# Patient Record
Sex: FEMALE | Race: WHITE | NOT HISPANIC OR LATINO | Employment: OTHER | URBAN - METROPOLITAN AREA
[De-identification: names, ages, dates, MRNs, and addresses within clinical notes are randomized per-mention and may not be internally consistent; named-entity substitution may affect disease eponyms.]

---

## 2017-01-05 DIAGNOSIS — E03.4 HYPOTHYROIDISM DUE TO ACQUIRED ATROPHY OF THYROID: ICD-10-CM

## 2017-01-05 DIAGNOSIS — I10 ESSENTIAL HYPERTENSION: ICD-10-CM

## 2017-01-05 DIAGNOSIS — N39.41 URGE INCONTINENCE: ICD-10-CM

## 2017-01-05 NOTE — TELEPHONE ENCOUNTER
Pt stating she told nurse to send 90 day supply to [facility] but it was sent to [facility] instead. Pt has to pay full price at local pharmacy. meds pended and pharm updated. Please advise.

## 2017-01-06 RX ORDER — ESOMEPRAZOLE MAGNESIUM 40 MG/1
40 CAPSULE, DELAYED RELEASE ORAL
Qty: 90 CAPSULE | Refills: 1 | Status: SHIPPED | OUTPATIENT
Start: 2017-01-06 | End: 2017-08-22 | Stop reason: SDUPTHER

## 2017-01-06 RX ORDER — LEVOTHYROXINE SODIUM 100 UG/1
100 TABLET ORAL DAILY
Qty: 90 TABLET | Refills: 1 | Status: SHIPPED | OUTPATIENT
Start: 2017-01-06 | End: 2017-06-22 | Stop reason: SDUPTHER

## 2017-01-06 RX ORDER — BENAZEPRIL HYDROCHLORIDE 20 MG/1
20 TABLET ORAL 2 TIMES DAILY
Qty: 180 TABLET | Refills: 1 | Status: SHIPPED | OUTPATIENT
Start: 2017-01-06 | End: 2017-08-22 | Stop reason: SDUPTHER

## 2017-01-06 RX ORDER — OXYBUTYNIN CHLORIDE 5 MG/1
5 TABLET, EXTENDED RELEASE ORAL DAILY
Qty: 90 TABLET | Refills: 1 | Status: SHIPPED | OUTPATIENT
Start: 2017-01-06 | End: 2017-08-22 | Stop reason: SDUPTHER

## 2017-01-10 ENCOUNTER — OFFICE VISIT (OUTPATIENT)
Dept: SPINE | Facility: CLINIC | Age: 67
End: 2017-01-10
Attending: ANESTHESIOLOGY
Payer: COMMERCIAL

## 2017-01-10 VITALS — HEIGHT: 61 IN | WEIGHT: 135 LBS | BODY MASS INDEX: 25.49 KG/M2

## 2017-01-10 DIAGNOSIS — M47.816 LUMBAR FACET ARTHROPATHY: ICD-10-CM

## 2017-01-10 DIAGNOSIS — M47.816 SPONDYLOSIS OF LUMBAR REGION WITHOUT MYELOPATHY OR RADICULOPATHY: Primary | ICD-10-CM

## 2017-01-10 PROCEDURE — 1159F MED LIST DOCD IN RCRD: CPT | Mod: S$GLB,,, | Performed by: ANESTHESIOLOGY

## 2017-01-10 PROCEDURE — 1126F AMNT PAIN NOTED NONE PRSNT: CPT | Mod: S$GLB,,, | Performed by: ANESTHESIOLOGY

## 2017-01-10 PROCEDURE — 99999 PR PBB SHADOW E&M-EST. PATIENT-LVL II: CPT | Mod: PBBFAC,,, | Performed by: ANESTHESIOLOGY

## 2017-01-10 PROCEDURE — 99213 OFFICE O/P EST LOW 20 MIN: CPT | Mod: S$GLB,,, | Performed by: ANESTHESIOLOGY

## 2017-01-10 PROCEDURE — 1157F ADVNC CARE PLAN IN RCRD: CPT | Mod: S$GLB,,, | Performed by: ANESTHESIOLOGY

## 2017-01-10 PROCEDURE — 1160F RVW MEDS BY RX/DR IN RCRD: CPT | Mod: S$GLB,,, | Performed by: ANESTHESIOLOGY

## 2017-01-10 NOTE — PROGRESS NOTES
Subjective:      Patient ID: Osman Johansen is a 66 y.o. female.    Chief Complaint: Follow-up    Referred by: Referral, Self       Returns for follow-up status post facet joint injections.  She has 100% relief.  She does not want anything done.  She has no untoward side effects.  Pain Scales  Best: 0/10  Worst: 8/10  Usually: 5/10  Today: 0/10      Past Medical History   Diagnosis Date    Allergy     Anxiety     Arthritis     Blood transfusion 8 yrs    Degenerative disc disease     Depression     GERD (gastroesophageal reflux disease)     Hypertension     Neuromuscular disorder     Osteoporosis     PUD (peptic ulcer disease)     Thoracic or lumbosacral neuritis or radiculitis 10/19/2012    Thyroid disease        Past Surgical History   Procedure Laterality Date    Hysterectomy  8 yrs     Appendectomy  1965    Tonsillectomy  1954       Review of patient's allergies indicates:   Allergen Reactions    Pcn [penicillins] Swelling    Toradol [ketorolac] Swelling    Vibramycin [doxycycline calcium] Swelling    Cymbalta [duloxetine]      dizzyness    Elavil [amitriptyline]     Lyrica [pregabalin] Swelling    Seroquel [quetiapine]      restless leg symdrome       Current Outpatient Prescriptions   Medication Sig Dispense Refill    benazepril (LOTENSIN) 20 MG tablet Take 1 tablet (20 mg total) by mouth 2 (two) times daily. 1 po qam and 1/2 tab po qpm 180 tablet 1    clonazePAM (KLONOPIN) 0.5 MG tablet Take 1 tablet (0.5 mg total) by mouth nightly as needed for Anxiety. 30 tablet 0    esomeprazole (NEXIUM) 40 MG capsule Take 1 capsule (40 mg total) by mouth before breakfast. 90 capsule 1    estradiol (VIVELLE-DOT) 0.075 mg/24 hr APPLY 1 PATCH TWICE A WEEK 3 patch 2    fluticasone (FLONASE) 50 mcg/actuation nasal spray USE 1 SPRAY IN EACH NOSTRIL DAILY 16 g 11    hyoscyamine (LEVSIN/SL) 0.125 mg Subl Place 1 tablet (0.125 mg total) under the tongue every 4 (four) hours as needed. 45 tablet 3     levothyroxine (SYNTHROID) 100 MCG tablet Take 1 tablet (100 mcg total) by mouth once daily. BRAND NAME ONLY 90 tablet 1    metformin (GLUCOPHAGE) 500 MG tablet Take 1 tablet by mouth once daily.      methylPREDNISolone (MEDROL DOSEPACK) 4 mg tablet use as directed 21 tablet 0    naproxen (NAPROSYN) 500 MG tablet Take 1 tablet (500 mg total) by mouth 2 (two) times daily as needed. 45 tablet 1    oxybutynin (DITROPAN-XL) 5 MG TR24 Take 1 tablet (5 mg total) by mouth once daily. 90 tablet 1    promethazine (PHENERGAN) 25 MG tablet Take 1 tablet (25 mg total) by mouth every 6 (six) hours as needed for Nausea. 45 tablet 2    ropinirole (REQUIP) 2 MG tablet       SUBOXONE 8-2 mg Film 1 Film by Subdermal route once daily.  0    sucralfate (CARAFATE) 1 gram tablet       valacyclovir (VALTREX) 1000 MG tablet Take 1 tablet (1,000 mg total) by mouth 3 (three) times daily. 21 tablet 1     No current facility-administered medications for this visit.        Family History   Problem Relation Age of Onset    Arthritis Mother     Cancer Mother     Heart disease Mother     Hypertension Mother        Social History     Social History    Marital status:      Spouse name: N/A    Number of children: N/A    Years of education: N/A     Occupational History    Not on file.     Social History Main Topics    Smoking status: Former Smoker     Quit date: 1/1/2008    Smokeless tobacco: Never Used    Alcohol use No    Drug use: No    Sexual activity: No     Other Topics Concern    Not on file     Social History Narrative       Review of Systems   HENT: Positive for headaches.    Eyes:        Vision change   Cardiovascular:        Hypertension   Respiratory: Positive for cough.    Endocrine:        Hypothyroidism   Hematologic/Lymphatic: Bruises/bleeds easily.   Genitourinary: Positive for frequency.   Neurological: Positive for aphonia, dizziness and loss of balance.   Psychiatric/Behavioral: Positive for  "depression and memory loss. The patient is nervous/anxious.    All other systems reviewed and are negative.          Objective:     Visit Vitals    Ht 5' 1" (1.549 m)    Wt 61.2 kg (135 lb)    BMI 25.51 kg/m2     Normocephalic.  Atraumatic.  Affect appropriate.  Breathing unlabored.  Extra ocular muscles intact.      Ortho/SPM Exam      Assessment:       Encounter Diagnoses   Name Primary?    Spondylosis of lumbar region without myelopathy or radiculopathy Yes    Lumbar facet arthropathy          Plan:       Osman was seen today for follow-up.    Diagnoses and all orders for this visit:    Spondylosis of lumbar region without myelopathy or radiculopathy    Lumbar facet arthropathy     We discussed with the patient the assessment and recommendations. The following is the plan we agreed on:]  1.  Return as needed.  Scales with her blood pressure and she needs to follow-up with her primary care physician.  She has an appointment on 30 January to support blood pressure.      "

## 2017-01-16 ENCOUNTER — PATIENT OUTREACH (OUTPATIENT)
Dept: ADMINISTRATIVE | Facility: HOSPITAL | Age: 67
End: 2017-01-16

## 2017-01-18 ENCOUNTER — TELEPHONE (OUTPATIENT)
Dept: FAMILY MEDICINE | Facility: CLINIC | Age: 67
End: 2017-01-18

## 2017-01-18 ENCOUNTER — NURSE TRIAGE (OUTPATIENT)
Dept: ADMINISTRATIVE | Facility: CLINIC | Age: 67
End: 2017-01-18

## 2017-01-18 PROBLEM — I10 ESSENTIAL (PRIMARY) HYPERTENSION: Status: ACTIVE | Noted: 2017-01-18

## 2017-01-18 PROBLEM — R07.9 CHEST PAIN AT REST: Status: ACTIVE | Noted: 2017-01-18

## 2017-01-18 PROBLEM — M79.7 FIBROMYALGIA: Status: ACTIVE | Noted: 2017-01-18

## 2017-01-18 NOTE — TELEPHONE ENCOUNTER
FYI: Pt c/o chest pain radiating to left arm, feeling cold and clammy and reporting BP 90/50. She stated off on for the past few days. I advised to report to ED. Pt stated she only wanted to go to a [facility] hospital. I advised best to go to the nearest ED, but pt declined.

## 2017-01-18 NOTE — TELEPHONE ENCOUNTER
Reason for Disposition   Pain also present in shoulder(s) or arm(s) or jaw    Protocols used: ST CHEST PAIN-A-OH    Reynamaitegretchen is calling to report left chest pain/pressure at times radiating down her arm.  At times she is feeling cold/clammy.  Advised ER.  States really did not want to go to ER.  Wants to see her MD.  Please contact Osman to advise.

## 2017-01-19 PROBLEM — I10 ACCELERATED HYPERTENSION: Status: ACTIVE | Noted: 2017-01-19

## 2017-01-20 ENCOUNTER — PATIENT OUTREACH (OUTPATIENT)
Dept: ADMINISTRATIVE | Facility: CLINIC | Age: 67
End: 2017-01-20
Payer: MEDICARE

## 2017-01-20 NOTE — PATIENT INSTRUCTIONS
C3 nurse attempted to contact patient. No answer. The following message was left for the patient to return the call:  Good afternoon I am a nurse calling on behalf of [facility] from the Care Coordination Center.  This is a Transitional Care Call for Osman Johansen . When you have a moment please contact us at [phone] or [phone] Monday through Friday, between the hours of 8 am to 4 pm. We look forward to speaking with you. On behalf of [facility] have a nice day.    The patient does not have a scheduled HOSFU appointment within 7-14 days post hospital discharge date 1/19/17. Message sent to Physician staff to assist with HOSFU appointment scheduling.

## 2017-01-24 ENCOUNTER — PATIENT OUTREACH (OUTPATIENT)
Dept: ADMINISTRATIVE | Facility: HOSPITAL | Age: 67
End: 2017-01-24

## 2017-01-24 NOTE — PROGRESS NOTES
Spoke to patient. She declined to schedule hospital follow up stating she is scheduled with her PCP, Dr. Stiles 1/20/17. Pt states she is also not having any post discharge issues

## 2017-01-24 NOTE — LETTER
January 24, 2017    Osman Johansen  [id number]  [place] [address]             [facility]  [address]  [place] [address]  Phone: [phone] Dear Mrs. Johansen:    We have tried to reach you by mychart unsuccessfully.    [facility] is committed to your overall health.  To help you get the most out of each of your visits, we will review your information to make sure you are up to date on all of your recommended tests and/or procedures.       Dr. Stiles        has found that you may be due for:     Annual Dilated Eye Exam   Tetanus immunization   Osteoporosis screening (DEXA SCAN)   Influenza vaccine   Mammogram     If you have had any of the above done at another facility, please bring the records or information with you so that your record at [facility] will be complete.      If you are currently taking medication , please bring it with you to your appointment for review.     We appreciate the opportunity to provide you with excellent medical care.      Sincerely,  Carmita Vazquez  Clinical Care Coordinator  [facility] [address] [facility] [address]  [place] [address]  Phone: [phone]   Fax: [phone]

## 2017-01-25 ENCOUNTER — OFFICE VISIT (OUTPATIENT)
Dept: OBSTETRICS AND GYNECOLOGY | Facility: CLINIC | Age: 67
End: 2017-01-25
Attending: OBSTETRICS & GYNECOLOGY
Payer: COMMERCIAL

## 2017-01-25 VITALS
BODY MASS INDEX: 26.51 KG/M2 | WEIGHT: 140.44 LBS | SYSTOLIC BLOOD PRESSURE: 112 MMHG | DIASTOLIC BLOOD PRESSURE: 80 MMHG | HEIGHT: 61 IN

## 2017-01-25 DIAGNOSIS — Z12.31 VISIT FOR SCREENING MAMMOGRAM: ICD-10-CM

## 2017-01-25 DIAGNOSIS — Z78.0 MENOPAUSE: Primary | ICD-10-CM

## 2017-01-25 PROCEDURE — 99202 OFFICE O/P NEW SF 15 MIN: CPT | Mod: S$GLB,,, | Performed by: OBSTETRICS & GYNECOLOGY

## 2017-01-25 PROCEDURE — 3074F SYST BP LT 130 MM HG: CPT | Mod: S$GLB,,, | Performed by: OBSTETRICS & GYNECOLOGY

## 2017-01-25 PROCEDURE — 1160F RVW MEDS BY RX/DR IN RCRD: CPT | Mod: S$GLB,,, | Performed by: OBSTETRICS & GYNECOLOGY

## 2017-01-25 PROCEDURE — 1157F ADVNC CARE PLAN IN RCRD: CPT | Mod: S$GLB,,, | Performed by: OBSTETRICS & GYNECOLOGY

## 2017-01-25 PROCEDURE — 99999 PR PBB SHADOW E&M-EST. PATIENT-LVL II: CPT | Mod: PBBFAC,,, | Performed by: OBSTETRICS & GYNECOLOGY

## 2017-01-25 PROCEDURE — 1159F MED LIST DOCD IN RCRD: CPT | Mod: S$GLB,,, | Performed by: OBSTETRICS & GYNECOLOGY

## 2017-01-25 PROCEDURE — 1126F AMNT PAIN NOTED NONE PRSNT: CPT | Mod: S$GLB,,, | Performed by: OBSTETRICS & GYNECOLOGY

## 2017-01-25 PROCEDURE — 3079F DIAST BP 80-89 MM HG: CPT | Mod: S$GLB,,, | Performed by: OBSTETRICS & GYNECOLOGY

## 2017-01-25 RX ORDER — ESTRADIOL 0.07 MG/D
PATCH TRANSDERMAL
Qty: 12 PATCH | Refills: 3 | Status: SHIPPED | OUTPATIENT
Start: 2017-01-25 | End: 2017-12-09 | Stop reason: SDUPTHER

## 2017-01-25 NOTE — PROGRESS NOTES
Subjective:       Patient ID: Osman Johansen is a 66 y.o. female.    Chief Complaint:  Menopause and Depression      History of Present Illness  HPI  This 66 yr old P2 female is here for discussion of ERT.  She was recently seen in ER for angina and had nuclear stress test and was normal.  Is now on nitroglycerine.  She took this for angina in her 30's but then went away.   She is having memory fog and has several days depression at a time then will have energy for week or two then fatigue and takes all she has to breathe.  She went to detox when she stopped her pain meds (fentanyl) for her back injuries.  Is taking Ceboxin and when she weans off of this she doesn't sleep for 4 days at a time.  She has appointment with neurology  When she forgets her patches she notices hot flashes etc. We disscussed  Her estradiol was 78 in Nov.  This is a good level for her so her other symptoms should not be related to this.  We are going to change her patch to weekly instead of twice weekly so she can remember better.  GYN & OB History  No LMP recorded. Patient has had a hysterectomy.   Date of Last Pap: 2013    OB History    Para Term  AB SAB TAB Ectopic Multiple Living   2 2              # Outcome Date GA Lbr Lucian/2nd Weight Sex Delivery Anes PTL Lv   2 Para            1 Para                   Review of Systems  Review of Systems   Constitutional: Negative for chills and fever.   Respiratory: Negative for shortness of breath.    Cardiovascular: Negative for chest pain.   Gastrointestinal: Negative for abdominal pain, nausea and vomiting.   Genitourinary: Negative for difficulty urinating, dyspareunia, genital sores, menstrual problem, pelvic pain, vaginal bleeding, vaginal discharge and vaginal pain.   Skin: Negative for wound.   Hematological: Negative for adenopathy.           Objective:    Physical Exam       Assessment:        1. Menopause               Plan:      Change to climara for easier use and  compliance

## 2017-01-25 NOTE — Clinical Note
Dr Pearson, this is a patient that is coming to see you and I would love a summary of your thoughts.  She is with me now and says ok to send to me.

## 2017-01-27 ENCOUNTER — TELEPHONE (OUTPATIENT)
Dept: FAMILY MEDICINE | Facility: CLINIC | Age: 67
End: 2017-01-27

## 2017-01-30 ENCOUNTER — HOSPITAL ENCOUNTER (OUTPATIENT)
Dept: RADIOLOGY | Facility: HOSPITAL | Age: 67
Discharge: HOME | End: 2017-01-30
Attending: INTERNAL MEDICINE
Payer: COMMERCIAL

## 2017-01-30 ENCOUNTER — OFFICE VISIT (OUTPATIENT)
Dept: FAMILY MEDICINE | Facility: CLINIC | Age: 67
End: 2017-01-30
Payer: COMMERCIAL

## 2017-01-30 VITALS
SYSTOLIC BLOOD PRESSURE: 124 MMHG | HEART RATE: 72 BPM | BODY MASS INDEX: 26.93 KG/M2 | DIASTOLIC BLOOD PRESSURE: 70 MMHG | HEIGHT: 61 IN | WEIGHT: 142.63 LBS

## 2017-01-30 DIAGNOSIS — Z12.31 ENCOUNTER FOR SCREENING MAMMOGRAM FOR BREAST CANCER: ICD-10-CM

## 2017-01-30 DIAGNOSIS — I10 ESSENTIAL HYPERTENSION: ICD-10-CM

## 2017-01-30 DIAGNOSIS — E03.4 HYPOTHYROIDISM DUE TO ACQUIRED ATROPHY OF THYROID: ICD-10-CM

## 2017-01-30 DIAGNOSIS — Z78.0 POST-MENOPAUSAL: ICD-10-CM

## 2017-01-30 DIAGNOSIS — E11.9 CONTROLLED TYPE 2 DIABETES MELLITUS WITHOUT COMPLICATION, WITHOUT LONG-TERM CURRENT USE OF INSULIN: ICD-10-CM

## 2017-01-30 DIAGNOSIS — R51.9 FRONTAL HEADACHE: ICD-10-CM

## 2017-01-30 DIAGNOSIS — Z00.00 ROUTINE PHYSICAL EXAMINATION: Primary | ICD-10-CM

## 2017-01-30 PROCEDURE — 3074F SYST BP LT 130 MM HG: CPT | Mod: S$GLB,,, | Performed by: INTERNAL MEDICINE

## 2017-01-30 PROCEDURE — 99397 PER PM REEVAL EST PAT 65+ YR: CPT | Mod: S$GLB,,, | Performed by: INTERNAL MEDICINE

## 2017-01-30 PROCEDURE — 99999 PR PBB SHADOW E&M-EST. PATIENT-LVL III: CPT | Mod: PBBFAC,,, | Performed by: INTERNAL MEDICINE

## 2017-01-30 PROCEDURE — 3078F DIAST BP <80 MM HG: CPT | Mod: S$GLB,,, | Performed by: INTERNAL MEDICINE

## 2017-01-30 PROCEDURE — 70210 X-RAY EXAM OF SINUSES: CPT | Mod: TC,PO

## 2017-01-30 PROCEDURE — 70210 X-RAY EXAM OF SINUSES: CPT | Mod: 26,,, | Performed by: RADIOLOGY

## 2017-01-30 NOTE — PROGRESS NOTES
Subjective:       Patient ID: Osman Johansen is a 67 y.o. female.    Chief Complaint: Follow-up and Herpes Zoster    HPI Comments: Here for routine health maintenance.   Here with multiple complaints:  HTN - controlled  Hypothyroid - controlled  DM - controlled    Went to ER for CP and low BP.  NM stress test wnl.  Dx non cardiac chest pain    Complains of b/l frontal headaches including top of her head.  Had viral URI with a lot of mucous a few weeks ago.  Gets headaches frequently.      Back pain resolved s/p pain Dr injection    Recall - pain Dr wanted her to see neurology.  Gets cramps on Suboxone.  Trying to wean off and down to a piece of her daily pill but can't because develops RLS that prevents her from sleeping.  She had similar events with leg cramping when on fentanyl and hydrocodone that resolved when placed on Suboxone.  She is down to a piece of Suboxone daily = 1 pill lasts about 4-6 days.  She also has uncontrolled RLS that has been exacerbated as she has tried to wean off Suboxone.  This has been happening off and on for about 6 months now.     Review of Systems   Constitutional: Negative for appetite change and fever.   HENT: Negative for nosebleeds and trouble swallowing.    Eyes: Negative for discharge and visual disturbance.   Respiratory: Negative for choking and shortness of breath.    Cardiovascular: Negative for chest pain and palpitations.   Gastrointestinal: Negative for abdominal pain, nausea and vomiting.   Musculoskeletal: Negative for arthralgias and joint swelling.   Skin: Negative for rash and wound.   Neurological: Positive for headaches. Negative for dizziness and syncope.   Psychiatric/Behavioral: Negative for confusion and dysphoric mood.       Objective:      Vitals:    01/30/17 1505   BP: 124/70   Pulse: 72     Physical Exam   Constitutional: She appears well-nourished.   Eyes: Conjunctivae and EOM are normal.   Neck: Trachea normal and normal range of motion. No  thyromegaly present.   Cardiovascular: Normal heart sounds.    Edema negative   Pulmonary/Chest: Effort normal and breath sounds normal.   Abdominal: Soft. There is no hepatomegaly.   Neurological: No cranial nerve deficit.   DTR decreased bilateral   Skin: Skin is warm, dry and intact.   Psychiatric: She has a normal mood and affect.   Alert and Oriented    Vitals reviewed.        Assessment:       1. Routine physical examination    2. Post-menopausal    3. Encounter for screening mammogram for breast cancer    4. Hypothyroidism due to acquired atrophy of thyroid    5. Frontal headache    6. Essential hypertension    7. Controlled type 2 diabetes mellitus without complication, without long-term current use of insulin        Plan:       Routine physical examination    Post-menopausal  -     DXA Bone Density Spine And Hip_Axial Skeleton; Future; Expected date: 1/30/17    Encounter for screening mammogram for breast cancer  -     Mammo Digital Screening Bilat with CAD; Future; Expected date: 1/30/17    Hypothyroidism due to acquired atrophy of thyroid  -     TSH; Future; Expected date: 7/29/17    Frontal headache  -     X-Ray Sinuses Ltd Less Than 3 Views; Future; Expected date: 1/30/17    Essential hypertension  -     Lipid panel; Future; Expected date: 7/29/17  -     Comprehensive metabolic panel; Future; Expected date: 7/29/17    Controlled type 2 diabetes mellitus without complication, without long-term current use of insulin  -     Hemoglobin A1c; Future; Expected date: 7/29/17    wellness reviewed  Continue current plan of care    Defer to neurology for HA, RLS, muscle cramping        Counseled on regular exercise, maintenance of a healthy weight, balanced diet rich in fruits/vegetables and lean protein, and avoidance of unhealthy habits like smoking and excessive alcohol intake.   Also, counseled on importance of being compliant with medication, health appointments, diet and exercise.     Return in about 6  "months (around 7/30/2017).    "This note will not be shared with the patient."  "

## 2017-01-30 NOTE — MR AVS SNAPSHOT
[facility]  [address]  [facility] [address]  Phone: [phone]  Fax: [phone]                  Osman Johansen   2017 3:10 PM   Office Visit    Description:  Female : 1950   Provider:  Galindo Stiles MD   Department:  [facility]           Reason for Visit     Follow-up     Herpes Zoster           Diagnoses this Visit        Comments    Routine physical examination    -  Primary     Post-menopausal         Encounter for screening mammogram for breast cancer         Hypothyroidism due to acquired atrophy of thyroid         Frontal headache         Essential hypertension         Controlled type 2 diabetes mellitus without complication, without long-term current use of insulin                To Do List           Future Appointments        Provider Department Dept Phone    2017 4:00 PM NS XR3 [facility] [phone]    2017 2:15 PM [facility] MAMMO1 [facility] [phone]    2017 2:40 PM NS DEXA1 [facility] [phone]    2017 1:40 PM Chandana Pearson Jr., MD [facility] [phone]    2017 10:15 AM LAB, [place] [facility] [phone]      Goals (5 Years of Data)     None      Follow-Up and Disposition     Return in about 6 months (around 2017).    Follow-up and Disposition History      [facility]     [facility] -  Assistance  Registered nurses in the [facility] provide clinical advisement, health education, appointment booking, and other advisory services.  Call for this free service at [phone].             Medications           Message regarding Medications     Verify the changes and/or additions to your medication regime listed below are the same as discussed with your clinician today.  If any of these changes or additions are incorrect, please notify your healthcare provider.         STOP taking these medications     clonazePAM (KLONOPIN) 0.5 MG tablet Take 1 tablet (0.5 mg total) by mouth nightly as needed for Anxiety.    estradiol (VIVELLE-DOT) 0.075 mg/24 hr APPLY 1 PATCH TWICE A WEEK    methylPREDNISolone (MEDROL DOSEPACK) 4 mg tablet use as directed    naproxen (NAPROSYN) 500 MG tablet Take 1 tablet (500 mg total) by mouth 2 (two) times daily as needed.    valacyclovir (VALTREX) 1000 MG tablet Take 1 tablet (1,000 mg total) by mouth 3 (three) times daily.           Verify that the below list of medications is an accurate representation of the medications you are currently taking.  If none reported, the list may be blank. If incorrect, please contact your healthcare provider. Carry this list with you in case of emergency.           Current Medications     benazepril (LOTENSIN) 20 MG tablet Take 1 tablet (20 mg total) by mouth 2 (two) times daily. 1 po qam and 1/2 tab po qpm    esomeprazole (NEXIUM) 40 MG capsule Take 1 capsule (40 mg total) by mouth before breakfast.    estradiol (CLIMARA) 0.075 mg/24 hr Apply to clean dry skin once per week    fluticasone (FLONASE) 50 mcg/actuation nasal spray USE 1 SPRAY IN EACH NOSTRIL DAILY    hyoscyamine (LEVSIN/SL) 0.125 mg Subl Place 1 tablet (0.125 mg total) under the tongue every 4 (four) hours as needed.    levothyroxine (SYNTHROID) 100 MCG tablet Take 1 tablet (100 mcg total) by mouth once daily. BRAND NAME ONLY    metformin (GLUCOPHAGE) 500 MG tablet Take 1 tablet by mouth once daily.    oxybutynin (DITROPAN-XL) 5 MG TR24 Take 1 tablet (5 mg total) by mouth once daily.    promethazine (PHENERGAN) 25 MG tablet Take 1 tablet (25 mg total) by mouth every 6 (six) hours as needed for Nausea.    ropinirole (REQUIP) 2 MG tablet 2 mg nightly.     SUBOXONE 8-2 mg Film 1 Film by Subdermal route once daily.    sucralfate (CARAFATE) 1 gram tablet     nitroGLYCERIN (NITROSTAT) 0.4 MG SL tablet Place 1 tablet (0.4 mg total) under the tongue every 5 (five)  "minutes as needed for Chest pain.           Clinical Reference Information           Vital Signs - Last Recorded  Most recent update: 1/30/2017  3:13 PM by Lizet Tee LPN    BP Pulse Ht Wt BMI    124/70 (BP Location: Left arm, Patient Position: Sitting, BP Method: Manual) 72 5' 1" (1.549 m) 64.7 kg (142 lb 10.2 oz) 26.95 kg/m2      Blood Pressure          Most Recent Value    BP  124/70      Allergies as of 1/30/2017     Pcn [Penicillins]    Toradol [Ketorolac]    Vibramycin [Doxycycline Calcium]    Cymbalta [Duloxetine]    Elavil [Amitriptyline]    Lyrica [Pregabalin]    Seroquel [Quetiapine]      Immunizations Administered on Date of Encounter - 1/30/2017     None      Orders Placed During Today's Visit     Future Labs/Procedures Expected by Expires    DXA Bone Density Spine And Hip_Axial Skeleton  1/30/2017 1/30/2018    Mammo Digital Screening Bilat with CAD  1/30/2017 4/1/2018    X-Ray Sinuses Ltd Less Than 3 Views  1/30/2017 1/31/2018    Comprehensive metabolic panel  7/29/2017 3/31/2018    Hemoglobin A1c  7/29/2017 1/30/2018    Lipid panel  7/29/2017 1/30/2018    TSH  7/29/2017 1/30/2018      "

## 2017-02-09 ENCOUNTER — HOSPITAL ENCOUNTER (OUTPATIENT)
Dept: RADIOLOGY | Facility: HOSPITAL | Age: 67
Discharge: HOME | End: 2017-02-09
Attending: INTERNAL MEDICINE
Payer: COMMERCIAL

## 2017-02-09 DIAGNOSIS — Z78.0 POST-MENOPAUSAL: ICD-10-CM

## 2017-02-09 DIAGNOSIS — Z12.31 ENCOUNTER FOR SCREENING MAMMOGRAM FOR BREAST CANCER: ICD-10-CM

## 2017-02-09 PROCEDURE — 77080 DXA BONE DENSITY AXIAL: CPT | Mod: TC,PO

## 2017-02-09 PROCEDURE — 77080 DXA BONE DENSITY AXIAL: CPT | Mod: 26,,, | Performed by: RADIOLOGY

## 2017-02-09 PROCEDURE — 77067 SCR MAMMO BI INCL CAD: CPT | Mod: TC

## 2017-02-09 PROCEDURE — 77067 SCR MAMMO BI INCL CAD: CPT | Mod: 26,,, | Performed by: RADIOLOGY

## 2017-02-09 PROCEDURE — 77063 BREAST TOMOSYNTHESIS BI: CPT | Mod: 26,,, | Performed by: RADIOLOGY

## 2017-02-16 ENCOUNTER — OFFICE VISIT (OUTPATIENT)
Dept: NEUROLOGY | Facility: CLINIC | Age: 67
End: 2017-02-16
Payer: COMMERCIAL

## 2017-02-16 ENCOUNTER — TELEPHONE (OUTPATIENT)
Dept: NEUROLOGY | Facility: CLINIC | Age: 67
End: 2017-02-16

## 2017-02-16 VITALS
HEIGHT: 61 IN | DIASTOLIC BLOOD PRESSURE: 86 MMHG | HEART RATE: 76 BPM | BODY MASS INDEX: 26.93 KG/M2 | WEIGHT: 142.63 LBS | SYSTOLIC BLOOD PRESSURE: 185 MMHG | RESPIRATION RATE: 18 BRPM

## 2017-02-16 DIAGNOSIS — G25.81 RLS (RESTLESS LEGS SYNDROME): ICD-10-CM

## 2017-02-16 DIAGNOSIS — R41.3 MEMORY LOSS: Primary | ICD-10-CM

## 2017-02-16 PROCEDURE — 1126F AMNT PAIN NOTED NONE PRSNT: CPT | Mod: S$GLB,,, | Performed by: PSYCHIATRY & NEUROLOGY

## 2017-02-16 PROCEDURE — 1157F ADVNC CARE PLAN IN RCRD: CPT | Mod: S$GLB,,, | Performed by: PSYCHIATRY & NEUROLOGY

## 2017-02-16 PROCEDURE — 3077F SYST BP >= 140 MM HG: CPT | Mod: S$GLB,,, | Performed by: PSYCHIATRY & NEUROLOGY

## 2017-02-16 PROCEDURE — 1160F RVW MEDS BY RX/DR IN RCRD: CPT | Mod: S$GLB,,, | Performed by: PSYCHIATRY & NEUROLOGY

## 2017-02-16 PROCEDURE — 99205 OFFICE O/P NEW HI 60 MIN: CPT | Mod: S$GLB,,, | Performed by: PSYCHIATRY & NEUROLOGY

## 2017-02-16 PROCEDURE — 3079F DIAST BP 80-89 MM HG: CPT | Mod: S$GLB,,, | Performed by: PSYCHIATRY & NEUROLOGY

## 2017-02-16 PROCEDURE — 1159F MED LIST DOCD IN RCRD: CPT | Mod: S$GLB,,, | Performed by: PSYCHIATRY & NEUROLOGY

## 2017-02-16 PROCEDURE — 99999 PR PBB SHADOW E&M-EST. PATIENT-LVL III: CPT | Mod: PBBFAC,,, | Performed by: PSYCHIATRY & NEUROLOGY

## 2017-02-16 RX ORDER — ROPINIROLE 2 MG/1
3 TABLET, FILM COATED ORAL NIGHTLY
Qty: 90 TABLET | Refills: 3 | Status: SHIPPED | OUTPATIENT
Start: 2017-02-16 | End: 2017-02-22 | Stop reason: SDUPTHER

## 2017-02-16 NOTE — MR AVS SNAPSHOT
[facility]  [address] [place] [address]  Phone: [phone]  Fax: [phone]                  Osman Johansen   2017 1:40 PM   Office Visit    Description:  Female : 1950   Provider:  Chanadna Pearson Jr., MD   Department:  [facility] - Neurology           Reason for Visit     Muscle Pain     Memory Loss           Diagnoses this Visit        Comments    Memory loss    -  Primary     RLS (restless legs syndrome)                To Do List           Future Appointments        Provider Department Dept Phone    3/3/2017 12:50 PM LAB, [place] [facility] [phone]    3/3/2017 1:00 PM [facility] MRI1 [facility] [phone]    2017 3:00 PM Chandana Pearson Jr., MD [facility] [phone]    2017 10:15 AM LAB, [place] [facility] [phone]    2017 1:50 PM Galindo Stiles MD [facility] [phone]      Goals (5 Years of Data)     None       These Medications        Disp Refills Start End    ropinirole (REQUIP) 2 MG tablet 90 tablet 3 2017     Take 1.5 tablets (3 mg total) by mouth nightly. - Oral    Pharmacy: [facility] - [address] #: [phone]         [facility]     [facility] Care [facility] -  Assistance  Registered nurses in the [facility] Center provide clinical advisement, health education, appointment booking, and other advisory services.  Call for this free service at [phone].             Medications           Message regarding Medications     Verify the changes and/or additions to your medication regime listed below are the same as discussed with your clinician today.  If any of these changes or additions are incorrect, please notify your healthcare provider.        CHANGE how you are taking these medications     Start Taking Instead of    ropinirole (REQUIP) 2 MG tablet ropinirole  "(REQUIP) 2 MG tablet    Dosage:  Take 1.5 tablets (3 mg total) by mouth nightly. Dosage:  2 mg nightly.     Reason for Change:  Reorder            Verify that the below list of medications is an accurate representation of the medications you are currently taking.  If none reported, the list may be blank. If incorrect, please contact your healthcare provider. Carry this list with you in case of emergency.           Current Medications     benazepril (LOTENSIN) 20 MG tablet Take 1 tablet (20 mg total) by mouth 2 (two) times daily. 1 po qam and 1/2 tab po qpm    esomeprazole (NEXIUM) 40 MG capsule Take 1 capsule (40 mg total) by mouth before breakfast.    estradiol (CLIMARA) 0.075 mg/24 hr Apply to clean dry skin once per week    fluticasone (FLONASE) 50 mcg/actuation nasal spray USE 1 SPRAY IN EACH NOSTRIL DAILY    hyoscyamine (LEVSIN/SL) 0.125 mg Subl Place 1 tablet (0.125 mg total) under the tongue every 4 (four) hours as needed.    levothyroxine (SYNTHROID) 100 MCG tablet Take 1 tablet (100 mcg total) by mouth once daily. BRAND NAME ONLY    metformin (GLUCOPHAGE) 500 MG tablet Take 1 tablet by mouth once daily.    nitroGLYCERIN (NITROSTAT) 0.4 MG SL tablet Place 1 tablet (0.4 mg total) under the tongue every 5 (five) minutes as needed for Chest pain.    oxybutynin (DITROPAN-XL) 5 MG TR24 Take 1 tablet (5 mg total) by mouth once daily.    promethazine (PHENERGAN) 25 MG tablet Take 1 tablet (25 mg total) by mouth every 6 (six) hours as needed for Nausea.    ropinirole (REQUIP) 2 MG tablet Take 1.5 tablets (3 mg total) by mouth nightly.    SUBOXONE 8-2 mg Film 1 Film by Subdermal route once daily.    sucralfate (CARAFATE) 1 gram tablet            Clinical Reference Information           Your Vitals Were     BP Pulse Resp Height Weight BMI    185/86 (BP Location: Right arm, Patient Position: Sitting, BP Method: Automatic) 76 18 5' 1" (1.549 m) 64.7 kg (142 lb 10.2 oz) 26.95 kg/m2      Blood Pressure          Most " Recent Value    BP  (!)  185/86      Allergies as of 2/16/2017     Pcn [Penicillins]    Toradol [Ketorolac]    Vibramycin [Doxycycline Calcium]    Cymbalta [Duloxetine]    Elavil [Amitriptyline]    Lyrica [Pregabalin]    Seroquel [Quetiapine]      Immunizations Administered on Date of Encounter - 2/16/2017     None      Orders Placed During Today's Visit     Future Labs/Procedures Expected by Expires    Ammonia  2/16/2017 2/16/2018    CBC auto differential  2/16/2017 4/17/2018    Comprehensive metabolic panel  2/16/2017 2/16/2018    Ferritin  2/16/2017 4/17/2018    Folate  2/16/2017 2/16/2018    Iron and TIBC  2/16/2017 4/17/2018    MRI Brain Without Contrast  2/16/2017 2/16/2018    RPR  2/16/2017 2/16/2018    TSH  2/16/2017 2/16/2018    Vitamin B12  2/16/2017 2/16/2018    Vitamin B1  2/16/2017 4/17/2018    Neuropsychological testing  As directed 2/16/2018      Language Assistance Services     ATTENTION: Language assistance services are available, free of charge. Please call [phone].      ATENCIÓN: Si habla español, tiene a lorenzo disposición servicios gratuitos de asistencia lingüística. Llame al [phone].     CHÚ Ý: N?u b?n nói Ti?ng Vi?t, có các d?ch v? h? tr? ngôn ng? mi?n phí dành cho b?n. G?i s? [phone].         [facility] complies with applicable Federal civil rights laws and does not discriminate on the basis of race, color, national origin, age, disability, or sex.

## 2017-02-16 NOTE — PROGRESS NOTES
Date of service:  2/16/2017    Chief complaint:  RLS, Memory Loss    History of present illness:  The patient is a 67 y.o. female referred for evaluation of RLS.  This began about 12 years ago.  She describes it as discomfort in the bilateral LE.  It is worse at night.  It also worsens with Elavil and steroids.  She notices it more when she attempts to wean down on narcotics.  She is presently on Suboxone.  She indicates that this is preventing her from stopping the Suboxone altogether.  She is on Requip, which she feels has not been helpful.  These symptoms are present whenever she does not take Suboxone.    The patient is most concerned with memory loss. This issue began about 20 years ago.  It has been gradually progressive, though she does have good days and bad days.  She has issues with word finding.  It involves short-term memory more than long-term memory.  She reports some difficulties with executive function.  There are issues with multiple step processes. She has no clear problems with ADLs. She does get lost in familiar areas. There are no hallucinations. There are some personality changes.  She does endorse depression, anxiety, and racing of thoughts.      Past Medical History   Diagnosis Date    Allergy     Anxiety     Arthritis     Blood transfusion 8 yrs    Degenerative disc disease     Depression     GERD (gastroesophageal reflux disease)     Hypertension     Neuromuscular disorder     Osteoporosis     PUD (peptic ulcer disease)     Thoracic or lumbosacral neuritis or radiculitis 10/19/2012    Thyroid disease        Past Surgical History   Procedure Laterality Date    Hysterectomy  8 yrs     Appendectomy  1965    Tonsillectomy  1954       Family History   Problem Relation Age of Onset    Arthritis Mother     Cancer Mother     Heart disease Mother     Hypertension Mother        Social History     Social History    Marital status:      Spouse name: N/A    Number of  "children: N/A    Years of education: N/A     Occupational History    Not on file.     Social History Main Topics    Smoking status: Former Smoker     Quit date: 1/1/2008    Smokeless tobacco: Never Used    Alcohol use No    Drug use: No    Sexual activity: No     Other Topics Concern    Not on file     Social History Narrative        Review of Systems  General/Constitutional:  No unintentional weight loss, No change in appetite  Eyes/Vision:  + change in vision, No double vision  ENT:  No frequent nose bleeds, + ringing in the ears  Respiratory:  No cough, No wheezing  Cardiovascular:  + chest pain, + palpitations  Gastrointestinal:  No jaundice, + nausea/vomiting  Genitourinary:  + incontinence, No burning with urination  Hematologic/Lymphatic:  + easy bruising/bleeding, No night sweats  Neurological:  + numbness, + weakness  Endocrine:  + fatigue, + heat/cold intolerance  Allergy/Immunologic:  No fevers, + chills  Musculoskeletal:  + muscle pain, + joint pain   Psychiatric:  No thoughts of harming self/others, + depression  Integumentary:  + rashes, No sores that do not heal     Physical exam:  Visit Vitals    BP (!) 185/86 (BP Location: Right arm, Patient Position: Sitting, BP Method: Automatic)    Pulse 76    Resp 18    Ht 5' 1" (1.549 m)    Wt 64.7 kg (142 lb 10.2 oz)    BMI 26.95 kg/m2     General: Well developed, well nourished.  No acute distress.  HEENT: Atraumatic, normocephalic.  Neck: Supple, trachea midline.  Cardiovascular: Regular rate and rhythm.  Pulmonary: No increased work of breathing.  Abdomen/GI: No guarding.  Musculoskeletal: No obvious joint deformities, moves all extremities well.    Neurological exam:  Mental status: Awake and alert.  Oriented x4.  Speech fluent and appropriate.  Recent and remote memory appear to be largely intact.  Fund of knowledge seems normal.  MMSE = 29/30.  Cranial nerves: Pupils equal round and reactive to light, extraocular movements intact, facial " strength and sensation intact bilaterally, palate and tongue midline, hearing grossly intact bilaterally.  Motor: 5 out of 5 strength throughout the upper and lower extremities bilaterally. Normal bulk and tone.  Sensation: Intact to light touch and temperature bilaterally.  DTR: 2+ at the knees and biceps bilaterally.  Coordination: Finger-nose-finger testing intact bilaterally.  Gait: Nonfocal gait.    Data base:  Notes of the referring physician were reviewed.  Briefly summarized, these included a recent visit for a variety of issues including HTN, DM, frontal headache, and thyroid disease.    Assessment and plan:  The patient is a 67 y.o. female referred for evaluation of RLS.  I do not see recent iron studies, so we will check some serologies.  Since she has not seen much benefit from Requip at this point but is also not having side effects, we will increase it to 3 mg QHS.  I would not feel comfortable maintaining her on narcotics for her RLS.  Given the severity of her symptoms, we will have her work with one of our movement disorder specialists.      She also complains of memory loss. I feel that the differential diagnosis includes MCI and pseudodementia.  I suspect the latter.  Options for evaluation were discussed, and the patient wishes to pursue an aggressive evaluation.  We will order MRI of the brain, serologies, and neuropsychological testing. We will plan on seeing the patient back in a few months.    The patient was noted to be hypertensive in clinic today.  Repeat BP check confirmed this.  I have asked the patient to follow up with their PCP about this and my staff to message the patient's PCP.

## 2017-02-16 NOTE — LETTER
February 27, 2017      Galindo Stiles MD  [address] [place] [address]           [facility]  [address] [place] [address]  Phone: [phone]  Fax: [phone]          Patient: Osman Johansen   MR Number: [id number]   YOB: 1950   Date of Visit: 2/16/2017       Dear Dr. Galindo Stiles:    Thank you for referring Osman Johansen to me for evaluation. Attached you will find relevant portions of my assessment and plan of care.    If you have questions, please do not hesitate to call me. I look forward to following Osman Johansen along with you.    Sincerely,    Chandana Pearson Jr., MD    Enclosure  CC:  No Recipients    If you would like to receive this communication electronically, please contact [email or web address] or [phone] to request more information on [employer] Link access.    For providers and/or their staff who would like to refer a patient to [facility], please contact us through our one-stop-shop provider referral line, [facility], at [phone].    If you feel you have received this communication in error or would no longer like to receive these types of communications, please e-mail [email or web address]

## 2017-02-16 NOTE — TELEPHONE ENCOUNTER
Patient referred to [facility]. Please contact patient to schedule appt.  Patent willing  to come to [place].

## 2017-02-22 DIAGNOSIS — G25.81 RLS (RESTLESS LEGS SYNDROME): ICD-10-CM

## 2017-02-22 DIAGNOSIS — R41.3 MEMORY LOSS: ICD-10-CM

## 2017-02-22 RX ORDER — ROPINIROLE 2 MG/1
3 TABLET, FILM COATED ORAL NIGHTLY
Qty: 135 TABLET | Refills: 3 | Status: SHIPPED | OUTPATIENT
Start: 2017-02-22 | End: 2020-09-08

## 2017-02-27 DIAGNOSIS — K29.70 GASTRITIS: ICD-10-CM

## 2017-03-01 RX ORDER — SUCRALFATE 1 G/1
TABLET ORAL
Qty: 90 TABLET | Refills: 1 | Status: SHIPPED | OUTPATIENT
Start: 2017-03-01 | End: 2017-08-22 | Stop reason: SDUPTHER

## 2017-03-02 ENCOUNTER — TELEPHONE (OUTPATIENT)
Dept: PAIN MEDICINE | Facility: CLINIC | Age: 67
End: 2017-03-02

## 2017-03-02 NOTE — TELEPHONE ENCOUNTER
Please review. Patient is asking for a repeat B/L Facet Injs @ L3/4. Please advise. This injection was performed 12/15/2016

## 2017-03-02 NOTE — TELEPHONE ENCOUNTER
----- Message from Candace Rushing sent at 3/1/2017 10:27 AM CST -----  _  1st Request  _  2nd Request  _  3rd Request        Who: Osman Costello    Why: pt is calling to schedule another injection, just like the one she had on 12/15/16    What Number to Call Back:[phone]    When to Expect a call back: (Before the end of the day)   -- if the call is after 12:00, the call back will be tomorrow.

## 2017-03-03 ENCOUNTER — HOSPITAL ENCOUNTER (OUTPATIENT)
Dept: RADIOLOGY | Facility: HOSPITAL | Age: 67
Discharge: HOME | End: 2017-03-03
Attending: PSYCHIATRY & NEUROLOGY
Payer: COMMERCIAL

## 2017-03-03 DIAGNOSIS — R41.3 MEMORY LOSS: ICD-10-CM

## 2017-03-03 PROCEDURE — 70551 MRI BRAIN STEM W/O DYE: CPT | Mod: TC,PO

## 2017-03-03 PROCEDURE — 70551 MRI BRAIN STEM W/O DYE: CPT | Mod: 26,,, | Performed by: RADIOLOGY

## 2017-03-03 NOTE — TELEPHONE ENCOUNTER
Left voice message requesting a return call to schedule for a Bilateral Facet injections at L3/4 with Dr. Belcher.

## 2017-03-13 ENCOUNTER — SURGERY (OUTPATIENT)
Age: 67
End: 2017-03-13

## 2017-03-13 ENCOUNTER — HOSPITAL ENCOUNTER (OUTPATIENT)
Facility: OTHER | Age: 67
Discharge: HOME | End: 2017-03-13
Attending: ANESTHESIOLOGY | Admitting: ANESTHESIOLOGY
Payer: COMMERCIAL

## 2017-03-13 VITALS
TEMPERATURE: 98 F | HEART RATE: 55 BPM | SYSTOLIC BLOOD PRESSURE: 176 MMHG | HEIGHT: 61 IN | BODY MASS INDEX: 26.06 KG/M2 | OXYGEN SATURATION: 100 % | RESPIRATION RATE: 18 BRPM | DIASTOLIC BLOOD PRESSURE: 77 MMHG | WEIGHT: 138 LBS

## 2017-03-13 DIAGNOSIS — M50.30 DDD (DEGENERATIVE DISC DISEASE), CERVICAL: Primary | ICD-10-CM

## 2017-03-13 DIAGNOSIS — R52 PAIN: ICD-10-CM

## 2017-03-13 LAB — POCT GLUCOSE: 84 MG/DL (ref 70–110)

## 2017-03-13 PROCEDURE — 25500020 PHARM REV CODE 255: Performed by: ANESTHESIOLOGY

## 2017-03-13 PROCEDURE — 64493 INJ PARAVERT F JNT L/S 1 LEV: CPT | Mod: 50 | Performed by: ANESTHESIOLOGY

## 2017-03-13 PROCEDURE — 64493 INJ PARAVERT F JNT L/S 1 LEV: CPT | Mod: 50,,, | Performed by: ANESTHESIOLOGY

## 2017-03-13 PROCEDURE — 25000003 PHARM REV CODE 250: Performed by: ANESTHESIOLOGY

## 2017-03-13 PROCEDURE — 63600175 PHARM REV CODE 636 W HCPCS: Performed by: ANESTHESIOLOGY

## 2017-03-13 RX ORDER — BUPIVACAINE HYDROCHLORIDE 2.5 MG/ML
INJECTION, SOLUTION INFILTRATION; PERINEURAL
Status: DISCONTINUED | OUTPATIENT
Start: 2017-03-13 | End: 2017-03-13 | Stop reason: HOSPADM

## 2017-03-13 RX ORDER — LIDOCAINE HYDROCHLORIDE 10 MG/ML
INJECTION INFILTRATION; PERINEURAL
Status: DISCONTINUED | OUTPATIENT
Start: 2017-03-13 | End: 2017-03-13 | Stop reason: HOSPADM

## 2017-03-13 RX ORDER — TRIAMCINOLONE ACETONIDE 40 MG/ML
INJECTION, SUSPENSION INTRA-ARTICULAR; INTRAMUSCULAR
Status: DISCONTINUED | OUTPATIENT
Start: 2017-03-13 | End: 2017-03-13 | Stop reason: HOSPADM

## 2017-03-13 RX ADMIN — TRIAMCINOLONE ACETONIDE 40 MG: 40 INJECTION, SUSPENSION INTRA-ARTICULAR; INTRAMUSCULAR at 01:03

## 2017-03-13 RX ADMIN — LIDOCAINE HYDROCHLORIDE 10 ML: 10 INJECTION, SOLUTION INFILTRATION; PERINEURAL at 01:03

## 2017-03-13 RX ADMIN — BUPIVACAINE HYDROCHLORIDE 10 ML: 2.5 INJECTION, SOLUTION INFILTRATION; PERINEURAL at 01:03

## 2017-03-13 RX ADMIN — IOHEXOL 10 ML: 300 INJECTION, SOLUTION INTRAVENOUS at 01:03

## 2017-03-13 RX ADMIN — SODIUM BICARBONATE 10 ML: 0.2 INJECTION, SOLUTION INTRAVENOUS at 01:03

## 2017-03-13 NOTE — IP AVS SNAPSHOT
[facility] Location ([facility])  [address] [place], [place] [address]  Phone: [phone]           Patient Discharge Instructions     Our goal is to set you up for success. This packet includes information on your condition, medications, and your home care. It will help you to care for yourself so you don't get sicker and need to go back to the hospital.     Please ask your nurse if you have any questions.        There are many details to remember when preparing to leave the hospital. Here is what you will need to do:    1. Take your medicine. If you are prescribed medications, review your Medication List in the following pages. You may have new medications to  at the pharmacy and others that you'll need to stop taking. Review the instructions for how and when to take your medications. Talk with your doctor or nurses if you are unsure of what to do.     2. Go to your follow-up appointments. Specific follow-up information is listed in the following pages. Your may be contacted by a transition nurse or clinical provider about future appointments. Be sure we have all of the phone numbers to reach you, if needed. Please contact your provider's office if you are unable to make an appointment.     3. Watch for warning signs. Your doctor or nurse will give you detailed warning signs to watch for and when to call for assistance. These instructions may also include educational information about your condition. If you experience any of warning signs to your health, call your doctor.               [facility]  Unless otherwise directed by your provider, please contact [facility], our nurse care line that is available for 24/7 assistance.     [phone] (toll-free)    Registered nurses in the [facility] provide clinical advisement, health education, appointment booking, and other advisory services.                    ** Verify the list of medication(s) below is accurate and up to  date. Carry this with you in case of emergency. If your medications have changed, please notify your healthcare provider.             Medication List      ASK your doctor about these medications        Additional Info                      benazepril 20 MG tablet   Commonly known as:  LOTENSIN   Quantity:  180 tablet   Refills:  1   Dose:  20 mg    Instructions:  Take 1 tablet (20 mg total) by mouth 2 (two) times daily. 1 po qam and 1/2 tab po qpm     Begin Date    AM    Noon    PM    Bedtime       esomeprazole 40 MG capsule   Commonly known as:  NEXIUM   Quantity:  90 capsule   Refills:  1   Dose:  40 mg    Instructions:  Take 1 capsule (40 mg total) by mouth before breakfast.     Begin Date    AM    Noon    PM    Bedtime       estradiol 0.075 mg/24 hr   Commonly known as:  CLIMARA   Quantity:  12 patch   Refills:  3    Instructions:  Apply to clean dry skin once per week     Begin Date    AM    Noon    PM    Bedtime       fluticasone 50 mcg/actuation nasal spray   Commonly known as:  FLONASE   Quantity:  16 g   Refills:  11    Instructions:  USE 1 SPRAY IN EACH NOSTRIL DAILY     Begin Date    AM    Noon    PM    Bedtime       hyoscyamine 0.125 mg Subl   Commonly known as:  LEVSIN/SL   Quantity:  45 tablet   Refills:  3   Dose:  0.125 mg    Instructions:  Place 1 tablet (0.125 mg total) under the tongue every 4 (four) hours as needed.     Begin Date    AM    Noon    PM    Bedtime       levothyroxine 100 MCG tablet   Commonly known as:  SYNTHROID   Quantity:  90 tablet   Refills:  1   Dose:  100 mcg    Instructions:  Take 1 tablet (100 mcg total) by mouth once daily. BRAND NAME ONLY     Begin Date    AM    Noon    PM    Bedtime       metformin 500 MG tablet   Commonly known as:  GLUCOPHAGE   Refills:  0   Dose:  1 tablet    Instructions:  Take 1 tablet by mouth once daily.     Begin Date    AM    Noon    PM    Bedtime       nitroGLYCERIN 0.4 MG SL tablet   Commonly known as:  NITROSTAT   Quantity:  30 tablet    Refills:  0   Dose:  0.4 mg    Instructions:  Place 1 tablet (0.4 mg total) under the tongue every 5 (five) minutes as needed for Chest pain.     Begin Date    AM    Noon    PM    Bedtime       oxybutynin 5 MG Tr24   Commonly known as:  DITROPAN-XL   Quantity:  90 tablet   Refills:  1   Dose:  5 mg    Instructions:  Take 1 tablet (5 mg total) by mouth once daily.     Begin Date    AM    Noon    PM    Bedtime       promethazine 25 MG tablet   Commonly known as:  PHENERGAN   Quantity:  45 tablet   Refills:  2   Dose:  25 mg    Instructions:  Take 1 tablet (25 mg total) by mouth every 6 (six) hours as needed for Nausea.     Begin Date    AM    Noon    PM    Bedtime       ropinirole 2 MG tablet   Commonly known as:  REQUIP   Quantity:  135 tablet   Refills:  3   Dose:  3 mg    Instructions:  Take 1.5 tablets (3 mg total) by mouth nightly.     Begin Date    AM    Noon    PM    Bedtime       SUBOXONE 8-2 mg Film   Refills:  0   Dose:  1 Film   Generic drug:  buprenorphine-naloxone    Instructions:  1 Film by Subdermal route once daily.     Begin Date    AM    Noon    PM    Bedtime       * sucralfate 1 gram tablet   Commonly known as:  CARAFATE   Refills:  0      Begin Date    AM    Noon    PM    Bedtime       * sucralfate 1 gram tablet   Commonly known as:  CARAFATE   Quantity:  90 tablet   Refills:  1    Instructions:  TAKE 1 TABLET FOUR TIMES A DAY AS NEEDED FOR GASTRITIS     Begin Date    AM    Noon    PM    Bedtime       * Notice:  This list has 2 medication(s) that are the same as other medications prescribed for you. Read the directions carefully, and ask your doctor or other care provider to review them with you.               Please bring to all follow up appointments:    1. A copy of your discharge instructions.  2. All medicines you are currently taking in their original bottles.  3. Identification and insurance card.    Please arrive 15 minutes ahead of scheduled appointment time.    Please call 24 hours in  advance if you must reschedule your appointment and/or time.        Your Scheduled Appointments     Mar 28, 2017  1:20 PM CDT   Established Patient Visit with BISHOP Rodriguez   [facility]    [address]  [facility] [address]   [phone]            May 03, 2017  3:20 PM CDT   New Patient with Connie Hanson MD   [facility]    [address] [facility] [address]  [facility] [address]   [phone]            May 24, 2017  3:00 PM CDT   Established Patient Visit with Chandana Pearson Jr., MD   [facility]    [address] [facility] [address]  [facility] [address]   [phone]            Jul 28, 2017 10:15 AM CDT   Fasting Lab with [facility] [place] [facility] ([facility])    [address] [facility] [address]  [facility] [address]   [phone]            Aug 04, 2017  1:50 PM CDT   Established Patient Visit with Galindo Stiles MD   [facility] ([facility])    [address] [facility] [address]  [facility] [address]   [phone]                  Discharge Instructions       Home Care Instructions Pain Management:    1. DIET:   You may resume your normal diet today.   2. BATHING:   You may shower with luke warm water. No soaking in tub.  3. DRESSING:   You may remove your bandage today.   4. ACTIVITY LEVEL:   You may resume your normal activities 24 hrs after your procedure.  5. MEDICATIONS:   You may resume your normal medications today.   6. SPECIAL INSTRUCTIONS:   No heat to the injection site for 24 hrs including, bath or shower, heating pad, moist heat, or hot tubs.    Use ice pack to injection site for any pain or discomfort.  Apply ice packs for 20 minute intervals as needed.   If you have received any sedatives by mouth today you may not drive for 12 hours.    If you have received any sedation through your IV, you may not drive for 24 hrs.     PLEASE CALL YOUR DOCTOR IF:  1. Redness or swelling around the injection site.  2. Fever of  "101 degrees  3. Drainage (pus) from the injection site.  4. For any continuous bleeding (some dried blood over the incision is normal.)  5. For severe headache that is relieved when lying flat.    FOR EMERGENCIES:   If any unusual problems or difficulties occur during clinic hours, call [phone] or 557.         Admission Information     Date & Time Provider Department CSN    3/13/2017 12:39 PM Talia Belcher MD [facility] [id number]      Care Providers     Provider Role Specialty Primary office phone    Talia Belcher MD Attending Provider Pain Medicine [phone]    Talia Belcher MD Surgeon  Pain Medicine [phone]      Your Vitals Were     BP Pulse Temp Resp Height Weight    176/77 (BP Location: Right arm, Patient Position: Lying, BP Method: Automatic) 55 98 °F (36.7 °C) (Oral) 18 5' 1" (1.549 m) 62.6 kg (138 lb)    SpO2 BMI             100% 26.07 kg/m2         Recent Lab Values        9/17/2015 1/21/2016 8/25/2016 11/25/2016                  3:40 PM  1:40 PM 12:44 PM  1:34 PM        A1C 5.9 5.9 6.1 6.0        Comment for A1C at 12:44 PM on 8/25/2016:  According to ADA guidelines, hemoglobin A1C <7.0% represents  optimal control in non-pregnant diabetic patients.  Different  metrics may apply to specific populations.   Standards of Medical Care in Diabetes - 2016.  For the purpose of screening for the presence of diabetes:  <5.7%     Consistent with the absence of diabetes  5.7-6.4%  Consistent with increasing risk for diabetes   (prediabetes)  >or=6.5%  Consistent with diabetes  Currently no consensus exists for use of hemoglobin A1C  for diagnosis of diabetes for children.      Comment for A1C at  1:34 PM on 11/25/2016:  According to ADA guidelines, hemoglobin A1C <7.0% represents  optimal control in non-pregnant diabetic patients.  Different  metrics may apply to specific populations.   Standards of Medical Care in Diabetes - 2016.  For the purpose of screening for the presence of " diabetes:  <5.7%     Consistent with the absence of diabetes  5.7-6.4%  Consistent with increasing risk for diabetes   (prediabetes)  >or=6.5%  Consistent with diabetes  Currently no consensus exists for use of hemoglobin A1C  for diagnosis of diabetes for children.        Allergies as of 3/13/2017        Reactions    Pcn [Penicillins] Swelling    Toradol [Ketorolac] Swelling    Vibramycin [Doxycycline Calcium] Swelling    Cymbalta [Duloxetine]     dizzyness    Elavil [Amitriptyline]     Lyrica [Pregabalin] Swelling    Seroquel [Quetiapine]     restless leg symdrome      Advance Directives     An advance directive is a document which, in the event you are no longer able to make decisions for yourself, tells your healthcare team what kind of treatment you do or do not want to receive, or who you would like to make those decisions for you.  If you do not currently have an advance directive, [facility] encourages you to create one.  For more information call:  [phone]), [phone] ([phone]),  or log on to [email or web address].        Language Assistance Services     ATTENTION: Language assistance services are available, free of charge. Please call [phone].      ATENCIÓN: Si habla español, tiene a lorenzo disposición servicios gratuitos de asistencia lingüística. Llame al [phone].     [facility] Ý: N?u b?n nói Ti?ng Vi?t, có các d?ch v? h? tr? ngôn ng? mi?n phí dành cho b?n. G?i s? [phone].         [facility] complies with applicable Federal civil rights laws and does not discriminate on the basis of race, color, national origin, age, disability, or sex.

## 2017-03-13 NOTE — DISCHARGE INSTRUCTIONS

## 2017-03-13 NOTE — OP NOTE
Thoracic/Lumbar Facet Joint Injection Under Fluoroscopy  Time-out taken to identify patient and procedure side prior to starting the procedure.             Date of Service: 03/13/2017    PCP: Galindo Stiles MD    PROCEDURE:  bilateral facet joint injection at L3-4 under fluoroscopy.    REASON FOR PROCEDURE: lumbar facet degenerative disease    PHYSICIAN: Talia Belcher MD  ASSISTANTS: none    MEDICATIONS INJECTED:  0.5ml Kenalog 40mg and Bupivacaine 0.25% 10ml. Injected 1.5ml  per level.  LOCAL ANESTHETIC USED:   Xylocaine 1% 9ml with Sodium Bicarbonate 1ml. 3ml per site.  SEDATION MEDICATIONS: None    ESTIMATED BLOOD LOSS:  None.    COMPLICATIONS:  None.    TECHNIQUE:   Lying in a prone position, the patient was prepped and draped in the usual sterile fashion using ChloraPrep and fenestrated drape.  The level was determined under fluoroscopic guidance.  Local anesthetic was given by going down to the hub of the 27-gauge 1.25in needle and raising a wheel.  The 3.5in 22-gauge needle was introduced into the >>facet joint.  Negative pressure applied to make sure that there was no intravascular placement.  Omnipaque was injected to confirm placement.  It was also done to confirm that there was no vascular runoff.  Medication was then injected slowly.  The patient tolerated the procedure well.     PAIN BEFORE THE PROCEDURE:  4/10    PAIN AFTER THE PROCEDURE: 0/10    The patient was monitored after the procedure.  Patient was given post procedure and discharge instructions to follow at home.  We will see the patient back in two weeks or the patient may call to inform of status. The patient was discharged in a stable condition

## 2017-03-28 ENCOUNTER — OFFICE VISIT (OUTPATIENT)
Dept: PAIN MEDICINE | Facility: CLINIC | Age: 67
End: 2017-03-28
Payer: COMMERCIAL

## 2017-03-28 VITALS
BODY MASS INDEX: 26.22 KG/M2 | DIASTOLIC BLOOD PRESSURE: 58 MMHG | HEIGHT: 61 IN | TEMPERATURE: 99 F | WEIGHT: 138.88 LBS | HEART RATE: 69 BPM | RESPIRATION RATE: 18 BRPM | SYSTOLIC BLOOD PRESSURE: 114 MMHG

## 2017-03-28 DIAGNOSIS — M53.3 SACROILIAC DYSFUNCTION: ICD-10-CM

## 2017-03-28 DIAGNOSIS — M43.06 SPONDYLOLYSIS OF LUMBAR REGION: ICD-10-CM

## 2017-03-28 DIAGNOSIS — M47.816 FACET DEGENERATION OF LUMBAR REGION: Primary | ICD-10-CM

## 2017-03-28 DIAGNOSIS — M51.36 DDD (DEGENERATIVE DISC DISEASE), LUMBAR: ICD-10-CM

## 2017-03-28 PROCEDURE — 1160F RVW MEDS BY RX/DR IN RCRD: CPT | Mod: S$GLB,,, | Performed by: NURSE PRACTITIONER

## 2017-03-28 PROCEDURE — 99213 OFFICE O/P EST LOW 20 MIN: CPT | Mod: S$GLB,,, | Performed by: NURSE PRACTITIONER

## 2017-03-28 PROCEDURE — 3078F DIAST BP <80 MM HG: CPT | Mod: S$GLB,,, | Performed by: NURSE PRACTITIONER

## 2017-03-28 PROCEDURE — 3074F SYST BP LT 130 MM HG: CPT | Mod: S$GLB,,, | Performed by: NURSE PRACTITIONER

## 2017-03-28 PROCEDURE — 1125F AMNT PAIN NOTED PAIN PRSNT: CPT | Mod: S$GLB,,, | Performed by: NURSE PRACTITIONER

## 2017-03-28 PROCEDURE — 99999 PR PBB SHADOW E&M-EST. PATIENT-LVL III: CPT | Mod: PBBFAC,,, | Performed by: NURSE PRACTITIONER

## 2017-03-28 PROCEDURE — 1159F MED LIST DOCD IN RCRD: CPT | Mod: S$GLB,,, | Performed by: NURSE PRACTITIONER

## 2017-03-28 PROCEDURE — 1157F ADVNC CARE PLAN IN RCRD: CPT | Mod: S$GLB,,, | Performed by: NURSE PRACTITIONER

## 2017-03-28 NOTE — PROGRESS NOTES
Chronic patient Established Note (Follow up visit)      SUBJECTIVE:    Osman Johansen presents to the clinic for a follow-up appointment for lower back pain.  She is s/p bilateral L3-4 facet joint injections on 3/13/17 with about 30% relief.  She previously had 100% relief for about 6 weeks.  She continue to report lower back pain without radiation into her legs.  Her pain is worse with standing and sitting for prolonged periods of time.  She was evaluated by neurology in February for gait disturbance and short term memory loss.  She had a brain MRI and is scheduled to follow up in May.  Since the last visit, Osman Johansen states the pain has been worsening.  Current pain intensity is 4/10.    Pain Disability Index Review:  Last 3 PDI Scores 3/28/2017 1/10/2017 12/13/2016   Pain Disability Index (PDI) 38 37 41       Pain Medications:  None    Opioid Contract: no     report:  Reviewed and consistent with medication use as prescribed.    Pain Procedures:   7/18/13 Bilateral L5 TF JOCE  10/28/13 Bilateral SI joint injection  4/20/15 Bilateral SI joint injection  12/15/16 Bilateral L3-4 facet joint injections- 100% relief for 6 weeks  3/13/17 Bilateral L3-4 facet joint injections- 30% relief      Physical Therapy/Home Exercise: per self    Imaging:     Lumbar MRI 12/10/16    Narrative   MRI LUMBAR SPINE WITHOUT CONTRAST    COMPARISON: None    TECHNIQUE:  Sagittal T1, T2, and STIR;  axial T1 and T2 sequences through the lumbar spine were acquired without contrast.    FINDINGS: Vertebral body height and alignment are within normal limits.  The conus terminates at T12-L1.  Moderate loss of disc height is present at L4-5.  Marrow signal is mildly heterogeneous.  No focal osseous lesion.    L1-L2:  No disc herniation. No spinal canal or neuroforaminal narrowing.    L2-L3:  Mild diffuse disc bulging is present without spinal canal or neuroforaminal narrowing.    L3-L4:  Mild diffuse disc bulging and moderate  bilateral facet arthropathy result in mild spinal canal narrowing.    L4-L5:  Mild diffuse disc bulging is present without spinal canal or neuroforaminal narrowing.    L5-S1:  No disc herniation. No spinal canal or neuroforaminal narrowing.    Several small gallstones noted.   Impression     Degenerative lumbar spondylosis with mild L3-4 spinal canal narrowing and no significant neuroforaminal narrowing.  Cholelithiasis         Allergies:   Review of patient's allergies indicates:   Allergen Reactions    Pcn [penicillins] Swelling    Toradol [ketorolac] Swelling    Vibramycin [doxycycline calcium] Swelling    Cymbalta [duloxetine]      dizzyness    Elavil [amitriptyline]     Lyrica [pregabalin] Swelling    Seroquel [quetiapine]      restless leg symdrome       Current Medications:   Current Outpatient Prescriptions   Medication Sig Dispense Refill    benazepril (LOTENSIN) 20 MG tablet Take 1 tablet (20 mg total) by mouth 2 (two) times daily. 1 po qam and 1/2 tab po qpm 180 tablet 1    esomeprazole (NEXIUM) 40 MG capsule Take 1 capsule (40 mg total) by mouth before breakfast. 90 capsule 1    estradiol (CLIMARA) 0.075 mg/24 hr Apply to clean dry skin once per week 12 patch 3    fluticasone (FLONASE) 50 mcg/actuation nasal spray USE 1 SPRAY IN EACH NOSTRIL DAILY 16 g 11    hyoscyamine (LEVSIN/SL) 0.125 mg Subl Place 1 tablet (0.125 mg total) under the tongue every 4 (four) hours as needed. 45 tablet 3    levothyroxine (SYNTHROID) 100 MCG tablet Take 1 tablet (100 mcg total) by mouth once daily. BRAND NAME ONLY 90 tablet 1    metformin (GLUCOPHAGE) 500 MG tablet Take 1 tablet by mouth once daily.      nitroGLYCERIN (NITROSTAT) 0.4 MG SL tablet Place 1 tablet (0.4 mg total) under the tongue every 5 (five) minutes as needed for Chest pain. 30 tablet 0    oxybutynin (DITROPAN-XL) 5 MG TR24 Take 1 tablet (5 mg total) by mouth once daily. 90 tablet 1    promethazine (PHENERGAN) 25 MG tablet Take 1 tablet (25  mg total) by mouth every 6 (six) hours as needed for Nausea. 45 tablet 2    ropinirole (REQUIP) 2 MG tablet Take 1.5 tablets (3 mg total) by mouth nightly. 135 tablet 3    SUBOXONE 8-2 mg Film 1 Film by Subdermal route once daily.  0    sucralfate (CARAFATE) 1 gram tablet       sucralfate (CARAFATE) 1 gram tablet TAKE 1 TABLET FOUR TIMES A DAY AS NEEDED FOR GASTRITIS 90 tablet 1     No current facility-administered medications for this visit.        REVIEW OF SYSTEMS:    GENERAL:  No weight loss, malaise or fevers.  HEENT:  Negative for frequent or significant headaches.  NECK:  Negative for lumps, goiter, pain and significant neck swelling.  RESPIRATORY:  Negative for cough, wheezing or shortness of breath.  CARDIOVASCULAR:  Negative for chest pain, leg swelling or palpitations.  GI:  Negative for abdominal discomfort, blood in stools or black stools or change in bowel habits. GERD.  MUSCULOSKELETAL:  See HPI.  SKIN:  Negative for lesions, rash, and itching.  PSYCH: H/O anxiety and opioid dependence.  HEMATOLOGY/LYMPHOLOGY:  Negative for prolonged bleeding, bruising easily or swollen nodes.  NEURO:   No history of headaches, syncope, paralysis, seizures or tremors.  All other reviewed and negative other than HPI.    Past Medical History:  Past Medical History:   Diagnosis Date    Allergy     Anxiety     Arthritis     Blood transfusion 8 yrs    Degenerative disc disease     Depression     GERD (gastroesophageal reflux disease)     Hypertension     Neuromuscular disorder     Osteoporosis     PUD (peptic ulcer disease)     Thoracic or lumbosacral neuritis or radiculitis 10/19/2012    Thyroid disease        Past Surgical History:  Past Surgical History:   Procedure Laterality Date    APPENDECTOMY  1965    HYSTERECTOMY  7 yrs     TONSILLECTOMY  1954       Family History:  Family History   Problem Relation Age of Onset    Arthritis Mother     Cancer Mother     Heart disease Mother     Hypertension  "Mother        Social History:  Social History     Social History    Marital status:      Spouse name: N/A    Number of children: N/A    Years of education: N/A     Social History Main Topics    Smoking status: Former Smoker     Quit date: 1/1/2008    Smokeless tobacco: Never Used    Alcohol use No    Drug use: No    Sexual activity: No     Other Topics Concern    Not on file     Social History Narrative       OBJECTIVE:    BP (!) 114/58 (BP Location: Left arm, Patient Position: Sitting)  Pulse 69  Temp 98.6 °F (37 °C) (Oral)   Resp 18  Ht 5' 1" (1.549 m)  Wt 63 kg (138 lb 14.2 oz)  BMI 26.24 kg/m2    PHYSICAL EXAMINATION:    General appearance: Well appearing, in no acute distress, alert and oriented x3.  Psych:  Mood and affect appropriate.  Skin: No rashes or edema.  Head/face:  Atraumatic, normocephalic. No palpable lymph nodes  Cor: RRR  Pulm: CTA  GI: Abdomen soft and non-distended.  There is pain with palpation to left inguinal area.  Back: There is pain with palpation to lumbar facet joints at approximately the levels of L2-5.  There is pain with lateral rotation of thoracic and lumbar spine.  There is pain with flexion and extension.  Positive facet loading bilaterally.  Extremities: Peripheral joint ROM is full and pain free without obvious instability or laxity in all four extremities. No deformities, edema, or skin discoloration. Good capillary refill.  Musculoskeletal: There is pain with internal and external rotation of right hip.  Bilateral upper and lower extremity strength is normal and symmetric.  No atrophy or tone abnormalities are noted.  Neuro: Bilateral upper and lower extremity coordination and muscle stretch reflexes are physiologic and symmetric.  Plantar response are downgoing.   Gait: Normal.    ASSESSMENT: 67 y.o. year old female with back and abdominal pain, consistent with the following diagnoses:     1. Facet degeneration of lumbar region     2. Spondylolysis of " lumbar region     3. Sacroiliac dysfunction     4. DDD (degenerative disc disease), lumbar           PLAN:     - Previous imaging was reviewed and discussed with the patient today.    - She is s/p bilateral L3-4 facet joint injections with 30% relief.  She has pain below this area as well.  I will schedule her for bilateral L2,3,4,5 MBB.  The patient will call with relief and if diagnostic, we can schedule radiofrequency ablation of affected nerves, one side followed by the other 2 weeks apart.    - She will continue f/u with neurology.    - RTC after procedures.    - The patient will continue a home exercise routine to help with pain and strengthening.      - Counseled patient regarding the importance of constant sleeping habits and physical therapy.    - Dr. Belcher was consulted on the patient and agrees with this plan.      The above plan and management options were discussed at length with patient. Patient is in agreement with the above and verbalized understanding.    Meche Clinton  03/28/2017

## 2017-03-28 NOTE — MR AVS SNAPSHOT
[facility]  [address]  [place] [address]  Phone: [phone]  Fax: [phone]                  Osman Johansen   3/28/2017 1:20 PM   Office Visit    Description:  Female : 1950   Provider:  BISHOP Rodriguez   Department:  [facility]           Reason for Visit     Back Pain           Diagnoses this Visit        Comments    Facet degeneration of lumbar region    -  Primary     Spondylolysis of lumbar region         Sacroiliac dysfunction         DDD (degenerative disc disease), lumbar                To Do List           Future Appointments        Provider Department Dept Phone    5/3/2017 3:20 PM Connie Hanson MD [facility] [phone]    2017 3:00 PM Chandana Pearson Jr., MD [facility]rology [phone]    2017 10:15 AM LAB, [place] [facility] [phone]    2017 1:50 PM Galindo Stiles MD [facility] [phone]      Goals (5 Years of Data)     None      [facility]     [facility] Nurse Care Line -  Assistance  Registered nurses in the [facility] provide clinical advisement, health education, appointment booking, and other advisory services.  Call for this free service at [phone].             Medications           Message regarding Medications     Verify the changes and/or additions to your medication regime listed below are the same as discussed with your clinician today.  If any of these changes or additions are incorrect, please notify your healthcare provider.             Verify that the below list of medications is an accurate representation of the medications you are currently taking.  If none reported, the list may be blank. If incorrect, please contact your healthcare provider. Carry this list with you in case of emergency.           Current Medications     benazepril (LOTENSIN) 20 MG tablet Take 1 tablet (20 mg total) by mouth 2  "(two) times daily. 1 po qam and 1/2 tab po qpm    esomeprazole (NEXIUM) 40 MG capsule Take 1 capsule (40 mg total) by mouth before breakfast.    estradiol (CLIMARA) 0.075 mg/24 hr Apply to clean dry skin once per week    fluticasone (FLONASE) 50 mcg/actuation nasal spray USE 1 SPRAY IN EACH NOSTRIL DAILY    hyoscyamine (LEVSIN/SL) 0.125 mg Subl Place 1 tablet (0.125 mg total) under the tongue every 4 (four) hours as needed.    levothyroxine (SYNTHROID) 100 MCG tablet Take 1 tablet (100 mcg total) by mouth once daily. BRAND NAME ONLY    metformin (GLUCOPHAGE) 500 MG tablet Take 1 tablet by mouth once daily.    nitroGLYCERIN (NITROSTAT) 0.4 MG SL tablet Place 1 tablet (0.4 mg total) under the tongue every 5 (five) minutes as needed for Chest pain.    oxybutynin (DITROPAN-XL) 5 MG TR24 Take 1 tablet (5 mg total) by mouth once daily.    promethazine (PHENERGAN) 25 MG tablet Take 1 tablet (25 mg total) by mouth every 6 (six) hours as needed for Nausea.    ropinirole (REQUIP) 2 MG tablet Take 1.5 tablets (3 mg total) by mouth nightly.    SUBOXONE 8-2 mg Film 1 Film by Subdermal route once daily.    sucralfate (CARAFATE) 1 gram tablet     sucralfate (CARAFATE) 1 gram tablet TAKE 1 TABLET FOUR TIMES A DAY AS NEEDED FOR GASTRITIS           Clinical Reference Information           Your Vitals Were     BP Pulse Temp Resp Height Weight    114/58 (BP Location: Left arm, Patient Position: Sitting) 69 98.6 °F (37 °C) (Oral) 18 5' 1" (1.549 m) 63 kg (138 lb 14.2 oz)    BMI                26.24 kg/m2          Blood Pressure          Most Recent Value    BP  (!)  114/58      Allergies as of 3/28/2017     Pcn [Penicillins]    Toradol [Ketorolac]    Vibramycin [Doxycycline Calcium]    Cymbalta [Duloxetine]    Elavil [Amitriptyline]    Lyrica [Pregabalin]    Seroquel [Quetiapine]      Immunizations Administered on Date of Encounter - 3/28/2017     None      Language Assistance Services     ATTENTION: Language assistance services are " available, free of charge. Please call [phone].      ATENCIÓN: Si habla wolfgang, tiene a lorenzo disposición servicios gratuitos de asistencia lingüística. Llame al [phone].     CHÚ Ý: N?u b?n nói Ti?ng Vi?t, có các d?ch v? h? tr? ngôn ng? mi?n phí dành cho b?n. G?i s? [phone].         [facility] complies with applicable Federal civil rights laws and does not discriminate on the basis of race, color, national origin, age, disability, or sex.

## 2017-04-17 ENCOUNTER — SURGERY (OUTPATIENT)
Age: 67
End: 2017-04-17

## 2017-04-17 ENCOUNTER — HOSPITAL ENCOUNTER (OUTPATIENT)
Facility: OTHER | Age: 67
Discharge: HOME | End: 2017-04-17
Attending: ANESTHESIOLOGY | Admitting: ANESTHESIOLOGY
Payer: COMMERCIAL

## 2017-04-17 VITALS
RESPIRATION RATE: 17 BRPM | HEIGHT: 61 IN | SYSTOLIC BLOOD PRESSURE: 157 MMHG | BODY MASS INDEX: 25.11 KG/M2 | TEMPERATURE: 98 F | DIASTOLIC BLOOD PRESSURE: 69 MMHG | OXYGEN SATURATION: 99 % | WEIGHT: 133 LBS | HEART RATE: 59 BPM

## 2017-04-17 DIAGNOSIS — R52 PAIN: Primary | ICD-10-CM

## 2017-04-17 LAB — POCT GLUCOSE: 99 MG/DL (ref 70–110)

## 2017-04-17 PROCEDURE — 64495 INJ PARAVERT F JNT L/S 3 LEV: CPT | Mod: 50 | Performed by: ANESTHESIOLOGY

## 2017-04-17 PROCEDURE — 64495 INJ PARAVERT F JNT L/S 3 LEV: CPT | Mod: 50,,, | Performed by: ANESTHESIOLOGY

## 2017-04-17 PROCEDURE — 64494 INJ PARAVERT F JNT L/S 2 LEV: CPT | Mod: 50 | Performed by: ANESTHESIOLOGY

## 2017-04-17 PROCEDURE — 64494 INJ PARAVERT F JNT L/S 2 LEV: CPT | Mod: 50,,, | Performed by: ANESTHESIOLOGY

## 2017-04-17 PROCEDURE — 64493 INJ PARAVERT F JNT L/S 1 LEV: CPT | Mod: 50,,, | Performed by: ANESTHESIOLOGY

## 2017-04-17 PROCEDURE — 63600175 PHARM REV CODE 636 W HCPCS: Performed by: ANESTHESIOLOGY

## 2017-04-17 PROCEDURE — 25000003 PHARM REV CODE 250: Performed by: ANESTHESIOLOGY

## 2017-04-17 PROCEDURE — 64493 INJ PARAVERT F JNT L/S 1 LEV: CPT | Mod: 50 | Performed by: ANESTHESIOLOGY

## 2017-04-17 RX ORDER — BUPIVACAINE HYDROCHLORIDE 2.5 MG/ML
INJECTION, SOLUTION INFILTRATION; PERINEURAL
Status: DISCONTINUED | OUTPATIENT
Start: 2017-04-17 | End: 2017-04-17 | Stop reason: HOSPADM

## 2017-04-17 RX ORDER — LIDOCAINE HYDROCHLORIDE 10 MG/ML
INJECTION INFILTRATION; PERINEURAL
Status: DISCONTINUED | OUTPATIENT
Start: 2017-04-17 | End: 2017-04-17 | Stop reason: HOSPADM

## 2017-04-17 RX ORDER — TRIAMCINOLONE ACETONIDE 40 MG/ML
INJECTION, SUSPENSION INTRA-ARTICULAR; INTRAMUSCULAR
Status: DISCONTINUED | OUTPATIENT
Start: 2017-04-17 | End: 2017-04-17 | Stop reason: HOSPADM

## 2017-04-17 RX ADMIN — LIDOCAINE HYDROCHLORIDE 10 ML: 10 INJECTION, SOLUTION INFILTRATION; PERINEURAL at 01:04

## 2017-04-17 RX ADMIN — BUPIVACAINE HYDROCHLORIDE 10 ML: 2.5 INJECTION, SOLUTION INFILTRATION; PERINEURAL at 01:04

## 2017-04-17 RX ADMIN — TRIAMCINOLONE ACETONIDE 40 MG: 40 INJECTION, SUSPENSION INTRA-ARTICULAR; INTRAMUSCULAR at 01:04

## 2017-04-17 NOTE — DISCHARGE INSTRUCTIONS

## 2017-04-17 NOTE — OP NOTE
lUMBAR Medial Branch Block Under Fluoroscopy  Time-out taken to identify patient and procedure side prior to starting the procedure.        Date of Service: 04/17/2017    PCP: Galindo Stiles MD    Referring Physician:                                                           PROCEDURE:  Bilateral  L2, L3, L4, and L5 medial branch block    REASON FOR PROCEDURE: Lumbar degenerative joint disease    PHYSICIAN: Talia Belcher MD  ASSISTANTS: Tyler Kessler DO    MEDICATIONS INJECTED:Kenalog 20mg Bupivicaine 25% 1.5ml at each level      LOCAL ANESTHETIC USED: Xylocaine 1% 9ml with Sodium Bicarbonate 1ml.  3ml each site.    SEDATION MEDICATIONS: None    ESTIMATED BLOOD LOSS:  None.    COMPLICATIONS:  None.    TECHNIQUE: Laying in a prone position, the patient was prepped and draped in the usual sterile fashion using ChloraPrep and fenestrated drape.  The level was determined under fluoroscopic guidance.  Local anesthetic was given by going down to the hub of the 27-gauge 1.25in needle and raising a wheel.  A 22-gauge 3.5inch needle was introduced to the anatomic local of the medial branch at the lateral mass utilizing live fluoroscopy. Medication was then injected slowly. The patient tolerated the procedure well.     PAIN BEFORE THE PROCEDURE:  3/10.    PAIN AFTER THE PROCEDURE:  0/10.    The patient was monitored after the procedure.  Patient was given post procedure and discharge instructions to follow at home.  We will see the patient back in two weeks or the patient may call to inform of status. The patient was discharged in a stable condition

## 2017-04-17 NOTE — PLAN OF CARE
Pt tolerated procedure well. Pt reports 0/10 pain after procedure. Assisted pt up for first time. Steady on feet. All discharge instructions given. Dressing clean, dry, intact. Patient provided with pain diary.

## 2017-04-17 NOTE — IP AVS SNAPSHOT
[facility] Location ([place])  [address] [place], [place] [address]  Phone: [phone]           Patient Discharge Instructions   Our goal is to set you up for success. This packet includes information on your condition, medications, and your home care.  It will help you care for yourself to prevent having to return to the hospital.     Please ask your nurse if you have any questions.      There are many details to remember when preparing to leave the hospital. Here is what you will need to do:    1. Take your medicine. If you are prescribed medications, review your Medication List on the following pages. You may have new medications to  at the pharmacy and others that you'll need to stop taking. Review the instructions for how and when to take your medications. Talk with your doctor or nurses if you are unsure of what to do.     2. Go to your follow-up appointments. Specific follow-up information is listed in the following pages. Your may be contacted by a nurse or clinical provider about future appointments. Be sure we have all of the phone numbers to reach you. Please contact your provider's office if you are unable to make an appointment.     3. Watch for warning signs. Your doctor or nurse will give you detailed warning signs to watch for and when to call for assistance. These instructions may also include educational information about your condition. If you experience any of warning signs to your health, call your doctor.           [facility]  Unless otherwise directed by your provider, please   contact [facility], our nurse care line   that is available for 24/7 assistance.     [phone] (toll-free)     Registered nurses in the [facility]   provide: appointment scheduling, clinical advisement, health education, and other advisory services.                  ** Verify the list of medication(s) below is accurate and up to date. Carry this with you in case of  emergency. If your medications have changed, please notify your healthcare provider.             Medication List      ASK your doctor about these medications        Additional Info                      benazepril 20 MG tablet   Commonly known as:  LOTENSIN   Quantity:  180 tablet   Refills:  1   Dose:  20 mg    Instructions:  Take 1 tablet (20 mg total) by mouth 2 (two) times daily. 1 po qam and 1/2 tab po qpm     Begin Date    AM    Noon    PM    Bedtime       esomeprazole 40 MG capsule   Commonly known as:  NEXIUM   Quantity:  90 capsule   Refills:  1   Dose:  40 mg    Instructions:  Take 1 capsule (40 mg total) by mouth before breakfast.     Begin Date    AM    Noon    PM    Bedtime       estradiol 0.075 mg/24 hr   Commonly known as:  CLIMARA   Quantity:  12 patch   Refills:  3    Instructions:  Apply to clean dry skin once per week     Begin Date    AM    Noon    PM    Bedtime       fluticasone 50 mcg/actuation nasal spray   Commonly known as:  FLONASE   Quantity:  16 g   Refills:  11    Instructions:  USE 1 SPRAY IN EACH NOSTRIL DAILY     Begin Date    AM    Noon    PM    Bedtime       hyoscyamine 0.125 mg Subl   Commonly known as:  LEVSIN/SL   Quantity:  45 tablet   Refills:  3   Dose:  0.125 mg    Instructions:  Place 1 tablet (0.125 mg total) under the tongue every 4 (four) hours as needed.     Begin Date    AM    Noon    PM    Bedtime       levothyroxine 100 MCG tablet   Commonly known as:  SYNTHROID   Quantity:  90 tablet   Refills:  1   Dose:  100 mcg    Instructions:  Take 1 tablet (100 mcg total) by mouth once daily. BRAND NAME ONLY     Begin Date    AM    Noon    PM    Bedtime       metformin 500 MG tablet   Commonly known as:  GLUCOPHAGE   Refills:  0   Dose:  1 tablet    Instructions:  Take 1 tablet by mouth once daily.     Begin Date    AM    Noon    PM    Bedtime       nitroGLYCERIN 0.4 MG SL tablet   Commonly known as:  NITROSTAT   Quantity:  30 tablet   Refills:  0   Dose:  0.4 mg     Instructions:  Place 1 tablet (0.4 mg total) under the tongue every 5 (five) minutes as needed for Chest pain.     Begin Date    AM    Noon    PM    Bedtime       oxybutynin 5 MG Tr24   Commonly known as:  DITROPAN-XL   Quantity:  90 tablet   Refills:  1   Dose:  5 mg    Instructions:  Take 1 tablet (5 mg total) by mouth once daily.     Begin Date    AM    Noon    PM    Bedtime       promethazine 25 MG tablet   Commonly known as:  PHENERGAN   Quantity:  45 tablet   Refills:  2   Dose:  25 mg    Instructions:  Take 1 tablet (25 mg total) by mouth every 6 (six) hours as needed for Nausea.     Begin Date    AM    Noon    PM    Bedtime       ropinirole 2 MG tablet   Commonly known as:  REQUIP   Quantity:  135 tablet   Refills:  3   Dose:  3 mg    Instructions:  Take 1.5 tablets (3 mg total) by mouth nightly.     Begin Date    AM    Noon    PM    Bedtime       SUBOXONE 8-2 mg Film   Refills:  0   Dose:  1 Film   Generic drug:  buprenorphine-naloxone    Instructions:  1 Film by Subdermal route once daily.     Begin Date    AM    Noon    PM    Bedtime       * sucralfate 1 gram tablet   Commonly known as:  CARAFATE   Refills:  0      Begin Date    AM    Noon    PM    Bedtime       * sucralfate 1 gram tablet   Commonly known as:  CARAFATE   Quantity:  90 tablet   Refills:  1    Instructions:  TAKE 1 TABLET FOUR TIMES A DAY AS NEEDED FOR GASTRITIS     Begin Date    AM    Noon    PM    Bedtime       * Notice:  This list has 2 medication(s) that are the same as other medications prescribed for you. Read the directions carefully, and ask your doctor or other care provider to review them with you.               Please bring to all follow up appointments:    1. A copy of your discharge instructions.  2. All medicines you are currently taking in their original bottles.  3. Identification and insurance card.    Please arrive 15 minutes ahead of scheduled appointment time.    Please call 24 hours in advance if you must reschedule  your appointment and/or time.        Your Scheduled Appointments     May 03, 2017  3:20 PM CDT   New Patient with Connie Hanson MD   [place] [facility] ([facility]    [address] [facility] [facility] [place] [address]   [phone]            May 24, 2017  3:00 PM CDT   Established Patient Visit with Chandana Pearson Jr., MD   [place] [facility] ([facility] [place])    [address] [facility] [address] [place] [address]   [phone]            Jul 28, 2017 10:15 AM CDT   Fasting Lab with LAB, [place] [facility] ([facility] [place])    [address]  [place] [address]   [phone]            Aug 04, 2017  1:50 PM CDT   Established Patient Visit with Galindo Stiles MD   [facility] ([facility] [place])    [address] [facility] [place] [address]   [phone]                  Discharge Instructions       Home Care Instructions Pain Management:    1. DIET:   You may resume your normal diet today.   2. BATHING:   You may shower with luke warm water. No soaking in tub.  3. DRESSING:   You may remove your bandage today.   4. ACTIVITY LEVEL:   You may resume your normal activities 24 hrs after your procedure.  5. MEDICATIONS:   You may resume your normal medications today.   6. SPECIAL INSTRUCTIONS:   No heat to the injection site for 24 hrs including, bath or shower, heating pad, moist heat, or hot tubs.    Use ice pack to injection site for any pain or discomfort.  Apply ice packs for 20 minute intervals as needed.   If you have received any sedatives by mouth today you may not drive for 12 hours.    If you have received any sedation through your IV, you may not drive for 24 hrs.     PLEASE CALL YOUR DOCTOR IF:  1. Redness or swelling around the injection site.  2. Fever of 101 degrees  3. Drainage (pus) from the injection site.  4. For any continuous bleeding (some dried blood over the incision is normal.)  5. For severe headache that is relieved when lying  "flat.    FOR EMERGENCIES:   If any unusual problems or difficulties occur during clinic hours, call [phone] or [phone].         Admission Information     Date & Time Provider Department CSN    4/17/2017 12:06 PM Talia Belcher MD [facility] [id number]      Care Providers     Provider Role Specialty Primary office phone    Talia Belcher MD Attending Provider Pain Medicine [phone]    Talia Belcher MD Surgeon  Pain Medicine [phone]      Your Vitals Were     BP Pulse Temp Resp Height Weight    157/69 (BP Location: Right arm, Patient Position: Sitting, BP Method: Automatic) 59 98.3 °F (36.8 °C) (Oral) 17 5' 1" (1.549 m) 60.3 kg (133 lb)    SpO2 BMI             99% 25.13 kg/m2         Recent Lab Values        9/17/2015 1/21/2016 8/25/2016 11/25/2016                  3:40 PM  1:40 PM 12:44 PM  1:34 PM        A1C 5.9 5.9 6.1 6.0        Comment for A1C at 12:44 PM on 8/25/2016:  According to ADA guidelines, hemoglobin A1C <7.0% represents  optimal control in non-pregnant diabetic patients.  Different  metrics may apply to specific populations.   Standards of Medical Care in Diabetes - 2016.  For the purpose of screening for the presence of diabetes:  <5.7%     Consistent with the absence of diabetes  5.7-6.4%  Consistent with increasing risk for diabetes   (prediabetes)  >or=6.5%  Consistent with diabetes  Currently no consensus exists for use of hemoglobin A1C  for diagnosis of diabetes for children.      Comment for A1C at  1:34 PM on 11/25/2016:  According to ADA guidelines, hemoglobin A1C <7.0% represents  optimal control in non-pregnant diabetic patients.  Different  metrics may apply to specific populations.   Standards of Medical Care in Diabetes - 2016.  For the purpose of screening for the presence of diabetes:  <5.7%     Consistent with the absence of diabetes  5.7-6.4%  Consistent with increasing risk for diabetes   (prediabetes)  >or=6.5%  Consistent with diabetes  Currently no consensus " exists for use of hemoglobin A1C  for diagnosis of diabetes for children.        Allergies as of 4/17/2017        Reactions    Pcn [Penicillins] Swelling    Toradol [Ketorolac] Swelling    Vibramycin [Doxycycline Calcium] Swelling    Cymbalta [Duloxetine]     dizzyness    Elavil [Amitriptyline]     Lyrica [Pregabalin] Swelling    Seroquel [Quetiapine]     restless leg symdrome      Advance Directives     An advance directive is a document which, in the event you are no longer able to make decisions for yourself, tells your healthcare team what kind of treatment you do or do not want to receive, or who you would like to make those decisions for you.  If you do not currently have an advance directive, [facility] encourages you to create one.  For more information call:  [phone]), [phone] ([phone]),  or log on to [email or web address].        Smoking Cessation     If you would like to quit smoking:   You may be eligible for free services if you are a [place] resident and started smoking cigarettes before September 1, 1988.  Call the Smoking Cessation Trust (SCT) toll free at (879) 778-9714 or (694) 418-2122.   Call 5-617-QUIT-NOW if you do not meet the above criteria.   Contact us via email: [email or web address]   View our website for more information: [email or web address]        Language Assistance Services     ATTENTION: Language assistance services are available, free of charge. Please call [phone].      ATENCIÓN: Si habla español, tiene a lorenzo disposición servicios gratuitos de asistencia lingüística. Llame al [phone].     GENNY Ý: N?u b?n nói Ti?ng Vi?t, có các d?ch v? h? tr? ngôn ng? mi?n phí dành cho b?n. G?i s? [phone].         [facility] complies with applicable Federal civil rights laws and does not discriminate on the basis of race, color, national origin, age, disability, or sex.

## 2017-04-21 ENCOUNTER — TELEPHONE (OUTPATIENT)
Dept: PAIN MEDICINE | Facility: CLINIC | Age: 67
End: 2017-04-21

## 2017-04-21 NOTE — TELEPHONE ENCOUNTER
----- Message from Lolita Campa sent at 4/21/2017 11:23 AM CDT -----  Contact: pt   X_  1st Request  _  2nd Request  _  3rd Request        Who: DEN MEJIAS [[id number]]    Why: pt is requesting a medial branch form pt states she would like another form sent to her   What Number to Call Back: [phone].    When to Expect a call back: (Before the end of the day)   -- if the call is after 12:00, the call back will be tomorrow.

## 2017-04-21 NOTE — TELEPHONE ENCOUNTER
Contacted and spoke to patient regarding her request. Informed her those forms are not kept in the office, she was given the contact information to the procedure area.

## 2017-04-25 ENCOUNTER — TELEPHONE (OUTPATIENT)
Dept: PAIN MEDICINE | Facility: CLINIC | Age: 67
End: 2017-04-25

## 2017-05-03 ENCOUNTER — OFFICE VISIT (OUTPATIENT)
Dept: NEUROLOGY | Facility: CLINIC | Age: 67
End: 2017-05-03
Payer: COMMERCIAL

## 2017-05-03 VITALS
DIASTOLIC BLOOD PRESSURE: 70 MMHG | HEIGHT: 61 IN | HEART RATE: 62 BPM | SYSTOLIC BLOOD PRESSURE: 120 MMHG | BODY MASS INDEX: 26.46 KG/M2 | RESPIRATION RATE: 20 BRPM | WEIGHT: 140.13 LBS

## 2017-05-03 DIAGNOSIS — R79.0 DECREASED FERRITIN: ICD-10-CM

## 2017-05-03 DIAGNOSIS — G25.81 RLS (RESTLESS LEGS SYNDROME): ICD-10-CM

## 2017-05-03 DIAGNOSIS — R29.898 RIGIDITY (MUSCLES): ICD-10-CM

## 2017-05-03 DIAGNOSIS — R41.89 SUBJECTIVE MEMORY COMPLAINTS: ICD-10-CM

## 2017-05-03 PROCEDURE — 1159F MED LIST DOCD IN RCRD: CPT | Mod: S$GLB,,, | Performed by: PSYCHIATRY & NEUROLOGY

## 2017-05-03 PROCEDURE — 1157F ADVNC CARE PLAN IN RCRD: CPT | Mod: 8P,S$GLB,, | Performed by: PSYCHIATRY & NEUROLOGY

## 2017-05-03 PROCEDURE — 3074F SYST BP LT 130 MM HG: CPT | Mod: S$GLB,,, | Performed by: PSYCHIATRY & NEUROLOGY

## 2017-05-03 PROCEDURE — 99999 PR PBB SHADOW E&M-EST. PATIENT-LVL III: CPT | Mod: PBBFAC,,, | Performed by: PSYCHIATRY & NEUROLOGY

## 2017-05-03 PROCEDURE — 1126F AMNT PAIN NOTED NONE PRSNT: CPT | Mod: S$GLB,,, | Performed by: PSYCHIATRY & NEUROLOGY

## 2017-05-03 PROCEDURE — 3078F DIAST BP <80 MM HG: CPT | Mod: S$GLB,,, | Performed by: PSYCHIATRY & NEUROLOGY

## 2017-05-03 PROCEDURE — 1160F RVW MEDS BY RX/DR IN RCRD: CPT | Mod: S$GLB,,, | Performed by: PSYCHIATRY & NEUROLOGY

## 2017-05-03 PROCEDURE — 99214 OFFICE O/P EST MOD 30 MIN: CPT | Mod: S$GLB,,, | Performed by: PSYCHIATRY & NEUROLOGY

## 2017-05-03 RX ORDER — HEPARIN 100 UNIT/ML
500 SYRINGE INTRAVENOUS
Status: CANCELLED | OUTPATIENT
Start: 2017-05-08

## 2017-05-03 RX ORDER — SODIUM CHLORIDE 0.9 % (FLUSH) 0.9 %
10 SYRINGE (ML) INJECTION
Status: CANCELLED | OUTPATIENT
Start: 2017-05-08

## 2017-05-03 NOTE — ASSESSMENT & PLAN NOTE
Intermittent RLS, currently controlled with either requip or suboxone, but she'd like to get off the latter completely.  Ferritin <40 can worsen RLS, so will send her for infusions.   -> ferritin infusion   -> continue requip for now, but consider levodopa, mirapex, gabapentin, others.

## 2017-05-03 NOTE — ASSESSMENT & PLAN NOTE
Intermittent posturing and tensing up that was at it's worse when she was weaning off of fentanyl, so I suspect this is an extrapyramidal side effect and will likely be static.   -> see RLS/ferritin plans

## 2017-05-03 NOTE — LETTER
May 3, 2017      Chandana Pearson Jr., MD  [address] [facility] [address] [place] [address]           [facility]  [address] [facility] [place] [address]  Phone: [phone]  Fax: [phone]          Patient: Osman Johansen   MR Number: [id number]   YOB: 1950   Date of Visit: 5/3/2017       Dear Dr. Chandana Pearson Jr.:    Thank you for referring Osman Johansen to me for evaluation. Attached you will find relevant portions of my assessment and plan of care.    If you have questions, please do not hesitate to call me. I look forward to following Osman Johansen along with you.    Sincerely,    Connie Hanson MD    Enclosure  CC:  No Recipients    If you would like to receive this communication electronically, please contact [email or web address] or [phone] to request more information on [employer] access.    For providers and/or their staff who would like to refer a patient to [facility], please contact us through our one-stop-shop provider referral line, [facility], at [phone].    If you feel you have received this communication in error or would no longer like to receive these types of communications, please e-mail [email or web address]

## 2017-05-03 NOTE — PROGRESS NOTES
Osman REYNAGA Chief Complaints during this visit:  New Patient visit for  RLS, memory loss    Chandana Pearson Jr., MD  [address] [place] [address]    Primary Care Physician  Galindo Stiles MD  [address] [place] [address]          History of present illness:   67 y.o.  female seen in consultation at the request of  Dr. Pearson for evaluation of RLS and memory loss.      Says she gets RLS every time she gets epidural.  requip seems to help.    Was on fentanyl patch for about 8 years.  She was able to wean herself off of the oral pain medications, but had to be hospitalized to get off of the fentanyl.  Now on suboxone, tiny piece, but would like to get off of that because she gets muscle spasms in jaw, body that she feels is still due to the opiate.    Describes augmentation on requip (worse 3 days into taking it).    Prior to pain meds, she says she only had two episodes of RLS, both as s/e to medications (elavil).    Gets tightness in muscles, tense when watching tv or during night for past 3 years.    12 years ago, she was diagnosed with iron deficiency anemia.      From iLli's note 2/16/17:  The patient is a 67 y.o. female referred for evaluation of RLS. This began about 12 years ago. She describes it as discomfort in the bilateral LE. It is worse at night. It also worsens with Elavil and steroids. She notices it more when she attempts to wean down on narcotics. She is presently on Suboxone. She indicates that this is preventing her from stopping the Suboxone altogether. She is on Requip, which she feels has not been helpful. These symptoms are present whenever she does not take Suboxone.     The patient is most concerned with memory loss. This issue began about 20 years ago. It has been gradually progressive, though she does have good days and bad days. She has issues with word finding. It involves short-term memory more than long-term memory. She reports some difficulties with executive  function. There are issues with multiple step processes. She has no clear problems with ADLs. She does get lost in familiar areas. There are no hallucinations. There are some personality changes. She does endorse depression, anxiety, and racing of thoughts.    II.  Review of systems:  As in HPI,  otherwise, balance 10 systems reviewed and are negative.    III.  Past Medical History:   Diagnosis Date    Allergy     Anxiety     Arthritis     Blood transfusion 8 yrs    Degenerative disc disease     Depression     GERD (gastroesophageal reflux disease)     Hypertension     Neuromuscular disorder     Osteoporosis     PUD (peptic ulcer disease)     Thoracic or lumbosacral neuritis or radiculitis 10/19/2012    Thyroid disease      Family History   Problem Relation Age of Onset    Arthritis Mother     Cancer Mother     Heart disease Mother     Hypertension Mother      Social History     Social History    Marital status:      Spouse name: N/A    Number of children: N/A    Years of education: N/A     Social History Main Topics    Smoking status: Former Smoker     Quit date: 1/1/2008    Smokeless tobacco: Never Used    Alcohol use No    Drug use: No    Sexual activity: No     Other Topics Concern    None     Social History Narrative         Current Outpatient Prescriptions on File Prior to Visit   Medication Sig Dispense Refill    benazepril (LOTENSIN) 20 MG tablet Take 1 tablet (20 mg total) by mouth 2 (two) times daily. 1 po qam and 1/2 tab po qpm 180 tablet 1    esomeprazole (NEXIUM) 40 MG capsule Take 1 capsule (40 mg total) by mouth before breakfast. 90 capsule 1    estradiol (CLIMARA) 0.075 mg/24 hr Apply to clean dry skin once per week 12 patch 3    fluticasone (FLONASE) 50 mcg/actuation nasal spray USE 1 SPRAY IN EACH NOSTRIL DAILY 16 g 11    hyoscyamine (LEVSIN/SL) 0.125 mg Subl Place 1 tablet (0.125 mg total) under the tongue every 4 (four) hours as needed. 45 tablet 3     "levothyroxine (SYNTHROID) 100 MCG tablet Take 1 tablet (100 mcg total) by mouth once daily. BRAND NAME ONLY 90 tablet 1    metformin (GLUCOPHAGE) 500 MG tablet Take 1 tablet by mouth once daily.      nitroGLYCERIN (NITROSTAT) 0.4 MG SL tablet Place 1 tablet (0.4 mg total) under the tongue every 5 (five) minutes as needed for Chest pain. 30 tablet 0    oxybutynin (DITROPAN-XL) 5 MG TR24 Take 1 tablet (5 mg total) by mouth once daily. 90 tablet 1    promethazine (PHENERGAN) 25 MG tablet Take 1 tablet (25 mg total) by mouth every 6 (six) hours as needed for Nausea. 45 tablet 2    ropinirole (REQUIP) 2 MG tablet Take 1.5 tablets (3 mg total) by mouth nightly. 135 tablet 3    SUBOXONE 8-2 mg Film 1 Film by Subdermal route once daily. 1 strip every 3-5 days  0    sucralfate (CARAFATE) 1 gram tablet TAKE 1 TABLET FOUR TIMES A DAY AS NEEDED FOR GASTRITIS 90 tablet 1    [DISCONTINUED] sucralfate (CARAFATE) 1 gram tablet        No current facility-administered medications on file prior to visit.        PRIOR problem-specific medications tried:  lyrica    Review of patient's allergies indicates:   Allergen Reactions    Pcn [penicillins] Swelling    Toradol [ketorolac] Swelling    Vibramycin [doxycycline calcium] Swelling    Cymbalta [duloxetine]      dizzyness    Elavil [amitriptyline]     Lyrica [pregabalin] Swelling    Seroquel [quetiapine]      restless leg symdrome       IV. Physical Exam    Vitals:    05/03/17 1504   BP: 120/70   Pulse: 62   Resp: 20   Weight: 63.6 kg (140 lb 1.6 oz)   Height: 5' 1" (1.549 m)     General appearance: Well nourished, well developed, no acute distress.         Cardiovascular:  pedal pulses 2, no edema or cyanosis, heart regular rate and rhythym, no carotid bruits.         -------------------------------------------------------------  Facial Expression: normal       Affect: full       Orientation to time & place:  Oriented to time, place, person and situation       Attention & " concentration:  Normal attention span and concentration       Memory:  Recent and remote memory intact  Language: Spontaneous, fluent; able to repeat and name objects        Fund of knowledge:  Aware of current events        Speech:  normal (not dysarthric)  -------------------------------------------------------  Cranial nerves: normal visual acuity, visual fields full, optic discs not visualized, pupils equal round and reactive, extraocular movements intact,       facial sensation intact, face symmetrical, hearing intact to whisper, palate raises midline, shoulder shrug strength normal, tongue protrudes midline.        -------------------------------------------------------  Musculoskeletal  Muscle tone: all 4 extremities normal        Muscle Bulk: all 4 extremities normal        Muscle strength:  5/5 in all 4 extremities        No pronator drift    --------------------------------------------------------------  Cerebellar and Coordination  Gait:  normal, able to tandem without difficulty        Finger-nose: no dysmetria       Rapid Alternating Movements (pronation/supination):  R normal; L normal  --------------------------------------------------------------  MOVEMENT DISORDERS FOCUSED EXAM  Abnormality of movement (bradykinesia, hyperkinesia) present? No    Tremor present?   No   Posture:  normal  Postural stability:  no Rhomberg    V.  Laboratory/ Radiological Data: (Personally reviewed images)         Lab Results   Component Value Date    FERRITIN 22 03/03/2017     Lab Results   Component Value Date    TSH 6.339 (H) 03/03/2017      Lab Results   Component Value Date    [id number] 794 03/03/2017          3/3/17 MRI brain:    1. Multiple foci of white matter signal abnormality nonspecific on this examination but statistically favored to relate  to microvascular ischemic change        VI. Assessment and Plan          Constellation of symptoms including RLS, intermittent tension of muscles and fibromyalgia all  suggestive of a peripheral dysfunction of dopamine.  Ferritin <40 can exacerbate, so we'll address this first.      Problem List Items Addressed This Visit        1 - High    RLS (restless legs syndrome)    Current Assessment & Plan     Intermittent RLS, currently controlled with either requip or suboxone, but she'd like to get off the latter completely.  Ferritin <40 can worsen RLS, so will send her for infusions.   -> ferritin infusion   -> continue requip for now, but consider levodopa, mirapex, gabapentin, others.         Rigidity (muscles)    Overview     EPS:  Intermittent, involuntary tensing up when at rest.         Current Assessment & Plan     Intermittent posturing and tensing up that was at it's worse when she was weaning off of fentanyl, so I suspect this is an extrapyramidal side effect and will likely be static.   -> see RLS/ferritin plans         Decreased ferritin    Overview     Lab Results   Component Value Date    FERRITIN 22 03/03/2017                 2     Subjective memory complaints    Current Assessment & Plan     Pending neuropsych testing next week.                     Return in about 3 months (around 8/3/2017) for RLS.         ___________________________________________________________    I appreciate the opportunity to participate in the care of this patient and will communicate my assessment and plan back to the referring physician via copy of this note.

## 2017-05-03 NOTE — MR AVS SNAPSHOT
[facility]  [address] [place] [address]  Phone: [phone]  Fax: [phone]                  Osman Johansen   5/3/2017 3:20 PM   Office Visit    Description:  Female : 1950   Provider:  Connie Hanson MD   Department:  [place] - Neurology           Reason for Visit     Gait Problem     Memory Loss           Diagnoses this Visit        Comments    RLS (restless legs syndrome)         Subjective memory complaints         Decreased ferritin         Rigidity (muscles)                To Do List           Future Appointments        Provider Department Dept Phone    2017 2:30 PM CHAIR 34, [facility] [phone]    2017 2:30 PM CHAIR 11, [facility] [phone]    2017 3:00 PM Chandana Pearson Jr., MD [facility] [phone]    2017 2:30 PM CHAIR 15, [facility] [phone]    2017 10:15 AM LAB, [place] [facility] [phone]      Goals (5 Years of Data)     None      Follow-Up and Disposition     Return in about 3 months (around 8/3/2017) for RLS.    Follow-up and Disposition History      [facility]     [facility] -  Assistance  Unless otherwise directed by your provider, please contact [facility], our nurse care line that is available for  assistance.     Registered nurses in the [facility] provide: appointment scheduling, clinical advisement, health education, and other advisory services.  Call: [phone] (toll free)               Medications           Message regarding Medications     Verify the changes and/or additions to your medication regime listed below are the same as discussed with your clinician today.  If any of these changes or additions are incorrect, please notify your healthcare provider.             Verify that the below list of medications is  "an accurate representation of the medications you are currently taking.  If none reported, the list may be blank. If incorrect, please contact your healthcare provider. Carry this list with you in case of emergency.           Current Medications     benazepril (LOTENSIN) 20 MG tablet Take 1 tablet (20 mg total) by mouth 2 (two) times daily. 1 po qam and 1/2 tab po qpm    esomeprazole (NEXIUM) 40 MG capsule Take 1 capsule (40 mg total) by mouth before breakfast.    estradiol (CLIMARA) 0.075 mg/24 hr Apply to clean dry skin once per week    fluticasone (FLONASE) 50 mcg/actuation nasal spray USE 1 SPRAY IN EACH NOSTRIL DAILY    hyoscyamine (LEVSIN/SL) 0.125 mg Subl Place 1 tablet (0.125 mg total) under the tongue every 4 (four) hours as needed.    levothyroxine (SYNTHROID) 100 MCG tablet Take 1 tablet (100 mcg total) by mouth once daily. BRAND NAME ONLY    metformin (GLUCOPHAGE) 500 MG tablet Take 1 tablet by mouth once daily.    nitroGLYCERIN (NITROSTAT) 0.4 MG SL tablet Place 1 tablet (0.4 mg total) under the tongue every 5 (five) minutes as needed for Chest pain.    oxybutynin (DITROPAN-XL) 5 MG TR24 Take 1 tablet (5 mg total) by mouth once daily.    promethazine (PHENERGAN) 25 MG tablet Take 1 tablet (25 mg total) by mouth every 6 (six) hours as needed for Nausea.    ropinirole (REQUIP) 2 MG tablet Take 1.5 tablets (3 mg total) by mouth nightly.    SUBOXONE 8-2 mg Film 1 Film by Subdermal route once daily. 1 strip every 3-5 days    sucralfate (CARAFATE) 1 gram tablet TAKE 1 TABLET FOUR TIMES A DAY AS NEEDED FOR GASTRITIS           Clinical Reference Information           Your Vitals Were     BP Pulse Resp Height Weight BMI    120/70 62 20 5' 1" (1.549 m) 63.6 kg (140 lb 1.6 oz) 26.47 kg/m2      Blood Pressure          Most Recent Value    BP  120/70      Allergies as of 5/3/2017     Pcn [Penicillins]    Toradol [Ketorolac]    Vibramycin [Doxycycline Calcium]    Cymbalta [Duloxetine]    Elavil [Amitriptyline]    " Lyrica [Pregabalin]    Seroquel [Quetiapine]      Immunizations Administered on Date of Encounter - 5/3/2017     None      Language Assistance Services     ATTENTION: Language assistance services are available, free of charge. Please call [phone].      ATENCIÓN: Si habla wolfgang, tiene a lorenzo disposición servicios gratuitos de asistencia lingüística. Llame al [phone].     CHÚ Ý: N?u b?n nói Ti?ng Vi?t, có các d?ch v? h? tr? ngôn ng? mi?n phí dành cho b?n. G?i s? [phone].         [facility] complies with applicable Federal civil rights laws and does not discriminate on the basis of race, color, national origin, age, disability, or sex.

## 2017-05-15 ENCOUNTER — INITIAL CONSULT (OUTPATIENT)
Dept: NEUROLOGY | Facility: CLINIC | Age: 67
End: 2017-05-15
Payer: COMMERCIAL

## 2017-05-15 DIAGNOSIS — R41.89 SUBJECTIVE MEMORY COMPLAINTS: ICD-10-CM

## 2017-05-15 DIAGNOSIS — F45.1 SOMATIC SYMPTOM DISORDER: ICD-10-CM

## 2017-05-15 PROCEDURE — 90791 PSYCH DIAGNOSTIC EVALUATION: CPT | Mod: 59,S$GLB,, | Performed by: CLINICAL NEUROPSYCHOLOGIST

## 2017-05-15 PROCEDURE — 99499 UNLISTED E&M SERVICE: CPT | Mod: S$GLB,,, | Performed by: CLINICAL NEUROPSYCHOLOGIST

## 2017-05-15 PROCEDURE — 96118 PR NEUROPSYCH TESTING BY PSYCH/PHYS: CPT | Mod: S$GLB,,, | Performed by: CLINICAL NEUROPSYCHOLOGIST

## 2017-05-15 NOTE — LETTER
May 17, 2017        Chandana Pearson Jr., MD  [address]  [facility] [address]             [facility]  [address]  [place] [address]  Phone: [phone]   Patient: Osman Johansen   MR Number: [id number]   YOB: 1950   Date of Visit: 5/15/2017       Dear Dr. Pearson:    Thank you for referring Osman Johansen to me for evaluation. Below are the relevant portions of my assessment and plan of care.            If you have questions, please do not hesitate to call me. I look forward to following Osman along with you.    Sincerely,      DAR Lopez II, PhD           CC  Galindo Stiles MD
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
## 2017-05-17 ENCOUNTER — INFUSION (OUTPATIENT)
Dept: INFUSION THERAPY | Facility: HOSPITAL | Age: 67
End: 2017-05-17
Attending: PSYCHIATRY & NEUROLOGY
Payer: COMMERCIAL

## 2017-05-17 VITALS
TEMPERATURE: 99 F | RESPIRATION RATE: 16 BRPM | DIASTOLIC BLOOD PRESSURE: 64 MMHG | HEART RATE: 68 BPM | HEIGHT: 61 IN | WEIGHT: 138.38 LBS | BODY MASS INDEX: 26.13 KG/M2 | SYSTOLIC BLOOD PRESSURE: 133 MMHG

## 2017-05-17 DIAGNOSIS — R79.0 DECREASED FERRITIN: Primary | ICD-10-CM

## 2017-05-17 PROBLEM — F45.1 SOMATIC SYMPTOM DISORDER: Status: ACTIVE | Noted: 2017-05-17

## 2017-05-17 PROCEDURE — 96365 THER/PROPH/DIAG IV INF INIT: CPT | Mod: PN

## 2017-05-17 PROCEDURE — 25000003 PHARM REV CODE 250: Mod: PN | Performed by: PSYCHIATRY & NEUROLOGY

## 2017-05-17 PROCEDURE — 63600175 PHARM REV CODE 636 W HCPCS: Mod: PN | Performed by: PSYCHIATRY & NEUROLOGY

## 2017-05-17 RX ORDER — HEPARIN 100 UNIT/ML
500 SYRINGE INTRAVENOUS
Status: CANCELLED | OUTPATIENT
Start: 2017-05-17

## 2017-05-17 RX ORDER — SODIUM CHLORIDE 0.9 % (FLUSH) 0.9 %
10 SYRINGE (ML) INJECTION
Status: CANCELLED | OUTPATIENT
Start: 2017-05-17

## 2017-05-17 RX ORDER — SODIUM CHLORIDE 0.9 % (FLUSH) 0.9 %
10 SYRINGE (ML) INJECTION
Status: DISCONTINUED | OUTPATIENT
Start: 2017-05-17 | End: 2017-05-17 | Stop reason: HOSPADM

## 2017-05-17 RX ADMIN — Medication 10 ML: at 02:05

## 2017-05-17 RX ADMIN — SODIUM CHLORIDE 125 MG: 9 INJECTION, SOLUTION INTRAVENOUS at 01:05

## 2017-05-17 NOTE — PLAN OF CARE
Problem: Activity Intolerance (Adult)  Goal: Identify Related Risk Factors and Signs and Symptoms  Related risk factors and signs and symptoms are identified upon initiation of Human [employer] Clinical Practice Guideline (CPG)   Outcome: Ongoing (interventions implemented as appropriate)  TOLERATED TREATMENT WITHOUT DIFFICULTY.

## 2017-05-17 NOTE — PLAN OF CARE
Problem: Activity Intolerance (Adult)  Goal: Activity Tolerance  Patient will demonstrate the desired outcomes by discharge/transition of care.   Outcome: Ongoing (interventions implemented as appropriate)  TOLERATED TREATMENT WITHOUT DIFFICULTY.

## 2017-05-17 NOTE — MR AVS SNAPSHOT
Patient Information     Patient Name Sex Osman Del Castillo Female 1950      Visit Information        Provider Department Dept Phone Center    2017 1:30 PM CHAIR 18, [facility] [phone] [facility]      Patient Instructions     None      Your Current Medications Are     benazepril (LOTENSIN) 20 MG tablet    esomeprazole (NEXIUM) 40 MG capsule    estradiol (CLIMARA) 0.075 mg/24 hr    fluticasone (FLONASE) 50 mcg/actuation nasal spray    hyoscyamine (LEVSIN/SL) 0.125 mg Subl    levothyroxine (SYNTHROID) 100 MCG tablet    metformin (GLUCOPHAGE) 500 MG tablet    nitroGLYCERIN (NITROSTAT) 0.4 MG SL tablet    oxybutynin (DITROPAN-XL) 5 MG TR24    promethazine (PHENERGAN) 25 MG tablet    ropinirole (REQUIP) 2 MG tablet    SUBOXONE 8-2 mg Film    sucralfate (CARAFATE) 1 gram tablet      Facility-Administered Medications     ferric gluconate (FERRLECIT) 125 mg in sodium chloride 0.9% 100 mL IVPB    sodium chloride 0.9% flush 10 mL      Appointments for Next Year     2017  2:30 PM INFUSION 120 MIN (120 min.) [facility] CHAIR 11, [facility] CHEMO    Arrive at check-in approximately 15 minutes before your scheduled appointment time. Bring all outside medical records and imaging, along with a list of your current medications and insurance card.    1st Floor    2017  3:00 PM ESTABLISHED PATIENT (20 min.) [facility] Chandana Pearson Jr., MD    Arrive at check-in approximately 15 minutes before your scheduled appointment time. Bring all outside medical records and imaging, along with a list of your current medications and insurance card.    First Floor    2017  2:30 PM INFUSION 120 MIN (120 min.) [facility] CHAIR 15, [facility] CHEMO    Arrive at check-in approximately 15 minutes before your scheduled appointment time. Bring all outside medical records and imaging, along with a list of your current medications  "and insurance card.    1st Floor    7/28/2017 10:15 AM FASTING LAB (15 min.) [facility]    1. Do not eat or drink anything for TEN HOURS (10) PRIOR TO TEST. Do not chew gum or eat candy mints, even those claiming to be sugar free. Water is allowed but do not drink any other fluids 2. Take your regular daily medicines as your doctor has ordered. If you are diabetic, do not take your insulin or other diabetic medication until your blood is drawn and you are ready to eat. Your physician may have special instructions for diabetics. Check with your doctor if you have any questions.3. Alcoholic beverages are not allowed starting at 6:00pm the evening before your appointment.    [facility] Floor    8/4/2017  1:50 PM ESTABLISHED PATIENT SHORT (20 min.) [facility] Galindo Stiles MD    Arrive at check-in approximately 15 minutes before your scheduled appointment time. Bring all outside medical records and imaging, along with a list of your current medications and insurance card.    1st Floor         Default Flowsheet Data (last 24 hours)      Amb Complex Vitals Alrs        05/17/17 1531 05/17/17 1349             Measurements    Weight  62.8 kg (138 lb 6.4 oz)       Height  5' 1" (1.549 m)       BSA (Calculated - sq m)  1.64 sq meters       BMI (Calculated)  26.2       /64 138/81       Temp  99.2 °F (37.3 °C)       Pulse 68 78       Resp  16       Pain Assessment    Pain Score  Four       Pain Loc  HEAD   headache               Allergies     Pcn [Penicillins] Swelling    Toradol [Ketorolac] Swelling    Vibramycin [Doxycycline Calcium] Swelling    Cymbalta [Duloxetine]     dizzyness    Elavil [Amitriptyline]     Lyrica [Pregabalin] Swelling    Seroquel [Quetiapine]     restless leg symdrome      Current Discharge Medication List     Cannot display discharge medications since this is not an admission.      "

## 2017-05-22 ENCOUNTER — DOCUMENTATION ONLY (OUTPATIENT)
Dept: NEUROLOGY | Facility: CLINIC | Age: 67
End: 2017-05-22

## 2017-05-22 NOTE — PROGRESS NOTES
Neuropsychology Feedback Note    Date of Session: 05/22/2017  Session Content: Results and recommendations were discussed at length with the patient first and then together with her . I explained that test results were largely normal. I also discussed best treatment for depression, anxiety, and likely somatization issues. She and  were in complete agreement and very appreciative of consultation. No further neuropsychology feedback needed.

## 2017-05-22 NOTE — LETTER
May 22, 2017        Connie Hanson MD  [address] [facility] [address] [facility] [address]             [facility]  [address]  [place] [address]  Phone: [phone]  Fax: [phone]   Patient: Osman Johansen   MR Number: [id number]   YOB: 1950   Date of Visit: 5/22/2017       Dear Dr. Hanson:    Thank you for referring Osman Johansen to me for evaluation. Below are the relevant portions of my assessment and plan of care.            If you have questions, please do not hesitate to call me. I look forward to following Osman along with you.    Sincerely,      DAR Lopez II, PhD           CC  Chandana Pearson Jr., MD

## 2017-05-23 ENCOUNTER — INFUSION (OUTPATIENT)
Dept: INFUSION THERAPY | Facility: HOSPITAL | Age: 67
End: 2017-05-23
Attending: PSYCHIATRY & NEUROLOGY
Payer: COMMERCIAL

## 2017-05-23 VITALS
DIASTOLIC BLOOD PRESSURE: 69 MMHG | TEMPERATURE: 99 F | BODY MASS INDEX: 26.22 KG/M2 | SYSTOLIC BLOOD PRESSURE: 145 MMHG | HEIGHT: 61 IN | RESPIRATION RATE: 14 BRPM | HEART RATE: 51 BPM | WEIGHT: 138.88 LBS

## 2017-05-23 DIAGNOSIS — R79.0 DECREASED FERRITIN: Primary | ICD-10-CM

## 2017-05-23 PROCEDURE — 25000003 PHARM REV CODE 250: Mod: PN | Performed by: PSYCHIATRY & NEUROLOGY

## 2017-05-23 PROCEDURE — 63600175 PHARM REV CODE 636 W HCPCS: Mod: PN | Performed by: PSYCHIATRY & NEUROLOGY

## 2017-05-23 PROCEDURE — 96365 THER/PROPH/DIAG IV INF INIT: CPT | Mod: PN

## 2017-05-23 RX ORDER — SODIUM CHLORIDE 0.9 % (FLUSH) 0.9 %
10 SYRINGE (ML) INJECTION
Status: CANCELLED | OUTPATIENT
Start: 2017-05-23

## 2017-05-23 RX ORDER — SODIUM CHLORIDE 0.9 % (FLUSH) 0.9 %
10 SYRINGE (ML) INJECTION
Status: DISCONTINUED | OUTPATIENT
Start: 2017-05-23 | End: 2017-05-23 | Stop reason: HOSPADM

## 2017-05-23 RX ORDER — HEPARIN 100 UNIT/ML
500 SYRINGE INTRAVENOUS
Status: CANCELLED | OUTPATIENT
Start: 2017-05-23

## 2017-05-23 RX ADMIN — SODIUM CHLORIDE 125 MG: 9 INJECTION, SOLUTION INTRAVENOUS at 02:05

## 2017-05-24 ENCOUNTER — OFFICE VISIT (OUTPATIENT)
Dept: NEUROLOGY | Facility: CLINIC | Age: 67
End: 2017-05-24
Payer: COMMERCIAL

## 2017-05-24 ENCOUNTER — TELEPHONE (OUTPATIENT)
Dept: NEUROLOGY | Facility: CLINIC | Age: 67
End: 2017-05-24

## 2017-05-24 VITALS
WEIGHT: 139.88 LBS | SYSTOLIC BLOOD PRESSURE: 117 MMHG | HEART RATE: 100 BPM | HEIGHT: 61 IN | BODY MASS INDEX: 26.41 KG/M2 | DIASTOLIC BLOOD PRESSURE: 71 MMHG | RESPIRATION RATE: 20 BRPM

## 2017-05-24 DIAGNOSIS — R29.818 EXTRAPYRAMIDAL SYMPTOM: ICD-10-CM

## 2017-05-24 DIAGNOSIS — R41.3 MEMORY LOSS: Primary | ICD-10-CM

## 2017-05-24 DIAGNOSIS — G25.81 RLS (RESTLESS LEGS SYNDROME): ICD-10-CM

## 2017-05-24 PROCEDURE — 1159F MED LIST DOCD IN RCRD: CPT | Mod: S$GLB,,, | Performed by: PSYCHIATRY & NEUROLOGY

## 2017-05-24 PROCEDURE — 99214 OFFICE O/P EST MOD 30 MIN: CPT | Mod: S$GLB,,, | Performed by: PSYCHIATRY & NEUROLOGY

## 2017-05-24 PROCEDURE — 1126F AMNT PAIN NOTED NONE PRSNT: CPT | Mod: S$GLB,,, | Performed by: PSYCHIATRY & NEUROLOGY

## 2017-05-24 PROCEDURE — 99999 PR PBB SHADOW E&M-EST. PATIENT-LVL III: CPT | Mod: PBBFAC,,, | Performed by: PSYCHIATRY & NEUROLOGY

## 2017-05-24 NOTE — PROGRESS NOTES
Date of service:  5/24/2017    Chief complaint:  RLS, Memory Loss    Interval history:  The patient is a 67 y.o. female seen previously for RLS and memory loss.  Regarding the former, she is working with Dr. Hanson.  She was sent for ferritin infusions, and her dopamine agonist was continued.  Dr. Hanson also felt that the patient had some EPS when weaning off narcotics that were felt likely to be static.  She reports that she has not needed her RLS medications lately since starting the infusions.  Regarding her memory, she reports seems better.    History of present illness:  The patient is a 67 y.o. female referred for evaluation of RLS.  This began about 12 years ago.  She describes it as discomfort in the bilateral LE.  It is worse at night.  It also worsens with Elavil and steroids.  She notices it more when she attempts to wean down on narcotics.  She is presently on Suboxone.  She indicates that this is preventing her from stopping the Suboxone altogether.  She is on Requip, which she feels has not been helpful.  These symptoms are present whenever she does not take Suboxone.    The patient is most concerned with memory loss. This issue began about 20 years ago.  It has been gradually progressive, though she does have good days and bad days.  She has issues with word finding.  It involves short-term memory more than long-term memory.  She reports some difficulties with executive function.  There are issues with multiple step processes. She has no clear problems with ADLs. She does get lost in familiar areas. There are no hallucinations. There are some personality changes.  She does endorse depression, anxiety, and racing of thoughts.      Past Medical History:   Diagnosis Date    Allergy     Anxiety     Arthritis     Blood transfusion 8 yrs    Degenerative disc disease     Depression     GERD (gastroesophageal reflux disease)     Hypertension     Neuromuscular disorder     Osteoporosis     PUD (peptic  "ulcer disease)     Thoracic or lumbosacral neuritis or radiculitis 10/19/2012    Thyroid disease        Past Surgical History:   Procedure Laterality Date    APPENDECTOMY  1965    HYSTERECTOMY  8 yrs     TONSILLECTOMY  1954       Family History   Problem Relation Age of Onset    Arthritis Mother     Cancer Mother     Heart disease Mother     Hypertension Mother        Social History     Social History    Marital status:      Spouse name: N/A    Number of children: N/A    Years of education: N/A     Occupational History    Not on file.     Social History Main Topics    Smoking status: Former Smoker     Quit date: 1/1/2008    Smokeless tobacco: Never Used    Alcohol use No    Drug use: No    Sexual activity: No     Other Topics Concern    Not on file     Social History Narrative    No narrative on file        Review of Systems  General/Constitutional:  No unintentional weight loss, No change in appetite  Eyes/Vision:  + change in vision, No double vision  ENT:  No frequent nose bleeds, + ringing in the ears  Respiratory:  No cough, No wheezing  Cardiovascular:  + chest pain, + palpitations  Gastrointestinal:  No jaundice, + nausea/vomiting  Genitourinary:  + incontinence, No burning with urination  Hematologic/Lymphatic:  + easy bruising/bleeding, No night sweats  Neurological:  + numbness, + weakness  Endocrine:  + fatigue, + heat/cold intolerance  Allergy/Immunologic:  No fevers, + chills  Musculoskeletal:  + muscle pain, + joint pain   Psychiatric:  No thoughts of harming self/others, + depression  Integumentary:  + rashes, No sores that do not heal     Physical exam:  /71   Pulse 100   Resp 20   Ht 5' 1" (1.549 m)   Wt 63.5 kg (139 lb 14.1 oz)   BMI 26.43 kg/m²   General: Well developed, well nourished.  No acute distress.  HEENT: Atraumatic, normocephalic.  Neck: Supple, trachea midline.  Cardiovascular: Regular rate and rhythm.  Pulmonary: No increased work of " "breathing.  Abdomen/GI: No guarding.  Musculoskeletal: No obvious joint deformities, moves all extremities well.    Neurological exam:  Mental status: Awake and alert.  Oriented x4.  Speech fluent and appropriate.  Recent and remote memory appear to be largely intact.  Fund of knowledge seems normal.  MMSE = 29/30.  Cranial nerves: Pupils equal round and reactive to light, extraocular movements intact, facial strength and sensation intact bilaterally, palate and tongue midline, hearing grossly intact bilaterally.  Motor: 5 out of 5 strength throughout the upper and lower extremities bilaterally. Normal bulk and tone.  Sensation: Intact to light touch and temperature bilaterally.  DTR: 2+ at the knees and biceps bilaterally.  Coordination: Finger-nose-finger testing intact bilaterally.  Gait: Nonfocal gait.    Data base:  Notes of the referring physician were reviewed.  Briefly summarized, these included a recent visit for a variety of issues including HTN, DM, frontal headache, and thyroid disease.    Labs checked at our initial visit were largely unrevealing.  She did have an elevated TSH but a normal FT4.  Also of note, her ferritin was 22.    MRI brain (3/17):  "Multiple foci of white matter signal abnormality nonspecific on this examination but statistically favored to relate  to microvascular ischemic change."  I independently visualized and interpreted this study.     Neuropsychological testing (5/17):  "Patient has significant history of psychiatric risk factors, various pain concerns, RLS, HTN, and fibromyalgia. She has been reporting memory difficulties for many years, but has never had a neuropsychological exam. She was referred for a neuropsychological evaluation by Neurology to characterize her cognitive status, for differential diagnosis, and for treatment recommendations. She has no decline in I/ADLs. She is seeing a therapist and psychiatrist to good effect regarding her mood, but still has ongoing " anxiety/depression. She had an MRI in March that noted some microvascular ischemic changes, but was otherwise normal. The patient's baseline or pre-morbid intellectual functioning was estimated to be in the low average to average range based on educational/occupational history and performance on a word reading measure. Results should be interpreted in that context.     Neurocognitive testing was largely intact. Attention/concentration was largely normal. Mental speed was also normal range or better for her age. Motor speed and dexterity was above average. Basic and expressive language was normal on observation. Verbal fluency and naming were normal range. Visuoconstruction skills were normal. Verbal learning and memory were above average for her age/background. Visual learning and memory were lower than expected. However, it is not uncommon for patients to have 1-3 isolated low scores when administered a lengthy neuropsychological battery. Executive functions were normal for set-shifting, generative fluency, planning/organization when copying a complex figure, and for verbal reasoning.     Psychiatric concerns continue to be prominent despite improved mood over the past several months. In particular, her history noted above and self-report on a lengthy psychological measure (Personality Assessment Inventory) note a tendency to generate physical symptoms in response to stress or when she is depressed/anxious. Thus, it is likely that she could experience significant improvement in pain control with behaviorally-based/psychologically-based interventions. Consistent with her profile on the NADINE, it is likely that her cognitive symptoms are also 2/2 psychiatric factors rather than any neurologic or neurodegenerative process. She also continues to report considerable depression and anxiety, but this appears improved when compared to the past several months.      Overall, the patient's neurocognitive profile was largely  "normal. She is not evidencing any significant memory difficulties on testing. Instead, it is likely that continued psychiatric factors (somatization in particular) are contributing to her experience of cognitive symptoms in everyday life. Continued psychotherapy and psychiatric follow-up is critical for this patient to ensure optimal management of her mood and somatization concerns. Aside from psychiatric factors, it is also important to minimize any medications that she is on that can impact cognition. Additionally, her recent thyroid level was abnormal."    Assessment and plan:  The patient is a 67 y.o. female seen previously for complaints of memory loss.  Evaluation is as noted, and it appears that this is more of a pseudodementia picture.  This was discussed with the patient in detail, and reassurance was provided.  She will continue working with her mental health providers.    The patient has established care in our movement disorder clinic for her RLS.  Dr. Hanson felt that the patient has both RLS and EPS from her narcotics.  The patient had a number of questions related to these issues, and this was discussed with her in depth.  I have encouraged her to continue working with Dr. Hanson on these issues.  The patient does report improvement in her RLS since starting the ferritin infusions, which is certainly encouraging.    As her memory loss does not represent MCI/dementia, we will see the patient back on a prn basis.  She will continue working with Dr. Hanson on her RLS and EPS.  "

## 2017-05-24 NOTE — TELEPHONE ENCOUNTER
We do not have a clinic schedule for August and if this patient can travel to [place] for appointment that would be great. If patient can not travel it may be a wait for appointments in [place]. We are asking patients to give us a call back in the middle of June for appointments.

## 2017-05-25 ENCOUNTER — TELEPHONE (OUTPATIENT)
Dept: NEUROLOGY | Facility: CLINIC | Age: 67
End: 2017-05-25

## 2017-05-25 NOTE — TELEPHONE ENCOUNTER
----- Message from [place] Gonzáles sent at 5/25/2017 11:34 AM CDT -----  Contact: [phone]  Patient is returning nurse's phone call.  Please call patient back at [phone].

## 2017-05-30 ENCOUNTER — INFUSION (OUTPATIENT)
Dept: INFUSION THERAPY | Facility: HOSPITAL | Age: 67
End: 2017-05-30
Attending: PSYCHIATRY & NEUROLOGY
Payer: COMMERCIAL

## 2017-05-30 ENCOUNTER — NURSE TRIAGE (OUTPATIENT)
Dept: ADMINISTRATIVE | Facility: CLINIC | Age: 67
End: 2017-05-30

## 2017-05-30 VITALS
HEART RATE: 58 BPM | DIASTOLIC BLOOD PRESSURE: 74 MMHG | RESPIRATION RATE: 16 BRPM | SYSTOLIC BLOOD PRESSURE: 169 MMHG | TEMPERATURE: 98 F

## 2017-05-30 DIAGNOSIS — R79.0 DECREASED FERRITIN: Primary | ICD-10-CM

## 2017-05-30 PROCEDURE — 25000003 PHARM REV CODE 250: Mod: PN | Performed by: PSYCHIATRY & NEUROLOGY

## 2017-05-30 PROCEDURE — 96365 THER/PROPH/DIAG IV INF INIT: CPT | Mod: PN

## 2017-05-30 PROCEDURE — 63600175 PHARM REV CODE 636 W HCPCS: Mod: PN | Performed by: PSYCHIATRY & NEUROLOGY

## 2017-05-30 RX ORDER — SODIUM CHLORIDE 0.9 % (FLUSH) 0.9 %
10 SYRINGE (ML) INJECTION
Status: CANCELLED | OUTPATIENT
Start: 2017-05-30

## 2017-05-30 RX ORDER — HEPARIN 100 UNIT/ML
500 SYRINGE INTRAVENOUS
Status: CANCELLED | OUTPATIENT
Start: 2017-05-30

## 2017-05-30 RX ORDER — SODIUM CHLORIDE 0.9 % (FLUSH) 0.9 %
10 SYRINGE (ML) INJECTION
Status: DISCONTINUED | OUTPATIENT
Start: 2017-05-30 | End: 2017-05-30 | Stop reason: HOSPADM

## 2017-05-30 RX ADMIN — SODIUM CHLORIDE 125 MG: 9 INJECTION, SOLUTION INTRAVENOUS at 02:05

## 2017-05-30 RX ADMIN — Medication 10 ML: at 02:05

## 2017-05-30 RX ADMIN — SODIUM CHLORIDE: 0.9 INJECTION, SOLUTION INTRAVENOUS at 02:05

## 2017-05-30 NOTE — PLAN OF CARE
Problem: Patient Care Overview (Adult)  Goal: Discharge Needs Assessment  Adequate for discharge.   Pt tolerated infusion without noted distress.  Reviewed upcoming appointments.  All questions answered.  Ambulated from infusion by self

## 2017-05-30 NOTE — TELEPHONE ENCOUNTER
"  Reason for Disposition   Pain, redness, or swelling at intravenous (IV) site or along course of vein   Arm is swollen, new onset (or leg swelling if IV in lower extremity)    Answer Assessment - Initial Assessment Questions  1. ONSET: "When did the pain start?"      Following Fe infusion  2. LOCATION: "Where is the pain located?"      Hand and up arm to elbow    4. WORK OR EXERCISE: "Has there been any recent work or exercise that involved this part of the body?"      n/a  5. CAUSE: "What do you think is causing the arm pain?"      Had Fe infusion and shortly afterward started w/ symptoms  6. OTHER SYMPTOMS: "Do you have any other symptoms?" (e.g., neck pain, swelling, rash, fever, numbness, weakness)      + swelling from fingers to elbow, + tingling in fingers and arm    Protocols used: ST ARM PAIN-A-AH, ST IV SITE (SKIN) SYMPTOMS-A-AH  pt states she got Fe infusion today/ her 3rd one in 3 weeks/ about 1 hour after infusion she started with tingling in fingers and swelling from fingers to elbow/ states her arm is continuing to swell    To ER for evaluation and tx    Soumya Reddy RN      "

## 2017-06-21 ENCOUNTER — TELEPHONE (OUTPATIENT)
Dept: FAMILY MEDICINE | Facility: CLINIC | Age: 67
End: 2017-06-21

## 2017-06-21 DIAGNOSIS — E03.4 HYPOTHYROIDISM DUE TO ACQUIRED ATROPHY OF THYROID: ICD-10-CM

## 2017-06-21 NOTE — TELEPHONE ENCOUNTER
Patient stated that she thinks her thyroid is off because she has palpitations, rapid heart beat and dizziness, and took synthroid 112mcg , you have her on the 100mcg .  She stated after she took the 112mcg she felt better, she does need them refilled ,but wants you to know what is going on with her.   She said to let you know family issues and she had some medical issues so she had to cancel her appointment.   Please advise TY

## 2017-06-21 NOTE — TELEPHONE ENCOUNTER
----- Message from Sarah Newman sent at 6/21/2017 11:18 AM CDT -----  Patient is requesting a return call she states the last few week she has been getting dizzy, she thinks it may be caused by her thyroid,  contact patient at [phone] to advise.       Thank you

## 2017-06-22 RX ORDER — LEVOTHYROXINE SODIUM 112 UG/1
112 TABLET ORAL DAILY
Qty: 60 TABLET | Refills: 0 | Status: SHIPPED | OUTPATIENT
Start: 2017-06-22 | End: 2017-08-22 | Stop reason: SDUPTHER

## 2017-06-22 NOTE — TELEPHONE ENCOUNTER
Will send in 112mcg for 2 mo supply; needs apt in 2 mo w lab previously ordered 1 week prior.  Ok for green hold spot.  Can you link previous labs to new apt?  Tsh, a1c, flp, cmp

## 2017-07-21 NOTE — TELEPHONE ENCOUNTER
----- Message from Celio Pugh sent at 1/5/2017 10:26 AM CST -----  Contact: pt  Pt is requesting a callback, needs to talk to nurse about medications  Call Back#[phone]  Thanks   Admission

## 2017-08-08 ENCOUNTER — PATIENT OUTREACH (OUTPATIENT)
Dept: ADMINISTRATIVE | Facility: HOSPITAL | Age: 67
End: 2017-08-08

## 2017-08-16 ENCOUNTER — PATIENT OUTREACH (OUTPATIENT)
Dept: ADMINISTRATIVE | Facility: HOSPITAL | Age: 67
End: 2017-08-16

## 2017-08-16 ENCOUNTER — TELEPHONE (OUTPATIENT)
Dept: NEUROLOGY | Facility: CLINIC | Age: 67
End: 2017-08-16

## 2017-08-16 NOTE — PROGRESS NOTES
Portal outreach un-read by patient.  Outreach mailed today    [facility] is committed to your overall health.  To help you get the most out of each of your visits, we will review your information to make sure you are up to date on all of your recommended tests and/or procedures.       Dr. Stiles        has found that you may be due for:     Annual Dilated Eye Exam   Tetanus immunization   Pneumonia immunization     REMINDER:  lab appointment 8/16/17       If you have had any of the above done at another facility, please bring the records or information with you so that your record at [facility] will be complete.      If you are currently taking medication , please bring it with you to your appointment for review.     We appreciate the opportunity to provide you with excellent medical care.     Please call the number listed below if you have any questions.

## 2017-08-16 NOTE — LETTER
August 16, 2017    Osman Johansen  [address]  [place] [address]             [facility]  [address]  [place] [address]  Phone: [phone] Dear Ms. Johansen:    We have tried to reach you by mychart unsuccessfully.   [facility] is committed to your overall health.  To help you get the most out of each of your visits, we will review your information to make sure you are up to date on all of your recommended tests and/or procedures.       Dr. Stiles        has found that you may be due for:     Annual Dilated Eye Exam   Tetanus immunization   Pneumonia immunization     REMINDER:  lab appointment 8/16/17       If you have had any of the above done at another facility, please bring the records or information with you so that your record at [facility] will be complete.      If you are currently taking medication , please bring it with you to your appointment for review.     We appreciate the opportunity to provide you with excellent medical care.     Sincerely,    Carmita Vazquez  Clinical Care Coordinator  [facility] [address] [facility] [address]  [place] [address]  Phone: [phone]   Fax: [phone]

## 2017-08-16 NOTE — TELEPHONE ENCOUNTER
Left message on both number informing Dr. Hanson out of the office due to jury duty. Explained we need to reschedule appointment and call office back.

## 2017-08-17 ENCOUNTER — TELEPHONE (OUTPATIENT)
Dept: FAMILY MEDICINE | Facility: CLINIC | Age: 67
End: 2017-08-17

## 2017-08-17 ENCOUNTER — LAB VISIT (OUTPATIENT)
Dept: LAB | Facility: HOSPITAL | Age: 67
End: 2017-08-17
Attending: INTERNAL MEDICINE
Payer: COMMERCIAL

## 2017-08-17 DIAGNOSIS — E03.4 HYPOTHYROIDISM DUE TO ACQUIRED ATROPHY OF THYROID: ICD-10-CM

## 2017-08-17 DIAGNOSIS — E61.1 LOW IRON: Primary | ICD-10-CM

## 2017-08-17 DIAGNOSIS — I10 ESSENTIAL HYPERTENSION: ICD-10-CM

## 2017-08-17 DIAGNOSIS — E11.9 CONTROLLED TYPE 2 DIABETES MELLITUS WITHOUT COMPLICATION, WITHOUT LONG-TERM CURRENT USE OF INSULIN: ICD-10-CM

## 2017-08-17 LAB
ALBUMIN SERPL BCP-MCNC: 3.9 G/DL
ALP SERPL-CCNC: 63 U/L
ALT SERPL W/O P-5'-P-CCNC: 15 U/L
ANION GAP SERPL CALC-SCNC: 5 MMOL/L
AST SERPL-CCNC: 20 U/L
BILIRUB SERPL-MCNC: 0.4 MG/DL
BUN SERPL-MCNC: 21 MG/DL
CALCIUM SERPL-MCNC: 9.3 MG/DL
CHLORIDE SERPL-SCNC: 102 MMOL/L
CHOLEST/HDLC SERPL: 2.7 {RATIO}
CO2 SERPL-SCNC: 28 MMOL/L
CREAT SERPL-MCNC: 0.9 MG/DL
EST. GFR  (AFRICAN AMERICAN): >60 ML/MIN/1.73 M^2
EST. GFR  (NON AFRICAN AMERICAN): >60 ML/MIN/1.73 M^2
GLUCOSE SERPL-MCNC: 94 MG/DL
HDL/CHOLESTEROL RATIO: 36.9 %
HDLC SERPL-MCNC: 249 MG/DL
HDLC SERPL-MCNC: 92 MG/DL
LDLC SERPL CALC-MCNC: 139.2 MG/DL
NONHDLC SERPL-MCNC: 157 MG/DL
POTASSIUM SERPL-SCNC: 4.9 MMOL/L
PROT SERPL-MCNC: 7.5 G/DL
SODIUM SERPL-SCNC: 135 MMOL/L
TRIGL SERPL-MCNC: 89 MG/DL
TSH SERPL DL<=0.005 MIU/L-ACNC: 0.44 UIU/ML

## 2017-08-17 PROCEDURE — 80053 COMPREHEN METABOLIC PANEL: CPT

## 2017-08-17 PROCEDURE — 84443 ASSAY THYROID STIM HORMONE: CPT

## 2017-08-17 PROCEDURE — 80061 LIPID PANEL: CPT

## 2017-08-17 PROCEDURE — 36415 COLL VENOUS BLD VENIPUNCTURE: CPT | Mod: PO

## 2017-08-17 PROCEDURE — 83036 HEMOGLOBIN GLYCOSYLATED A1C: CPT

## 2017-08-17 NOTE — TELEPHONE ENCOUNTER
----- Message from Marty Gutierrez sent at 8/17/2017  3:41 PM CDT -----  Contact: Patient  Patient states that she has done her labs but there aren't any orders for the Feratin nor for Iron.  The lab is requesting for the order to be put in the system so the labs can test for her iron also.  Please call [phone].  Thank you

## 2017-08-17 NOTE — TELEPHONE ENCOUNTER
Attempted to contact pt on number listed below. No answer. Left message to call clinic.     It appears that neurology ordered the last iron studies and ordered infusions for it.    Would you order below tests, or do we defer to neurology.

## 2017-08-18 LAB
ESTIMATED AVG GLUCOSE: 105 MG/DL
HBA1C MFR BLD HPLC: 5.3 %

## 2017-08-18 NOTE — TELEPHONE ENCOUNTER
Spoke with pt, notified that orders have been placed. States that is all she needs. She will call back to make another lab appt to have drawn.

## 2017-08-22 ENCOUNTER — OFFICE VISIT (OUTPATIENT)
Dept: FAMILY MEDICINE | Facility: CLINIC | Age: 67
End: 2017-08-22
Payer: COMMERCIAL

## 2017-08-22 ENCOUNTER — LAB VISIT (OUTPATIENT)
Dept: LAB | Facility: HOSPITAL | Age: 67
End: 2017-08-22
Attending: INTERNAL MEDICINE
Payer: COMMERCIAL

## 2017-08-22 VITALS
HEIGHT: 61 IN | BODY MASS INDEX: 26.06 KG/M2 | RESPIRATION RATE: 18 BRPM | OXYGEN SATURATION: 98 % | SYSTOLIC BLOOD PRESSURE: 112 MMHG | WEIGHT: 138 LBS | HEART RATE: 78 BPM | DIASTOLIC BLOOD PRESSURE: 64 MMHG

## 2017-08-22 DIAGNOSIS — H26.9 CATARACT, UNSPECIFIED CATARACT TYPE, UNSPECIFIED LATERALITY: ICD-10-CM

## 2017-08-22 DIAGNOSIS — E11.9 CONTROLLED TYPE 2 DIABETES MELLITUS WITHOUT COMPLICATION, WITHOUT LONG-TERM CURRENT USE OF INSULIN: ICD-10-CM

## 2017-08-22 DIAGNOSIS — N39.41 URGE INCONTINENCE: ICD-10-CM

## 2017-08-22 DIAGNOSIS — E03.4 HYPOTHYROIDISM DUE TO ACQUIRED ATROPHY OF THYROID: Primary | ICD-10-CM

## 2017-08-22 DIAGNOSIS — E61.1 LOW IRON: ICD-10-CM

## 2017-08-22 DIAGNOSIS — K29.70 GASTRITIS, PRESENCE OF BLEEDING UNSPECIFIED, UNSPECIFIED CHRONICITY, UNSPECIFIED GASTRITIS TYPE: ICD-10-CM

## 2017-08-22 DIAGNOSIS — I10 ESSENTIAL HYPERTENSION: ICD-10-CM

## 2017-08-22 LAB
FERRITIN SERPL-MCNC: 82 NG/ML
IRON SERPL-MCNC: 90 UG/DL
SATURATED IRON: 31 %
TOTAL IRON BINDING CAPACITY: 293 UG/DL
TRANSFERRIN SERPL-MCNC: 198 MG/DL

## 2017-08-22 PROCEDURE — 3078F DIAST BP <80 MM HG: CPT | Mod: S$GLB,,, | Performed by: INTERNAL MEDICINE

## 2017-08-22 PROCEDURE — 4010F ACE/ARB THERAPY RXD/TAKEN: CPT | Mod: S$GLB,,, | Performed by: INTERNAL MEDICINE

## 2017-08-22 PROCEDURE — 36415 COLL VENOUS BLD VENIPUNCTURE: CPT | Mod: PO

## 2017-08-22 PROCEDURE — 99999 PR PBB SHADOW E&M-EST. PATIENT-LVL III: CPT | Mod: PBBFAC,,, | Performed by: INTERNAL MEDICINE

## 2017-08-22 PROCEDURE — 1126F AMNT PAIN NOTED NONE PRSNT: CPT | Mod: S$GLB,,, | Performed by: INTERNAL MEDICINE

## 2017-08-22 PROCEDURE — 83540 ASSAY OF IRON: CPT

## 2017-08-22 PROCEDURE — 82728 ASSAY OF FERRITIN: CPT

## 2017-08-22 PROCEDURE — 3044F HG A1C LEVEL LT 7.0%: CPT | Mod: S$GLB,,, | Performed by: INTERNAL MEDICINE

## 2017-08-22 PROCEDURE — 3074F SYST BP LT 130 MM HG: CPT | Mod: S$GLB,,, | Performed by: INTERNAL MEDICINE

## 2017-08-22 PROCEDURE — 3008F BODY MASS INDEX DOCD: CPT | Mod: S$GLB,,, | Performed by: INTERNAL MEDICINE

## 2017-08-22 PROCEDURE — 1159F MED LIST DOCD IN RCRD: CPT | Mod: S$GLB,,, | Performed by: INTERNAL MEDICINE

## 2017-08-22 PROCEDURE — 99214 OFFICE O/P EST MOD 30 MIN: CPT | Mod: S$GLB,,, | Performed by: INTERNAL MEDICINE

## 2017-08-22 RX ORDER — BENAZEPRIL HYDROCHLORIDE 20 MG/1
20 TABLET ORAL DAILY
Qty: 90 TABLET | Refills: 1 | Status: SHIPPED | OUTPATIENT
Start: 2017-08-22 | End: 2018-03-06 | Stop reason: SDUPTHER

## 2017-08-22 RX ORDER — SUCRALFATE 1 G/1
TABLET ORAL
Qty: 90 TABLET | Refills: 3 | Status: SHIPPED | OUTPATIENT
Start: 2017-08-22 | End: 2019-06-14 | Stop reason: ALTCHOICE

## 2017-08-22 RX ORDER — LEVOTHYROXINE SODIUM 112 UG/1
125 TABLET ORAL DAILY
Qty: 90 TABLET | Refills: 1 | Status: SHIPPED | OUTPATIENT
Start: 2017-08-22 | End: 2017-09-08

## 2017-08-22 RX ORDER — OXYBUTYNIN CHLORIDE 10 MG/1
10 TABLET, EXTENDED RELEASE ORAL DAILY
Qty: 90 TABLET | Refills: 1 | Status: SHIPPED | OUTPATIENT
Start: 2017-08-22 | End: 2018-03-06 | Stop reason: SDUPTHER

## 2017-08-22 RX ORDER — ESOMEPRAZOLE MAGNESIUM 40 MG/1
40 CAPSULE, DELAYED RELEASE ORAL
Qty: 90 CAPSULE | Refills: 1 | Status: SHIPPED | OUTPATIENT
Start: 2017-08-22 | End: 2018-06-04 | Stop reason: SDUPTHER

## 2017-08-22 RX ORDER — METFORMIN HYDROCHLORIDE 500 MG/1
500 TABLET ORAL DAILY
Qty: 90 TABLET | Refills: 1 | Status: SHIPPED | OUTPATIENT
Start: 2017-08-22 | End: 2018-02-18 | Stop reason: SDUPTHER

## 2017-08-22 NOTE — PROGRESS NOTES
Subjective:       Patient ID: Osman Johansen is a 67 y.o. female.    Chief Complaint: Diabetes; Hashimoto's Thyroiditis; and Medication Refill    HTN - controlled  Hypothyroid - controlled, but almost supratheraputic.  112 mcg from local pharmacy made her have palpitations, but 125 mcg does not from mail order.  Dye? Only use mail order 112  DM - controlled; outside eye exam done 7/26/17    Went to ER for CP and low BP.  NM stress test wnl.  Dx non cardiac chest pain    Back pain resolved s/p pain Dr injection - still pain free    Recall - pain Dr wanted her to see neurology.  Gets cramps on Suboxone.  Trying to wean off and down to a piece of her daily pill but can't because develops RLS that prevents her from sleeping.  She had similar events with leg cramping when on fentanyl and hydrocodone that resolved when placed on Suboxone.  She is down to a piece of Suboxone daily = 1 pill lasts about 4-6 days.  She also has uncontrolled RLS that has been exacerbated as she has tried to wean off Suboxone.  This has been happening off and on for about 6 months now.     Wants wait on vac      Review of Systems   Constitutional: Negative for appetite change and fever.   HENT: Negative for nosebleeds and trouble swallowing.    Eyes: Negative for discharge and visual disturbance.   Respiratory: Negative for choking and shortness of breath.    Cardiovascular: Negative for chest pain and palpitations.   Gastrointestinal: Negative for abdominal pain, nausea and vomiting.   Musculoskeletal: Negative for arthralgias and joint swelling.   Skin: Negative for rash and wound.   Neurological: Negative for dizziness and syncope.   Psychiatric/Behavioral: Negative for confusion and dysphoric mood.       Objective:      Vitals:    08/22/17 1514   BP: 112/64   Pulse: 78   Resp: 18     Physical Exam   Constitutional: She appears well-nourished.   Eyes: Conjunctivae and EOM are normal.   Neck: Normal range of motion.   Cardiovascular:  Normal rate and regular rhythm.    Pulmonary/Chest: Effort normal and breath sounds normal.   Musculoskeletal:   Normal ROM bilateral    Neurological: No cranial nerve deficit (grossly intact).   Skin: Skin is warm and dry.   Psychiatric: She has a normal mood and affect.   Alert and orientated   Vitals reviewed.        Assessment:       1. Hypothyroidism due to acquired atrophy of thyroid    2. Gastritis, presence of bleeding unspecified, unspecified chronicity, unspecified gastritis type    3. Essential hypertension    4. Urge incontinence    5. Controlled type 2 diabetes mellitus without complication, without long-term current use of insulin    6. Cataract, unspecified cataract type, unspecified laterality        Plan:       Hypothyroidism due to acquired atrophy of thyroid  -     levothyroxine (SYNTHROID) 112 MCG tablet; Take 1 tablet (112 mcg total) by mouth once daily. BRAND NAME ONLY  Dispense: 90 tablet; Refill: 1  -     TSH; Future; Expected date: 02/18/2018    Gastritis, presence of bleeding unspecified, unspecified chronicity, unspecified gastritis type  -     sucralfate (CARAFATE) 1 gram tablet; TAKE 1 TABLET FOUR TIMES A DAY AS NEEDED FOR GASTRITIS  Dispense: 90 tablet; Refill: 3  -     esomeprazole (NEXIUM) 40 MG capsule; Take 1 capsule (40 mg total) by mouth before breakfast.  Dispense: 90 capsule; Refill: 1  -     CBC auto differential; Future; Expected date: 02/18/2018    Essential hypertension  -     benazepril (LOTENSIN) 20 MG tablet; Take 1 tablet (20 mg total) by mouth once daily.  Dispense: 90 tablet; Refill: 1  -     Comprehensive metabolic panel; Future; Expected date: 02/18/2018  -     Lipid panel; Future; Expected date: 02/18/2018    Urge incontinence  -     oxybutynin (DITROPAN-XL) 10 MG 24 hr tablet; Take 1 tablet (10 mg total) by mouth once daily.  Dispense: 90 tablet; Refill: 1    Controlled type 2 diabetes mellitus without complication, without long-term current use of insulin  -      metformin (GLUCOPHAGE) 500 MG tablet; Take 1 tablet (500 mg total) by mouth once daily.  Dispense: 90 tablet; Refill: 1  -     Hemoglobin A1c; Future; Expected date: 02/18/2018  -     Microalbumin/creatinine urine ratio; Future; Expected date: 02/18/2018    Cataract, unspecified cataract type, unspecified laterality  -     Ambulatory referral to Ophthalmology      Medication List with Changes/Refills   Current Medications    DIPHENHYDRAMINE (BENADRYL) 25 MG CAPSULE    Take 1 each (25 mg total) by mouth every 6 (six) hours as needed for Itching or Allergies.    ESTRADIOL (CLIMARA) 0.075 MG/24 HR    Apply to clean dry skin once per week    FLUTICASONE (FLONASE) 50 MCG/ACTUATION NASAL SPRAY    USE 1 SPRAY IN EACH NOSTRIL DAILY    HYOSCYAMINE (LEVSIN/SL) 0.125 MG SUBL    Place 1 tablet (0.125 mg total) under the tongue every 4 (four) hours as needed.    NITROGLYCERIN (NITROSTAT) 0.4 MG SL TABLET    Place 1 tablet (0.4 mg total) under the tongue every 5 (five) minutes as needed for Chest pain.    PROMETHAZINE (PHENERGAN) 25 MG TABLET    Take 1 tablet (25 mg total) by mouth every 6 (six) hours as needed for Nausea.    ROPINIROLE (REQUIP) 2 MG TABLET    Take 1.5 tablets (3 mg total) by mouth nightly.    SUBOXONE 8-2 MG FILM    1 Film by Subdermal route once daily. 1 strip last 7days   Changed and/or Refilled Medications    Modified Medication Previous Medication    BENAZEPRIL (LOTENSIN) 20 MG TABLET benazepril (LOTENSIN) 20 MG tablet       Take 1 tablet (20 mg total) by mouth once daily.    Take 1 tablet (20 mg total) by mouth 2 (two) times daily. 1 po qam and 1/2 tab po qpm    ESOMEPRAZOLE (NEXIUM) 40 MG CAPSULE esomeprazole (NEXIUM) 40 MG capsule       Take 1 capsule (40 mg total) by mouth before breakfast.    Take 1 capsule (40 mg total) by mouth before breakfast.    LEVOTHYROXINE (SYNTHROID) 112 MCG TABLET levothyroxine (SYNTHROID) 112 MCG tablet       Take 1 tablet (112 mcg total) by mouth once daily. BRAND NAME ONLY   "  Take 1 tablet (112 mcg total) by mouth once daily. BRAND NAME ONLY    METFORMIN (GLUCOPHAGE) 500 MG TABLET metformin (GLUCOPHAGE) 500 MG tablet       Take 1 tablet (500 mg total) by mouth once daily.    Take 1 tablet by mouth once daily.    OXYBUTYNIN (DITROPAN-XL) 10 MG 24 HR TABLET oxybutynin (DITROPAN-XL) 5 MG TR24       Take 1 tablet (10 mg total) by mouth once daily.    Take 1 tablet (5 mg total) by mouth once daily.    SUCRALFATE (CARAFATE) 1 GRAM TABLET sucralfate (CARAFATE) 1 gram tablet       TAKE 1 TABLET FOUR TIMES A DAY AS NEEDED FOR GASTRITIS    TAKE 1 TABLET FOUR TIMES A DAY AS NEEDED FOR GASTRITIS             Counseled on regular exercise, maintenance of a healthy weight, balanced diet rich in fruits/vegetables and lean protein, and avoidance of unhealthy habits like smoking and excessive alcohol intake.   Also, counseled on importance of being compliant with medication, health appointments, diet and exercise.     Return in about 6 months (around 2/22/2018).    "This note will not be shared with the patient."  "

## 2017-09-08 DIAGNOSIS — K29.70 GASTRITIS, PRESENCE OF BLEEDING UNSPECIFIED, UNSPECIFIED CHRONICITY, UNSPECIFIED GASTRITIS TYPE: ICD-10-CM

## 2017-09-08 DIAGNOSIS — E03.4 HYPOTHYROIDISM DUE TO ACQUIRED ATROPHY OF THYROID: ICD-10-CM

## 2017-09-08 NOTE — TELEPHONE ENCOUNTER
----- Message from Preethi Dey sent at 9/8/2017  4:04 PM CDT -----  Refill on Rx Finagin 25 mg and Rx Synthroid 125 mg(90 day supply).  Please send into [facility], call patient at [phone]

## 2017-09-11 RX ORDER — LEVOTHYROXINE SODIUM 125 UG/1
125 TABLET ORAL DAILY
Qty: 30 TABLET | Refills: 6 | Status: SHIPPED | OUTPATIENT
Start: 2017-09-11 | End: 2018-02-21 | Stop reason: SDUPTHER

## 2017-09-11 RX ORDER — PROMETHAZINE HYDROCHLORIDE 25 MG/1
25 TABLET ORAL EVERY 6 HOURS PRN
Qty: 45 TABLET | Refills: 6 | Status: SHIPPED | OUTPATIENT
Start: 2017-09-11 | End: 2018-11-20 | Stop reason: SDUPTHER

## 2017-10-17 ENCOUNTER — OFFICE VISIT (OUTPATIENT)
Dept: OPHTHALMOLOGY | Facility: CLINIC | Age: 67
End: 2017-10-17
Payer: COMMERCIAL

## 2017-10-17 DIAGNOSIS — H25.13 NUCLEAR SCLEROSIS, BILATERAL: Primary | ICD-10-CM

## 2017-10-17 DIAGNOSIS — H35.362 RETINAL DRUSEN OF LEFT EYE: ICD-10-CM

## 2017-10-17 DIAGNOSIS — H40.053 OCULAR HYPERTENSION, BILATERAL: ICD-10-CM

## 2017-10-17 DIAGNOSIS — H52.7 REFRACTIVE ERROR: ICD-10-CM

## 2017-10-17 PROCEDURE — 99999 PR PBB SHADOW E&M-EST. PATIENT-LVL II: CPT | Mod: PBBFAC,,, | Performed by: OPHTHALMOLOGY

## 2017-10-17 PROCEDURE — 92004 COMPRE OPH EXAM NEW PT 1/>: CPT | Mod: S$GLB,,, | Performed by: OPHTHALMOLOGY

## 2017-10-17 RX ORDER — CARBOXYMETHYLCELLULOSE SODIUM 5 MG/ML
1 SOLUTION/ DROPS OPHTHALMIC DAILY PRN
COMMUNITY

## 2017-10-17 NOTE — LETTER
October 17, 2017      Galindo Stiles MD  [address] [facility] [address] [place] [address]           [facility]  [address] [facility] [address] [place] [address]  Phone: [phone]  Fax: [phone]          Patient: Osman Johansen   MR Number: [id number]   YOB: 1950   Date of Visit: 10/17/2017       Dear Dr. Galindo Stiles:    Thank you for referring Osman Johansen to me for evaluation. Attached you will find relevant portions of my assessment and plan of care.    If you have questions, please do not hesitate to call me. I look forward to following Osman Johansen along with you.    Sincerely,    Aicha Merritt MD    Enclosure  CC:  No Recipients    If you would like to receive this communication electronically, please contact [email or web address] or [phone] to request more information on [employer] Link access.    For providers and/or their staff who would like to refer a patient to [facility], please contact us through our one-stop-shop provider referral line, [facility], at [phone].    If you feel you have received this communication in error or would no longer like to receive these types of communications, please e-mail [email or web address]

## 2017-10-17 NOTE — PROGRESS NOTES
HPI     Cataract    Additional comments: Cataract eval per Dr Terrell           Comments   DLE: 7/26/17    Pt states having more difficulty seeing at dist. Uses a contact for near   OS, tried using a dist OD but could not see well. + dry eyes. + has   trouble driving at night due to poor night vision and glare from   headlights.     Gtts: Refresh Tears 2-3 times daily.     LBSL: does not need to check  Hemoglobin A1C       Date                     Value               Ref Range             Status                08/17/2017               5.3                 4.0 - 5.6 %           Final                 11/25/2016               6.0                 4.5 - 6.2 %           Final                 08/25/2016               6.1                 4.5 - 6.2 %           Final              Agree with above. Monovision CL's, wears lens in OS for near. Does not   recall what the power was for the CL she tried OD, that was 2 years ago.        Last edited by Aicha Merritt MD on 10/17/2017  3:00 PM.   (History)        ROS     Positive for: Neurological (restless legs; short term memory issues;   denies history of seizure; denies neuropathy), Endocrine (DM diagnosed 2   years ago, often forgets glucophage - only takes sometimes, A1c stable   with this regimen she says; hypothyroid), Cardiovascular (HTN - up and   down lately), Eyes (CL wear OS for near - monovision)    Negative for: Genitourinary (denies flomax; remote hx nephrolithiasis;   denies nephropathy), Respiratory (sleep apnea - no machine; denies   asthma/orthopnea), Heme/Lymph (denies ASA)    Last edited by Aicha Merritt MD on 10/17/2017  3:19 PM.   (History)        Assessment /Plan     For exam results, see Encounter Report.    Nuclear sclerosis, bilateral    Ocular hypertension, bilateral    Retinal drusen of left eye    Refractive error          Nuclear sclerosis, bilateral - Not visually significant.  Observe.    Ocular hypertension, bilateral - per   [facility] in [place], [place]. NCT 24 OD, 20 OS. 20 OU for me today, very healthy optic nerves C:D 0.25. Observe.    Retinal drusen of left eye - former smoker. Advised healthy diet. Father , no known family history macular degeneration.    Refractive error - mild hyperopia - distance vision may improve with CL for distance OD - recommend re-trying this. Otherwise doing well with monovision OS near.

## 2017-10-27 ENCOUNTER — OFFICE VISIT (OUTPATIENT)
Dept: NEUROLOGY | Facility: CLINIC | Age: 67
End: 2017-10-27
Payer: COMMERCIAL

## 2017-10-27 VITALS
SYSTOLIC BLOOD PRESSURE: 190 MMHG | WEIGHT: 136.69 LBS | HEART RATE: 52 BPM | HEIGHT: 61 IN | BODY MASS INDEX: 25.81 KG/M2 | DIASTOLIC BLOOD PRESSURE: 80 MMHG

## 2017-10-27 DIAGNOSIS — R79.0 DECREASED FERRITIN: ICD-10-CM

## 2017-10-27 DIAGNOSIS — G25.81 RLS (RESTLESS LEGS SYNDROME): ICD-10-CM

## 2017-10-27 DIAGNOSIS — R29.898 RIGIDITY (MUSCLES): ICD-10-CM

## 2017-10-27 PROCEDURE — 99213 OFFICE O/P EST LOW 20 MIN: CPT | Mod: S$GLB,,, | Performed by: PSYCHIATRY & NEUROLOGY

## 2017-10-27 PROCEDURE — 99999 PR PBB SHADOW E&M-EST. PATIENT-LVL III: CPT | Mod: PBBFAC,,, | Performed by: PSYCHIATRY & NEUROLOGY

## 2017-10-27 NOTE — ASSESSMENT & PLAN NOTE
RLS now resolved since ferritin infusions earlier this year.   -> requip prn   -> follow ferritin yearly

## 2017-10-27 NOTE — PROGRESS NOTES
Osman REYNAGA Chief Complaints during this visit:  f/u Patient visit for  RLS, memory loss    No referring provider defined for this encounter.    Primary Care Physician  Galindo Stiles MD  [address] [place] [address]          History of present illness:   67 y.o.  W seen in f/u for RLS and memory loss.  Since ferritin infusions, RLS has been much better controlled.  No events at all except after extreme walking (like a whole day of walking).  Not even taking requip.    Memory loss is static.    Interval history 5/3/17:  consultation at the request of  Dr. Pearson for evaluation of RLS and memory loss.    Says she gets RLS every time she gets epidural.  requip seems to help.  Was on fentanyl patch for about 8 years.  She was able to wean herself off of the oral pain medications, but had to be hospitalized to get off of the fentanyl.  Now on suboxone, tiny piece, but would like to get off of that because she gets muscle spasms in jaw, body that she feels is still due to the opiate.  Describes augmentation on requip (worse 3 days into taking it).  Prior to pain meds, she says she only had two episodes of RLS, both as s/e to medications (elavil).  Gets tightness in muscles, tense when watching tv or during night for past 3 years.  12 years ago, she was diagnosed with iron deficiency anemia.      From Lili's note 2/16/17:  The patient is a 67 y.o. female referred for evaluation of RLS. This began about 12 years ago. She describes it as discomfort in the bilateral LE. It is worse at night. It also worsens with Elavil and steroids. She notices it more when she attempts to wean down on narcotics. She is presently on Suboxone. She indicates that this is preventing her from stopping the Suboxone altogether. She is on Requip, which she feels has not been helpful. These symptoms are present whenever she does not take Suboxone.     The patient is most concerned with memory loss. This issue began about 20 years  ago. It has been gradually progressive, though she does have good days and bad days. She has issues with word finding. It involves short-term memory more than long-term memory. She reports some difficulties with executive function. There are issues with multiple step processes. She has no clear problems with ADLs. She does get lost in familiar areas. There are no hallucinations. There are some personality changes. She does endorse depression, anxiety, and racing of thoughts.    II.  Review of systems:  As in HPI,  otherwise, balance 10 systems reviewed and are negative.    III.  Past Medical History:   Diagnosis Date    Allergy     Anxiety     Arthritis     Blood transfusion 8 yrs    Cataract     Degenerative disc disease     Depression     Dry eye syndrome     GERD (gastroesophageal reflux disease)     Hypertension     Macular drusen     Neuromuscular disorder     Ocular hypertension, bilateral     Osteoporosis     PUD (peptic ulcer disease)     Thoracic or lumbosacral neuritis or radiculitis 10/19/2012    Thyroid disease      Family History   Problem Relation Age of Onset    Arthritis Mother     Cancer Mother     Heart disease Mother     Hypertension Mother     Cataracts Mother     Ulcers Father     Thyroid disease Brother     Heart disease Brother     Amblyopia Neg Hx     Blindness Neg Hx     Diabetes Neg Hx     Glaucoma Neg Hx     Macular degeneration Neg Hx     Retinal detachment Neg Hx     Strabismus Neg Hx     Stroke Neg Hx      Social History     Social History    Marital status:      Spouse name: N/A    Number of children: N/A    Years of education: N/A     Social History Main Topics    Smoking status: Former Smoker     Quit date: 1/1/2008    Smokeless tobacco: Never Used    Alcohol use No    Drug use: No    Sexual activity: No     Other Topics Concern    None     Social History Narrative    None         Current Outpatient Prescriptions on File Prior to  Visit   Medication Sig Dispense Refill    benazepril (LOTENSIN) 20 MG tablet Take 1 tablet (20 mg total) by mouth once daily. 90 tablet 1    CALCIUM CARBONATE/VITAMIN D3 (CALCIUM 500 WITH D ORAL) Take 1 capsule by mouth once daily.      carboxymethylcellulose (REFRESH PLUS) 0.5 % Dpet 1 drop daily as needed.      esomeprazole (NEXIUM) 40 MG capsule Take 1 capsule (40 mg total) by mouth before breakfast. 90 capsule 1    estradiol (CLIMARA) 0.075 mg/24 hr Apply to clean dry skin once per week (Patient taking differently: Place 1 patch onto the skin twice a week. Apply to clean dry skin once per week) 12 patch 3    fluticasone (FLONASE) 50 mcg/actuation nasal spray USE 1 SPRAY IN EACH NOSTRIL DAILY 16 g 11    hyoscyamine (LEVSIN/SL) 0.125 mg Subl Place 1 tablet (0.125 mg total) under the tongue every 4 (four) hours as needed. 45 tablet 3    levothyroxine (SYNTHROID) 125 MCG tablet Take 1 tablet (125 mcg total) by mouth once daily. 30 tablet 6    metformin (GLUCOPHAGE) 500 MG tablet Take 1 tablet (500 mg total) by mouth once daily. 90 tablet 1    MULTIVITAMIN (MULTIPLE VITAMIN ORAL) Take 1 capsule by mouth once daily.      nitroGLYCERIN (NITROSTAT) 0.4 MG SL tablet Place 1 tablet (0.4 mg total) under the tongue every 5 (five) minutes as needed for Chest pain. 30 tablet 0    oxybutynin (DITROPAN-XL) 10 MG 24 hr tablet Take 1 tablet (10 mg total) by mouth once daily. 90 tablet 1    promethazine (PHENERGAN) 25 MG tablet Take 1 tablet (25 mg total) by mouth every 6 (six) hours as needed for Nausea. 45 tablet 6    ropinirole (REQUIP) 2 MG tablet Take 1.5 tablets (3 mg total) by mouth nightly. (Patient taking differently: Take 2 mg by mouth nightly. ) 135 tablet 3    SUBOXONE 8-2 mg Film 1 Film by Subdermal route once daily. 1 strip last 7days  0    sucralfate (CARAFATE) 1 gram tablet TAKE 1 TABLET FOUR TIMES A DAY AS NEEDED FOR GASTRITIS 90 tablet 3    [DISCONTINUED] diphenhydrAMINE (BENADRYL) 25 mg capsule  "Take 1 each (25 mg total) by mouth every 6 (six) hours as needed for Itching or Allergies. 20 capsule 0     No current facility-administered medications on file prior to visit.        PRIOR problem-specific medications tried:  lyrica    Review of patient's allergies indicates:   Allergen Reactions    Pcn [penicillins] Swelling    Toradol [ketorolac] Swelling    Vibramycin [doxycycline calcium] Swelling    Cymbalta [duloxetine]      dizzyness    Elavil [amitriptyline]     Lyrica [pregabalin] Swelling    Seroquel [quetiapine]      restless leg symdrome       IV. Physical Exam    Vitals:    10/27/17 1532   BP: (!) 190/80   BP Location: Left arm   Patient Position: Sitting   BP Method: Medium (Automatic)   Pulse: (!) 52   Weight: 62 kg (136 lb 11 oz)   Height: 5' 1" (1.549 m)     General appearance: Well nourished, well developed, no acute distress.          -------------------------------------------------------------  Facial Expression: normal       Affect: full       Orientation to time & place:  Oriented to time, place, person and situation       Speech:  normal (not dysarthric)  Gait:  normal   --------------------------------------------------------------  MOVEMENT DISORDERS FOCUSED EXAM  Abnormality of movement (bradykinesia, hyperkinesia) present? No    Tremor present?   No   Posture:  normal  Postural stability:  no Rhomberg    V.  Laboratory/ Radiological Data:          Lab Results   Component Value Date    FERRITIN 82 08/22/2017     Lab Results   Component Value Date    TSH 0.439 08/17/2017      Lab Results   Component Value Date    [id number] 03/03/2017          3/3/17 MRI brain:    1. Multiple foci of white matter signal abnormality nonspecific on this examination but statistically favored to relate  to microvascular ischemic change        VI. Assessment and Plan          Constellation of symptoms including RLS, intermittent tension of muscles and fibromyalgia all suggestive of a peripheral " dysfunction of dopamine.  Ferritin <40 can exacerbate, so we'll address this first.      Problem List Items Addressed This Visit        1 - High    Rigidity (muscles)    Overview     EPS:  Intermittent, involuntary tensing up when at rest.         Current Assessment & Plan     Continues to occur 1-2 times during the night (upper body, throat).   -> no changes in meds             2     RLS (restless legs syndrome)    Current Assessment & Plan     RLS now resolved since ferritin infusions earlier this year.   -> requip prn   -> follow ferritin yearly            3     Decreased ferritin    Overview     Ferritin 22 in March 2017; up to 82 after infusions 8/2017.                     Return in about 1 year (around 10/27/2018).

## 2017-11-30 ENCOUNTER — OFFICE VISIT (OUTPATIENT)
Dept: SPINE | Facility: CLINIC | Age: 67
End: 2017-11-30
Payer: COMMERCIAL

## 2017-11-30 VITALS
WEIGHT: 135.81 LBS | BODY MASS INDEX: 25.64 KG/M2 | HEART RATE: 58 BPM | HEIGHT: 61 IN | SYSTOLIC BLOOD PRESSURE: 136 MMHG | DIASTOLIC BLOOD PRESSURE: 70 MMHG

## 2017-11-30 DIAGNOSIS — M53.3 SACROILIAC JOINT PAIN: ICD-10-CM

## 2017-11-30 DIAGNOSIS — M47.816 LUMBAR SPONDYLOSIS: ICD-10-CM

## 2017-11-30 DIAGNOSIS — M47.816 LUMBAR FACET ARTHROPATHY: ICD-10-CM

## 2017-11-30 DIAGNOSIS — M54.14 THORACIC RADICULOPATHY: Primary | ICD-10-CM

## 2017-11-30 PROCEDURE — 99999 PR PBB SHADOW E&M-EST. PATIENT-LVL III: CPT | Mod: PBBFAC,,, | Performed by: NURSE PRACTITIONER

## 2017-11-30 PROCEDURE — 99214 OFFICE O/P EST MOD 30 MIN: CPT | Mod: S$GLB,,, | Performed by: NURSE PRACTITIONER

## 2017-11-30 NOTE — PROGRESS NOTES
Chronic patient Established Note (Follow up visit)      SUBJECTIVE:    Osman Johansen presents to the clinic for a follow-up appointment for lower back pain.  We have not seen the patient since April.  At that time, she underwent bilateral L2,3,4,5 MBB with steroids which provided 90% pain relief for 7 months.  Her pain returned about 2 weeks ago in similar character and distribution.  She is also reporting middle back pain which wraps around her sides to the front of her body.  She describes this pain as tingling and burning.  This pain has been present for about 4 months and has worsened since onset.  She has tried home stretching and exercise without benefit.  She has tried heat and ice.  She has tried OTC medications, all without benefit.  Since the last visit, Osman Johansen states the pain has been worsening.  Current pain intensity is 6/10.    Pain Disability Index Review:  Last 3 PDI Scores 11/30/2017 3/28/2017 1/10/2017   Pain Disability Index (PDI) 56 38 37       Pain Medications:  None    Opioid Contract: no     report:  Reviewed and consistent with medication use as prescribed.    Pain Procedures:   7/18/13 Bilateral L5 TF JOCE  10/28/13 Bilateral SI joint injection  4/20/15 Bilateral SI joint injection  12/15/16 Bilateral L3-4 facet joint injections- 100% relief for 6 weeks  3/13/17 Bilateral L3-4 facet joint injections- 30% relief  4/17/17 Bilateral L2,3,4,5 MBB with steroids- significant benefit for 7 months    Physical Therapy/Home Exercise: per self    Imaging:     Lumbar MRI 12/10/16    Narrative   MRI LUMBAR SPINE WITHOUT CONTRAST    COMPARISON: None    TECHNIQUE:  Sagittal T1, T2, and STIR;  axial T1 and T2 sequences through the lumbar spine were acquired without contrast.    FINDINGS: Vertebral body height and alignment are within normal limits.  The conus terminates at T12-L1.  Moderate loss of disc height is present at L4-5.  Marrow signal is mildly heterogeneous.  No focal osseous  lesion.    L1-L2:  No disc herniation. No spinal canal or neuroforaminal narrowing.    L2-L3:  Mild diffuse disc bulging is present without spinal canal or neuroforaminal narrowing.    L3-L4:  Mild diffuse disc bulging and moderate bilateral facet arthropathy result in mild spinal canal narrowing.    L4-L5:  Mild diffuse disc bulging is present without spinal canal or neuroforaminal narrowing.    L5-S1:  No disc herniation. No spinal canal or neuroforaminal narrowing.    Several small gallstones noted.   Impression     Degenerative lumbar spondylosis with mild L3-4 spinal canal narrowing and no significant neuroforaminal narrowing.  Cholelithiasis     CMP  Sodium   Date Value Ref Range Status   08/17/2017 135 (L) 136 - 145 mmol/L Final     Potassium   Date Value Ref Range Status   08/17/2017 4.9 3.5 - 5.1 mmol/L Final     Chloride   Date Value Ref Range Status   08/17/2017 102 95 - 110 mmol/L Final     CO2   Date Value Ref Range Status   08/17/2017 28 23 - 29 mmol/L Final     Glucose   Date Value Ref Range Status   08/17/2017 94 70 - 110 mg/dL Final     BUN, Bld   Date Value Ref Range Status   08/17/2017 21 8 - 23 mg/dL Final     Creatinine   Date Value Ref Range Status   08/17/2017 0.9 0.5 - 1.4 mg/dL Final     Calcium   Date Value Ref Range Status   08/17/2017 9.3 8.7 - 10.5 mg/dL Final     Total Protein   Date Value Ref Range Status   08/17/2017 7.5 6.0 - 8.4 g/dL Final     Albumin   Date Value Ref Range Status   08/17/2017 3.9 3.5 - 5.2 g/dL Final     Total Bilirubin   Date Value Ref Range Status   08/17/2017 0.4 0.1 - 1.0 mg/dL Final     Comment:     For infants and newborns, interpretation of results should be based  on gestational age, weight and in agreement with clinical  observations.  Premature Infant recommended reference ranges:  Up to 24 hours.............<8.0 mg/dL  Up to 48 hours............<12.0 mg/dL  3-5 days..................<15.0 mg/dL  6-29 days.................<15.0 mg/dL       Alkaline  Phosphatase   Date Value Ref Range Status   08/17/2017 63 55 - 135 U/L Final     AST   Date Value Ref Range Status   08/17/2017 20 10 - 40 U/L Final     ALT   Date Value Ref Range Status   08/17/2017 15 10 - 44 U/L Final     Anion Gap   Date Value Ref Range Status   08/17/2017 5 (L) 8 - 16 mmol/L Final     eGFR if    Date Value Ref Range Status   08/17/2017 >60.0 >60 mL/min/1.73 m^2 Final     eGFR if non    Date Value Ref Range Status   08/17/2017 >60.0 >60 mL/min/1.73 m^2 Final     Comment:     Calculation used to obtain the estimated glomerular filtration  rate (eGFR) is the CKD-EPI equation. Since race is unknown   in our information system, the eGFR values for   -American and Non--American patients are given   for each creatinine result.       Lab Results   Component Value Date    WBC 6.66 03/03/2017    HGB 11.5 (L) 03/03/2017    HCT 36.1 (L) 03/03/2017    MCV 89 03/03/2017     03/03/2017         Allergies:   Review of patient's allergies indicates:   Allergen Reactions    Pcn [penicillins] Swelling    Toradol [ketorolac] Swelling    Vibramycin [doxycycline calcium] Swelling    Cymbalta [duloxetine]      dizzyness    Elavil [amitriptyline]     Lyrica [pregabalin] Swelling    Seroquel [quetiapine]      restless leg symdrome       Current Medications:   Current Outpatient Prescriptions   Medication Sig Dispense Refill    benazepril (LOTENSIN) 20 MG tablet Take 1 tablet (20 mg total) by mouth once daily. 90 tablet 1    CALCIUM CARBONATE/VITAMIN D3 (CALCIUM 500 WITH D ORAL) Take 1 capsule by mouth once daily.      carboxymethylcellulose (REFRESH PLUS) 0.5 % Dpet 1 drop daily as needed.      esomeprazole (NEXIUM) 40 MG capsule Take 1 capsule (40 mg total) by mouth before breakfast. 90 capsule 1    estradiol (CLIMARA) 0.075 mg/24 hr Apply to clean dry skin once per week (Patient taking differently: Place 1 patch onto the skin twice a week. Apply to clean  dry skin once per week) 12 patch 3    fluticasone (FLONASE) 50 mcg/actuation nasal spray USE 1 SPRAY IN EACH NOSTRIL DAILY 16 g 11    hyoscyamine (LEVSIN/SL) 0.125 mg Subl Place 1 tablet (0.125 mg total) under the tongue every 4 (four) hours as needed. 45 tablet 3    levothyroxine (SYNTHROID) 125 MCG tablet Take 1 tablet (125 mcg total) by mouth once daily. 30 tablet 6    metformin (GLUCOPHAGE) 500 MG tablet Take 1 tablet (500 mg total) by mouth once daily. 90 tablet 1    MULTIVITAMIN (MULTIPLE VITAMIN ORAL) Take 1 capsule by mouth once daily.      nitroGLYCERIN (NITROSTAT) 0.4 MG SL tablet Place 1 tablet (0.4 mg total) under the tongue every 5 (five) minutes as needed for Chest pain. 30 tablet 0    oxybutynin (DITROPAN-XL) 10 MG 24 hr tablet Take 1 tablet (10 mg total) by mouth once daily. 90 tablet 1    promethazine (PHENERGAN) 25 MG tablet Take 1 tablet (25 mg total) by mouth every 6 (six) hours as needed for Nausea. 45 tablet 6    ropinirole (REQUIP) 2 MG tablet Take 1.5 tablets (3 mg total) by mouth nightly. (Patient taking differently: Take 2 mg by mouth nightly. ) 135 tablet 3    SUBOXONE 8-2 mg Film 1 Film by Subdermal route once daily. 1 strip last 7days  0    sucralfate (CARAFATE) 1 gram tablet TAKE 1 TABLET FOUR TIMES A DAY AS NEEDED FOR GASTRITIS 90 tablet 3     No current facility-administered medications for this visit.        REVIEW OF SYSTEMS:    GENERAL:  No weight loss, malaise or fevers.  HEENT:  Negative for frequent or significant headaches.  NECK:  Negative for lumps, goiter, pain and significant neck swelling.  RESPIRATORY:  Negative for cough, wheezing or shortness of breath.  CARDIOVASCULAR:  Negative for chest pain, leg swelling or palpitations.  GI:  Negative for abdominal discomfort, blood in stools or black stools or change in bowel habits. GERD.  MUSCULOSKELETAL:  See HPI.  SKIN:  Negative for lesions, rash, and itching.  PSYCH: H/O anxiety and opioid  "dependence.  HEMATOLOGY/LYMPHOLOGY:  Negative for prolonged bleeding, bruising easily or swollen nodes.  NEURO:   No history of headaches, syncope, paralysis, seizures or tremors.  All other reviewed and negative other than HPI.    Past Medical History:  Past Medical History:   Diagnosis Date    Allergy     Anxiety     Arthritis     Blood transfusion 8 yrs    Cataract     Degenerative disc disease     Depression     Dry eye syndrome     GERD (gastroesophageal reflux disease)     Hypertension     Macular drusen     Neuromuscular disorder     Ocular hypertension, bilateral     Osteoporosis     PUD (peptic ulcer disease)     Thoracic or lumbosacral neuritis or radiculitis 10/19/2012    Thyroid disease        Past Surgical History:  Past Surgical History:   Procedure Laterality Date    APPENDECTOMY  1965    HYSTERECTOMY  8 yrs     TONSILLECTOMY  1954       Family History:  Family History   Problem Relation Age of Onset    Arthritis Mother     Cancer Mother     Heart disease Mother     Hypertension Mother     Cataracts Mother     Ulcers Father     Thyroid disease Brother     Heart disease Brother     Amblyopia Neg Hx     Blindness Neg Hx     Diabetes Neg Hx     Glaucoma Neg Hx     Macular degeneration Neg Hx     Retinal detachment Neg Hx     Strabismus Neg Hx     Stroke Neg Hx        Social History:  Social History     Social History    Marital status:      Spouse name: N/A    Number of children: N/A    Years of education: N/A     Social History Main Topics    Smoking status: Former Smoker     Quit date: 1/1/2008    Smokeless tobacco: Never Used    Alcohol use No    Drug use: No    Sexual activity: No     Other Topics Concern    None     Social History Narrative    None       OBJECTIVE:    /70   Pulse (!) 58   Ht 5' 1" (1.549 m)   Wt 61.6 kg (135 lb 12.8 oz)   BMI 25.66 kg/m²     PHYSICAL EXAMINATION:    General appearance: Well appearing, in no acute " distress, alert and oriented x3.  Psych:  Mood and affect appropriate.  Skin: No rashes or edema.  Head/face:  Atraumatic, normocephalic. No palpable lymph nodes  Cor: RRR  Pulm: CTA  GI: Abdomen soft and non-distended.    Back: There is pain with palpation to lumbar facet joints at approximately the levels of L2-5.  There is midline pain to thoracic spine.  There is pain with lateral rotation of thoracic and lumbar spine.  There is pain with extension.  Positive facet loading bilaterally.  Extremities: Peripheral joint ROM is full and pain free without obvious instability or laxity in all four extremities. No deformities, edema, or skin discoloration. Good capillary refill.  Musculoskeletal: Bilateral upper and lower extremity strength is normal and symmetric.  No atrophy or tone abnormalities are noted.  Neuro: Bilateral upper and lower extremity coordination and muscle stretch reflexes are physiologic and symmetric.  Plantar response are downgoing. Decreased sensation to left thoracic area around the levels of T7-9.  Gait: Normal.    ASSESSMENT: 67 y.o. year old female with back pain, consistent with the following diagnoses:     1. Thoracic radiculopathy  MRI Thoracic Spine Without Contrast   2. Lumbar spondylosis     3. Sacroiliac joint pain     4. Lumbar facet arthropathy           PLAN:     - Previous imaging was reviewed and discussed with the patient today.    - She had bilateral L2,3,4,5 MBB in April (with steroid) with significant benefit for 7 months.  I will schedule a repeat.    The procedure, risks, benefits and options were discussed with patient. There are no contraindications to the procedure. The patient expressed understanding and agreed to proceed.  Consent obtained today.    - She will continue f/u with neurology.    - Regarding thoracic pain, will order MRI as she has had XRAYs and has radicular symptoms.    - RTC 2 weeks after procedure.    - The patient will continue a home exercise routine  to help with pain and strengthening.      - Counseled patient regarding the importance of constant sleeping habits and physical therapy.    - Dr. Belcher was consulted on the patient and agrees with this plan.      The above plan and management options were discussed at length with patient. Patient is in agreement with the above and verbalized understanding.    Meche Clinton  11/30/2017

## 2017-12-03 ENCOUNTER — HOSPITAL ENCOUNTER (OUTPATIENT)
Dept: RADIOLOGY | Facility: HOSPITAL | Age: 67
Discharge: HOME | End: 2017-12-03
Attending: NURSE PRACTITIONER
Payer: COMMERCIAL

## 2017-12-03 DIAGNOSIS — M54.14 THORACIC RADICULOPATHY: ICD-10-CM

## 2017-12-03 PROCEDURE — 72146 MRI CHEST SPINE W/O DYE: CPT | Mod: 26,,, | Performed by: RADIOLOGY

## 2017-12-03 PROCEDURE — 72146 MRI CHEST SPINE W/O DYE: CPT | Mod: TC

## 2017-12-05 ENCOUNTER — TELEPHONE (OUTPATIENT)
Dept: PAIN MEDICINE | Facility: CLINIC | Age: 67
End: 2017-12-05

## 2017-12-05 NOTE — TELEPHONE ENCOUNTER
----- Message from Shena Silva sent at 12/5/2017 10:09 AM CST -----  Pt. States she already had Thoracic Mri, she's ready  to schedule Thoacic Procedure  with  Dr. Belcher,  Pt. Will like Thoracic procedure schedule with her MBB 12/14/17 w/ Dr. Belcher.

## 2017-12-09 DIAGNOSIS — Z78.0 MENOPAUSE: ICD-10-CM

## 2017-12-12 RX ORDER — ESTRADIOL 0.07 MG/D
PATCH TRANSDERMAL
Qty: 12 PATCH | Refills: 3 | Status: SHIPPED | OUTPATIENT
Start: 2017-12-12 | End: 2018-11-13 | Stop reason: SDUPTHER

## 2017-12-14 ENCOUNTER — HOSPITAL ENCOUNTER (OUTPATIENT)
Facility: OTHER | Age: 67
Discharge: HOME | End: 2017-12-14
Attending: ANESTHESIOLOGY | Admitting: ANESTHESIOLOGY
Payer: COMMERCIAL

## 2017-12-14 ENCOUNTER — SURGERY (OUTPATIENT)
Age: 67
End: 2017-12-14

## 2017-12-14 VITALS
RESPIRATION RATE: 16 BRPM | SYSTOLIC BLOOD PRESSURE: 169 MMHG | OXYGEN SATURATION: 100 % | HEART RATE: 60 BPM | DIASTOLIC BLOOD PRESSURE: 72 MMHG

## 2017-12-14 DIAGNOSIS — M47.816 OSTEOARTHRITIS OF LUMBAR SPINE, UNSPECIFIED SPINAL OSTEOARTHRITIS COMPLICATION STATUS: Primary | ICD-10-CM

## 2017-12-14 DIAGNOSIS — R52 PAIN: ICD-10-CM

## 2017-12-14 LAB — POCT GLUCOSE: 98 MG/DL (ref 70–110)

## 2017-12-14 PROCEDURE — 64493 INJ PARAVERT F JNT L/S 1 LEV: CPT | Mod: 50 | Performed by: ANESTHESIOLOGY

## 2017-12-14 PROCEDURE — 64495 INJ PARAVERT F JNT L/S 3 LEV: CPT | Mod: 50 | Performed by: ANESTHESIOLOGY

## 2017-12-14 PROCEDURE — 64494 INJ PARAVERT F JNT L/S 2 LEV: CPT | Mod: 50 | Performed by: ANESTHESIOLOGY

## 2017-12-14 PROCEDURE — 64493 INJ PARAVERT F JNT L/S 1 LEV: CPT | Mod: 50,,, | Performed by: ANESTHESIOLOGY

## 2017-12-14 PROCEDURE — 25000003 PHARM REV CODE 250: Performed by: ANESTHESIOLOGY

## 2017-12-14 PROCEDURE — 82947 ASSAY GLUCOSE BLOOD QUANT: CPT | Performed by: ANESTHESIOLOGY

## 2017-12-14 PROCEDURE — 64495 INJ PARAVERT F JNT L/S 3 LEV: CPT | Mod: 50,,, | Performed by: ANESTHESIOLOGY

## 2017-12-14 PROCEDURE — 64494 INJ PARAVERT F JNT L/S 2 LEV: CPT | Mod: 50,,, | Performed by: ANESTHESIOLOGY

## 2017-12-14 PROCEDURE — 20552 NJX 1/MLT TRIGGER POINT 1/2: CPT

## 2017-12-14 PROCEDURE — 63600175 PHARM REV CODE 636 W HCPCS: Performed by: ANESTHESIOLOGY

## 2017-12-14 RX ORDER — SODIUM CHLORIDE 9 MG/ML
500 INJECTION, SOLUTION INTRAVENOUS CONTINUOUS
Status: DISCONTINUED | OUTPATIENT
Start: 2017-12-14 | End: 2017-12-14 | Stop reason: HOSPADM

## 2017-12-14 RX ORDER — LIDOCAINE HYDROCHLORIDE 10 MG/ML
INJECTION INFILTRATION; PERINEURAL
Status: DISCONTINUED | OUTPATIENT
Start: 2017-12-14 | End: 2017-12-14 | Stop reason: HOSPADM

## 2017-12-14 RX ORDER — TRIAMCINOLONE ACETONIDE 40 MG/ML
INJECTION, SUSPENSION INTRA-ARTICULAR; INTRAMUSCULAR
Status: DISCONTINUED | OUTPATIENT
Start: 2017-12-14 | End: 2017-12-14 | Stop reason: HOSPADM

## 2017-12-14 RX ORDER — BUPIVACAINE HYDROCHLORIDE 2.5 MG/ML
INJECTION, SOLUTION EPIDURAL; INFILTRATION; INTRACAUDAL
Status: DISCONTINUED | OUTPATIENT
Start: 2017-12-14 | End: 2017-12-14 | Stop reason: HOSPADM

## 2017-12-14 RX ADMIN — LIDOCAINE HYDROCHLORIDE 10 ML: 10 INJECTION, SOLUTION INFILTRATION; PERINEURAL at 09:12

## 2017-12-14 RX ADMIN — TRIAMCINOLONE ACETONIDE 40 MG: 40 INJECTION, SUSPENSION INTRA-ARTICULAR; INTRAMUSCULAR at 09:12

## 2017-12-14 RX ADMIN — BUPIVACAINE HYDROCHLORIDE 10 ML: 2.5 INJECTION, SOLUTION EPIDURAL; INFILTRATION; INTRACAUDAL; PERINEURAL at 09:12

## 2017-12-14 NOTE — DISCHARGE INSTRUCTIONS

## 2017-12-14 NOTE — OP NOTE
lUMBAR Medial Branch Block Under Fluoroscopy  Time-out taken to identify patient and procedure side prior to starting the procedure.   I attest that I have reviewed the patient's home medications prior to the procedure and no contraindication have been identified. I  re-evaluated the patient after the patient was positioned for the procedure in the procedure room immediately before the procedural time-out. The vital signs are current and represent the current state of the patient which has not significantly changed since the preprocedure assessment.            Date of Service: 12/14/2017    PCP: Galindo Stiles MD    Referring Physician:                                                           PROCEDURE:  Bilateral L2, L3, L4, and L5 medial branch block with steroid    REASON FOR PROCEDURE: Lumbar spondylosis [M47.816], lumbar DJD    PHYSICIAN: Talia Belcher MD  ASSISTANTS: Tyler Kessler DO.     MEDICATIONS INJECTED: Kenalog 20mg Bupivicaine 25% 1.5ml at each level      LOCAL ANESTHETIC USED: Xylocaine 1% 9ml with Sodium Bicarbonate 1ml.  3ml each site.    SEDATION MEDICATIONS: None    ESTIMATED BLOOD LOSS:  None.    COMPLICATIONS:  None.    TECHNIQUE: Laying in a prone position, the patient was prepped and draped in the usual sterile fashion using ChloraPrep and fenestrated drape.  The level was determined under fluoroscopic guidance.  Local anesthetic was given by going down to the hub of the 27-gauge 1.25in needle and raising a wheel.  A 22-gauge 3.5inch needle was introduced to the anatomic local of the medial branch at the lateral mass utilizing live fluoroscopy. Medication was then injected slowly. The patient tolerated the procedure well.    Trigger point injection:     REASON FOR PROCEDURE: Thoracic Myofascial pain, myalgia    PHYSICIAN: Talia Belcher MD  ASSISTANTS: Tyler Kessler DO.    MEDICATIONS INJECTED: Kenalog 20mg, Bupivicaine 25%    SEDATION MEDICATIONS: None    ESTIMATED BLOOD LOSS:   None.    COMPLICATIONS:  None.    TECHNIQUE:  Under clean technique & after discussing with the patient trigger point injection using 4cc solution mixture of 3.5cc Bupivicaine 0.25% and 0.5cc Kenalog 20mg/ml. This was injected in the following muscles: interspinous ligament at T7-T8    PAIN BEFORE THE PROCEDURE:  2/10.    PAIN AFTER THE PROCEDURE:  0/10.    The patient was monitored after the procedure.  Patient was given post procedure and discharge instructions to follow at home.  We will see the patient back in two weeks or the patient may call to inform of status. The patient was discharged in a stable condition

## 2018-02-07 ENCOUNTER — PATIENT OUTREACH (OUTPATIENT)
Dept: ADMINISTRATIVE | Facility: HOSPITAL | Age: 68
End: 2018-02-07

## 2018-02-07 NOTE — PROGRESS NOTES
Health Maintenance Due   Topic Date Due    TETANUS VACCINE  01/30/1968    Pneumococcal (65+) (2 of 2 - PPSV23) 01/28/2017    Influenza Vaccine  08/01/2017    Foot Exam  12/13/2017    Mammogram  02/13/2018

## 2018-02-15 ENCOUNTER — PATIENT OUTREACH (OUTPATIENT)
Dept: ADMINISTRATIVE | Facility: HOSPITAL | Age: 68
End: 2018-02-15

## 2018-02-15 NOTE — PROGRESS NOTES
Health Maintenance Due   Topic Date Due    TETANUS VACCINE  01/30/1968    Pneumococcal (65+) (2 of 2 - PPSV23) 01/28/2017    Influenza Vaccine  08/01/2017    Foot Exam  12/13/2017    Mammogram  02/13/2018    Hemoglobin A1c  02/17/2018     Portal outreach un-read by patient.  Outreach mailed today

## 2018-02-15 NOTE — LETTER
February 15, 2018    Osman Johansen  [address]  [place] [address]             [facility]  [address]  [place] [address]  Phone: [phone] Dear Ms. Johansen:    We have tried to reach you by [facility] email unsuccessfully.      [facility] is committed to your overall health.  To help you get the most out of each of your visits, we will review your information to make sure you are up to date on all of your recommended tests and/or procedures.       Dr. Stiles       has found that you may be due for:     Tetanus immunization   Pneumonia immunization   Influenza vaccine   Diabetic Foot Exam   Mammogram     REMINDER: lab appointment 2/20/18     If you have had any of the above done at another facility, please bring the records or information with you so that your record at [facility] will be complete.      If you are currently taking medication , please bring it with you to your appointment for review.     Sincerely,    Carmita Vazquez  Clinical Care Coordinator  [facility] [address] [place] [address]  Phone: [phone]   Fax: [phone]

## 2018-02-18 DIAGNOSIS — E11.9 CONTROLLED TYPE 2 DIABETES MELLITUS WITHOUT COMPLICATION, WITHOUT LONG-TERM CURRENT USE OF INSULIN: ICD-10-CM

## 2018-02-19 RX ORDER — METFORMIN HYDROCHLORIDE 500 MG/1
TABLET ORAL
Qty: 90 TABLET | Refills: 1 | Status: SHIPPED | OUTPATIENT
Start: 2018-02-19 | End: 2018-08-17 | Stop reason: SDUPTHER

## 2018-02-20 ENCOUNTER — LAB VISIT (OUTPATIENT)
Dept: LAB | Facility: HOSPITAL | Age: 68
End: 2018-02-20
Attending: INTERNAL MEDICINE
Payer: COMMERCIAL

## 2018-02-20 DIAGNOSIS — E11.9 CONTROLLED TYPE 2 DIABETES MELLITUS WITHOUT COMPLICATION, WITHOUT LONG-TERM CURRENT USE OF INSULIN: ICD-10-CM

## 2018-02-20 LAB
CREAT UR-MCNC: 253 MG/DL
MICROALBUMIN UR DL<=1MG/L-MCNC: 16 UG/ML
MICROALBUMIN/CREATININE RATIO: 6.3 UG/MG

## 2018-02-20 PROCEDURE — 82043 UR ALBUMIN QUANTITATIVE: CPT

## 2018-02-21 ENCOUNTER — OFFICE VISIT (OUTPATIENT)
Dept: FAMILY MEDICINE | Facility: CLINIC | Age: 68
End: 2018-02-21
Payer: COMMERCIAL

## 2018-02-21 VITALS
WEIGHT: 133.63 LBS | OXYGEN SATURATION: 97 % | RESPIRATION RATE: 15 BRPM | HEIGHT: 61 IN | SYSTOLIC BLOOD PRESSURE: 122 MMHG | HEART RATE: 80 BPM | DIASTOLIC BLOOD PRESSURE: 70 MMHG | BODY MASS INDEX: 25.23 KG/M2

## 2018-02-21 DIAGNOSIS — Z00.00 ROUTINE PHYSICAL EXAMINATION: Primary | ICD-10-CM

## 2018-02-21 DIAGNOSIS — E03.4 HYPOTHYROIDISM DUE TO ACQUIRED ATROPHY OF THYROID: ICD-10-CM

## 2018-02-21 DIAGNOSIS — R10.9 ABDOMINAL PAIN, UNSPECIFIED ABDOMINAL LOCATION: ICD-10-CM

## 2018-02-21 DIAGNOSIS — E78.5 DYSLIPIDEMIA: ICD-10-CM

## 2018-02-21 DIAGNOSIS — I10 ESSENTIAL HYPERTENSION: ICD-10-CM

## 2018-02-21 DIAGNOSIS — E11.9 CONTROLLED TYPE 2 DIABETES MELLITUS WITHOUT COMPLICATION, WITHOUT LONG-TERM CURRENT USE OF INSULIN: ICD-10-CM

## 2018-02-21 PROCEDURE — 99999 PR PBB SHADOW E&M-EST. PATIENT-LVL III: CPT | Mod: PBBFAC,,, | Performed by: INTERNAL MEDICINE

## 2018-02-21 PROCEDURE — 99397 PER PM REEVAL EST PAT 65+ YR: CPT | Mod: S$GLB,,, | Performed by: INTERNAL MEDICINE

## 2018-02-21 RX ORDER — LEVOTHYROXINE SODIUM 112 UG/1
112 TABLET ORAL DAILY
Qty: 90 TABLET | Refills: 3 | Status: SHIPPED | OUTPATIENT
Start: 2018-02-21 | End: 2018-05-03 | Stop reason: SDUPTHER

## 2018-02-21 RX ORDER — DICYCLOMINE HYDROCHLORIDE 10 MG/1
10 CAPSULE ORAL EVERY 6 HOURS PRN
Qty: 45 CAPSULE | Refills: 5 | Status: SHIPPED | OUTPATIENT
Start: 2018-02-21 | End: 2018-03-23

## 2018-02-21 NOTE — PROGRESS NOTES
Subjective:       Patient ID: Osman Johansen is a 68 y.o. female.    Chief Complaint: Annual Exam    Here for routine health maintenance.  No new complaints.    HTN - controlled  Hypothyroid - uncontrolled, supratheraputic.  112 mcg from local pharmacy and Brand name made her have palpitations, but 125 mcg does not from mail order.  Dye? Only use mail order -   DM - controlled; outside eye exam done 7/26/17  HLD - mild uncontrolled ldl goal < 70; refuses statins out of fear from mother's se.    Complains of 2 episodes of severe abdominal pain associated with n/v and diarrhea - points to LLQ.  Decrease with Bentyl.  Once in Nov and again 3 weeks ago.  Lasts 1-2 weeks.  Hx of IBS and Levsin did not help.  Large diverticuli found on cscp.      Back pain resolved s/p pain Dr injection - still pain free    Recall - pain Dr wanted her to see neurology.  Gets cramps on Suboxone.  Trying to wean off and down to a piece of her daily pill but can't because develops RLS that prevents her from sleeping.  She had similar events with leg cramping when on fentanyl and hydrocodone that resolved when placed on Suboxone.  She is down to a piece of Suboxone daily = 1 pill lasts about 4-6 days.  She also has uncontrolled RLS that has been exacerbated as she has tried to wean off Suboxone.  This has been happening off and on for about 6 months now.      NM stress test wnl.  Dx non cardiac chest pain      Review of Systems   Constitutional: Negative for appetite change and fever.   HENT: Negative for nosebleeds and trouble swallowing.    Eyes: Negative for discharge and visual disturbance.   Respiratory: Negative for choking and shortness of breath.    Cardiovascular: Negative for chest pain and palpitations.   Gastrointestinal: Negative for abdominal pain, nausea and vomiting.   Musculoskeletal: Negative for arthralgias and joint swelling.   Skin: Negative for rash and wound.   Neurological: Negative for dizziness and syncope.    Psychiatric/Behavioral: Negative for confusion and dysphoric mood.       Objective:      Vitals:    02/21/18 1523   BP: 122/70   Pulse: 80   Resp: 15     Physical Exam   Constitutional: She appears well-nourished.   Eyes: Conjunctivae and EOM are normal.   Neck: Trachea normal and normal range of motion. No thyromegaly present.   Cardiovascular: Normal heart sounds.    Pulses:       Dorsalis pedis pulses are 2+ on the right side, and 2+ on the left side.   Edema negative   Pulmonary/Chest: Effort normal and breath sounds normal.   Abdominal: Soft. There is no hepatomegaly.   Musculoskeletal:   ROM normal bilateral  Strength normal bilateral   Feet:   Right Foot:   Protective Sensation: 4 sites tested. 4 sites sensed.   Left Foot:   Protective Sensation: 4 sites tested. 4 sites sensed.   Neurological: She has normal reflexes. No cranial nerve deficit.   Skin: Skin is warm, dry and intact.   Psychiatric: She has a normal mood and affect.   Alert and Oriented    Vitals reviewed.        Assessment:       1. Routine physical examination    2. Controlled type 2 diabetes mellitus without complication, without long-term current use of insulin    3. Essential hypertension    4. Dyslipidemia    5. Hypothyroidism due to acquired atrophy of thyroid    6. Abdominal pain, unspecified abdominal location        Plan:       Routine physical examination    Controlled type 2 diabetes mellitus without complication, without long-term current use of insulin  -     Hemoglobin A1c; Future; Expected date: 08/20/2018  -     Microalbumin/creatinine urine ratio; Future; Expected date: 08/20/2018    Essential hypertension  -     Comprehensive metabolic panel; Future; Expected date: 08/20/2018    Dyslipidemia  -     Lipid panel; Future; Expected date: 08/20/2018    Hypothyroidism due to acquired atrophy of thyroid  -     TSH; Future; Expected date: 08/20/2018  -     levothyroxine (SYNTHROID) 112 MCG tablet; Take 1 tablet (112 mcg total) by  mouth once daily.  Dispense: 90 tablet; Refill: 3    Abdominal pain, unspecified abdominal location  -     dicyclomine (BENTYL) 10 MG capsule; Take 1 capsule (10 mg total) by mouth every 6 (six) hours as needed (stomach pain).  Dispense: 45 capsule; Refill: 5      Medication List with Changes/Refills   New Medications    DICYCLOMINE (BENTYL) 10 MG CAPSULE    Take 1 capsule (10 mg total) by mouth every 6 (six) hours as needed (stomach pain).   Current Medications    BENAZEPRIL (LOTENSIN) 20 MG TABLET    Take 1 tablet (20 mg total) by mouth once daily.    CALCIUM CARBONATE/VITAMIN D3 (CALCIUM 500 WITH D ORAL)    Take 1 capsule by mouth once daily.    CARBOXYMETHYLCELLULOSE (REFRESH PLUS) 0.5 % DPET    1 drop daily as needed.    ESOMEPRAZOLE (NEXIUM) 40 MG CAPSULE    Take 1 capsule (40 mg total) by mouth before breakfast.    ESTRADIOL (CLIMARA) 0.075 MG/24 HR    APPLY TO CLEAN DRY SKIN ONCE PER WEEK    FLUTICASONE (FLONASE) 50 MCG/ACTUATION NASAL SPRAY    USE 1 SPRAY IN EACH NOSTRIL DAILY    HYOSCYAMINE (LEVSIN/SL) 0.125 MG SUBL    Place 1 tablet (0.125 mg total) under the tongue every 4 (four) hours as needed.    METFORMIN (GLUCOPHAGE) 500 MG TABLET    TAKE 1 TABLET DAILY    MULTIVITAMIN (MULTIPLE VITAMIN ORAL)    Take 1 capsule by mouth once daily.    NITROGLYCERIN (NITROSTAT) 0.4 MG SL TABLET    Place 1 tablet (0.4 mg total) under the tongue every 5 (five) minutes as needed for Chest pain.    OXYBUTYNIN (DITROPAN-XL) 10 MG 24 HR TABLET    Take 1 tablet (10 mg total) by mouth once daily.    PROMETHAZINE (PHENERGAN) 25 MG TABLET    Take 1 tablet (25 mg total) by mouth every 6 (six) hours as needed for Nausea.    ROPINIROLE (REQUIP) 2 MG TABLET    Take 1.5 tablets (3 mg total) by mouth nightly.    SUBOXONE 8-2 MG FILM    1 Film by Subdermal route once daily. 1 strip last 7days    SUCRALFATE (CARAFATE) 1 GRAM TABLET    TAKE 1 TABLET FOUR TIMES A DAY AS NEEDED FOR GASTRITIS   Changed and/or Refilled Medications     "Modified Medication Previous Medication    LEVOTHYROXINE (SYNTHROID) 112 MCG TABLET levothyroxine (SYNTHROID) 125 MCG tablet       Take 1 tablet (112 mcg total) by mouth once daily.    Take 1 tablet (125 mcg total) by mouth once daily.        If sob and palpitations with 112 mcg levothyroxine, send to endo  ls changes; consider statin  If abd pn again, r/u diverticulitis w CT        Counseled on regular exercise, maintenance of a healthy weight, balanced diet rich in fruits/vegetables and lean protein, and avoidance of unhealthy habits like smoking and excessive alcohol intake.   Also, counseled on importance of being compliant with medication, health appointments, diet and exercise.     Follow-up in about 6 months (around 8/21/2018).    "This note will not be shared with the patient."  "

## 2018-02-22 ENCOUNTER — TELEPHONE (OUTPATIENT)
Dept: FAMILY MEDICINE | Facility: CLINIC | Age: 68
End: 2018-02-22

## 2018-02-22 DIAGNOSIS — Z92.89 HX OF CT SCAN OF ABDOMEN: ICD-10-CM

## 2018-02-22 DIAGNOSIS — Z29.89 ENCOUNTER FOR HYDRATION PRIOR TO CT SCAN: ICD-10-CM

## 2018-02-22 DIAGNOSIS — R10.32 LLQ ABDOMINAL PAIN: Primary | ICD-10-CM

## 2018-02-22 DIAGNOSIS — R10.84 GENERALIZED ABDOMINAL PAIN: ICD-10-CM

## 2018-02-22 NOTE — TELEPHONE ENCOUNTER
Pt states she is having abdominal cramping and diarrhea again starting last night.  She states you mentioned abx if needed.

## 2018-02-22 NOTE — TELEPHONE ENCOUNTER
----- Message from Samira Sarmiento sent at 2/22/2018  4:05 PM CST -----  Contact: pt  Pt states was in yesterday for regular check up had been having a stomach like virus but gone away but last night started back up and been going every since. Would like to have something called in,please...[phone] (home)             .  [facility] - [place] - [address] [address]  [address] [address] [address]  [place] [address]  Phone: [phone] Fax: [phone]

## 2018-02-23 NOTE — TELEPHONE ENCOUNTER
Spoke with pt. Verbalized understanding. CT scheduled.  Needs stat creatinine same morning.  Needs to stop metformin x 2 days after and cmp after to resume medication.  Labs pended.    Staff, please schedule both lab appts.

## 2018-03-06 DIAGNOSIS — I10 ESSENTIAL HYPERTENSION: ICD-10-CM

## 2018-03-06 DIAGNOSIS — N39.41 URGE INCONTINENCE: ICD-10-CM

## 2018-03-06 RX ORDER — OXYBUTYNIN CHLORIDE 5 MG/1
TABLET, EXTENDED RELEASE ORAL
Qty: 90 TABLET | Refills: 1 | OUTPATIENT
Start: 2018-03-06

## 2018-03-06 RX ORDER — BENAZEPRIL HYDROCHLORIDE 20 MG/1
TABLET ORAL
Qty: 90 TABLET | Refills: 1 | Status: SHIPPED | OUTPATIENT
Start: 2018-03-06 | End: 2018-08-22 | Stop reason: SDUPTHER

## 2018-03-06 RX ORDER — OXYBUTYNIN CHLORIDE 10 MG/1
TABLET, EXTENDED RELEASE ORAL
Qty: 90 TABLET | Refills: 1 | Status: SHIPPED | OUTPATIENT
Start: 2018-03-06 | End: 2018-08-30 | Stop reason: SDUPTHER

## 2018-03-08 NOTE — TELEPHONE ENCOUNTER
----- Message from Alex Neves sent at 3/8/2018 11:15 AM CST -----  Contact: Self  Refill was not approved. Please advise on if she was supposed to discontinue  Rx oxybutynin (DITROPAN-XL) 10 MG 24 hr tablet  She states she only has 3 left.   Please call her to let her know [phone] (home)       [facility] - [address] [address]  Phone: [phone] Fax: [phone]

## 2018-03-08 NOTE — TELEPHONE ENCOUNTER
Spoke with pt. Instructed that 2 requests for oxybutynin were submitted. 1 approved and sent to [facility]. The other request denied due to duplicity.    Verbalized understanding.

## 2018-05-01 ENCOUNTER — NURSE TRIAGE (OUTPATIENT)
Dept: ADMINISTRATIVE | Facility: CLINIC | Age: 68
End: 2018-05-01

## 2018-05-01 ENCOUNTER — TELEPHONE (OUTPATIENT)
Dept: FAMILY MEDICINE | Facility: CLINIC | Age: 68
End: 2018-05-01

## 2018-05-01 NOTE — TELEPHONE ENCOUNTER
Patient called stating having blurry vision, shortness of breath, fatigue, and weakness.  She states the fatigue is worse than anything.  She states she has had episodes over the last 10 days.  She is not having symptoms right now, but would like to have this worked up.  Appt scheduled

## 2018-05-01 NOTE — TELEPHONE ENCOUNTER
----- Message from Anat Wiley sent at 5/1/2018 12:58 PM CDT -----  Contact: Osman  Patient called stating having blurry vision, shortness of breath, fatigue, and weak. Sent to [facility]. Thanks!

## 2018-05-02 ENCOUNTER — LAB VISIT (OUTPATIENT)
Dept: LAB | Facility: HOSPITAL | Age: 68
End: 2018-05-02
Attending: NURSE PRACTITIONER
Payer: COMMERCIAL

## 2018-05-02 ENCOUNTER — OFFICE VISIT (OUTPATIENT)
Dept: FAMILY MEDICINE | Facility: CLINIC | Age: 68
End: 2018-05-02
Payer: COMMERCIAL

## 2018-05-02 VITALS
BODY MASS INDEX: 25.18 KG/M2 | TEMPERATURE: 99 F | RESPIRATION RATE: 14 BRPM | HEIGHT: 61 IN | SYSTOLIC BLOOD PRESSURE: 124 MMHG | OXYGEN SATURATION: 99 % | HEART RATE: 88 BPM | WEIGHT: 133.38 LBS | DIASTOLIC BLOOD PRESSURE: 68 MMHG

## 2018-05-02 DIAGNOSIS — E03.9 HYPOTHYROIDISM, UNSPECIFIED TYPE: ICD-10-CM

## 2018-05-02 DIAGNOSIS — I10 ESSENTIAL (PRIMARY) HYPERTENSION: ICD-10-CM

## 2018-05-02 DIAGNOSIS — Z86.69 HISTORY OF BLURRY VISION: ICD-10-CM

## 2018-05-02 DIAGNOSIS — D64.9 ANEMIA, UNSPECIFIED TYPE: ICD-10-CM

## 2018-05-02 DIAGNOSIS — R53.83 FATIGUE, UNSPECIFIED TYPE: Primary | ICD-10-CM

## 2018-05-02 DIAGNOSIS — Z87.898 HISTORY OF SHORTNESS OF BREATH: ICD-10-CM

## 2018-05-02 LAB
ALBUMIN SERPL BCP-MCNC: 3.9 G/DL
ALP SERPL-CCNC: 74 U/L
ALT SERPL W/O P-5'-P-CCNC: 11 U/L
ANION GAP SERPL CALC-SCNC: 10 MMOL/L
AST SERPL-CCNC: 18 U/L
BASOPHILS # BLD AUTO: 0.07 K/UL
BASOPHILS NFR BLD: 0.7 %
BILIRUB SERPL-MCNC: 0.3 MG/DL
BUN SERPL-MCNC: 16 MG/DL
CALCIUM SERPL-MCNC: 10.2 MG/DL
CHLORIDE SERPL-SCNC: 100 MMOL/L
CO2 SERPL-SCNC: 27 MMOL/L
CREAT SERPL-MCNC: 1 MG/DL
DIFFERENTIAL METHOD: ABNORMAL
EOSINOPHIL # BLD AUTO: 0 K/UL
EOSINOPHIL NFR BLD: 0 %
ERYTHROCYTE [DISTWIDTH] IN BLOOD BY AUTOMATED COUNT: 12.8 %
EST. GFR  (AFRICAN AMERICAN): >60 ML/MIN/1.73 M^2
EST. GFR  (NON AFRICAN AMERICAN): 57.6 ML/MIN/1.73 M^2
FERRITIN SERPL-MCNC: 49 NG/ML
GLUCOSE SERPL-MCNC: 96 MG/DL
HCT VFR BLD AUTO: 36.8 %
HGB BLD-MCNC: 11.5 G/DL
IMM GRANULOCYTES # BLD AUTO: 0.03 K/UL
IMM GRANULOCYTES NFR BLD AUTO: 0.3 %
IRON SERPL-MCNC: 49 UG/DL
LYMPHOCYTES # BLD AUTO: 3.1 K/UL
LYMPHOCYTES NFR BLD: 32.2 %
MCH RBC QN AUTO: 29.1 PG
MCHC RBC AUTO-ENTMCNC: 31.3 G/DL
MCV RBC AUTO: 93 FL
MONOCYTES # BLD AUTO: 0.8 K/UL
MONOCYTES NFR BLD: 8 %
NEUTROPHILS # BLD AUTO: 5.7 K/UL
NEUTROPHILS NFR BLD: 58.8 %
NRBC BLD-RTO: 0 /100 WBC
PLATELET # BLD AUTO: 208 K/UL
PMV BLD AUTO: 12.2 FL
POTASSIUM SERPL-SCNC: 4.9 MMOL/L
PROT SERPL-MCNC: 7.7 G/DL
RBC # BLD AUTO: 3.95 M/UL
SATURATED IRON: 16 %
SODIUM SERPL-SCNC: 137 MMOL/L
TOTAL IRON BINDING CAPACITY: 308 UG/DL
TRANSFERRIN SERPL-MCNC: 208 MG/DL
WBC # BLD AUTO: 9.62 K/UL

## 2018-05-02 PROCEDURE — 84443 ASSAY THYROID STIM HORMONE: CPT

## 2018-05-02 PROCEDURE — 80053 COMPREHEN METABOLIC PANEL: CPT

## 2018-05-02 PROCEDURE — 82728 ASSAY OF FERRITIN: CPT

## 2018-05-02 PROCEDURE — 36415 COLL VENOUS BLD VENIPUNCTURE: CPT | Mod: PO

## 2018-05-02 PROCEDURE — 84439 ASSAY OF FREE THYROXINE: CPT

## 2018-05-02 PROCEDURE — 3074F SYST BP LT 130 MM HG: CPT | Mod: CPTII,S$GLB,, | Performed by: NURSE PRACTITIONER

## 2018-05-02 PROCEDURE — 3078F DIAST BP <80 MM HG: CPT | Mod: CPTII,S$GLB,, | Performed by: NURSE PRACTITIONER

## 2018-05-02 PROCEDURE — 99999 PR PBB SHADOW E&M-EST. PATIENT-LVL V: CPT | Mod: PBBFAC,,, | Performed by: NURSE PRACTITIONER

## 2018-05-02 PROCEDURE — 85025 COMPLETE CBC W/AUTO DIFF WBC: CPT

## 2018-05-02 PROCEDURE — 99214 OFFICE O/P EST MOD 30 MIN: CPT | Mod: S$GLB,,, | Performed by: NURSE PRACTITIONER

## 2018-05-02 PROCEDURE — 83540 ASSAY OF IRON: CPT

## 2018-05-02 NOTE — PROGRESS NOTES
Subjective:       Patient ID: Osman Johansen is a 68 y.o. female.    Chief Complaint: Shortness of Breath; Extremity Weakness; Blurred Vision; and Fatigue    HPI   Ms. Johansen is a new patient to me. She presents today for SOB, blurry vision, fatigue, and weakness. She does not have any of these symptoms currently--intermittent over the past 2 weeks--has been worked up for very similar symptoms a few months back, thyroid found to be suppressed, levothyroxine adjusted and all symptoms resolved. Last stress test 01/2017 WNL.     History of iron deficiency anemia, last CBC showed normocytic normochromic h/h 11.3/34.8    HTN: home average 120s/60s  Vitals:    05/02/18 1339   BP: 124/68   Pulse: 88   Resp: 14   Temp: 98.9 °F (37.2 °C)     Review of Systems   Constitutional: Negative for diaphoresis and fever.   HENT: Negative for facial swelling and trouble swallowing.    Eyes: Negative for discharge and redness.   Respiratory: Negative for cough and shortness of breath.    Cardiovascular: Negative for chest pain and palpitations.   Gastrointestinal: Negative for abdominal pain and diarrhea.   Genitourinary: Negative for difficulty urinating.   Musculoskeletal: Negative for gait problem.   Skin: Negative for rash and wound.   Neurological: Negative for facial asymmetry and speech difficulty.   Psychiatric/Behavioral: Negative for confusion. The patient is not nervous/anxious.        Past Medical History:   Diagnosis Date    Allergy     Anxiety     Arthritis     Blood transfusion 8 yrs    Cataract     Degenerative disc disease     Depression     Dry eye syndrome     GERD (gastroesophageal reflux disease)     Hypertension     Macular drusen     Neuromuscular disorder     Ocular hypertension, bilateral     Osteoporosis     PUD (peptic ulcer disease)     Thoracic or lumbosacral neuritis or radiculitis 10/19/2012    Thyroid disease      Objective:      Physical Exam   Constitutional: She is oriented to  person, place, and time. She does not have a sickly appearance. No distress.   HENT:   Head: Normocephalic.   Right Ear: Hearing normal.   Left Ear: Hearing normal.   Nose: Nose normal.   Eyes: Conjunctivae and lids are normal.   Neck: No JVD present. No tracheal deviation present.   Cardiovascular: Normal rate and normal heart sounds.    Pulmonary/Chest: Effort normal and breath sounds normal.   Abdominal: Normal appearance. She exhibits no distension.   Musculoskeletal: She exhibits no deformity.   Neurological: She is alert and oriented to person, place, and time.   Skin: She is not diaphoretic. No pallor.   Psychiatric: She has a normal mood and affect. Her speech is normal and behavior is normal. Judgment and thought content normal. Cognition and memory are normal.   Nursing note and vitals reviewed.      Assessment:       1. Fatigue, unspecified type    2. History of shortness of breath    3. History of blurry vision    4. Essential (primary) hypertension    5. Hypothyroidism, unspecified type    6. Anemia, unspecified type        Plan:       Fatigue, unspecified type  -     TSH; Future; Expected date: 05/02/2018  -     CBC auto differential; Future; Expected date: 05/02/2018    History of shortness of breath  -     CBC auto differential; Future; Expected date: 05/02/2018  -     Iron and TIBC; Future; Expected date: 05/02/2018  -     Ferritin; Future; Expected date: 05/02/2018    History of blurry vision  -     TSH; Future; Expected date: 05/02/2018    Essential (primary) hypertension  -     Comprehensive metabolic panel; Future; Expected date: 05/02/2018    Hypothyroidism, unspecified type  -     TSH; Future; Expected date: 05/02/2018    Anemia, unspecified type  -     CBC auto differential; Future; Expected date: 05/02/2018  -     Iron and TIBC; Future; Expected date: 05/02/2018  -     Ferritin; Future; Expected date: 05/02/2018        Follow-up for labs now, if symptoms return, with PCP as scheduled.

## 2018-05-03 DIAGNOSIS — D64.9 ANEMIA, UNSPECIFIED TYPE: Primary | ICD-10-CM

## 2018-05-03 DIAGNOSIS — E03.4 HYPOTHYROIDISM DUE TO ACQUIRED ATROPHY OF THYROID: ICD-10-CM

## 2018-05-03 LAB
T4 FREE SERPL-MCNC: 1.38 NG/DL
TSH SERPL DL<=0.005 MIU/L-ACNC: 0.11 UIU/ML

## 2018-05-03 RX ORDER — LEVOTHYROXINE SODIUM 100 UG/1
100 TABLET ORAL
Qty: 30 TABLET | Refills: 5 | Status: SHIPPED | OUTPATIENT
Start: 2018-05-03 | End: 2018-11-14 | Stop reason: SDUPTHER

## 2018-06-04 DIAGNOSIS — K29.70 GASTRITIS, PRESENCE OF BLEEDING UNSPECIFIED, UNSPECIFIED CHRONICITY, UNSPECIFIED GASTRITIS TYPE: ICD-10-CM

## 2018-06-04 DIAGNOSIS — E03.4 HYPOTHYROIDISM DUE TO ACQUIRED ATROPHY OF THYROID: ICD-10-CM

## 2018-06-05 RX ORDER — LEVOTHYROXINE SODIUM 100 UG/1
TABLET ORAL
Qty: 90 TABLET | Refills: 1 | Status: SHIPPED | OUTPATIENT
Start: 2018-06-05 | End: 2018-08-30 | Stop reason: SDUPTHER

## 2018-06-05 RX ORDER — ESOMEPRAZOLE MAGNESIUM 40 MG/1
CAPSULE, DELAYED RELEASE ORAL
Qty: 90 CAPSULE | Refills: 1 | Status: SHIPPED | OUTPATIENT
Start: 2018-06-05 | End: 2019-08-27 | Stop reason: SDUPTHER

## 2018-06-14 ENCOUNTER — TELEPHONE (OUTPATIENT)
Dept: FAMILY MEDICINE | Facility: CLINIC | Age: 68
End: 2018-06-14

## 2018-06-14 NOTE — TELEPHONE ENCOUNTER
Spoke to [facility]ist at [facility] to confirm levothyroxine dose change to 100mcg per Yadira Serna office note. Verbalized understanding.

## 2018-06-14 NOTE — TELEPHONE ENCOUNTER
----- Message from Brien GARCIA Mohitjannette sent at 6/14/2018 10:27 AM CDT -----  Contact: Ezra Stout/Jeremy, Pharmacist  Jeremy called in regarding the attached patient and her Rx for SYNTHROID 100 mcg tablet.  Jeremy stated they have some questions regarding the MCG's.  Previous Rx for 125 mcg and this one is only 100 mcg    Jeremy's call back number is [phone], ref number: [id number]

## 2018-08-09 ENCOUNTER — OFFICE VISIT (OUTPATIENT)
Dept: SPINE | Facility: CLINIC | Age: 68
End: 2018-08-09
Payer: COMMERCIAL

## 2018-08-09 ENCOUNTER — PATIENT OUTREACH (OUTPATIENT)
Dept: ADMINISTRATIVE | Facility: HOSPITAL | Age: 68
End: 2018-08-09

## 2018-08-09 VITALS
WEIGHT: 138 LBS | BODY MASS INDEX: 26.06 KG/M2 | SYSTOLIC BLOOD PRESSURE: 181 MMHG | DIASTOLIC BLOOD PRESSURE: 70 MMHG | HEIGHT: 61 IN | HEART RATE: 70 BPM

## 2018-08-09 DIAGNOSIS — M79.10 MYALGIA: ICD-10-CM

## 2018-08-09 DIAGNOSIS — M53.3 SACROILIAC JOINT PAIN: ICD-10-CM

## 2018-08-09 DIAGNOSIS — M54.14 THORACIC RADICULOPATHY: ICD-10-CM

## 2018-08-09 DIAGNOSIS — M47.816 LUMBAR FACET ARTHROPATHY: ICD-10-CM

## 2018-08-09 DIAGNOSIS — M47.816 LUMBAR SPONDYLOSIS: Primary | ICD-10-CM

## 2018-08-09 DIAGNOSIS — M47.816 SPONDYLOSIS OF LUMBAR REGION WITHOUT MYELOPATHY OR RADICULOPATHY: ICD-10-CM

## 2018-08-09 PROCEDURE — 99999 PR PBB SHADOW E&M-EST. PATIENT-LVL III: CPT | Mod: PBBFAC,,, | Performed by: NURSE PRACTITIONER

## 2018-08-09 PROCEDURE — 99213 OFFICE O/P EST LOW 20 MIN: CPT | Mod: S$GLB,,, | Performed by: NURSE PRACTITIONER

## 2018-08-09 PROCEDURE — 3077F SYST BP >= 140 MM HG: CPT | Mod: CPTII,S$GLB,, | Performed by: NURSE PRACTITIONER

## 2018-08-09 PROCEDURE — 3078F DIAST BP <80 MM HG: CPT | Mod: CPTII,S$GLB,, | Performed by: NURSE PRACTITIONER

## 2018-08-09 RX ORDER — DICYCLOMINE HYDROCHLORIDE 10 MG/1
10 CAPSULE ORAL 4 TIMES DAILY PRN
COMMUNITY
Start: 2018-07-26 | End: 2021-05-11 | Stop reason: SDUPTHER

## 2018-08-09 NOTE — PROGRESS NOTES
Health Maintenance Due   Topic Date Due    TETANUS VACCINE  01/30/1968    Pneumococcal (65+) (2 of 2 - PPSV23) 01/28/2017    Mammogram  02/13/2018    Influenza Vaccine  08/01/2018

## 2018-08-09 NOTE — PROGRESS NOTES
Chronic patient Established Note (Follow up visit)      SUBJECTIVE:    Osman Johansen presents to the clinic for a follow-up appointment for lower back pain.  We have not seen the patient since last year.  Since her last OV, she underwent bilateral L2,3,4,5 MBB with steroids and T7-8 interspinous ligament injection with 90% pain relief until a couple of weeks ago.  Her previous thoracic MRI did not show any major disc bulges or NF narrowing.  She wishes to schedule a repeat procedure.  Since the last visit, Osman Johansen states the pain has been worsening.  Current pain intensity is 4/10.    Pain Disability Index Review:  Last 3 PDI Scores 8/9/2018 11/30/2017 3/28/2017   Pain Disability Index (PDI) 39 [phone] 38       Pain Medications:  None    Opioid Contract: no     report:  Reviewed and consistent with medication use as prescribed.    Pain Procedures:   7/18/13 Bilateral L5 TF JOCE  10/28/13 Bilateral SI joint injection  4/20/15 Bilateral SI joint injection  12/15/16 Bilateral L3-4 facet joint injections- 100% relief for 6 weeks  3/13/17 Bilateral L3-4 facet joint injections- 30% relief  4/17/17 Bilateral L2,3,4,5 MBB with steroids- significant benefit for 7 months  12/14/17 Bilateral L2,3,4,5 MBB with steroids and Interspinal ligament injection- 90% relief for 7 months    Physical Therapy/Home Exercise: per self    Imaging:     Lumbar MRI 12/10/16    Narrative   MRI LUMBAR SPINE WITHOUT CONTRAST    COMPARISON: None    TECHNIQUE:  Sagittal T1, T2, and STIR;  axial T1 and T2 sequences through the lumbar spine were acquired without contrast.    FINDINGS: Vertebral body height and alignment are within normal limits.  The conus terminates at T12-L1.  Moderate loss of disc height is present at L4-5.  Marrow signal is mildly heterogeneous.  No focal osseous lesion.    L1-L2:  No disc herniation. No spinal canal or neuroforaminal narrowing.    L2-L3:  Mild diffuse disc bulging is present without spinal canal or  neuroforaminal narrowing.    L3-L4:  Mild diffuse disc bulging and moderate bilateral facet arthropathy result in mild spinal canal narrowing.    L4-L5:  Mild diffuse disc bulging is present without spinal canal or neuroforaminal narrowing.    L5-S1:  No disc herniation. No spinal canal or neuroforaminal narrowing.    Several small gallstones noted.   Impression     Degenerative lumbar spondylosis with mild L3-4 spinal canal narrowing and no significant neuroforaminal narrowing.  Cholelithiasis       Narrative     MRI of the thoracic spine without contrast    Technique: Multiplanar multisequence MR imaging of the thoracic spine was performed without contrast.    Findings: There is mild levoscoliosis of the lumbar spine.  This may be positional.  Vertebral bodies otherwise demonstrate normal height alignment.  The marrow signal of the vertebral bodies is within normal limits.  There is mild disc desiccation noted throughout the thoracic spine.  No significant disc space narrowing.  No significant disc protrusions are identified.  The central canal is widely patent.  No significant spondylosis.  The cord is normal in signal and caliber.      Impression         1.  Mild levoscoliosis of the thoracic spine otherwise essentially unremarkable MRI of the thoracic spine.         CMP  Sodium   Date Value Ref Range Status   05/02/2018 137 136 - 145 mmol/L Final     Potassium   Date Value Ref Range Status   05/02/2018 4.9 3.5 - 5.1 mmol/L Final     Chloride   Date Value Ref Range Status   05/02/2018 100 95 - 110 mmol/L Final     CO2   Date Value Ref Range Status   05/02/2018 27 23 - 29 mmol/L Final     Glucose   Date Value Ref Range Status   05/02/2018 96 70 - 110 mg/dL Final     BUN, Bld   Date Value Ref Range Status   05/02/2018 16 8 - 23 mg/dL Final     Creatinine   Date Value Ref Range Status   05/02/2018 1.0 0.5 - 1.4 mg/dL Final     Calcium   Date Value Ref Range Status   05/02/2018 10.2 8.7 - 10.5 mg/dL Final     Total  Protein   Date Value Ref Range Status   05/02/2018 7.7 6.0 - 8.4 g/dL Final     Albumin   Date Value Ref Range Status   05/02/2018 3.9 3.5 - 5.2 g/dL Final     Total Bilirubin   Date Value Ref Range Status   05/02/2018 0.3 0.1 - 1.0 mg/dL Final     Comment:     For infants and newborns, interpretation of results should be based  on gestational age, weight and in agreement with clinical  observations.  Premature Infant recommended reference ranges:  Up to 24 hours.............<8.0 mg/dL  Up to 48 hours............<12.0 mg/dL  3-5 days..................<15.0 mg/dL  6-29 days.................<15.0 mg/dL       Alkaline Phosphatase   Date Value Ref Range Status   05/02/2018 74 55 - 135 U/L Final     AST   Date Value Ref Range Status   05/02/2018 18 10 - 40 U/L Final     ALT   Date Value Ref Range Status   05/02/2018 11 10 - 44 U/L Final     Anion Gap   Date Value Ref Range Status   05/02/2018 10 8 - 16 mmol/L Final     eGFR if    Date Value Ref Range Status   05/02/2018 >60.0 >60 mL/min/1.73 m^2 Final     eGFR if non    Date Value Ref Range Status   05/02/2018 57.6 (A) >60 mL/min/1.73 m^2 Final     Comment:     Calculation used to obtain the estimated glomerular filtration  rate (eGFR) is the CKD-EPI equation.        Lab Results   Component Value Date    WBC 9.62 05/02/2018    HGB 11.5 (L) 05/02/2018    HCT 36.8 (L) 05/02/2018    MCV 93 05/02/2018     05/02/2018         Allergies:   Review of patient's allergies indicates:   Allergen Reactions    Pcn [penicillins] Swelling    Toradol [ketorolac] Swelling    Vibramycin [doxycycline calcium] Swelling    Cymbalta [duloxetine]      dizzyness    Elavil [amitriptyline]     Lyrica [pregabalin] Swelling    Seroquel [quetiapine]      restless leg symdrome       Current Medications:   Current Outpatient Prescriptions   Medication Sig Dispense Refill    benazepril (LOTENSIN) 20 MG tablet TAKE 1 TABLET DAILY 90 tablet 1    CALCIUM  CARBONATE/VITAMIN D3 (CALCIUM 500 WITH D ORAL) Take 1 capsule by mouth once daily.      carboxymethylcellulose (REFRESH PLUS) 0.5 % Dpet 1 drop daily as needed.      esomeprazole (NEXIUM) 40 MG capsule TAKE 1 CAPSULE BEFORE BREAKFAST 90 capsule 1    estradiol (CLIMARA) 0.075 mg/24 hr APPLY TO CLEAN DRY SKIN ONCE PER WEEK (Patient taking differently: APPLY TO CLEAN DRY SKIN TWICE PER WEEK) 12 patch 3    fluticasone (FLONASE) 50 mcg/actuation nasal spray USE 1 SPRAY IN EACH NOSTRIL DAILY (Patient taking differently: USE 1 SPRAY IN EACH NOSTRIL DAILY PRN) 16 g 11    hyoscyamine (LEVSIN/SL) 0.125 mg Subl Place 1 tablet (0.125 mg total) under the tongue every 4 (four) hours as needed. 45 tablet 3    levothyroxine (SYNTHROID) 100 MCG tablet Take 1 tablet (100 mcg total) by mouth before breakfast. 30 tablet 5    metFORMIN (GLUCOPHAGE) 500 MG tablet TAKE 1 TABLET DAILY 90 tablet 1    MULTIVITAMIN (MULTIPLE VITAMIN ORAL) Take 1 capsule by mouth once daily.      oxybutynin (DITROPAN-XL) 10 MG 24 hr tablet TAKE 1 TABLET DAILY 90 tablet 1    ropinirole (REQUIP) 2 MG tablet Take 1.5 tablets (3 mg total) by mouth nightly. (Patient taking differently: Take 2 mg by mouth nightly. ) 135 tablet 3    SUBOXONE 8-2 mg Film 1 Film by Subdermal route once daily. 1 strip last 7days  0    sucralfate (CARAFATE) 1 gram tablet TAKE 1 TABLET FOUR TIMES A DAY AS NEEDED FOR GASTRITIS 90 tablet 3    SYNTHROID 100 mcg tablet TAKE 1 TABLET DAILY 90 tablet 1    nitroGLYCERIN (NITROSTAT) 0.4 MG SL tablet Place 1 tablet (0.4 mg total) under the tongue every 5 (five) minutes as needed for Chest pain. 30 tablet 0    promethazine (PHENERGAN) 25 MG tablet Take 1 tablet (25 mg total) by mouth every 6 (six) hours as needed for Nausea. 45 tablet 6     No current facility-administered medications for this visit.        REVIEW OF SYSTEMS:    GENERAL:  No weight loss, malaise or fevers.  HEENT:  Negative for frequent or significant  headaches.  NECK:  Negative for lumps, goiter, pain and significant neck swelling.  RESPIRATORY:  Negative for cough, wheezing or shortness of breath.  CARDIOVASCULAR:  Negative for chest pain, leg swelling or palpitations.  GI:  Negative for abdominal discomfort, blood in stools or black stools or change in bowel habits. GERD.  MUSCULOSKELETAL:  See HPI.  SKIN:  Negative for lesions, rash, and itching.  PSYCH: H/O anxiety and opioid dependence.  HEMATOLOGY/LYMPHOLOGY:  Negative for prolonged bleeding, bruising easily or swollen nodes.  NEURO:   No history of headaches, syncope, paralysis, seizures or tremors.  All other reviewed and negative other than HPI.    Past Medical History:  Past Medical History:   Diagnosis Date    Allergy     Anxiety     Arthritis     Blood transfusion 8 yrs    Cataract     Degenerative disc disease     Depression     Dry eye syndrome     GERD (gastroesophageal reflux disease)     Hypertension     Macular drusen     Neuromuscular disorder     Ocular hypertension, bilateral     Osteoporosis     PUD (peptic ulcer disease)     Thoracic or lumbosacral neuritis or radiculitis 10/19/2012    Thyroid disease        Past Surgical History:  Past Surgical History:   Procedure Laterality Date    APPENDECTOMY  1965    HYSTERECTOMY  7 yrs     TONSILLECTOMY  1954       Family History:  Family History   Problem Relation Age of Onset    Arthritis Mother     Cancer Mother     Heart disease Mother     Hypertension Mother     Cataracts Mother     Ulcers Father     Thyroid disease Brother     Heart disease Brother     Amblyopia Neg Hx     Blindness Neg Hx     Diabetes Neg Hx     Glaucoma Neg Hx     Macular degeneration Neg Hx     Retinal detachment Neg Hx     Strabismus Neg Hx     Stroke Neg Hx        Social History:  Social History     Social History    Marital status:      Spouse name: N/A    Number of children: N/A    Years of education: N/A     Social History  "Main Topics    Smoking status: Former Smoker     Quit date: 1/1/2008    Smokeless tobacco: Never Used    Alcohol use No    Drug use: No    Sexual activity: No     Other Topics Concern    None     Social History Narrative    None       OBJECTIVE:    BP (!) 181/70   Pulse 70   Ht 5' 1" (1.549 m)   Wt 62.6 kg (138 lb)   BMI 26.07 kg/m²     PHYSICAL EXAMINATION:    General appearance: Well appearing, in no acute distress, alert and oriented x3.  Psych:  Mood and affect appropriate.  Skin: No rashes or edema.  Head/face:  Atraumatic, normocephalic. No palpable lymph nodes  Cor: RRR  Pulm: CTA  GI: Abdomen soft and non-distended.    Back: There is pain with palpation to lumbar facet joints at approximately the levels of L2-5.  There is midline pain to thoracic spine around T7.  There is pain with lateral rotation of thoracic and lumbar spine.  There is pain with extension.  Positive facet loading bilaterally.  Extremities: Peripheral joint ROM is full and pain free without obvious instability or laxity in all four extremities. No deformities, edema, or skin discoloration. Good capillary refill.  Musculoskeletal: Bilateral upper and lower extremity strength is normal and symmetric.  No atrophy or tone abnormalities are noted.  Neuro: Bilateral upper and lower extremity coordination and muscle stretch reflexes are physiologic and symmetric.  Plantar response are downgoing.   Gait: Normal.    ASSESSMENT: 68 y.o. year old female with back pain, consistent with the following diagnoses:     1. Lumbar spondylosis     2. Thoracic radiculopathy     3. Sacroiliac joint pain     4. Myalgia     5. Spondylosis of lumbar region without myelopathy or radiculopathy     6. Lumbar facet arthropathy           PLAN:     - Previous imaging was reviewed and discussed with the patient today.    - Schedule for repeat bilateral L2,3,4,5 MBB with steroid and T7-8 interspinous ligament injection as previous provided significant benefit " for 7 months.   The procedure, risks, benefits and options were discussed with patient. There are no contraindications to the procedure. The patient expressed understanding and agreed to proceed.      - RTC 2 weeks after procedure.    - The patient will continue a home exercise routine to help with pain and strengthening.      - Counseled patient regarding the importance of constant sleeping habits and physical therapy.        The above plan and management options were discussed at length with patient. Patient is in agreement with the above and verbalized understanding.    Meche Clinton  08/09/2018

## 2018-08-17 ENCOUNTER — PATIENT OUTREACH (OUTPATIENT)
Dept: ADMINISTRATIVE | Facility: HOSPITAL | Age: 68
End: 2018-08-17

## 2018-08-17 DIAGNOSIS — E11.9 CONTROLLED TYPE 2 DIABETES MELLITUS WITHOUT COMPLICATION, WITHOUT LONG-TERM CURRENT USE OF INSULIN: ICD-10-CM

## 2018-08-17 NOTE — LETTER
August 17, 2018    Osman Johansen  [address]  [place] [address]             [facility]  [address]  [place] [address]  Phone: [phone] Dear Ms. Johansen:    We have tried to reach you by [facility] email unsuccessfully.      [facility] is committed to your overall health.  To help you get the most out of each of your visits, we will review your information to make sure you are up to date on all of your recommended tests and/or procedures.       Dr. Stiles    has found that your chart shows you may be due for the following:     Tetanus Immunization   Pneumonia immunization   Mammogram     REMINDER: lab appointment 8/17/18     If you have had any of the above done at another facility, please bring the records or information with you so that your record at [facility] will be complete.  If you would like to schedule any of these, please contact me.      If you are currently taking medication , please bring it with you to your appointment for review.       If you have any questions or concerns, please don't hesitate to call.    Sincerely,    Carmita Vazquez  Clinical Care Coordinator  [facility]  [address] [facility] [address]  [place] [address]  Phone: [phone]   Fax: [phone]

## 2018-08-20 ENCOUNTER — TELEPHONE (OUTPATIENT)
Dept: FAMILY MEDICINE | Facility: CLINIC | Age: 68
End: 2018-08-20

## 2018-08-20 ENCOUNTER — LAB VISIT (OUTPATIENT)
Dept: LAB | Facility: HOSPITAL | Age: 68
End: 2018-08-20
Attending: INTERNAL MEDICINE
Payer: COMMERCIAL

## 2018-08-20 DIAGNOSIS — E78.5 DYSLIPIDEMIA: ICD-10-CM

## 2018-08-20 DIAGNOSIS — E03.4 HYPOTHYROIDISM DUE TO ACQUIRED ATROPHY OF THYROID: ICD-10-CM

## 2018-08-20 DIAGNOSIS — E03.9 HYPOTHYROIDISM, UNSPECIFIED TYPE: Primary | ICD-10-CM

## 2018-08-20 DIAGNOSIS — E11.9 CONTROLLED TYPE 2 DIABETES MELLITUS WITHOUT COMPLICATION, WITHOUT LONG-TERM CURRENT USE OF INSULIN: ICD-10-CM

## 2018-08-20 DIAGNOSIS — E03.9 HYPOTHYROIDISM, UNSPECIFIED TYPE: ICD-10-CM

## 2018-08-20 DIAGNOSIS — I10 ESSENTIAL HYPERTENSION: ICD-10-CM

## 2018-08-20 LAB
ALBUMIN SERPL BCP-MCNC: 3.9 G/DL
ALP SERPL-CCNC: 70 U/L
ALT SERPL W/O P-5'-P-CCNC: 11 U/L
ANION GAP SERPL CALC-SCNC: 9 MMOL/L
AST SERPL-CCNC: 20 U/L
BILIRUB SERPL-MCNC: 0.2 MG/DL
BUN SERPL-MCNC: 17 MG/DL
CALCIUM SERPL-MCNC: 9.5 MG/DL
CHLORIDE SERPL-SCNC: 103 MMOL/L
CHOLEST SERPL-MCNC: 245 MG/DL
CHOLEST/HDLC SERPL: 2.4 {RATIO}
CO2 SERPL-SCNC: 25 MMOL/L
CREAT SERPL-MCNC: 1 MG/DL
EST. GFR  (AFRICAN AMERICAN): >60 ML/MIN/1.73 M^2
EST. GFR  (NON AFRICAN AMERICAN): 57.6 ML/MIN/1.73 M^2
ESTIMATED AVG GLUCOSE: 108 MG/DL
GLUCOSE SERPL-MCNC: 98 MG/DL
HBA1C MFR BLD HPLC: 5.4 %
HDLC SERPL-MCNC: 104 MG/DL
HDLC SERPL: 42.4 %
LDLC SERPL CALC-MCNC: 128.8 MG/DL
NONHDLC SERPL-MCNC: 141 MG/DL
POTASSIUM SERPL-SCNC: 4.9 MMOL/L
PROT SERPL-MCNC: 7.4 G/DL
SODIUM SERPL-SCNC: 137 MMOL/L
T3 SERPL-MCNC: 53 NG/DL
T4 SERPL-MCNC: 7.8 UG/DL
TRIGL SERPL-MCNC: 61 MG/DL
TSH SERPL DL<=0.005 MIU/L-ACNC: 0.88 UIU/ML

## 2018-08-20 PROCEDURE — 80053 COMPREHEN METABOLIC PANEL: CPT

## 2018-08-20 PROCEDURE — 80061 LIPID PANEL: CPT

## 2018-08-20 PROCEDURE — 83036 HEMOGLOBIN GLYCOSYLATED A1C: CPT

## 2018-08-20 PROCEDURE — 84480 ASSAY TRIIODOTHYRONINE (T3): CPT

## 2018-08-20 PROCEDURE — 84436 ASSAY OF TOTAL THYROXINE: CPT

## 2018-08-20 PROCEDURE — 84443 ASSAY THYROID STIM HORMONE: CPT

## 2018-08-20 RX ORDER — METFORMIN HYDROCHLORIDE 500 MG/1
TABLET ORAL
Qty: 90 TABLET | Refills: 1 | Status: SHIPPED | OUTPATIENT
Start: 2018-08-20 | End: 2019-02-15 | Stop reason: SDUPTHER

## 2018-08-21 ENCOUNTER — HOSPITAL ENCOUNTER (OUTPATIENT)
Facility: OTHER | Age: 68
Discharge: HOME | End: 2018-08-21
Attending: ANESTHESIOLOGY | Admitting: ANESTHESIOLOGY
Payer: COMMERCIAL

## 2018-08-21 VITALS
HEART RATE: 62 BPM | TEMPERATURE: 99 F | WEIGHT: 130 LBS | DIASTOLIC BLOOD PRESSURE: 82 MMHG | OXYGEN SATURATION: 98 % | RESPIRATION RATE: 18 BRPM | SYSTOLIC BLOOD PRESSURE: 198 MMHG | HEIGHT: 61 IN | BODY MASS INDEX: 24.55 KG/M2

## 2018-08-21 DIAGNOSIS — M47.816 OSTEOARTHRITIS OF LUMBAR SPINE, UNSPECIFIED SPINAL OSTEOARTHRITIS COMPLICATION STATUS: ICD-10-CM

## 2018-08-21 DIAGNOSIS — M43.06 SPONDYLOLYSIS OF LUMBAR REGION: Primary | ICD-10-CM

## 2018-08-21 DIAGNOSIS — M47.816 LUMBAR SPONDYLOSIS: ICD-10-CM

## 2018-08-21 PROCEDURE — 63600175 PHARM REV CODE 636 W HCPCS: Performed by: ANESTHESIOLOGY

## 2018-08-21 PROCEDURE — 64494 INJ PARAVERT F JNT L/S 2 LEV: CPT | Mod: 50 | Performed by: ANESTHESIOLOGY

## 2018-08-21 PROCEDURE — 64494 INJ PARAVERT F JNT L/S 2 LEV: CPT | Mod: 50,,, | Performed by: ANESTHESIOLOGY

## 2018-08-21 PROCEDURE — 64495 INJ PARAVERT F JNT L/S 3 LEV: CPT | Mod: 50,,, | Performed by: ANESTHESIOLOGY

## 2018-08-21 PROCEDURE — 20552 NJX 1/MLT TRIGGER POINT 1/2: CPT | Performed by: ANESTHESIOLOGY

## 2018-08-21 PROCEDURE — 25000003 PHARM REV CODE 250: Performed by: ANESTHESIOLOGY

## 2018-08-21 PROCEDURE — 20552 NJX 1/MLT TRIGGER POINT 1/2: CPT | Mod: 59,,, | Performed by: ANESTHESIOLOGY

## 2018-08-21 PROCEDURE — 64493 INJ PARAVERT F JNT L/S 1 LEV: CPT | Mod: 50,,, | Performed by: ANESTHESIOLOGY

## 2018-08-21 PROCEDURE — 64493 INJ PARAVERT F JNT L/S 1 LEV: CPT | Mod: 50 | Performed by: ANESTHESIOLOGY

## 2018-08-21 PROCEDURE — 64495 INJ PARAVERT F JNT L/S 3 LEV: CPT | Mod: 50 | Performed by: ANESTHESIOLOGY

## 2018-08-21 RX ORDER — BUPIVACAINE HYDROCHLORIDE 2.5 MG/ML
INJECTION, SOLUTION EPIDURAL; INFILTRATION; INTRACAUDAL
Status: DISCONTINUED | OUTPATIENT
Start: 2018-08-21 | End: 2018-08-21 | Stop reason: HOSPADM

## 2018-08-21 RX ORDER — TRIAMCINOLONE ACETONIDE 40 MG/ML
INJECTION, SUSPENSION INTRA-ARTICULAR; INTRAMUSCULAR
Status: DISCONTINUED | OUTPATIENT
Start: 2018-08-21 | End: 2018-08-21 | Stop reason: HOSPADM

## 2018-08-21 RX ORDER — SODIUM CHLORIDE 9 MG/ML
500 INJECTION, SOLUTION INTRAVENOUS CONTINUOUS
Status: DISCONTINUED | OUTPATIENT
Start: 2018-08-21 | End: 2018-08-21 | Stop reason: HOSPADM

## 2018-08-21 RX ORDER — LIDOCAINE HYDROCHLORIDE 10 MG/ML
INJECTION INFILTRATION; PERINEURAL
Status: DISCONTINUED | OUTPATIENT
Start: 2018-08-21 | End: 2018-08-21 | Stop reason: HOSPADM

## 2018-08-21 NOTE — OP NOTE
lUMBAR Medial Branch Block Under Fluoroscopy  Time-out taken to identify patient and procedure side prior to starting the procedure.   I attest that I have reviewed the patient's home medications prior to the procedure and no contraindication have been identified. I  re-evaluated the patient after the patient was positioned for the procedure in the procedure room immediately before the procedural time-out. The vital signs are current and represent the current state of the patient which has not significantly changed since the preprocedure assessment.            Date of Service: 08/21/2018    PCP: Galindo Stiles MD    Referring Physician:                                                           PROCEDURE:  Bilateral  L2, L3, L4, L5 medial branch block, and T7-8 interspinous ligament injection.    REASON FOR PROCEDURE: Facet arthritis of lumbar region [M46.96]  Myalgia [M79.1]  1. Spondylolysis of lumbar region    2. Osteoarthritis of lumbar spine, unspecified spinal osteoarthritis complication status    3. Lumbar spondylosis      POSTPROCEDURE DIAGNOSIS:   Facet arthritis of lumbar region [M46.96]  Myalgia [M79.1]    1. Spondylolysis of lumbar region    2. Osteoarthritis of lumbar spine, unspecified spinal osteoarthritis complication status    3. Lumbar spondylosis           PHYSICIAN: Talia Belcher MD  ASSISTANTS: Stephan Mireles MD    MEDICATIONS INJECTED: Bupivicaine 25% 1.5ml at each level, as well as total of 10 mg kenalog.      LOCAL ANESTHETIC USED: Xylocaine 1% 9ml with Sodium Bicarbonate 1ml.  3ml each site.    SEDATION MEDICATIONS: None    ESTIMATED BLOOD LOSS:  None.    COMPLICATIONS:  None.    TECHNIQUE: Laying in a prone position, the patient was prepped and draped in the usual sterile fashion using ChloraPrep and fenestrated drape.  The level was determined under fluoroscopic guidance.  Local anesthetic was given by going down to the hub of the 27-gauge 1.25in needle and raising a wheel.  A 22-gauge  3.5inch needle was introduced to the anatomic local of the medial branch at the lateral mass utilizing live fluoroscopy. Medication was then injected slowly. The patient tolerated the procedure well.    In addition, under clean technique & after discussing with the patient T7-8 interspinous ligament injection using Sarapin  mls, Bupivicaine 0.25%     mls & Betamethasone  Mls.      PAIN BEFORE THE PROCEDURE:  0/10.    PAIN AFTER THE PROCEDURE:  0/10.    The patient was monitored after the procedure.  Patient was given post procedure and discharge instructions to follow at home.  We will see the patient back in two weeks or the patient may call to inform of status. The patient was discharged in a stable condition

## 2018-08-21 NOTE — DISCHARGE INSTRUCTIONS
Thank you for allowing us to care for you today. You may receive a survey about the care we provided. Your feedback is valuable and helps us provide excellent care throughout the community.     Home Care Instructions for Pain Management:    1. DIET:   You may resume your normal diet today.   2. BATHING:   You may shower with luke warm water. No tub baths or anything that will soak injection sites under water for the next 24 hours.  3. DRESSING:   You may remove your bandage today.   4. ACTIVITY LEVEL:   You may resume your normal activities IMMEDIATELY after your procedure. Nothing strenuous today.  5. MEDICATIONS:   You may resume your normal medications today. To restart blood thinners, ask your doctor.  6. DRIVING    If you have received any sedatives by mouth today, you may not drive for 12 hours.    If you have received any sedation through your IV, you may not drive for 24 hrs.   7. SPECIAL INSTRUCTIONS:   No heat to the injection site for 24 hrs including, hot bath or shower, heating pad, moist heat, or hot tubs.    Use ice pack to injection site for any pain or discomfort.  Apply ice packs for 20 minute intervals as needed.    IF you have diabetes, be sure to monitor your blood sugar more closely. IF your injection contained steroids your blood sugar levels may become higher than normal.    If you are still having pain upon discharge:  Your pain may improve over the next 48 hours. The anesthetic (numbing medication) works immediately to 48 hours. IF your injection contained a steroid (anti-inflammatory medication), it takes approximately 3 days to start feeling relief and 7-10 days to see your greatest results from the medication. It is possible you may need subsequent injections. This would be discussed at your follow up appointment with pain management or your referring doctor.      PLEASE CALL YOUR DOCTOR IF:  1. Redness or swelling around the injection site.  2. Fever of 101 degrees or more  3.  Drainage (pus) from the injection site.  4. For any continuous bleeding (some dried blood over the incision is normal.)    FOR EMERGENCIES:   If any unusual problems or difficulties occur during clinic hours, call [phone] or [phone].

## 2018-08-22 ENCOUNTER — OFFICE VISIT (OUTPATIENT)
Dept: FAMILY MEDICINE | Facility: CLINIC | Age: 68
End: 2018-08-22
Payer: COMMERCIAL

## 2018-08-22 VITALS
HEART RATE: 61 BPM | DIASTOLIC BLOOD PRESSURE: 78 MMHG | SYSTOLIC BLOOD PRESSURE: 118 MMHG | OXYGEN SATURATION: 97 % | HEIGHT: 61 IN | BODY MASS INDEX: 25.89 KG/M2 | WEIGHT: 137.13 LBS

## 2018-08-22 DIAGNOSIS — E11.9 CONTROLLED TYPE 2 DIABETES MELLITUS WITHOUT COMPLICATION, WITHOUT LONG-TERM CURRENT USE OF INSULIN: ICD-10-CM

## 2018-08-22 DIAGNOSIS — E03.4 HYPOTHYROIDISM DUE TO ACQUIRED ATROPHY OF THYROID: ICD-10-CM

## 2018-08-22 DIAGNOSIS — Z23 INFLUENZA VACCINE NEEDED: ICD-10-CM

## 2018-08-22 DIAGNOSIS — Z12.31 ENCOUNTER FOR SCREENING MAMMOGRAM FOR BREAST CANCER: ICD-10-CM

## 2018-08-22 DIAGNOSIS — Z23 NEED FOR PNEUMOCOCCAL VACCINE: ICD-10-CM

## 2018-08-22 DIAGNOSIS — E78.5 DYSLIPIDEMIA: ICD-10-CM

## 2018-08-22 DIAGNOSIS — I10 ESSENTIAL HYPERTENSION: Primary | ICD-10-CM

## 2018-08-22 PROCEDURE — 90732 PPSV23 VACC 2 YRS+ SUBQ/IM: CPT | Mod: S$GLB,,, | Performed by: INTERNAL MEDICINE

## 2018-08-22 PROCEDURE — 3044F HG A1C LEVEL LT 7.0%: CPT | Mod: CPTII,S$GLB,, | Performed by: INTERNAL MEDICINE

## 2018-08-22 PROCEDURE — 99214 OFFICE O/P EST MOD 30 MIN: CPT | Mod: 25,S$GLB,, | Performed by: INTERNAL MEDICINE

## 2018-08-22 PROCEDURE — 3078F DIAST BP <80 MM HG: CPT | Mod: CPTII,S$GLB,, | Performed by: INTERNAL MEDICINE

## 2018-08-22 PROCEDURE — 90471 IMMUNIZATION ADMIN: CPT | Mod: S$GLB,,, | Performed by: INTERNAL MEDICINE

## 2018-08-22 PROCEDURE — 3074F SYST BP LT 130 MM HG: CPT | Mod: CPTII,S$GLB,, | Performed by: INTERNAL MEDICINE

## 2018-08-22 PROCEDURE — 90472 IMMUNIZATION ADMIN EACH ADD: CPT | Mod: S$GLB,,, | Performed by: INTERNAL MEDICINE

## 2018-08-22 PROCEDURE — 90662 IIV NO PRSV INCREASED AG IM: CPT | Mod: S$GLB,,, | Performed by: INTERNAL MEDICINE

## 2018-08-22 PROCEDURE — 99999 PR PBB SHADOW E&M-EST. PATIENT-LVL III: CPT | Mod: PBBFAC,,, | Performed by: INTERNAL MEDICINE

## 2018-08-22 RX ORDER — BENAZEPRIL HYDROCHLORIDE 20 MG/1
20 TABLET ORAL DAILY
Qty: 135 TABLET | Refills: 3 | Status: SHIPPED | OUTPATIENT
Start: 2018-08-22 | End: 2018-08-30 | Stop reason: SDUPTHER

## 2018-08-22 NOTE — PROGRESS NOTES
Subjective:       Patient ID: Osman Johansen is a 68 y.o. female.    Chief Complaint: Hypothyroidism    HTN - controlled with 2-3 day episode where runs high each month  Hypothyroid - controlled, supratheraputic.  112 mcg from local pharmacy and Brand name made her have palpitations, but 125 mcg does not from mail order.  Dye? Only use mail order -   DM - controlled; outside eye exam done 7/26/17  HLD - uncontrolled ldl goal < 70; refuses statins out of fear from mother's se.  Will consider next visit     Complains of 2 episodes of severe abdominal pain associated with n/v and diarrhea - points to LLQ.  Decrease with Bentyl.  Once in Nov and again 3 weeks ago.  Lasts 1-2 weeks.  Hx of IBS and Levsin did not help.  Large diverticuli found on cscp.          Recall - pain Dr wanted her to see neurology.  Gets cramps on Suboxone.  Trying to wean off and down to a piece of her daily pill but can't because develops RLS that prevents her from sleeping.  She had similar events with leg cramping when on fentanyl and hydrocodone that resolved when placed on Suboxone.  She is down to a piece of Suboxone daily = 1 pill lasts about 4-6 days.  She also has uncontrolled RLS that has been exacerbated as she has tried to wean off Suboxone.  This has been happening off and on for about 6 months now.      NM stress test wnl 1/2017.  Dx non cardiac chest pain      Review of Systems   Constitutional: Negative for appetite change and fever.   HENT: Negative for nosebleeds and trouble swallowing.    Eyes: Negative for discharge and visual disturbance.   Respiratory: Negative for choking and shortness of breath.    Cardiovascular: Negative for chest pain and palpitations.   Gastrointestinal: Negative for abdominal pain, nausea and vomiting.   Musculoskeletal: Negative for arthralgias and joint swelling.   Skin: Negative for rash and wound.   Neurological: Negative for dizziness and syncope.   Psychiatric/Behavioral: Negative for  confusion and dysphoric mood.       Objective:      Vitals:    08/22/18 1531   BP: (!) 158/72   Pulse: 61     Physical Exam   Constitutional: She appears well-nourished.   Eyes: Conjunctivae and EOM are normal.   Neck: Normal range of motion.   Cardiovascular: Normal rate and regular rhythm.   Pulmonary/Chest: Effort normal and breath sounds normal.   Musculoskeletal:   Normal ROM bilateral    Neurological: No cranial nerve deficit (grossly intact).   Skin: Skin is warm and dry.   Psychiatric: She has a normal mood and affect.   Alert and orientated   Vitals reviewed.        Assessment:       1. Essential hypertension    2. Dyslipidemia    3. Hypothyroidism due to acquired atrophy of thyroid    4. Controlled type 2 diabetes mellitus without complication, without long-term current use of insulin    5. Encounter for screening mammogram for breast cancer    6. Need for pneumococcal vaccine    7. Influenza vaccine needed        Plan:       Essential hypertension  -     benazepril (LOTENSIN) 20 MG tablet; Take 1 tablet (20 mg total) by mouth once daily. May take additional 20 mg tab prn per instructions given  Dispense: 135 tablet; Refill: 3  -     Comprehensive metabolic panel; Future; Expected date: 02/18/2019    Dyslipidemia  -     Lipid panel; Future; Expected date: 02/18/2019    Hypothyroidism due to acquired atrophy of thyroid  -     TSH; Future; Expected date: 02/18/2019    Controlled type 2 diabetes mellitus without complication, without long-term current use of insulin  -     Hemoglobin A1c; Future; Expected date: 02/18/2019    Encounter for screening mammogram for breast cancer  -     Mammo Digital Screening Bilat with CAD; Future; Expected date: 08/22/2018    Need for pneumococcal vaccine  -     Pneumococcal Polysaccharide Vaccine (23 Valent) (SQ/IM)    Influenza vaccine needed  -     Influenza - High Dose (65+) (PF) (IM)            Medication List with Changes/Refills   Current Medications    CALCIUM  CARBONATE/VITAMIN D3 (CALCIUM 500 WITH D ORAL)    Take 1 capsule by mouth once daily.    CARBOXYMETHYLCELLULOSE (REFRESH PLUS) 0.5 % DPET    1 drop daily as needed.    DICYCLOMINE (BENTYL) 10 MG CAPSULE    10 mg.     ESOMEPRAZOLE (NEXIUM) 40 MG CAPSULE    TAKE 1 CAPSULE BEFORE BREAKFAST    ESTRADIOL (CLIMARA) 0.075 MG/24 HR    APPLY TO CLEAN DRY SKIN ONCE PER WEEK    FLUTICASONE (FLONASE) 50 MCG/ACTUATION NASAL SPRAY    USE 1 SPRAY IN EACH NOSTRIL DAILY    HYOSCYAMINE (LEVSIN/SL) 0.125 MG SUBL    Place 1 tablet (0.125 mg total) under the tongue every 4 (four) hours as needed.    LEVOTHYROXINE (SYNTHROID) 100 MCG TABLET    Take 1 tablet (100 mcg total) by mouth before breakfast.    METFORMIN (GLUCOPHAGE) 500 MG TABLET    TAKE 1 TABLET DAILY    MULTIVITAMIN (MULTIPLE VITAMIN ORAL)    Take 1 capsule by mouth once daily.    NITROGLYCERIN (NITROSTAT) 0.4 MG SL TABLET    Place 1 tablet (0.4 mg total) under the tongue every 5 (five) minutes as needed for Chest pain.    OXYBUTYNIN (DITROPAN-XL) 10 MG 24 HR TABLET    TAKE 1 TABLET DAILY    PROMETHAZINE (PHENERGAN) 25 MG TABLET    Take 1 tablet (25 mg total) by mouth every 6 (six) hours as needed for Nausea.    ROPINIROLE (REQUIP) 2 MG TABLET    Take 1.5 tablets (3 mg total) by mouth nightly.    SUBOXONE 8-2 MG FILM    1 Film by Subdermal route once daily. 1 strip last 7days    SUCRALFATE (CARAFATE) 1 GRAM TABLET    TAKE 1 TABLET FOUR TIMES A DAY AS NEEDED FOR GASTRITIS    SYNTHROID 100 MCG TABLET    TAKE 1 TABLET DAILY   Changed and/or Refilled Medications    Modified Medication Previous Medication    BENAZEPRIL (LOTENSIN) 20 MG TABLET benazepril (LOTENSIN) 20 MG tablet       Take 1 tablet (20 mg total) by mouth once daily. May take additional 20 mg tab prn per instructions given    TAKE 1 TABLET DAILY       Continue current management    Counseled on regular exercise, maintenance of a healthy weight, balanced diet rich in fruits/vegetables and lean protein, and avoidance of  "unhealthy habits like smoking and excessive alcohol intake.   Also, counseled on importance of being compliant with medication, health appointments, diet and exercise.     Follow-up in about 6 months (around 2/26/2019).    "This note will not be shared with the patient."  "

## 2018-08-23 ENCOUNTER — TELEPHONE (OUTPATIENT)
Dept: FAMILY MEDICINE | Facility: CLINIC | Age: 68
End: 2018-08-23

## 2018-08-23 NOTE — TELEPHONE ENCOUNTER
Pt having pain 9/10 and cannot lift left arm without pain  where flu shot was received per documentation.  Pt states she is certain that is where the pneumonia shot was given.  She has tried ice and heat without relief.  She is taking Tylenol and ASA.  She is asking if there is a cream or something she can put on this to help with the discomfort.

## 2018-08-23 NOTE — TELEPHONE ENCOUNTER
----- Message from Talon Hewitt sent at 8/23/2018  2:56 PM CDT -----  Contact: Patient  Type:  Patient Returning Call    Who Called:  Osman  Who Left Message for Patient:  Bria  Does the patient know what this is regarding?:  Flu shot  Best Call Back Number:  [phone]  Additional Information:  n/a

## 2018-08-23 NOTE — TELEPHONE ENCOUNTER
Placed call to pt, no answer at this time. Left voicemail to call clinic. Call back number provided.    (Flu vaccine was given in Left arm.)

## 2018-08-23 NOTE — TELEPHONE ENCOUNTER
----- Message from Az Ruelas sent at 8/23/2018  2:40 PM CDT -----  Contact: patient  Type: Needs Medical Advice    Who Called: Patient  Symptoms (please be specific):    How long has patient had these symptoms:    Pharmacy name and phone #:    Best Call Back Number: [phone]  Additional Information: patient called to advise that she took the pneumonia and the flu shot yesterday but the pneumonia shot gave her severe pain in left arm need to know what can she do about the pain in her arm? Call back

## 2018-08-30 ENCOUNTER — TELEPHONE (OUTPATIENT)
Dept: FAMILY MEDICINE | Facility: CLINIC | Age: 68
End: 2018-08-30

## 2018-08-30 ENCOUNTER — OFFICE VISIT (OUTPATIENT)
Dept: FAMILY MEDICINE | Facility: CLINIC | Age: 68
End: 2018-08-30
Payer: COMMERCIAL

## 2018-08-30 VITALS
BODY MASS INDEX: 25.81 KG/M2 | OXYGEN SATURATION: 97 % | HEIGHT: 61 IN | SYSTOLIC BLOOD PRESSURE: 168 MMHG | DIASTOLIC BLOOD PRESSURE: 94 MMHG | HEART RATE: 92 BPM | WEIGHT: 136.69 LBS | RESPIRATION RATE: 18 BRPM

## 2018-08-30 DIAGNOSIS — E03.4 HYPOTHYROIDISM DUE TO ACQUIRED ATROPHY OF THYROID: ICD-10-CM

## 2018-08-30 DIAGNOSIS — I10 ESSENTIAL HYPERTENSION: ICD-10-CM

## 2018-08-30 DIAGNOSIS — M25.512 ACUTE PAIN OF LEFT SHOULDER: Primary | ICD-10-CM

## 2018-08-30 DIAGNOSIS — M25.512 ACUTE PAIN OF LEFT SHOULDER: ICD-10-CM

## 2018-08-30 DIAGNOSIS — N39.41 URGE INCONTINENCE: ICD-10-CM

## 2018-08-30 PROCEDURE — 99214 OFFICE O/P EST MOD 30 MIN: CPT | Mod: S$GLB,,, | Performed by: INTERNAL MEDICINE

## 2018-08-30 PROCEDURE — 99999 PR PBB SHADOW E&M-EST. PATIENT-LVL III: CPT | Mod: PBBFAC,,, | Performed by: INTERNAL MEDICINE

## 2018-08-30 PROCEDURE — 3080F DIAST BP >= 90 MM HG: CPT | Mod: CPTII,S$GLB,, | Performed by: INTERNAL MEDICINE

## 2018-08-30 PROCEDURE — 3077F SYST BP >= 140 MM HG: CPT | Mod: CPTII,S$GLB,, | Performed by: INTERNAL MEDICINE

## 2018-08-30 RX ORDER — LIDOCAINE 50 MG/G
1 PATCH TOPICAL DAILY PRN
Qty: 5 PATCH | Refills: 5 | Status: SHIPPED | OUTPATIENT
Start: 2018-08-30 | End: 2018-08-30 | Stop reason: SDUPTHER

## 2018-08-30 RX ORDER — BENAZEPRIL HYDROCHLORIDE 20 MG/1
20 TABLET ORAL DAILY
Qty: 135 TABLET | Refills: 3 | Status: SHIPPED | OUTPATIENT
Start: 2018-08-30 | End: 2018-11-06

## 2018-08-30 RX ORDER — OXYBUTYNIN CHLORIDE 10 MG/1
10 TABLET, EXTENDED RELEASE ORAL DAILY
Qty: 90 TABLET | Refills: 1 | Status: SHIPPED | OUTPATIENT
Start: 2018-08-30 | End: 2019-02-26 | Stop reason: SDUPTHER

## 2018-08-30 RX ORDER — LEVOTHYROXINE SODIUM 100 UG/1
100 TABLET ORAL DAILY
Qty: 90 TABLET | Refills: 1 | Status: SHIPPED | OUTPATIENT
Start: 2018-08-30 | End: 2019-02-26 | Stop reason: SDUPTHER

## 2018-08-30 NOTE — TELEPHONE ENCOUNTER
----- Message from Preethi Dey sent at 8/30/2018 11:52 AM CDT -----  Prior authorization on Rx pain patches.  Please send into [facility].  Please call patient when called in at [phone].

## 2018-08-30 NOTE — PROGRESS NOTES
Subjective:       Patient ID: Osman Johansen is a 68 y.o. female.    Chief Complaint: Arm Pain    Complains of severe shoulder pain after receiving pneumonia vaccine in left shoulder 1 week ago.  Radiates and is painful in shoulder/back, neck and upper chest muscle.  Worse with movement.  Lidocaine patch decreases pain      Review of Systems   Constitutional: Negative for appetite change and fever.   HENT: Negative for nosebleeds and trouble swallowing.    Eyes: Negative for discharge and visual disturbance.   Respiratory: Negative for choking and shortness of breath.    Cardiovascular: Negative for chest pain and palpitations.   Gastrointestinal: Negative for abdominal pain, nausea and vomiting.   Musculoskeletal: Positive for arthralgias. Negative for joint swelling.   Skin: Negative for rash and wound.   Neurological: Negative for dizziness and syncope.   Psychiatric/Behavioral: Negative for confusion and dysphoric mood.       Objective:      Vitals:    08/30/18 1054   BP: (!) 168/94   Pulse: 92   Resp: 18     Physical Exam   Constitutional: She appears well-nourished.   Eyes: Conjunctivae and EOM are normal.   Neck: Normal range of motion.   Pulmonary/Chest: Effort normal.   Musculoskeletal:   Left shoulder: no swelling, warmth or erythema.  + TTP at distal medial deltoid head, proximal anterior deltoid head, left lateral upper chest and trapezious - some of which are previous fibromyalgia painful spots.  Location of shot non TTP   Neurological: No cranial nerve deficit (grossly intact).   Skin: Skin is warm and dry.   Psychiatric: She has a normal mood and affect.   Alert and orientated   Vitals reviewed.        Assessment:       1. Acute pain of left shoulder    2. Urge incontinence    3. Essential hypertension    4. Hypothyroidism due to acquired atrophy of thyroid        Plan:       Acute pain of left shoulder  -     lidocaine (LIDODERM) 5 %; Place 1 patch onto the skin daily as needed. Remove & Discard  patch within 12 hours or as directed by MD  Dispense: 5 patch; Refill: 5    Urge incontinence  -     oxybutynin (DITROPAN-XL) 10 MG 24 hr tablet; Take 1 tablet (10 mg total) by mouth once daily.  Dispense: 90 tablet; Refill: 1    Essential hypertension  -     benazepril (LOTENSIN) 20 MG tablet; Take 1 tablet (20 mg total) by mouth once daily. May take additional 20 mg tab prn per instructions given  Dispense: 135 tablet; Refill: 3    Hypothyroidism due to acquired atrophy of thyroid  -     SYNTHROID 100 mcg tablet; Take 1 tablet (100 mcg total) by mouth once daily.  Dispense: 90 tablet; Refill: 1            Medication List with Changes/Refills   New Medications    LIDOCAINE (LIDODERM) 5 %    Place 1 patch onto the skin daily as needed. Remove & Discard patch within 12 hours or as directed by MD   Current Medications    CALCIUM CARBONATE/VITAMIN D3 (CALCIUM 500 WITH D ORAL)    Take 1 capsule by mouth once daily.    CARBOXYMETHYLCELLULOSE (REFRESH PLUS) 0.5 % DPET    1 drop daily as needed.    DICYCLOMINE (BENTYL) 10 MG CAPSULE    10 mg.     ESOMEPRAZOLE (NEXIUM) 40 MG CAPSULE    TAKE 1 CAPSULE BEFORE BREAKFAST    ESTRADIOL (CLIMARA) 0.075 MG/24 HR    APPLY TO CLEAN DRY SKIN ONCE PER WEEK    FLUTICASONE (FLONASE) 50 MCG/ACTUATION NASAL SPRAY    USE 1 SPRAY IN EACH NOSTRIL DAILY    HYOSCYAMINE (LEVSIN/SL) 0.125 MG SUBL    Place 1 tablet (0.125 mg total) under the tongue every 4 (four) hours as needed.    LEVOTHYROXINE (SYNTHROID) 100 MCG TABLET    Take 1 tablet (100 mcg total) by mouth before breakfast.    METFORMIN (GLUCOPHAGE) 500 MG TABLET    TAKE 1 TABLET DAILY    MULTIVITAMIN (MULTIPLE VITAMIN ORAL)    Take 1 capsule by mouth once daily.    NITROGLYCERIN (NITROSTAT) 0.4 MG SL TABLET    Place 1 tablet (0.4 mg total) under the tongue every 5 (five) minutes as needed for Chest pain.    PROMETHAZINE (PHENERGAN) 25 MG TABLET    Take 1 tablet (25 mg total) by mouth every 6 (six) hours as needed for Nausea.    ROPINIROLE  "(REQUIP) 2 MG TABLET    Take 1.5 tablets (3 mg total) by mouth nightly.    SUBOXONE 8-2 MG FILM    1 Film by Subdermal route once daily. 1 strip last 7days    SUCRALFATE (CARAFATE) 1 GRAM TABLET    TAKE 1 TABLET FOUR TIMES A DAY AS NEEDED FOR GASTRITIS   Changed and/or Refilled Medications    Modified Medication Previous Medication    BENAZEPRIL (LOTENSIN) 20 MG TABLET benazepril (LOTENSIN) 20 MG tablet       Take 1 tablet (20 mg total) by mouth once daily. May take additional 20 mg tab prn per instructions given    Take 1 tablet (20 mg total) by mouth once daily. May take additional 20 mg tab prn per instructions given    OXYBUTYNIN (DITROPAN-XL) 10 MG 24 HR TABLET oxybutynin (DITROPAN-XL) 10 MG 24 hr tablet       Take 1 tablet (10 mg total) by mouth once daily.    TAKE 1 TABLET DAILY    SYNTHROID 100 MCG TABLET SYNTHROID 100 mcg tablet       Take 1 tablet (100 mcg total) by mouth once daily.    TAKE 1 TABLET DAILY     firbomyalgia flare? - has 800mg ibuprofen at home will start to take.  Nw, xray, PT, pmr  Continue current management    Counseled on regular exercise, maintenance of a healthy weight, balanced diet rich in fruits/vegetables and lean protein, and avoidance of unhealthy habits like smoking and excessive alcohol intake.   Also, counseled on importance of being compliant with medication, health appointments, diet and exercise.     No Follow-up on file.    "This note will not be shared with the patient."  "

## 2018-08-31 ENCOUNTER — TELEPHONE (OUTPATIENT)
Dept: FAMILY MEDICINE | Facility: CLINIC | Age: 68
End: 2018-08-31

## 2018-08-31 ENCOUNTER — DOCUMENTATION ONLY (OUTPATIENT)
Dept: FAMILY MEDICINE | Facility: CLINIC | Age: 68
End: 2018-08-31

## 2018-08-31 RX ORDER — LIDOCAINE 50 MG/G
1 PATCH TOPICAL DAILY PRN
Qty: 5 PATCH | Refills: 5 | Status: SHIPPED | OUTPATIENT
Start: 2018-08-31 | End: 2018-11-14

## 2018-08-31 NOTE — PROGRESS NOTES
PA initiated for Lidocaine 5% Patch    Additional form faxed to [employer] 8/31/18  _____________________________________  Per [employer] automated system:  APPROVED   Valid: 8/4/18-9/2/21

## 2018-08-31 NOTE — TELEPHONE ENCOUNTER
----- Message from Giovanni Butler sent at 8/31/2018  1:17 PM CDT -----  Type: Needs Medical Advice    Who Called:  Patient  Best Call Back Number: [phone]  Additional Information: Patient wants to confirm doctor Go received and sent back authorization for Lidocaine patches

## 2018-11-06 ENCOUNTER — OFFICE VISIT (OUTPATIENT)
Dept: FAMILY MEDICINE | Facility: CLINIC | Age: 68
End: 2018-11-06
Payer: COMMERCIAL

## 2018-11-06 VITALS
BODY MASS INDEX: 25.81 KG/M2 | SYSTOLIC BLOOD PRESSURE: 154 MMHG | HEART RATE: 64 BPM | OXYGEN SATURATION: 98 % | HEIGHT: 61 IN | WEIGHT: 136.69 LBS | TEMPERATURE: 98 F | DIASTOLIC BLOOD PRESSURE: 96 MMHG

## 2018-11-06 DIAGNOSIS — I10 ESSENTIAL HYPERTENSION: Primary | ICD-10-CM

## 2018-11-06 PROCEDURE — 3077F SYST BP >= 140 MM HG: CPT | Mod: CPTII,S$GLB,, | Performed by: NURSE PRACTITIONER

## 2018-11-06 PROCEDURE — 99214 OFFICE O/P EST MOD 30 MIN: CPT | Mod: S$GLB,,, | Performed by: NURSE PRACTITIONER

## 2018-11-06 PROCEDURE — 3080F DIAST BP >= 90 MM HG: CPT | Mod: CPTII,S$GLB,, | Performed by: NURSE PRACTITIONER

## 2018-11-06 PROCEDURE — 99999 PR PBB SHADOW E&M-EST. PATIENT-LVL III: CPT | Mod: PBBFAC,,, | Performed by: NURSE PRACTITIONER

## 2018-11-06 PROCEDURE — 1101F PT FALLS ASSESS-DOCD LE1/YR: CPT | Mod: CPTII,S$GLB,, | Performed by: NURSE PRACTITIONER

## 2018-11-06 RX ORDER — HYDROCHLOROTHIAZIDE 12.5 MG/1
12.5 CAPSULE ORAL DAILY
Qty: 30 CAPSULE | Refills: 0 | Status: SHIPPED | OUTPATIENT
Start: 2018-11-06 | End: 2018-11-14 | Stop reason: ALTCHOICE

## 2018-11-06 RX ORDER — NITROGLYCERIN 0.4 MG/1
0.4 TABLET SUBLINGUAL EVERY 5 MIN PRN
Qty: 30 TABLET | Refills: 0 | Status: SHIPPED | OUTPATIENT
Start: 2018-11-06 | End: 2020-03-04 | Stop reason: SDUPTHER

## 2018-11-06 RX ORDER — BENAZEPRIL HYDROCHLORIDE 20 MG/1
20 TABLET ORAL 2 TIMES DAILY
Qty: 135 TABLET | Refills: 3
Start: 2018-11-06 | End: 2019-02-26 | Stop reason: SDUPTHER

## 2018-11-06 NOTE — PROGRESS NOTES
Subjective:       Patient ID: Osman Johansen is a 68 y.o. female.    Chief Complaint: Hypertension (blurred vision, headaches, pain posterior left arm )    Ms. Johansen is a known patient to me. She presents today for HTN    HPI   Uncontrolled at home (ranging 140s-190s/80s-90s). No weight gain. Does add salt. Compliant with benazepril (taking 20mg BID). Denies stress. Has cut down on caffeine   Vitals:    11/06/18 1229   BP: (!) 154/96   Pulse: 64   Temp: 98.4 °F (36.9 °C)     Review of Systems   Constitutional: Negative for diaphoresis and fever.   HENT: Negative for facial swelling and trouble swallowing.    Eyes: Negative for discharge and redness.   Respiratory: Negative for cough and shortness of breath.    Cardiovascular: Negative for chest pain and palpitations.   Gastrointestinal: Negative for vomiting.   Genitourinary: Negative for difficulty urinating.   Musculoskeletal: Negative for gait problem.   Skin: Negative for rash and wound.   Neurological: Negative for facial asymmetry and speech difficulty.   Psychiatric/Behavioral: Negative for confusion. The patient is not nervous/anxious.        Past Medical History:   Diagnosis Date    Allergy     Anxiety     Arthritis     Blood transfusion 8 yrs    Cataract     Degenerative disc disease     Depression     Dry eye syndrome     GERD (gastroesophageal reflux disease)     Hypertension     Macular drusen     Neuromuscular disorder     Ocular hypertension, bilateral     Osteoporosis     PUD (peptic ulcer disease)     Thoracic or lumbosacral neuritis or radiculitis 10/19/2012    Thyroid disease      Objective:      Physical Exam   Constitutional: She is oriented to person, place, and time. She does not have a sickly appearance. No distress.   HENT:   Head: Normocephalic.   Right Ear: Hearing normal.   Left Ear: Hearing normal.   Nose: Nose normal.   Eyes: Conjunctivae and lids are normal.   Neck: No JVD present. No tracheal deviation present.  "  Cardiovascular: Normal rate and normal heart sounds.   Pulmonary/Chest: Effort normal and breath sounds normal.   Musculoskeletal: She exhibits no deformity.   Neurological: She is alert and oriented to person, place, and time.   Skin: She is not diaphoretic. No pallor.   Psychiatric: She has a normal mood and affect. Her speech is normal and behavior is normal. Judgment and thought content normal. Cognition and memory are normal.   Nursing note and vitals reviewed.      Assessment:       1. Essential hypertension        Plan:       Essential hypertension  -    START hydroCHLOROthiazide (MICROZIDE) 12.5 mg capsule; Take 1 capsule (12.5 mg total) by mouth once daily.  Dispense: 30 capsule; Refill: 0  -     CONTINUE benazepril (LOTENSIN) 20 MG tablet; Take 1 tablet (20 mg total) by mouth 2 (two) times daily. May take additional 20 mg tab prn per instructions given  Dispense: 135 tablet; Refill: 3  Additional Advice:  1.  Avoid excessive salt, hidden salt. Increase fruits and vegetables.   2. Increase physical activity--at least 30 minutes of moderate-intensity dynamic aerobic exercises at least 5 days per week as tolerated   3. Limit alcohol consumption (<2 drinks per day for men, <1 drink per day in women). Total weekly alcohol consumption should not exceed 140 g for men and 80 g for women.   4. Smoking cessation  5.  Avoid decongestants (any allergy or cold medication with "D")  6.  Purchase home blood pressure cuff and check blood pressure twice daily--keep a record; If you already have a blood pressure cuff bring the cuff to your next visit so we can make sure it is accurate.     Other orders  -     nitroGLYCERIN (NITROSTAT) 0.4 MG SL tablet; Place 1 tablet (0.4 mg total) under the tongue every 5 (five) minutes as needed for Chest pain.  Dispense: 30 tablet; Refill: 0        Follow-up in about 1 week (around 11/13/2018) for HTN FU, sooner as needed .       Medication List           Accurate as of 11/6/18 12:48 " PM. If you have any questions, ask your nurse or doctor.               START taking these medications    hydroCHLOROthiazide 12.5 mg capsule  Commonly known as:  MICROZIDE  Take 1 capsule (12.5 mg total) by mouth once daily.  Started by:  Yadira Serna NP        CHANGE how you take these medications    benazepril 20 MG tablet  Commonly known as:  LOTENSIN  Take 1 tablet (20 mg total) by mouth 2 (two) times daily. May take additional 20 mg tab prn per instructions given  What changed:  when to take this  Changed by:  Yadira Serna NP     estradiol 0.075 mg/24 hr  Commonly known as:  CLIMARA  APPLY TO CLEAN DRY SKIN ONCE PER WEEK  What changed:  See the new instructions.     fluticasone 50 mcg/actuation nasal spray  Commonly known as:  FLONASE  USE 1 SPRAY IN EACH NOSTRIL DAILY  What changed:    · how much to take  · how to take this  · when to take this     rOPINIRole 2 MG tablet  Commonly known as:  REQUIP  Take 1.5 tablets (3 mg total) by mouth nightly.  What changed:  how much to take        CONTINUE taking these medications    CALCIUM 500 WITH D ORAL     carboxymethylcellulose 0.5 % Dpet  Commonly known as:  REFRESH PLUS     dicyclomine 10 MG capsule  Commonly known as:  BENTYL     esomeprazole 40 MG capsule  Commonly known as:  NEXIUM  TAKE 1 CAPSULE BEFORE BREAKFAST     hyoscyamine 0.125 mg Subl  Commonly known as:  LEVSIN/SL  Place 1 tablet (0.125 mg total) under the tongue every 4 (four) hours as needed.     * levothyroxine 100 MCG tablet  Commonly known as:  SYNTHROID  Take 1 tablet (100 mcg total) by mouth before breakfast.     * SYNTHROID 100 MCG tablet  Generic drug:  levothyroxine  Take 1 tablet (100 mcg total) by mouth once daily.     lidocaine 5 %  Commonly known as:  LIDODERM  Place 1 patch onto the skin daily as needed. Remove & Discard patch within 12 hours or as directed by MD     metFORMIN 500 MG tablet  Commonly known as:  GLUCOPHAGE  TAKE 1 TABLET DAILY     MULTIPLE VITAMIN  ORAL     nitroGLYCERIN 0.4 MG SL tablet  Commonly known as:  NITROSTAT  Place 1 tablet (0.4 mg total) under the tongue every 5 (five) minutes as needed for Chest pain.     oxybutynin 10 MG 24 hr tablet  Commonly known as:  DITROPAN-XL  Take 1 tablet (10 mg total) by mouth once daily.     promethazine 25 MG tablet  Commonly known as:  PHENERGAN  Take 1 tablet (25 mg total) by mouth every 6 (six) hours as needed for Nausea.     SUBOXONE 8-2 mg Film  Generic drug:  buprenorphine-naloxone     sucralfate 1 gram tablet  Commonly known as:  CARAFATE  TAKE 1 TABLET FOUR TIMES A DAY AS NEEDED FOR GASTRITIS         * This list has 2 medication(s) that are the same as other medications prescribed for you. Read the directions carefully, and ask your doctor or other care provider to review them with you.               Where to Get Your Medications      These medications were sent to [facility] [place] - [address] [address] [address]  [address] [address] [place] [address]    Phone:  [phone]   · hydroCHLOROthiazide 12.5 mg capsule  · nitroGLYCERIN 0.4 MG SL tablet     Information about where to get these medications is not yet available    Ask your nurse or doctor about these medications  · benazepril 20 MG tablet

## 2018-11-06 NOTE — PATIENT INSTRUCTIONS
"  Additional Advice:  1.  Avoid excessive salt, hidden salt. Increase fruits and vegetables.   2. Increase physical activity--at least 30 minutes of moderate-intensity dynamic aerobic exercises at least 5 days per week as tolerated   3. Limit alcohol consumption (<2 drinks per day for men, <1 drink per day in women). Total weekly alcohol consumption should not exceed 140 g for men and 80 g for women.   4. Smoking cessation  5.  Avoid decongestants (any allergy or cold medication with "D")  6.  Purchase home blood pressure cuff and check blood pressure twice daily--keep a record; If you already have a blood pressure cuff bring the cuff to your next visit so we can make sure it is accurate.       Discharge Instructions: Eating a Low-Salt Diet  Your health care provider has prescribed a low-salt diet for you. Most people with heart problems need to eat less salt, which is full of sodium. Too much sodium is linked to high blood pressure, which is linked to a greater risk of heart disease, stroke, blindness, and kidney problems.  Home care    Learn ways to cut back on salt (sodium):  · Eat less frozen, canned, dried, packaged, and fast foods. These often contain high amounts of sodium.  · Season foods with herbs instead of salt when you cook.  · Season with flavorings such as pepper, lemon, garlic, and onion.  · Dont add salt to your food at the table.  · Sprinkle salt-free herbal blends on meats and vegetables.  Learn to read food labels carefully:  · Look for the total amount of sodium per serving.  · Look for foods labeled low sodium, reduced sodium, or no added salt.  · Beware. Salt goes by many names. Cut down on foods with these words (all forms of salt) listed as ingredients:  ¨ Salt  ¨ Sodium  ¨ Soy sauce  ¨ Baking soda  ¨ Baking powder  ¨ MSG  ¨ Monosodium  ¨ Na (the chemical symbol for sodium)  Other ideas:  · Use more fresh food. Buy more fruits and vegetables.  · Select lean meats, fish, and poultry.  · Find " a cookbook with low-salt recipes. Youll find ideas for tasty meals that are healthy for your heart.  ·   When eating out, ask questions about the menu. Tell the  you're on a low-salt diet.  ¨ If you order fish, chicken, beef, or pork, ask the  to have it broiled, baked, poached, or grilled without salt, butter, or breading.  ¨ Choose plain steamed rice, boiled noodles, and baked or boiled potatoes. Top potatoes with chives and a little sour cream instead of butter.  · Avoid antacids that are high in salt. Check the label before you buy.  Follow-up  Make a follow-up appointment with a nutritionist as directed by our staff.  Date Last Reviewed: 6/20/2015 © 2000-2017 [employer]. [address] [place] [address]. All rights reserved. This information is not intended as a substitute for professional medical care. Always follow your healthcare professional's instructions.        Established High Blood Pressure    High blood pressure (hypertension) is a chronic disease. Often, healthcare providers dont know what causes it. But it can be caused by certain health conditions and medicines.  If you have high blood pressure, you may not have any symptoms. If you do have symptoms, they may include headache, dizziness, changes in your vision, chest pain, and shortness of breath. But even without symptoms, high blood pressure thats not treated raises your risk for heart attack and stroke. High blood pressure is a serious health risk and shouldnt be ignored.  A blood pressure reading is made up of two numbers: a higher number over a lower number. The top number is the systolic pressure. The bottom number is the diastolic pressure. A normal blood pressure is a systolic pressure of  less than 120 over a diastolic pressure of less than 80. You will see your blood pressure readings written together. For example, a person with a systolic pressure of 188 and a diastolic pressure of 78 will have  118/78 written in the medical record.  High blood pressure is when either the top number is 140 or higher, or the bottom number is 90 or higher. This must be the result when taking your blood pressure a number of times. The blood pressures between normal and high are called prehypertension.  Home care  If you have high blood pressure, you should do what is listed below to lower your blood pressure. If you are taking medicines for high blood pressure, these methods may reduce or end your need for medicines in the future.  · Begin a weight-loss program if you are overweight.  · Cut back on how much salt you get in your diet. Heres how to do this:  ¨ Dont eat foods that have a lot of salt. These include olives, pickles, smoked meats, and salted potato chips.  ¨ Dont add salt to your food at the table.  ¨ Use only small amounts of salt when cooking.  · Start an exercise program. Talk with your healthcare provider about the type of exercise program that would be best for you. It doesn't have to be hard. Even brisk walking for 20 minutes 3 times a week is a good form of exercise.  · Dont take medicines that stimulate the heart. This includes many over-the-counter cold and sinus decongestant pills and sprays, as well as diet pills. Check the warnings about hypertension on the label. Before buying any over-the-counter medicines or supplements, always ask the pharmacist about the product's potential interaction with your high blood pressure and your high blood pressure medicines.  · Stimulants such as amphetamine or cocaine could be deadly for someone with high blood pressure. Never take these.  · Limit how much caffeine you get in your diet. Switch to caffeine-free products.  · Stop smoking. If you are a long-time smoker, this can be hard. Talk to your healthcare provider about medicines and nicotine replacement options to help you. Also, enroll in a stop-smoking program to make it more likely that you will quit for  good.  · Learn how to handle stress. This is an important part of any program to lower blood pressure. Learn about relaxation methods like meditation, yoga, or biofeedback.  · If your provider prescribed medicines, take them exactly as directed. Missing doses may cause your blood pressure get out of control.  · If you miss a dose or doses, check with your healthcare provider or pharmacist about what to do.  · Consider buying an automatic blood pressure machine. Ask your provider for a recommendation. You can get one of these at most pharmacies.     The American Heart Association recommends the following guidelines for home blood pressure monitoring:  · Don't smoke or drink coffee for 30 minutes before taking your blood pressure.  · Go to the bathroom before the test.  · Relax for 5 minutes before taking the measurement.  · Sit with your back supported (don't sit on a couch or soft chair); keep your feet on the floor uncrossed. Place your arm on a solid flat surface (like a table) with the upper part of the arm at heart level. Place the middle of the cuff directly above the eye of the elbow. Check the monitor's instruction manual for an illustration.  · Take multiple readings. When you measure, take 2 to 3 readings one minute apart and record all of the results.  · Take your blood pressure at the same time every day, or as your healthcare provider recommends.  · Record the date, time, and blood pressure reading.  · Take the record with you to your next medical appointment. If your blood pressure monitor has a built-in memory, simply take the monitor with you to your next appointment.  · Call your provider if you have several high readings. Don't be frightened by a single high blood pressure reading, but if you get several high readings, check in with your healthcare provider.  · Note: When blood pressure reaches a systolic (top number) of 180 or higher OR diastolic (bottom number) of 110 or higher, seek emergency  medical treatment.  Follow-up care  You will need to see your healthcare provider regularly. This is to check your blood pressure and to make changes to your medicines. Make a follow-up appointment as directed. Bring the record of your home blood pressure readings to the appointment.  When to seek medical advice  Call your healthcare provider right away if any of these occur:  · Blood pressure reaches a systolic (upper number) of 180 or higher OR a diastolic (bottom number) of 110 or higher  · Chest pain or shortness of breath  · Severe headache  · Throbbing or rushing sound in the ears  · Nosebleed  · Sudden severe pain in your belly (abdomen)  · Extreme drowsiness, confusion, or fainting  · Dizziness or spinning sensation (vertigo)  · Weakness of an arm or leg or one side of the face  · You have problems speaking or seeing   Date Last Reviewed: 12/1/2016  © 7154-0297 [employer]. [address] [place] [address], [place] [address]. All rights reserved. This information is not intended as a substitute for professional medical care. Always follow your healthcare professional's instructions.

## 2018-11-09 DIAGNOSIS — E11.9 TYPE 2 DIABETES MELLITUS WITHOUT COMPLICATION, UNSPECIFIED WHETHER LONG TERM INSULIN USE: ICD-10-CM

## 2018-11-13 DIAGNOSIS — Z78.0 MENOPAUSE: ICD-10-CM

## 2018-11-13 RX ORDER — ESTRADIOL 0.07 MG/D
PATCH TRANSDERMAL
Qty: 24 PATCH | Refills: 3 | Status: SHIPPED | OUTPATIENT
Start: 2018-11-13 | End: 2022-04-28

## 2018-11-14 ENCOUNTER — OFFICE VISIT (OUTPATIENT)
Dept: FAMILY MEDICINE | Facility: CLINIC | Age: 68
End: 2018-11-14
Payer: COMMERCIAL

## 2018-11-14 VITALS
DIASTOLIC BLOOD PRESSURE: 60 MMHG | SYSTOLIC BLOOD PRESSURE: 118 MMHG | TEMPERATURE: 98 F | HEART RATE: 63 BPM | WEIGHT: 136.69 LBS | OXYGEN SATURATION: 99 % | BODY MASS INDEX: 25.81 KG/M2 | RESPIRATION RATE: 16 BRPM | HEIGHT: 61 IN

## 2018-11-14 DIAGNOSIS — I10 ESSENTIAL (PRIMARY) HYPERTENSION: Primary | ICD-10-CM

## 2018-11-14 PROCEDURE — 3074F SYST BP LT 130 MM HG: CPT | Mod: CPTII,S$GLB,, | Performed by: NURSE PRACTITIONER

## 2018-11-14 PROCEDURE — 99213 OFFICE O/P EST LOW 20 MIN: CPT | Mod: S$GLB,,, | Performed by: NURSE PRACTITIONER

## 2018-11-14 PROCEDURE — 3078F DIAST BP <80 MM HG: CPT | Mod: CPTII,S$GLB,, | Performed by: NURSE PRACTITIONER

## 2018-11-14 PROCEDURE — 1101F PT FALLS ASSESS-DOCD LE1/YR: CPT | Mod: CPTII,S$GLB,, | Performed by: NURSE PRACTITIONER

## 2018-11-14 PROCEDURE — 99999 PR PBB SHADOW E&M-EST. PATIENT-LVL III: CPT | Mod: PBBFAC,,, | Performed by: NURSE PRACTITIONER

## 2018-11-14 NOTE — PROGRESS NOTES
Subjective:       Patient ID: Osman Johansen is a 68 y.o. female.    Chief Complaint: Hypertension    Ms. Johansen is a known patient to me. She presents today for HTN follow up    HPI   Controlled on benazepril 20mg bid--took HCTZ for a few days and BP low 90s/50s with lightheadedness/dizziness so she stopped taking.   Vitals:    11/14/18 1332   BP: 118/60   Pulse: 63   Resp: 16   Temp: 98.2 °F (36.8 °C)     Review of Systems   Constitutional: Negative for diaphoresis and fever.   HENT: Negative for facial swelling and trouble swallowing.    Eyes: Negative for discharge and redness.   Respiratory: Negative for cough and shortness of breath.    Cardiovascular: Negative for chest pain and palpitations.   Gastrointestinal: Negative for diarrhea.   Genitourinary: Negative for difficulty urinating.   Musculoskeletal: Negative for gait problem.   Skin: Negative for rash and wound.   Neurological: Negative for facial asymmetry and speech difficulty.   Psychiatric/Behavioral: Negative for confusion. The patient is not nervous/anxious.        Past Medical History:   Diagnosis Date    Allergy     Anxiety     Arthritis     Blood transfusion 8 yrs    Cataract     Degenerative disc disease     Depression     Dry eye syndrome     GERD (gastroesophageal reflux disease)     Hypertension     Macular drusen     Neuromuscular disorder     Ocular hypertension, bilateral     Osteoporosis     PUD (peptic ulcer disease)     Thoracic or lumbosacral neuritis or radiculitis 10/19/2012    Thyroid disease      Objective:      Physical Exam   Constitutional: She is oriented to person, place, and time. She does not have a sickly appearance. No distress.   HENT:   Head: Normocephalic.   Right Ear: Hearing normal.   Left Ear: Hearing normal.   Nose: Nose normal.   Eyes: Conjunctivae and lids are normal.   Neck: No JVD present. No tracheal deviation present.   Cardiovascular: Normal rate and normal heart sounds.    Pulmonary/Chest: Effort normal and breath sounds normal.   Musculoskeletal: She exhibits no deformity.   Neurological: She is alert and oriented to person, place, and time.   Skin: She is not diaphoretic. No pallor.   Psychiatric: She has a normal mood and affect. Her speech is normal and behavior is normal. Judgment and thought content normal. Cognition and memory are normal.   Nursing note and vitals reviewed.      Assessment:       1. Essential (primary) hypertension        Plan:       Essential (primary) hypertension   Stop HCTZ   Continue benazepril as now   Continue low salt diet   Check BP at home: notify me if >140/90      schedule mammogram     Follow-up in about 3 months (around 2/26/2019) for routine FU with PCP as scheduled with fasting labs prio.       Medication List           Accurate as of 11/14/18  1:58 PM. If you have any questions, ask your nurse or doctor.               CHANGE how you take these medications    fluticasone 50 mcg/actuation nasal spray  Commonly known as:  FLONASE  USE 1 SPRAY IN EACH NOSTRIL DAILY  What changed:    · how much to take  · how to take this  · when to take this     rOPINIRole 2 MG tablet  Commonly known as:  REQUIP  Take 1.5 tablets (3 mg total) by mouth nightly.  What changed:    · how much to take  · when to take this  · reasons to take this        CONTINUE taking these medications    benazepril 20 MG tablet  Commonly known as:  LOTENSIN  Take 1 tablet (20 mg total) by mouth 2 (two) times daily. May take additional 20 mg tab prn per instructions given     CALCIUM 500 WITH D ORAL     carboxymethylcellulose 0.5 % Dpet  Commonly known as:  REFRESH PLUS     dicyclomine 10 MG capsule  Commonly known as:  BENTYL     esomeprazole 40 MG capsule  Commonly known as:  NEXIUM  TAKE 1 CAPSULE BEFORE BREAKFAST     estradiol 0.075 mg/24 hr  Commonly known as:  CLIMARA  APPLY TO CLEAN DRY SKIN ONCE PER WEEK     hyoscyamine 0.125 mg Subl  Commonly known as:  LEVSIN/SL  Place 1  tablet (0.125 mg total) under the tongue every 4 (four) hours as needed.     metFORMIN 500 MG tablet  Commonly known as:  GLUCOPHAGE  TAKE 1 TABLET DAILY     MULTIPLE VITAMIN ORAL     nitroGLYCERIN 0.4 MG SL tablet  Commonly known as:  NITROSTAT  Place 1 tablet (0.4 mg total) under the tongue every 5 (five) minutes as needed for Chest pain.     oxybutynin 10 MG 24 hr tablet  Commonly known as:  DITROPAN-XL  Take 1 tablet (10 mg total) by mouth once daily.     promethazine 25 MG tablet  Commonly known as:  PHENERGAN  Take 1 tablet (25 mg total) by mouth every 6 (six) hours as needed for Nausea.     SUBOXONE 8-2 mg Film  Generic drug:  buprenorphine-naloxone     sucralfate 1 gram tablet  Commonly known as:  CARAFATE  TAKE 1 TABLET FOUR TIMES A DAY AS NEEDED FOR GASTRITIS     SYNTHROID 100 MCG tablet  Generic drug:  levothyroxine  Take 1 tablet (100 mcg total) by mouth once daily.        STOP taking these medications    hydroCHLOROthiazide 12.5 mg capsule  Commonly known as:  MICROZIDE  Stopped by:  Yadira Serna NP     lidocaine 5 %  Commonly known as:  LIDODERM  Stopped by:  Yadira Serna NP

## 2018-11-20 DIAGNOSIS — K29.70 GASTRITIS, PRESENCE OF BLEEDING UNSPECIFIED, UNSPECIFIED CHRONICITY, UNSPECIFIED GASTRITIS TYPE: ICD-10-CM

## 2018-11-21 RX ORDER — PROMETHAZINE HYDROCHLORIDE 25 MG/1
TABLET ORAL
Qty: 45 TABLET | Refills: 2 | Status: SHIPPED | OUTPATIENT
Start: 2018-11-21 | End: 2020-09-08

## 2018-11-21 NOTE — TELEPHONE ENCOUNTER
She reports she has gastritis and has flair ups, she reports she is not out of this medication just yet, but states she is close to running out. Allergies reviewed. Please advise. thanks-RICHARD

## 2018-11-29 ENCOUNTER — TELEPHONE (OUTPATIENT)
Dept: PAIN MEDICINE | Facility: CLINIC | Age: 68
End: 2018-11-29

## 2018-11-29 ENCOUNTER — HOSPITAL ENCOUNTER (OUTPATIENT)
Dept: RADIOLOGY | Facility: HOSPITAL | Age: 68
Discharge: HOME | End: 2018-11-29
Attending: INTERNAL MEDICINE
Payer: COMMERCIAL

## 2018-11-29 DIAGNOSIS — Z12.31 ENCOUNTER FOR SCREENING MAMMOGRAM FOR BREAST CANCER: ICD-10-CM

## 2018-11-29 PROCEDURE — 77067 SCR MAMMO BI INCL CAD: CPT | Mod: 26,,, | Performed by: RADIOLOGY

## 2018-11-29 PROCEDURE — 77063 BREAST TOMOSYNTHESIS BI: CPT | Mod: 26,,, | Performed by: RADIOLOGY

## 2018-11-29 PROCEDURE — 77063 BREAST TOMOSYNTHESIS BI: CPT | Mod: TC,PO

## 2018-11-29 PROCEDURE — 77067 SCR MAMMO BI INCL CAD: CPT | Mod: TC,PO

## 2018-11-29 NOTE — TELEPHONE ENCOUNTER
----- Message from Miladis Saravia sent at 11/29/2018 12:26 PM CST -----  Contact: Self  Trigger point injections in clinic, please contact patient.  ----- Message -----  From: Tonja Payne  Sent: 11/28/2018   4:04 PM  To: [facility] Schedulers              Name of Who is Calling: DEN MEJIAS [[id number]]      What is the request in detail: Pt would like to set up trigger point injections. Please contact to further discuss and advise.        Can the clinic reply by [facility]: N      What Number to Call Back if not in [facility]: [phone]

## 2018-12-07 ENCOUNTER — OFFICE VISIT (OUTPATIENT)
Dept: OPHTHALMOLOGY | Facility: CLINIC | Age: 68
End: 2018-12-07
Payer: COMMERCIAL

## 2018-12-07 DIAGNOSIS — H35.362 RETINAL DRUSEN OF LEFT EYE: ICD-10-CM

## 2018-12-07 DIAGNOSIS — H25.13 NUCLEAR SCLEROSIS, BILATERAL: Primary | ICD-10-CM

## 2018-12-07 DIAGNOSIS — H52.7 REFRACTIVE ERROR: ICD-10-CM

## 2018-12-07 PROCEDURE — 92014 COMPRE OPH EXAM EST PT 1/>: CPT | Mod: S$GLB,,, | Performed by: OPHTHALMOLOGY

## 2018-12-07 PROCEDURE — 99999 PR PBB SHADOW E&M-EST. PATIENT-LVL III: CPT | Mod: PBBFAC,,, | Performed by: OPHTHALMOLOGY

## 2018-12-07 NOTE — PROGRESS NOTES
HPI     Seen by Optometrist 2 weeks ago    Here for Cataract evaluation states her vision gets blurry and seems to   fade in and out gradually worsening over the past year. Denies eye pain   does dry eyes using Refresh TID. Wears contact lens left eye for reading   does sleep in contact.     Lab Results       Component                Value               Date                       HGBA1C                   5.4                 2018                 HGBA1C                   5.4                 2018                 HGBA1C                   5.3                 2017            No results found for: LABA1C    Last edited by Tash Crespo on 2018  1:34 PM. (History)        ROS     Positive for: Neurological (restless legs; short term memory issues;   denies history of seizure; denies neuropathy), Endocrine (DM diagnosed 2   years ago, often forgets glucophage - only takes sometimes, A1c stable   with this regimen she says; hypothyroid), Cardiovascular (HTN - up and   down lately), Eyes (CL wear OS for near - monovision)    Negative for: Genitourinary (denies flomax; remote hx nephrolithiasis;   denies nephropathy), Respiratory (sleep apnea - no machine; denies   asthma/orthopnea), Heme/Lymph (denies ASA)    Last edited by Aicha Merritt MD on 2018  2:14 PM.   (History)        Assessment /Plan     For exam results, see Encounter Report.    Nuclear sclerosis, bilateral    Retinal drusen of left eye    Refractive error            Nuclear sclerosis, bilateral - Not yet visually significant.  Observe.    Ocular hypertension, bilateral - per Dr. Terrell in [place], [place]. NCT 24 OD, 20 OS. 19 OU for me today, very healthy optic nerves C:D 0.25. Observe.    Retinal drusen of left eye - former smoker. Advised healthy diet. Father , no known family history macular degeneration.    Refractive error - mild hyperopia - distance vision may improve with CL for distance OD - recommend  re-trying this. Otherwise doing well with monovision OS near.

## 2018-12-12 ENCOUNTER — OFFICE VISIT (OUTPATIENT)
Dept: PAIN MEDICINE | Facility: CLINIC | Age: 68
End: 2018-12-12
Attending: ANESTHESIOLOGY
Payer: COMMERCIAL

## 2018-12-12 VITALS
SYSTOLIC BLOOD PRESSURE: 178 MMHG | DIASTOLIC BLOOD PRESSURE: 80 MMHG | WEIGHT: 134.5 LBS | HEIGHT: 61 IN | RESPIRATION RATE: 18 BRPM | BODY MASS INDEX: 25.39 KG/M2 | TEMPERATURE: 98 F | HEART RATE: 63 BPM

## 2018-12-12 DIAGNOSIS — M54.14 THORACIC RADICULOPATHY: Primary | ICD-10-CM

## 2018-12-12 DIAGNOSIS — M79.18 MYOFASCIAL PAIN: ICD-10-CM

## 2018-12-12 DIAGNOSIS — M71.9 CERVICOTHORACIC INTERSPINOUS BURSITIS: ICD-10-CM

## 2018-12-12 DIAGNOSIS — R07.9 CHEST PAIN AT REST: ICD-10-CM

## 2018-12-12 DIAGNOSIS — B02.29 POST HERPETIC NEURALGIA: ICD-10-CM

## 2018-12-12 PROCEDURE — 3077F SYST BP >= 140 MM HG: CPT | Mod: CPTII,S$GLB,, | Performed by: ANESTHESIOLOGY

## 2018-12-12 PROCEDURE — 99999 PR PBB SHADOW E&M-EST. PATIENT-LVL III: CPT | Mod: PBBFAC,,, | Performed by: ANESTHESIOLOGY

## 2018-12-12 PROCEDURE — 99213 OFFICE O/P EST LOW 20 MIN: CPT | Mod: 25,S$GLB,, | Performed by: ANESTHESIOLOGY

## 2018-12-12 PROCEDURE — 3079F DIAST BP 80-89 MM HG: CPT | Mod: CPTII,S$GLB,, | Performed by: ANESTHESIOLOGY

## 2018-12-12 PROCEDURE — 1101F PT FALLS ASSESS-DOCD LE1/YR: CPT | Mod: CPTII,S$GLB,, | Performed by: ANESTHESIOLOGY

## 2018-12-12 PROCEDURE — 20553 NJX 1/MLT TRIGGER POINTS 3/>: CPT | Mod: 59,S$GLB,, | Performed by: ANESTHESIOLOGY

## 2018-12-12 PROCEDURE — 20610 DRAIN/INJ JOINT/BURSA W/O US: CPT | Mod: 59,S$GLB,, | Performed by: ANESTHESIOLOGY

## 2018-12-12 RX ORDER — LIDOCAINE 50 MG/G
1 PATCH TOPICAL DAILY
Qty: 30 PATCH | Refills: 2 | Status: SHIPPED | OUTPATIENT
Start: 2018-12-12 | End: 2021-05-21

## 2018-12-12 RX ORDER — BETAMETHASONE SODIUM PHOSPHATE AND BETAMETHASONE ACETATE 3; 3 MG/ML; MG/ML
6 INJECTION, SUSPENSION INTRA-ARTICULAR; INTRALESIONAL; INTRAMUSCULAR; SOFT TISSUE
Status: COMPLETED | OUTPATIENT
Start: 2018-12-12 | End: 2018-12-12

## 2018-12-12 RX ADMIN — BETAMETHASONE SODIUM PHOSPHATE AND BETAMETHASONE ACETATE 6 MG: 3; 3 INJECTION, SUSPENSION INTRA-ARTICULAR; INTRALESIONAL; INTRAMUSCULAR; SOFT TISSUE at 03:12

## 2019-02-14 ENCOUNTER — LAB VISIT (OUTPATIENT)
Dept: LAB | Facility: HOSPITAL | Age: 69
End: 2019-02-14
Attending: INTERNAL MEDICINE
Payer: COMMERCIAL

## 2019-02-14 DIAGNOSIS — I10 ESSENTIAL HYPERTENSION: ICD-10-CM

## 2019-02-14 DIAGNOSIS — E78.5 DYSLIPIDEMIA: ICD-10-CM

## 2019-02-14 DIAGNOSIS — E11.9 CONTROLLED TYPE 2 DIABETES MELLITUS WITHOUT COMPLICATION, WITHOUT LONG-TERM CURRENT USE OF INSULIN: ICD-10-CM

## 2019-02-14 DIAGNOSIS — E03.4 HYPOTHYROIDISM DUE TO ACQUIRED ATROPHY OF THYROID: ICD-10-CM

## 2019-02-14 LAB
ALBUMIN SERPL BCP-MCNC: 3.7 G/DL
ALP SERPL-CCNC: 61 U/L
ALT SERPL W/O P-5'-P-CCNC: 11 U/L
ANION GAP SERPL CALC-SCNC: 7 MMOL/L
AST SERPL-CCNC: 18 U/L
BILIRUB SERPL-MCNC: 0.3 MG/DL
BUN SERPL-MCNC: 15 MG/DL
CALCIUM SERPL-MCNC: 9.4 MG/DL
CHLORIDE SERPL-SCNC: 104 MMOL/L
CHOLEST SERPL-MCNC: 243 MG/DL
CHOLEST/HDLC SERPL: 2.7 {RATIO}
CO2 SERPL-SCNC: 27 MMOL/L
CREAT SERPL-MCNC: 1 MG/DL
EST. GFR  (AFRICAN AMERICAN): >60 ML/MIN/1.73 M^2
EST. GFR  (NON AFRICAN AMERICAN): 57.2 ML/MIN/1.73 M^2
ESTIMATED AVG GLUCOSE: 111 MG/DL
GLUCOSE SERPL-MCNC: 93 MG/DL
HBA1C MFR BLD HPLC: 5.5 %
HDLC SERPL-MCNC: 91 MG/DL
HDLC SERPL: 37.4 %
LDLC SERPL CALC-MCNC: 130 MG/DL
NONHDLC SERPL-MCNC: 152 MG/DL
POTASSIUM SERPL-SCNC: 4.4 MMOL/L
PROT SERPL-MCNC: 7.1 G/DL
SODIUM SERPL-SCNC: 138 MMOL/L
TRIGL SERPL-MCNC: 110 MG/DL
TSH SERPL DL<=0.005 MIU/L-ACNC: 1.38 UIU/ML

## 2019-02-14 PROCEDURE — 83036 HEMOGLOBIN GLYCOSYLATED A1C: CPT

## 2019-02-14 PROCEDURE — 80053 COMPREHEN METABOLIC PANEL: CPT

## 2019-02-14 PROCEDURE — 36415 COLL VENOUS BLD VENIPUNCTURE: CPT | Mod: PO

## 2019-02-14 PROCEDURE — 80061 LIPID PANEL: CPT

## 2019-02-14 PROCEDURE — 84443 ASSAY THYROID STIM HORMONE: CPT

## 2019-02-15 DIAGNOSIS — E11.9 CONTROLLED TYPE 2 DIABETES MELLITUS WITHOUT COMPLICATION, WITHOUT LONG-TERM CURRENT USE OF INSULIN: ICD-10-CM

## 2019-02-18 RX ORDER — METFORMIN HYDROCHLORIDE 500 MG/1
TABLET ORAL
Qty: 90 TABLET | Refills: 1 | Status: ON HOLD | OUTPATIENT
Start: 2019-02-18 | End: 2019-06-05 | Stop reason: HOSPADM

## 2019-02-26 ENCOUNTER — LAB VISIT (OUTPATIENT)
Dept: LAB | Facility: HOSPITAL | Age: 69
End: 2019-02-26
Attending: INTERNAL MEDICINE
Payer: COMMERCIAL

## 2019-02-26 ENCOUNTER — OFFICE VISIT (OUTPATIENT)
Dept: FAMILY MEDICINE | Facility: CLINIC | Age: 69
End: 2019-02-26
Payer: COMMERCIAL

## 2019-02-26 VITALS
HEART RATE: 61 BPM | OXYGEN SATURATION: 98 % | DIASTOLIC BLOOD PRESSURE: 70 MMHG | RESPIRATION RATE: 20 BRPM | SYSTOLIC BLOOD PRESSURE: 130 MMHG | TEMPERATURE: 98 F | WEIGHT: 137.38 LBS | BODY MASS INDEX: 25.94 KG/M2 | HEIGHT: 61 IN

## 2019-02-26 DIAGNOSIS — D50.9 IRON DEFICIENCY ANEMIA, UNSPECIFIED IRON DEFICIENCY ANEMIA TYPE: ICD-10-CM

## 2019-02-26 DIAGNOSIS — E78.5 DYSLIPIDEMIA: ICD-10-CM

## 2019-02-26 DIAGNOSIS — E11.9 CONTROLLED TYPE 2 DIABETES MELLITUS WITHOUT COMPLICATION, WITHOUT LONG-TERM CURRENT USE OF INSULIN: ICD-10-CM

## 2019-02-26 DIAGNOSIS — Z00.00 ROUTINE PHYSICAL EXAMINATION: Primary | ICD-10-CM

## 2019-02-26 DIAGNOSIS — Z23 NEED FOR DIPHTHERIA-TETANUS-PERTUSSIS (TDAP) VACCINE: ICD-10-CM

## 2019-02-26 DIAGNOSIS — N39.41 URGE INCONTINENCE: ICD-10-CM

## 2019-02-26 DIAGNOSIS — J32.9 SINUSITIS, UNSPECIFIED CHRONICITY, UNSPECIFIED LOCATION: ICD-10-CM

## 2019-02-26 DIAGNOSIS — I10 ESSENTIAL HYPERTENSION: ICD-10-CM

## 2019-02-26 DIAGNOSIS — E03.4 HYPOTHYROIDISM DUE TO ACQUIRED ATROPHY OF THYROID: ICD-10-CM

## 2019-02-26 LAB
BASOPHILS # BLD AUTO: 0.05 K/UL
BASOPHILS NFR BLD: 0.6 %
DIFFERENTIAL METHOD: ABNORMAL
EOSINOPHIL # BLD AUTO: 0 K/UL
EOSINOPHIL NFR BLD: 0.1 %
ERYTHROCYTE [DISTWIDTH] IN BLOOD BY AUTOMATED COUNT: 13.6 %
FERRITIN SERPL-MCNC: 31 NG/ML
HCT VFR BLD AUTO: 35 %
HGB BLD-MCNC: 11 G/DL
IMM GRANULOCYTES # BLD AUTO: 0.02 K/UL
IMM GRANULOCYTES NFR BLD AUTO: 0.2 %
IRON SERPL-MCNC: 29 UG/DL
LYMPHOCYTES # BLD AUTO: 2.4 K/UL
LYMPHOCYTES NFR BLD: 29.9 %
MCH RBC QN AUTO: 28.9 PG
MCHC RBC AUTO-ENTMCNC: 31.4 G/DL
MCV RBC AUTO: 92 FL
MONOCYTES # BLD AUTO: 0.6 K/UL
MONOCYTES NFR BLD: 7.1 %
NEUTROPHILS # BLD AUTO: 5 K/UL
NEUTROPHILS NFR BLD: 62.1 %
NRBC BLD-RTO: 0 /100 WBC
PLATELET # BLD AUTO: 248 K/UL
PMV BLD AUTO: 11.6 FL
RBC # BLD AUTO: 3.8 M/UL
SATURATED IRON: 10 %
TOTAL IRON BINDING CAPACITY: 303 UG/DL
TRANSFERRIN SERPL-MCNC: 205 MG/DL
WBC # BLD AUTO: 8.03 K/UL

## 2019-02-26 PROCEDURE — 99999 PR PBB SHADOW E&M-EST. PATIENT-LVL III: ICD-10-PCS | Mod: PBBFAC,,, | Performed by: INTERNAL MEDICINE

## 2019-02-26 PROCEDURE — 3078F PR MOST RECENT DIASTOLIC BLOOD PRESSURE < 80 MM HG: ICD-10-PCS | Mod: CPTII,S$GLB,, | Performed by: INTERNAL MEDICINE

## 2019-02-26 PROCEDURE — 3075F SYST BP GE 130 - 139MM HG: CPT | Mod: CPTII,S$GLB,, | Performed by: INTERNAL MEDICINE

## 2019-02-26 PROCEDURE — 90715 TDAP VACCINE GREATER THAN OR EQUAL TO 7YO IM: ICD-10-PCS | Mod: S$GLB,,, | Performed by: INTERNAL MEDICINE

## 2019-02-26 PROCEDURE — 83540 ASSAY OF IRON: CPT

## 2019-02-26 PROCEDURE — 90471 TDAP VACCINE GREATER THAN OR EQUAL TO 7YO IM: ICD-10-PCS | Mod: S$GLB,,, | Performed by: INTERNAL MEDICINE

## 2019-02-26 PROCEDURE — 36415 COLL VENOUS BLD VENIPUNCTURE: CPT | Mod: PO

## 2019-02-26 PROCEDURE — 99397 PR PREVENTIVE VISIT,EST,65 & OVER: ICD-10-PCS | Mod: 25,S$GLB,, | Performed by: INTERNAL MEDICINE

## 2019-02-26 PROCEDURE — 82728 ASSAY OF FERRITIN: CPT

## 2019-02-26 PROCEDURE — 99397 PER PM REEVAL EST PAT 65+ YR: CPT | Mod: 25,S$GLB,, | Performed by: INTERNAL MEDICINE

## 2019-02-26 PROCEDURE — 90715 TDAP VACCINE 7 YRS/> IM: CPT | Mod: S$GLB,,, | Performed by: INTERNAL MEDICINE

## 2019-02-26 PROCEDURE — 3078F DIAST BP <80 MM HG: CPT | Mod: CPTII,S$GLB,, | Performed by: INTERNAL MEDICINE

## 2019-02-26 PROCEDURE — 90471 IMMUNIZATION ADMIN: CPT | Mod: S$GLB,,, | Performed by: INTERNAL MEDICINE

## 2019-02-26 PROCEDURE — 3044F PR MOST RECENT HEMOGLOBIN A1C LEVEL <7.0%: ICD-10-PCS | Mod: CPTII,S$GLB,, | Performed by: INTERNAL MEDICINE

## 2019-02-26 PROCEDURE — 85025 COMPLETE CBC W/AUTO DIFF WBC: CPT

## 2019-02-26 PROCEDURE — 3044F HG A1C LEVEL LT 7.0%: CPT | Mod: CPTII,S$GLB,, | Performed by: INTERNAL MEDICINE

## 2019-02-26 PROCEDURE — 99999 PR PBB SHADOW E&M-EST. PATIENT-LVL III: CPT | Mod: PBBFAC,,, | Performed by: INTERNAL MEDICINE

## 2019-02-26 PROCEDURE — 3075F PR MOST RECENT SYSTOLIC BLOOD PRESS GE 130-139MM HG: ICD-10-PCS | Mod: CPTII,S$GLB,, | Performed by: INTERNAL MEDICINE

## 2019-02-26 RX ORDER — OXYBUTYNIN CHLORIDE 10 MG/1
10 TABLET, EXTENDED RELEASE ORAL DAILY
Qty: 90 TABLET | Refills: 3 | Status: SHIPPED | OUTPATIENT
Start: 2019-02-26 | End: 2019-08-27 | Stop reason: SDUPTHER

## 2019-02-26 RX ORDER — LEVOTHYROXINE SODIUM 100 UG/1
100 TABLET ORAL DAILY
Qty: 90 TABLET | Refills: 2 | Status: SHIPPED | OUTPATIENT
Start: 2019-02-26 | End: 2019-08-27 | Stop reason: SDUPTHER

## 2019-02-26 RX ORDER — CEFUROXIME AXETIL 500 MG/1
500 TABLET ORAL 2 TIMES DAILY
Qty: 28 TABLET | Refills: 0 | Status: SHIPPED | OUTPATIENT
Start: 2019-02-26 | End: 2019-03-12

## 2019-02-26 RX ORDER — BENAZEPRIL HYDROCHLORIDE 20 MG/1
20 TABLET ORAL 2 TIMES DAILY
Qty: 135 TABLET | Refills: 3
Start: 2019-02-26 | End: 2019-06-06

## 2019-02-26 NOTE — PROGRESS NOTES
Subjective:       Patient ID: Osman Johansen is a 69 y.o. female.    Chief Complaint: Annual Exam    Here for routine health maintenance.    Complains of sinus pain and pressure with yellow drainage for 2 months.     HTN - controlled with 2-3 day episode where runs high each month  Hypothyroid - controlled.112 mcg from local pharmacy and Brand name made her have palpitations, but 125 mcg does not from mail order.  Dye? Only use mail order -   DM - controlled; outside eye exam done 7/26/17  HLD - uncontrolled ldl goal < 70; will go on statin that her mother was on as her and her mother are allergic to the same medicines in the past.     Recall - pain Dr wanted her to see neurology.  Gets cramps on Suboxone.  Trying to wean off and down to a piece of her daily pill but can't because develops RLS that prevents her from sleeping.  She had similar events with leg cramping when on fentanyl and hydrocodone that resolved when placed on Suboxone.  She is down to a piece of Suboxone daily = 1 pill lasts about 4-6 days.  She also has uncontrolled RLS that has been exacerbated as she has tried to wean off Suboxone.  This has been happening off and on for about 6 months now.      NM stress test wnl 1/2017.  Dx non cardiac chest pain      Review of Systems   Constitutional: Negative for appetite change and fever.   HENT: Positive for sinus pressure and sinus pain. Negative for nosebleeds and trouble swallowing.    Eyes: Negative for discharge and visual disturbance.   Respiratory: Negative for choking and shortness of breath.    Cardiovascular: Negative for chest pain and palpitations.   Gastrointestinal: Negative for abdominal pain, nausea and vomiting.   Musculoskeletal: Negative for arthralgias and joint swelling.   Skin: Negative for rash and wound.   Neurological: Negative for dizziness and syncope.   Psychiatric/Behavioral: Negative for confusion and dysphoric mood.       Objective:      Vitals:    02/26/19 1550   BP:  130/70   Pulse: 61   Resp: 20   Temp: 98.4 °F (36.9 °C)     Physical Exam   Constitutional: She appears well-nourished.   Eyes: Conjunctivae and EOM are normal.   Neck: Trachea normal and normal range of motion. No thyromegaly present.   Cardiovascular: Normal heart sounds.   Pulses:       Dorsalis pedis pulses are 2+ on the right side, and 2+ on the left side.   Edema negative   Pulmonary/Chest: Effort normal and breath sounds normal.   Abdominal: Soft. There is no hepatomegaly.   Feet:   Right Foot:   Protective Sensation: 4 sites tested. 4 sites sensed.   Left Foot:   Protective Sensation: 4 sites tested. 4 sites sensed.   Neurological: No cranial nerve deficit.   DTR decreased bilateral   Skin: Skin is warm, dry and intact.   Psychiatric: She has a normal mood and affect.   Alert and Oriented    Vitals reviewed.        Assessment:       1. Routine physical examination    2. Essential hypertension    3. Hypothyroidism due to acquired atrophy of thyroid    4. Urge incontinence    5. Controlled type 2 diabetes mellitus without complication, without long-term current use of insulin    6. Dyslipidemia    7. Sinusitis, unspecified chronicity, unspecified location    8. Need for diphtheria-tetanus-pertussis (Tdap) vaccine    9. Iron deficiency anemia, unspecified iron deficiency anemia type        Plan:       Routine physical examination  -     Tdap Vaccine    Essential hypertension  -     benazepril (LOTENSIN) 20 MG tablet; Take 1 tablet (20 mg total) by mouth 2 (two) times daily. May take additional 20 mg tab prn per instructions given  Dispense: 135 tablet; Refill: 3  -     Comprehensive metabolic panel; Future; Expected date: 08/25/2019    Hypothyroidism due to acquired atrophy of thyroid  -     SYNTHROID 100 mcg tablet; Take 1 tablet (100 mcg total) by mouth once daily.  Dispense: 90 tablet; Refill: 2  -     TSH; Future; Expected date: 08/25/2019    Urge incontinence  -     oxybutynin (DITROPAN-XL) 10 MG 24 hr  tablet; Take 1 tablet (10 mg total) by mouth once daily.  Dispense: 90 tablet; Refill: 3    Controlled type 2 diabetes mellitus without complication, without long-term current use of insulin  -     Hemoglobin A1c; Future; Expected date: 08/25/2019  -     Microalbumin/creatinine urine ratio; Future; Expected date: 08/25/2019    Dyslipidemia  -     Lipid panel; Future; Expected date: 08/25/2019    Sinusitis, unspecified chronicity, unspecified location  -     cefUROXime (CEFTIN) 500 MG tablet; Take 1 tablet (500 mg total) by mouth 2 (two) times daily. for 14 days  Dispense: 28 tablet; Refill: 0    Need for diphtheria-tetanus-pertussis (Tdap) vaccine  -     Tdap Vaccine    Iron deficiency anemia, unspecified iron deficiency anemia type  -     CBC auto differential; Future; Expected date: 08/25/2019  -     CBC auto differential; Future; Expected date: 02/26/2019  -     Ferritin; Future; Expected date: 02/26/2019  -     Iron and TIBC; Future; Expected date: 02/26/2019            Medication List with Changes/Refills   New Medications    CEFUROXIME (CEFTIN) 500 MG TABLET    Take 1 tablet (500 mg total) by mouth 2 (two) times daily. for 14 days   Current Medications    CALCIUM CARBONATE/VITAMIN D3 (CALCIUM 500 WITH D ORAL)    Take 1 capsule by mouth once daily.    CARBOXYMETHYLCELLULOSE (REFRESH PLUS) 0.5 % DPET    1 drop daily as needed.    DICYCLOMINE (BENTYL) 10 MG CAPSULE    Take 10 mg by mouth 4 (four) times daily as needed.     ESOMEPRAZOLE (NEXIUM) 40 MG CAPSULE    TAKE 1 CAPSULE BEFORE BREAKFAST    ESTRADIOL (CLIMARA) 0.075 MG/24 HR    APPLY TO CLEAN DRY SKIN ONCE PER WEEK    FLUTICASONE (FLONASE) 50 MCG/ACTUATION NASAL SPRAY    USE 1 SPRAY IN EACH NOSTRIL DAILY    HYOSCYAMINE (LEVSIN/SL) 0.125 MG SUBL    Place 1 tablet (0.125 mg total) under the tongue every 4 (four) hours as needed.    LIDOCAINE (LIDODERM) 5 %    Place 1 patch onto the skin once daily. Remove & Discard patch within 12 hours or as directed by MD     METFORMIN (GLUCOPHAGE) 500 MG TABLET    TAKE 1 TABLET DAILY    MULTIVITAMIN (MULTIPLE VITAMIN ORAL)    Take 1 capsule by mouth once daily.    NITROGLYCERIN (NITROSTAT) 0.4 MG SL TABLET    Place 1 tablet (0.4 mg total) under the tongue every 5 (five) minutes as needed for Chest pain.    PROMETHAZINE (PHENERGAN) 25 MG TABLET    TAKE 1 TABLET EVERY 6 HOURS AS NEEDED FOR NAUSEA    ROPINIROLE (REQUIP) 2 MG TABLET    Take 1.5 tablets (3 mg total) by mouth nightly.    SUBOXONE 8-2 MG FILM    1 Film by Subdermal route once daily. 1 strip last 7days    SUCRALFATE (CARAFATE) 1 GRAM TABLET    TAKE 1 TABLET FOUR TIMES A DAY AS NEEDED FOR GASTRITIS   Changed and/or Refilled Medications    Modified Medication Previous Medication    BENAZEPRIL (LOTENSIN) 20 MG TABLET benazepril (LOTENSIN) 20 MG tablet       Take 1 tablet (20 mg total) by mouth 2 (two) times daily. May take additional 20 mg tab prn per instructions given    Take 1 tablet (20 mg total) by mouth 2 (two) times daily. May take additional 20 mg tab prn per instructions given    OXYBUTYNIN (DITROPAN-XL) 10 MG 24 HR TABLET oxybutynin (DITROPAN-XL) 10 MG 24 hr tablet       Take 1 tablet (10 mg total) by mouth once daily.    Take 1 tablet (10 mg total) by mouth once daily.    SYNTHROID 100 MCG TABLET SYNTHROID 100 mcg tablet       Take 1 tablet (100 mcg total) by mouth once daily.    Take 1 tablet (100 mcg total) by mouth once daily.     Wellness reviewed  Will call me with statin mom non allergic to and I will Rx mail order  Continue current management and monitor.    Counseled on regular exercise, maintenance of a healthy weight, balanced diet rich in fruits/vegetables and lean protein, and avoidance of unhealthy habits like smoking and excessive alcohol intake.   Also, counseled on importance of being compliant with medication, health appointments, diet and exercise.     Follow-up in about 6 months (around 8/26/2019).

## 2019-02-27 ENCOUNTER — TELEPHONE (OUTPATIENT)
Dept: FAMILY MEDICINE | Facility: CLINIC | Age: 69
End: 2019-02-27
Payer: COMMERCIAL

## 2019-02-27 DIAGNOSIS — D50.9 IRON DEFICIENCY ANEMIA, UNSPECIFIED IRON DEFICIENCY ANEMIA TYPE: Primary | ICD-10-CM

## 2019-02-27 RX ORDER — EZETIMIBE 10 MG/1
10 TABLET ORAL DAILY
Qty: 30 TABLET | Refills: 11 | Status: SHIPPED | OUTPATIENT
Start: 2019-02-27 | End: 2019-03-18 | Stop reason: SDUPTHER

## 2019-02-27 RX ORDER — FERROUS SULFATE 325(65) MG
325 TABLET ORAL
Qty: 90 TABLET | Refills: 3 | COMMUNITY
Start: 2019-02-27 | End: 2021-05-21

## 2019-02-27 NOTE — TELEPHONE ENCOUNTER
Discussed labs; patient also states she was supposed to get back to you regarding cholesterol medication that she can take; meds pended for your review

## 2019-02-28 ENCOUNTER — CLINICAL SUPPORT (OUTPATIENT)
Dept: FAMILY MEDICINE | Facility: CLINIC | Age: 69
End: 2019-02-28
Payer: COMMERCIAL

## 2019-02-28 DIAGNOSIS — D50.9 IRON DEFICIENCY ANEMIA, UNSPECIFIED IRON DEFICIENCY ANEMIA TYPE: Primary | ICD-10-CM

## 2019-02-28 PROCEDURE — 99499 UNLISTED E&M SERVICE: CPT | Mod: S$GLB,,, | Performed by: INTERNAL MEDICINE

## 2019-02-28 PROCEDURE — 99499 NO LOS: ICD-10-PCS | Mod: S$GLB,,, | Performed by: INTERNAL MEDICINE

## 2019-03-06 ENCOUNTER — LAB VISIT (OUTPATIENT)
Dept: LAB | Facility: HOSPITAL | Age: 69
End: 2019-03-06
Attending: INTERNAL MEDICINE
Payer: COMMERCIAL

## 2019-03-06 DIAGNOSIS — D50.9 IRON DEFICIENCY ANEMIA, UNSPECIFIED IRON DEFICIENCY ANEMIA TYPE: ICD-10-CM

## 2019-03-06 PROCEDURE — 82272 OCCULT BLD FECES 1-3 TESTS: CPT

## 2019-03-07 LAB — OB PNL STL: NEGATIVE

## 2019-03-18 NOTE — TELEPHONE ENCOUNTER
----- Message from Harley Trujillo sent at 3/18/2019  1:23 PM CDT -----  Contact: Patient  Type:  RX Refill Request    Who Called:  Patient  Refill or New Rx:  Refill  RX Name and Strength:  ezetimibe (ZETIA) 10 mg tablet  How is the patient currently taking it? (ex. 1XDay):  As ordered  Is this a 30 day or 90 day RX:  As recommended  Preferred Pharmacy with phone number:    [facility] [place] - [address]  [address] [address] [facility] [address]  [place] [address]  Phone: [phone] Fax: [phone]  Local or Mail Order:  Local  Ordering Provider:  Dr. Go Christopher Call Back Number:  [phone]  Additional Information:  FRANK

## 2019-03-18 NOTE — TELEPHONE ENCOUNTER
Pt stated that she never got her refill of Zetia from [facility] and needs a 30 day supply to go to [facility]. I informed pt that I would send her refill request to Dr. Stiles for approval. Pt voiced understanding.

## 2019-03-19 RX ORDER — EZETIMIBE 10 MG/1
10 TABLET ORAL DAILY
Qty: 30 TABLET | Refills: 11 | Status: SHIPPED | OUTPATIENT
Start: 2019-03-19 | End: 2019-06-14

## 2019-06-03 ENCOUNTER — TELEPHONE (OUTPATIENT)
Dept: FAMILY MEDICINE | Facility: CLINIC | Age: 69
End: 2019-06-03

## 2019-06-03 ENCOUNTER — NURSE TRIAGE (OUTPATIENT)
Dept: ADMINISTRATIVE | Facility: CLINIC | Age: 69
End: 2019-06-03

## 2019-06-03 NOTE — TELEPHONE ENCOUNTER
Tried calling land line and cell no answer.     Tried calling patient to see if she wants to keep Dr. Stiles apt tomorrow for 9:50 or keep clari apt for 1 waiting on a call back.

## 2019-06-03 NOTE — TELEPHONE ENCOUNTER
If patient has chest pain associated with a BP of 206/109, she needs to go to the ER/ call 911.  If no chest pain and BP 140s/90s, can put into apt tomorrow.  If symptoms worsen, please go to Emergency Room.

## 2019-06-03 NOTE — TELEPHONE ENCOUNTER
Tried calling patient no anser on either phone number. To see if she will see harry tomorrow or see clari. Waiting on call back .

## 2019-06-03 NOTE — TELEPHONE ENCOUNTER
Encouraged pt to go to the ER for cardiac symptoms. Pt states she does not want to go to the ER but she will go if symptoms began/worsens. Pt states that symptoms only happen at night and have been going on for the past 6 months. Try to persuade the pt to go to the ER but she said she will wait it out until tomorrow. Informed pt that she has a 0950 appt scheduled with Dr Stiles. Advised pt to contact Dr Stiles' staff in the morning to see what he advises her too. But informed patient that Shen, NP wants her to go to the ER. Pt verbalized understanding.

## 2019-06-03 NOTE — TELEPHONE ENCOUNTER
"206/109  141/67 now.  Relieved when taking NItro glycerin    Reason for Disposition   Requesting regular office appointment    Answer Assessment - Initial Assessment Questions  1. REASON FOR CALL or QUESTION: "What is your reason for calling today?" or "How can I best help you?" or "What question do you have that I can help answer?"      Night chest pain.  Relieved when taking NItro glycerin    Protocols used: ST INFORMATION ONLY CALL-A-AH      "

## 2019-06-04 ENCOUNTER — OFFICE VISIT (OUTPATIENT)
Dept: FAMILY MEDICINE | Facility: CLINIC | Age: 69
End: 2019-06-04
Payer: COMMERCIAL

## 2019-06-04 ENCOUNTER — TELEPHONE (OUTPATIENT)
Dept: FAMILY MEDICINE | Facility: CLINIC | Age: 69
End: 2019-06-04

## 2019-06-04 VITALS
BODY MASS INDEX: 26.2 KG/M2 | HEIGHT: 61 IN | WEIGHT: 138.75 LBS | OXYGEN SATURATION: 96 % | DIASTOLIC BLOOD PRESSURE: 88 MMHG | SYSTOLIC BLOOD PRESSURE: 186 MMHG | HEART RATE: 73 BPM

## 2019-06-04 DIAGNOSIS — E03.4 HYPOTHYROIDISM DUE TO ACQUIRED ATROPHY OF THYROID: ICD-10-CM

## 2019-06-04 DIAGNOSIS — I10 ESSENTIAL HYPERTENSION: ICD-10-CM

## 2019-06-04 DIAGNOSIS — I20.0 UNSTABLE ANGINA: Primary | ICD-10-CM

## 2019-06-04 DIAGNOSIS — E11.9 CONTROLLED TYPE 2 DIABETES MELLITUS WITHOUT COMPLICATION, WITHOUT LONG-TERM CURRENT USE OF INSULIN: ICD-10-CM

## 2019-06-04 PROBLEM — R07.9 CHEST PAIN: Status: ACTIVE | Noted: 2019-06-04

## 2019-06-04 PROCEDURE — 99999 PR PBB SHADOW E&M-EST. PATIENT-LVL III: CPT | Mod: PBBFAC,,, | Performed by: INTERNAL MEDICINE

## 2019-06-04 PROCEDURE — 3079F DIAST BP 80-89 MM HG: CPT | Mod: CPTII,S$GLB,, | Performed by: INTERNAL MEDICINE

## 2019-06-04 PROCEDURE — 99215 OFFICE O/P EST HI 40 MIN: CPT | Mod: S$GLB,,, | Performed by: INTERNAL MEDICINE

## 2019-06-04 PROCEDURE — 3044F PR MOST RECENT HEMOGLOBIN A1C LEVEL <7.0%: ICD-10-PCS | Mod: CPTII,S$GLB,, | Performed by: INTERNAL MEDICINE

## 2019-06-04 PROCEDURE — 99999 PR PBB SHADOW E&M-EST. PATIENT-LVL III: ICD-10-PCS | Mod: PBBFAC,,, | Performed by: INTERNAL MEDICINE

## 2019-06-04 PROCEDURE — 3077F PR MOST RECENT SYSTOLIC BLOOD PRESSURE >= 140 MM HG: ICD-10-PCS | Mod: CPTII,S$GLB,, | Performed by: INTERNAL MEDICINE

## 2019-06-04 PROCEDURE — 1101F PT FALLS ASSESS-DOCD LE1/YR: CPT | Mod: CPTII,S$GLB,, | Performed by: INTERNAL MEDICINE

## 2019-06-04 PROCEDURE — 3079F PR MOST RECENT DIASTOLIC BLOOD PRESSURE 80-89 MM HG: ICD-10-PCS | Mod: CPTII,S$GLB,, | Performed by: INTERNAL MEDICINE

## 2019-06-04 PROCEDURE — 1101F PR PT FALLS ASSESS DOC 0-1 FALLS W/OUT INJ PAST YR: ICD-10-PCS | Mod: CPTII,S$GLB,, | Performed by: INTERNAL MEDICINE

## 2019-06-04 PROCEDURE — 3077F SYST BP >= 140 MM HG: CPT | Mod: CPTII,S$GLB,, | Performed by: INTERNAL MEDICINE

## 2019-06-04 PROCEDURE — 3044F HG A1C LEVEL LT 7.0%: CPT | Mod: CPTII,S$GLB,, | Performed by: INTERNAL MEDICINE

## 2019-06-04 PROCEDURE — 99215 PR OFFICE/OUTPT VISIT, EST, LEVL V, 40-54 MIN: ICD-10-PCS | Mod: S$GLB,,, | Performed by: INTERNAL MEDICINE

## 2019-06-04 NOTE — PROGRESS NOTES
"Subjective:       Patient ID: Osman Johansen is a 69 y.o. female.    Chief Complaint: Chest Pain    Patient comes in today with chest pain.  Complains of intermittent daily chest pain for the past 2 weeks.  Last night she had 2 episodes of chest pain that woke her up.   She describes it as a mild - moderate "pressure" pain in her left chest that radiates down her left arm.  It is associated with elevated pressure.  It was relieved by 1x NTG each to the two different times it occurred.    No nausea, vomiting.  No SOB.   In January of 2017 she had a normal stress test and was diagnosed with non cardiac chest pain.  She was given NTG at this visit.  The pain then was a constant, sharp pain for a week.    This pain over the past 2 weeks is different in that it is intermittent and is "pressure - like."   Patient was told yesterday to go to ER if recurred by our nurse, but did not.    HTN - uncontrolled    DM - controlled      Review of Systems   Constitutional: Negative for appetite change and fever.   HENT: Negative for nosebleeds and trouble swallowing.    Eyes: Negative for discharge and visual disturbance.   Respiratory: Negative for choking and shortness of breath.    Cardiovascular: Positive for chest pain. Negative for palpitations.   Gastrointestinal: Negative for abdominal pain, nausea and vomiting.   Musculoskeletal: Negative for arthralgias and joint swelling.   Skin: Negative for rash and wound.   Neurological: Negative for dizziness and syncope.   Psychiatric/Behavioral: Negative for confusion and dysphoric mood.       Objective:      Vitals:    06/04/19 0949   BP: (!) 186/88   Pulse: 73     Physical Exam   Constitutional: She appears well-nourished.   Eyes: Conjunctivae and EOM are normal.   Neck: Normal range of motion.   Cardiovascular: Normal rate and regular rhythm.   Pulmonary/Chest: Effort normal and breath sounds normal.   Musculoskeletal:   Normal ROM bilateral    Neurological: No cranial nerve " deficit (grossly intact).   Skin: Skin is warm and dry.   Psychiatric: She has a normal mood and affect.   Alert and orientated   Vitals reviewed.        Assessment:       1. Unstable angina    2. Essential hypertension    3. Controlled type 2 diabetes mellitus without complication, without long-term current use of insulin    4. Hypothyroidism due to acquired atrophy of thyroid        Plan:       Unstable angina    Essential hypertension    Controlled type 2 diabetes mellitus without complication, without long-term current use of insulin    Hypothyroidism due to acquired atrophy of thyroid            Medication List with Changes/Refills   Current Medications    BENAZEPRIL (LOTENSIN) 20 MG TABLET    Take 1 tablet (20 mg total) by mouth 2 (two) times daily. May take additional 20 mg tab prn per instructions given    CALCIUM CARBONATE/VITAMIN D3 (CALCIUM 500 WITH D ORAL)    Take 1 capsule by mouth once daily.    CARBOXYMETHYLCELLULOSE (REFRESH PLUS) 0.5 % DPET    1 drop daily as needed.    DICYCLOMINE (BENTYL) 10 MG CAPSULE    Take 10 mg by mouth 4 (four) times daily as needed.     ESOMEPRAZOLE (NEXIUM) 40 MG CAPSULE    TAKE 1 CAPSULE BEFORE BREAKFAST    ESTRADIOL (CLIMARA) 0.075 MG/24 HR    APPLY TO CLEAN DRY SKIN ONCE PER WEEK    EZETIMIBE (ZETIA) 10 MG TABLET    Take 1 tablet (10 mg total) by mouth once daily.    FERROUS SULFATE (FEOSOL) 325 MG (65 MG IRON) TAB TABLET    Take 1 tablet (325 mg total) by mouth daily with breakfast.    FLUTICASONE (FLONASE) 50 MCG/ACTUATION NASAL SPRAY    USE 1 SPRAY IN EACH NOSTRIL DAILY    HYOSCYAMINE (LEVSIN/SL) 0.125 MG SUBL    Place 1 tablet (0.125 mg total) under the tongue every 4 (four) hours as needed.    LIDOCAINE (LIDODERM) 5 %    Place 1 patch onto the skin once daily. Remove & Discard patch within 12 hours or as directed by MD    METFORMIN (GLUCOPHAGE) 500 MG TABLET    TAKE 1 TABLET DAILY    MULTIVITAMIN (MULTIPLE VITAMIN ORAL)    Take 1 capsule by mouth once daily.     NITROGLYCERIN (NITROSTAT) 0.4 MG SL TABLET    Place 1 tablet (0.4 mg total) under the tongue every 5 (five) minutes as needed for Chest pain.    OXYBUTYNIN (DITROPAN-XL) 10 MG 24 HR TABLET    Take 1 tablet (10 mg total) by mouth once daily.    PROMETHAZINE (PHENERGAN) 25 MG TABLET    TAKE 1 TABLET EVERY 6 HOURS AS NEEDED FOR NAUSEA    ROPINIROLE (REQUIP) 2 MG TABLET    Take 1.5 tablets (3 mg total) by mouth nightly.    SUBOXONE 8-2 MG FILM    1 Film by Subdermal route once daily. 1 strip last 7days    SUCRALFATE (CARAFATE) 1 GRAM TABLET    TAKE 1 TABLET FOUR TIMES A DAY AS NEEDED FOR GASTRITIS    SYNTHROID 100 MCG TABLET    Take 1 tablet (100 mcg total) by mouth once daily.     Concern is for this unstable angina to turn into a full myocardial infarct.  Symptoms are classic cardiac pain.  Will likely need a heart cath.  Cardiac stress test is only good for one year and it has been > 2 years.  In addition, this pain is different and more consistent with cardiac type pain.    Advised for me to call ambulance for safety.  Patient refused but stated she will go straight to ER.   Continue current management and monitor.    After hospital visit

## 2019-06-14 ENCOUNTER — HOSPITAL ENCOUNTER (OUTPATIENT)
Dept: RADIOLOGY | Facility: HOSPITAL | Age: 69
Discharge: HOME | End: 2019-06-14
Attending: INTERNAL MEDICINE
Payer: COMMERCIAL

## 2019-06-14 ENCOUNTER — OFFICE VISIT (OUTPATIENT)
Dept: CARDIOLOGY | Facility: CLINIC | Age: 69
End: 2019-06-14
Payer: COMMERCIAL

## 2019-06-14 VITALS
SYSTOLIC BLOOD PRESSURE: 138 MMHG | WEIGHT: 137.13 LBS | DIASTOLIC BLOOD PRESSURE: 69 MMHG | BODY MASS INDEX: 25.89 KG/M2 | HEART RATE: 67 BPM | HEIGHT: 61 IN

## 2019-06-14 DIAGNOSIS — I51.89 DIASTOLIC DYSFUNCTION: ICD-10-CM

## 2019-06-14 DIAGNOSIS — I34.0 NON-RHEUMATIC MITRAL REGURGITATION: ICD-10-CM

## 2019-06-14 DIAGNOSIS — R07.2 PRECORDIAL PAIN: ICD-10-CM

## 2019-06-14 DIAGNOSIS — Z91.89 AT RISK FOR CORONARY ARTERY DISEASE: ICD-10-CM

## 2019-06-14 DIAGNOSIS — I10 ESSENTIAL (PRIMARY) HYPERTENSION: Primary | ICD-10-CM

## 2019-06-14 PROBLEM — I20.89 ANGINA OF EFFORT: Status: ACTIVE | Noted: 2019-06-14

## 2019-06-14 PROCEDURE — 1101F PT FALLS ASSESS-DOCD LE1/YR: CPT | Mod: CPTII,S$GLB,, | Performed by: INTERNAL MEDICINE

## 2019-06-14 PROCEDURE — 99999 PR PBB SHADOW E&M-EST. PATIENT-LVL IV: ICD-10-PCS | Mod: PBBFAC,,, | Performed by: INTERNAL MEDICINE

## 2019-06-14 PROCEDURE — 99214 OFFICE O/P EST MOD 30 MIN: CPT | Mod: S$GLB,,, | Performed by: INTERNAL MEDICINE

## 2019-06-14 PROCEDURE — 75571 CT HRT W/O DYE W/CA TEST: CPT | Mod: 26,,, | Performed by: RADIOLOGY

## 2019-06-14 PROCEDURE — 3078F DIAST BP <80 MM HG: CPT | Mod: CPTII,S$GLB,, | Performed by: INTERNAL MEDICINE

## 2019-06-14 PROCEDURE — 1101F PR PT FALLS ASSESS DOC 0-1 FALLS W/OUT INJ PAST YR: ICD-10-PCS | Mod: CPTII,S$GLB,, | Performed by: INTERNAL MEDICINE

## 2019-06-14 PROCEDURE — 3075F PR MOST RECENT SYSTOLIC BLOOD PRESS GE 130-139MM HG: ICD-10-PCS | Mod: CPTII,S$GLB,, | Performed by: INTERNAL MEDICINE

## 2019-06-14 PROCEDURE — 75571 CT HRT W/O DYE W/CA TEST: CPT | Mod: TC,PO

## 2019-06-14 PROCEDURE — 3078F PR MOST RECENT DIASTOLIC BLOOD PRESSURE < 80 MM HG: ICD-10-PCS | Mod: CPTII,S$GLB,, | Performed by: INTERNAL MEDICINE

## 2019-06-14 PROCEDURE — 75571 CT CALCIUM SCORING CARDIAC: ICD-10-PCS | Mod: 26,,, | Performed by: RADIOLOGY

## 2019-06-14 PROCEDURE — 99214 PR OFFICE/OUTPT VISIT, EST, LEVL IV, 30-39 MIN: ICD-10-PCS | Mod: S$GLB,,, | Performed by: INTERNAL MEDICINE

## 2019-06-14 PROCEDURE — 99999 PR PBB SHADOW E&M-EST. PATIENT-LVL IV: CPT | Mod: PBBFAC,,, | Performed by: INTERNAL MEDICINE

## 2019-06-14 PROCEDURE — 3075F SYST BP GE 130 - 139MM HG: CPT | Mod: CPTII,S$GLB,, | Performed by: INTERNAL MEDICINE

## 2019-06-14 RX ORDER — HYDROCHLOROTHIAZIDE 12.5 MG/1
12.5 TABLET ORAL EVERY OTHER DAY
Qty: 15 TABLET | Refills: 5 | Status: SHIPPED | OUTPATIENT
Start: 2019-06-14 | End: 2019-12-09 | Stop reason: SDUPTHER

## 2019-06-14 NOTE — PROGRESS NOTES
Subjective:    Patient ID:  Osman Johansen is a 69 y.o. female who presents for Chest Pain (F/U STPH) and Hypertension        HPI  S/P STPH WITH CP, RECORDS REVIEWED NEGATIVE W/U, STRESS, HCTZ ADDED, DROPPED BP TOO MUCH, ECHO MILD MR, DIASTOLIC DYSFUNCTION,LDL 86,  SEE ROS    Past Medical History:   Diagnosis Date    Allergy     Anxiety     Arthritis     Blood transfusion 8 yrs    CAD (coronary artery disease)     Cataract     Degenerative disc disease     Depression     Dry eye syndrome     GERD (gastroesophageal reflux disease)     Hypertension     Macular drusen     Neuromuscular disorder     Ocular hypertension, bilateral     Osteoporosis     PUD (peptic ulcer disease)     Thoracic or lumbosacral neuritis or radiculitis 10/19/2012    Thyroid disease      Past Surgical History:   Procedure Laterality Date    APPENDECTOMY  1965    BLOCK, NERVE Bilateral 8/21/2018    [facility]    BLOCK-NERVE-MEDIAL BRANCH-LUMBAR Bilateral 12/14/2017    [facility]    BLOCK-NERVE-MEDIAL BRANCH-LUMBAR Bilateral 4/17/2017    [facility]    CARDIAC CATHETERIZATION      COLONOSCOPY N/A 11/29/2012    [facility]    HYSTERECTOMY  8 yrs     INJECTION N/A 8/21/2018    [facility]    INJECTION, JOINT, SHOULDER, HIP, OR KNEE Bilateral 10/28/2013    [facility]    INJECTION, STEROID, SPINE, LUMBAR, EPIDURAL Bilateral 7/18/2013    [facility]    INJECTION-FACET Bilateral 3/13/2017    [facility]    INJECTION-FACET Bilateral 12/15/2016    [facility]    INJECTION-JOINT Bilateral 4/20/2015    [facility]    TONSILLECTOMY  1954     Family History   Problem Relation Age of  Onset    Arthritis Mother     Cancer Mother     Heart disease Mother     Hypertension Mother     Cataracts Mother     Ovarian cancer Mother     Ulcers Father     Thyroid disease Brother     Heart disease Brother     Amblyopia Neg Hx     Blindness Neg Hx     Diabetes Neg Hx     Glaucoma Neg Hx     Macular degeneration Neg Hx     Retinal detachment Neg Hx     Strabismus Neg Hx     Stroke Neg Hx      Social History     Socioeconomic History    Marital status:      Spouse name: Not on file    Number of children: Not on file    Years of education: Not on file    Highest education level: Not on file   Occupational History    Not on file   Social Needs    Financial resource strain: Not on file    Food insecurity:     Worry: Not on file     Inability: Not on file    Transportation needs:     Medical: Not on file     Non-medical: Not on file   Tobacco Use    Smoking status: Former Smoker     Last attempt to quit: 2008     Years since quittin.4    Smokeless tobacco: Never Used   Substance and Sexual Activity    Alcohol use: No     Alcohol/week: 0.0 oz    Drug use: No    Sexual activity: Never   Lifestyle    Physical activity:     Days per week: Not on file     Minutes per session: Not on file    Stress: Not on file   Relationships    Social connections:     Talks on phone: Not on file     Gets together: Not on file     Attends Spiritism service: Not on file     Active member of club or organization: Not on file     Attends meetings of clubs or organizations: Not on file     Relationship status: Not on file   Other Topics Concern    Not on file   Social History Narrative    Not on file       Review of patient's allergies indicates:   Allergen Reactions    Pcn [penicillins] Swelling    Toradol [ketorolac] Swelling    Vibramycin [doxycycline calcium] Swelling    Zetia [ezetimibe] Other (See Comments)    Cymbalta [duloxetine]      dizzyness    Elavil [amitriptyline]      Lyrica [pregabalin] Swelling    Seroquel [quetiapine]      restless leg symdrome       Current Outpatient Medications:     amLODIPine (NORVASC) 5 MG tablet, Take 1 tablet (5 mg total) by mouth every evening., Disp: 90 tablet, Rfl: 6    CALCIUM CARBONATE/VITAMIN D3 (CALCIUM 500 WITH D ORAL), Take 1 capsule by mouth once daily., Disp: , Rfl:     carboxymethylcellulose (REFRESH PLUS) 0.5 % Dpet, 1 drop daily as needed., Disp: , Rfl:     esomeprazole (NEXIUM) 40 MG capsule, TAKE 1 CAPSULE BEFORE BREAKFAST, Disp: 90 capsule, Rfl: 1    estradiol (CLIMARA) 0.075 mg/24 hr, APPLY TO CLEAN DRY SKIN ONCE PER WEEK, Disp: 24 patch, Rfl: 3    fluticasone (FLONASE) 50 mcg/actuation nasal spray, USE 1 SPRAY IN EACH NOSTRIL DAILY (Patient taking differently: USE 1 SPRAY IN EACH NOSTRIL DAILY PRN), Disp: 16 g, Rfl: 11    hyoscyamine (LEVSIN/SL) 0.125 mg Subl, Place 1 tablet (0.125 mg total) under the tongue every 4 (four) hours as needed., Disp: 45 tablet, Rfl: 3    lidocaine (LIDODERM) 5 %, Place 1 patch onto the skin once daily. Remove & Discard patch within 12 hours or as directed by MD, Disp: 30 patch, Rfl: 2    lisinopril (PRINIVIL,ZESTRIL) 20 MG tablet, Take 2 tablets (40 mg total) by mouth every morning., Disp: 90 tablet, Rfl: 6    MULTIVITAMIN (MULTIPLE VITAMIN ORAL), Take 1 capsule by mouth once daily., Disp: , Rfl:     nitroGLYCERIN (NITROSTAT) 0.4 MG SL tablet, Place 1 tablet (0.4 mg total) under the tongue every 5 (five) minutes as needed for Chest pain., Disp: 30 tablet, Rfl: 0    oxybutynin (DITROPAN-XL) 10 MG 24 hr tablet, Take 1 tablet (10 mg total) by mouth once daily., Disp: 90 tablet, Rfl: 3    promethazine (PHENERGAN) 25 MG tablet, TAKE 1 TABLET EVERY 6 HOURS AS NEEDED FOR NAUSEA, Disp: 45 tablet, Rfl: 2    ropinirole (REQUIP) 2 MG tablet, Take 1.5 tablets (3 mg total) by mouth nightly. (Patient taking differently: Take 2 mg by mouth nightly as needed. ), Disp: 135 tablet, Rfl: 3    SUBOXONE 8-2 mg  Film, 1 Film by Subdermal route once daily. 1 strip every 3 to 4 days, Disp: , Rfl: 0    SYNTHROID 100 mcg tablet, Take 1 tablet (100 mcg total) by mouth once daily., Disp: 90 tablet, Rfl: 2    dicyclomine (BENTYL) 10 MG capsule, Take 10 mg by mouth 4 (four) times daily as needed. , Disp: , Rfl:     ferrous sulfate (FEOSOL) 325 mg (65 mg iron) Tab tablet, Take 1 tablet (325 mg total) by mouth daily with breakfast., Disp: 90 tablet, Rfl: 3    hydroCHLOROthiazide (HYDRODIURIL) 12.5 MG Tab, Take 1 tablet (12.5 mg total) by mouth every other day., Disp: 15 tablet, Rfl: 5    Review of Systems   Constitution: Negative for chills, diaphoresis, malaise/fatigue and night sweats.   HENT: Negative for congestion, hearing loss and nosebleeds.    Eyes: Negative for blurred vision and visual disturbance.   Cardiovascular: Negative for chest pain, claudication, cyanosis, dyspnea on exertion (MILD), irregular heartbeat, leg swelling, near-syncope, orthopnea, palpitations, paroxysmal nocturnal dyspnea and syncope.   Respiratory: Negative for cough, hemoptysis, shortness of breath and wheezing.    Endocrine: Negative for cold intolerance, heat intolerance and polyuria.   Hematologic/Lymphatic: Negative for adenopathy. Does not bruise/bleed easily.   Skin: Negative for color change, itching and nail changes.   Musculoskeletal: Negative for back pain, falls and joint pain.   Gastrointestinal: Negative for abdominal pain, change in bowel habit, constipation, flatus, heartburn, hematemesis, jaundice, melena and vomiting.   Genitourinary: Negative for dysuria, flank pain and frequency.   Neurological: Negative for brief paralysis, dizziness (WITH BP DROP), focal weakness, light-headedness, loss of balance, numbness, paresthesias, seizures, sensory change, tremors and weakness.   Psychiatric/Behavioral: Negative for altered mental status, depression and memory loss. The patient is not nervous/anxious.    Allergic/Immunologic: Negative  "for hives and persistent infections.        Objective:      Vitals:    06/14/19 1140   BP: 138/69   Pulse: 67   Weight: 62.2 kg (137 lb 2 oz)   Height: 5' 1" (1.549 m)   PainSc: 0-No pain     Body mass index is 25.91 kg/m².    Physical Exam   Constitutional: She is oriented to person, place, and time. She appears well-developed and well-nourished. She is active.   HENT:   Head: Normocephalic and atraumatic.   Mouth/Throat: Oropharynx is clear and moist and mucous membranes are normal.   Eyes: Pupils are equal, round, and reactive to light. Conjunctivae and EOM are normal.   Neck: Trachea normal and normal range of motion. Neck supple. Normal carotid pulses, no hepatojugular reflux and no JVD present. Carotid bruit is not present. No tracheal deviation, no edema and no erythema present. No thyromegaly present.   Cardiovascular: Normal rate, regular rhythm and normal heart sounds.  No extrasystoles are present. PMI is not displaced. Exam reveals no gallop and no friction rub.   No murmur heard.  Pulses:       Carotid pulses are 2+ on the right side, and 2+ on the left side.       Radial pulses are 2+ on the right side, and 2+ on the left side.        Femoral pulses are 2+ on the right side, and 2+ on the left side.       Popliteal pulses are 2+ on the right side, and 2+ on the left side.        Dorsalis pedis pulses are 2+ on the right side, and 2+ on the left side.        Posterior tibial pulses are 2+ on the right side, and 2+ on the left side.   Pulmonary/Chest: Effort normal and breath sounds normal. No tachypnea and no bradypnea. She has no wheezes. She has no rales. She exhibits no tenderness.   Abdominal: Soft. Bowel sounds are normal. She exhibits no distension and no mass. There is no hepatosplenomegaly. There is no tenderness. There is no guarding and no CVA tenderness.   Musculoskeletal: Normal range of motion. She exhibits no edema or tenderness.   Lymphadenopathy:     She has no cervical adenopathy. "   Neurological: She is alert and oriented to person, place, and time. She has normal strength. She displays no tremor. No cranial nerve deficit. She exhibits normal muscle tone. Coordination normal.   Skin: Skin is warm and dry. No rash noted. No cyanosis or erythema. No pallor.   Psychiatric: She has a normal mood and affect. Her speech is normal and behavior is normal. Judgment and thought content normal.               ..    Chemistry        Component Value Date/Time     06/04/2019 1117    K 4.0 06/04/2019 1117     06/04/2019 1117    CO2 24 06/04/2019 1117    BUN 20 (H) 06/04/2019 1117    CREATININE 0.96 06/04/2019 1117    GLU 86 06/04/2019 1117        Component Value Date/Time    CALCIUM 9.2 06/04/2019 1117    ALKPHOS 47 06/04/2019 1117    AST 27 06/04/2019 1117    ALT 26 06/04/2019 1117    BILITOT 0.2 06/04/2019 1117    ESTGFRAFRICA >60 06/04/2019 1117    EGFRNONAA >60 06/04/2019 1117            ..  Lab Results   Component Value Date    CHOL 211 (H) 06/05/2019    CHOL 243 (H) 02/14/2019    CHOL 245 (H) 08/20/2018     Lab Results   Component Value Date    HDL 93 (H) 06/05/2019    HDL 91 (H) 02/14/2019     (H) 08/20/2018     Lab Results   Component Value Date    LDLCALC 86.4 06/05/2019    LDLCALC 130.0 02/14/2019    LDLCALC 128.8 08/20/2018     Lab Results   Component Value Date    TRIG 158 (H) 06/05/2019    TRIG 110 02/14/2019    TRIG 61 08/20/2018     Lab Results   Component Value Date    CHOLHDL 44.1 06/05/2019    CHOLHDL 37.4 02/14/2019    CHOLHDL 42.4 08/20/2018     ..  Lab Results   Component Value Date    WBC 6.86 06/05/2019    HGB 10.3 (L) 06/05/2019    HCT 31.6 (L) 06/05/2019    MCV 88 06/05/2019     06/05/2019       Test(s) Reviewed  I have reviewed the following in detail:  [x] Stress test   [] Angiography   [x] Echocardiogram   [x] Labs   [x] Other:       Assessment:         ICD-10-CM ICD-9-CM   1. Essential (primary) hypertension I10 401.9   2. Non-rheumatic mitral  regurgitation I34.0 424.0   3. Diastolic dysfunction I51.9 429.9   4. Precordial pain R07.2 786.51   5. At risk for coronary artery disease Z91.89 V49.89     Problem List Items Addressed This Visit        Cardiac/Vascular    Essential (primary) hypertension - Primary    Relevant Orders    Basic metabolic panel    Non-rheumatic mitral regurgitation    Diastolic dysfunction    Angina of effort    At risk for coronary artery disease    Relevant Orders    CT Cardiac Scoring (Completed)           Plan:     DECREASE HCTZ TO 12.5 QOD, WATCH BP, HAD SIGNIFICANT DROP WITH THE DAILY HCTZ AND SHE WAS TAKING INITIALLY 25 MG CHECK CA SCORE, ALL CV CLINICALLY STABLE, NO ANGINA, NO HF, NO TIA, NO CLINICAL ARRHYTHMIA,CONTINUE CURRENT MEDS, EDUCATION, DIET, EXERCISERTC IN 6 MO WITH LABS      Essential (primary) hypertension  -     Basic metabolic panel; Future; Expected date: 06/14/2019    Non-rheumatic mitral regurgitation    Diastolic dysfunction    Precordial pain    At risk for coronary artery disease  -     CT Cardiac Scoring; Future; Expected date: 06/14/2019    Other orders  -     hydroCHLOROthiazide (HYDRODIURIL) 12.5 MG Tab; Take 1 tablet (12.5 mg total) by mouth every other day.  Dispense: 15 tablet; Refill: 5    RTC Low level/low impact aerobic exercise 5x's/wk. Heart healthy diet and risk factor modification.    See labs and med orders.    Aerobic exercise 5x's/wk. Heart healthy diet and risk factor modification.    See labs and med orders.

## 2019-06-24 ENCOUNTER — TELEPHONE (OUTPATIENT)
Dept: CARDIOLOGY | Facility: CLINIC | Age: 69
End: 2019-06-24

## 2019-06-25 RX ORDER — PRAVASTATIN SODIUM 20 MG/1
20 TABLET ORAL DAILY
Qty: 90 TABLET | Refills: 0 | Status: SHIPPED | OUTPATIENT
Start: 2019-06-25 | End: 2019-09-19 | Stop reason: SDUPTHER

## 2019-06-25 NOTE — TELEPHONE ENCOUNTER
----- Message from Sourav Nugent MD sent at 6/24/2019  6:25 PM CDT -----  I talked to patient about high calcium score will need to start pravastatin 20 mg take half a pill initially

## 2019-06-27 ENCOUNTER — OFFICE VISIT (OUTPATIENT)
Dept: NEUROLOGY | Facility: CLINIC | Age: 69
End: 2019-06-27
Payer: COMMERCIAL

## 2019-06-27 VITALS — BODY MASS INDEX: 25.89 KG/M2 | WEIGHT: 137.13 LBS | HEIGHT: 61 IN

## 2019-06-27 DIAGNOSIS — G47.31 COMPLEX SLEEP APNEA SYNDROME: ICD-10-CM

## 2019-06-27 DIAGNOSIS — G25.81 RLS (RESTLESS LEGS SYNDROME): Primary | ICD-10-CM

## 2019-06-27 PROBLEM — R07.9 CHEST PAIN AT REST: Status: RESOLVED | Noted: 2017-01-18 | Resolved: 2019-06-27

## 2019-06-27 PROBLEM — I10 UNCONTROLLED HYPERTENSION: Status: RESOLVED | Noted: 2019-06-04 | Resolved: 2019-06-27

## 2019-06-27 PROBLEM — G47.39 COMPLEX SLEEP APNEA SYNDROME: Status: ACTIVE | Noted: 2019-06-27

## 2019-06-27 PROCEDURE — 99999 PR PBB SHADOW E&M-EST. PATIENT-LVL III: ICD-10-PCS | Mod: PBBFAC,,, | Performed by: PSYCHIATRY & NEUROLOGY

## 2019-06-27 PROCEDURE — 99999 PR PBB SHADOW E&M-EST. PATIENT-LVL III: CPT | Mod: PBBFAC,,, | Performed by: PSYCHIATRY & NEUROLOGY

## 2019-06-27 PROCEDURE — 99214 PR OFFICE/OUTPT VISIT, EST, LEVL IV, 30-39 MIN: ICD-10-PCS | Mod: S$GLB,,, | Performed by: PSYCHIATRY & NEUROLOGY

## 2019-06-27 PROCEDURE — 1101F PR PT FALLS ASSESS DOC 0-1 FALLS W/OUT INJ PAST YR: ICD-10-PCS | Mod: CPTII,S$GLB,, | Performed by: PSYCHIATRY & NEUROLOGY

## 2019-06-27 PROCEDURE — 1101F PT FALLS ASSESS-DOCD LE1/YR: CPT | Mod: CPTII,S$GLB,, | Performed by: PSYCHIATRY & NEUROLOGY

## 2019-06-27 PROCEDURE — 99214 OFFICE O/P EST MOD 30 MIN: CPT | Mod: S$GLB,,, | Performed by: PSYCHIATRY & NEUROLOGY

## 2019-06-27 NOTE — ASSESSMENT & PLAN NOTE
Well controlled, if she uses suboxone.  Unable to tolerate much else.  Opiates are third line in treating RLS, so I encouraged her to continue this low dose medication.   -> no changes   -> consider retesting ferritin and IV if <40

## 2019-06-27 NOTE — PROGRESS NOTES
"Osman REYNAGA Chief Complaints during this visit:  f/u Patient visit for  RLS, memory loss    No referring provider defined for this encounter.    Primary Care Physician  Galindo Stiles MD  [address] [place] [address]          History of present illness:   69 y.o.  W seen in f/u for RLS and memory loss.  Trying to wean off suboxone, but cannot tolerate off (RLS gets worse immediately).    Chest pain during night with radiation down left arm.  Recent testing at [facility] (nuclear test, no angiogram) all normal.    Was told in past (Dr. Rosado) that she had complex NAOMI and would need a special "cpap" machine.        10/2017:  Since ferritin infusions, RLS has been much better controlled.  No events at all except after extreme walking (like a whole day of walking).  Not even taking requip.  Memory loss is static.    Interval history 5/3/17:  consultation at the request of  Dr. Pearson for evaluation of RLS and memory loss.    Says she gets RLS every time she gets epidural.  requip seems to help.  Was on fentanyl patch for about 8 years.  She was able to wean herself off of the oral pain medications, but had to be hospitalized to get off of the fentanyl.  Now on suboxone, tiny piece, but would like to get off of that because she gets muscle spasms in jaw, body that she feels is still due to the opiate.  Describes augmentation on requip (worse 3 days into taking it).  Prior to pain meds, she says she only had two episodes of RLS, both as s/e to medications (elavil).  Gets tightness in muscles, tense when watching tv or during night for past 3 years.  12 years ago, she was diagnosed with iron deficiency anemia.      From Lili's note 2/16/17:  The patient is a 67 y.o. female referred for evaluation of RLS. This began about 12 years ago. She describes it as discomfort in the bilateral LE. It is worse at night. It also worsens with Elavil and steroids. She notices it more when she attempts to wean down on " narcotics. She is presently on Suboxone. She indicates that this is preventing her from stopping the Suboxone altogether. She is on Requip, which she feels has not been helpful. These symptoms are present whenever she does not take Suboxone.     The patient is most concerned with memory loss. This issue began about 20 years ago. It has been gradually progressive, though she does have good days and bad days. She has issues with word finding. It involves short-term memory more than long-term memory. She reports some difficulties with executive function. There are issues with multiple step processes. She has no clear problems with ADLs. She does get lost in familiar areas. There are no hallucinations. There are some personality changes. She does endorse depression, anxiety, and racing of thoughts.    II.  Review of systems:  As in HPI,  otherwise, balance 10 systems reviewed and are negative.    III.  Past Medical History:   Diagnosis Date    Allergy     Anxiety     Arthritis     Blood transfusion 8 yrs    CAD (coronary artery disease)     Cataract     Degenerative disc disease     Depression     Dry eye syndrome     GERD (gastroesophageal reflux disease)     Hypertension     Macular drusen     Neuromuscular disorder     Ocular hypertension, bilateral     Osteoporosis     PUD (peptic ulcer disease)     Thoracic or lumbosacral neuritis or radiculitis 10/19/2012    Thyroid disease      Family History   Problem Relation Age of Onset    Arthritis Mother     Cancer Mother     Heart disease Mother     Hypertension Mother     Cataracts Mother     Ovarian cancer Mother     Ulcers Father     Thyroid disease Brother     Heart disease Brother     Amblyopia Neg Hx     Blindness Neg Hx     Diabetes Neg Hx     Glaucoma Neg Hx     Macular degeneration Neg Hx     Retinal detachment Neg Hx     Strabismus Neg Hx     Stroke Neg Hx      Sochx:        Current Outpatient Medications on File Prior to  Visit   Medication Sig Dispense Refill    amLODIPine (NORVASC) 5 MG tablet Take 1 tablet (5 mg total) by mouth every evening. 90 tablet 6    CALCIUM CARBONATE/VITAMIN D3 (CALCIUM 500 WITH D ORAL) Take 1 capsule by mouth once daily.      carboxymethylcellulose (REFRESH PLUS) 0.5 % Dpet 1 drop daily as needed.      dicyclomine (BENTYL) 10 MG capsule Take 10 mg by mouth 4 (four) times daily as needed.       esomeprazole (NEXIUM) 40 MG capsule TAKE 1 CAPSULE BEFORE BREAKFAST 90 capsule 1    estradiol (CLIMARA) 0.075 mg/24 hr APPLY TO CLEAN DRY SKIN ONCE PER WEEK 24 patch 3    ferrous sulfate (FEOSOL) 325 mg (65 mg iron) Tab tablet Take 1 tablet (325 mg total) by mouth daily with breakfast. 90 tablet 3    fluticasone (FLONASE) 50 mcg/actuation nasal spray USE 1 SPRAY IN EACH NOSTRIL DAILY (Patient taking differently: USE 1 SPRAY IN EACH NOSTRIL DAILY PRN) 16 g 11    hydroCHLOROthiazide (HYDRODIURIL) 12.5 MG Tab Take 1 tablet (12.5 mg total) by mouth every other day. 15 tablet 5    hyoscyamine (LEVSIN/SL) 0.125 mg Subl Place 1 tablet (0.125 mg total) under the tongue every 4 (four) hours as needed. 45 tablet 3    lidocaine (LIDODERM) 5 % Place 1 patch onto the skin once daily. Remove & Discard patch within 12 hours or as directed by MD 30 patch 2    MULTIVITAMIN (MULTIPLE VITAMIN ORAL) Take 1 capsule by mouth once daily.      nitroGLYCERIN (NITROSTAT) 0.4 MG SL tablet Place 1 tablet (0.4 mg total) under the tongue every 5 (five) minutes as needed for Chest pain. 30 tablet 0    oxybutynin (DITROPAN-XL) 10 MG 24 hr tablet Take 1 tablet (10 mg total) by mouth once daily. 90 tablet 3    pravastatin (PRAVACHOL) 20 MG tablet Take 1 tablet (20 mg total) by mouth once daily. 90 tablet 0    promethazine (PHENERGAN) 25 MG tablet TAKE 1 TABLET EVERY 6 HOURS AS NEEDED FOR NAUSEA 45 tablet 2    ropinirole (REQUIP) 2 MG tablet Take 1.5 tablets (3 mg total) by mouth nightly. (Patient taking differently: Take 2 mg by  "mouth nightly as needed. ) 135 tablet 3    SUBOXONE 8-2 mg Film 1 Film by Subdermal route once daily. 1 strip every 3 to 4 days  0    SYNTHROID 100 mcg tablet Take 1 tablet (100 mcg total) by mouth once daily. 90 tablet 2    lisinopril (PRINIVIL,ZESTRIL) 20 MG tablet Take 2 tablets (40 mg total) by mouth every morning. 90 tablet 6     No current facility-administered medications on file prior to visit.        PRIOR problem-specific medications tried:  lyrica    Review of patient's allergies indicates:   Allergen Reactions    Pcn [penicillins] Swelling    Toradol [ketorolac] Swelling    Vibramycin [doxycycline calcium] Swelling    Cymbalta [duloxetine]      dizzyness    Elavil [amitriptyline]     Lyrica [pregabalin] Swelling    Seroquel [quetiapine]      restless leg symdrome       IV. Physical Exam    Vitals:    06/27/19 1407   Weight: 62.2 kg (137 lb 2 oz)   Height: 5' 1" (1.549 m)     General appearance: Well nourished, well developed, no acute distress.          -------------------------------------------------------------  Facial Expression: normal       Affect: full       Orientation to time & place:  Oriented to time, place, person and situation       Speech:  normal (not dysarthric)  Gait:  normal   --------------------------------------------------------------  MOVEMENT DISORDERS FOCUSED EXAM  Abnormality of movement (bradykinesia, hyperkinesia) present? No    Tremor present?   No   Posture:  normal  Postural stability:  no Rhomberg    V.  Laboratory/ Radiological Data:          Lab Results   Component Value Date    FERRITIN 31 02/26/2019     Lab Results   Component Value Date    TSH <0.015 (L) 06/04/2019      Lab Results   Component Value Date    [id number] 03/03/2017      PSG 2011:     IMPRESSION: Complex Sleep Apnea (327.21) CompSA. The patient qualified for titration based on AHI criteria, but central apneas emerged with application of CPAP / bilevel PAP. An effective pressure was not " determined.   RECOMMENDATION: Titration with Auto Servo Ventilation (ASV) ( device)       3/3/17 MRI brain:    1. Multiple foci of white matter signal abnormality nonspecific on this examination but statistically favored to relate  to microvascular ischemic change        VI. Assessment and Plan            Problem List Items Addressed This Visit        2     RLS (restless legs syndrome) - Primary    Current Assessment & Plan     Well controlled, if she uses suboxone.  Unable to tolerate much else.  Opiates are third line in treating RLS, so I encouraged her to continue this low dose medication.   -> no changes   -> consider retesting ferritin and IV if <40         Relevant Orders    Ambulatory referral to Sleep Disorders       3     Complex sleep apnea syndrome    Overview     PSG 2011 ([facility]):  Complex sleep apnea, recommend ASV         Current Assessment & Plan     She did not follow-up 8 years ago for the ASV titration and now worries that her chest pain at night could be due to untreated NAOMI.   -> refer to Sleep Clinic at [facility]         Relevant Orders    Ambulatory referral to Sleep Disorders                Follow up in about 6 months (around 12/27/2019).

## 2019-06-27 NOTE — ASSESSMENT & PLAN NOTE
She did not follow-up 8 years ago for the ASV titration and now worries that her chest pain at night could be due to untreated NAOIM.   -> refer to Sleep Clinic at [facility]

## 2019-08-13 ENCOUNTER — PATIENT OUTREACH (OUTPATIENT)
Dept: ADMINISTRATIVE | Facility: HOSPITAL | Age: 69
End: 2019-08-13

## 2019-08-20 ENCOUNTER — LAB VISIT (OUTPATIENT)
Dept: LAB | Facility: HOSPITAL | Age: 69
End: 2019-08-20
Attending: INTERNAL MEDICINE
Payer: COMMERCIAL

## 2019-08-20 DIAGNOSIS — D50.9 IRON DEFICIENCY ANEMIA, UNSPECIFIED IRON DEFICIENCY ANEMIA TYPE: ICD-10-CM

## 2019-08-20 DIAGNOSIS — E11.9 CONTROLLED TYPE 2 DIABETES MELLITUS WITHOUT COMPLICATION, WITHOUT LONG-TERM CURRENT USE OF INSULIN: ICD-10-CM

## 2019-08-20 DIAGNOSIS — E78.5 DYSLIPIDEMIA: ICD-10-CM

## 2019-08-20 DIAGNOSIS — I10 ESSENTIAL HYPERTENSION: ICD-10-CM

## 2019-08-20 DIAGNOSIS — E03.4 HYPOTHYROIDISM DUE TO ACQUIRED ATROPHY OF THYROID: ICD-10-CM

## 2019-08-20 LAB
ALBUMIN SERPL BCP-MCNC: 3.5 G/DL (ref 3.5–5.2)
ALBUMIN/CREAT UR: 8 UG/MG (ref 0–30)
ALP SERPL-CCNC: 53 U/L (ref 55–135)
ALT SERPL W/O P-5'-P-CCNC: 9 U/L (ref 10–44)
ANION GAP SERPL CALC-SCNC: 10 MMOL/L (ref 8–16)
AST SERPL-CCNC: 18 U/L (ref 10–40)
BASOPHILS # BLD AUTO: 0.07 K/UL (ref 0–0.2)
BASOPHILS NFR BLD: 1.1 % (ref 0–1.9)
BILIRUB SERPL-MCNC: 0.2 MG/DL (ref 0.1–1)
BUN SERPL-MCNC: 20 MG/DL (ref 8–23)
CALCIUM SERPL-MCNC: 9.3 MG/DL (ref 8.7–10.5)
CHLORIDE SERPL-SCNC: 106 MMOL/L (ref 95–110)
CHOLEST SERPL-MCNC: 192 MG/DL (ref 120–199)
CHOLEST/HDLC SERPL: 1.8 {RATIO} (ref 2–5)
CO2 SERPL-SCNC: 24 MMOL/L (ref 23–29)
CREAT SERPL-MCNC: 1 MG/DL (ref 0.5–1.4)
CREAT UR-MCNC: 138 MG/DL (ref 15–325)
DIFFERENTIAL METHOD: ABNORMAL
EOSINOPHIL # BLD AUTO: 0 K/UL (ref 0–0.5)
EOSINOPHIL NFR BLD: 0.5 % (ref 0–8)
ERYTHROCYTE [DISTWIDTH] IN BLOOD BY AUTOMATED COUNT: 13.5 % (ref 11.5–14.5)
EST. GFR  (AFRICAN AMERICAN): >60 ML/MIN/1.73 M^2
EST. GFR  (NON AFRICAN AMERICAN): 57.2 ML/MIN/1.73 M^2
ESTIMATED AVG GLUCOSE: 111 MG/DL (ref 68–131)
GLUCOSE SERPL-MCNC: 90 MG/DL (ref 70–110)
HBA1C MFR BLD HPLC: 5.5 % (ref 4–5.6)
HCT VFR BLD AUTO: 33.7 % (ref 37–48.5)
HDLC SERPL-MCNC: 104 MG/DL (ref 40–75)
HDLC SERPL: 54.2 % (ref 20–50)
HGB BLD-MCNC: 10.5 G/DL (ref 12–16)
IMM GRANULOCYTES # BLD AUTO: 0.03 K/UL (ref 0–0.04)
IMM GRANULOCYTES NFR BLD AUTO: 0.5 % (ref 0–0.5)
LDLC SERPL CALC-MCNC: 77.4 MG/DL (ref 63–159)
LYMPHOCYTES # BLD AUTO: 1.7 K/UL (ref 1–4.8)
LYMPHOCYTES NFR BLD: 26.8 % (ref 18–48)
MCH RBC QN AUTO: 28.5 PG (ref 27–31)
MCHC RBC AUTO-ENTMCNC: 31.2 G/DL (ref 32–36)
MCV RBC AUTO: 92 FL (ref 82–98)
MICROALBUMIN UR DL<=1MG/L-MCNC: 11 UG/ML
MONOCYTES # BLD AUTO: 0.4 K/UL (ref 0.3–1)
MONOCYTES NFR BLD: 5.8 % (ref 4–15)
NEUTROPHILS # BLD AUTO: 4.1 K/UL (ref 1.8–7.7)
NEUTROPHILS NFR BLD: 65.3 % (ref 38–73)
NONHDLC SERPL-MCNC: 88 MG/DL
NRBC BLD-RTO: 0 /100 WBC
PLATELET # BLD AUTO: 257 K/UL (ref 150–350)
PMV BLD AUTO: 11.5 FL (ref 9.2–12.9)
POTASSIUM SERPL-SCNC: 5.1 MMOL/L (ref 3.5–5.1)
PROT SERPL-MCNC: 6.8 G/DL (ref 6–8.4)
RBC # BLD AUTO: 3.68 M/UL (ref 4–5.4)
SODIUM SERPL-SCNC: 140 MMOL/L (ref 136–145)
TRIGL SERPL-MCNC: 53 MG/DL (ref 30–150)
TSH SERPL DL<=0.005 MIU/L-ACNC: 0.95 UIU/ML (ref 0.4–4)
WBC # BLD AUTO: 6.22 K/UL (ref 3.9–12.7)

## 2019-08-20 PROCEDURE — 80061 LIPID PANEL: CPT

## 2019-08-20 PROCEDURE — 85025 COMPLETE CBC W/AUTO DIFF WBC: CPT

## 2019-08-20 PROCEDURE — 84443 ASSAY THYROID STIM HORMONE: CPT

## 2019-08-20 PROCEDURE — 80053 COMPREHEN METABOLIC PANEL: CPT

## 2019-08-20 PROCEDURE — 82043 UR ALBUMIN QUANTITATIVE: CPT

## 2019-08-20 PROCEDURE — 83036 HEMOGLOBIN GLYCOSYLATED A1C: CPT

## 2019-08-20 PROCEDURE — 36415 COLL VENOUS BLD VENIPUNCTURE: CPT | Mod: PO

## 2019-08-23 ENCOUNTER — TELEPHONE (OUTPATIENT)
Dept: PAIN MEDICINE | Facility: CLINIC | Age: 69
End: 2019-08-23

## 2019-08-23 NOTE — TELEPHONE ENCOUNTER
----- Message from Rebekah Fowler sent at 8/23/2019  1:23 PM CDT -----  Contact: DEN MEJIAS [[id number]]  Name of Who is Calling: DEN MEJIAS [[id number]]       What is the request in detail: Patient is requesting a call from staff in regards to getting her injections schedule.....Please contact to further discuss and advise.     Can the clinic reply by [facility]: no     What [facility] to [facility] Back [facility]  [phone]

## 2019-08-23 NOTE — TELEPHONE ENCOUNTER
Staff contacted and spoke with patient in regards to her message.    Staff informed patient that she would need to be schedule for in office visit to be evaluated for possible injections.    Staff went ahead with patient permission to be schedule for 09.12.19 at 1:20p with WILMA    Pt verbalized understanding and has accepted the appt.

## 2019-08-27 ENCOUNTER — OFFICE VISIT (OUTPATIENT)
Dept: FAMILY MEDICINE | Facility: CLINIC | Age: 69
End: 2019-08-27
Payer: COMMERCIAL

## 2019-08-27 VITALS
HEIGHT: 61 IN | SYSTOLIC BLOOD PRESSURE: 130 MMHG | WEIGHT: 144.81 LBS | HEART RATE: 77 BPM | DIASTOLIC BLOOD PRESSURE: 74 MMHG | OXYGEN SATURATION: 97 % | BODY MASS INDEX: 27.34 KG/M2

## 2019-08-27 DIAGNOSIS — N39.41 URGE INCONTINENCE: ICD-10-CM

## 2019-08-27 DIAGNOSIS — E03.4 HYPOTHYROIDISM DUE TO ACQUIRED ATROPHY OF THYROID: ICD-10-CM

## 2019-08-27 DIAGNOSIS — D50.9 IRON DEFICIENCY ANEMIA, UNSPECIFIED IRON DEFICIENCY ANEMIA TYPE: ICD-10-CM

## 2019-08-27 DIAGNOSIS — K29.70 GASTRITIS, PRESENCE OF BLEEDING UNSPECIFIED, UNSPECIFIED CHRONICITY, UNSPECIFIED GASTRITIS TYPE: ICD-10-CM

## 2019-08-27 DIAGNOSIS — E78.5 DYSLIPIDEMIA: ICD-10-CM

## 2019-08-27 DIAGNOSIS — I25.10 CORONARY ARTERY DISEASE, ANGINA PRESENCE UNSPECIFIED, UNSPECIFIED VESSEL OR LESION TYPE, UNSPECIFIED WHETHER NATIVE OR TRANSPLANTED HEART: Primary | ICD-10-CM

## 2019-08-27 DIAGNOSIS — E11.9 CONTROLLED TYPE 2 DIABETES MELLITUS WITHOUT COMPLICATION, WITHOUT LONG-TERM CURRENT USE OF INSULIN: ICD-10-CM

## 2019-08-27 DIAGNOSIS — I10 ESSENTIAL HYPERTENSION: ICD-10-CM

## 2019-08-27 PROCEDURE — 3078F DIAST BP <80 MM HG: CPT | Mod: CPTII,S$GLB,, | Performed by: INTERNAL MEDICINE

## 2019-08-27 PROCEDURE — 99999 PR PBB SHADOW E&M-EST. PATIENT-LVL III: CPT | Mod: PBBFAC,,, | Performed by: INTERNAL MEDICINE

## 2019-08-27 PROCEDURE — 1101F PT FALLS ASSESS-DOCD LE1/YR: CPT | Mod: CPTII,S$GLB,, | Performed by: INTERNAL MEDICINE

## 2019-08-27 PROCEDURE — 99214 OFFICE O/P EST MOD 30 MIN: CPT | Mod: S$GLB,,, | Performed by: INTERNAL MEDICINE

## 2019-08-27 PROCEDURE — 3044F PR MOST RECENT HEMOGLOBIN A1C LEVEL <7.0%: ICD-10-PCS | Mod: CPTII,S$GLB,, | Performed by: INTERNAL MEDICINE

## 2019-08-27 PROCEDURE — 3078F PR MOST RECENT DIASTOLIC BLOOD PRESSURE < 80 MM HG: ICD-10-PCS | Mod: CPTII,S$GLB,, | Performed by: INTERNAL MEDICINE

## 2019-08-27 PROCEDURE — 3075F SYST BP GE 130 - 139MM HG: CPT | Mod: CPTII,S$GLB,, | Performed by: INTERNAL MEDICINE

## 2019-08-27 PROCEDURE — 3075F PR MOST RECENT SYSTOLIC BLOOD PRESS GE 130-139MM HG: ICD-10-PCS | Mod: CPTII,S$GLB,, | Performed by: INTERNAL MEDICINE

## 2019-08-27 PROCEDURE — 3044F HG A1C LEVEL LT 7.0%: CPT | Mod: CPTII,S$GLB,, | Performed by: INTERNAL MEDICINE

## 2019-08-27 PROCEDURE — 99214 PR OFFICE/OUTPT VISIT, EST, LEVL IV, 30-39 MIN: ICD-10-PCS | Mod: S$GLB,,, | Performed by: INTERNAL MEDICINE

## 2019-08-27 PROCEDURE — 99999 PR PBB SHADOW E&M-EST. PATIENT-LVL III: ICD-10-PCS | Mod: PBBFAC,,, | Performed by: INTERNAL MEDICINE

## 2019-08-27 PROCEDURE — 1101F PR PT FALLS ASSESS DOC 0-1 FALLS W/OUT INJ PAST YR: ICD-10-PCS | Mod: CPTII,S$GLB,, | Performed by: INTERNAL MEDICINE

## 2019-08-27 RX ORDER — ESOMEPRAZOLE MAGNESIUM 40 MG/1
40 CAPSULE, DELAYED RELEASE ORAL
Qty: 90 CAPSULE | Refills: 3 | Status: SHIPPED | OUTPATIENT
Start: 2019-08-27 | End: 2020-03-04 | Stop reason: SDUPTHER

## 2019-08-27 RX ORDER — TRIAMCINOLONE ACETONIDE 1 MG/G
PASTE DENTAL
COMMUNITY
Start: 2019-08-26 | End: 2019-11-07

## 2019-08-27 RX ORDER — CEPHALEXIN 500 MG/1
CAPSULE ORAL
COMMUNITY
Start: 2019-08-26 | End: 2019-11-07

## 2019-08-27 RX ORDER — OXYBUTYNIN CHLORIDE 10 MG/1
10 TABLET, EXTENDED RELEASE ORAL DAILY
Qty: 90 TABLET | Refills: 3 | Status: SHIPPED | OUTPATIENT
Start: 2019-08-27 | End: 2019-09-20

## 2019-08-27 RX ORDER — LEVOTHYROXINE SODIUM 100 UG/1
100 TABLET ORAL DAILY
Qty: 90 TABLET | Refills: 3 | Status: SHIPPED | OUTPATIENT
Start: 2019-08-27 | End: 2020-03-04 | Stop reason: SDUPTHER

## 2019-08-27 NOTE — PROGRESS NOTES
Subjective:       Patient ID: Osman Johansen is a 69 y.o. female.    Chief Complaint: Hypertension    Follow up s/p angina admit.  Had classic angina symptoms in office.  Sent to ER.  W/u / stress test normal.  F/u w cardiology.  Calcium score done = > 1,100 = cx CAD with likely significant blockage.  If pt has cp again, will likely get angiogram.  No chest pain since.  Does have AVILA waking in store.    CT doc emphysema - AVILA as above.  No longer smokes.    Iron deficiency anemia - no improvement with oral iron yet.  Advised getting cscp and EGD (hx bleeding ulcer in past).  Last cscp 2012 was ok.  + fatigue     HTN - controlled   Hypothyroid - controlled.112 mcg from local pharmacy and Brand name made her have palpitations, but 125 mcg does not from mail order.  Dye? Only use mail order -   DM - controlled; outside eye exam done 7/26/17  HLD - uncontrolled ldl goal < 70; cardiology finally convinced her to start a statin.      Recall - pain Dr wanted her to see neurology.  Gets cramps on Suboxone.  Trying to wean off and down to a piece of her daily pill but can't because develops RLS that prevents her from sleeping.  She had similar events with leg cramping when on fentanyl and hydrocodone that resolved when placed on Suboxone.  She is down to a piece of Suboxone daily = 1 pill lasts about 4-6 days.  She also has uncontrolled RLS that has been exacerbated as she has tried to wean off Suboxone.  This has been happening off and on for about 6 months now.       Review of Systems   Constitutional: Positive for fatigue. Negative for appetite change and fever.   HENT: Negative for nosebleeds and trouble swallowing.    Eyes: Negative for discharge and visual disturbance.   Respiratory: Negative for choking and shortness of breath.    Cardiovascular: Negative for chest pain and palpitations.   Gastrointestinal: Negative for abdominal pain, nausea and vomiting.   Musculoskeletal: Negative for arthralgias and joint  swelling.   Skin: Negative for rash and wound.   Neurological: Negative for dizziness and syncope.   Psychiatric/Behavioral: Negative for confusion and dysphoric mood.       Objective:      Vitals:    08/27/19 1606   BP: 130/74   Pulse: 77     Physical Exam   Constitutional: She appears well-nourished.   Eyes: Conjunctivae and EOM are normal.   Neck: Normal range of motion.   Cardiovascular: Normal rate and regular rhythm.   Pulmonary/Chest: Effort normal and breath sounds normal.   Musculoskeletal:   Normal ROM bilateral    Neurological: No cranial nerve deficit (grossly intact).   Skin: Skin is warm and dry.   Psychiatric: She has a normal mood and affect.   Alert and orientated   Vitals reviewed.        Assessment:       1. Coronary artery disease, angina presence unspecified, unspecified vessel or lesion type, unspecified whether native or transplanted heart    2. Essential hypertension    3. Dyslipidemia    4. Controlled type 2 diabetes mellitus without complication, without long-term current use of insulin    5. Iron deficiency anemia, unspecified iron deficiency anemia type    6. Hypothyroidism due to acquired atrophy of thyroid    7. Gastritis, presence of bleeding unspecified, unspecified chronicity, unspecified gastritis type    8. Urge incontinence        Plan:       Coronary artery disease, angina presence unspecified, unspecified vessel or lesion type, unspecified whether native or transplanted heart  -     Comprehensive metabolic panel; Future; Expected date: 12/25/2019    Essential hypertension  -     Comprehensive metabolic panel; Future; Expected date: 12/25/2019    Dyslipidemia  -     Comprehensive metabolic panel; Future; Expected date: 12/25/2019    Controlled type 2 diabetes mellitus without complication, without long-term current use of insulin  -     Hemoglobin A1c; Future; Expected date: 12/25/2019    Iron deficiency anemia, unspecified iron deficiency anemia type  -     Occult blood x 1,  stool; Future  -     Occult blood x 1, stool; Future  -     Iron and TIBC; Future; Expected date: 12/25/2019  -     Ferritin; Future; Expected date: 12/25/2019  -     Reticulocytes; Future; Expected date: 12/25/2019  -     CBC auto differential; Future; Expected date: 12/25/2019    Hypothyroidism due to acquired atrophy of thyroid  -     TSH; Future; Expected date: 12/25/2019  -     SYNTHROID 100 mcg tablet; Take 1 tablet (100 mcg total) by mouth once daily.  Dispense: 90 tablet; Refill: 3    Gastritis, presence of bleeding unspecified, unspecified chronicity, unspecified gastritis type  -     esomeprazole (NEXIUM) 40 MG capsule; Take 1 capsule (40 mg total) by mouth before breakfast.  Dispense: 90 capsule; Refill: 3    Urge incontinence  -     oxybutynin (DITROPAN-XL) 10 MG 24 hr tablet; Take 1 tablet (10 mg total) by mouth once daily.  Dispense: 90 tablet; Refill: 3            Medication List with Changes/Refills   Current Medications    AMLODIPINE (NORVASC) 5 MG TABLET    Take 1 tablet (5 mg total) by mouth every evening.    CALCIUM CARBONATE/VITAMIN D3 (CALCIUM 500 WITH D ORAL)    Take 1 capsule by mouth once daily.    CARBOXYMETHYLCELLULOSE (REFRESH PLUS) 0.5 % DPET    1 drop daily as needed.    CEPHALEXIN (KEFLEX) 500 MG CAPSULE        DICYCLOMINE (BENTYL) 10 MG CAPSULE    Take 10 mg by mouth 4 (four) times daily as needed.     ESTRADIOL (CLIMARA) 0.075 MG/24 HR    APPLY TO CLEAN DRY SKIN ONCE PER WEEK    FERROUS SULFATE (FEOSOL) 325 MG (65 MG IRON) TAB TABLET    Take 1 tablet (325 mg total) by mouth daily with breakfast.    FLUTICASONE (FLONASE) 50 MCG/ACTUATION NASAL SPRAY    USE 1 SPRAY IN EACH NOSTRIL DAILY    HYDROCHLOROTHIAZIDE (HYDRODIURIL) 12.5 MG TAB    Take 1 tablet (12.5 mg total) by mouth every other day.    HYOSCYAMINE (LEVSIN/SL) 0.125 MG SUBL    Place 1 tablet (0.125 mg total) under the tongue every 4 (four) hours as needed.    LIDOCAINE (LIDODERM) 5 %    Place 1 patch onto the skin once daily.  Remove & Discard patch within 12 hours or as directed by MD    LISINOPRIL (PRINIVIL,ZESTRIL) 20 MG TABLET    Take 2 tablets (40 mg total) by mouth every morning.    MULTIVITAMIN (MULTIPLE VITAMIN ORAL)    Take 1 capsule by mouth once daily.    NITROGLYCERIN (NITROSTAT) 0.4 MG SL TABLET    Place 1 tablet (0.4 mg total) under the tongue every 5 (five) minutes as needed for Chest pain.    PRAVASTATIN (PRAVACHOL) 20 MG TABLET    Take 1 tablet (20 mg total) by mouth once daily.    PROMETHAZINE (PHENERGAN) 25 MG TABLET    TAKE 1 TABLET EVERY 6 HOURS AS NEEDED FOR NAUSEA    ROPINIROLE (REQUIP) 2 MG TABLET    Take 1.5 tablets (3 mg total) by mouth nightly.    SUBOXONE 8-2 MG FILM    1 Film by Subdermal route once daily. 1 strip every 3 to 4 days    TRIAMCINOLONE ACETONIDE 0.1% (KENALOG) 0.1 % PASTE       Changed and/or Refilled Medications    Modified Medication Previous Medication    ESOMEPRAZOLE (NEXIUM) 40 MG CAPSULE esomeprazole (NEXIUM) 40 MG capsule       Take 1 capsule (40 mg total) by mouth before breakfast.    TAKE 1 CAPSULE BEFORE BREAKFAST    OXYBUTYNIN (DITROPAN-XL) 10 MG 24 HR TABLET oxybutynin (DITROPAN-XL) 10 MG 24 hr tablet       Take 1 tablet (10 mg total) by mouth once daily.    Take 1 tablet (10 mg total) by mouth once daily.    SYNTHROID 100 MCG TABLET SYNTHROID 100 mcg tablet       Take 1 tablet (100 mcg total) by mouth once daily.    Take 1 tablet (100 mcg total) by mouth once daily.     Patient refused cscp and egd now.  Risks and benefits discussed.  If fobt +, will consider.  Advised to hold iron and no red meat for 3 days before starting stool cards   rtc in 4mo; if iron not improve, send for iv iron  Continue current management and monitor.    Counseled on regular exercise, maintenance of a healthy weight, balanced diet rich in fruits/vegetables and lean protein, and avoidance of unhealthy habits like smoking and excessive alcohol intake.   Also, counseled on importance of being compliant with  medication, health appointments, diet and exercise.     Follow up in about 6 months (around 2/27/2020).

## 2019-09-19 DIAGNOSIS — Z91.89 AT RISK FOR CORONARY ARTERY DISEASE: Primary | ICD-10-CM

## 2019-09-19 RX ORDER — PRAVASTATIN SODIUM 20 MG/1
20 TABLET ORAL DAILY
Qty: 90 TABLET | Refills: 1 | Status: SHIPPED | OUTPATIENT
Start: 2019-09-19 | End: 2020-09-08 | Stop reason: SDUPTHER

## 2019-09-20 ENCOUNTER — OFFICE VISIT (OUTPATIENT)
Dept: FAMILY MEDICINE | Facility: CLINIC | Age: 69
End: 2019-09-20
Payer: COMMERCIAL

## 2019-09-20 ENCOUNTER — TELEPHONE (OUTPATIENT)
Dept: FAMILY MEDICINE | Facility: CLINIC | Age: 69
End: 2019-09-20

## 2019-09-20 VITALS
SYSTOLIC BLOOD PRESSURE: 110 MMHG | WEIGHT: 145.31 LBS | HEART RATE: 70 BPM | TEMPERATURE: 99 F | DIASTOLIC BLOOD PRESSURE: 76 MMHG | BODY MASS INDEX: 25.75 KG/M2 | HEIGHT: 63 IN | OXYGEN SATURATION: 98 %

## 2019-09-20 DIAGNOSIS — R10.11 RUQ PAIN: Primary | ICD-10-CM

## 2019-09-20 DIAGNOSIS — N39.41 URGE INCONTINENCE: ICD-10-CM

## 2019-09-20 DIAGNOSIS — R35.0 URINARY FREQUENCY: ICD-10-CM

## 2019-09-20 LAB
BACTERIA #/AREA URNS HPF: NORMAL /HPF
BILIRUB UR QL STRIP: NEGATIVE
CLARITY UR: CLEAR
COLOR UR: YELLOW
GLUCOSE UR QL STRIP: NEGATIVE
HGB UR QL STRIP: ABNORMAL
KETONES UR QL STRIP: NEGATIVE
LEUKOCYTE ESTERASE UR QL STRIP: NEGATIVE
MICROSCOPIC COMMENT: NORMAL
NITRITE UR QL STRIP: NEGATIVE
PH UR STRIP: 6 [PH] (ref 5–8)
PROT UR QL STRIP: NEGATIVE
RBC #/AREA URNS HPF: 1 /HPF (ref 0–4)
SP GR UR STRIP: <=1.005 (ref 1–1.03)
SQUAMOUS #/AREA URNS HPF: 1 /HPF
URN SPEC COLLECT METH UR: ABNORMAL
WBC #/AREA URNS HPF: 1 /HPF (ref 0–5)

## 2019-09-20 PROCEDURE — 3078F DIAST BP <80 MM HG: CPT | Mod: CPTII,S$GLB,, | Performed by: NURSE PRACTITIONER

## 2019-09-20 PROCEDURE — 1101F PT FALLS ASSESS-DOCD LE1/YR: CPT | Mod: CPTII,S$GLB,, | Performed by: NURSE PRACTITIONER

## 2019-09-20 PROCEDURE — 3074F SYST BP LT 130 MM HG: CPT | Mod: CPTII,S$GLB,, | Performed by: NURSE PRACTITIONER

## 2019-09-20 PROCEDURE — 99999 PR PBB SHADOW E&M-EST. PATIENT-LVL III: ICD-10-PCS | Mod: PBBFAC,,, | Performed by: NURSE PRACTITIONER

## 2019-09-20 PROCEDURE — 81000 URINALYSIS NONAUTO W/SCOPE: CPT | Mod: PO

## 2019-09-20 PROCEDURE — 99214 OFFICE O/P EST MOD 30 MIN: CPT | Mod: S$GLB,,, | Performed by: NURSE PRACTITIONER

## 2019-09-20 PROCEDURE — 1101F PR PT FALLS ASSESS DOC 0-1 FALLS W/OUT INJ PAST YR: ICD-10-PCS | Mod: CPTII,S$GLB,, | Performed by: NURSE PRACTITIONER

## 2019-09-20 PROCEDURE — 3078F PR MOST RECENT DIASTOLIC BLOOD PRESSURE < 80 MM HG: ICD-10-PCS | Mod: CPTII,S$GLB,, | Performed by: NURSE PRACTITIONER

## 2019-09-20 PROCEDURE — 99999 PR PBB SHADOW E&M-EST. PATIENT-LVL III: CPT | Mod: PBBFAC,,, | Performed by: NURSE PRACTITIONER

## 2019-09-20 PROCEDURE — 99214 PR OFFICE/OUTPT VISIT, EST, LEVL IV, 30-39 MIN: ICD-10-PCS | Mod: S$GLB,,, | Performed by: NURSE PRACTITIONER

## 2019-09-20 PROCEDURE — 3074F PR MOST RECENT SYSTOLIC BLOOD PRESSURE < 130 MM HG: ICD-10-PCS | Mod: CPTII,S$GLB,, | Performed by: NURSE PRACTITIONER

## 2019-09-20 RX ORDER — FAMCICLOVIR 500 MG/1
TABLET ORAL
Refills: 1 | COMMUNITY
Start: 2019-09-04 | End: 2019-11-07

## 2019-09-20 RX ORDER — HYDROCODONE BITARTRATE AND ACETAMINOPHEN 5; 325 MG/1; MG/1
1 TABLET ORAL EVERY 6 HOURS PRN
Refills: 0 | COMMUNITY
Start: 2019-08-26 | End: 2019-11-07

## 2019-09-20 RX ORDER — OXYBUTYNIN CHLORIDE 15 MG/1
15 TABLET, EXTENDED RELEASE ORAL DAILY
Qty: 90 TABLET | Refills: 1 | Status: SHIPPED | OUTPATIENT
Start: 2019-09-20 | End: 2020-03-02

## 2019-09-20 NOTE — PROGRESS NOTES
Subjective:       Patient ID: Osman Johansen is a 69 y.o. female.    Chief Complaint: Hernia (pulling and hurting the past week, first noticed a couple of weeks ago)    Abdominal Pain   This is a new problem. The current episode started more than 1 month ago. The onset quality is gradual. The problem occurs intermittently. The problem has been waxing and waning. The pain is located in the RUQ. The quality of the pain is aching. Associated symptoms include frequency. Pertinent negatives include no constipation, diarrhea, dysuria, fever, nausea or vomiting. The pain is aggravated by certain positions, palpation and movement.     Labs last month showed normal liver function. Pain has been intermittent over the past few months.    She reports urinary frequency gradually worsening over the last few months--believes she has gotten use to dose of ditropan. Check urine for reassurance     Vitals:    09/20/19 1300   BP: 110/76   Pulse: 70   Temp: 99 °F (37.2 °C)     Review of Systems   Constitutional: Negative for diaphoresis and fever.   HENT: Negative for facial swelling and trouble swallowing.    Eyes: Negative for discharge and redness.   Respiratory: Negative for cough and shortness of breath.    Cardiovascular: Negative for chest pain and palpitations.   Gastrointestinal: Positive for abdominal pain. Negative for constipation, diarrhea, nausea and vomiting.   Genitourinary: Positive for frequency. Negative for difficulty urinating and dysuria.   Musculoskeletal: Negative for gait problem.   Skin: Negative for pallor and rash.   Neurological: Negative for facial asymmetry and speech difficulty.   Psychiatric/Behavioral: Negative for confusion. The patient is not nervous/anxious.        Past Medical History:   Diagnosis Date    Allergy     Anxiety     Arthritis     Blood transfusion 8 yrs    CAD (coronary artery disease)     Cataract     Degenerative disc disease     Depression     Dry eye syndrome     GERD  (gastroesophageal reflux disease)     Hypertension     Macular drusen     Neuromuscular disorder     Ocular hypertension, bilateral     Osteoporosis     PUD (peptic ulcer disease)     Thoracic or lumbosacral neuritis or radiculitis 10/19/2012    Thyroid disease      Objective:      Physical Exam   Constitutional: She is oriented to person, place, and time. She does not have a sickly appearance. No distress.   HENT:   Head: Normocephalic.   Right Ear: Hearing normal.   Left Ear: Hearing normal.   Nose: Nose normal.   Eyes: Conjunctivae and lids are normal.   Neck: No JVD present. No tracheal deviation present.   Cardiovascular: Normal rate and normal heart sounds.   Pulmonary/Chest: Effort normal and breath sounds normal.   Abdominal: Normal appearance. She exhibits no distension. There is tenderness in the right upper quadrant.   Musculoskeletal: She exhibits no deformity.   Neurological: She is alert and oriented to person, place, and time.   Skin: She is not diaphoretic. No pallor.   Psychiatric: She has a normal mood and affect. Her speech is normal and behavior is normal. Judgment and thought content normal. Cognition and memory are normal.   Nursing note and vitals reviewed.      Assessment:       1. RUQ pain    2. Urinary frequency    3. Urge incontinence        Plan:       RUQ pain  -     US Abdomen Limited; Future; Expected date: 09/20/2019  -     CBC auto differential; Future; Expected date: 09/20/2019  -     Comprehensive metabolic panel; Future; Expected date: 09/20/2019  -     Amylase; Future; Expected date: 09/20/2019  -     Lipase; Future; Expected date: 09/20/2019    Urinary frequency  -     Urinalysis; Future; Expected date: 09/20/2019  -     Urinalysis Microscopic; Future; Expected date: 09/20/2019    Urge incontinence  -     oxybutynin (DITROPAN XL) 15 MG TR24; Take 1 tablet (15 mg total) by mouth once daily.  Dispense: 90 tablet; Refill: 1      Follow up for further evaluation if s/s  worsen, fail to improve, or new symptoms arise.    Medication List with Changes/Refills   Current Medications    AMLODIPINE (NORVASC) 5 MG TABLET    Take 1 tablet (5 mg total) by mouth every evening.    CALCIUM CARBONATE/VITAMIN D3 (CALCIUM 500 WITH D ORAL)    Take 1 capsule by mouth once daily.    CARBOXYMETHYLCELLULOSE (REFRESH PLUS) 0.5 % DPET    1 drop daily as needed.    CEPHALEXIN (KEFLEX) 500 MG CAPSULE        DICYCLOMINE (BENTYL) 10 MG CAPSULE    Take 10 mg by mouth 4 (four) times daily as needed.     ESOMEPRAZOLE (NEXIUM) 40 MG CAPSULE    Take 1 capsule (40 mg total) by mouth before breakfast.    ESTRADIOL (CLIMARA) 0.075 MG/24 HR    APPLY TO CLEAN DRY SKIN ONCE PER WEEK    FAMCICLOVIR (FAMVIR) 500 MG TABLET    TAKE TWO TABLETS BY MOUTH in THE morning THEN TAKE TWO TABLETS 12 hours LATER    FERROUS SULFATE (FEOSOL) 325 MG (65 MG IRON) TAB TABLET    Take 1 tablet (325 mg total) by mouth daily with breakfast.    FLUTICASONE (FLONASE) 50 MCG/ACTUATION NASAL SPRAY    USE 1 SPRAY IN EACH NOSTRIL DAILY    HYDROCHLOROTHIAZIDE (HYDRODIURIL) 12.5 MG TAB    Take 1 tablet (12.5 mg total) by mouth every other day.    HYDROCODONE-ACETAMINOPHEN (NORCO) 5-325 MG PER TABLET    Take 1 tablet by mouth every 6 (six) hours as needed.    HYOSCYAMINE (LEVSIN/SL) 0.125 MG SUBL    Place 1 tablet (0.125 mg total) under the tongue every 4 (four) hours as needed.    LIDOCAINE (LIDODERM) 5 %    Place 1 patch onto the skin once daily. Remove & Discard patch within 12 hours or as directed by MD    LISINOPRIL (PRINIVIL,ZESTRIL) 20 MG TABLET    Take 2 tablets (40 mg total) by mouth every morning.    MULTIVITAMIN (MULTIPLE VITAMIN ORAL)    Take 1 capsule by mouth once daily.    NITROGLYCERIN (NITROSTAT) 0.4 MG SL TABLET    Place 1 tablet (0.4 mg total) under the tongue every 5 (five) minutes as needed for Chest pain.    PRAVASTATIN (PRAVACHOL) 20 MG TABLET    Take 1 tablet (20 mg total) by mouth once daily.    PROMETHAZINE (PHENERGAN) 25  MG TABLET    TAKE 1 TABLET EVERY 6 HOURS AS NEEDED FOR NAUSEA    ROPINIROLE (REQUIP) 2 MG TABLET    Take 1.5 tablets (3 mg total) by mouth nightly.    SUBOXONE 8-2 MG FILM    1 Film by Subdermal route once daily. 1 strip every 3 to 4 days    SYNTHROID 100 MCG TABLET    Take 1 tablet (100 mcg total) by mouth once daily.    TRIAMCINOLONE ACETONIDE 0.1% (KENALOG) 0.1 % PASTE       Changed and/or Refilled Medications    Modified Medication Previous Medication    OXYBUTYNIN (DITROPAN XL) 15 MG TR24 oxybutynin (DITROPAN-XL) 10 MG 24 hr tablet       Take 1 tablet (15 mg total) by mouth once daily.    Take 1 tablet (10 mg total) by mouth once daily.

## 2019-09-20 NOTE — TELEPHONE ENCOUNTER
----- Message from Yadira Serna NP sent at 9/20/2019  1:27 PM CDT -----  Have patient do labs when she comes back for ultrasound

## 2019-09-21 ENCOUNTER — PATIENT MESSAGE (OUTPATIENT)
Dept: FAMILY MEDICINE | Facility: CLINIC | Age: 69
End: 2019-09-21

## 2019-09-23 ENCOUNTER — LAB VISIT (OUTPATIENT)
Dept: LAB | Facility: HOSPITAL | Age: 69
End: 2019-09-23
Attending: NURSE PRACTITIONER
Payer: COMMERCIAL

## 2019-09-23 DIAGNOSIS — R10.11 RUQ PAIN: ICD-10-CM

## 2019-09-23 LAB
ALBUMIN SERPL BCP-MCNC: 4.1 G/DL (ref 3.5–5.2)
ALP SERPL-CCNC: 70 U/L (ref 55–135)
ALT SERPL W/O P-5'-P-CCNC: 14 U/L (ref 10–44)
AMYLASE SERPL-CCNC: 33 U/L (ref 20–110)
ANION GAP SERPL CALC-SCNC: 10 MMOL/L (ref 8–16)
AST SERPL-CCNC: 22 U/L (ref 10–40)
BASOPHILS # BLD AUTO: 0.07 K/UL (ref 0–0.2)
BASOPHILS NFR BLD: 1.1 % (ref 0–1.9)
BILIRUB SERPL-MCNC: 0.3 MG/DL (ref 0.1–1)
BUN SERPL-MCNC: 20 MG/DL (ref 8–23)
CALCIUM SERPL-MCNC: 9.6 MG/DL (ref 8.7–10.5)
CHLORIDE SERPL-SCNC: 102 MMOL/L (ref 95–110)
CO2 SERPL-SCNC: 24 MMOL/L (ref 23–29)
CREAT SERPL-MCNC: 1.2 MG/DL (ref 0.5–1.4)
DIFFERENTIAL METHOD: ABNORMAL
EOSINOPHIL # BLD AUTO: 0 K/UL (ref 0–0.5)
EOSINOPHIL NFR BLD: 0.6 % (ref 0–8)
ERYTHROCYTE [DISTWIDTH] IN BLOOD BY AUTOMATED COUNT: 13.5 % (ref 11.5–14.5)
EST. GFR  (AFRICAN AMERICAN): 52.9 ML/MIN/1.73 M^2
EST. GFR  (NON AFRICAN AMERICAN): 45.9 ML/MIN/1.73 M^2
GLUCOSE SERPL-MCNC: 90 MG/DL (ref 70–110)
HCT VFR BLD AUTO: 37.4 % (ref 37–48.5)
HGB BLD-MCNC: 11.7 G/DL (ref 12–16)
IMM GRANULOCYTES # BLD AUTO: 0.02 K/UL (ref 0–0.04)
IMM GRANULOCYTES NFR BLD AUTO: 0.3 % (ref 0–0.5)
LIPASE SERPL-CCNC: 8 U/L (ref 4–60)
LYMPHOCYTES # BLD AUTO: 1.5 K/UL (ref 1–4.8)
LYMPHOCYTES NFR BLD: 23.5 % (ref 18–48)
MCH RBC QN AUTO: 28.4 PG (ref 27–31)
MCHC RBC AUTO-ENTMCNC: 31.3 G/DL (ref 32–36)
MCV RBC AUTO: 91 FL (ref 82–98)
MONOCYTES # BLD AUTO: 0.5 K/UL (ref 0.3–1)
MONOCYTES NFR BLD: 6.9 % (ref 4–15)
NEUTROPHILS # BLD AUTO: 4.4 K/UL (ref 1.8–7.7)
NEUTROPHILS NFR BLD: 67.6 % (ref 38–73)
NRBC BLD-RTO: 0 /100 WBC
PLATELET # BLD AUTO: 256 K/UL (ref 150–350)
PMV BLD AUTO: 11.9 FL (ref 9.2–12.9)
POTASSIUM SERPL-SCNC: 4.9 MMOL/L (ref 3.5–5.1)
PROT SERPL-MCNC: 7.6 G/DL (ref 6–8.4)
RBC # BLD AUTO: 4.12 M/UL (ref 4–5.4)
SODIUM SERPL-SCNC: 136 MMOL/L (ref 136–145)
WBC # BLD AUTO: 6.51 K/UL (ref 3.9–12.7)

## 2019-09-23 PROCEDURE — 82150 ASSAY OF AMYLASE: CPT

## 2019-09-23 PROCEDURE — 80053 COMPREHEN METABOLIC PANEL: CPT

## 2019-09-23 PROCEDURE — 36415 COLL VENOUS BLD VENIPUNCTURE: CPT | Mod: PO

## 2019-09-23 PROCEDURE — 83690 ASSAY OF LIPASE: CPT

## 2019-09-23 PROCEDURE — 85025 COMPLETE CBC W/AUTO DIFF WBC: CPT

## 2019-09-24 ENCOUNTER — PATIENT MESSAGE (OUTPATIENT)
Dept: FAMILY MEDICINE | Facility: CLINIC | Age: 69
End: 2019-09-24

## 2019-09-25 ENCOUNTER — HOSPITAL ENCOUNTER (OUTPATIENT)
Dept: RADIOLOGY | Facility: HOSPITAL | Age: 69
Discharge: HOME | End: 2019-09-25
Attending: NURSE PRACTITIONER
Payer: COMMERCIAL

## 2019-09-25 DIAGNOSIS — R10.11 RUQ PAIN: ICD-10-CM

## 2019-09-25 PROCEDURE — 76705 US ABDOMEN LIMITED: ICD-10-PCS | Mod: 26,,, | Performed by: RADIOLOGY

## 2019-09-25 PROCEDURE — 76705 ECHO EXAM OF ABDOMEN: CPT | Mod: TC,PO

## 2019-09-25 PROCEDURE — 76705 ECHO EXAM OF ABDOMEN: CPT | Mod: 26,,, | Performed by: RADIOLOGY

## 2019-09-26 DIAGNOSIS — K80.50 CALCULUS OF BILE DUCT WITHOUT CHOLECYSTITIS AND WITHOUT OBSTRUCTION: Primary | ICD-10-CM

## 2019-09-27 ENCOUNTER — TELEPHONE (OUTPATIENT)
Dept: FAMILY MEDICINE | Facility: CLINIC | Age: 69
End: 2019-09-27

## 2019-09-27 NOTE — TELEPHONE ENCOUNTER
----- Message from Samm Armando sent at 9/27/2019 12:53 PM CDT -----  Type:  Sooner Apoointment Request    Caller is requesting a sooner appointment.  Caller declined first available appointment listed below.  Caller will not accept being placed on the waitlist and is requesting a message be sent to doctor.    Name of Caller:  Self   When is the first available appointment?   Symptoms:    Best Call Back Number:  [phone]  Additional Information:  Patient is aware of the Mri appointment for 10/15/2019, pt requesting a sooner appointment.

## 2019-09-27 NOTE — TELEPHONE ENCOUNTER
Called and spoke with patient, Let her know that at this time there isn't a sooner appointment for her MRI. Patient verbalized understanding and stated that she would be ok with waiting until 10/15. Call ended.

## 2019-10-14 DIAGNOSIS — J06.9 URI (UPPER RESPIRATORY INFECTION): ICD-10-CM

## 2019-10-14 NOTE — TELEPHONE ENCOUNTER
----- Message from Kiley Dexter sent at 10/14/2019  1:13 PM CDT -----  Contact: Patient  Type:  RX Refill Request    Who Called:  Patient  Refill or New Rx:  Refill  RX Name and Strength:  fluticasone (FLONASE) 50 mcg/actuation nasal spray  How is the patient currently taking it? (ex. 1XDay):  1xday  Is this a 30 day or 90 day RX:  30  Preferred Pharmacy with phone number:  [facility] [place] - [id number] [address]  [address] [address] [facility] [address]  [place] [address]  Phone: [phone] Fax: [phone]  Local or Mail Order:  Local  Ordering Provider:  Patient  Best Call Back Number:[phone]

## 2019-10-15 ENCOUNTER — HOSPITAL ENCOUNTER (OUTPATIENT)
Dept: RADIOLOGY | Facility: HOSPITAL | Age: 69
Discharge: HOME | End: 2019-10-15
Attending: NURSE PRACTITIONER
Payer: COMMERCIAL

## 2019-10-15 DIAGNOSIS — K80.50 CALCULUS OF BILE DUCT WITHOUT CHOLECYSTITIS AND WITHOUT OBSTRUCTION: ICD-10-CM

## 2019-10-15 PROCEDURE — 76376 3D RENDER W/INTRP POSTPROCES: CPT | Mod: 26,,, | Performed by: RADIOLOGY

## 2019-10-15 PROCEDURE — 74181 MRI ABDOMEN W/O CONTRAST: CPT | Mod: TC,PO

## 2019-10-15 PROCEDURE — 76376 3D RENDER W/INTRP POSTPROCES: CPT | Mod: TC,PO

## 2019-10-15 PROCEDURE — 74181 MRI ABDOMEN W/O CONTRAST: CPT | Mod: 26,,, | Performed by: RADIOLOGY

## 2019-10-15 PROCEDURE — 76376 MRI ABDOMEN WITHOUT CONTRAST MRCP: ICD-10-PCS | Mod: 26,,, | Performed by: RADIOLOGY

## 2019-10-15 PROCEDURE — 74181 MRI ABDOMEN WITHOUT CONTRAST MRCP: ICD-10-PCS | Mod: 26,,, | Performed by: RADIOLOGY

## 2019-10-16 RX ORDER — FLUTICASONE PROPIONATE 50 MCG
1 SPRAY, SUSPENSION (ML) NASAL DAILY
Qty: 16 G | Refills: 11 | Status: SHIPPED | OUTPATIENT
Start: 2019-10-16 | End: 2021-03-02

## 2019-10-18 ENCOUNTER — TELEPHONE (OUTPATIENT)
Dept: FAMILY MEDICINE | Facility: CLINIC | Age: 69
End: 2019-10-18

## 2019-10-18 NOTE — TELEPHONE ENCOUNTER
Mary May Staff             Pt has a liability and/or residual balance due.              Is this procedure medically urgent or non-medically   Please respond via In-basket or contact [facility] @ [phone]                 Medical Urgency Definition     If you can Answer yes to one of the following questions, the   Case will be considered Medically Urgent and will be done as   Scheduled irrespective of whether [facility] will receive payment.     *If this particular case is not done as scheduled, would the patient   Experience loss of life?     *Loss of Limb?     *Irreversible loss of function?     *Would their condition significantly worsen?     If none of these questions have a yes answer, the case   Should be postponed until such a time as the finances   can be sorted out.

## 2019-11-05 ENCOUNTER — PATIENT OUTREACH (OUTPATIENT)
Dept: ADMINISTRATIVE | Facility: OTHER | Age: 69
End: 2019-11-05

## 2019-11-06 ENCOUNTER — TELEPHONE (OUTPATIENT)
Dept: PAIN MEDICINE | Facility: CLINIC | Age: 69
End: 2019-11-06

## 2019-11-07 ENCOUNTER — OFFICE VISIT (OUTPATIENT)
Dept: PAIN MEDICINE | Facility: CLINIC | Age: 69
End: 2019-11-07
Payer: COMMERCIAL

## 2019-11-07 VITALS
WEIGHT: 150.38 LBS | DIASTOLIC BLOOD PRESSURE: 97 MMHG | TEMPERATURE: 99 F | HEIGHT: 61 IN | OXYGEN SATURATION: 100 % | RESPIRATION RATE: 18 BRPM | BODY MASS INDEX: 28.39 KG/M2 | HEART RATE: 62 BPM | SYSTOLIC BLOOD PRESSURE: 187 MMHG

## 2019-11-07 DIAGNOSIS — M47.816 FACET ARTHRITIS OF LUMBAR REGION: ICD-10-CM

## 2019-11-07 DIAGNOSIS — M51.36 DDD (DEGENERATIVE DISC DISEASE), LUMBAR: ICD-10-CM

## 2019-11-07 DIAGNOSIS — M47.816 LUMBAR SPONDYLOSIS: Primary | ICD-10-CM

## 2019-11-07 DIAGNOSIS — M79.18 MYOFASCIAL PAIN: ICD-10-CM

## 2019-11-07 DIAGNOSIS — M71.9 CERVICOTHORACIC INTERSPINOUS BURSITIS: ICD-10-CM

## 2019-11-07 PROCEDURE — 99213 OFFICE O/P EST LOW 20 MIN: CPT | Mod: S$GLB,,, | Performed by: NURSE PRACTITIONER

## 2019-11-07 PROCEDURE — 99999 PR PBB SHADOW E&M-EST. PATIENT-LVL III: ICD-10-PCS | Mod: PBBFAC,,, | Performed by: NURSE PRACTITIONER

## 2019-11-07 PROCEDURE — 99213 PR OFFICE/OUTPT VISIT, EST, LEVL III, 20-29 MIN: ICD-10-PCS | Mod: S$GLB,,, | Performed by: NURSE PRACTITIONER

## 2019-11-07 PROCEDURE — 99999 PR PBB SHADOW E&M-EST. PATIENT-LVL III: CPT | Mod: PBBFAC,,, | Performed by: NURSE PRACTITIONER

## 2019-11-07 PROCEDURE — 1101F PT FALLS ASSESS-DOCD LE1/YR: CPT | Mod: CPTII,S$GLB,, | Performed by: NURSE PRACTITIONER

## 2019-11-07 PROCEDURE — 1101F PR PT FALLS ASSESS DOC 0-1 FALLS W/OUT INJ PAST YR: ICD-10-PCS | Mod: CPTII,S$GLB,, | Performed by: NURSE PRACTITIONER

## 2019-11-07 PROCEDURE — 3077F PR MOST RECENT SYSTOLIC BLOOD PRESSURE >= 140 MM HG: ICD-10-PCS | Mod: CPTII,S$GLB,, | Performed by: NURSE PRACTITIONER

## 2019-11-07 PROCEDURE — 3080F PR MOST RECENT DIASTOLIC BLOOD PRESSURE >= 90 MM HG: ICD-10-PCS | Mod: CPTII,S$GLB,, | Performed by: NURSE PRACTITIONER

## 2019-11-07 PROCEDURE — 3080F DIAST BP >= 90 MM HG: CPT | Mod: CPTII,S$GLB,, | Performed by: NURSE PRACTITIONER

## 2019-11-07 PROCEDURE — 3077F SYST BP >= 140 MM HG: CPT | Mod: CPTII,S$GLB,, | Performed by: NURSE PRACTITIONER

## 2019-11-07 NOTE — H&P (VIEW-ONLY)
Chronic patient Established Note (Follow up visit)      SUBJECTIVE:    Osman Johansen presents to the clinic for a follow-up appointment for lower back pain. She reports increased low back pain over the last few weeks. She describes this pain as constant, sharp, and aching. She denies any radiating leg pain. She previously had 90% relief of her pain with bilateral L2,3,4,5 MBB and T7-8 interspinous ligament injection, last done in August 2018. She would like to repeat this procedure. She continues to participate in a home exercise routine. She denies any other health changes. Her pain today is 5/10.    Pain Disability Index Review:  Last 3 PDI Scores 11/7/2019 12/12/2018 8/9/2018   Pain Disability Index (PDI) 17 30 39       Pain Medications:  None    Opioid Contract: no     report:  Reviewed and consistent with medication use as prescribed.    Pain Procedures:   7/18/13 Bilateral L5 TF JOCE  10/28/13 Bilateral SI joint injection  4/20/15 Bilateral SI joint injection  12/15/16 Bilateral L3-4 facet joint injections- 100% relief for 6 weeks  3/13/17 Bilateral L3-4 facet joint injections- 30% relief  4/17/17 Bilateral L2,3,4,5 MBB with steroids- significant benefit for 7 months  12/14/17 Bilateral L2,3,4,5 MBB with steroids and Interspinal ligament injection- 90% relief for 7 months  8/21/2018- Bilateral L2,3,4,5 MBB with steroids and T7-8 interspinous ligament injections     Physical Therapy/Home Exercise: per self    Imaging:     Lumbar MRI 12/10/16    Narrative   MRI LUMBAR SPINE WITHOUT CONTRAST    COMPARISON: None    TECHNIQUE:  Sagittal T1, T2, and STIR;  axial T1 and T2 sequences through the lumbar spine were acquired without contrast.    FINDINGS: Vertebral body height and alignment are within normal limits.  The conus terminates at T12-L1.  Moderate loss of disc height is present at L4-5.  Marrow signal is mildly heterogeneous.  No focal osseous lesion.    L1-L2:  No disc herniation. No spinal canal or  neuroforaminal narrowing.    L2-L3:  Mild diffuse disc bulging is present without spinal canal or neuroforaminal narrowing.    L3-L4:  Mild diffuse disc bulging and moderate bilateral facet arthropathy result in mild spinal canal narrowing.    L4-L5:  Mild diffuse disc bulging is present without spinal canal or neuroforaminal narrowing.    L5-S1:  No disc herniation. No spinal canal or neuroforaminal narrowing.    Several small gallstones noted.   Impression     Degenerative lumbar spondylosis with mild L3-4 spinal canal narrowing and no significant neuroforaminal narrowing.  Cholelithiasis       Narrative     MRI of the thoracic spine without contrast    Technique: Multiplanar multisequence MR imaging of the thoracic spine was performed without contrast.    Findings: There is mild levoscoliosis of the lumbar spine.  This may be positional.  Vertebral bodies otherwise demonstrate normal height alignment.  The marrow signal of the vertebral bodies is within normal limits.  There is mild disc desiccation noted throughout the thoracic spine.  No significant disc space narrowing.  No significant disc protrusions are identified.  The central canal is widely patent.  No significant spondylosis.  The cord is normal in signal and caliber.      Impression         1.  Mild levoscoliosis of the thoracic spine otherwise essentially unremarkable MRI of the thoracic spine.         CMP  Sodium   Date Value Ref Range Status   09/23/2019 136 136 - 145 mmol/L Final     Potassium   Date Value Ref Range Status   09/23/2019 4.9 3.5 - 5.1 mmol/L Final     Chloride   Date Value Ref Range Status   09/23/2019 102 95 - 110 mmol/L Final     CO2   Date Value Ref Range Status   09/23/2019 24 23 - 29 mmol/L Final     Glucose   Date Value Ref Range Status   09/23/2019 90 70 - 110 mg/dL Final     BUN, Bld   Date Value Ref Range Status   09/23/2019 20 8 - 23 mg/dL Final     Creatinine   Date Value Ref Range Status   09/23/2019 1.2 0.5 - 1.4 mg/dL  Final     Calcium   Date Value Ref Range Status   09/23/2019 9.6 8.7 - 10.5 mg/dL Final     Total Protein   Date Value Ref Range Status   09/23/2019 7.6 6.0 - 8.4 g/dL Final     Albumin   Date Value Ref Range Status   09/23/2019 4.1 3.5 - 5.2 g/dL Final     Total Bilirubin   Date Value Ref Range Status   09/23/2019 0.3 0.1 - 1.0 mg/dL Final     Comment:     For infants and newborns, interpretation of results should be based  on gestational age, weight and in agreement with clinical  observations.  Premature Infant recommended reference ranges:  Up to 24 hours.............<8.0 mg/dL  Up to 48 hours............<12.0 mg/dL  3-5 days..................<15.0 mg/dL  6-29 days.................<15.0 mg/dL       Alkaline Phosphatase   Date Value Ref Range Status   09/23/2019 70 55 - 135 U/L Final     AST   Date Value Ref Range Status   09/23/2019 22 10 - 40 U/L Final     ALT   Date Value Ref Range Status   09/23/2019 14 10 - 44 U/L Final     Anion Gap   Date Value Ref Range Status   09/23/2019 10 8 - 16 mmol/L Final     eGFR if    Date Value Ref Range Status   09/23/2019 52.9 (A) >60 mL/min/1.73 m^2 Final     eGFR if non    Date Value Ref Range Status   09/23/2019 45.9 (A) >60 mL/min/1.73 m^2 Final     Comment:     Calculation used to obtain the estimated glomerular filtration  rate (eGFR) is the CKD-EPI equation.        Lab Results   Component Value Date    WBC 6.51 09/23/2019    HGB 11.7 (L) 09/23/2019    HCT 37.4 09/23/2019    MCV 91 09/23/2019     09/23/2019         Allergies:   Review of patient's allergies indicates:   Allergen Reactions    Pcn [penicillins] Swelling    Toradol [ketorolac] Swelling    Vibramycin [doxycycline calcium] Swelling    Cymbalta [duloxetine]      dizzyness    Elavil [amitriptyline]     Lyrica [pregabalin] Swelling    Seroquel [quetiapine]      restless leg symdrome       Current Medications:   Current Outpatient Medications   Medication Sig Dispense  Refill    amLODIPine (NORVASC) 5 MG tablet Take 1 tablet (5 mg total) by mouth every evening. 90 tablet 6    CALCIUM CARBONATE/VITAMIN D3 (CALCIUM 500 WITH D ORAL) Take 1 capsule by mouth once daily.      carboxymethylcellulose (REFRESH PLUS) 0.5 % Dpet 1 drop daily as needed.      dicyclomine (BENTYL) 10 MG capsule Take 10 mg by mouth 4 (four) times daily as needed.       esomeprazole (NEXIUM) 40 MG capsule Take 1 capsule (40 mg total) by mouth before breakfast. 90 capsule 3    estradiol (CLIMARA) 0.075 mg/24 hr APPLY TO CLEAN DRY SKIN ONCE PER WEEK 24 patch 3    ferrous sulfate (FEOSOL) 325 mg (65 mg iron) Tab tablet Take 1 tablet (325 mg total) by mouth daily with breakfast. 90 tablet 3    fluticasone propionate (FLONASE) 50 mcg/actuation nasal spray 1 spray (50 mcg total) by Each Nostril route once daily. 16 g 11    hydroCHLOROthiazide (HYDRODIURIL) 12.5 MG Tab Take 1 tablet (12.5 mg total) by mouth every other day. 15 tablet 5    hyoscyamine (LEVSIN/SL) 0.125 mg Subl Place 1 tablet (0.125 mg total) under the tongue every 4 (four) hours as needed. 45 tablet 3    lidocaine (LIDODERM) 5 % Place 1 patch onto the skin once daily. Remove & Discard patch within 12 hours or as directed by MD 30 patch 2    MULTIVITAMIN (MULTIPLE VITAMIN ORAL) Take 1 capsule by mouth once daily.      oxybutynin (DITROPAN XL) 15 MG TR24 Take 1 tablet (15 mg total) by mouth once daily. 90 tablet 1    pravastatin (PRAVACHOL) 20 MG tablet Take 1 tablet (20 mg total) by mouth once daily. 90 tablet 1    promethazine (PHENERGAN) 25 MG tablet TAKE 1 TABLET EVERY 6 HOURS AS NEEDED FOR NAUSEA 45 tablet 2    SUBOXONE 8-2 mg Film 1 Film by Subdermal route once daily. 1 strip every 3 to 4 days  0    SYNTHROID 100 mcg tablet Take 1 tablet (100 mcg total) by mouth once daily. 90 tablet 3    cephALEXin (KEFLEX) 500 MG capsule       famciclovir (FAMVIR) 500 MG tablet TAKE TWO TABLETS BY MOUTH in THE morning THEN TAKE TWO TABLETS 12  hours LATER  1    HYDROcodone-acetaminophen (NORCO) 5-325 mg per tablet Take 1 tablet by mouth every 6 (six) hours as needed.  0    lisinopril (PRINIVIL,ZESTRIL) 20 MG tablet Take 2 tablets (40 mg total) by mouth every morning. 90 tablet 6    nitroGLYCERIN (NITROSTAT) 0.4 MG SL tablet Place 1 tablet (0.4 mg total) under the tongue every 5 (five) minutes as needed for Chest pain. 30 tablet 0    ropinirole (REQUIP) 2 MG tablet Take 1.5 tablets (3 mg total) by mouth nightly. (Patient taking differently: Take 2 mg by mouth nightly as needed. ) 135 tablet 3    triamcinolone acetonide 0.1% (KENALOG) 0.1 % paste        No current facility-administered medications for this visit.        REVIEW OF SYSTEMS:    GENERAL:  No weight loss, malaise or fevers.  HEENT:  Negative for frequent or significant headaches.  NECK:  Negative for lumps, goiter, pain and significant neck swelling.  RESPIRATORY:  Negative for cough, wheezing or shortness of breath.  CARDIOVASCULAR:  Negative for chest pain, leg swelling or palpitations.  GI:  Negative for abdominal discomfort, blood in stools or black stools or change in bowel habits. GERD.  MUSCULOSKELETAL:  See HPI.  SKIN:  Negative for lesions, rash, and itching.  PSYCH: H/O anxiety and opioid dependence.  HEMATOLOGY/LYMPHOLOGY:  Negative for prolonged bleeding, bruising easily or swollen nodes.  NEURO:   No history of headaches, syncope, paralysis, seizures or tremors.  All other reviewed and negative other than HPI.    Past Medical History:  Past Medical History:   Diagnosis Date    Allergy     Anxiety     Arthritis     Blood transfusion 8 yrs    CAD (coronary artery disease)     Cataract     Degenerative disc disease     Depression     Dry eye syndrome     GERD (gastroesophageal reflux disease)     Hypertension     Macular drusen     Neuromuscular disorder     Ocular hypertension, bilateral     Osteoporosis     PUD (peptic ulcer disease)     Thoracic or lumbosacral  neuritis or radiculitis 10/19/2012    Thyroid disease        Past Surgical History:  Past Surgical History:   Procedure Laterality Date    APPENDECTOMY  1965    CARDIAC CATHETERIZATION      HYSTERECTOMY  8 yrs     INJECTION OF ANESTHETIC AGENT AROUND NERVE Bilateral 2018    Procedure: BLOCK, NERVE;  Surgeon: Talia Belcher MD;  Location: [facility];  Service: Pain Management;  Laterality: Bilateral;  Bilateral block @ L2,3,4,5      TONSILLECTOMY         Family History:  Family History   Problem Relation Age of Onset    Arthritis Mother     Cancer Mother     Heart disease Mother     Hypertension Mother     Cataracts Mother     Ovarian cancer Mother     Ulcers Father     Thyroid disease Brother     Heart disease Brother     Amblyopia Neg Hx     Blindness Neg Hx     Diabetes Neg Hx     Glaucoma Neg Hx     Macular degeneration Neg Hx     Retinal detachment Neg Hx     Strabismus Neg Hx     Stroke Neg Hx        Social History:  Social History     Socioeconomic History    Marital status:      Spouse name: Not on file    Number of children: Not on file    Years of education: Not on file    Highest education level: Not on file   Occupational History    Not on file   Social Needs    Financial resource strain: Not on file    Food insecurity:     Worry: Not on file     Inability: Not on file    Transportation needs:     Medical: Not on file     Non-medical: Not on file   Tobacco Use    Smoking status: Former Smoker     Last attempt to quit: 2008     Years since quittin.8    Smokeless tobacco: Never Used   Substance and Sexual Activity    Alcohol use: No     Alcohol/week: 0.0 standard drinks    Drug use: No    Sexual activity: Never   Lifestyle    Physical activity:     Days per week: Not on file     Minutes per session: Not on file    Stress: Not on file   Relationships    Social connections:     Talks on phone: Not on file     Gets together: Not on file      "Attends Spiritism service: Not on file     Active member of club or organization: Not on file     Attends meetings of clubs or organizations: Not on file     Relationship status: Not on file   Other Topics Concern    Not on file   Social History Narrative    Not on file       OBJECTIVE:    BP (!) 187/97   Pulse 62   Temp 98.8 °F (37.1 °C)   Resp 18   Ht 5' 1" (1.549 m)   Wt 68.2 kg (150 lb 5.7 oz)   SpO2 100%   BMI 28.41 kg/m²     PHYSICAL EXAMINATION:    General appearance: Well appearing, in no acute distress, alert and oriented x3.  Psych:  Mood and affect appropriate.  Skin: No rashes or edema.  Head/face:  Atraumatic, normocephalic. No palpable lymph nodes  Cor: RRR  Pulm: CTA  GI: Abdomen soft and non-distended.    Back: Straight leg raise in the sitting position is negative for radicular pain bilaterally. There is pain with palpation to lumbar facet joints. There is midline pain to thoracic spine around T7. Limited ROM with pain on flexion and extension. Positive facet loading bilaterally.  Extremities: Peripheral joint ROM is full and pain free without obvious instability or laxity in all four extremities. No deformities, edema, or skin discoloration. Good capillary refill.  Musculoskeletal: Bilateral upper and lower extremity strength is normal and symmetric.  No atrophy or tone abnormalities are noted.  Neuro: Bilateral upper and lower extremity coordination and muscle stretch reflexes are physiologic and symmetric.  Plantar response are downgoing.   Gait: Normal.    ASSESSMENT: 69 y.o. year old female with back pain, consistent with the following diagnoses:     1. Lumbar spondylosis     2. Facet arthritis of lumbar region     3. DDD (degenerative disc disease), lumbar     4. Myofascial pain     5. Cervicothoracic interspinous bursitis           PLAN:     - Previous imaging was reviewed and discussed with the patient today.    - Schedule for repeat bilateral L2,3,4,5 MBB with steroid and T7-8 " interspinous ligament injection as previous provided significant benefit for a year.   The procedure, risks, benefits and options were discussed with patient. There are no contraindications to the procedure. The patient expressed understanding and agreed to proceed.      - The patient will continue a home exercise routine to help with pain and strengthening.      - RTC 2 weeks after procedure.    - Counseled patient regarding the importance of constant sleeping habits and physical therapy.    - Dr. Belcher was consulted on the patient and agrees with this plan.    The above plan and management options were discussed at length with patient. Patient is in agreement with the above and verbalized understanding.    Mary Villar  11/07/2019

## 2019-11-07 NOTE — PROGRESS NOTES
Chronic patient Established Note (Follow up visit)      SUBJECTIVE:    Osman Johansen presents to the clinic for a follow-up appointment for lower back pain. She reports increased low back pain over the last few weeks. She describes this pain as constant, sharp, and aching. She denies any radiating leg pain. She previously had 90% relief of her pain with bilateral L2,3,4,5 MBB and T7-8 interspinous ligament injection, last done in August 2018. She would like to repeat this procedure. She continues to participate in a home exercise routine. She denies any other health changes. Her pain today is 5/10.    Pain Disability Index Review:  Last 3 PDI Scores 11/7/2019 12/12/2018 8/9/2018   Pain Disability Index (PDI) 17 30 39       Pain Medications:  None    Opioid Contract: no     report:  Reviewed and consistent with medication use as prescribed.    Pain Procedures:   7/18/13 Bilateral L5 TF JOCE  10/28/13 Bilateral SI joint injection  4/20/15 Bilateral SI joint injection  12/15/16 Bilateral L3-4 facet joint injections- 100% relief for 6 weeks  3/13/17 Bilateral L3-4 facet joint injections- 30% relief  4/17/17 Bilateral L2,3,4,5 MBB with steroids- significant benefit for 7 months  12/14/17 Bilateral L2,3,4,5 MBB with steroids and Interspinal ligament injection- 90% relief for 7 months  8/21/2018- Bilateral L2,3,4,5 MBB with steroids and T7-8 interspinous ligament injections     Physical Therapy/Home Exercise: per self    Imaging:     Lumbar MRI 12/10/16    Narrative   MRI LUMBAR SPINE WITHOUT CONTRAST    COMPARISON: None    TECHNIQUE:  Sagittal T1, T2, and STIR;  axial T1 and T2 sequences through the lumbar spine were acquired without contrast.    FINDINGS: Vertebral body height and alignment are within normal limits.  The conus terminates at T12-L1.  Moderate loss of disc height is present at L4-5.  Marrow signal is mildly heterogeneous.  No focal osseous lesion.    L1-L2:  No disc herniation. No spinal canal or  neuroforaminal narrowing.    L2-L3:  Mild diffuse disc bulging is present without spinal canal or neuroforaminal narrowing.    L3-L4:  Mild diffuse disc bulging and moderate bilateral facet arthropathy result in mild spinal canal narrowing.    L4-L5:  Mild diffuse disc bulging is present without spinal canal or neuroforaminal narrowing.    L5-S1:  No disc herniation. No spinal canal or neuroforaminal narrowing.    Several small gallstones noted.   Impression     Degenerative lumbar spondylosis with mild L3-4 spinal canal narrowing and no significant neuroforaminal narrowing.  Cholelithiasis       Narrative     MRI of the thoracic spine without contrast    Technique: Multiplanar multisequence MR imaging of the thoracic spine was performed without contrast.    Findings: There is mild levoscoliosis of the lumbar spine.  This may be positional.  Vertebral bodies otherwise demonstrate normal height alignment.  The marrow signal of the vertebral bodies is within normal limits.  There is mild disc desiccation noted throughout the thoracic spine.  No significant disc space narrowing.  No significant disc protrusions are identified.  The central canal is widely patent.  No significant spondylosis.  The cord is normal in signal and caliber.      Impression         1.  Mild levoscoliosis of the thoracic spine otherwise essentially unremarkable MRI of the thoracic spine.         CMP  Sodium   Date Value Ref Range Status   09/23/2019 136 136 - 145 mmol/L Final     Potassium   Date Value Ref Range Status   09/23/2019 4.9 3.5 - 5.1 mmol/L Final     Chloride   Date Value Ref Range Status   09/23/2019 102 95 - 110 mmol/L Final     CO2   Date Value Ref Range Status   09/23/2019 24 23 - 29 mmol/L Final     Glucose   Date Value Ref Range Status   09/23/2019 90 70 - 110 mg/dL Final     BUN, Bld   Date Value Ref Range Status   09/23/2019 20 8 - 23 mg/dL Final     Creatinine   Date Value Ref Range Status   09/23/2019 1.2 0.5 - 1.4 mg/dL  Final     Calcium   Date Value Ref Range Status   09/23/2019 9.6 8.7 - 10.5 mg/dL Final     Total Protein   Date Value Ref Range Status   09/23/2019 7.6 6.0 - 8.4 g/dL Final     Albumin   Date Value Ref Range Status   09/23/2019 4.1 3.5 - 5.2 g/dL Final     Total Bilirubin   Date Value Ref Range Status   09/23/2019 0.3 0.1 - 1.0 mg/dL Final     Comment:     For infants and newborns, interpretation of results should be based  on gestational age, weight and in agreement with clinical  observations.  Premature Infant recommended reference ranges:  Up to 24 hours.............<8.0 mg/dL  Up to 48 hours............<12.0 mg/dL  3-5 days..................<15.0 mg/dL  6-29 days.................<15.0 mg/dL       Alkaline Phosphatase   Date Value Ref Range Status   09/23/2019 70 55 - 135 U/L Final     AST   Date Value Ref Range Status   09/23/2019 22 10 - 40 U/L Final     ALT   Date Value Ref Range Status   09/23/2019 14 10 - 44 U/L Final     Anion Gap   Date Value Ref Range Status   09/23/2019 10 8 - 16 mmol/L Final     eGFR if    Date Value Ref Range Status   09/23/2019 52.9 (A) >60 mL/min/1.73 m^2 Final     eGFR if non    Date Value Ref Range Status   09/23/2019 45.9 (A) >60 mL/min/1.73 m^2 Final     Comment:     Calculation used to obtain the estimated glomerular filtration  rate (eGFR) is the CKD-EPI equation.        Lab Results   Component Value Date    WBC 6.51 09/23/2019    HGB 11.7 (L) 09/23/2019    HCT 37.4 09/23/2019    MCV 91 09/23/2019     09/23/2019         Allergies:   Review of patient's allergies indicates:   Allergen Reactions    Pcn [penicillins] Swelling    Toradol [ketorolac] Swelling    Vibramycin [doxycycline calcium] Swelling    Cymbalta [duloxetine]      dizzyness    Elavil [amitriptyline]     Lyrica [pregabalin] Swelling    Seroquel [quetiapine]      restless leg symdrome       Current Medications:   Current Outpatient Medications   Medication Sig Dispense  Refill    amLODIPine (NORVASC) 5 MG tablet Take 1 tablet (5 mg total) by mouth every evening. 90 tablet 6    CALCIUM CARBONATE/VITAMIN D3 (CALCIUM 500 WITH D ORAL) Take 1 capsule by mouth once daily.      carboxymethylcellulose (REFRESH PLUS) 0.5 % Dpet 1 drop daily as needed.      dicyclomine (BENTYL) 10 MG capsule Take 10 mg by mouth 4 (four) times daily as needed.       esomeprazole (NEXIUM) 40 MG capsule Take 1 capsule (40 mg total) by mouth before breakfast. 90 capsule 3    estradiol (CLIMARA) 0.075 mg/24 hr APPLY TO CLEAN DRY SKIN ONCE PER WEEK 24 patch 3    ferrous sulfate (FEOSOL) 325 mg (65 mg iron) Tab tablet Take 1 tablet (325 mg total) by mouth daily with breakfast. 90 tablet 3    fluticasone propionate (FLONASE) 50 mcg/actuation nasal spray 1 spray (50 mcg total) by Each Nostril route once daily. 16 g 11    hydroCHLOROthiazide (HYDRODIURIL) 12.5 MG Tab Take 1 tablet (12.5 mg total) by mouth every other day. 15 tablet 5    hyoscyamine (LEVSIN/SL) 0.125 mg Subl Place 1 tablet (0.125 mg total) under the tongue every 4 (four) hours as needed. 45 tablet 3    lidocaine (LIDODERM) 5 % Place 1 patch onto the skin once daily. Remove & Discard patch within 12 hours or as directed by MD 30 patch 2    MULTIVITAMIN (MULTIPLE VITAMIN ORAL) Take 1 capsule by mouth once daily.      oxybutynin (DITROPAN XL) 15 MG TR24 Take 1 tablet (15 mg total) by mouth once daily. 90 tablet 1    pravastatin (PRAVACHOL) 20 MG tablet Take 1 tablet (20 mg total) by mouth once daily. 90 tablet 1    promethazine (PHENERGAN) 25 MG tablet TAKE 1 TABLET EVERY 6 HOURS AS NEEDED FOR NAUSEA 45 tablet 2    SUBOXONE 8-2 mg Film 1 Film by Subdermal route once daily. 1 strip every 3 to 4 days  0    SYNTHROID 100 mcg tablet Take 1 tablet (100 mcg total) by mouth once daily. 90 tablet 3    cephALEXin (KEFLEX) 500 MG capsule       famciclovir (FAMVIR) 500 MG tablet TAKE TWO TABLETS BY MOUTH in THE morning THEN TAKE TWO TABLETS 12  hours LATER  1    HYDROcodone-acetaminophen (NORCO) 5-325 mg per tablet Take 1 tablet by mouth every 6 (six) hours as needed.  0    lisinopril (PRINIVIL,ZESTRIL) 20 MG tablet Take 2 tablets (40 mg total) by mouth every morning. 90 tablet 6    nitroGLYCERIN (NITROSTAT) 0.4 MG SL tablet Place 1 tablet (0.4 mg total) under the tongue every 5 (five) minutes as needed for Chest pain. 30 tablet 0    ropinirole (REQUIP) 2 MG tablet Take 1.5 tablets (3 mg total) by mouth nightly. (Patient taking differently: Take 2 mg by mouth nightly as needed. ) 135 tablet 3    triamcinolone acetonide 0.1% (KENALOG) 0.1 % paste        No current facility-administered medications for this visit.        REVIEW OF SYSTEMS:    GENERAL:  No weight loss, malaise or fevers.  HEENT:  Negative for frequent or significant headaches.  NECK:  Negative for lumps, goiter, pain and significant neck swelling.  RESPIRATORY:  Negative for cough, wheezing or shortness of breath.  CARDIOVASCULAR:  Negative for chest pain, leg swelling or palpitations.  GI:  Negative for abdominal discomfort, blood in stools or black stools or change in bowel habits. GERD.  MUSCULOSKELETAL:  See HPI.  SKIN:  Negative for lesions, rash, and itching.  PSYCH: H/O anxiety and opioid dependence.  HEMATOLOGY/LYMPHOLOGY:  Negative for prolonged bleeding, bruising easily or swollen nodes.  NEURO:   No history of headaches, syncope, paralysis, seizures or tremors.  All other reviewed and negative other than HPI.    Past Medical History:  Past Medical History:   Diagnosis Date    Allergy     Anxiety     Arthritis     Blood transfusion 8 yrs    CAD (coronary artery disease)     Cataract     Degenerative disc disease     Depression     Dry eye syndrome     GERD (gastroesophageal reflux disease)     Hypertension     Macular drusen     Neuromuscular disorder     Ocular hypertension, bilateral     Osteoporosis     PUD (peptic ulcer disease)     Thoracic or lumbosacral  neuritis or radiculitis 10/19/2012    Thyroid disease        Past Surgical History:  Past Surgical History:   Procedure Laterality Date    APPENDECTOMY  1965    CARDIAC CATHETERIZATION      HYSTERECTOMY  8 yrs     INJECTION OF ANESTHETIC AGENT AROUND NERVE Bilateral 2018    Procedure: BLOCK, NERVE;  Surgeon: Talia Belcher MD;  Location: [facility];  Service: Pain Management;  Laterality: Bilateral;  Bilateral block @ L2,3,4,5      TONSILLECTOMY         Family History:  Family History   Problem Relation Age of Onset    Arthritis Mother     Cancer Mother     Heart disease Mother     Hypertension Mother     Cataracts Mother     Ovarian cancer Mother     Ulcers Father     Thyroid disease Brother     Heart disease Brother     Amblyopia Neg Hx     Blindness Neg Hx     Diabetes Neg Hx     Glaucoma Neg Hx     Macular degeneration Neg Hx     Retinal detachment Neg Hx     Strabismus Neg Hx     Stroke Neg Hx        Social History:  Social History     Socioeconomic History    Marital status:      Spouse name: Not on file    Number of children: Not on file    Years of education: Not on file    Highest education level: Not on file   Occupational History    Not on file   Social Needs    Financial resource strain: Not on file    Food insecurity:     Worry: Not on file     Inability: Not on file    Transportation needs:     Medical: Not on file     Non-medical: Not on file   Tobacco Use    Smoking status: Former Smoker     Last attempt to quit: 2008     Years since quittin.8    Smokeless tobacco: Never Used   Substance and Sexual Activity    Alcohol use: No     Alcohol/week: 0.0 standard drinks    Drug use: No    Sexual activity: Never   Lifestyle    Physical activity:     Days per week: Not on file     Minutes per session: Not on file    Stress: Not on file   Relationships    Social connections:     Talks on phone: Not on file     Gets together: Not on file      "Attends Judaism service: Not on file     Active member of club or organization: Not on file     Attends meetings of clubs or organizations: Not on file     Relationship status: Not on file   Other Topics Concern    Not on file   Social History Narrative    Not on file       OBJECTIVE:    BP (!) 187/97   Pulse 62   Temp 98.8 °F (37.1 °C)   Resp 18   Ht 5' 1" (1.549 m)   Wt 68.2 kg (150 lb 5.7 oz)   SpO2 100%   BMI 28.41 kg/m²     PHYSICAL EXAMINATION:    General appearance: Well appearing, in no acute distress, alert and oriented x3.  Psych:  Mood and affect appropriate.  Skin: No rashes or edema.  Head/face:  Atraumatic, normocephalic. No palpable lymph nodes  Cor: RRR  Pulm: CTA  GI: Abdomen soft and non-distended.    Back: Straight leg raise in the sitting position is negative for radicular pain bilaterally. There is pain with palpation to lumbar facet joints. There is midline pain to thoracic spine around T7. Limited ROM with pain on flexion and extension. Positive facet loading bilaterally.  Extremities: Peripheral joint ROM is full and pain free without obvious instability or laxity in all four extremities. No deformities, edema, or skin discoloration. Good capillary refill.  Musculoskeletal: Bilateral upper and lower extremity strength is normal and symmetric.  No atrophy or tone abnormalities are noted.  Neuro: Bilateral upper and lower extremity coordination and muscle stretch reflexes are physiologic and symmetric.  Plantar response are downgoing.   Gait: Normal.    ASSESSMENT: 69 y.o. year old female with back pain, consistent with the following diagnoses:     1. Lumbar spondylosis     2. Facet arthritis of lumbar region     3. DDD (degenerative disc disease), lumbar     4. Myofascial pain     5. Cervicothoracic interspinous bursitis           PLAN:     - Previous imaging was reviewed and discussed with the patient today.    - Schedule for repeat bilateral L2,3,4,5 MBB with steroid and T7-8 " interspinous ligament injection as previous provided significant benefit for a year.   The procedure, risks, benefits and options were discussed with patient. There are no contraindications to the procedure. The patient expressed understanding and agreed to proceed.      - The patient will continue a home exercise routine to help with pain and strengthening.      - RTC 2 weeks after procedure.    - Counseled patient regarding the importance of constant sleeping habits and physical therapy.    - Dr. Belcher was consulted on the patient and agrees with this plan.    The above plan and management options were discussed at length with patient. Patient is in agreement with the above and verbalized understanding.    Mary Villar  11/07/2019

## 2019-11-07 NOTE — PROGRESS NOTES
Chronic patient Established Note (Follow up visit)      SUBJECTIVE:    Osman Johansen presents to the clinic for a follow-up appointment for ***. Since the last visit, Osman Johansen states the pain has been {PAIN - I/W/P:92179}. Current pain intensity is {GEN PAIN SCALE HI:75608}.    Pain Disability Index Review:  Last 3 PDI Scores 12/12/2018 8/9/2018 11/30/2017   Pain Disability Index (PDI) 30 39 56       Pain Medications:    {PMC - MEDICATIONS:33618}    Opioid Contract: {YES/NO:63}     report:  {:25123}    Pain Procedures: ***    Physical Therapy/Home Exercise: {YES/NO:63}    Imaging: ***    Allergies:   Review of patient's allergies indicates:   Allergen Reactions    Pcn [penicillins] Swelling    Toradol [ketorolac] Swelling    Vibramycin [doxycycline calcium] Swelling    Zetia [ezetimibe] Other (See Comments)    Cymbalta [duloxetine]      dizzyness    Elavil [amitriptyline]     Lyrica [pregabalin] Swelling    Seroquel [quetiapine]      restless leg symdrome       Current Medications:   Current Outpatient Medications   Medication Sig Dispense Refill    amLODIPine (NORVASC) 5 MG tablet Take 1 tablet (5 mg total) by mouth every evening. 90 tablet 6    CALCIUM CARBONATE/VITAMIN D3 (CALCIUM 500 WITH D ORAL) Take 1 capsule by mouth once daily.      carboxymethylcellulose (REFRESH PLUS) 0.5 % Dpet 1 drop daily as needed.      cephALEXin (KEFLEX) 500 MG capsule       dicyclomine (BENTYL) 10 MG capsule Take 10 mg by mouth 4 (four) times daily as needed.       esomeprazole (NEXIUM) 40 MG capsule Take 1 capsule (40 mg total) by mouth before breakfast. 90 capsule 3    estradiol (CLIMARA) 0.075 mg/24 hr APPLY TO CLEAN DRY SKIN ONCE PER WEEK 24 patch 3    famciclovir (FAMVIR) 500 MG tablet TAKE TWO TABLETS BY MOUTH in THE morning THEN TAKE TWO TABLETS 12 hours LATER  1    ferrous sulfate (FEOSOL) 325 mg (65 mg iron) Tab tablet Take 1 tablet (325 mg total) by mouth daily with breakfast. 90  tablet 3    fluticasone propionate (FLONASE) 50 mcg/actuation nasal spray 1 spray (50 mcg total) by Each Nostril route once daily. 16 g 11    hydroCHLOROthiazide (HYDRODIURIL) 12.5 MG Tab Take 1 tablet (12.5 mg total) by mouth every other day. 15 tablet 5    HYDROcodone-acetaminophen (NORCO) 5-325 mg per tablet Take 1 tablet by mouth every 6 (six) hours as needed.  0    hyoscyamine (LEVSIN/SL) 0.125 mg Subl Place 1 tablet (0.125 mg total) under the tongue every 4 (four) hours as needed. 45 tablet 3    lidocaine (LIDODERM) 5 % Place 1 patch onto the skin once daily. Remove & Discard patch within 12 hours or as directed by MD 30 patch 2    lisinopril (PRINIVIL,ZESTRIL) 20 MG tablet Take 2 tablets (40 mg total) by mouth every morning. 90 tablet 6    MULTIVITAMIN (MULTIPLE VITAMIN ORAL) Take 1 capsule by mouth once daily.      nitroGLYCERIN (NITROSTAT) 0.4 MG SL tablet Place 1 tablet (0.4 mg total) under the tongue every 5 (five) minutes as needed for Chest pain. 30 tablet 0    oxybutynin (DITROPAN XL) 15 MG TR24 Take 1 tablet (15 mg total) by mouth once daily. 90 tablet 1    pravastatin (PRAVACHOL) 20 MG tablet Take 1 tablet (20 mg total) by mouth once daily. 90 tablet 1    promethazine (PHENERGAN) 25 MG tablet TAKE 1 TABLET EVERY 6 HOURS AS NEEDED FOR NAUSEA 45 tablet 2    ropinirole (REQUIP) 2 MG tablet Take 1.5 tablets (3 mg total) by mouth nightly. (Patient taking differently: Take 2 mg by mouth nightly as needed. ) 135 tablet 3    SUBOXONE 8-2 mg Film 1 Film by Subdermal route once daily. 1 strip every 3 to 4 days  0    SYNTHROID 100 mcg tablet Take 1 tablet (100 mcg total) by mouth once daily. 90 tablet 3    triamcinolone acetonide 0.1% (KENALOG) 0.1 % paste        No current facility-administered medications for this visit.        REVIEW OF SYSTEMS:    GENERAL:  No weight loss, malaise or fevers.  HEENT:  Negative for frequent or significant headaches.  NECK:  Negative for lumps, goiter, pain and  significant neck swelling.  RESPIRATORY:  Negative for cough, wheezing or shortness of breath.  CARDIOVASCULAR:  Negative for chest pain, leg swelling or palpitations.  GI:  Negative for abdominal discomfort, blood in stools or black stools or change in bowel habits.  MUSCULOSKELETAL:  See HPI.  SKIN:  Negative for lesions, rash, and itching.  PSYCH:  Negative for sleep disturbance, mood disorder and recent psychosocial stressors.  HEMATOLOGY/LYMPHOLOGY:  Negative for prolonged bleeding, bruising easily or swollen nodes.  NEURO:   No history of headaches, syncope, paralysis, seizures or tremors.  All other reviewed and negative other than HPI.    Past Medical History:  Past Medical History:   Diagnosis Date    Allergy     Anxiety     Arthritis     Blood transfusion 8 yrs    CAD (coronary artery disease)     Cataract     Degenerative disc disease     Depression     Dry eye syndrome     GERD (gastroesophageal reflux disease)     Hypertension     Macular drusen     Neuromuscular disorder     Ocular hypertension, bilateral     Osteoporosis     PUD (peptic ulcer disease)     Thoracic or lumbosacral neuritis or radiculitis 10/19/2012    Thyroid disease        Past Surgical History:  Past Surgical History:   Procedure Laterality Date    APPENDECTOMY  1965    CARDIAC CATHETERIZATION      HYSTERECTOMY  8 yrs     INJECTION OF ANESTHETIC AGENT AROUND NERVE Bilateral 8/21/2018    Procedure: BLOCK, NERVE;  Surgeon: Talia Belcher MD;  [facility] [facility] [place] [facility]  Service: Pain Management;  Laterality: Bilateral;  Bilateral block @ L2,3,4,5      TONSILLECTOMY  1954       Family History:  Family History   Problem Relation Age of Onset    Arthritis Mother     Cancer Mother     Heart disease Mother     Hypertension Mother     Cataracts Mother     Ovarian cancer Mother     Ulcers Father     Thyroid disease Brother     Heart disease Brother     Amblyopia Neg Hx     Blindness Neg Hx     Diabetes Neg Hx      Glaucoma Neg Hx     Macular degeneration Neg Hx     Retinal detachment Neg Hx     Strabismus Neg Hx     Stroke Neg Hx        Social History:  Social History     Socioeconomic History    Marital status:      Spouse name: Not on file    Number of children: Not on file    Years of education: Not on file    Highest education level: Not on file   Occupational History    Not on file   Social Needs    Financial resource strain: Not on file    Food insecurity:     Worry: Not on file     Inability: Not on file    Transportation needs:     Medical: Not on file     Non-medical: Not on file   Tobacco Use    Smoking status: Former Smoker     Last attempt to quit: 2008     Years since quittin.8    Smokeless tobacco: Never Used   Substance and Sexual Activity    Alcohol use: No     Alcohol/week: 0.0 standard drinks    Drug use: No    Sexual activity: Never   Lifestyle    Physical activity:     Days per week: Not on file     Minutes per session: Not on file    Stress: Not on file   Relationships    Social connections:     Talks on phone: Not on file     Gets together: Not on file     Attends Sikhism service: Not on file     Active member of club or organization: Not on file     Attends meetings of clubs or organizations: Not on file     Relationship status: Not on file   Other Topics Concern    Not on file   Social History Narrative    Not on file       OBJECTIVE:    There were no vitals taken for this visit.    PHYSICAL EXAMINATION:    General appearance: Well appearing, in no acute distress, alert and oriented x3.  Psych:  Mood and affect appropriate.  Skin: Skin color, texture, turgor normal, no rashes or lesions, in both upper and lower body.  Head/face:  Atraumatic, normocephalic. No palpable lymph nodes  Neck: No pain to palpation over the cervical paraspinous muscles. Spurling Negative. No pain with neck flexion, extension, or lateral flexion. .  Cor: RRR  Pulm: CTA  GI: Abdomen  soft and non-tender.  Back: Straight leg raising in the sitting and supine positions is negative to radicular pain. No pain to palpation over the spine or costovertebral angles. Normal range of motion without pain reproduction.  Extremities: Peripheral joint ROM is full and pain free without obvious instability or laxity in all four extremities. No deformities, edema, or skin discoloration. Good capillary refill.  Musculoskeletal: Shoulder, hip, sacroiliac and knee provocative maneuvers are negative. Bilateral upper and lower extremity strength is normal and symmetric.  No atrophy or tone abnormalities are noted.  Neuro: Bilateral upper and lower extremity coordination and muscle stretch reflexes are physiologic and symmetric.  Plantar response are downgoing. No loss of sensation is noted.  Gait: Normal.    ASSESSMENT: 69 y.o. year old female with *** pain, consistent with ***     No diagnosis found.      PLAN:     {PLAN:04986}  - RTC ***  - Counseled patient regarding the importance of {:61678}.    The above plan and management options were discussed at length with patient. Patient is in agreement with the above and verbalized understanding.    Mary Villar  11/07/2019

## 2019-11-18 ENCOUNTER — HOSPITAL ENCOUNTER (OUTPATIENT)
Facility: OTHER | Age: 69
Discharge: HOME | End: 2019-11-18
Attending: ANESTHESIOLOGY | Admitting: ANESTHESIOLOGY
Payer: COMMERCIAL

## 2019-11-18 VITALS
WEIGHT: 145 LBS | TEMPERATURE: 98 F | OXYGEN SATURATION: 99 % | HEIGHT: 61 IN | DIASTOLIC BLOOD PRESSURE: 82 MMHG | SYSTOLIC BLOOD PRESSURE: 143 MMHG | BODY MASS INDEX: 27.38 KG/M2 | HEART RATE: 82 BPM | RESPIRATION RATE: 16 BRPM

## 2019-11-18 DIAGNOSIS — G89.29 CHRONIC PAIN: ICD-10-CM

## 2019-11-18 DIAGNOSIS — M47.9 OSTEOARTHRITIS OF SPINE, UNSPECIFIED SPINAL OSTEOARTHRITIS COMPLICATION STATUS, UNSPECIFIED SPINAL REGION: Primary | ICD-10-CM

## 2019-11-18 DIAGNOSIS — M43.06 SPONDYLOLYSIS OF LUMBAR REGION: ICD-10-CM

## 2019-11-18 PROCEDURE — 64494 INJ PARAVERT F JNT L/S 2 LEV: CPT | Mod: 50 | Performed by: ANESTHESIOLOGY

## 2019-11-18 PROCEDURE — 63600175 PHARM REV CODE 636 W HCPCS: Performed by: ANESTHESIOLOGY

## 2019-11-18 PROCEDURE — 64495 PR INJ DX/THER AGNT PARAVERT FACET JOINT,IMG GUIDE,LUMBAR/SAC, ADD LEVEL: ICD-10-PCS | Mod: 50,,, | Performed by: ANESTHESIOLOGY

## 2019-11-18 PROCEDURE — 64493 PR INJ DX/THER AGNT PARAVERT FACET JOINT,IMG GUIDE,LUMBAR/SAC,1ST LVL: ICD-10-PCS | Mod: 50,,, | Performed by: ANESTHESIOLOGY

## 2019-11-18 PROCEDURE — 64493 INJ PARAVERT F JNT L/S 1 LEV: CPT | Mod: 50,,, | Performed by: ANESTHESIOLOGY

## 2019-11-18 PROCEDURE — 64494 PR INJ DX/THER AGNT PARAVERT FACET JOINT,IMG GUIDE,LUMBAR/SAC, 2ND LEVEL: ICD-10-PCS | Mod: 50,,, | Performed by: ANESTHESIOLOGY

## 2019-11-18 PROCEDURE — 25000003 PHARM REV CODE 250: Performed by: ANESTHESIOLOGY

## 2019-11-18 PROCEDURE — 64495 INJ PARAVERT F JNT L/S 3 LEV: CPT | Mod: 50,,, | Performed by: ANESTHESIOLOGY

## 2019-11-18 PROCEDURE — 64495 INJ PARAVERT F JNT L/S 3 LEV: CPT | Mod: 50 | Performed by: ANESTHESIOLOGY

## 2019-11-18 PROCEDURE — 64494 INJ PARAVERT F JNT L/S 2 LEV: CPT | Mod: 50,,, | Performed by: ANESTHESIOLOGY

## 2019-11-18 PROCEDURE — 64493 INJ PARAVERT F JNT L/S 1 LEV: CPT | Mod: 50 | Performed by: ANESTHESIOLOGY

## 2019-11-18 RX ORDER — SODIUM CHLORIDE 9 MG/ML
500 INJECTION, SOLUTION INTRAVENOUS CONTINUOUS
Status: DISCONTINUED | OUTPATIENT
Start: 2019-11-18 | End: 2019-11-18 | Stop reason: HOSPADM

## 2019-11-18 RX ORDER — LIDOCAINE HYDROCHLORIDE 10 MG/ML
INJECTION INFILTRATION; PERINEURAL
Status: DISCONTINUED | OUTPATIENT
Start: 2019-11-18 | End: 2019-11-18 | Stop reason: HOSPADM

## 2019-11-18 RX ORDER — TRIAMCINOLONE ACETONIDE 40 MG/ML
INJECTION, SUSPENSION INTRA-ARTICULAR; INTRAMUSCULAR
Status: DISCONTINUED | OUTPATIENT
Start: 2019-11-18 | End: 2019-11-18 | Stop reason: HOSPADM

## 2019-11-18 RX ORDER — BUPIVACAINE HYDROCHLORIDE 2.5 MG/ML
INJECTION, SOLUTION EPIDURAL; INFILTRATION; INTRACAUDAL
Status: DISCONTINUED | OUTPATIENT
Start: 2019-11-18 | End: 2019-11-18 | Stop reason: HOSPADM

## 2019-11-18 NOTE — OP NOTE
LUMBAR Medial Branch Block Under Fluoroscopy  Time-out taken to identify patient and procedure side prior to starting the procedure.   I attest that I have reviewed the patient's home medications prior to the procedure and no contraindication have been identified. I  re-evaluated the patient after the patient was positioned for the procedure in the procedure room immediately before the procedural time-out. The vital signs are current and represent the current state of the patient which has not significantly changed since the preprocedure assessment.            Date of Service: 11/18/2019    PCP: Galindo Stiles MD    Referring Physician:                                                           PROCEDURE:  Bilateral  L2, 3, 4, & 5 medial branch block    REASON FOR PROCEDURE: Lumbar spondylosis [M47.816]  1. Osteoarthritis of spine, unspecified spinal osteoarthritis complication status, unspecified spinal region    2. Spondylolysis of lumbar region    3. Chronic pain      POSTPROCEDURE DIAGNOSIS:   Lumbar spondylosis [M47.816]    1. Osteoarthritis of spine, unspecified spinal osteoarthritis complication status, unspecified spinal region    2. Spondylolysis of lumbar region    3. Chronic pain           PHYSICIAN: Talia Belcher MD  ASSISTANTS: Marek Guthrie MD resident     MEDICATIONS INJECTED: Kenalog 20mg and Bupivicaine 25% 1.5ml at each level      LOCAL ANESTHETIC USED: Xylocaine 1% 9ml with Sodium Bicarbonate 1ml.  3ml each site.    SEDATION MEDICATIONS: None    ESTIMATED BLOOD LOSS:  None.    COMPLICATIONS:  None.    TECHNIQUE: Laying in a prone position, the patient was prepped and draped in the usual sterile fashion using ChloraPrep and fenestrated drape.  The level was determined under fluoroscopic guidance.  Local anesthetic was given by going down to the hub of the 27-gauge 1.25in needle and raising a wheel.  A 22-gauge 3.5inch needle was introduced to the anatomic local of the medial branch at the lateral  mass of all levels as stated above utilizing live fluoroscopy. Medication was then injected slowly. The patient tolerated the procedure well.     PAIN BEFORE THE PROCEDURE:  3-9/10.    PAIN AFTER THE PROCEDURE:  2/10.    The patient was monitored after the procedure.  Patient was given post procedure and discharge instructions to follow at home.  We will see the patient back in two weeks or the patient may call to inform of status. The patient was discharged in a stable condition

## 2019-11-18 NOTE — DISCHARGE INSTRUCTIONS

## 2019-11-18 NOTE — DISCHARGE SUMMARY
Discharge Note  Short Stay      SUMMARY     Admit Date: 11/18/2019    Attending Physician: Talia Belcher      Discharge Physician: Talia Belcher      Discharge Date: 11/18/2019 8:35 AM    Procedure(s) (LRB):  BLOCK, NERVE, L2-L3-L4-L5 ME DIAL BRANCH (Bilateral)    Final Diagnosis: Lumbar spondylosis [M47.816]    Disposition: Home or self care    Patient Instructions:   Current Discharge Medication List      CONTINUE these medications which have NOT CHANGED    Details   amLODIPine (NORVASC) 5 MG tablet Take 1 tablet (5 mg total) by mouth every evening.  Qty: 90 tablet, Refills: 6      CALCIUM CARBONATE/VITAMIN D3 (CALCIUM 500 WITH D ORAL) Take 1 capsule by mouth once daily.      carboxymethylcellulose (REFRESH PLUS) 0.5 % Dpet 1 drop daily as needed.      dicyclomine (BENTYL) 10 MG capsule Take 10 mg by mouth 4 (four) times daily as needed.       esomeprazole (NEXIUM) 40 MG capsule Take 1 capsule (40 mg total) by mouth before breakfast.  Qty: 90 capsule, Refills: 3    Associated Diagnoses: Gastritis, presence of bleeding unspecified, unspecified chronicity, unspecified gastritis type      estradiol (CLIMARA) 0.075 mg/24 hr APPLY TO CLEAN DRY SKIN ONCE PER WEEK  Qty: 24 patch, Refills: 3    Associated Diagnoses: Menopause      ferrous sulfate (FEOSOL) 325 mg (65 mg iron) Tab tablet Take 1 tablet (325 mg total) by mouth daily with breakfast.  Qty: 90 tablet, Refills: 3    Associated Diagnoses: Iron deficiency anemia, unspecified iron deficiency anemia type      fluticasone propionate (FLONASE) 50 mcg/actuation nasal spray 1 spray (50 mcg total) by Each Nostril route once daily.  Qty: 16 g, Refills: 11    Associated Diagnoses: URI (upper respiratory infection)      hydroCHLOROthiazide (HYDRODIURIL) 12.5 MG Tab Take 1 tablet (12.5 mg total) by mouth every other day.  Qty: 15 tablet, Refills: 5      hyoscyamine (LEVSIN/SL) 0.125 mg Subl Place 1 tablet (0.125 mg total) under the tongue every 4 (four) hours as needed.  Qty: 45  tablet, Refills: 3    Associated Diagnoses: Gastritis      lidocaine (LIDODERM) 5 % Place 1 patch onto the skin once daily. Remove & Discard patch within 12 hours or as directed by MD  Qty: 30 patch, Refills: 2    Associated Diagnoses: Chest pain at rest; Post herpetic neuralgia      MULTIVITAMIN (MULTIPLE VITAMIN ORAL) Take 1 capsule by mouth once daily.      nitroGLYCERIN (NITROSTAT) 0.4 MG SL tablet Place 1 tablet (0.4 mg total) under the tongue every 5 (five) minutes as needed for Chest pain.  Qty: 30 tablet, Refills: 0      oxybutynin (DITROPAN XL) 15 MG TR24 Take 1 tablet (15 mg total) by mouth once daily.  Qty: 90 tablet, Refills: 1    Associated Diagnoses: Urge incontinence      pravastatin (PRAVACHOL) 20 MG tablet Take 1 tablet (20 mg total) by mouth once daily.  Qty: 90 tablet, Refills: 1    Associated Diagnoses: At risk for coronary artery disease      promethazine (PHENERGAN) 25 MG tablet TAKE 1 TABLET EVERY 6 HOURS AS NEEDED FOR NAUSEA  Qty: 45 tablet, Refills: 2    Associated Diagnoses: Gastritis, presence of bleeding unspecified, unspecified chronicity, unspecified gastritis type      ropinirole (REQUIP) 2 MG tablet Take 1.5 tablets (3 mg total) by mouth nightly.  Qty: 135 tablet, Refills: 3    Associated Diagnoses: Memory loss; RLS (restless legs syndrome)      SUBOXONE 8-2 mg Film 1 Film by Subdermal route once daily. 1 strip every 3 to 4 days  Refills: 0      SYNTHROID 100 mcg tablet Take 1 tablet (100 mcg total) by mouth once daily.  Qty: 90 tablet, Refills: 3    Associated Diagnoses: Hypothyroidism due to acquired atrophy of thyroid                 Discharge Diagnosis: Lumbar spondylosis [M47.816]  Condition on Discharge: Stable with no complications to procedure   Diet on Discharge: Same as before.  Activity: as per instruction sheet.  Discharge to: Home with a responsible adult.  Follow up: 2-4 weeks       Please call my office or pager at [phone] if experienced any weakness or loss of  sensation, fever > 101.5, pain uncontrolled with oral medications, persistent nausea/vomiting/or diarrhea, redness or drainage from the incisions, or any other worrisome concerns. If physician on call was not reached or could not communicate with our office for any reason please go to the nearest emergency department

## 2019-12-09 ENCOUNTER — OFFICE VISIT (OUTPATIENT)
Dept: CARDIOLOGY | Facility: CLINIC | Age: 69
End: 2019-12-09
Payer: COMMERCIAL

## 2019-12-09 ENCOUNTER — LAB VISIT (OUTPATIENT)
Dept: LAB | Facility: HOSPITAL | Age: 69
End: 2019-12-09
Attending: INTERNAL MEDICINE
Payer: COMMERCIAL

## 2019-12-09 VITALS
HEIGHT: 61 IN | SYSTOLIC BLOOD PRESSURE: 134 MMHG | HEART RATE: 74 BPM | DIASTOLIC BLOOD PRESSURE: 74 MMHG | WEIGHT: 149.06 LBS | BODY MASS INDEX: 28.14 KG/M2

## 2019-12-09 DIAGNOSIS — E78.00 HYPERCHOLESTEROLEMIA: ICD-10-CM

## 2019-12-09 DIAGNOSIS — R93.1 AGATSTON CAC SCORE, >400: Primary | ICD-10-CM

## 2019-12-09 DIAGNOSIS — I34.0 NON-RHEUMATIC MITRAL REGURGITATION: ICD-10-CM

## 2019-12-09 DIAGNOSIS — I10 ESSENTIAL (PRIMARY) HYPERTENSION: ICD-10-CM

## 2019-12-09 PROCEDURE — 3075F SYST BP GE 130 - 139MM HG: CPT | Mod: CPTII,S$GLB,, | Performed by: INTERNAL MEDICINE

## 2019-12-09 PROCEDURE — 3075F PR MOST RECENT SYSTOLIC BLOOD PRESS GE 130-139MM HG: ICD-10-PCS | Mod: CPTII,S$GLB,, | Performed by: INTERNAL MEDICINE

## 2019-12-09 PROCEDURE — 3078F DIAST BP <80 MM HG: CPT | Mod: CPTII,S$GLB,, | Performed by: INTERNAL MEDICINE

## 2019-12-09 PROCEDURE — 36415 COLL VENOUS BLD VENIPUNCTURE: CPT | Mod: PO

## 2019-12-09 PROCEDURE — 99214 OFFICE O/P EST MOD 30 MIN: CPT | Mod: S$GLB,,, | Performed by: INTERNAL MEDICINE

## 2019-12-09 PROCEDURE — 1101F PR PT FALLS ASSESS DOC 0-1 FALLS W/OUT INJ PAST YR: ICD-10-PCS | Mod: CPTII,S$GLB,, | Performed by: INTERNAL MEDICINE

## 2019-12-09 PROCEDURE — 1126F AMNT PAIN NOTED NONE PRSNT: CPT | Mod: S$GLB,,, | Performed by: INTERNAL MEDICINE

## 2019-12-09 PROCEDURE — 1159F MED LIST DOCD IN RCRD: CPT | Mod: S$GLB,,, | Performed by: INTERNAL MEDICINE

## 2019-12-09 PROCEDURE — 80061 LIPID PANEL: CPT

## 2019-12-09 PROCEDURE — 99999 PR PBB SHADOW E&M-EST. PATIENT-LVL IV: CPT | Mod: PBBFAC,,, | Performed by: INTERNAL MEDICINE

## 2019-12-09 PROCEDURE — 1126F PR PAIN SEVERITY QUANTIFIED, NO PAIN PRESENT: ICD-10-PCS | Mod: S$GLB,,, | Performed by: INTERNAL MEDICINE

## 2019-12-09 PROCEDURE — 1159F PR MEDICATION LIST DOCUMENTED IN MEDICAL RECORD: ICD-10-PCS | Mod: S$GLB,,, | Performed by: INTERNAL MEDICINE

## 2019-12-09 PROCEDURE — 1101F PT FALLS ASSESS-DOCD LE1/YR: CPT | Mod: CPTII,S$GLB,, | Performed by: INTERNAL MEDICINE

## 2019-12-09 PROCEDURE — 99999 PR PBB SHADOW E&M-EST. PATIENT-LVL IV: ICD-10-PCS | Mod: PBBFAC,,, | Performed by: INTERNAL MEDICINE

## 2019-12-09 PROCEDURE — 99214 PR OFFICE/OUTPT VISIT, EST, LEVL IV, 30-39 MIN: ICD-10-PCS | Mod: S$GLB,,, | Performed by: INTERNAL MEDICINE

## 2019-12-09 PROCEDURE — 3078F PR MOST RECENT DIASTOLIC BLOOD PRESSURE < 80 MM HG: ICD-10-PCS | Mod: CPTII,S$GLB,, | Performed by: INTERNAL MEDICINE

## 2019-12-09 RX ORDER — AMLODIPINE BESYLATE 5 MG/1
5 TABLET ORAL NIGHTLY
Qty: 90 TABLET | Refills: 1 | Status: SHIPPED | OUTPATIENT
Start: 2019-12-09 | End: 2021-05-21

## 2019-12-09 RX ORDER — HYDROCHLOROTHIAZIDE 12.5 MG/1
12.5 TABLET ORAL EVERY OTHER DAY
Qty: 45 TABLET | Refills: 1 | Status: SHIPPED | OUTPATIENT
Start: 2019-12-09 | End: 2020-01-07 | Stop reason: SDUPTHER

## 2019-12-09 NOTE — PROGRESS NOTES
Subjective:    Patient ID:  Osman Johansen is a 69 y.o. female who presents for Hypertension; Coronary Artery Disease; and Hyperlipidemia        HPI    DISCUSSED TEST AND GOALS, LAST LABS CMP OK, NO LIPIDS, CA SCORE 1132, STARTED STATIN, DOING OK, DX WITH CENTRAL SLEEP APNEA, NORMAL STRESS, NO CHEST PAIN, NO SHORTNESS OF BREATH, NO TIA TYPE SYMPTOMS NO, NO NEAR-SYNCOPE, SEE ROS  Past Medical History:   Diagnosis Date    Allergy     Anxiety     Arthritis     Blood transfusion 8 yrs    CAD (coronary artery disease)     Cataract     Degenerative disc disease     Depression     Dry eye syndrome     GERD (gastroesophageal reflux disease)     Hypercholesterolemia 12/9/2019    Hypertension     Macular drusen     Neuromuscular disorder     Ocular hypertension, bilateral     Osteoporosis     PUD (peptic ulcer disease)     Thoracic or lumbosacral neuritis or radiculitis 10/19/2012    Thyroid disease      Past Surgical History:   Procedure Laterality Date    APPENDECTOMY  1965    CARDIAC CATHETERIZATION      HYSTERECTOMY  8 yrs     INJECTION OF ANESTHETIC AGENT AROUND NERVE Bilateral 8/21/2018    Procedure: BLOCK, NERVE;  Surgeon: Talia Belcher MD;  Location: [facility];  Service: Pain Management;  Laterality: Bilateral;  Bilateral block @ L2,3,4,5      INJECTION OF ANESTHETIC AGENT AROUND NERVE Bilateral 11/18/2019    Procedure: BLOCK, NERVE, L2-L3-L4-L5 ME DIAL BRANCH;  Surgeon: Talia Belcher MD;  Location: [facility];  Service: Pain Management;  Laterality: Bilateral;    TONSILLECTOMY  1954     Family History   Problem Relation Age of Onset    Arthritis Mother     Cancer Mother     Heart disease Mother     Hypertension Mother     Cataracts Mother     Ovarian cancer Mother     Ulcers Father     Thyroid disease Brother     Heart disease Brother     Amblyopia Neg Hx     Blindness Neg Hx     Diabetes Neg Hx     Glaucoma Neg Hx     Macular degeneration Neg Hx     Retinal detachment  Neg Hx     Strabismus Neg Hx     Stroke Neg Hx      Social History     Socioeconomic History    Marital status:      Spouse name: Not on file    Number of children: Not on file    Years of education: Not on file    Highest education level: Not on file   Occupational History    Not on file   Social Needs    Financial resource strain: Not on file    Food insecurity:     Worry: Not on file     Inability: Not on file    Transportation needs:     Medical: Not on file     Non-medical: Not on file   Tobacco Use    Smoking status: Former Smoker     Last attempt to quit: 2008     Years since quittin.9    Smokeless tobacco: Never Used   Substance and Sexual Activity    Alcohol use: No     Alcohol/week: 0.0 standard drinks    Drug use: No    Sexual activity: Never   Lifestyle    Physical activity:     Days per week: Not on file     Minutes per session: Not on file    Stress: Not on file   Relationships    Social connections:     Talks on phone: Not on file     Gets together: Not on file     Attends Religion service: Not on file     Active member of club or organization: Not on file     Attends meetings of clubs or organizations: Not on file     Relationship status: Not on file   Other Topics Concern    Not on file   Social History Narrative    Not on file       Review of patient's allergies indicates:   Allergen Reactions    Pcn [penicillins] Swelling    Toradol [ketorolac] Swelling    Vibramycin [doxycycline calcium] Swelling    Zetia [ezetimibe] Other (See Comments)    Cymbalta [duloxetine]      dizzyness    Elavil [amitriptyline]     Lyrica [pregabalin] Swelling    Seroquel [quetiapine]      restless leg symdrome       Current Outpatient Medications:     amLODIPine (NORVASC) 5 MG tablet, Take 1 tablet (5 mg total) by mouth every evening., Disp: 90 tablet, Rfl: 1    CALCIUM CARBONATE/VITAMIN D3 (CALCIUM 500 WITH D ORAL), Take 1 capsule by mouth once daily., Disp: , Rfl:      carboxymethylcellulose (REFRESH PLUS) 0.5 % Dpet, 1 drop daily as needed., Disp: , Rfl:     dicyclomine (BENTYL) 10 MG capsule, Take 10 mg by mouth 4 (four) times daily as needed. , Disp: , Rfl:     esomeprazole (NEXIUM) 40 MG capsule, Take 1 capsule (40 mg total) by mouth before breakfast., Disp: 90 capsule, Rfl: 3    estradiol (CLIMARA) 0.075 mg/24 hr, APPLY TO CLEAN DRY SKIN ONCE PER WEEK, Disp: 24 patch, Rfl: 3    ferrous sulfate (FEOSOL) 325 mg (65 mg iron) Tab tablet, Take 1 tablet (325 mg total) by mouth daily with breakfast., Disp: 90 tablet, Rfl: 3    fluticasone propionate (FLONASE) 50 mcg/actuation nasal spray, 1 spray (50 mcg total) by Each Nostril route once daily., Disp: 16 g, Rfl: 11    hydroCHLOROthiazide (HYDRODIURIL) 12.5 MG Tab, Take 1 tablet (12.5 mg total) by mouth every other day., Disp: 45 tablet, Rfl: 1    hyoscyamine (LEVSIN/SL) 0.125 mg Subl, Place 1 tablet (0.125 mg total) under the tongue every 4 (four) hours as needed., Disp: 45 tablet, Rfl: 3    lidocaine (LIDODERM) 5 %, Place 1 patch onto the skin once daily. Remove & Discard patch within 12 hours or as directed by MD, Disp: 30 patch, Rfl: 2    MULTIVITAMIN (MULTIPLE VITAMIN ORAL), Take 1 capsule by mouth once daily., Disp: , Rfl:     oxybutynin (DITROPAN XL) 15 MG TR24, Take 1 tablet (15 mg total) by mouth once daily., Disp: 90 tablet, Rfl: 1    pravastatin (PRAVACHOL) 20 MG tablet, Take 1 tablet (20 mg total) by mouth once daily., Disp: 90 tablet, Rfl: 1    promethazine (PHENERGAN) 25 MG tablet, TAKE 1 TABLET EVERY 6 HOURS AS NEEDED FOR NAUSEA, Disp: 45 tablet, Rfl: 2    SUBOXONE 8-2 mg Film, 1 Film by Subdermal route once daily. 1 strip every 3 to 4 days, Disp: , Rfl: 0    SYNTHROID 100 mcg tablet, Take 1 tablet (100 mcg total) by mouth once daily., Disp: 90 tablet, Rfl: 3    nitroGLYCERIN (NITROSTAT) 0.4 MG SL tablet, Place 1 tablet (0.4 mg total) under the tongue every 5 (five) minutes as needed for Chest pain.,  "Disp: 30 tablet, Rfl: 0    ropinirole (REQUIP) 2 MG tablet, Take 1.5 tablets (3 mg total) by mouth nightly. (Patient taking differently: Take 2 mg by mouth nightly as needed. ), Disp: 135 tablet, Rfl: 3    Review of Systems   Constitution: Positive for weight gain. Negative for chills, diaphoresis, fever, malaise/fatigue and night sweats.   HENT: Negative for congestion, hearing loss and nosebleeds.    Eyes: Negative for blurred vision and visual disturbance.   Cardiovascular: Negative for chest pain, claudication, cyanosis, dyspnea on exertion (MILD), irregular heartbeat, leg swelling, near-syncope, orthopnea, palpitations, paroxysmal nocturnal dyspnea and syncope.   Respiratory: Negative for cough, hemoptysis, shortness of breath and wheezing.    Endocrine: Negative for cold intolerance, heat intolerance and polyuria.   Hematologic/Lymphatic: Negative for adenopathy. Does not bruise/bleed easily.   Skin: Negative for color change, itching and nail changes.   Musculoskeletal: Negative for back pain, falls and joint pain.   Gastrointestinal: Negative for abdominal pain, change in bowel habit, constipation, heartburn, hematemesis, jaundice, melena and vomiting.   Genitourinary: Negative for dysuria, flank pain and frequency.   Neurological: Negative for brief paralysis, dizziness, focal weakness, light-headedness, loss of balance, numbness, paresthesias, seizures, tremors and weakness.   Psychiatric/Behavioral: Negative for altered mental status, depression and memory loss. The patient is not nervous/anxious.    Allergic/Immunologic: Negative for hives and persistent infections.        Objective:      Vitals:    12/09/19 1432   BP: 134/74   Pulse: 74   Weight: 67.6 kg (149 lb 0.5 oz)   Height: 5' 1" (1.549 m)   PainSc: 0-No pain     Body mass index is 28.16 kg/m².    Physical Exam   Constitutional: She is oriented to person, place, and time. She appears well-developed and well-nourished. She is active.   HENT: "   Head: Normocephalic and atraumatic.   Mouth/Throat: Oropharynx is clear and moist and mucous membranes are normal.   Eyes: Pupils are equal, round, and reactive to light. Conjunctivae and EOM are normal.   Neck: Trachea normal and normal range of motion. Neck supple. Normal carotid pulses, no hepatojugular reflux and no JVD present. Carotid bruit is not present. No edema and no erythema present. No thyromegaly present.   Cardiovascular: Normal rate, regular rhythm and normal heart sounds.  No extrasystoles are present. PMI is not displaced. Exam reveals no gallop and no friction rub.   No murmur heard.  Pulses:       Carotid pulses are 2+ on the right side, and 2+ on the left side.       Radial pulses are 2+ on the right side, and 2+ on the left side.        Femoral pulses are 2+ on the right side, and 2+ on the left side.       Popliteal pulses are 2+ on the right side, and 2+ on the left side.        Dorsalis pedis pulses are 2+ on the right side, and 2+ on the left side.        Posterior tibial pulses are 2+ on the right side, and 2+ on the left side.   Pulmonary/Chest: Effort normal and breath sounds normal. No tachypnea and no bradypnea. She has no wheezes. She has no rales. She exhibits no tenderness.   Abdominal: Soft. Bowel sounds are normal. She exhibits no distension and no mass. There is no hepatosplenomegaly. There is no CVA tenderness.   Musculoskeletal: Normal range of motion. She exhibits no edema or tenderness.   Lymphadenopathy:     She has no cervical adenopathy.   Neurological: She is alert and oriented to person, place, and time. She has normal strength. She displays no tremor. No cranial nerve deficit.   Skin: Skin is warm and dry. No rash noted. No cyanosis or erythema. No pallor.   Psychiatric: She has a normal mood and affect. Her speech is normal and behavior is normal.               ..    Chemistry        Component Value Date/Time     09/23/2019 1405    K 4.9 09/23/2019 1405    CL  102 09/23/2019 1405    CO2 24 09/23/2019 1405    BUN 20 09/23/2019 1405    CREATININE 1.2 09/23/2019 1405    GLU 90 09/23/2019 1405        Component Value Date/Time    CALCIUM 9.6 09/23/2019 1405    ALKPHOS 70 09/23/2019 1405    AST 22 09/23/2019 1405    ALT 14 09/23/2019 1405    BILITOT 0.3 09/23/2019 1405    ESTGFRAFRICA 52.9 (A) 09/23/2019 1405    EGFRNONAA 45.9 (A) 09/23/2019 1405            ..  Lab Results   Component Value Date    CHOL 220 (H) 12/09/2019    CHOL 192 08/20/2019    CHOL 211 (H) 06/05/2019     Lab Results   Component Value Date     (H) 12/09/2019     (H) 08/20/2019    HDL 93 (H) 06/05/2019     Lab Results   Component Value Date    LDLCALC 90.0 12/09/2019    LDLCALC 77.4 08/20/2019    LDLCALC 86.4 06/05/2019     Lab Results   Component Value Date    TRIG 115 12/09/2019    TRIG 53 08/20/2019    TRIG 158 (H) 06/05/2019     Lab Results   Component Value Date    CHOLHDL 48.6 12/09/2019    CHOLHDL 54.2 (H) 08/20/2019    CHOLHDL 44.1 06/05/2019     ..  Lab Results   Component Value Date    WBC 6.51 09/23/2019    HGB 11.7 (L) 09/23/2019    HCT 37.4 09/23/2019    MCV 91 09/23/2019     09/23/2019       Test(s) Reviewed  I have reviewed the following in detail:  [x] Stress test   [] Angiography   [] Echocardiogram   [x] Labs   [x] Other:       Assessment:         ICD-10-CM ICD-9-CM   1. Agatston CAC score, >400 R93.1 793.2   2. Essential (primary) hypertension I10 401.9   3. Non-rheumatic mitral regurgitation I34.0 424.0   4. Hypercholesterolemia E78.00 272.0     Problem List Items Addressed This Visit        Cardiac/Vascular    Essential (primary) hypertension    Non-rheumatic mitral regurgitation    Agatston CAC score, >400 - Primary    Hypercholesterolemia    Relevant Orders    Lipid panel (Completed)           Plan:     LABS SOON, CONTINUE STATIN SPECIALLY IN VIEW OF SIGNIFICANT ATHEROSCLEROSIS OF THE CORONARY ARTERIES, WATCH BLOOD PRESSURE,ALL CV CLINICALLY STABLE, NO ANGINA, NO  HF, NO TIA, NO CLINICAL ARRHYTHMIA,CONTINUE CURRENT MEDS, EDUCATION, DIET, EXERCISE, RTC IN 6 MO      Agatston CAC score, >400    Essential (primary) hypertension    Non-rheumatic mitral regurgitation    Hypercholesterolemia  -     Lipid panel; Future; Expected date: 12/09/2019    Other orders  -     amLODIPine (NORVASC) 5 MG tablet; Take 1 tablet (5 mg total) by mouth every evening.  Dispense: 90 tablet; Refill: 1  -     hydroCHLOROthiazide (HYDRODIURIL) 12.5 MG Tab; Take 1 tablet (12.5 mg total) by mouth every other day.  Dispense: 45 tablet; Refill: 1    RTC Low level/low impact aerobic exercise 5x's/wk. Heart healthy diet and risk factor modification.    See labs and med orders.    Aerobic exercise 5x's/wk. Heart healthy diet and risk factor modification.    See labs and med orders.

## 2019-12-10 LAB
CHOLEST SERPL-MCNC: 220 MG/DL (ref 120–199)
CHOLEST/HDLC SERPL: 2.1 {RATIO} (ref 2–5)
HDLC SERPL-MCNC: 107 MG/DL (ref 40–75)
HDLC SERPL: 48.6 % (ref 20–50)
LDLC SERPL CALC-MCNC: 90 MG/DL (ref 63–159)
NONHDLC SERPL-MCNC: 113 MG/DL
TRIGL SERPL-MCNC: 115 MG/DL (ref 30–150)

## 2019-12-10 NOTE — PLAN OF CARE
Problem: Patient Care Overview (Adult)  Goal: Plan of Care Review  Outcome: Ongoing (interventions implemented as appropriate)  TOLERATED TREATMENT WITHOUT DIFFICULTY.       WORSENED 9/17/19. SWITCH TO EYELA 2 MG.

## 2019-12-11 ENCOUNTER — PATIENT OUTREACH (OUTPATIENT)
Dept: ADMINISTRATIVE | Facility: OTHER | Age: 69
End: 2019-12-11

## 2019-12-11 DIAGNOSIS — Z12.31 ENCOUNTER FOR SCREENING MAMMOGRAM FOR MALIGNANT NEOPLASM OF BREAST: ICD-10-CM

## 2019-12-11 DIAGNOSIS — Z13.5 ENCOUNTER FOR SCREENING FOR DIABETIC RETINOPATHY: Primary | ICD-10-CM

## 2019-12-12 ENCOUNTER — TELEPHONE (OUTPATIENT)
Dept: PAIN MEDICINE | Facility: CLINIC | Age: 69
End: 2019-12-12

## 2019-12-12 ENCOUNTER — PATIENT MESSAGE (OUTPATIENT)
Dept: PAIN MEDICINE | Facility: CLINIC | Age: 69
End: 2019-12-12

## 2019-12-12 NOTE — TELEPHONE ENCOUNTER
Good morning,afternoon Mr./Mrs.    My name is Lolita I am contacting you from [facility] regarding your appointment scheduled for,12/13/2019 with, Meche aCvazos just confirming you will be able to make it.        Staff left voicemail reminding pt of their appt.

## 2019-12-13 ENCOUNTER — PATIENT MESSAGE (OUTPATIENT)
Dept: PAIN MEDICINE | Facility: OTHER | Age: 69
End: 2019-12-13

## 2019-12-13 ENCOUNTER — OFFICE VISIT (OUTPATIENT)
Dept: PAIN MEDICINE | Facility: CLINIC | Age: 69
End: 2019-12-13
Payer: COMMERCIAL

## 2019-12-13 VITALS
WEIGHT: 149 LBS | DIASTOLIC BLOOD PRESSURE: 80 MMHG | SYSTOLIC BLOOD PRESSURE: 184 MMHG | HEART RATE: 66 BPM | HEIGHT: 61 IN | BODY MASS INDEX: 28.13 KG/M2 | TEMPERATURE: 98 F

## 2019-12-13 DIAGNOSIS — M47.816 FACET ARTHRITIS OF LUMBAR REGION: ICD-10-CM

## 2019-12-13 DIAGNOSIS — M47.816 LUMBAR SPONDYLOSIS: Primary | ICD-10-CM

## 2019-12-13 PROCEDURE — 1159F PR MEDICATION LIST DOCUMENTED IN MEDICAL RECORD: ICD-10-PCS | Mod: S$GLB,,, | Performed by: NURSE PRACTITIONER

## 2019-12-13 PROCEDURE — 1101F PT FALLS ASSESS-DOCD LE1/YR: CPT | Mod: CPTII,S$GLB,, | Performed by: NURSE PRACTITIONER

## 2019-12-13 PROCEDURE — 99999 PR PBB SHADOW E&M-EST. PATIENT-LVL V: CPT | Mod: PBBFAC,,, | Performed by: NURSE PRACTITIONER

## 2019-12-13 PROCEDURE — 3079F DIAST BP 80-89 MM HG: CPT | Mod: CPTII,S$GLB,, | Performed by: NURSE PRACTITIONER

## 2019-12-13 PROCEDURE — 1125F PR PAIN SEVERITY QUANTIFIED, PAIN PRESENT: ICD-10-PCS | Mod: S$GLB,,, | Performed by: NURSE PRACTITIONER

## 2019-12-13 PROCEDURE — 99213 OFFICE O/P EST LOW 20 MIN: CPT | Mod: S$GLB,,, | Performed by: NURSE PRACTITIONER

## 2019-12-13 PROCEDURE — 3077F SYST BP >= 140 MM HG: CPT | Mod: CPTII,S$GLB,, | Performed by: NURSE PRACTITIONER

## 2019-12-13 PROCEDURE — 3079F PR MOST RECENT DIASTOLIC BLOOD PRESSURE 80-89 MM HG: ICD-10-PCS | Mod: CPTII,S$GLB,, | Performed by: NURSE PRACTITIONER

## 2019-12-13 PROCEDURE — 3077F PR MOST RECENT SYSTOLIC BLOOD PRESSURE >= 140 MM HG: ICD-10-PCS | Mod: CPTII,S$GLB,, | Performed by: NURSE PRACTITIONER

## 2019-12-13 PROCEDURE — 99999 PR PBB SHADOW E&M-EST. PATIENT-LVL V: ICD-10-PCS | Mod: PBBFAC,,, | Performed by: NURSE PRACTITIONER

## 2019-12-13 PROCEDURE — 1101F PR PT FALLS ASSESS DOC 0-1 FALLS W/OUT INJ PAST YR: ICD-10-PCS | Mod: CPTII,S$GLB,, | Performed by: NURSE PRACTITIONER

## 2019-12-13 PROCEDURE — 1125F AMNT PAIN NOTED PAIN PRSNT: CPT | Mod: S$GLB,,, | Performed by: NURSE PRACTITIONER

## 2019-12-13 PROCEDURE — 99213 PR OFFICE/OUTPT VISIT, EST, LEVL III, 20-29 MIN: ICD-10-PCS | Mod: S$GLB,,, | Performed by: NURSE PRACTITIONER

## 2019-12-13 PROCEDURE — 1159F MED LIST DOCD IN RCRD: CPT | Mod: S$GLB,,, | Performed by: NURSE PRACTITIONER

## 2019-12-13 NOTE — H&P (VIEW-ONLY)
Chronic patient Established Note (Follow up visit)      SUBJECTIVE:    Interval History 12/13/2019:  The patient is here for follow up of back pain.  She is s/p Bilateral L2,3,4,5 MBB with steroids.  She is reporting limited benefit this time.  Previous provided her significant benefit.  She is having pain across the lower back, worse on the left side.  She denies radiation into the legs.  She is also having middle thoracic pain.  This does not radiate to the front of her body.  She is not having any numbness.  She would like to schedule another procedure.  Her pain today is 5/10.    Previous Encounter:  Osman Johansen presents to the clinic for a follow-up appointment for lower back pain. She reports increased low back pain over the last few weeks. She describes this pain as constant, sharp, and aching. She denies any radiating leg pain. She previously had 90% relief of her pain with bilateral L2,3,4,5 MBB and T7-8 interspinous ligament injection, last done in August 2018. She would like to repeat this procedure. She continues to participate in a home exercise routine. She denies any other health changes. Her pain today is 5/10.    Pain Disability Index Review:  Last 3 PDI Scores 12/13/2019 11/7/2019 12/12/2018   Pain Disability Index (PDI) 29 17 30       Pain Medications:  None    Opioid Contract: no     report:  Reviewed and consistent with medication use as prescribed.    Pain Procedures:   7/18/13 Bilateral L5 TF JOCE  10/28/13 Bilateral SI joint injection  4/20/15 Bilateral SI joint injection  12/15/16 Bilateral L3-4 facet joint injections- 100% relief for 6 weeks  3/13/17 Bilateral L3-4 facet joint injections- 30% relief  4/17/17 Bilateral L2,3,4,5 MBB with steroids- significant benefit for 7 months  12/14/17 Bilateral L2,3,4,5 MBB with steroids and Interspinal ligament injection- 90% relief for 7 months  8/21/2018- Bilateral L2,3,4,5 MBB with steroids and T7-8 interspinous ligament injections    11/18/19 Bilateral L2,3,4,5 MBB with steroids    Physical Therapy/Home Exercise: per self    Imaging:     Lumbar MRI 12/10/16    Narrative   MRI LUMBAR SPINE WITHOUT CONTRAST    COMPARISON: None    TECHNIQUE:  Sagittal T1, T2, and STIR;  axial T1 and T2 sequences through the lumbar spine were acquired without contrast.    FINDINGS: Vertebral body height and alignment are within normal limits.  The conus terminates at T12-L1.  Moderate loss of disc height is present at L4-5.  Marrow signal is mildly heterogeneous.  No focal osseous lesion.    L1-L2:  No disc herniation. No spinal canal or neuroforaminal narrowing.    L2-L3:  Mild diffuse disc bulging is present without spinal canal or neuroforaminal narrowing.    L3-L4:  Mild diffuse disc bulging and moderate bilateral facet arthropathy result in mild spinal canal narrowing.    L4-L5:  Mild diffuse disc bulging is present without spinal canal or neuroforaminal narrowing.    L5-S1:  No disc herniation. No spinal canal or neuroforaminal narrowing.    Several small gallstones noted.   Impression     Degenerative lumbar spondylosis with mild L3-4 spinal canal narrowing and no significant neuroforaminal narrowing.  Cholelithiasis       Narrative     MRI of the thoracic spine without contrast    Technique: Multiplanar multisequence MR imaging of the thoracic spine was performed without contrast.    Findings: There is mild levoscoliosis of the lumbar spine.  This may be positional.  Vertebral bodies otherwise demonstrate normal height alignment.  The marrow signal of the vertebral bodies is within normal limits.  There is mild disc desiccation noted throughout the thoracic spine.  No significant disc space narrowing.  No significant disc protrusions are identified.  The central canal is widely patent.  No significant spondylosis.  The cord is normal in signal and caliber.      Impression         1.  Mild levoscoliosis of the thoracic spine otherwise essentially  unremarkable MRI of the thoracic spine.         CMP  Sodium   Date Value Ref Range Status   09/23/2019 136 136 - 145 mmol/L Final     Potassium   Date Value Ref Range Status   09/23/2019 4.9 3.5 - 5.1 mmol/L Final     Chloride   Date Value Ref Range Status   09/23/2019 102 95 - 110 mmol/L Final     CO2   Date Value Ref Range Status   09/23/2019 24 23 - 29 mmol/L Final     Glucose   Date Value Ref Range Status   09/23/2019 90 70 - 110 mg/dL Final     BUN, Bld   Date Value Ref Range Status   09/23/2019 20 8 - 23 mg/dL Final     Creatinine   Date Value Ref Range Status   09/23/2019 1.2 0.5 - 1.4 mg/dL Final     Calcium   Date Value Ref Range Status   09/23/2019 9.6 8.7 - 10.5 mg/dL Final     Total Protein   Date Value Ref Range Status   09/23/2019 7.6 6.0 - 8.4 g/dL Final     Albumin   Date Value Ref Range Status   09/23/2019 4.1 3.5 - 5.2 g/dL Final     Total Bilirubin   Date Value Ref Range Status   09/23/2019 0.3 0.1 - 1.0 mg/dL Final     Comment:     For infants and newborns, interpretation of results should be based  on gestational age, weight and in agreement with clinical  observations.  Premature Infant recommended reference ranges:  Up to 24 hours.............<8.0 mg/dL  Up to 48 hours............<12.0 mg/dL  3-5 days..................<15.0 mg/dL  6-29 days.................<15.0 mg/dL       Alkaline Phosphatase   Date Value Ref Range Status   09/23/2019 70 55 - 135 U/L Final     AST   Date Value Ref Range Status   09/23/2019 22 10 - 40 U/L Final     ALT   Date Value Ref Range Status   09/23/2019 14 10 - 44 U/L Final     Anion Gap   Date Value Ref Range Status   09/23/2019 10 8 - 16 mmol/L Final     eGFR if    Date Value Ref Range Status   09/23/2019 52.9 (A) >60 mL/min/1.73 m^2 Final     eGFR if non    Date Value Ref Range Status   09/23/2019 45.9 (A) >60 mL/min/1.73 m^2 Final     Comment:     Calculation used to obtain the estimated glomerular filtration  rate (eGFR) is the  CKD-EPI equation.        Lab Results   Component Value Date    WBC 6.51 09/23/2019    HGB 11.7 (L) 09/23/2019    HCT 37.4 09/23/2019    MCV 91 09/23/2019     09/23/2019         Allergies:   Review of patient's allergies indicates:   Allergen Reactions    Pcn [penicillins] Swelling    Toradol [ketorolac] Swelling    Vibramycin [doxycycline calcium] Swelling    Cymbalta [duloxetine]      dizzyness    Elavil [amitriptyline]     Lyrica [pregabalin] Swelling    Seroquel [quetiapine]      restless leg symdrome       Current Medications:   Current Outpatient Medications   Medication Sig Dispense Refill    amLODIPine (NORVASC) 5 MG tablet Take 1 tablet (5 mg total) by mouth every evening. 90 tablet 1    CALCIUM CARBONATE/VITAMIN D3 (CALCIUM 500 WITH D ORAL) Take 1 capsule by mouth once daily.      carboxymethylcellulose (REFRESH PLUS) 0.5 % Dpet 1 drop daily as needed.      dicyclomine (BENTYL) 10 MG capsule Take 10 mg by mouth 4 (four) times daily as needed.       esomeprazole (NEXIUM) 40 MG capsule Take 1 capsule (40 mg total) by mouth before breakfast. 90 capsule 3    estradiol (CLIMARA) 0.075 mg/24 hr APPLY TO CLEAN DRY SKIN ONCE PER WEEK 24 patch 3    ferrous sulfate (FEOSOL) 325 mg (65 mg iron) Tab tablet Take 1 tablet (325 mg total) by mouth daily with breakfast. 90 tablet 3    fluticasone propionate (FLONASE) 50 mcg/actuation nasal spray 1 spray (50 mcg total) by Each Nostril route once daily. 16 g 11    hydroCHLOROthiazide (HYDRODIURIL) 12.5 MG Tab Take 1 tablet (12.5 mg total) by mouth every other day. 45 tablet 1    hyoscyamine (LEVSIN/SL) 0.125 mg Subl Place 1 tablet (0.125 mg total) under the tongue every 4 (four) hours as needed. 45 tablet 3    lidocaine (LIDODERM) 5 % Place 1 patch onto the skin once daily. Remove & Discard patch within 12 hours or as directed by MD 30 patch 2    MULTIVITAMIN (MULTIPLE VITAMIN ORAL) Take 1 capsule by mouth once daily.      oxybutynin (DITROPAN XL)  15 MG TR24 Take 1 tablet (15 mg total) by mouth once daily. 90 tablet 1    pravastatin (PRAVACHOL) 20 MG tablet Take 1 tablet (20 mg total) by mouth once daily. 90 tablet 1    promethazine (PHENERGAN) 25 MG tablet TAKE 1 TABLET EVERY 6 HOURS AS NEEDED FOR NAUSEA 45 tablet 2    SUBOXONE 8-2 mg Film 1 Film by Subdermal route once daily. 1 strip every 3 to 4 days  0    SYNTHROID 100 mcg tablet Take 1 tablet (100 mcg total) by mouth once daily. 90 tablet 3    nitroGLYCERIN (NITROSTAT) 0.4 MG SL tablet Place 1 tablet (0.4 mg total) under the tongue every 5 (five) minutes as needed for Chest pain. 30 tablet 0    ropinirole (REQUIP) 2 MG tablet Take 1.5 tablets (3 mg total) by mouth nightly. (Patient taking differently: Take 2 mg by mouth nightly as needed. ) 135 tablet 3     No current facility-administered medications for this visit.        REVIEW OF SYSTEMS:    GENERAL:  No weight loss, malaise or fevers.  HEENT:  Negative for frequent or significant headaches.  NECK:  Negative for lumps, goiter, pain and significant neck swelling.  RESPIRATORY:  Negative for cough, wheezing or shortness of breath.  CARDIOVASCULAR:  Negative for chest pain, leg swelling or palpitations.  GI:  Negative for abdominal discomfort, blood in stools or black stools or change in bowel habits. GERD.  MUSCULOSKELETAL:  See HPI.  SKIN:  Negative for lesions, rash, and itching.  PSYCH: H/O anxiety and opioid dependence.  HEMATOLOGY/LYMPHOLOGY:  Negative for prolonged bleeding, bruising easily or swollen nodes.  NEURO:   No history of headaches, syncope, paralysis, seizures or tremors.  All other reviewed and negative other than HPI.    Past Medical History:  Past Medical History:   Diagnosis Date    Allergy     Anxiety     Arthritis     Blood transfusion 8 yrs    CAD (coronary artery disease)     Cataract     Degenerative disc disease     Depression     Dry eye syndrome     GERD (gastroesophageal reflux disease)      Hypercholesterolemia 12/9/2019    Hypertension     Macular drusen     Neuromuscular disorder     Ocular hypertension, bilateral     Osteoporosis     PUD (peptic ulcer disease)     Thoracic or lumbosacral neuritis or radiculitis 10/19/2012    Thyroid disease        Past Surgical History:  Past Surgical History:   Procedure Laterality Date    APPENDECTOMY  1965    CARDIAC CATHETERIZATION      HYSTERECTOMY  8 yrs     INJECTION OF ANESTHETIC AGENT AROUND NERVE Bilateral 8/21/2018    Procedure: BLOCK, NERVE;  Surgeon: Talia Belcher MD;  Location: [facility];  Service: Pain Management;  Laterality: Bilateral;  Bilateral block @ L2,3,4,5      INJECTION OF ANESTHETIC AGENT AROUND NERVE Bilateral 11/18/2019    Procedure: BLOCK, NERVE, L2-L3-L4-L5 ME DIAL BRANCH;  Surgeon: Talia Belcher MD;  Location: [facility];  Service: Pain Management;  Laterality: Bilateral;    TONSILLECTOMY  1954       Family History:  Family History   Problem Relation Age of Onset    Arthritis Mother     Cancer Mother     Heart disease Mother     Hypertension Mother     Cataracts Mother     Ovarian cancer Mother     Ulcers Father     Thyroid disease Brother     Heart disease Brother     Amblyopia Neg Hx     Blindness Neg Hx     Diabetes Neg Hx     Glaucoma Neg Hx     Macular degeneration Neg Hx     Retinal detachment Neg Hx     Strabismus Neg Hx     Stroke Neg Hx        Social History:  Social History     Socioeconomic History    Marital status:      Spouse name: Not on file    Number of children: Not on file    Years of education: Not on file    Highest education level: Not on file   Occupational History    Not on file   Social Needs    Financial resource strain: Not on file    Food insecurity:     Worry: Not on file     Inability: Not on file    Transportation needs:     Medical: Not on file     Non-medical: Not on file   Tobacco Use    Smoking status: Former Smoker     Last attempt to quit: 1/1/2008  "    Years since quittin.9    Smokeless tobacco: Never Used   Substance and Sexual Activity    Alcohol use: No     Alcohol/week: 0.0 standard drinks    Drug use: No    Sexual activity: Never   Lifestyle    Physical activity:     Days per week: Not on file     Minutes per session: Not on file    Stress: Not on file   Relationships    Social connections:     Talks on phone: Not on file     Gets together: Not on file     Attends Jainism service: Not on file     Active member of club or organization: Not on file     Attends meetings of clubs or organizations: Not on file     Relationship status: Not on file   Other Topics Concern    Not on file   Social History Narrative    Not on file       OBJECTIVE:    BP (!) 184/80   Pulse 66   Temp 98.3 °F (36.8 °C)   Ht 5' 1" (1.549 m)   Wt 67.6 kg (149 lb)   BMI 28.15 kg/m²     PHYSICAL EXAMINATION:    General appearance: Well appearing, in no acute distress, alert and oriented x3.  Psych:  Mood and affect appropriate.  Skin: No rashes or edema.  Head/face:  Atraumatic, normocephalic. No palpable lymph nodes  Cor: RRR  Pulm: CTA  GI: Abdomen soft and non-distended.    Back: Straight leg raise in the sitting position is negative for radicular pain bilaterally. There is pain with palpation to lumbar facet joints on the left. There is midline pain to thoracic spine around T7. Limited ROM with pain on flexion and extension. Positive facet loading bilaterally, L>R.  Extremities: Peripheral joint ROM is full and pain free without obvious instability or laxity in all four extremities. No deformities, edema, or skin discoloration. Good capillary refill.  Musculoskeletal: Bilateral upper and lower extremity strength is normal and symmetric.  No atrophy or tone abnormalities are noted.  Neuro: Bilateral upper and lower extremity coordination and muscle stretch reflexes are physiologic and symmetric.  Plantar response are downgoing.   Gait: Normal.    ASSESSMENT: 69 y.o. " year old female with back pain, consistent with the following diagnoses:     1. Lumbar spondylosis     2. Facet arthritis of lumbar region           PLAN:     - Previous imaging was reviewed and discussed with the patient today.    - Schedule for bilateral L3-4 and L4-5 facet joint injections as well as T7-8 interspinous ligament injection    - The patient will continue a home exercise routine to help with pain and strengthening.      - RTC 2 weeks after procedure.    - Counseled patient regarding the importance of constant sleeping habits and physical therapy.        The above plan and management options were discussed at length with patient. Patient is in agreement with the above and verbalized understanding.    Meche Clinton  12/13/2019

## 2019-12-13 NOTE — PROGRESS NOTES
Chronic patient Established Note (Follow up visit)      SUBJECTIVE:    Interval History 12/13/2019:  The patient is here for follow up of back pain.  She is s/p Bilateral L2,3,4,5 MBB with steroids.  She is reporting limited benefit this time.  Previous provided her significant benefit.  She is having pain across the lower back, worse on the left side.  She denies radiation into the legs.  She is also having middle thoracic pain.  This does not radiate to the front of her body.  She is not having any numbness.  She would like to schedule another procedure.  Her pain today is 5/10.    Previous Encounter:  Osman Johansen presents to the clinic for a follow-up appointment for lower back pain. She reports increased low back pain over the last few weeks. She describes this pain as constant, sharp, and aching. She denies any radiating leg pain. She previously had 90% relief of her pain with bilateral L2,3,4,5 MBB and T7-8 interspinous ligament injection, last done in August 2018. She would like to repeat this procedure. She continues to participate in a home exercise routine. She denies any other health changes. Her pain today is 5/10.    Pain Disability Index Review:  Last 3 PDI Scores 12/13/2019 11/7/2019 12/12/2018   Pain Disability Index (PDI) 29 17 30       Pain Medications:  None    Opioid Contract: no     report:  Reviewed and consistent with medication use as prescribed.    Pain Procedures:   7/18/13 Bilateral L5 TF JOCE  10/28/13 Bilateral SI joint injection  4/20/15 Bilateral SI joint injection  12/15/16 Bilateral L3-4 facet joint injections- 100% relief for 6 weeks  3/13/17 Bilateral L3-4 facet joint injections- 30% relief  4/17/17 Bilateral L2,3,4,5 MBB with steroids- significant benefit for 7 months  12/14/17 Bilateral L2,3,4,5 MBB with steroids and Interspinal ligament injection- 90% relief for 7 months  8/21/2018- Bilateral L2,3,4,5 MBB with steroids and T7-8 interspinous ligament injections    11/18/19 Bilateral L2,3,4,5 MBB with steroids    Physical Therapy/Home Exercise: per self    Imaging:     Lumbar MRI 12/10/16    Narrative   MRI LUMBAR SPINE WITHOUT CONTRAST    COMPARISON: None    TECHNIQUE:  Sagittal T1, T2, and STIR;  axial T1 and T2 sequences through the lumbar spine were acquired without contrast.    FINDINGS: Vertebral body height and alignment are within normal limits.  The conus terminates at T12-L1.  Moderate loss of disc height is present at L4-5.  Marrow signal is mildly heterogeneous.  No focal osseous lesion.    L1-L2:  No disc herniation. No spinal canal or neuroforaminal narrowing.    L2-L3:  Mild diffuse disc bulging is present without spinal canal or neuroforaminal narrowing.    L3-L4:  Mild diffuse disc bulging and moderate bilateral facet arthropathy result in mild spinal canal narrowing.    L4-L5:  Mild diffuse disc bulging is present without spinal canal or neuroforaminal narrowing.    L5-S1:  No disc herniation. No spinal canal or neuroforaminal narrowing.    Several small gallstones noted.   Impression     Degenerative lumbar spondylosis with mild L3-4 spinal canal narrowing and no significant neuroforaminal narrowing.  Cholelithiasis       Narrative     MRI of the thoracic spine without contrast    Technique: Multiplanar multisequence MR imaging of the thoracic spine was performed without contrast.    Findings: There is mild levoscoliosis of the lumbar spine.  This may be positional.  Vertebral bodies otherwise demonstrate normal height alignment.  The marrow signal of the vertebral bodies is within normal limits.  There is mild disc desiccation noted throughout the thoracic spine.  No significant disc space narrowing.  No significant disc protrusions are identified.  The central canal is widely patent.  No significant spondylosis.  The cord is normal in signal and caliber.      Impression         1.  Mild levoscoliosis of the thoracic spine otherwise essentially  unremarkable MRI of the thoracic spine.         CMP  Sodium   Date Value Ref Range Status   09/23/2019 136 136 - 145 mmol/L Final     Potassium   Date Value Ref Range Status   09/23/2019 4.9 3.5 - 5.1 mmol/L Final     Chloride   Date Value Ref Range Status   09/23/2019 102 95 - 110 mmol/L Final     CO2   Date Value Ref Range Status   09/23/2019 24 23 - 29 mmol/L Final     Glucose   Date Value Ref Range Status   09/23/2019 90 70 - 110 mg/dL Final     BUN, Bld   Date Value Ref Range Status   09/23/2019 20 8 - 23 mg/dL Final     Creatinine   Date Value Ref Range Status   09/23/2019 1.2 0.5 - 1.4 mg/dL Final     Calcium   Date Value Ref Range Status   09/23/2019 9.6 8.7 - 10.5 mg/dL Final     Total Protein   Date Value Ref Range Status   09/23/2019 7.6 6.0 - 8.4 g/dL Final     Albumin   Date Value Ref Range Status   09/23/2019 4.1 3.5 - 5.2 g/dL Final     Total Bilirubin   Date Value Ref Range Status   09/23/2019 0.3 0.1 - 1.0 mg/dL Final     Comment:     For infants and newborns, interpretation of results should be based  on gestational age, weight and in agreement with clinical  observations.  Premature Infant recommended reference ranges:  Up to 24 hours.............<8.0 mg/dL  Up to 48 hours............<12.0 mg/dL  3-5 days..................<15.0 mg/dL  6-29 days.................<15.0 mg/dL       Alkaline Phosphatase   Date Value Ref Range Status   09/23/2019 70 55 - 135 U/L Final     AST   Date Value Ref Range Status   09/23/2019 22 10 - 40 U/L Final     ALT   Date Value Ref Range Status   09/23/2019 14 10 - 44 U/L Final     Anion Gap   Date Value Ref Range Status   09/23/2019 10 8 - 16 mmol/L Final     eGFR if    Date Value Ref Range Status   09/23/2019 52.9 (A) >60 mL/min/1.73 m^2 Final     eGFR if non    Date Value Ref Range Status   09/23/2019 45.9 (A) >60 mL/min/1.73 m^2 Final     Comment:     Calculation used to obtain the estimated glomerular filtration  rate (eGFR) is the  CKD-EPI equation.        Lab Results   Component Value Date    WBC 6.51 09/23/2019    HGB 11.7 (L) 09/23/2019    HCT 37.4 09/23/2019    MCV 91 09/23/2019     09/23/2019         Allergies:   Review of patient's allergies indicates:   Allergen Reactions    Pcn [penicillins] Swelling    Toradol [ketorolac] Swelling    Vibramycin [doxycycline calcium] Swelling    Cymbalta [duloxetine]      dizzyness    Elavil [amitriptyline]     Lyrica [pregabalin] Swelling    Seroquel [quetiapine]      restless leg symdrome       Current Medications:   Current Outpatient Medications   Medication Sig Dispense Refill    amLODIPine (NORVASC) 5 MG tablet Take 1 tablet (5 mg total) by mouth every evening. 90 tablet 1    CALCIUM CARBONATE/VITAMIN D3 (CALCIUM 500 WITH D ORAL) Take 1 capsule by mouth once daily.      carboxymethylcellulose (REFRESH PLUS) 0.5 % Dpet 1 drop daily as needed.      dicyclomine (BENTYL) 10 MG capsule Take 10 mg by mouth 4 (four) times daily as needed.       esomeprazole (NEXIUM) 40 MG capsule Take 1 capsule (40 mg total) by mouth before breakfast. 90 capsule 3    estradiol (CLIMARA) 0.075 mg/24 hr APPLY TO CLEAN DRY SKIN ONCE PER WEEK 24 patch 3    ferrous sulfate (FEOSOL) 325 mg (65 mg iron) Tab tablet Take 1 tablet (325 mg total) by mouth daily with breakfast. 90 tablet 3    fluticasone propionate (FLONASE) 50 mcg/actuation nasal spray 1 spray (50 mcg total) by Each Nostril route once daily. 16 g 11    hydroCHLOROthiazide (HYDRODIURIL) 12.5 MG Tab Take 1 tablet (12.5 mg total) by mouth every other day. 45 tablet 1    hyoscyamine (LEVSIN/SL) 0.125 mg Subl Place 1 tablet (0.125 mg total) under the tongue every 4 (four) hours as needed. 45 tablet 3    lidocaine (LIDODERM) 5 % Place 1 patch onto the skin once daily. Remove & Discard patch within 12 hours or as directed by MD 30 patch 2    MULTIVITAMIN (MULTIPLE VITAMIN ORAL) Take 1 capsule by mouth once daily.      oxybutynin (DITROPAN XL)  15 MG TR24 Take 1 tablet (15 mg total) by mouth once daily. 90 tablet 1    pravastatin (PRAVACHOL) 20 MG tablet Take 1 tablet (20 mg total) by mouth once daily. 90 tablet 1    promethazine (PHENERGAN) 25 MG tablet TAKE 1 TABLET EVERY 6 HOURS AS NEEDED FOR NAUSEA 45 tablet 2    SUBOXONE 8-2 mg Film 1 Film by Subdermal route once daily. 1 strip every 3 to 4 days  0    SYNTHROID 100 mcg tablet Take 1 tablet (100 mcg total) by mouth once daily. 90 tablet 3    nitroGLYCERIN (NITROSTAT) 0.4 MG SL tablet Place 1 tablet (0.4 mg total) under the tongue every 5 (five) minutes as needed for Chest pain. 30 tablet 0    ropinirole (REQUIP) 2 MG tablet Take 1.5 tablets (3 mg total) by mouth nightly. (Patient taking differently: Take 2 mg by mouth nightly as needed. ) 135 tablet 3     No current facility-administered medications for this visit.        REVIEW OF SYSTEMS:    GENERAL:  No weight loss, malaise or fevers.  HEENT:  Negative for frequent or significant headaches.  NECK:  Negative for lumps, goiter, pain and significant neck swelling.  RESPIRATORY:  Negative for cough, wheezing or shortness of breath.  CARDIOVASCULAR:  Negative for chest pain, leg swelling or palpitations.  GI:  Negative for abdominal discomfort, blood in stools or black stools or change in bowel habits. GERD.  MUSCULOSKELETAL:  See HPI.  SKIN:  Negative for lesions, rash, and itching.  PSYCH: H/O anxiety and opioid dependence.  HEMATOLOGY/LYMPHOLOGY:  Negative for prolonged bleeding, bruising easily or swollen nodes.  NEURO:   No history of headaches, syncope, paralysis, seizures or tremors.  All other reviewed and negative other than HPI.    Past Medical History:  Past Medical History:   Diagnosis Date    Allergy     Anxiety     Arthritis     Blood transfusion 8 yrs    CAD (coronary artery disease)     Cataract     Degenerative disc disease     Depression     Dry eye syndrome     GERD (gastroesophageal reflux disease)      Hypercholesterolemia 12/9/2019    Hypertension     Macular drusen     Neuromuscular disorder     Ocular hypertension, bilateral     Osteoporosis     PUD (peptic ulcer disease)     Thoracic or lumbosacral neuritis or radiculitis 10/19/2012    Thyroid disease        Past Surgical History:  Past Surgical History:   Procedure Laterality Date    APPENDECTOMY  1965    CARDIAC CATHETERIZATION      HYSTERECTOMY  8 yrs     INJECTION OF ANESTHETIC AGENT AROUND NERVE Bilateral 8/21/2018    Procedure: BLOCK, NERVE;  Surgeon: Talia Belcher MD;  Location: [facility];  Service: Pain Management;  Laterality: Bilateral;  Bilateral block @ L2,3,4,5      INJECTION OF ANESTHETIC AGENT AROUND NERVE Bilateral 11/18/2019    Procedure: BLOCK, NERVE, L2-L3-L4-L5 ME DIAL BRANCH;  Surgeon: Talia Belcher MD;  Location: [facility];  Service: Pain Management;  Laterality: Bilateral;    TONSILLECTOMY  1954       Family History:  Family History   Problem Relation Age of Onset    Arthritis Mother     Cancer Mother     Heart disease Mother     Hypertension Mother     Cataracts Mother     Ovarian cancer Mother     Ulcers Father     Thyroid disease Brother     Heart disease Brother     Amblyopia Neg Hx     Blindness Neg Hx     Diabetes Neg Hx     Glaucoma Neg Hx     Macular degeneration Neg Hx     Retinal detachment Neg Hx     Strabismus Neg Hx     Stroke Neg Hx        Social History:  Social History     Socioeconomic History    Marital status:      Spouse name: Not on file    Number of children: Not on file    Years of education: Not on file    Highest education level: Not on file   Occupational History    Not on file   Social Needs    Financial resource strain: Not on file    Food insecurity:     Worry: Not on file     Inability: Not on file    Transportation needs:     Medical: Not on file     Non-medical: Not on file   Tobacco Use    Smoking status: Former Smoker     Last attempt to quit: 1/1/2008  "    Years since quittin.9    Smokeless tobacco: Never Used   Substance and Sexual Activity    Alcohol use: No     Alcohol/week: 0.0 standard drinks    Drug use: No    Sexual activity: Never   Lifestyle    Physical activity:     Days per week: Not on file     Minutes per session: Not on file    Stress: Not on file   Relationships    Social connections:     Talks on phone: Not on file     Gets together: Not on file     Attends Congregational service: Not on file     Active member of club or organization: Not on file     Attends meetings of clubs or organizations: Not on file     Relationship status: Not on file   Other Topics Concern    Not on file   Social History Narrative    Not on file       OBJECTIVE:    BP (!) 184/80   Pulse 66   Temp 98.3 °F (36.8 °C)   Ht 5' 1" (1.549 m)   Wt 67.6 kg (149 lb)   BMI 28.15 kg/m²     PHYSICAL EXAMINATION:    General appearance: Well appearing, in no acute distress, alert and oriented x3.  Psych:  Mood and affect appropriate.  Skin: No rashes or edema.  Head/face:  Atraumatic, normocephalic. No palpable lymph nodes  Cor: RRR  Pulm: CTA  GI: Abdomen soft and non-distended.    Back: Straight leg raise in the sitting position is negative for radicular pain bilaterally. There is pain with palpation to lumbar facet joints on the left. There is midline pain to thoracic spine around T7. Limited ROM with pain on flexion and extension. Positive facet loading bilaterally, L>R.  Extremities: Peripheral joint ROM is full and pain free without obvious instability or laxity in all four extremities. No deformities, edema, or skin discoloration. Good capillary refill.  Musculoskeletal: Bilateral upper and lower extremity strength is normal and symmetric.  No atrophy or tone abnormalities are noted.  Neuro: Bilateral upper and lower extremity coordination and muscle stretch reflexes are physiologic and symmetric.  Plantar response are downgoing.   Gait: Normal.    ASSESSMENT: 69 y.o. " year old female with back pain, consistent with the following diagnoses:     1. Lumbar spondylosis     2. Facet arthritis of lumbar region           PLAN:     - Previous imaging was reviewed and discussed with the patient today.    - Schedule for bilateral L3-4 and L4-5 facet joint injections as well as T7-8 interspinous ligament injection    - The patient will continue a home exercise routine to help with pain and strengthening.      - RTC 2 weeks after procedure.    - Counseled patient regarding the importance of constant sleeping habits and physical therapy.        The above plan and management options were discussed at length with patient. Patient is in agreement with the above and verbalized understanding.    Meche Clinton  12/13/2019

## 2019-12-16 ENCOUNTER — HOSPITAL ENCOUNTER (OUTPATIENT)
Facility: OTHER | Age: 69
Discharge: HOME | End: 2019-12-16
Attending: ANESTHESIOLOGY | Admitting: ANESTHESIOLOGY
Payer: COMMERCIAL

## 2019-12-16 VITALS
HEART RATE: 71 BPM | SYSTOLIC BLOOD PRESSURE: 184 MMHG | OXYGEN SATURATION: 100 % | RESPIRATION RATE: 18 BRPM | TEMPERATURE: 98 F | DIASTOLIC BLOOD PRESSURE: 84 MMHG

## 2019-12-16 DIAGNOSIS — M43.06 SPONDYLOLYSIS OF LUMBAR REGION: Primary | ICD-10-CM

## 2019-12-16 DIAGNOSIS — G89.29 CHRONIC PAIN: ICD-10-CM

## 2019-12-16 PROCEDURE — 63600175 PHARM REV CODE 636 W HCPCS: Performed by: ANESTHESIOLOGY

## 2019-12-16 PROCEDURE — 64494 INJ PARAVERT F JNT L/S 2 LEV: CPT | Performed by: ANESTHESIOLOGY

## 2019-12-16 PROCEDURE — 64493 INJ PARAVERT F JNT L/S 1 LEV: CPT | Mod: 50,,, | Performed by: ANESTHESIOLOGY

## 2019-12-16 PROCEDURE — 64494 INJ PARAVERT F JNT L/S 2 LEV: CPT | Mod: 50,,, | Performed by: ANESTHESIOLOGY

## 2019-12-16 PROCEDURE — 64494 PR INJ DX/THER AGNT PARAVERT FACET JOINT,IMG GUIDE,LUMBAR/SAC, 2ND LEVEL: ICD-10-PCS | Mod: 50,,, | Performed by: ANESTHESIOLOGY

## 2019-12-16 PROCEDURE — 25500020 PHARM REV CODE 255: Performed by: ANESTHESIOLOGY

## 2019-12-16 PROCEDURE — 64493 PR INJ DX/THER AGNT PARAVERT FACET JOINT,IMG GUIDE,LUMBAR/SAC,1ST LVL: ICD-10-PCS | Mod: 50,,, | Performed by: ANESTHESIOLOGY

## 2019-12-16 PROCEDURE — 20552 NJX 1/MLT TRIGGER POINT 1/2: CPT | Performed by: ANESTHESIOLOGY

## 2019-12-16 PROCEDURE — 64493 INJ PARAVERT F JNT L/S 1 LEV: CPT | Performed by: ANESTHESIOLOGY

## 2019-12-16 PROCEDURE — 20550 NJX 1 TENDON SHEATH/LIGAMENT: CPT | Performed by: ANESTHESIOLOGY

## 2019-12-16 PROCEDURE — 25000003 PHARM REV CODE 250: Performed by: ANESTHESIOLOGY

## 2019-12-16 RX ORDER — TRIAMCINOLONE ACETONIDE 40 MG/ML
INJECTION, SUSPENSION INTRA-ARTICULAR; INTRAMUSCULAR
Status: DISCONTINUED | OUTPATIENT
Start: 2019-12-16 | End: 2019-12-16 | Stop reason: HOSPADM

## 2019-12-16 RX ORDER — BUPIVACAINE HYDROCHLORIDE 2.5 MG/ML
INJECTION, SOLUTION EPIDURAL; INFILTRATION; INTRACAUDAL
Status: DISCONTINUED | OUTPATIENT
Start: 2019-12-16 | End: 2019-12-16 | Stop reason: HOSPADM

## 2019-12-16 RX ORDER — ALPRAZOLAM 0.5 MG/1
0.5 TABLET ORAL
Status: DISCONTINUED | OUTPATIENT
Start: 2019-12-16 | End: 2019-12-16 | Stop reason: HOSPADM

## 2019-12-16 RX ORDER — LIDOCAINE HYDROCHLORIDE 10 MG/ML
INJECTION INFILTRATION; PERINEURAL
Status: DISCONTINUED | OUTPATIENT
Start: 2019-12-16 | End: 2019-12-16 | Stop reason: HOSPADM

## 2019-12-16 NOTE — OP NOTE
Thoracic/Lumbar Facet Joint Injection Under Fluoroscopy  Time-out taken to identify patient and procedure side prior to starting the procedure.            I attest that I have reviewed the patient's home medications prior to the procedure and no contraindication have been identified. I  re-evaluated the patient after the patient was positioned for the procedure in the procedure room immediately before the procedural time-out. The vital signs are current and represent the current state of the patient which has not significantly changed since the preprocedure assessment.   Date of Service: 12/16/2019    PCP: Galindo Stiles MD    Referring Physician:                                                        PROCEDURE:  bilateral facet joint injection at L3/4 & L4/5 under fluoroscopy.  Interspinous ligament injection T7/8    REASON FOR PROCEDURE: Lumbar spondylosis [M47.816]  1. Spondylolysis of lumbar region    2. Chronic pain      POSTOP DIAGNOSIS: Lumbar spondylosis [M47.816]  1. Spondylolysis of lumbar region    2. Chronic pain      PHYSICIAN: Talia Belcher MD  ASSISTANTS: Reinaldo Fraga MD [employer] pain fellow        MEDICATIONS INJECTED:  0.5ml Kenalog 40mg and Bupivacaine 0.25% 10ml. Injected 1.5ml  per level.  LOCAL ANESTHETIC USED:   Xylocaine 1% 9ml with Sodium Bicarbonate 1ml. 3ml per site.  SEDATION MEDICATIONS: None    ESTIMATED BLOOD LOSS:  None.    COMPLICATIONS:  None.    TECHNIQUE:   Lying in a prone position, the patient was prepped and draped in the usual sterile fashion using ChloraPrep and fenestrated drape.  The level was determined under fluoroscopic guidance.  Local anesthetic was given by going down to the hub of the 27-gauge 1.25in needle and raising a wheel.  The 3.5in 22-gauge needle was introduced into all levels as stated above facet joints.  Negative pressure applied to make sure that there was no intravascular placement.  Omnipaque was injected to confirm placement.  It was also done to confirm that  there was no vascular runoff.  Medication was then injected slowly.  The patient tolerated the procedure well. Interspinous ligament injected with 1.5 ml from the mixture of medication.     PAIN BEFORE THE PROCEDURE:  4-6/10.    PAIN AFTER THE PROCEDURE: 0/10.    The patient was monitored after the procedure.  Patient was given post procedure and discharge instructions to follow at home.  We will see the patient back in two weeks or the patient may call to inform of status. The patient was discharged in a stable condition

## 2019-12-16 NOTE — DISCHARGE SUMMARY
Discharge Note  Short Stay      SUMMARY     Admit Date: 12/16/2019    Attending Physician: Talia Belcher      Discharge Physician: Talia Belcher      Discharge Date: 12/16/2019 8:18 AM    Procedure(s) (LRB):  INJECTION, FACET JOINT, L3-L4 AND L4-L5 AND T7-T8 LIGAMENT (Bilateral)    Final Diagnosis: Lumbar spondylosis [M47.816]    Disposition: Home or self care    Patient Instructions:   Current Discharge Medication List      CONTINUE these medications which have NOT CHANGED    Details   amLODIPine (NORVASC) 5 MG tablet Take 1 tablet (5 mg total) by mouth every evening.  Qty: 90 tablet, Refills: 1      esomeprazole (NEXIUM) 40 MG capsule Take 1 capsule (40 mg total) by mouth before breakfast.  Qty: 90 capsule, Refills: 3    Associated Diagnoses: Gastritis, presence of bleeding unspecified, unspecified chronicity, unspecified gastritis type      oxybutynin (DITROPAN XL) 15 MG TR24 Take 1 tablet (15 mg total) by mouth once daily.  Qty: 90 tablet, Refills: 1    Associated Diagnoses: Urge incontinence      SYNTHROID 100 mcg tablet Take 1 tablet (100 mcg total) by mouth once daily.  Qty: 90 tablet, Refills: 3    Associated Diagnoses: Hypothyroidism due to acquired atrophy of thyroid      CALCIUM CARBONATE/VITAMIN D3 (CALCIUM 500 WITH D ORAL) Take 1 capsule by mouth once daily.      carboxymethylcellulose (REFRESH PLUS) 0.5 % Dpet 1 drop daily as needed.      dicyclomine (BENTYL) 10 MG capsule Take 10 mg by mouth 4 (four) times daily as needed.       estradiol (CLIMARA) 0.075 mg/24 hr APPLY TO CLEAN DRY SKIN ONCE PER WEEK  Qty: 24 patch, Refills: 3    Associated Diagnoses: Menopause      ferrous sulfate (FEOSOL) 325 mg (65 mg iron) Tab tablet Take 1 tablet (325 mg total) by mouth daily with breakfast.  Qty: 90 tablet, Refills: 3    Associated Diagnoses: Iron deficiency anemia, unspecified iron deficiency anemia type      fluticasone propionate (FLONASE) 50 mcg/actuation nasal spray 1 spray (50 mcg total) by Each Nostril route  once daily.  Qty: 16 g, Refills: 11    Associated Diagnoses: URI (upper respiratory infection)      hydroCHLOROthiazide (HYDRODIURIL) 12.5 MG Tab Take 1 tablet (12.5 mg total) by mouth every other day.  Qty: 45 tablet, Refills: 1      hyoscyamine (LEVSIN/SL) 0.125 mg Subl Place 1 tablet (0.125 mg total) under the tongue every 4 (four) hours as needed.  Qty: 45 tablet, Refills: 3    Associated Diagnoses: Gastritis      lidocaine (LIDODERM) 5 % Place 1 patch onto the skin once daily. Remove & Discard patch within 12 hours or as directed by MD  Qty: 30 patch, Refills: 2    Associated Diagnoses: Chest pain at rest; Post herpetic neuralgia      MULTIVITAMIN (MULTIPLE VITAMIN ORAL) Take 1 capsule by mouth once daily.      nitroGLYCERIN (NITROSTAT) 0.4 MG SL tablet Place 1 tablet (0.4 mg total) under the tongue every 5 (five) minutes as needed for Chest pain.  Qty: 30 tablet, Refills: 0      pravastatin (PRAVACHOL) 20 MG tablet Take 1 tablet (20 mg total) by mouth once daily.  Qty: 90 tablet, Refills: 1    Associated Diagnoses: At risk for coronary artery disease      promethazine (PHENERGAN) 25 MG tablet TAKE 1 TABLET EVERY 6 HOURS AS NEEDED FOR NAUSEA  Qty: 45 tablet, Refills: 2    Associated Diagnoses: Gastritis, presence of bleeding unspecified, unspecified chronicity, unspecified gastritis type      ropinirole (REQUIP) 2 MG tablet Take 1.5 tablets (3 mg total) by mouth nightly.  Qty: 135 tablet, Refills: 3    Associated Diagnoses: Memory loss; RLS (restless legs syndrome)      SUBOXONE 8-2 mg Film 1 Film by Subdermal route once daily. 1 strip every 3 to 4 days  Refills: 0                 Discharge Diagnosis: Lumbar spondylosis [M47.816]  Condition on Discharge: Stable with no complications to procedure   Diet on Discharge: Same as before.  Activity: as per instruction sheet.  Discharge to: Home with a responsible adult.  Follow up: 2-4 weeks       Please call my office or pager at [phone] if experienced any  weakness or loss of sensation, fever > 101.5, pain uncontrolled with oral medications, persistent nausea/vomiting/or diarrhea, redness or drainage from the incisions, or any other worrisome concerns. If physician on call was not reached or could not communicate with our office for any reason please go to the nearest emergency department

## 2019-12-16 NOTE — DISCHARGE INSTRUCTIONS

## 2020-01-07 DIAGNOSIS — I10 ESSENTIAL (PRIMARY) HYPERTENSION: Primary | ICD-10-CM

## 2020-01-07 RX ORDER — HYDROCHLOROTHIAZIDE 12.5 MG/1
12.5 TABLET ORAL EVERY OTHER DAY
Qty: 45 TABLET | Refills: 1 | Status: SHIPPED | OUTPATIENT
Start: 2020-01-07 | End: 2020-01-28 | Stop reason: SDUPTHER

## 2020-01-15 ENCOUNTER — PATIENT OUTREACH (OUTPATIENT)
Dept: ADMINISTRATIVE | Facility: OTHER | Age: 70
End: 2020-01-15

## 2020-01-20 ENCOUNTER — OFFICE VISIT (OUTPATIENT)
Dept: PAIN MEDICINE | Facility: CLINIC | Age: 70
End: 2020-01-20
Payer: COMMERCIAL

## 2020-01-20 VITALS
RESPIRATION RATE: 18 BRPM | BODY MASS INDEX: 29.59 KG/M2 | DIASTOLIC BLOOD PRESSURE: 66 MMHG | TEMPERATURE: 98 F | HEART RATE: 62 BPM | OXYGEN SATURATION: 100 % | WEIGHT: 156.75 LBS | HEIGHT: 61 IN | SYSTOLIC BLOOD PRESSURE: 173 MMHG

## 2020-01-20 DIAGNOSIS — M43.06 SPONDYLOLYSIS OF LUMBAR REGION: ICD-10-CM

## 2020-01-20 DIAGNOSIS — M79.18 MYOFASCIAL PAIN: Primary | ICD-10-CM

## 2020-01-20 DIAGNOSIS — M47.816 LUMBAR SPONDYLOSIS: ICD-10-CM

## 2020-01-20 DIAGNOSIS — M54.14 THORACIC RADICULOPATHY: ICD-10-CM

## 2020-01-20 PROCEDURE — 99215 OFFICE O/P EST HI 40 MIN: CPT | Mod: PBBFAC,25 | Performed by: NURSE PRACTITIONER

## 2020-01-20 PROCEDURE — 20553 NJX 1/MLT TRIGGER POINTS 3/>: CPT | Mod: S$PBB,,, | Performed by: NURSE PRACTITIONER

## 2020-01-20 PROCEDURE — 99213 PR OFFICE/OUTPT VISIT, EST, LEVL III, 20-29 MIN: ICD-10-PCS | Mod: 25,S$PBB,, | Performed by: NURSE PRACTITIONER

## 2020-01-20 PROCEDURE — 20553 NJX 1/MLT TRIGGER POINTS 3/>: CPT | Mod: PBBFAC | Performed by: NURSE PRACTITIONER

## 2020-01-20 PROCEDURE — 99999 PR PBB SHADOW E&M-EST. PATIENT-LVL V: CPT | Mod: PBBFAC,,, | Performed by: NURSE PRACTITIONER

## 2020-01-20 PROCEDURE — 99999 PR PBB SHADOW E&M-EST. PATIENT-LVL V: ICD-10-PCS | Mod: PBBFAC,,, | Performed by: NURSE PRACTITIONER

## 2020-01-20 PROCEDURE — 20553 PR INJECT TRIGGER POINTS, > 3: ICD-10-PCS | Mod: S$PBB,,, | Performed by: NURSE PRACTITIONER

## 2020-01-20 PROCEDURE — 99213 OFFICE O/P EST LOW 20 MIN: CPT | Mod: 25,S$PBB,, | Performed by: NURSE PRACTITIONER

## 2020-01-20 RX ORDER — METHYLPREDNISOLONE ACETATE 40 MG/ML
40 INJECTION, SUSPENSION INTRA-ARTICULAR; INTRALESIONAL; INTRAMUSCULAR; SOFT TISSUE
Status: COMPLETED | OUTPATIENT
Start: 2020-01-20 | End: 2020-01-20

## 2020-01-20 RX ORDER — BUPIVACAINE HYDROCHLORIDE 2.5 MG/ML
4 INJECTION, SOLUTION EPIDURAL; INFILTRATION; INTRACAUDAL
Status: COMPLETED | OUTPATIENT
Start: 2020-01-20 | End: 2020-01-20

## 2020-01-20 RX ADMIN — METHYLPREDNISOLONE ACETATE 40 MG: 40 INJECTION, SUSPENSION INTRA-ARTICULAR; INTRALESIONAL; INTRAMUSCULAR; SOFT TISSUE at 03:01

## 2020-01-20 RX ADMIN — BUPIVACAINE HYDROCHLORIDE 10 MG: 2.5 INJECTION, SOLUTION EPIDURAL; INFILTRATION; INTRACAUDAL; PERINEURAL at 03:01

## 2020-01-20 NOTE — PROGRESS NOTES
[facility]  Chronic patient Established Note (Follow up visit)      SUBJECTIVE:    Interval History 1/20/2020:  The patient returns for follow up of middle and lower back pain.  She is now s/p bilateral L3-4 and L4-5 facet joint injections as well as T7-8 interspinous ligament injection on 12/16/19 with about 50% relief.  She is still having right sided lower back pain which starts at the spine and often radiates to her hip.  She denies associated numbness.  She says that the area is tender to touch and feels swollen sometimes.  She has tried ice and heat without benefit.  Her pain today is 2/10.    Interval History 12/13/2019:  The patient is here for follow up of back pain.  She is s/p Bilateral L2,3,4,5 MBB with steroids.  She is reporting limited benefit this time.  Previous provided her significant benefit.  She is having pain across the lower back, worse on the left side.  She denies radiation into the legs.  She is also having middle thoracic pain.  This does not radiate to the front of her body.  She is not having any numbness.  She would like to schedule another procedure.  Her pain today is 5/10.    Previous Encounter:  Osman Johansen presents to the clinic for a follow-up appointment for lower back pain. She reports increased low back pain over the last few weeks. She describes this pain as constant, sharp, and aching. She denies any radiating leg pain. She previously had 90% relief of her pain with bilateral L2,3,4,5 MBB and T7-8 interspinous ligament injection, last done in August 2018. She would like to repeat this procedure. She continues to participate in a home exercise routine. She denies any other health changes. Her pain today is 5/10.    Pain Disability Index Review:  Last 3 PDI Scores 1/20/2020 12/13/2019 11/7/2019   Pain Disability Index (PDI) 37 29 17       Pain Medications:  None    Opioid Contract: no     report:  Reviewed and consistent with medication use as prescribed.    Pain  Procedures:   7/18/13 Bilateral L5 TF JOCE  10/28/13 Bilateral SI joint injection  4/20/15 Bilateral SI joint injection  12/15/16 Bilateral L3-4 facet joint injections- 100% relief for 6 weeks  3/13/17 Bilateral L3-4 facet joint injections- 30% relief  4/17/17 Bilateral L2,3,4,5 MBB with steroids- significant benefit for 7 months  12/14/17 Bilateral L2,3,4,5 MBB with steroids and Interspinal ligament injection- 90% relief for 7 months  8/21/2018- Bilateral L2,3,4,5 MBB with steroids and T7-8 interspinous ligament injections   11/18/19 Bilateral L2,3,4,5 MBB with steroids  12/16/19 bilateral L3-4 and L4-5 facet joint injections as well as T7-8 interspinous ligament injection- 50% relief    Physical Therapy/Home Exercise: per self    Imaging:     Lumbar MRI 12/10/16    Narrative   MRI LUMBAR SPINE WITHOUT CONTRAST    COMPARISON: None    TECHNIQUE:  Sagittal T1, T2, and STIR;  axial T1 and T2 sequences through the lumbar spine were acquired without contrast.    FINDINGS: Vertebral body height and alignment are within normal limits.  The conus terminates at T12-L1.  Moderate loss of disc height is present at L4-5.  Marrow signal is mildly heterogeneous.  No focal osseous lesion.    L1-L2:  No disc herniation. No spinal canal or neuroforaminal narrowing.    L2-L3:  Mild diffuse disc bulging is present without spinal canal or neuroforaminal narrowing.    L3-L4:  Mild diffuse disc bulging and moderate bilateral facet arthropathy result in mild spinal canal narrowing.    L4-L5:  Mild diffuse disc bulging is present without spinal canal or neuroforaminal narrowing.    L5-S1:  No disc herniation. No spinal canal or neuroforaminal narrowing.    Several small gallstones noted.   Impression     Degenerative lumbar spondylosis with mild L3-4 spinal canal narrowing and no significant neuroforaminal narrowing.  Cholelithiasis       Narrative     MRI of the thoracic spine without contrast    Technique: Multiplanar multisequence MR  imaging of the thoracic spine was performed without contrast.    Findings: There is mild levoscoliosis of the lumbar spine.  This may be positional.  Vertebral bodies otherwise demonstrate normal height alignment.  The marrow signal of the vertebral bodies is within normal limits.  There is mild disc desiccation noted throughout the thoracic spine.  No significant disc space narrowing.  No significant disc protrusions are identified.  The central canal is widely patent.  No significant spondylosis.  The cord is normal in signal and caliber.      Impression         1.  Mild levoscoliosis of the thoracic spine otherwise essentially unremarkable MRI of the thoracic spine.         CMP  Sodium   Date Value Ref Range Status   09/23/2019 136 136 - 145 mmol/L Final     Potassium   Date Value Ref Range Status   09/23/2019 4.9 3.5 - 5.1 mmol/L Final     Chloride   Date Value Ref Range Status   09/23/2019 102 95 - 110 mmol/L Final     CO2   Date Value Ref Range Status   09/23/2019 24 23 - 29 mmol/L Final     Glucose   Date Value Ref Range Status   09/23/2019 90 70 - 110 mg/dL Final     BUN, Bld   Date Value Ref Range Status   09/23/2019 20 8 - 23 mg/dL Final     Creatinine   Date Value Ref Range Status   09/23/2019 1.2 0.5 - 1.4 mg/dL Final     Calcium   Date Value Ref Range Status   09/23/2019 9.6 8.7 - 10.5 mg/dL Final     Total Protein   Date Value Ref Range Status   09/23/2019 7.6 6.0 - 8.4 g/dL Final     Albumin   Date Value Ref Range Status   09/23/2019 4.1 3.5 - 5.2 g/dL Final     Total Bilirubin   Date Value Ref Range Status   09/23/2019 0.3 0.1 - 1.0 mg/dL Final     Comment:     For infants and newborns, interpretation of results should be based  on gestational age, weight and in agreement with clinical  observations.  Premature Infant recommended reference ranges:  Up to 24 hours.............<8.0 mg/dL  Up to 48 hours............<12.0 mg/dL  3-5 days..................<15.0 mg/dL  6-29 days.................<15.0  mg/dL       Alkaline Phosphatase   Date Value Ref Range Status   09/23/2019 70 55 - 135 U/L Final     AST   Date Value Ref Range Status   09/23/2019 22 10 - 40 U/L Final     ALT   Date Value Ref Range Status   09/23/2019 14 10 - 44 U/L Final     Anion Gap   Date Value Ref Range Status   09/23/2019 10 8 - 16 mmol/L Final     eGFR if    Date Value Ref Range Status   09/23/2019 52.9 (A) >60 mL/min/1.73 m^2 Final     eGFR if non    Date Value Ref Range Status   09/23/2019 45.9 (A) >60 mL/min/1.73 m^2 Final     Comment:     Calculation used to obtain the estimated glomerular filtration  rate (eGFR) is the CKD-EPI equation.        Lab Results   Component Value Date    WBC 6.51 09/23/2019    HGB 11.7 (L) 09/23/2019    HCT 37.4 09/23/2019    MCV 91 09/23/2019     09/23/2019         Allergies:   Review of patient's allergies indicates:   Allergen Reactions    Pcn [penicillins] Swelling    Toradol [ketorolac] Swelling    Vibramycin [doxycycline calcium] Swelling    Cymbalta [duloxetine]      dizzyness    Elavil [amitriptyline]     Lyrica [pregabalin] Swelling    Seroquel [quetiapine]      restless leg symdrome       Current Medications:   Current Outpatient Medications   Medication Sig Dispense Refill    amLODIPine (NORVASC) 5 MG tablet Take 1 tablet (5 mg total) by mouth every evening. 90 tablet 1    CALCIUM CARBONATE/VITAMIN D3 (CALCIUM 500 WITH D ORAL) Take 1 capsule by mouth once daily.      carboxymethylcellulose (REFRESH PLUS) 0.5 % Dpet 1 drop daily as needed.      dicyclomine (BENTYL) 10 MG capsule Take 10 mg by mouth 4 (four) times daily as needed.       esomeprazole (NEXIUM) 40 MG capsule Take 1 capsule (40 mg total) by mouth before breakfast. 90 capsule 3    estradiol (CLIMARA) 0.075 mg/24 hr APPLY TO CLEAN DRY SKIN ONCE PER WEEK 24 patch 3    fluticasone propionate (FLONASE) 50 mcg/actuation nasal spray 1 spray (50 mcg total) by Each Nostril route once daily. 16  g 11    hydroCHLOROthiazide (HYDRODIURIL) 12.5 MG Tab Take 1 tablet (12.5 mg total) by mouth every other day. 45 tablet 1    hyoscyamine (LEVSIN/SL) 0.125 mg Subl Place 1 tablet (0.125 mg total) under the tongue every 4 (four) hours as needed. 45 tablet 3    lidocaine (LIDODERM) 5 % Place 1 patch onto the skin once daily. Remove & Discard patch within 12 hours or as directed by MD 30 patch 2    MULTIVITAMIN (MULTIPLE VITAMIN ORAL) Take 1 capsule by mouth once daily.      oxybutynin (DITROPAN XL) 15 MG TR24 Take 1 tablet (15 mg total) by mouth once daily. 90 tablet 1    pravastatin (PRAVACHOL) 20 MG tablet Take 1 tablet (20 mg total) by mouth once daily. 90 tablet 1    promethazine (PHENERGAN) 25 MG tablet TAKE 1 TABLET EVERY 6 HOURS AS NEEDED FOR NAUSEA 45 tablet 2    SUBOXONE 8-2 mg Film 1 Film by Subdermal route once daily. 1 strip every 3 to 4 days  0    SYNTHROID 100 mcg tablet Take 1 tablet (100 mcg total) by mouth once daily. 90 tablet 3    ferrous sulfate (FEOSOL) 325 mg (65 mg iron) Tab tablet Take 1 tablet (325 mg total) by mouth daily with breakfast. 90 tablet 3    nitroGLYCERIN (NITROSTAT) 0.4 MG SL tablet Place 1 tablet (0.4 mg total) under the tongue every 5 (five) minutes as needed for Chest pain. 30 tablet 0    ropinirole (REQUIP) 2 MG tablet Take 1.5 tablets (3 mg total) by mouth nightly. (Patient taking differently: Take 2 mg by mouth nightly as needed. ) 135 tablet 3     No current facility-administered medications for this visit.        REVIEW OF SYSTEMS:    GENERAL:  No weight loss, malaise or fevers.  HEENT:  Negative for frequent or significant headaches.  NECK:  Negative for lumps, goiter, pain and significant neck swelling.  RESPIRATORY:  Negative for cough, wheezing or shortness of breath.  CARDIOVASCULAR:  Negative for chest pain, leg swelling or palpitations.  GI:  Negative for abdominal discomfort, blood in stools or black stools or change in bowel habits.  GERD.  MUSCULOSKELETAL:  See HPI.  SKIN:  Negative for lesions, rash, and itching.  PSYCH: H/O anxiety and opioid dependence.  HEMATOLOGY/LYMPHOLOGY:  Negative for prolonged bleeding, bruising easily or swollen nodes.  NEURO:   No history of headaches, syncope, paralysis, seizures or tremors.  All other reviewed and negative other than HPI.    Past Medical History:  Past Medical History:   Diagnosis Date    Allergy     Anxiety     Arthritis     Blood transfusion 8 yrs    CAD (coronary artery disease)     Cataract     Degenerative disc disease     Depression     Dry eye syndrome     GERD (gastroesophageal reflux disease)     Hypercholesterolemia 12/9/2019    Hypertension     Macular drusen     Neuromuscular disorder     Ocular hypertension, bilateral     Osteoporosis     PUD (peptic ulcer disease)     Thoracic or lumbosacral neuritis or radiculitis 10/19/2012    Thyroid disease        Past Surgical History:  Past Surgical History:   Procedure Laterality Date    APPENDECTOMY  1965    CARDIAC CATHETERIZATION      HYSTERECTOMY  8 yrs     INJECTION OF ANESTHETIC AGENT AROUND NERVE Bilateral 8/21/2018    Procedure: BLOCK, NERVE;  Surgeon: Talia Belcher MD;  Location: [facility];  Service: Pain Management;  Laterality: Bilateral;  Bilateral block @ L2,3,4,5      INJECTION OF ANESTHETIC AGENT AROUND NERVE Bilateral 11/18/2019    Procedure: BLOCK, NERVE, L2-L3-L4-L5 ME DIAL BRANCH;  Surgeon: Talia Belcher MD;  Location: [facility];  Service: Pain Management;  Laterality: Bilateral;    INJECTION OF FACET JOINT Bilateral 12/16/2019    Procedure: INJECTION, FACET JOINT, L3-L4 AND L4-L5 AND T7-T8 LIGAMENT;  Surgeon: Talia Belcher MD;  Location: [facility];  Service: Pain Management;  Laterality: Bilateral;    TONSILLECTOMY  1954       Family History:  Family History   Problem Relation Age of Onset    Arthritis Mother     Cancer Mother     Heart disease Mother     Hypertension Mother      "Cataracts Mother     Ovarian cancer Mother     Ulcers Father     Thyroid disease Brother     Heart disease Brother     Amblyopia Neg Hx     Blindness Neg Hx     Diabetes Neg Hx     Glaucoma Neg Hx     Macular degeneration Neg Hx     Retinal detachment Neg Hx     Strabismus Neg Hx     Stroke Neg Hx        Social History:  Social History     Socioeconomic History    Marital status:      Spouse name: Not on file    Number of children: Not on file    Years of education: Not on file    Highest education level: Not on file   Occupational History    Not on file   Social Needs    Financial resource strain: Not on file    Food insecurity:     Worry: Not on file     Inability: Not on file    Transportation needs:     Medical: Not on file     Non-medical: Not on file   Tobacco Use    Smoking status: Former Smoker     Last attempt to quit: 2008     Years since quittin.0    Smokeless tobacco: Never Used   Substance and Sexual Activity    Alcohol use: No     Alcohol/week: 0.0 standard drinks    Drug use: No    Sexual activity: Never   Lifestyle    Physical activity:     Days per week: Not on file     Minutes per session: Not on file    Stress: Not on file   Relationships    Social connections:     Talks on phone: Not on file     Gets together: Not on file     Attends Zoroastrian service: Not on file     Active member of club or organization: Not on file     Attends meetings of clubs or organizations: Not on file     Relationship status: Not on file   Other Topics Concern    Not on file   Social History Narrative    Not on file       OBJECTIVE:    BP (!) 173/66   Pulse 62   Temp 97.7 °F (36.5 °C)   Resp 18   Ht 5' 1" (1.549 m)   Wt 71.1 kg (156 lb 12 oz)   SpO2 100%   BMI 29.62 kg/m²     PHYSICAL EXAMINATION:    General appearance: Well appearing, in no acute distress, alert and oriented x3.  Psych:  Mood and affect appropriate.  Skin: No rashes or edema.  Head/face:  Atraumatic, " normocephalic. No palpable lymph nodes  Cor: RRR  Pulm: CTA  GI: Abdomen soft and non-distended.    Back: Straight leg raise in the sitting position is negative for radicular pain bilaterally. There is pain with palpation to lumbar paraspinals and iliolumbar ligament on the right. There is midline pain to thoracic spine around T7 with paraspinal tenderness to the right. Limited ROM with pain on flexion and extension. Positive facet loading bilaterally.  Extremities: Peripheral joint ROM is full and pain free without obvious instability or laxity in all four extremities. No deformities, edema, or skin discoloration. Good capillary refill.  Musculoskeletal: Bilateral upper and lower extremity strength is normal and symmetric.  No atrophy or tone abnormalities are noted.  Neuro: Bilateral upper and lower extremity coordination and muscle stretch reflexes are physiologic and symmetric.  Plantar response are downgoing.   Gait: Normal.    ASSESSMENT: 69 y.o. year old female with back pain, consistent with the following diagnoses:     1. Myofascial pain  methylPREDNISolone acetate injection 40 mg    bupivacaine (PF) 0.25% (2.5 mg/ml) injection 10 mg   2. Spondylolysis of lumbar region     3. Lumbar spondylosis     4. Thoracic radiculopathy           PLAN:     - Previous imaging was reviewed and discussed with the patient today.    - She is s/p bilateral L3-4 and L4-5 facet joint injections as well as T7-8 interspinous ligament injection with benefit.  She continues with right sided pain to paraspinals and iliolumbar ligament.  Will perform TPIs as per below.    - If limited benefit, consider updated lumbar MRI vs iliolumbar ligament injection.    - The patient will continue a home exercise routine to help with pain and strengthening.      - RTC PRN.    - Counseled patient regarding the importance of constant sleeping habits and physical therapy.        The above plan and management options were discussed at length with  patient. Patient is in agreement with the above and verbalized understanding.    Meche Clinton  01/20/2020     Trigger Point Injection:   The procedure was discussed with the patient including complications of nerve damage,  bleeding, infection, and failure of pain relief.   Trigger points were identified by palpation and marked. Alcohol prep of sites done. A mixture of 4mL 0.25% bupivacaine +40mg Depo-Medrol was prepared (6 mL total).   A 27-gauge needle was advanced to the point of maximal tenderness, and medication was injected after negative aspiration. All sites done in the same manner. Patient tolerated the procedure well and without complications. Sites injected included: lumbar and thoracic paraspinals on the right (4 sites total).

## 2020-01-28 DIAGNOSIS — I10 ESSENTIAL (PRIMARY) HYPERTENSION: ICD-10-CM

## 2020-01-28 RX ORDER — HYDROCHLOROTHIAZIDE 12.5 MG/1
12.5 TABLET ORAL EVERY OTHER DAY
Qty: 45 TABLET | Refills: 1 | Status: SHIPPED | OUTPATIENT
Start: 2020-01-28 | End: 2020-09-08

## 2020-01-28 NOTE — TELEPHONE ENCOUNTER
----- Message from Az Ruelas sent at 1/28/2020  3:05 PM CST -----  Contact: patient  Type:  RX Refill Request    Who Called:  Patient  Refill or New Rx:  refill  RX Name and Strength:  hydroCHLOROthiazide (HYDRODIURIL) 12.5 MG Tab  How is the patient currently taking it? (ex. 1XDay):    Is this a 30 day or 90 day RX:  90 day  Preferred Pharmacy with phone number:  [facility]  Local or Mail Order:  Local  Ordering Provider:    [facility] Call Back Number:  [phone]  Additional Information:

## 2020-02-04 ENCOUNTER — OFFICE VISIT (OUTPATIENT)
Dept: FAMILY MEDICINE | Facility: CLINIC | Age: 70
End: 2020-02-04
Payer: MEDICARE

## 2020-02-04 VITALS
TEMPERATURE: 98 F | HEART RATE: 69 BPM | HEIGHT: 61 IN | SYSTOLIC BLOOD PRESSURE: 142 MMHG | BODY MASS INDEX: 29.02 KG/M2 | WEIGHT: 153.69 LBS | OXYGEN SATURATION: 98 % | DIASTOLIC BLOOD PRESSURE: 88 MMHG

## 2020-02-04 DIAGNOSIS — K80.20 CALCULUS OF GALLBLADDER WITHOUT CHOLECYSTITIS WITHOUT OBSTRUCTION: ICD-10-CM

## 2020-02-04 DIAGNOSIS — J32.9 BACTERIAL SINUSITIS: Primary | ICD-10-CM

## 2020-02-04 DIAGNOSIS — N18.30 CKD (CHRONIC KIDNEY DISEASE), STAGE III: ICD-10-CM

## 2020-02-04 DIAGNOSIS — B96.89 BACTERIAL SINUSITIS: Primary | ICD-10-CM

## 2020-02-04 PROCEDURE — 99213 OFFICE O/P EST LOW 20 MIN: CPT | Mod: PBBFAC,PO | Performed by: NURSE PRACTITIONER

## 2020-02-04 PROCEDURE — 99999 PR PBB SHADOW E&M-EST. PATIENT-LVL III: CPT | Mod: PBBFAC,,, | Performed by: NURSE PRACTITIONER

## 2020-02-04 PROCEDURE — 99999 PR PBB SHADOW E&M-EST. PATIENT-LVL III: ICD-10-PCS | Mod: PBBFAC,,, | Performed by: NURSE PRACTITIONER

## 2020-02-04 PROCEDURE — 99214 OFFICE O/P EST MOD 30 MIN: CPT | Mod: S$PBB,,, | Performed by: NURSE PRACTITIONER

## 2020-02-04 PROCEDURE — 99214 PR OFFICE/OUTPT VISIT, EST, LEVL IV, 30-39 MIN: ICD-10-PCS | Mod: S$PBB,,, | Performed by: NURSE PRACTITIONER

## 2020-02-04 RX ORDER — METHYLPREDNISOLONE 4 MG/1
TABLET ORAL
Qty: 21 TABLET | Refills: 0 | Status: SHIPPED | OUTPATIENT
Start: 2020-02-05 | End: 2020-06-04 | Stop reason: ALTCHOICE

## 2020-02-04 RX ORDER — LEVOFLOXACIN 500 MG/1
500 TABLET, FILM COATED ORAL DAILY
Qty: 7 TABLET | Refills: 0 | Status: SHIPPED | OUTPATIENT
Start: 2020-02-04 | End: 2020-02-11

## 2020-02-04 NOTE — PROGRESS NOTES
Subjective:       Patient ID: Osman Johansen is a 70 y.o. female.    Chief Complaint: Sinusitis and Abdominal Pain    Sinusitis   This is a new problem. The current episode started 1 to 4 weeks ago. The problem has been gradually worsening since onset. Associated symptoms include congestion, headaches and sinus pressure. Pertinent negatives include no coughing, diaphoresis or shortness of breath. Treatments tried: NSAIDs, flonase, singulair, benadryl. The treatment provided no relief.     RUQ: chronic, persistent. She believes she has a hernia. Recall US showed gallstones. MRCP without cholecystitis or bile duct stone. Referred to general surgery--never scheduled appointment  Vitals:    02/04/20 1419   BP: (!) 142/88   Pulse: 69   Temp: 98.2 °F (36.8 °C)     Review of Systems   Constitutional: Negative for diaphoresis and fever.   HENT: Positive for congestion, sinus pressure and sinus pain. Negative for facial swelling and trouble swallowing.    Eyes: Negative for discharge and redness.   Respiratory: Negative for cough and shortness of breath.    Cardiovascular: Negative for chest pain and palpitations.   Gastrointestinal: Positive for abdominal pain.   Genitourinary: Negative for difficulty urinating.   Musculoskeletal: Negative for gait problem.   Skin: Negative for pallor and rash.   Neurological: Positive for headaches. Negative for facial asymmetry and speech difficulty.   Psychiatric/Behavioral: Negative for confusion. The patient is not nervous/anxious.        Past Medical History:   Diagnosis Date    Allergy     Anxiety     Arthritis     Blood transfusion 8 yrs    CAD (coronary artery disease)     Cataract     Degenerative disc disease     Depression     Dry eye syndrome     GERD (gastroesophageal reflux disease)     Hypercholesterolemia 12/9/2019    Hypertension     Macular drusen     Neuromuscular disorder     Ocular hypertension, bilateral     Osteoporosis     PUD (peptic ulcer  disease)     Thoracic or lumbosacral neuritis or radiculitis 10/19/2012    Thyroid disease      Objective:      Physical Exam   Constitutional: She is oriented to person, place, and time. No distress.   HENT:   Head: Normocephalic.   Right Ear: Hearing, external ear and ear canal normal. A middle ear effusion is present.   Left Ear: Hearing, external ear and ear canal normal. A middle ear effusion is present.   Nose: Mucosal edema present. Right sinus exhibits maxillary sinus tenderness and frontal sinus tenderness.   Mouth/Throat: Uvula is midline, oropharynx is clear and moist and mucous membranes are normal.   Eyes: Conjunctivae and lids are normal.   Neck: No JVD present. No tracheal deviation present.   Cardiovascular: Normal rate and normal heart sounds.   Pulmonary/Chest: Effort normal and breath sounds normal.   Abdominal: She exhibits no distension.   Musculoskeletal: She exhibits no deformity.   Neurological: She is alert and oriented to person, place, and time.   Skin: She is not diaphoretic. No pallor.   Psychiatric: She has a normal mood and affect. Her speech is normal and behavior is normal. Judgment and thought content normal. Cognition and memory are normal.   Nursing note and vitals reviewed.      Assessment:       1. Bacterial sinusitis    2. Calculus of gallbladder without cholecystitis without obstruction    3. CKD (chronic kidney disease), stage III        Plan:       Bacterial sinusitis  -     levoFLOXacin (LEVAQUIN) 500 MG tablet; Take 1 tablet (500 mg total) by mouth once daily. Take 2 tabs on day 1; then 1 tab daily x 6 days for 7 days  Dispense: 7 tablet; Refill: 0  -     methylPREDNISolone (MEDROL DOSEPACK) 4 mg tablet; use as directed  Dispense: 21 tablet; Refill: 0    Calculus of gallbladder without cholecystitis without obstruction    CKD (chronic kidney disease), stage III    schedule with general surgery   Educated on med safety, return precautions, supportive care  Follow up for  further evaluation if s/s worsen, fail to improve, or new symptoms arise.    Medication List with Changes/Refills   New Medications    LEVOFLOXACIN (LEVAQUIN) 500 MG TABLET    Take 1 tablet (500 mg total) by mouth once daily. Take 2 tabs on day 1; then 1 tab daily x 6 days for 7 days    METHYLPREDNISOLONE (MEDROL DOSEPACK) 4 MG TABLET    use as directed   Current Medications    AMLODIPINE (NORVASC) 5 MG TABLET    Take 1 tablet (5 mg total) by mouth every evening.    CALCIUM CARBONATE/VITAMIN D3 (CALCIUM 500 WITH D ORAL)    Take 1 capsule by mouth once daily.    CARBOXYMETHYLCELLULOSE (REFRESH PLUS) 0.5 % DPET    1 drop daily as needed.    DICYCLOMINE (BENTYL) 10 MG CAPSULE    Take 10 mg by mouth 4 (four) times daily as needed.     ESOMEPRAZOLE (NEXIUM) 40 MG CAPSULE    Take 1 capsule (40 mg total) by mouth before breakfast.    ESTRADIOL (CLIMARA) 0.075 MG/24 HR    APPLY TO CLEAN DRY SKIN ONCE PER WEEK    FERROUS SULFATE (FEOSOL) 325 MG (65 MG IRON) TAB TABLET    Take 1 tablet (325 mg total) by mouth daily with breakfast.    FLUTICASONE PROPIONATE (FLONASE) 50 MCG/ACTUATION NASAL SPRAY    1 spray (50 mcg total) by Each Nostril route once daily.    HYDROCHLOROTHIAZIDE (HYDRODIURIL) 12.5 MG TAB    Take 1 tablet (12.5 mg total) by mouth every other day.    HYOSCYAMINE (LEVSIN/SL) 0.125 MG SUBL    Place 1 tablet (0.125 mg total) under the tongue every 4 (four) hours as needed.    LIDOCAINE (LIDODERM) 5 %    Place 1 patch onto the skin once daily. Remove & Discard patch within 12 hours or as directed by MD    MULTIVITAMIN (MULTIPLE VITAMIN ORAL)    Take 1 capsule by mouth once daily.    NITROGLYCERIN (NITROSTAT) 0.4 MG SL TABLET    Place 1 tablet (0.4 mg total) under the tongue every 5 (five) minutes as needed for Chest pain.    OXYBUTYNIN (DITROPAN XL) 15 MG TR24    Take 1 tablet (15 mg total) by mouth once daily.    PRAVASTATIN (PRAVACHOL) 20 MG TABLET    Take 1 tablet (20 mg total) by mouth once daily.    PROMETHAZINE  (PHENERGAN) 25 MG TABLET    TAKE 1 TABLET EVERY 6 HOURS AS NEEDED FOR NAUSEA    ROPINIROLE (REQUIP) 2 MG TABLET    Take 1.5 tablets (3 mg total) by mouth nightly.    SUBOXONE 8-2 MG FILM    1 Film by Subdermal route once daily. 1 strip every 3 to 4 days    SYNTHROID 100 MCG TABLET    Take 1 tablet (100 mcg total) by mouth once daily.

## 2020-02-18 ENCOUNTER — PATIENT OUTREACH (OUTPATIENT)
Dept: ADMINISTRATIVE | Facility: HOSPITAL | Age: 70
End: 2020-02-18

## 2020-02-18 NOTE — PROGRESS NOTES
Chart review completed 02/18/2020.  Care Everywhere updates requested and reviewed.  Immunizations reconciled. Media reviewed.     Future Appointments   Date Time Provider Department Center   2/20/2020  2:45 PM John Morrow MD [facility] GENSURG [place]   2/24/2020  8:00 AM LAB, [facility] LAB [place]   3/3/2020  4:30 PM Galindo Stiles MD [facility] FAM MED [place]   3/5/2020  3:30 PM [facility] MAMMO1 [facility] MAMMO [place]   6/11/2020  2:30 PM Sourav Nugent MD [facility] CARDIO [place]

## 2020-02-19 ENCOUNTER — PATIENT OUTREACH (OUTPATIENT)
Dept: ADMINISTRATIVE | Facility: OTHER | Age: 70
End: 2020-02-19

## 2020-02-20 ENCOUNTER — TELEPHONE (OUTPATIENT)
Dept: SURGERY | Facility: CLINIC | Age: 70
End: 2020-02-20

## 2020-02-20 NOTE — TELEPHONE ENCOUNTER
I called patient to r/s her appointment today at 2:45pm due to Dr. Morrow having an emergency surgery.  I r/s her to 3/18/20 at 2pm in [place].  Diego

## 2020-02-24 ENCOUNTER — LAB VISIT (OUTPATIENT)
Dept: LAB | Facility: HOSPITAL | Age: 70
End: 2020-02-24
Attending: INTERNAL MEDICINE
Payer: MEDICARE

## 2020-02-24 DIAGNOSIS — E11.9 CONTROLLED TYPE 2 DIABETES MELLITUS WITHOUT COMPLICATION, WITHOUT LONG-TERM CURRENT USE OF INSULIN: ICD-10-CM

## 2020-02-24 DIAGNOSIS — I10 ESSENTIAL HYPERTENSION: ICD-10-CM

## 2020-02-24 DIAGNOSIS — I25.10 CORONARY ARTERY DISEASE, ANGINA PRESENCE UNSPECIFIED, UNSPECIFIED VESSEL OR LESION TYPE, UNSPECIFIED WHETHER NATIVE OR TRANSPLANTED HEART: ICD-10-CM

## 2020-02-24 DIAGNOSIS — E03.4 HYPOTHYROIDISM DUE TO ACQUIRED ATROPHY OF THYROID: ICD-10-CM

## 2020-02-24 DIAGNOSIS — E78.5 DYSLIPIDEMIA: ICD-10-CM

## 2020-02-24 DIAGNOSIS — D50.9 IRON DEFICIENCY ANEMIA, UNSPECIFIED IRON DEFICIENCY ANEMIA TYPE: ICD-10-CM

## 2020-02-24 LAB
ALBUMIN SERPL BCP-MCNC: 3.5 G/DL (ref 3.5–5.2)
ALP SERPL-CCNC: 60 U/L (ref 55–135)
ALT SERPL W/O P-5'-P-CCNC: 20 U/L (ref 10–44)
ANION GAP SERPL CALC-SCNC: 7 MMOL/L (ref 8–16)
AST SERPL-CCNC: 25 U/L (ref 10–40)
BASOPHILS # BLD AUTO: 0.04 K/UL (ref 0–0.2)
BASOPHILS NFR BLD: 0.5 % (ref 0–1.9)
BILIRUB SERPL-MCNC: 0.2 MG/DL (ref 0.1–1)
BUN SERPL-MCNC: 16 MG/DL (ref 8–23)
CALCIUM SERPL-MCNC: 9.5 MG/DL (ref 8.7–10.5)
CHLORIDE SERPL-SCNC: 105 MMOL/L (ref 95–110)
CO2 SERPL-SCNC: 27 MMOL/L (ref 23–29)
CREAT SERPL-MCNC: 1 MG/DL (ref 0.5–1.4)
DIFFERENTIAL METHOD: ABNORMAL
EOSINOPHIL # BLD AUTO: 0 K/UL (ref 0–0.5)
EOSINOPHIL NFR BLD: 0 % (ref 0–8)
ERYTHROCYTE [DISTWIDTH] IN BLOOD BY AUTOMATED COUNT: 13.6 % (ref 11.5–14.5)
EST. GFR  (AFRICAN AMERICAN): >60 ML/MIN/1.73 M^2
EST. GFR  (NON AFRICAN AMERICAN): 56.8 ML/MIN/1.73 M^2
ESTIMATED AVG GLUCOSE: 114 MG/DL (ref 68–131)
FERRITIN SERPL-MCNC: 17 NG/ML (ref 20–300)
GLUCOSE SERPL-MCNC: 88 MG/DL (ref 70–110)
HBA1C MFR BLD HPLC: 5.6 % (ref 4–5.6)
HCT VFR BLD AUTO: 37.4 % (ref 37–48.5)
HGB BLD-MCNC: 11.2 G/DL (ref 12–16)
IMM GRANULOCYTES # BLD AUTO: 0.02 K/UL (ref 0–0.04)
IMM GRANULOCYTES NFR BLD AUTO: 0.3 % (ref 0–0.5)
IRON SERPL-MCNC: 65 UG/DL (ref 30–160)
LYMPHOCYTES # BLD AUTO: 1.8 K/UL (ref 1–4.8)
LYMPHOCYTES NFR BLD: 24 % (ref 18–48)
MCH RBC QN AUTO: 28.9 PG (ref 27–31)
MCHC RBC AUTO-ENTMCNC: 29.9 G/DL (ref 32–36)
MCV RBC AUTO: 97 FL (ref 82–98)
MONOCYTES # BLD AUTO: 0.5 K/UL (ref 0.3–1)
MONOCYTES NFR BLD: 7.2 % (ref 4–15)
NEUTROPHILS # BLD AUTO: 5.1 K/UL (ref 1.8–7.7)
NEUTROPHILS NFR BLD: 68 % (ref 38–73)
NRBC BLD-RTO: 0 /100 WBC
PLATELET # BLD AUTO: 211 K/UL (ref 150–350)
PMV BLD AUTO: 11.7 FL (ref 9.2–12.9)
POTASSIUM SERPL-SCNC: 5.3 MMOL/L (ref 3.5–5.1)
PROT SERPL-MCNC: 7 G/DL (ref 6–8.4)
RBC # BLD AUTO: 3.87 M/UL (ref 4–5.4)
RETICS/RBC NFR AUTO: 1.5 % (ref 0.5–2.5)
SATURATED IRON: 19 % (ref 20–50)
SODIUM SERPL-SCNC: 139 MMOL/L (ref 136–145)
TOTAL IRON BINDING CAPACITY: 336 UG/DL (ref 250–450)
TRANSFERRIN SERPL-MCNC: 227 MG/DL (ref 200–375)
TSH SERPL DL<=0.005 MIU/L-ACNC: 1.1 UIU/ML (ref 0.4–4)
WBC # BLD AUTO: 7.47 K/UL (ref 3.9–12.7)

## 2020-02-24 PROCEDURE — 85045 AUTOMATED RETICULOCYTE COUNT: CPT

## 2020-02-24 PROCEDURE — 36415 COLL VENOUS BLD VENIPUNCTURE: CPT | Mod: PO

## 2020-02-24 PROCEDURE — 84443 ASSAY THYROID STIM HORMONE: CPT

## 2020-02-24 PROCEDURE — 83036 HEMOGLOBIN GLYCOSYLATED A1C: CPT

## 2020-02-24 PROCEDURE — 82728 ASSAY OF FERRITIN: CPT

## 2020-02-24 PROCEDURE — 80053 COMPREHEN METABOLIC PANEL: CPT

## 2020-02-24 PROCEDURE — 83540 ASSAY OF IRON: CPT

## 2020-02-24 PROCEDURE — 85025 COMPLETE CBC W/AUTO DIFF WBC: CPT

## 2020-03-01 DIAGNOSIS — N39.41 URGE INCONTINENCE: ICD-10-CM

## 2020-03-02 RX ORDER — OXYBUTYNIN CHLORIDE 15 MG/1
TABLET, EXTENDED RELEASE ORAL
Qty: 90 TABLET | Refills: 1 | Status: SHIPPED | OUTPATIENT
Start: 2020-03-02 | End: 2020-03-09 | Stop reason: SDUPTHER

## 2020-03-03 ENCOUNTER — OFFICE VISIT (OUTPATIENT)
Dept: FAMILY MEDICINE | Facility: CLINIC | Age: 70
End: 2020-03-03
Payer: MEDICARE

## 2020-03-03 VITALS
BODY MASS INDEX: 29.51 KG/M2 | HEIGHT: 61 IN | SYSTOLIC BLOOD PRESSURE: 119 MMHG | WEIGHT: 156.31 LBS | OXYGEN SATURATION: 96 % | HEART RATE: 64 BPM | DIASTOLIC BLOOD PRESSURE: 69 MMHG

## 2020-03-03 DIAGNOSIS — E78.5 DYSLIPIDEMIA: ICD-10-CM

## 2020-03-03 DIAGNOSIS — I10 ESSENTIAL HYPERTENSION: ICD-10-CM

## 2020-03-03 DIAGNOSIS — E03.4 HYPOTHYROIDISM DUE TO ACQUIRED ATROPHY OF THYROID: ICD-10-CM

## 2020-03-03 DIAGNOSIS — J32.0 CHRONIC MAXILLARY SINUSITIS: Primary | ICD-10-CM

## 2020-03-03 DIAGNOSIS — R51.9 SINUS HEADACHE: ICD-10-CM

## 2020-03-03 DIAGNOSIS — D50.9 IRON DEFICIENCY ANEMIA, UNSPECIFIED IRON DEFICIENCY ANEMIA TYPE: ICD-10-CM

## 2020-03-03 DIAGNOSIS — E11.9 CONTROLLED TYPE 2 DIABETES MELLITUS WITHOUT COMPLICATION, WITHOUT LONG-TERM CURRENT USE OF INSULIN: ICD-10-CM

## 2020-03-03 DIAGNOSIS — R09.82 PND (POST-NASAL DRIP): ICD-10-CM

## 2020-03-03 PROCEDURE — 99214 PR OFFICE/OUTPT VISIT, EST, LEVL IV, 30-39 MIN: ICD-10-PCS | Mod: S$GLB,,, | Performed by: INTERNAL MEDICINE

## 2020-03-03 PROCEDURE — 99214 OFFICE O/P EST MOD 30 MIN: CPT | Mod: S$GLB,,, | Performed by: INTERNAL MEDICINE

## 2020-03-03 PROCEDURE — 99999 PR PBB SHADOW E&M-EST. PATIENT-LVL III: ICD-10-PCS | Mod: PBBFAC,,, | Performed by: INTERNAL MEDICINE

## 2020-03-03 PROCEDURE — 99213 OFFICE O/P EST LOW 20 MIN: CPT | Mod: PBBFAC,PO | Performed by: INTERNAL MEDICINE

## 2020-03-03 PROCEDURE — 99999 PR PBB SHADOW E&M-EST. PATIENT-LVL III: CPT | Mod: PBBFAC,,, | Performed by: INTERNAL MEDICINE

## 2020-03-03 RX ORDER — LEVOFLOXACIN 500 MG/1
500 TABLET, FILM COATED ORAL DAILY
Qty: 21 TABLET | Refills: 0 | Status: SHIPPED | OUTPATIENT
Start: 2020-03-03 | End: 2020-03-24

## 2020-03-03 RX ORDER — IPRATROPIUM BROMIDE 42 UG/1
2 SPRAY, METERED NASAL 4 TIMES DAILY PRN
Qty: 15 ML | Refills: 11 | Status: SHIPPED | OUTPATIENT
Start: 2020-03-03 | End: 2020-09-08

## 2020-03-03 NOTE — PROGRESS NOTES
Subjective:       Patient ID: Osman Johansen is a 70 y.o. female.    Chief Complaint: Annual Exam    Complains of thick nasal drainage and PND.  Has been having frontal facial pain.  Was treated for sinusitis 1 month ago with Levoquin and prednisone.  This helped but did not resolve it.    Complains of headaches she feels is worse with the above changes.  She states it is feels like it triggers her migraines.  It usually happens on her right maxillary area and sometimes has right eyelid swelling with it.      Takes tylenol, ASA, or BC powder  Singulair helped in the past.  Zyrtec has not helped so she is not taking it.  Flonase did not help but makes her feel dry.      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     CT doc emphysema - AVILA as above.  No longer smokes.    Iron deficiency anemia - iron level and Hb improved.  Trouble tolerating iron orally.  Advised getting cscp and EGD (hx bleeding ulcer in past).  Last cscp 2012 was ok.  + fatigue     HTN - controlled   Hypothyroid - controlled.112 mcg from local pharmacy and Brand name made her have palpitations, but 125 mcg does not from mail order.  Dye? Only use mail order -   DM - controlled; outside eye exam done 7/26/17  HLD - uncontrolled ldl goal < 70; cardiology finally convinced her to start a statin.      Recall - pain  wanted her to see neurology.  Gets cramps on Suboxone.  Trying to wean off and down to a piece of her daily pill but can't because develops RLS that prevents her from sleeping.  She had similar events with leg cramping when on fentanyl and hydrocodone that resolved when placed on Suboxone.  She is down to a piece of Suboxone daily = 1 pill lasts about 4-6 days.  She also has uncontrolled RLS that has been exacerbated as she has tried to wean off Suboxone.  This has been happening off and on for about 6 months now.     Recall previous visit:   Follow up s/p angina admit.  Had classic angina symptoms in office.   Sent to ER.  W/u / stress test normal.  F/u w cardiology.  Calcium score done = > 1,100 = cx CAD with likely significant blockage.  If pt has cp again, will likely get angiogram.  No chest pain since.  Does have AVILA waking in store.    Review of Systems   Constitutional: Positive for fatigue. Negative for appetite change and fever.   HENT: Positive for postnasal drip, rhinorrhea, sinus pressure and sinus pain. Negative for nosebleeds and trouble swallowing.    Eyes: Negative for discharge and visual disturbance.   Respiratory: Negative for choking and shortness of breath.    Cardiovascular: Negative for chest pain and palpitations.   Gastrointestinal: Negative for abdominal pain, nausea and vomiting.   Musculoskeletal: Negative for arthralgias and joint swelling.   Skin: Negative for rash and wound.   Neurological: Negative for dizziness and syncope.   Psychiatric/Behavioral: Negative for confusion and dysphoric mood.       Objective:      Vitals:    03/03/20 1610   BP: 119/69   Pulse: 64     Physical Exam   Constitutional: She appears well-nourished.   Eyes: Conjunctivae and EOM are normal.   Neck: Normal range of motion.   Cardiovascular: Normal rate and regular rhythm.   Pulses:       Dorsalis pedis pulses are 2+ on the right side, and 2+ on the left side.   Pulmonary/Chest: Effort normal and breath sounds normal.   Musculoskeletal:   Normal ROM bilateral    Feet:   Right Foot:   Protective Sensation: 4 sites tested. 4 sites sensed.   Left Foot:   Protective Sensation: 4 sites tested. 4 sites sensed.   Neurological: No cranial nerve deficit (grossly intact).   Skin: Skin is warm and dry.   Psychiatric: She has a normal mood and affect.   Alert and orientated   Vitals reviewed.        Assessment:       1. Chronic maxillary sinusitis    2. PND (post-nasal drip)    3. Sinus headache    4. Controlled type 2 diabetes mellitus without complication, without long-term current use of insulin    5. Essential hypertension     6. Dyslipidemia    7. Iron deficiency anemia, unspecified iron deficiency anemia type    8. Hypothyroidism due to acquired atrophy of thyroid        Plan:       Chronic maxillary sinusitis  -     ipratropium (ATROVENT) 42 mcg (0.06 %) nasal spray; 2 sprays by Nasal route 4 (four) times daily as needed for Rhinitis.  Dispense: 15 mL; Refill: 11  -     levoFLOXacin (LEVAQUIN) 500 MG tablet; Take 1 tablet (500 mg total) by mouth once daily. for 21 days  Dispense: 21 tablet; Refill: 0    PND (post-nasal drip)  -     ipratropium (ATROVENT) 42 mcg (0.06 %) nasal spray; 2 sprays by Nasal route 4 (four) times daily as needed for Rhinitis.  Dispense: 15 mL; Refill: 11  -     levoFLOXacin (LEVAQUIN) 500 MG tablet; Take 1 tablet (500 mg total) by mouth once daily. for 21 days  Dispense: 21 tablet; Refill: 0    Sinus headache  -     ipratropium (ATROVENT) 42 mcg (0.06 %) nasal spray; 2 sprays by Nasal route 4 (four) times daily as needed for Rhinitis.  Dispense: 15 mL; Refill: 11  -     levoFLOXacin (LEVAQUIN) 500 MG tablet; Take 1 tablet (500 mg total) by mouth once daily. for 21 days  Dispense: 21 tablet; Refill: 0    Controlled type 2 diabetes mellitus without complication, without long-term current use of insulin  -     Hemoglobin A1c; Future; Expected date: 08/30/2020  -     Microalbumin/creatinine urine ratio; Future; Expected date: 08/30/2020    Essential hypertension  -     Comprehensive metabolic panel; Future; Expected date: 08/30/2020    Dyslipidemia  -     Lipid panel; Future; Expected date: 08/30/2020    Iron deficiency anemia, unspecified iron deficiency anemia type  -     CBC auto differential; Future; Expected date: 08/30/2020  -     Iron and TIBC; Future; Expected date: 08/30/2020  -     Ferritin; Future; Expected date: 08/30/2020    Hypothyroidism due to acquired atrophy of thyroid  -     TSH; Future; Expected date: 08/30/2020            Medication List with Changes/Refills   New Medications    IPRATROPIUM  (ATROVENT) 42 MCG (0.06 %) NASAL SPRAY    2 sprays by Nasal route 4 (four) times daily as needed for Rhinitis.    LEVOFLOXACIN (LEVAQUIN) 500 MG TABLET    Take 1 tablet (500 mg total) by mouth once daily. for 21 days   Current Medications    AMLODIPINE (NORVASC) 5 MG TABLET    Take 1 tablet (5 mg total) by mouth every evening.    CALCIUM CARBONATE/VITAMIN D3 (CALCIUM 500 WITH D ORAL)    Take 1 capsule by mouth once daily.    CARBOXYMETHYLCELLULOSE (REFRESH PLUS) 0.5 % DPET    1 drop daily as needed.    DICYCLOMINE (BENTYL) 10 MG CAPSULE    Take 10 mg by mouth 4 (four) times daily as needed.     ESOMEPRAZOLE (NEXIUM) 40 MG CAPSULE    Take 1 capsule (40 mg total) by mouth before breakfast.    ESTRADIOL (CLIMARA) 0.075 MG/24 HR    APPLY TO CLEAN DRY SKIN ONCE PER WEEK    FERROUS SULFATE (FEOSOL) 325 MG (65 MG IRON) TAB TABLET    Take 1 tablet (325 mg total) by mouth daily with breakfast.    FLUTICASONE PROPIONATE (FLONASE) 50 MCG/ACTUATION NASAL SPRAY    1 spray (50 mcg total) by Each Nostril route once daily.    HYDROCHLOROTHIAZIDE (HYDRODIURIL) 12.5 MG TAB    Take 1 tablet (12.5 mg total) by mouth every other day.    HYOSCYAMINE (LEVSIN/SL) 0.125 MG SUBL    Place 1 tablet (0.125 mg total) under the tongue every 4 (four) hours as needed.    LIDOCAINE (LIDODERM) 5 %    Place 1 patch onto the skin once daily. Remove & Discard patch within 12 hours or as directed by MD    METHYLPREDNISOLONE (MEDROL DOSEPACK) 4 MG TABLET    use as directed    MULTIVITAMIN (MULTIPLE VITAMIN ORAL)    Take 1 capsule by mouth once daily.    NITROGLYCERIN (NITROSTAT) 0.4 MG SL TABLET    Place 1 tablet (0.4 mg total) under the tongue every 5 (five) minutes as needed for Chest pain.    OXYBUTYNIN (DITROPAN XL) 15 MG TR24    TAKE 1 TABLET DAILY    PRAVASTATIN (PRAVACHOL) 20 MG TABLET    Take 1 tablet (20 mg total) by mouth once daily.    PROMETHAZINE (PHENERGAN) 25 MG TABLET    TAKE 1 TABLET EVERY 6 HOURS AS NEEDED FOR NAUSEA    ROPINIROLE  (REQUIP) 2 MG TABLET    Take 1.5 tablets (3 mg total) by mouth nightly.    SUBOXONE 8-2 MG FILM    1 Film by Subdermal route once daily. 1 strip every 3 to 4 days    SYNTHROID 100 MCG TABLET    Take 1 tablet (100 mcg total) by mouth once daily.     Nw, ENT for eval and possible allergy shot  Continue current management and monitor.    Counseled on regular exercise, maintenance of a healthy weight, balanced diet rich in fruits/vegetables and lean protein, and avoidance of unhealthy habits like smoking and excessive alcohol intake.   Also, counseled on importance of being compliant with medication, health appointments, diet and exercise.     Follow up in about 6 months (around 9/3/2020), or if symptoms worsen or fail to improve.

## 2020-03-03 NOTE — PATIENT INSTRUCTIONS
Patient advised to use:  - intranasal steroid - Flonase  - Over the counter 24h Allegra (no drowsiness); generic ok.   Use Mucinex or quaifenesin daily to thin mucous congestion.  Patient advised to take Mucinex BID, use nasal saline spray or wash (distilled water through a Neti Pot using  salt water (1/2 - 1 table spoon per 8 - 10 oz of cool water), increase oral fluids, and try warm steam inhalation.

## 2020-03-04 DIAGNOSIS — N39.41 URGE INCONTINENCE: ICD-10-CM

## 2020-03-04 DIAGNOSIS — K29.70 GASTRITIS, PRESENCE OF BLEEDING UNSPECIFIED, UNSPECIFIED CHRONICITY, UNSPECIFIED GASTRITIS TYPE: ICD-10-CM

## 2020-03-04 DIAGNOSIS — E03.4 HYPOTHYROIDISM DUE TO ACQUIRED ATROPHY OF THYROID: ICD-10-CM

## 2020-03-04 NOTE — TELEPHONE ENCOUNTER
----- Message from Cailin Mallory sent at 3/4/2020  3:55 PM CST -----  Contact: Pt  Type: Rx Refill Request    Who Called: Pt  Refill or New Rx: refill  Rx Name and Strength:   oxybutynin (DITROPAN XL) 15 MG TR24,    esomeprazole (NEXIUM) 40 MG capsule,   SYNTHROID 100 mcg tablet and nitroGLYCERIN (NITROSTAT) 0.4 MG SL tablet  Is it a 30 day or 90 day? As directed  Preferred Pharmacy:  [facility] [place] - [address] [address] [address]  [address] [address] [address]  [place] [address]  Phone: [phone] Fax: [phone]  Local or Mail: local  Ordering Provider: harry Christopher Call Back Number: [phone]  Additional Information: n/a  Please advise. Thank you

## 2020-03-05 ENCOUNTER — HOSPITAL ENCOUNTER (OUTPATIENT)
Dept: RADIOLOGY | Facility: HOSPITAL | Age: 70
Discharge: HOME | End: 2020-03-05
Attending: INTERNAL MEDICINE
Payer: COMMERCIAL

## 2020-03-05 DIAGNOSIS — Z12.31 ENCOUNTER FOR SCREENING MAMMOGRAM FOR MALIGNANT NEOPLASM OF BREAST: ICD-10-CM

## 2020-03-05 PROCEDURE — 77063 BREAST TOMOSYNTHESIS BI: CPT | Mod: 26,,, | Performed by: RADIOLOGY

## 2020-03-05 PROCEDURE — 77067 SCR MAMMO BI INCL CAD: CPT | Mod: 26,,, | Performed by: RADIOLOGY

## 2020-03-05 PROCEDURE — 77067 MAMMO DIGITAL SCREENING BILAT WITH TOMOSYNTHESIS_CAD: ICD-10-PCS | Mod: 26,,, | Performed by: RADIOLOGY

## 2020-03-05 PROCEDURE — 77067 SCR MAMMO BI INCL CAD: CPT | Mod: TC,PO

## 2020-03-05 PROCEDURE — 77063 MAMMO DIGITAL SCREENING BILAT WITH TOMOSYNTHESIS_CAD: ICD-10-PCS | Mod: 26,,, | Performed by: RADIOLOGY

## 2020-03-06 NOTE — PROGRESS NOTES
Refill Authorization Note     is requesting a refill authorization.    Brief assessment and rationale for refill: REVIEW: nitroGLYCERIN -not previously prescribed by you // ROUTE: oxybutynin -op // APPROVE: SYNTHROID and esomeprazole -prr          Medication Therapy Plan: GERD lco(8/19)                              Comments:   Requested Prescriptions   Pending Prescriptions Disp Refills    oxybutynin (DITROPAN XL) 15 MG TR24 30 tablet 0     Sig: Take 1 tablet (15 mg total) by mouth once daily.       There is no refill protocol information for this order       esomeprazole (NEXIUM) 40 MG capsule 90 capsule 1     Sig: Take 1 capsule (40 mg total) by mouth before breakfast.       Gastroenterology: Proton Pump Inhibitors Failed - 3/4/2020  4:06 PM        Failed - An appropriate indication is on the problem list        Passed - Patient is at least 18 years old        Passed - Osteoporosis is not on problem list        Passed - Plavix is not on active medication list        Passed - Office visit in past 6 months or future 90 days.     Recent Outpatient Visits            3 days ago Chronic maxillary sinusitis    [facility] Galindo Stiles MD    1 month ago Bacterial sinusitis    [facility] Yadira Serna, NP    1 month ago Myofascial pain    [facility] [place] [address] [place], CYNTHIAP    2 months ago Lumbar spondylosis    [facility] [place] [address] [place], CYNTHIAP    2 months ago Agatston CAC score, >400    [facility] Sourav Nugent MD          Future Appointments              In 1 week [facility]    In 3 months [facility]    In 5 months LAB, [place] [facility] [place]    In 5 months URINE [facility] [place]    In 6 months Galindo Stiles MD [facility]                SYNTHROID 100 mcg tablet 90 tablet 3     Sig: Take 1 tablet (100 mcg total) by mouth once daily.       Endocrinology:  Hypothyroid Agents Failed - 3/4/2020  4:06 PM        Failed - Manual Review: FT4 is not required if last TSH is WNL.        Passed - Patient is at least 18 years old        Passed - Office visit in past 12 months or future 90 days.     Recent Outpatient Visits            3 days ago Chronic maxillary sinusitis    [facility] Galindo Stiles MD    1 month ago Bacterial sinusitis    [facility] Yadira Serna, NP    1 month ago Myofascial pain    [facility] [place] [address] [place], FNP    2 months ago Lumbar spondylosis    [facility] [place] [address] [place], FNP    2 months ago Agatston CAC score, >400    [facility] Sourav Nugent MD          Future Appointments              In 1 week [facility]    In 3 months [facility]    In 5 months LAB, [place] [facility] [place]    In 5 months URINE [facility] [place]    In 6 months Galindo Stiles MD [facility]                Passed - TSH in normal range and within 360 days     TSH   Date Value Ref Range Status   02/24/2020 1.102 0.400 - 4.000 uIU/mL Final   08/20/2019 0.949 0.400 - 4.000 uIU/mL Final   06/04/2019 <0.015 (L) 0.400 - 4.000 uIU/mL Final     Comment:     Warning:  Heterophilic antibodies in serum or plasma of   certain individuals are known to cause interference with   immunoassays. These antibodies may be present in blood samples   from individuals regularly exposed to animal or who have been   treated with animal products.   Patients taking very high Biotin doses of >300 mcg/day may   cause a negative bias in this assay.                Passed - T4 free within 1080 days     Free T4   Date  Value Ref Range Status   05/02/2018 1.38 0.71 - 1.51 ng/dL Final   02/20/2018 1.24 0.71 - 1.51 ng/dL Final   03/03/2017 0.93 0.71 - 1.51 ng/dL Final             nitroGLYCERIN (NITROSTAT) 0.4 MG SL tablet 25 tablet 2     Sig: Place 1 tablet (0.4 mg total) under the tongue every 5 (five) minutes as needed for Chest pain.       Cardiovascular: Nitrates - nitroglycerin Passed - 3/4/2020  4:06 PM        Passed - Patient is at least 18 years old        Passed - Last BP in normal range within 360 days.     BP Readings from Last 3 Encounters:   03/03/20 119/69   02/04/20 (!) 142/88   01/20/20 (!) 173/66              Passed - Last Heart Rate in normal range within 360 days.     Pulse Readings from Last 3 Encounters:   03/03/20 64   02/04/20 69   01/20/20 62             Passed - Office visit in past 12 months or future 90 days.     Recent Outpatient Visits            3 days ago Chronic maxillary sinusitis    [facility] Galindo Stiles MD    1 month ago Bacterial sinusitis    [facility] Yadira Serna, NP    1 month ago Myofascial pain    [facility] [place] [address] [place], FNP    2 months ago Lumbar spondylosis    [facility] [place] [address] [place], CYNTHIAP    2 months ago Agatston CAC score, >400    [place] [facility] Sourav Nugent MD          Future Appointments              In 1 week [facility]    In 3 months [facility]    In 5 months LAB, [place] [facility] [place]    In 5 months URINE [facility] [place]    In 6 months Galindo Stiles MD [facility]                 Appointments  past 12m or future 3m with PCP    Date Provider   Last Visit   3/3/2020 Galindo Stiles MD   Next Visit   Visit date not found Galindo Stiles MD   .  ED visits in past 90 days: 0       Note  composed:11:16 AM 03/06/2020

## 2020-03-09 ENCOUNTER — TELEPHONE (OUTPATIENT)
Dept: FAMILY MEDICINE | Facility: CLINIC | Age: 70
End: 2020-03-09

## 2020-03-09 DIAGNOSIS — N39.41 URGE INCONTINENCE: ICD-10-CM

## 2020-03-09 RX ORDER — OXYBUTYNIN CHLORIDE 15 MG/1
15 TABLET, EXTENDED RELEASE ORAL DAILY
Qty: 90 TABLET | Refills: 0 | Status: CANCELLED | OUTPATIENT
Start: 2020-03-09

## 2020-03-09 RX ORDER — ESOMEPRAZOLE MAGNESIUM 40 MG/1
40 CAPSULE, DELAYED RELEASE ORAL
Qty: 90 CAPSULE | Refills: 1 | Status: SHIPPED | OUTPATIENT
Start: 2020-03-09 | End: 2020-06-09

## 2020-03-09 RX ORDER — LEVOTHYROXINE SODIUM 100 UG/1
100 TABLET ORAL DAILY
Qty: 90 TABLET | Refills: 3 | Status: SHIPPED | OUTPATIENT
Start: 2020-03-09 | End: 2020-08-12 | Stop reason: SDUPTHER

## 2020-03-09 RX ORDER — OXYBUTYNIN CHLORIDE 15 MG/1
15 TABLET, EXTENDED RELEASE ORAL DAILY
Qty: 90 TABLET | Refills: 0 | Status: SHIPPED | OUTPATIENT
Start: 2020-03-09 | End: 2020-06-10

## 2020-03-09 NOTE — TELEPHONE ENCOUNTER
----- Message from Shena Babcock sent at 3/9/2020  1:06 PM CDT -----  Contact: [facility]  Type:  RX Refill Request    Who Called:  Pharmacy  Refill or New Rx:  refill  RX Name and Strength:  oxybutynin (DITROPAN XL) 15 MG TR24  How is the patient currently taking it? (ex. 1XDay):  As directed  Is this a 30 day or 90 day RX:  30  Preferred Pharmacy with phone number:    [facility] [place] [address] [address]  [address] [address] [facility] [address]  [place] [address]  Phone: [phone] Fax: [phone]  Local or Mail Order:  local  Ordering Provider:  harry Christopher Call Back Number:  [phone]   Additional Information: Per pharmacy no longer using mail order-please advise-thank you

## 2020-03-09 NOTE — TELEPHONE ENCOUNTER
Please see the following assessment. This refill request still requires some action on your part. Oxybutynin and Nitrostat have not been previously prescribed by you. Defer to you.  Thank you.

## 2020-03-09 NOTE — TELEPHONE ENCOUNTER
----- Message from Neno Hill sent at 3/9/2020  1:59 PM CDT -----  Contact: pt  Type:  RX Refill Request    Who Called:  pt  Refill or New Rx:  refill  RX Name and Strength:  oxybutynin (DITROPAN XL) 15 MG TR24  How is the patient currently taking it? (ex. 1XDay):  1 x day  Is this a 30 day or 90 day RX:  90  Preferred Pharmacy with phone number:    [place] - [address] [address]  [address] [facility] [address]  [place] [address]  Phone: [phone] Fax: [phone]    Local or Mail Order:    Ordering Provider:    Best Call Back Number:  [phone]  Additional Information:  Pt states the others were filled but not this one.

## 2020-03-09 NOTE — PROGRESS NOTES
Refill Routing Note     Medication(s) are not appropriate for processing by [facility]:    Medication Outside of Protocol    Appointments  past 12m or future 3m with PCP    Date Provider   Last Visit   3/3/2020 Galindo Stiles MD   Next Visit   Visit date not found Galindo Stiles MD           Automatic Epic Protocol Generated Data:    Requested Prescriptions   Pending Prescriptions Disp Refills    oxybutynin (DITROPAN XL) 15 MG TR24 90 tablet 1     Sig: Take 1 tablet (15 mg total) by mouth once daily.       There is no refill protocol information for this order           Note composed:2:00 PM 03/09/2020

## 2020-03-11 RX ORDER — NITROGLYCERIN 0.4 MG/1
0.4 TABLET SUBLINGUAL EVERY 5 MIN PRN
Qty: 25 TABLET | Refills: 2 | Status: SHIPPED | OUTPATIENT
Start: 2020-03-11 | End: 2022-04-28 | Stop reason: SDUPTHER

## 2020-03-16 ENCOUNTER — PATIENT OUTREACH (OUTPATIENT)
Dept: ADMINISTRATIVE | Facility: OTHER | Age: 70
End: 2020-03-16

## 2020-03-18 ENCOUNTER — TELEPHONE (OUTPATIENT)
Dept: SURGERY | Facility: CLINIC | Age: 70
End: 2020-03-18

## 2020-05-05 ENCOUNTER — PATIENT MESSAGE (OUTPATIENT)
Dept: ADMINISTRATIVE | Facility: HOSPITAL | Age: 70
End: 2020-05-05

## 2020-06-03 ENCOUNTER — TELEPHONE (OUTPATIENT)
Dept: FAMILY MEDICINE | Facility: CLINIC | Age: 70
End: 2020-06-03

## 2020-06-03 NOTE — TELEPHONE ENCOUNTER
----- Message from Tyra Nixon sent at 6/3/2020  1:44 PM CDT -----  Contact: Patient  Type: Needs Medical Advice  Who Called:  Patient  Symptoms (please be specific):  Sinus infection  Pharmacy name and phone #:    [facility] [place] - [address]  [address]  [place] [address]  Phone: [phone] Fax: [phone]  Best Call Back Number:   Additional Information: Calling to find out if she can get antibiotics called in for her sinus infection.

## 2020-06-04 ENCOUNTER — OFFICE VISIT (OUTPATIENT)
Dept: FAMILY MEDICINE | Facility: CLINIC | Age: 70
End: 2020-06-04
Payer: MEDICARE

## 2020-06-04 DIAGNOSIS — J01.91 ACUTE RECURRENT SINUSITIS, UNSPECIFIED LOCATION: Primary | ICD-10-CM

## 2020-06-04 PROCEDURE — G0463 HOSPITAL OUTPT CLINIC VISIT: HCPCS | Mod: PO

## 2020-06-04 PROCEDURE — 99213 PR OFFICE/OUTPT VISIT, EST, LEVL III, 20-29 MIN: ICD-10-PCS | Mod: 95,,, | Performed by: NURSE PRACTITIONER

## 2020-06-04 PROCEDURE — 99211 OFF/OP EST MAY X REQ PHY/QHP: CPT | Mod: PO

## 2020-06-04 PROCEDURE — 99213 OFFICE O/P EST LOW 20 MIN: CPT | Mod: 95,,, | Performed by: NURSE PRACTITIONER

## 2020-06-04 RX ORDER — LEVOFLOXACIN 500 MG/1
500 TABLET, FILM COATED ORAL DAILY
Qty: 7 TABLET | Refills: 0 | Status: SHIPPED | OUTPATIENT
Start: 2020-06-04 | End: 2020-06-11

## 2020-06-04 NOTE — PROGRESS NOTES
Subjective:       Patient ID: Osman Johansen is a 70 y.o. female.    The patient location is: [place]  The chief complaint leading to consultation is: Sinusitis    Visit type: audiovisual    Face to Face time with patient: 10min  10 minutes of total time spent on the encounter, which includes face to face time and non-face to face time preparing to see the patient (eg, review of tests), Obtaining and/or reviewing separately obtained history, Documenting clinical information in the electronic or other health record, Independently interpreting results (not separately reported) and communicating results to the patient/family/caregiver, or Care coordination (not separately reported).     Each patient to whom he or she provides medical services by telemedicine is:  (1) informed of the relationship between the physician and patient and the respective role of any other health care provider with respect to management of the patient; and (2) notified that he or she may decline to receive medical services by telemedicine and may withdraw from such care at any time.    Notes:   Sinusitis   This is a recurrent problem. The current episode started 1 to 4 weeks ago. The problem has been gradually worsening since onset. Associated symptoms include congestion, headaches and sinus pressure. Pertinent negatives include no coughing, diaphoresis or shortness of breath. (Green/brown mucus discharge) Treatments tried: daily flonase, antihistamine  The treatment provided no relief.     There were no vitals filed for this visit.  Review of Systems   Constitutional: Negative for diaphoresis and fever.   HENT: Positive for congestion, postnasal drip, sinus pressure and sinus pain. Negative for facial swelling and trouble swallowing.    Eyes: Negative for discharge and redness.   Respiratory: Negative for cough and shortness of breath.    Cardiovascular: Negative for chest pain and palpitations.   Gastrointestinal: Negative for abdominal pain  and diarrhea.   Genitourinary: Negative for difficulty urinating.   Musculoskeletal: Negative for gait problem.   Skin: Negative for pallor and rash.   Neurological: Positive for headaches. Negative for facial asymmetry and speech difficulty.   Psychiatric/Behavioral: Negative for confusion. The patient is not nervous/anxious.        Past Medical History:   Diagnosis Date    Allergy     Anxiety     Arthritis     Blood transfusion 8 yrs    CAD (coronary artery disease)     Cataract     Degenerative disc disease     Depression     Dry eye syndrome     GERD (gastroesophageal reflux disease)     Hypercholesterolemia 12/9/2019    Hypertension     Macular drusen     Neuromuscular disorder     Ocular hypertension, bilateral     Osteoporosis     PUD (peptic ulcer disease)     Thoracic or lumbosacral neuritis or radiculitis 10/19/2012    Thyroid disease      Objective:      Physical Exam   Constitutional: She is oriented to person, place, and time. She appears well-developed.  Non-toxic appearance. She does not have a sickly appearance. She does not appear ill. No distress.   HENT:   Right Ear: Hearing normal.   Left Ear: Hearing normal.   Pt reports maxillary and frontal sinus tenderness   Pulmonary/Chest: No tachypnea. No respiratory distress.   Neurological: She is alert and oriented to person, place, and time.   Skin: She is not diaphoretic. No pallor.   Psychiatric: She has a normal mood and affect. Her speech is normal and behavior is normal. Judgment and thought content normal. Cognition and memory are normal.       Assessment:       1. Acute recurrent sinusitis, unspecified location        Plan:       Acute recurrent sinusitis, unspecified location  -     levoFLOXacin (LEVAQUIN) 500 MG tablet; Take 1 tablet (500 mg total) by mouth once daily. for 7 days  Dispense: 7 tablet; Refill: 0  -     Ambulatory referral/consult to ENT; Future; Expected date: 06/13/2020    cont flonase,  antihistamine  Decongestant prn  Cont nasal rinses         Follow up for further evaluation with ENT if s/s worsen, fail to improve, or new symptoms arise.    Medication List with Changes/Refills   New Medications    LEVOFLOXACIN (LEVAQUIN) 500 MG TABLET    Take 1 tablet (500 mg total) by mouth once daily. for 7 days   Current Medications    AMLODIPINE (NORVASC) 5 MG TABLET    Take 1 tablet (5 mg total) by mouth every evening.    CALCIUM CARBONATE/VITAMIN D3 (CALCIUM 500 WITH D ORAL)    Take 1 capsule by mouth once daily.    CARBOXYMETHYLCELLULOSE (REFRESH PLUS) 0.5 % DPET    1 drop daily as needed.    DICYCLOMINE (BENTYL) 10 MG CAPSULE    Take 10 mg by mouth 4 (four) times daily as needed.     ESOMEPRAZOLE (NEXIUM) 40 MG CAPSULE    Take 1 capsule (40 mg total) by mouth before breakfast.    ESTRADIOL (CLIMARA) 0.075 MG/24 HR    APPLY TO CLEAN DRY SKIN ONCE PER WEEK    FERROUS SULFATE (FEOSOL) 325 MG (65 MG IRON) TAB TABLET    Take 1 tablet (325 mg total) by mouth daily with breakfast.    FLUTICASONE PROPIONATE (FLONASE) 50 MCG/ACTUATION NASAL SPRAY    1 spray (50 mcg total) by Each Nostril route once daily.    HYDROCHLOROTHIAZIDE (HYDRODIURIL) 12.5 MG TAB    Take 1 tablet (12.5 mg total) by mouth every other day.    HYOSCYAMINE (LEVSIN/SL) 0.125 MG SUBL    Place 1 tablet (0.125 mg total) under the tongue every 4 (four) hours as needed.    IPRATROPIUM (ATROVENT) 42 MCG (0.06 %) NASAL SPRAY    2 sprays by Nasal route 4 (four) times daily as needed for Rhinitis.    LIDOCAINE (LIDODERM) 5 %    Place 1 patch onto the skin once daily. Remove & Discard patch within 12 hours or as directed by MD    MULTIVITAMIN (MULTIPLE VITAMIN ORAL)    Take 1 capsule by mouth once daily.    NITROGLYCERIN (NITROSTAT) 0.4 MG SL TABLET    Place 1 tablet (0.4 mg total) under the tongue every 5 (five) minutes as needed for Chest pain.    OXYBUTYNIN (DITROPAN XL) 15 MG TR24    Take 1 tablet (15 mg total) by mouth once daily.    PRAVASTATIN  (PRAVACHOL) 20 MG TABLET    Take 1 tablet (20 mg total) by mouth once daily.    PROMETHAZINE (PHENERGAN) 25 MG TABLET    TAKE 1 TABLET EVERY 6 HOURS AS NEEDED FOR NAUSEA    ROPINIROLE (REQUIP) 2 MG TABLET    Take 1.5 tablets (3 mg total) by mouth nightly.    SUBOXONE 8-2 MG FILM    1 Film by Subdermal route once daily. 1 strip every 3 to 4 days    SYNTHROID 100 MCG TABLET    Take 1 tablet (100 mcg total) by mouth once daily.   Discontinued Medications    METHYLPREDNISOLONE (MEDROL DOSEPACK) 4 MG TABLET    use as directed

## 2020-06-09 DIAGNOSIS — N39.41 URGE INCONTINENCE: ICD-10-CM

## 2020-06-09 NOTE — PROGRESS NOTES
Refill Routing Note    Medication(s) are not appropriate for processing by [facility]:       Outside of protocol           Medication reconciliation completed: No      Automatic Epic Protocol Generated Data:    Requested Prescriptions   Pending Prescriptions Disp Refills    oxybutynin (DITROPAN XL) 15 MG TR24 [Pharmacy Med Name: oxybutynin chloride ER 15 mg tablet,extended release 24 hr] 90 tablet 0     Sig: Take 1 tablet (15 mg total) by mouth once daily.       There is no refill protocol information for this order           Appointments  past 12m or future 3m with PCP    Date Provider   Last Visit   3/3/2020 Galindo Stiles MD   Next Visit   6/9/2020 Galindo Stiles MD   ED visits in past 90 days: 0     Note composed:6:06 PM 06/09/2020

## 2020-06-10 RX ORDER — OXYBUTYNIN CHLORIDE 15 MG/1
15 TABLET, EXTENDED RELEASE ORAL DAILY
Qty: 90 TABLET | Refills: 2 | Status: SHIPPED | OUTPATIENT
Start: 2020-06-10 | End: 2020-08-12 | Stop reason: SDUPTHER

## 2020-07-06 ENCOUNTER — PATIENT OUTREACH (OUTPATIENT)
Dept: ADMINISTRATIVE | Facility: OTHER | Age: 70
End: 2020-07-06

## 2020-07-06 NOTE — PROGRESS NOTES
Chart reviewed.   Immunizations: Triggered Imm Registry     Orders placed: n/a  Upcoming appts to satisfy MOUNIKA topics: n/a

## 2020-07-07 ENCOUNTER — OFFICE VISIT (OUTPATIENT)
Dept: PAIN MEDICINE | Facility: CLINIC | Age: 70
End: 2020-07-07
Payer: MEDICARE

## 2020-07-07 VITALS
OXYGEN SATURATION: 100 % | BODY MASS INDEX: 28.31 KG/M2 | HEIGHT: 61 IN | DIASTOLIC BLOOD PRESSURE: 75 MMHG | RESPIRATION RATE: 18 BRPM | HEART RATE: 58 BPM | SYSTOLIC BLOOD PRESSURE: 161 MMHG | WEIGHT: 149.94 LBS | TEMPERATURE: 98 F

## 2020-07-07 DIAGNOSIS — M79.18 MYOFASCIAL PAIN: Primary | ICD-10-CM

## 2020-07-07 DIAGNOSIS — M43.06 SPONDYLOLYSIS OF LUMBAR REGION: ICD-10-CM

## 2020-07-07 DIAGNOSIS — M47.816 LUMBAR SPONDYLOSIS: ICD-10-CM

## 2020-07-07 PROCEDURE — 20553 NJX 1/MLT TRIGGER POINTS 3/>: CPT | Mod: PBBFAC | Performed by: NURSE PRACTITIONER

## 2020-07-07 PROCEDURE — 99999 PR PBB SHADOW E&M-EST. PATIENT-LVL V: CPT | Mod: PBBFAC,,, | Performed by: NURSE PRACTITIONER

## 2020-07-07 PROCEDURE — 99213 OFFICE O/P EST LOW 20 MIN: CPT | Mod: 25,S$PBB,, | Performed by: NURSE PRACTITIONER

## 2020-07-07 PROCEDURE — 99999 PR PBB SHADOW E&M-EST. PATIENT-LVL V: ICD-10-PCS | Mod: PBBFAC,,, | Performed by: NURSE PRACTITIONER

## 2020-07-07 PROCEDURE — 99215 OFFICE O/P EST HI 40 MIN: CPT | Mod: PBBFAC,25 | Performed by: NURSE PRACTITIONER

## 2020-07-07 PROCEDURE — 20553 NJX 1/MLT TRIGGER POINTS 3/>: CPT | Mod: S$PBB,,, | Performed by: NURSE PRACTITIONER

## 2020-07-07 PROCEDURE — 20553 PR INJECT TRIGGER POINTS, > 3: ICD-10-PCS | Mod: S$PBB,,, | Performed by: NURSE PRACTITIONER

## 2020-07-07 PROCEDURE — 99213 PR OFFICE/OUTPT VISIT, EST, LEVL III, 20-29 MIN: ICD-10-PCS | Mod: 25,S$PBB,, | Performed by: NURSE PRACTITIONER

## 2020-07-07 RX ORDER — BUPIVACAINE HYDROCHLORIDE 2.5 MG/ML
9 INJECTION, SOLUTION EPIDURAL; INFILTRATION; INTRACAUDAL
Status: COMPLETED | OUTPATIENT
Start: 2020-07-07 | End: 2020-07-07

## 2020-07-07 RX ORDER — METHYLPREDNISOLONE ACETATE 40 MG/ML
40 INJECTION, SUSPENSION INTRA-ARTICULAR; INTRALESIONAL; INTRAMUSCULAR; SOFT TISSUE
Status: COMPLETED | OUTPATIENT
Start: 2020-07-07 | End: 2020-07-07

## 2020-07-07 RX ADMIN — METHYLPREDNISOLONE ACETATE 40 MG: 40 INJECTION, SUSPENSION INTRA-ARTICULAR; INTRALESIONAL; INTRAMUSCULAR; SOFT TISSUE at 03:07

## 2020-07-07 RX ADMIN — BUPIVACAINE HYDROCHLORIDE 22.5 MG: 2.5 INJECTION, SOLUTION EPIDURAL; INFILTRATION; INTRACAUDAL; PERINEURAL at 03:07

## 2020-07-07 NOTE — PROGRESS NOTES
Danielle  Chronic patient Established Note (Follow up visit)      SUBJECTIVE:    Interval History 7/7/2020:  The patient is here for follow up of back pain.  She has been doing well until the past month or so.  She is having more middle and lower back pain.  She is not having any radiation into the legs at this time.  She describes the pain as aching and stabbing.  She has been stretching and walking at home.  Her pain today is 4/10.    Interval History 1/20/2020:  The patient returns for follow up of middle and lower back pain.  She is now s/p bilateral L3-4 and L4-5 facet joint injections as well as T7-8 interspinous ligament injection on 12/16/19 with about 50% relief.  She is still having right sided lower back pain which starts at the spine and often radiates to her hip.  She denies associated numbness.  She says that the area is tender to touch and feels swollen sometimes.  She has tried ice and heat without benefit.  Her pain today is 2/10.    Interval History 12/13/2019:  The patient is here for follow up of back pain.  She is s/p Bilateral L2,3,4,5 MBB with steroids.  She is reporting limited benefit this time.  Previous provided her significant benefit.  She is having pain across the lower back, worse on the left side.  She denies radiation into the legs.  She is also having middle thoracic pain.  This does not radiate to the front of her body.  She is not having any numbness.  She would like to schedule another procedure.  Her pain today is 5/10.    Previous Encounter:  Osman Johansen presents to the clinic for a follow-up appointment for lower back pain. She reports increased low back pain over the last few weeks. She describes this pain as constant, sharp, and aching. She denies any radiating leg pain. She previously had 90% relief of her pain with bilateral L2,3,4,5 MBB and T7-8 interspinous ligament injection, last done in August 2018. She would like to repeat this procedure. She continues to  participate in a home exercise routine. She denies any other health changes. Her pain today is 5/10.    Pain Disability Index Review:  Last 3 PDI Scores 1/20/2020 12/13/2019 11/7/2019   Pain Disability Index (PDI) 37 29 17       Pain Medications:  None    Opioid Contract: no     report:  Reviewed and consistent with medication use as prescribed.    Pain Procedures:   7/18/13 Bilateral L5 TF JOCE  10/28/13 Bilateral SI joint injection  4/20/15 Bilateral SI joint injection  12/15/16 Bilateral L3-4 facet joint injections- 100% relief for 6 weeks  3/13/17 Bilateral L3-4 facet joint injections- 30% relief  4/17/17 Bilateral L2,3,4,5 MBB with steroids- significant benefit for 7 months  12/14/17 Bilateral L2,3,4,5 MBB with steroids and Interspinal ligament injection- 90% relief for 7 months  8/21/2018- Bilateral L2,3,4,5 MBB with steroids and T7-8 interspinous ligament injections   11/18/19 Bilateral L2,3,4,5 MBB with steroids  12/16/19 bilateral L3-4 and L4-5 facet joint injections as well as T7-8 interspinous ligament injection- 50% relief    Physical Therapy/Home Exercise: per self    Imaging:     Lumbar MRI 12/10/16    Narrative   MRI LUMBAR SPINE WITHOUT CONTRAST    COMPARISON: None    TECHNIQUE:  Sagittal T1, T2, and STIR;  axial T1 and T2 sequences through the lumbar spine were acquired without contrast.    FINDINGS: Vertebral body height and alignment are within normal limits.  The conus terminates at T12-L1.  Moderate loss of disc height is present at L4-5.  Marrow signal is mildly heterogeneous.  No focal osseous lesion.    L1-L2:  No disc herniation. No spinal canal or neuroforaminal narrowing.    L2-L3:  Mild diffuse disc bulging is present without spinal canal or neuroforaminal narrowing.    L3-L4:  Mild diffuse disc bulging and moderate bilateral facet arthropathy result in mild spinal canal narrowing.    L4-L5:  Mild diffuse disc bulging is present without spinal canal or neuroforaminal  narrowing.    L5-S1:  No disc herniation. No spinal canal or neuroforaminal narrowing.    Several small gallstones noted.   Impression     Degenerative lumbar spondylosis with mild L3-4 spinal canal narrowing and no significant neuroforaminal narrowing.  Cholelithiasis       Narrative     MRI of the thoracic spine without contrast    Technique: Multiplanar multisequence MR imaging of the thoracic spine was performed without contrast.    Findings: There is mild levoscoliosis of the lumbar spine.  This may be positional.  Vertebral bodies otherwise demonstrate normal height alignment.  The marrow signal of the vertebral bodies is within normal limits.  There is mild disc desiccation noted throughout the thoracic spine.  No significant disc space narrowing.  No significant disc protrusions are identified.  The central canal is widely patent.  No significant spondylosis.  The cord is normal in signal and caliber.      Impression         1.  Mild levoscoliosis of the thoracic spine otherwise essentially unremarkable MRI of the thoracic spine.         CMP  Sodium   Date Value Ref Range Status   02/24/2020 139 136 - 145 mmol/L Final     Potassium   Date Value Ref Range Status   02/24/2020 5.3 (H) 3.5 - 5.1 mmol/L Final     Comment:     *No Visible Hemolysis     Chloride   Date Value Ref Range Status   02/24/2020 105 95 - 110 mmol/L Final     CO2   Date Value Ref Range Status   02/24/2020 27 23 - 29 mmol/L Final     Glucose   Date Value Ref Range Status   02/24/2020 88 70 - 110 mg/dL Final     BUN, Bld   Date Value Ref Range Status   02/24/2020 16 8 - 23 mg/dL Final     Creatinine   Date Value Ref Range Status   02/24/2020 1.0 0.5 - 1.4 mg/dL Final     Calcium   Date Value Ref Range Status   02/24/2020 9.5 8.7 - 10.5 mg/dL Final     Total Protein   Date Value Ref Range Status   02/24/2020 7.0 6.0 - 8.4 g/dL Final     Albumin   Date Value Ref Range Status   02/24/2020 3.5 3.5 - 5.2 g/dL Final     Total Bilirubin   Date Value  Ref Range Status   02/24/2020 0.2 0.1 - 1.0 mg/dL Final     Comment:     For infants and newborns, interpretation of results should be based  on gestational age, weight and in agreement with clinical  observations.  Premature Infant recommended reference ranges:  Up to 24 hours.............<8.0 mg/dL  Up to 48 hours............<12.0 mg/dL  3-5 days..................<15.0 mg/dL  6-29 days.................<15.0 mg/dL       Alkaline Phosphatase   Date Value Ref Range Status   02/24/2020 60 55 - 135 U/L Final     AST   Date Value Ref Range Status   02/24/2020 25 10 - 40 U/L Final     ALT   Date Value Ref Range Status   02/24/2020 20 10 - 44 U/L Final     Anion Gap   Date Value Ref Range Status   02/24/2020 7 (L) 8 - 16 mmol/L Final     eGFR if    Date Value Ref Range Status   02/24/2020 >60.0 >60 mL/min/1.73 m^2 Final     eGFR if non    Date Value Ref Range Status   02/24/2020 56.8 (A) >60 mL/min/1.73 m^2 Final     Comment:     Calculation used to obtain the estimated glomerular filtration  rate (eGFR) is the CKD-EPI equation.        Lab Results   Component Value Date    WBC 7.47 02/24/2020    HGB 11.2 (L) 02/24/2020    HCT 37.4 02/24/2020    MCV 97 02/24/2020     02/24/2020         Allergies:   Review of patient's allergies indicates:   Allergen Reactions    Pcn [penicillins] Swelling    Toradol [ketorolac] Swelling    Vibramycin [doxycycline calcium] Swelling    Cymbalta [duloxetine]      dizzyness    Elavil [amitriptyline]     Lyrica [pregabalin] Swelling    Seroquel [quetiapine]      restless leg symdrome       Current Medications:   Current Outpatient Medications   Medication Sig Dispense Refill    amLODIPine (NORVASC) 5 MG tablet Take 1 tablet (5 mg total) by mouth every evening. 90 tablet 1    CALCIUM CARBONATE/VITAMIN D3 (CALCIUM 500 WITH D ORAL) Take 1 capsule by mouth once daily.      carboxymethylcellulose (REFRESH PLUS) 0.5 % Dpet 1 drop daily as needed.       dicyclomine (BENTYL) 10 MG capsule Take 10 mg by mouth 4 (four) times daily as needed.       esomeprazole (NEXIUM) 40 MG capsule Take 1 capsule (40 mg total) by mouth before breakfast. 90 capsule 1    estradiol (CLIMARA) 0.075 mg/24 hr APPLY TO CLEAN DRY SKIN ONCE PER WEEK 24 patch 3    ferrous sulfate (FEOSOL) 325 mg (65 mg iron) Tab tablet Take 1 tablet (325 mg total) by mouth daily with breakfast. 90 tablet 3    fluticasone propionate (FLONASE) 50 mcg/actuation nasal spray 1 spray (50 mcg total) by Each Nostril route once daily. 16 g 11    hydroCHLOROthiazide (HYDRODIURIL) 12.5 MG Tab Take 1 tablet (12.5 mg total) by mouth every other day. 45 tablet 1    hyoscyamine (LEVSIN/SL) 0.125 mg Subl Place 1 tablet (0.125 mg total) under the tongue every 4 (four) hours as needed. 45 tablet 3    MULTIVITAMIN (MULTIPLE VITAMIN ORAL) Take 1 capsule by mouth once daily.      nitroGLYCERIN (NITROSTAT) 0.4 MG SL tablet Place 1 tablet (0.4 mg total) under the tongue every 5 (five) minutes as needed for Chest pain. 25 tablet 2    oxybutynin (DITROPAN XL) 15 MG TR24 Take 1 tablet (15 mg total) by mouth once daily. 90 tablet 2    promethazine (PHENERGAN) 25 MG tablet TAKE 1 TABLET EVERY 6 HOURS AS NEEDED FOR NAUSEA 45 tablet 2    SUBOXONE 8-2 mg Film 1 Film by Subdermal route once daily. 1 strip every 3 to 4 days  0    SYNTHROID 100 mcg tablet Take 1 tablet (100 mcg total) by mouth once daily. 90 tablet 3    ipratropium (ATROVENT) 42 mcg (0.06 %) nasal spray 2 sprays by Nasal route 4 (four) times daily as needed for Rhinitis. (Patient not taking: Reported on 7/7/2020) 15 mL 11    lidocaine (LIDODERM) 5 % Place 1 patch onto the skin once daily. Remove & Discard patch within 12 hours or as directed by MD (Patient not taking: Reported on 7/7/2020) 30 patch 2    pravastatin (PRAVACHOL) 20 MG tablet Take 1 tablet (20 mg total) by mouth once daily. (Patient not taking: Reported on 7/7/2020) 90 tablet 1    ropinirole  (REQUIP) 2 MG tablet Take 1.5 tablets (3 mg total) by mouth nightly. (Patient not taking: Reported on 3/3/2020) 135 tablet 3     No current facility-administered medications for this visit.        REVIEW OF SYSTEMS:    GENERAL:  No weight loss, malaise or fevers.  HEENT:  Negative for frequent or significant headaches.  NECK:  Negative for lumps, goiter, pain and significant neck swelling.  RESPIRATORY:  Negative for cough, wheezing or shortness of breath.  CARDIOVASCULAR:  Negative for chest pain, leg swelling or palpitations.  GI:  Negative for abdominal discomfort, blood in stools or black stools or change in bowel habits. GERD.  MUSCULOSKELETAL:  See HPI.  SKIN:  Negative for lesions, rash, and itching.  PSYCH: H/O anxiety and opioid dependence.  HEMATOLOGY/LYMPHOLOGY:  Negative for prolonged bleeding, bruising easily or swollen nodes.  NEURO:   No history of headaches, syncope, paralysis, seizures or tremors.  All other reviewed and negative other than HPI.    Past Medical History:  Past Medical History:   Diagnosis Date    Allergy     Anxiety     Arthritis     Blood transfusion 8 yrs    CAD (coronary artery disease)     Cataract     Degenerative disc disease     Depression     Dry eye syndrome     GERD (gastroesophageal reflux disease)     Hypercholesterolemia 12/9/2019    Hypertension     Macular drusen     Neuromuscular disorder     Ocular hypertension, bilateral     Osteoporosis     PUD (peptic ulcer disease)     Thoracic or lumbosacral neuritis or radiculitis 10/19/2012    Thyroid disease        Past Surgical History:  Past Surgical History:   Procedure Laterality Date    APPENDECTOMY  1965    CARDIAC CATHETERIZATION      HYSTERECTOMY  8 yrs     INJECTION OF ANESTHETIC AGENT AROUND NERVE Bilateral 8/21/2018    Procedure: BLOCK, NERVE;  Surgeon: Talia Belcher MD;  [facility];  Service: Pain Management;  Laterality: Bilateral;  Bilateral block @ L2,3,4,5      INJECTION OF  ANESTHETIC AGENT AROUND NERVE Bilateral 2019    Procedure: BLOCK, NERVE, L2-L3-L4-L5 ME DIAL BRANCH;  Surgeon: Talia Belcher MD;  Location: [facility] PAIN [facility]GT;  Service: Pain Management;  Laterality: Bilateral;    INJECTION OF FACET JOINT Bilateral 2019    Procedure: INJECTION, FACET JOINT, L3-L4 AND L4-L5 AND T7-T8 LIGAMENT;  Surgeon: Talia Belcher MD;  Location: [facility] PAIN [facility]GT;  Service: Pain Management;  Laterality: Bilateral;    TONSILLECTOMY         Family History:  Family History   Problem Relation Age of Onset    Arthritis Mother     Cancer Mother     Heart disease Mother     Hypertension Mother     Cataracts Mother     Ovarian cancer Mother     Ulcers Father     Thyroid disease Brother     Heart disease Brother     Amblyopia Neg Hx     Blindness Neg Hx     Diabetes Neg Hx     Glaucoma Neg Hx     Macular degeneration Neg Hx     Retinal detachment Neg Hx     Strabismus Neg Hx     Stroke Neg Hx        Social History:  Social History     Socioeconomic History    Marital status:      Spouse name: Not on file    Number of children: Not on file    Years of education: Not on file    Highest education level: Not on file   Occupational History    Not on file   Social Needs    Financial resource strain: Not on file    Food insecurity     Worry: Not on file     Inability: Not on file    Transportation needs     Medical: Not on file     Non-medical: Not on file   Tobacco Use    Smoking status: Former Smoker     Quit date: 2008     Years since quittin.5    Smokeless tobacco: Never Used   Substance and Sexual Activity    Alcohol use: No     Alcohol/week: 0.0 standard drinks    Drug use: No    Sexual activity: Never   Lifestyle    Physical activity     Days per week: Not on file     Minutes per session: Not on file    Stress: Not on file   Relationships    Social connections     Talks on phone: Not on file     Gets together: Not on file     Attends Buddhism  "service: Not on file     Active member of club or organization: Not on file     Attends meetings of clubs or organizations: Not on file     Relationship status: Not on file   Other Topics Concern    Not on file   Social History Narrative    Not on file       OBJECTIVE:    BP (!) 161/75   Pulse (!) 58   Temp 98.2 °F (36.8 °C)   Resp 18   Ht 5' 1" (1.549 m)   Wt 68 kg (149 lb 14.6 oz)   SpO2 100%   BMI 28.33 kg/m²     PHYSICAL EXAMINATION:    General appearance: Well appearing, in no acute distress, alert and oriented x3.  Psych:  Mood and affect appropriate.  Skin: No rashes or edema.  Head/face:  Atraumatic, normocephalic. No palpable lymph nodes  Cor: RRR  Pulm: CTA  GI: Abdomen soft and non-distended.    Back: Straight leg raise in the sitting position is negative for radicular pain bilaterally. There is pain with palpation to lumbar paraspinals and iliolumbar ligament bilaterally. There is midline pain to thoracic spine around T7 with paraspinal tenderness to the right. Limited ROM with pain on flexion and extension. Positive facet loading bilaterally.  Extremities: Peripheral joint ROM is full and pain free without obvious instability or laxity in all four extremities. No deformities, edema, or skin discoloration. Good capillary refill.  Musculoskeletal: Bilateral upper and lower extremity strength is normal and symmetric.  No atrophy or tone abnormalities are noted.  Neuro: Bilateral upper and lower extremity coordination and muscle stretch reflexes are physiologic and symmetric.  Plantar response are downgoing.   Gait: Normal.    ASSESSMENT: 70 y.o. year old female with back pain, consistent with the following diagnoses:     1. Myofascial pain     2. Spondylolysis of lumbar region     3. Lumbar spondylosis           PLAN:     - Previous imaging was reviewed and discussed with the patient today.    - TPIs today as below.    - Schedule for bilateral L3-4 and L4-5 facet joint injections as well as T7-8 " interspinous ligament injection.  Previous were helpful.    - If limited benefit, consider updated lumbar MRI.    - The patient will continue a home exercise routine to help with pain and strengthening.      - RTC 2 weeks after procedure.    - Counseled patient regarding the importance of constant sleeping habits and physical therapy.        The above plan and management options were discussed at length with patient. Patient is in agreement with the above and verbalized understanding.    Meche Clinton  07/07/2020     Trigger Point Injection:   The procedure was discussed with the patient including complications of nerve damage,  bleeding, infection, and failure of pain relief.   Trigger points were identified by palpation and marked. Alcohol prep of sites done. A mixture of 4mL 0.25% bupivacaine +40mg Depo-Medrol was prepared (6 mL total).   A 27-gauge needle was advanced to the point of maximal tenderness, and medication was injected after negative aspiration. All sites done in the same manner. Patient tolerated the procedure well and without complications. Sites injected included: lumbar and thoracic paraspinals bilaterally (4 sites total).

## 2020-07-07 NOTE — PATIENT INSTRUCTIONS
Exercises to Strengthen Your Lower Back  Strong lower back and abdominal muscles work together to support your spine. The exercises below will help strengthen the lower back. It is important that you begin exercising slowly and increase levels gradually.  Always begin any exercise program with stretching. If you feel pain while doing any of these exercises, stop and talk to your doctor about a more specific exercise program that better suits your condition.   Low back stretch  The point of stretching is to make you more flexible and increase your range of motion. Stretch only as much as you are able. Stretch slowly. Do not push your stretch to the limit. If at any point you feel pain while stretching, this is your (temporary) limit.  · Lie on your back with your knees bent and both feet on the ground.  · Slowly raise your left knee to your chest as you flatten your lower back against the floor. Hold for 5 seconds.  · Relax and repeat the exercise with your right knee.  · Do 10 of these exercises for each leg.  · Repeat hugging both knees to your chest at the same time.  Building lower back strength  Start your exercise routine with 10 to 30 minutes a day, 1 to 3 times a day.  Initial exercises  Lying on your back:  1. Ankle pumps: Move your foot up and down, towards your head, and then away. Repeat 10 times with each foot.  2. Heel slides: Slowly bend your knee, drawing the heel of your foot towards you. Then slide your heel/foot from you, straightening your knee. Do not lift your foot off the floor (this is not a leg lift).  3. Abdominal contraction: Bend your knees and put your hands on your stomach. Tighten your stomach muscles. Hold for 5 seconds, then relax. Repeat 10 times.  4. Straight leg raise: Bend one leg at the knee and keep the other leg straight. Tighten your stomach muscles. Slowly lift your straight leg 6 to 12 inches off the floor and hold for up to 5 seconds. Repeat 10 times on each  side.  Standin. Wall squats: Stand with your back against the wall. Move your feet about 12 inches away from the wall. Tighten your stomach muscles, and slowly bend your knees until they are at about a 45 degree angle. Do not go down too far. Hold about 5 seconds. Then slowly return to your starting position. Repeat 10 times.  2. Heel raises: Stand facing the wall. Slowly raise the heels of your feet up and down, while keeping your toes on the floor. If you have trouble balancing, you can touch the wall with your hands. Repeat 10 times.  More advanced exercises  When you feel comfortable enough, try these exercises.  1. Kneeling lumbar extension: Begin on your hands and knees. At the same time, raise and straighten your right arm and left leg until they are parallel to the ground. Hold for 2 seconds and come back slowly to a starting position. Repeat with left arm and right leg, alternating 10 times.  2. Prone lumbar extension: Lie face down, arms extended overhead, palms on the floor. At the same time, raise your right arm and left leg as high as comfortably possible. Hold for 10 seconds and slowly return to start. Repeat with left arm and right leg, alternating 10 times. Gradually build up to 20 times. (Advanced: Repeat this exercise raising both arms and both legs a few inches off the floor at the same time. Hold for 5 seconds and release.)  3. Pelvic tilt: Lie on the floor on your back with your knees bent at 90 degrees. Your feet should be flat on the floor. Inhale, exhale, then slowly contract your abdominal muscles bringing your navel toward your spine. Let your pelvis rock back until your lower back is flat on the floor. Hold for 10 seconds while breathing smoothly.  4. Abdominal crunch: Perform a pelvic tilt (above) flattening your lower back against the floor. Holding the tension in your abdominal muscles, take another breath and raise your shoulder blades off the ground (this is not a full sit-up).  Keep your head in line with your body (dont bend your neck forward). Hold for 2 seconds, then slowly lower.  Date Last Reviewed: 6/1/2016  © 8630-6005 [employer]. [address] [place] [address], [place] [address]. All rights reserved. This information is not intended as a substitute for professional medical care. Always follow your healthcare professional's instructions.        Caring for Your Back Throughout the Day  Take care of your back throughout the day. You will likely have fewer back problems if you do. Try to warm up before you move. Shift positions often. Also do your best to form healthy habits.    Warm up for the day  Do a few slow, catlike stretches before starting your day. This simple warmup can soften your disks, stretch your back muscles, and help prevent injuries.  Shift positions often  At work and at home, change positions often. This helps keep your body from getting stiff. Stand up or lean back while you sit. If you can, get up and move every 1/2 hour.  Form healthy habits  Here are some suggestions:   · Keep a healthy weight. When you weigh too much, your back is under excess strain. But losing just a few extra pounds can help a lot.  · Try not to overeat. Learn about serving sizes. The size of a serving depends on the food and the food group. Many foods list serving sizes on the labels.  · Handle minor aches with cold and heat. Apply cold the first 24 to 48 hours. Use heat after that. Always place a thin cloth between your skin and the source of cold or heat.  · Take medicines as directed. This helps keep pain under control. Always read labels, and call your healthcare provider or pharmacist if you have any questions.  Walk each day  A daily walk keeps your back and thigh muscles stretched and strong. This gives your back better support. Be sure to walk with your spines three curves aligned, by keeping your head, hips, and toes connected by a vertical line.   Date Last Reviewed:  10/18/2015  © 8436-8864 [employer]. [address] [place] [address], [place] [address]. All rights reserved. This information is not intended as a substitute for professional medical care. Always follow your healthcare professional's instructions.

## 2020-07-21 ENCOUNTER — TELEPHONE (OUTPATIENT)
Dept: PAIN MEDICINE | Facility: CLINIC | Age: 70
End: 2020-07-21

## 2020-07-21 ENCOUNTER — PATIENT OUTREACH (OUTPATIENT)
Dept: ADMINISTRATIVE | Facility: OTHER | Age: 70
End: 2020-07-21

## 2020-07-21 NOTE — TELEPHONE ENCOUNTER
----- Message from Gwendolyn Saravia sent at 7/21/2020  2:15 PM CDT -----      Name of Who is Calling: DEN MEJIAS [[id number]]      What is the request in detail: Pt called and would like to move appt sooner than 8/5/20.Please contact to further discuss and advise.          Can the clinic reply by [facility]: N      [facility]: [phone]

## 2020-07-21 NOTE — TELEPHONE ENCOUNTER
Staff returned the patient call regarding getting scheduled for a sooner appointment.     Patient accepted an audio call tomorrow 7/22/20 with Meche Clinton Np and was informed that Meche has a 30 minute window to contact her from the appointment time.

## 2020-07-22 ENCOUNTER — OFFICE VISIT (OUTPATIENT)
Dept: PAIN MEDICINE | Facility: CLINIC | Age: 70
End: 2020-07-22
Payer: MEDICARE

## 2020-07-22 DIAGNOSIS — M47.816 LUMBAR SPONDYLOSIS: ICD-10-CM

## 2020-07-22 DIAGNOSIS — M25.552 PAIN OF BOTH HIP JOINTS: ICD-10-CM

## 2020-07-22 DIAGNOSIS — M54.9 DORSALGIA, UNSPECIFIED: ICD-10-CM

## 2020-07-22 DIAGNOSIS — M43.06 SPONDYLOLYSIS OF LUMBAR REGION: ICD-10-CM

## 2020-07-22 DIAGNOSIS — M25.551 PAIN OF BOTH HIP JOINTS: ICD-10-CM

## 2020-07-22 DIAGNOSIS — M54.17 LUMBOSACRAL RADICULOPATHY: Primary | ICD-10-CM

## 2020-07-22 PROCEDURE — 99442 PR PHYSICIAN TELEPHONE EVALUATION 11-20 MIN: ICD-10-PCS | Mod: 95,,, | Performed by: NURSE PRACTITIONER

## 2020-07-22 PROCEDURE — 99442 PR PHYSICIAN TELEPHONE EVALUATION 11-20 MIN: CPT | Mod: 95,,, | Performed by: NURSE PRACTITIONER

## 2020-07-22 NOTE — PROGRESS NOTES
Chronic Pain-Tele-Medicine-Established Note (Follow up visit)        The patient location is: Home  The chief complaint leading to consultation is: pain  Visit type: Audio only  Total time spent with patient: 13 min  Each patient to whom he or she provides medical services by telemedicine is:  (1) informed of the relationship between the physician and patient and the respective role of any other health care provider with respect to management of the patient; and (2) notified that he or she may decline to receive medical services by telemedicine and may withdraw from such care at any time.    The reason for the audio only service rather than synchronous audio and video virtual visit was related to technical difficulties or patient preference/necessity.    SUBJECTIVE:    Interval History 7/22/2020:  The patient has an audio visit today to discuss back pain.  She had TPIs at last OV which gave her some short term benefit.  She was scheduled for facet injections which she cancelled.  She would like to update her imaging prior to another procedure.  Her pain today is 6/10.    Interval History 7/7/2020:  The patient is here for follow up of back pain.  She has been doing well until the past month or so.  She is having more middle and lower back pain.  She is not having any radiation into the legs at this time.  She describes the pain as aching and stabbing.  She has been stretching and walking at home.  Her pain today is 4/10.    Interval History 1/20/2020:  The patient returns for follow up of middle and lower back pain.  She is now s/p bilateral L3-4 and L4-5 facet joint injections as well as T7-8 interspinous ligament injection on 12/16/19 with about 50% relief.  She is still having right sided lower back pain which starts at the spine and often radiates to her hip.  She denies associated numbness.  She says that the area is tender to touch and feels swollen sometimes.  She has tried ice and heat without benefit.  Her  pain today is 2/10.    Interval History 12/13/2019:  The patient is here for follow up of back pain.  She is s/p Bilateral L2,3,4,5 MBB with steroids.  She is reporting limited benefit this time.  Previous provided her significant benefit.  She is having pain across the lower back, worse on the left side.  She denies radiation into the legs.  She is also having middle thoracic pain.  This does not radiate to the front of her body.  She is not having any numbness.  She would like to schedule another procedure.  Her pain today is 5/10.    Previous Encounter:  Osman Johansen presents to the clinic for a follow-up appointment for lower back pain. She reports increased low back pain over the last few weeks. She describes this pain as constant, sharp, and aching. She denies any radiating leg pain. She previously had 90% relief of her pain with bilateral L2,3,4,5 MBB and T7-8 interspinous ligament injection, last done in August 2018. She would like to repeat this procedure. She continues to participate in a home exercise routine. She denies any other health changes. Her pain today is 5/10.    Pain Disability Index Review:  Last 3 PDI Scores 7/7/2020 1/20/2020 12/13/2019   Pain Disability Index (PDI) 48 37 29       Pain Medications:  None    Opioid Contract: no     report:  Reviewed and consistent with medication use as prescribed.    Pain Procedures:   7/18/13 Bilateral L5 TF JOCE  10/28/13 Bilateral SI joint injection  4/20/15 Bilateral SI joint injection  12/15/16 Bilateral L3-4 facet joint injections- 100% relief for 6 weeks  3/13/17 Bilateral L3-4 facet joint injections- 30% relief  4/17/17 Bilateral L2,3,4,5 MBB with steroids- significant benefit for 7 months  12/14/17 Bilateral L2,3,4,5 MBB with steroids and Interspinal ligament injection- 90% relief for 7 months  8/21/2018- Bilateral L2,3,4,5 MBB with steroids and T7-8 interspinous ligament injections   11/18/19 Bilateral L2,3,4,5 MBB with steroids  12/16/19  bilateral L3-4 and L4-5 facet joint injections as well as T7-8 interspinous ligament injection- 50% relief    Physical Therapy/Home Exercise: per self    Imaging:     Lumbar MRI 12/10/16    Narrative   MRI LUMBAR SPINE WITHOUT CONTRAST    COMPARISON: None    TECHNIQUE:  Sagittal T1, T2, and STIR;  axial T1 and T2 sequences through the lumbar spine were acquired without contrast.    FINDINGS: Vertebral body height and alignment are within normal limits.  The conus terminates at T12-L1.  Moderate loss of disc height is present at L4-5.  Marrow signal is mildly heterogeneous.  No focal osseous lesion.    L1-L2:  No disc herniation. No spinal canal or neuroforaminal narrowing.    L2-L3:  Mild diffuse disc bulging is present without spinal canal or neuroforaminal narrowing.    L3-L4:  Mild diffuse disc bulging and moderate bilateral facet arthropathy result in mild spinal canal narrowing.    L4-L5:  Mild diffuse disc bulging is present without spinal canal or neuroforaminal narrowing.    L5-S1:  No disc herniation. No spinal canal or neuroforaminal narrowing.    Several small gallstones noted.   Impression     Degenerative lumbar spondylosis with mild L3-4 spinal canal narrowing and no significant neuroforaminal narrowing.  Cholelithiasis       Narrative     MRI of the thoracic spine without contrast    Technique: Multiplanar multisequence MR imaging of the thoracic spine was performed without contrast.    Findings: There is mild levoscoliosis of the lumbar spine.  This may be positional.  Vertebral bodies otherwise demonstrate normal height alignment.  The marrow signal of the vertebral bodies is within normal limits.  There is mild disc desiccation noted throughout the thoracic spine.  No significant disc space narrowing.  No significant disc protrusions are identified.  The central canal is widely patent.  No significant spondylosis.  The cord is normal in signal and caliber.      Impression         1.  Mild  levoscoliosis of the thoracic spine otherwise essentially unremarkable MRI of the thoracic spine.         CMP  Sodium   Date Value Ref Range Status   02/24/2020 139 136 - 145 mmol/L Final     Potassium   Date Value Ref Range Status   02/24/2020 5.3 (H) 3.5 - 5.1 mmol/L Final     Comment:     *No Visible Hemolysis     Chloride   Date Value Ref Range Status   02/24/2020 105 95 - 110 mmol/L Final     CO2   Date Value Ref Range Status   02/24/2020 27 23 - 29 mmol/L Final     Glucose   Date Value Ref Range Status   02/24/2020 88 70 - 110 mg/dL Final     BUN, Bld   Date Value Ref Range Status   02/24/2020 16 8 - 23 mg/dL Final     Creatinine   Date Value Ref Range Status   02/24/2020 1.0 0.5 - 1.4 mg/dL Final     Calcium   Date Value Ref Range Status   02/24/2020 9.5 8.7 - 10.5 mg/dL Final     Total Protein   Date Value Ref Range Status   02/24/2020 7.0 6.0 - 8.4 g/dL Final     Albumin   Date Value Ref Range Status   02/24/2020 3.5 3.5 - 5.2 g/dL Final     Total Bilirubin   Date Value Ref Range Status   02/24/2020 0.2 0.1 - 1.0 mg/dL Final     Comment:     For infants and newborns, interpretation of results should be based  on gestational age, weight and in agreement with clinical  observations.  Premature Infant recommended reference ranges:  Up to 24 hours.............<8.0 mg/dL  Up to 48 hours............<12.0 mg/dL  3-5 days..................<15.0 mg/dL  6-29 days.................<15.0 mg/dL       Alkaline Phosphatase   Date Value Ref Range Status   02/24/2020 60 55 - 135 U/L Final     AST   Date Value Ref Range Status   02/24/2020 25 10 - 40 U/L Final     ALT   Date Value Ref Range Status   02/24/2020 20 10 - 44 U/L Final     Anion Gap   Date Value Ref Range Status   02/24/2020 7 (L) 8 - 16 mmol/L Final     eGFR if    Date Value Ref Range Status   02/24/2020 >60.0 >60 mL/min/1.73 m^2 Final     eGFR if non    Date Value Ref Range Status   02/24/2020 56.8 (A) >60 mL/min/1.73 m^2 Final      Comment:     Calculation used to obtain the estimated glomerular filtration  rate (eGFR) is the CKD-EPI equation.        Lab Results   Component Value Date    WBC 7.47 02/24/2020    HGB 11.2 (L) 02/24/2020    HCT 37.4 02/24/2020    MCV 97 02/24/2020     02/24/2020         Allergies:   Review of patient's allergies indicates:   Allergen Reactions    Pcn [penicillins] Swelling    Toradol [ketorolac] Swelling    Vibramycin [doxycycline calcium] Swelling    Cymbalta [duloxetine]      dizzyness    Elavil [amitriptyline]     Lyrica [pregabalin] Swelling    Seroquel [quetiapine]      restless leg symdrome       Current Medications:   Current Outpatient Medications   Medication Sig Dispense Refill    amLODIPine (NORVASC) 5 MG tablet Take 1 tablet (5 mg total) by mouth every evening. 90 tablet 1    CALCIUM CARBONATE/VITAMIN D3 (CALCIUM 500 WITH D ORAL) Take 1 capsule by mouth once daily.      carboxymethylcellulose (REFRESH PLUS) 0.5 % Dpet 1 drop daily as needed.      dicyclomine (BENTYL) 10 MG capsule Take 10 mg by mouth 4 (four) times daily as needed.       esomeprazole (NEXIUM) 40 MG capsule Take 1 capsule (40 mg total) by mouth before breakfast. 90 capsule 1    estradiol (CLIMARA) 0.075 mg/24 hr APPLY TO CLEAN DRY SKIN ONCE PER WEEK 24 patch 3    ferrous sulfate (FEOSOL) 325 mg (65 mg iron) Tab tablet Take 1 tablet (325 mg total) by mouth daily with breakfast. 90 tablet 3    fluticasone propionate (FLONASE) 50 mcg/actuation nasal spray 1 spray (50 mcg total) by Each Nostril route once daily. 16 g 11    hydroCHLOROthiazide (HYDRODIURIL) 12.5 MG Tab Take 1 tablet (12.5 mg total) by mouth every other day. 45 tablet 1    hyoscyamine (LEVSIN/SL) 0.125 mg Subl Place 1 tablet (0.125 mg total) under the tongue every 4 (four) hours as needed. 45 tablet 3    ipratropium (ATROVENT) 42 mcg (0.06 %) nasal spray 2 sprays by Nasal route 4 (four) times daily as needed for Rhinitis. (Patient not taking:  Reported on 7/7/2020) 15 mL 11    lidocaine (LIDODERM) 5 % Place 1 patch onto the skin once daily. Remove & Discard patch within 12 hours or as directed by MD (Patient not taking: Reported on 7/7/2020) 30 patch 2    MULTIVITAMIN (MULTIPLE VITAMIN ORAL) Take 1 capsule by mouth once daily.      nitroGLYCERIN (NITROSTAT) 0.4 MG SL tablet Place 1 tablet (0.4 mg total) under the tongue every 5 (five) minutes as needed for Chest pain. 25 tablet 2    oxybutynin (DITROPAN XL) 15 MG TR24 Take 1 tablet (15 mg total) by mouth once daily. 90 tablet 2    pravastatin (PRAVACHOL) 20 MG tablet Take 1 tablet (20 mg total) by mouth once daily. (Patient not taking: Reported on 7/7/2020) 90 tablet 1    promethazine (PHENERGAN) 25 MG tablet TAKE 1 TABLET EVERY 6 HOURS AS NEEDED FOR NAUSEA 45 tablet 2    ropinirole (REQUIP) 2 MG tablet Take 1.5 tablets (3 mg total) by mouth nightly. (Patient not taking: Reported on 3/3/2020) 135 tablet 3    SUBOXONE 8-2 mg Film 1 Film by Subdermal route once daily. 1 strip every 3 to 4 days  0    SYNTHROID 100 mcg tablet Take 1 tablet (100 mcg total) by mouth once daily. 90 tablet 3     No current facility-administered medications for this visit.        REVIEW OF SYSTEMS:    GENERAL:  No weight loss, malaise or fevers.  HEENT:  Negative for frequent or significant headaches.  NECK:  Negative for lumps, goiter, pain and significant neck swelling.  RESPIRATORY:  Negative for cough, wheezing or shortness of breath.  CARDIOVASCULAR:  Negative for chest pain, leg swelling or palpitations.  GI:  Negative for abdominal discomfort, blood in stools or black stools or change in bowel habits. GERD.  MUSCULOSKELETAL:  See HPI.  SKIN:  Negative for lesions, rash, and itching.  PSYCH: H/O anxiety and opioid dependence.  HEMATOLOGY/LYMPHOLOGY:  Negative for prolonged bleeding, bruising easily or swollen nodes.  NEURO:   No history of headaches, syncope, paralysis, seizures or tremors.  All other reviewed and  negative other than HPI.    Past Medical History:  Past Medical History:   Diagnosis Date    Allergy     Anxiety     Arthritis     Blood transfusion 8 yrs    CAD (coronary artery disease)     Cataract     Degenerative disc disease     Depression     Dry eye syndrome     GERD (gastroesophageal reflux disease)     Hypercholesterolemia 12/9/2019    Hypertension     Macular drusen     Neuromuscular disorder     Ocular hypertension, bilateral     Osteoporosis     PUD (peptic ulcer disease)     Thoracic or lumbosacral neuritis or radiculitis 10/19/2012    Thyroid disease        Past Surgical History:  Past Surgical History:   Procedure Laterality Date    APPENDECTOMY  1965    CARDIAC CATHETERIZATION      HYSTERECTOMY  8 yrs     INJECTION OF ANESTHETIC AGENT AROUND NERVE Bilateral 8/21/2018    Procedure: BLOCK, NERVE;  Surgeon: Talia Belcher MD;  Location: [facility];  Service: Pain Management;  Laterality: Bilateral;  Bilateral block @ L2,3,4,5      INJECTION OF ANESTHETIC AGENT AROUND NERVE Bilateral 11/18/2019    Procedure: BLOCK, NERVE, L2-L3-L4-L5 ME DIAL BRANCH;  Surgeon: Talia Belcher MD;  Location: [facility];  Service: Pain Management;  Laterality: Bilateral;    INJECTION OF FACET JOINT Bilateral 12/16/2019    Procedure: INJECTION, FACET JOINT, L3-L4 AND L4-L5 AND T7-T8 LIGAMENT;  Surgeon: Talia Belcher MD;  Location: [facility];  Service: Pain Management;  Laterality: Bilateral;    TONSILLECTOMY  1954       Family History:  Family History   Problem Relation Age of Onset    Arthritis Mother     Cancer Mother     Heart disease Mother     Hypertension Mother     Cataracts Mother     Ovarian cancer Mother     Ulcers Father     Thyroid disease Brother     Heart disease Brother     Amblyopia Neg Hx     Blindness Neg Hx     Diabetes Neg Hx     Glaucoma Neg Hx     Macular degeneration Neg Hx     Retinal detachment Neg Hx     Strabismus Neg Hx     Stroke Neg Hx         Social History:  Social History     Socioeconomic History    Marital status:      Spouse name: Not on file    Number of children: Not on file    Years of education: Not on file    Highest education level: Not on file   Occupational History    Not on file   Social Needs    Financial resource strain: Not on file    Food insecurity     Worry: Not on file     Inability: Not on file    Transportation needs     Medical: Not on file     Non-medical: Not on file   Tobacco Use    Smoking status: Former Smoker     Quit date: 2008     Years since quittin.5    Smokeless tobacco: Never Used   Substance and Sexual Activity    Alcohol use: No     Alcohol/week: 0.0 standard drinks    Drug use: No    Sexual activity: Never   Lifestyle    Physical activity     Days per week: Not on file     Minutes per session: Not on file    Stress: Not on file   Relationships    Social connections     Talks on phone: Not on file     Gets together: Not on file     Attends [employer] service: Not on file     Active member of club or organization: Not on file     Attends meetings of clubs or organizations: Not on file     Relationship status: Not on file   Other Topics Concern    Not on file   Social History Narrative    Not on file       OBJECTIVE:    PHYSICAL EXAMINATION not performed today.    Previous exam:  General appearance: Well appearing, in no acute distress, alert and oriented x3.  Psych:  Mood and affect appropriate.  Skin: No rashes or edema.  Head/face:  Atraumatic, normocephalic. No palpable lymph nodes  Cor: RRR  Pulm: CTA  GI: Abdomen soft and non-distended.    Back: Straight leg raise in the sitting position is negative for radicular pain bilaterally. There is pain with palpation to lumbar paraspinals and iliolumbar ligament bilaterally. There is midline pain to thoracic spine around T7 with paraspinal tenderness to the right. Limited ROM with pain on flexion and extension. Positive facet  loading bilaterally.  Extremities: Peripheral joint ROM is full and pain free without obvious instability or laxity in all four extremities. No deformities, edema, or skin discoloration. Good capillary refill.  Musculoskeletal: Bilateral upper and lower extremity strength is normal and symmetric.  No atrophy or tone abnormalities are noted.  Neuro: Bilateral upper and lower extremity coordination and muscle stretch reflexes are physiologic and symmetric.  Plantar response are downgoing.   Gait: Normal.    ASSESSMENT: 70 y.o. year old female with back pain, consistent with the following diagnoses:     1. Lumbosacral radiculopathy     2. Lumbar spondylosis     3. Spondylolysis of lumbar region     4. Pain of both hip joints  X-Ray Hips Bilateral 2 View Incl AP Pelvis   5. Dorsalgia, unspecified  MRI Lumbar Spine Without Contrast         PLAN:     - Previous imaging was reviewed and discussed with the patient today.    - She cancelled her bilateral L3-4 and L4-5 facet joint injections as well as T7-8 interspinous ligament injection.      - Will order lumbar MRI and hip XRAYs today.  Re-evaluate for procedures after.    - The patient will continue a home exercise routine to help with pain and strengthening.      - RTC PRN.    - Counseled patient regarding the importance of constant sleeping habits and physical therapy.        The above plan and management options were discussed at length with patient. Patient is in agreement with the above and verbalized understanding.    Meche Clinton  07/22/2020

## 2020-07-25 ENCOUNTER — HOSPITAL ENCOUNTER (OUTPATIENT)
Dept: RADIOLOGY | Facility: HOSPITAL | Age: 70
Discharge: HOME | End: 2020-07-25
Attending: NURSE PRACTITIONER
Payer: MEDICARE

## 2020-07-25 DIAGNOSIS — M25.551 PAIN OF BOTH HIP JOINTS: ICD-10-CM

## 2020-07-25 DIAGNOSIS — M25.552 PAIN OF BOTH HIP JOINTS: ICD-10-CM

## 2020-07-25 PROCEDURE — 73521 X-RAY EXAM HIPS BI 2 VIEWS: CPT | Mod: 26,,, | Performed by: RADIOLOGY

## 2020-07-25 PROCEDURE — 73521 XR HIPS BILATERAL 2 VIEW INCL AP PELVIS: ICD-10-PCS | Mod: 26,,, | Performed by: RADIOLOGY

## 2020-07-25 PROCEDURE — 73521 X-RAY EXAM HIPS BI 2 VIEWS: CPT | Mod: TC,FY,PO

## 2020-07-27 ENCOUNTER — TELEPHONE (OUTPATIENT)
Dept: PAIN MEDICINE | Facility: CLINIC | Age: 70
End: 2020-07-27

## 2020-07-27 NOTE — TELEPHONE ENCOUNTER
----- Message from Deon Duque, Patient Care Assistant sent at 7/27/2020  1:32 PM CDT -----  Name of Who is Calling: DEN MEJIAS [[id number]]    What is the request in detail: Requesting orders be call in to [facility] phone number is [phone]. Please contact to further discuss and advise      Can the clinic reply by [facility]: No    What Number to Call Back if not in [facility]:   [phone]

## 2020-07-28 DIAGNOSIS — M54.9 DORSALGIA, UNSPECIFIED: ICD-10-CM

## 2020-07-31 ENCOUNTER — TELEPHONE (OUTPATIENT)
Dept: PAIN MEDICINE | Facility: CLINIC | Age: 70
End: 2020-07-31

## 2020-07-31 NOTE — TELEPHONE ENCOUNTER
Staff contacted patient regarding message due to Mri being faxed patient was informed they were closed it will be faxed off Monday at earliest due to staff having to call to receive fax number patient verbalized understanding.

## 2020-08-03 ENCOUNTER — TELEPHONE (OUTPATIENT)
Dept: PAIN MEDICINE | Facility: CLINIC | Age: 70
End: 2020-08-03

## 2020-08-12 DIAGNOSIS — K29.70 GASTRITIS, PRESENCE OF BLEEDING UNSPECIFIED, UNSPECIFIED CHRONICITY, UNSPECIFIED GASTRITIS TYPE: ICD-10-CM

## 2020-08-12 DIAGNOSIS — E03.4 HYPOTHYROIDISM DUE TO ACQUIRED ATROPHY OF THYROID: ICD-10-CM

## 2020-08-12 DIAGNOSIS — N39.41 URGE INCONTINENCE: ICD-10-CM

## 2020-08-12 RX ORDER — LEVOTHYROXINE SODIUM 100 UG/1
100 TABLET ORAL DAILY
Qty: 90 TABLET | Refills: 1 | Status: SHIPPED | OUTPATIENT
Start: 2020-08-12 | End: 2021-01-07

## 2020-08-12 RX ORDER — ESOMEPRAZOLE MAGNESIUM 40 MG/1
40 CAPSULE, DELAYED RELEASE ORAL
Qty: 90 CAPSULE | Refills: 1 | Status: SHIPPED | OUTPATIENT
Start: 2020-08-12 | End: 2021-01-07

## 2020-08-12 NOTE — TELEPHONE ENCOUNTER
----- Message from Az Ruelas sent at 8/12/2020 12:59 PM CDT -----  Contact: Veda  Type:  RX Refill Request    Who Called:  Veda with [facility]  Refill or New Rx:  New  RX Name and Strength:  SUBOXONE 8-2 mg ,SYNTHROID 100 mcg tablet, Benazepril 20mg, lidocane patches, esomeprazole (NEXIUM) 40 MG capsule, oxybutynin (DITROPAN ER) 15 MG TR24  How is the patient currently taking it? (ex. 1XDay):    Is this a 30 day or 90 day RX:    Preferred Pharmacy with phone number:  [facility]   Local or Mail Order:  mail order  Ordering Provider:    Best Call Back Number:    Additional Information:

## 2020-08-12 NOTE — TELEPHONE ENCOUNTER
No new care gaps identified.  Powered by [employer]. Reference number: [id number]. 8/12/2020 1:16:18 PM CDT

## 2020-08-12 NOTE — PROGRESS NOTES
Refill Authorization Note    is requesting a refill authorization.    Brief assessment and rationale for refill: ROUTE; op ( ditropan) // APPROVE: prr  Name and strength of medication: synthroid/ esomeprazole        Medication Therapy Plan: CDMR. only medications actively prescribed by PCP are pended for refill for review; (ditropan/synthroid/esomperazole) Hx of GERD/ gastritis, Approve     Medication reconciliation completed: No                         Comments:      Orders Placed This Encounter    SYNTHROID 100 mcg tablet    esomeprazole (NEXIUM) 40 MG capsule      Requested Prescriptions   Pending Prescriptions Disp Refills    oxybutynin (DITROPAN XL) 15 MG TR24 90 tablet 1     Sig: Take 1 tablet (15 mg total) by mouth once daily.       Off-Protocol Failed - 8/12/2020  1:15 PM        Failed - Medication not assigned to a protocol, review manually.        Passed - Office visit in past 12 months or future 90 days.     Recent Outpatient Visits            3 weeks ago Lumbosacral radiculopathy    [facility] [address]    1 month ago Myofascial pain    [facility] [address]    2 months ago Acute recurrent sinusitis, unspecified location    [facility] Yadira Serna NP    5 months ago Chronic maxillary sinusitis    [facility] Galindo Stiles MD    6 months ago Bacterial sinusitis    [facility] Yadira Serna NP          Future Appointments              In 2 weeks LAB, [place] [facility] [place]    In 2 weeks URINE [facility] [place]    In 3 weeks Galindo Stiles MD [facility]                 Signed Prescriptions Disp Refills    SYNTHROID 100 mcg tablet 90 tablet 1     Sig: Take 1 tablet (100 mcg total) by mouth once daily.       Endocrinology:  Hypothyroid Agents Failed - 8/12/2020  1:15 PM         Failed - Manual Review: FT4 is not required if last TSH is WNL.        Passed - Patient is at least 18 years old        Passed - Office visit in past 12 months or future 90 days.     Recent Outpatient Visits            3 weeks ago Lumbosacral radiculopathy    [facility] [address]    1 month ago Myofascial pain    [facility] [address]    2 months ago Acute recurrent sinusitis, unspecified location    [facility] Yadira Serna NP    5 months ago Chronic maxillary sinusitis    [facility] Galindo Stiles MD    6 months ago Bacterial sinusitis    [facility]          Future Appointments              In 2 weeks LAB, [place] [facility] [place]    In 2 weeks URINE [facility] [place]    In 3 weeks [facility]                Passed - TSH in normal range and within 360 days     TSH   Date Value Ref Range Status   02/24/2020 1.102 0.400 - 4.000 uIU/mL Final   08/20/2019 0.949 0.400 - 4.000 uIU/mL Final   06/04/2019 <0.015 (L) 0.400 - 4.000 uIU/mL Final     Comment:     Warning:  Heterophilic antibodies in serum or plasma of   certain individuals are known to cause interference with   immunoassays. These antibodies may be present in blood samples   from individuals regularly exposed to animal or who have been   treated with animal products.   Patients taking very high Biotin doses of >300 mcg/day may   cause a negative bias in this assay.                Passed - T4 free within 1080 days     Free T4   Date Value Ref Range Status   05/02/2018 1.38 0.71 - 1.51 ng/dL Final   02/20/2018 1.24 0.71 - 1.51 ng/dL Final   03/03/2017 0.93 0.71 - 1.51 ng/dL Final                esomeprazole (NEXIUM) 40 MG capsule 90 capsule 1     Sig: Take 1 capsule (40 mg total) by mouth before breakfast.        Gastroenterology: Proton Pump Inhibitors Failed - 8/12/2020  1:15 PM        Failed - GERD is on problem list        Passed - Patient is at least 18 years old        Passed - Osteoporosis is not on problem list        Passed - Plavix is not on active medication list        Passed - Office visit in past 6 months or future 90 days.     Recent Outpatient Visits            3 weeks ago Lumbosacral radiculopathy    [facility] [address]    1 month ago Myofascial pain    [facility] [address]    2 months ago Acute recurrent sinusitis, unspecified location    [facility] Yadira Serna NP    5 months ago Chronic maxillary sinusitis    [facility] Galindo Stiles MD    6 months ago Bacterial sinusitis    [facility] Yadira Serna NP          Future Appointments              In 2 weeks LAB, [place] [facility] [place]    In 2 weeks URINE [facility] [place]    In 3 weeks Galindo Stiles MD [facility]                    Appointments  past 12m or future 3m with PCP    Date Provider   Last Visit   3/3/2020 Galindo Stiles MD   Next Visit   9/8/2020 Galindo Stiles MD   ED visits in past 90 days: 0     Note composed:1:53 PM 08/12/2020

## 2020-08-13 RX ORDER — OXYBUTYNIN CHLORIDE 15 MG/1
15 TABLET, EXTENDED RELEASE ORAL DAILY
Qty: 90 TABLET | Refills: 1 | Status: SHIPPED | OUTPATIENT
Start: 2020-08-13 | End: 2021-01-07

## 2020-08-18 ENCOUNTER — TELEPHONE (OUTPATIENT)
Dept: PAIN MEDICINE | Facility: CLINIC | Age: 70
End: 2020-08-18

## 2020-08-18 NOTE — TELEPHONE ENCOUNTER
Staff returned patient call regarding her concern regarding MRI disk she has from MRI she had done yesterday at [facility] in [place].      Staff informed the patient that her concerns would be sent to you for review.    Patient would liek to know if you would like her to send the disk to you or have it imported into her records please review and advise.

## 2020-08-18 NOTE — TELEPHONE ENCOUNTER
"----- Message from Loretta Huffman sent at 8/18/2020  3:05 PM CDT -----  Regarding: Patient Advice  Name of Who is Calling:   Sherie Johansen      What is the request in detail:   Patient called requesting a call as this pertains to the MRI she had on yesterday 08/17/2020. Patient states, "she was given the disc and would like to know what to do with it."      Please further advise    Reply by [facility]: no      Call Back:  [phone]                                        "

## 2020-08-25 ENCOUNTER — TELEPHONE (OUTPATIENT)
Dept: PAIN MEDICINE | Facility: CLINIC | Age: 70
End: 2020-08-25

## 2020-08-25 NOTE — TELEPHONE ENCOUNTER
Patient states she was informed by [facility] in [place] that the report was sent to the ordering provider ([facility] in early August).     Staff verbalized understanding and expressed thanks.

## 2020-08-25 NOTE — TELEPHONE ENCOUNTER
"Staff returned the patient's call regarding MRI Lum[facility]     [facility] images were imported into the system but the reports were not given to the patient.     Patient states," I will call [facility] to see if the require anything I can do incase [facility] will need the report.     Staff verbalized understanding     Please review and advise.   "

## 2020-08-25 NOTE — TELEPHONE ENCOUNTER
----- Message from Fabiana Sands sent at 8/25/2020  3:32 PM CDT -----  Type: Patient Call Back    Who called: pt     What is the request in detail: pt requesting to speak back to [facility] regarding previous encounter.     Can the clinic reply by [facility]? No     Would the patient rather a call back or a response via [facility]? Call back     Best call back number: [phone]    Additional Information:

## 2020-08-25 NOTE — TELEPHONE ENCOUNTER
----- Message from Zakia Hamilton sent at 8/25/2020  2:31 PM CDT -----  Contact: DEN MEJIAS [[id number]]  Name of Who is Calling: DEN MEJIAS [[id number]]    What is the request in detail: Patient would like to speak with staff regarding MRI. Please call      Can the clinic reply by [facility]: no      [facility] [phone] (home)

## 2020-08-26 ENCOUNTER — TELEPHONE (OUTPATIENT)
Dept: PAIN MEDICINE | Facility: CLINIC | Age: 70
End: 2020-08-26

## 2020-08-26 NOTE — TELEPHONE ENCOUNTER
----- Message from Sonya Sousa sent at 8/26/2020  1:28 PM CDT -----  Regarding: Call Back  Name of Who is Calling : DEN MEJIAS [[id number]]    DEN MEJIAS [[id number]] is requesting a call from staff in regards to getting a prescription for celebrex 200 mg patient would like to know if she can start taking medication for arthritis and her back pain  .....Please contact to further discuss and advise.    Can the clinic reply by [facility] : No     What Number to Call Back :  [phone]

## 2020-08-26 NOTE — TELEPHONE ENCOUNTER
----- Message from Sonya Sousa sent at 8/26/2020  1:28 PM CDT -----  Regarding: Call Back  Name of Who is Calling : DEN MEJIAS [[id number]]    DEN MEJIAS [[id number]] is requesting a call from staff in regards to getting a prescription for celebrex 200 mg patient would like to know if she can start taking medication for arthritis and her back pain  .....Please contact to further discuss and advise.    Can the clinic reply by [facility] : No     What Number to Call Back :  [phone]

## 2020-09-02 ENCOUNTER — LAB VISIT (OUTPATIENT)
Dept: LAB | Facility: HOSPITAL | Age: 70
End: 2020-09-02
Attending: INTERNAL MEDICINE
Payer: MEDICARE

## 2020-09-02 DIAGNOSIS — E11.9 CONTROLLED TYPE 2 DIABETES MELLITUS WITHOUT COMPLICATION, WITHOUT LONG-TERM CURRENT USE OF INSULIN: ICD-10-CM

## 2020-09-02 DIAGNOSIS — E03.4 HYPOTHYROIDISM DUE TO ACQUIRED ATROPHY OF THYROID: ICD-10-CM

## 2020-09-02 DIAGNOSIS — D50.9 IRON DEFICIENCY ANEMIA, UNSPECIFIED IRON DEFICIENCY ANEMIA TYPE: ICD-10-CM

## 2020-09-02 DIAGNOSIS — I10 ESSENTIAL HYPERTENSION: ICD-10-CM

## 2020-09-02 DIAGNOSIS — E78.5 DYSLIPIDEMIA: ICD-10-CM

## 2020-09-02 LAB
ALBUMIN SERPL BCP-MCNC: 4.1 G/DL (ref 3.5–5.2)
ALP SERPL-CCNC: 59 U/L (ref 55–135)
ALT SERPL W/O P-5'-P-CCNC: 14 U/L (ref 10–44)
ANION GAP SERPL CALC-SCNC: 8 MMOL/L (ref 8–16)
AST SERPL-CCNC: 21 U/L (ref 10–40)
BASOPHILS # BLD AUTO: 0.05 K/UL (ref 0–0.2)
BASOPHILS NFR BLD: 0.6 % (ref 0–1.9)
BILIRUB SERPL-MCNC: 0.4 MG/DL (ref 0.1–1)
BUN SERPL-MCNC: 19 MG/DL (ref 8–23)
CALCIUM SERPL-MCNC: 9.8 MG/DL (ref 8.7–10.5)
CHLORIDE SERPL-SCNC: 105 MMOL/L (ref 95–110)
CHOLEST SERPL-MCNC: 247 MG/DL (ref 120–199)
CHOLEST/HDLC SERPL: 2.5 {RATIO} (ref 2–5)
CO2 SERPL-SCNC: 26 MMOL/L (ref 23–29)
CREAT SERPL-MCNC: 1.2 MG/DL (ref 0.5–1.4)
DIFFERENTIAL METHOD: ABNORMAL
EOSINOPHIL # BLD AUTO: 0 K/UL (ref 0–0.5)
EOSINOPHIL NFR BLD: 0 % (ref 0–8)
ERYTHROCYTE [DISTWIDTH] IN BLOOD BY AUTOMATED COUNT: 13.6 % (ref 11.5–14.5)
EST. GFR  (AFRICAN AMERICAN): 52.5 ML/MIN/1.73 M^2
EST. GFR  (NON AFRICAN AMERICAN): 45.6 ML/MIN/1.73 M^2
FERRITIN SERPL-MCNC: 28 NG/ML (ref 20–300)
GLUCOSE SERPL-MCNC: 99 MG/DL (ref 70–110)
HCT VFR BLD AUTO: 36.2 % (ref 37–48.5)
HDLC SERPL-MCNC: 99 MG/DL (ref 40–75)
HDLC SERPL: 40.1 % (ref 20–50)
HGB BLD-MCNC: 11.1 G/DL (ref 12–16)
IMM GRANULOCYTES # BLD AUTO: 0.03 K/UL (ref 0–0.04)
IMM GRANULOCYTES NFR BLD AUTO: 0.4 % (ref 0–0.5)
IRON SERPL-MCNC: 67 UG/DL (ref 30–160)
LDLC SERPL CALC-MCNC: 131 MG/DL (ref 63–159)
LYMPHOCYTES # BLD AUTO: 1.5 K/UL (ref 1–4.8)
LYMPHOCYTES NFR BLD: 19.4 % (ref 18–48)
MCH RBC QN AUTO: 29 PG (ref 27–31)
MCHC RBC AUTO-ENTMCNC: 30.7 G/DL (ref 32–36)
MCV RBC AUTO: 95 FL (ref 82–98)
MONOCYTES # BLD AUTO: 0.4 K/UL (ref 0.3–1)
MONOCYTES NFR BLD: 5.5 % (ref 4–15)
NEUTROPHILS # BLD AUTO: 5.8 K/UL (ref 1.8–7.7)
NEUTROPHILS NFR BLD: 74.1 % (ref 38–73)
NONHDLC SERPL-MCNC: 148 MG/DL
NRBC BLD-RTO: 0 /100 WBC
PLATELET # BLD AUTO: 300 K/UL (ref 150–350)
PMV BLD AUTO: 11.5 FL (ref 9.2–12.9)
POTASSIUM SERPL-SCNC: 5.4 MMOL/L (ref 3.5–5.1)
PROT SERPL-MCNC: 7.6 G/DL (ref 6–8.4)
RBC # BLD AUTO: 3.83 M/UL (ref 4–5.4)
SATURATED IRON: 21 % (ref 20–50)
SODIUM SERPL-SCNC: 139 MMOL/L (ref 136–145)
TOTAL IRON BINDING CAPACITY: 326 UG/DL (ref 250–450)
TRANSFERRIN SERPL-MCNC: 220 MG/DL (ref 200–375)
TRIGL SERPL-MCNC: 85 MG/DL (ref 30–150)
TSH SERPL DL<=0.005 MIU/L-ACNC: 0.5 UIU/ML (ref 0.4–4)
WBC # BLD AUTO: 7.77 K/UL (ref 3.9–12.7)

## 2020-09-02 PROCEDURE — 82728 ASSAY OF FERRITIN: CPT

## 2020-09-02 PROCEDURE — 36415 COLL VENOUS BLD VENIPUNCTURE: CPT | Mod: PO

## 2020-09-02 PROCEDURE — 83036 HEMOGLOBIN GLYCOSYLATED A1C: CPT

## 2020-09-02 PROCEDURE — 83540 ASSAY OF IRON: CPT

## 2020-09-02 PROCEDURE — 84443 ASSAY THYROID STIM HORMONE: CPT

## 2020-09-02 PROCEDURE — 80061 LIPID PANEL: CPT

## 2020-09-02 PROCEDURE — 85025 COMPLETE CBC W/AUTO DIFF WBC: CPT

## 2020-09-02 PROCEDURE — 80053 COMPREHEN METABOLIC PANEL: CPT

## 2020-09-03 LAB
ESTIMATED AVG GLUCOSE: 114 MG/DL (ref 68–131)
HBA1C MFR BLD HPLC: 5.6 % (ref 4–5.6)

## 2020-09-08 ENCOUNTER — OFFICE VISIT (OUTPATIENT)
Dept: FAMILY MEDICINE | Facility: CLINIC | Age: 70
End: 2020-09-08
Payer: MEDICARE

## 2020-09-08 VITALS
HEIGHT: 61 IN | OXYGEN SATURATION: 98 % | DIASTOLIC BLOOD PRESSURE: 84 MMHG | HEART RATE: 94 BPM | WEIGHT: 149.94 LBS | BODY MASS INDEX: 28.31 KG/M2 | SYSTOLIC BLOOD PRESSURE: 138 MMHG

## 2020-09-08 DIAGNOSIS — I10 ESSENTIAL HYPERTENSION: ICD-10-CM

## 2020-09-08 DIAGNOSIS — E03.4 HYPOTHYROIDISM DUE TO ACQUIRED ATROPHY OF THYROID: ICD-10-CM

## 2020-09-08 DIAGNOSIS — Z78.0 POST-MENOPAUSAL: ICD-10-CM

## 2020-09-08 DIAGNOSIS — E78.5 DYSLIPIDEMIA: ICD-10-CM

## 2020-09-08 DIAGNOSIS — Z91.89 AT RISK FOR CORONARY ARTERY DISEASE: Primary | ICD-10-CM

## 2020-09-08 DIAGNOSIS — E11.9 CONTROLLED TYPE 2 DIABETES MELLITUS WITHOUT COMPLICATION, WITHOUT LONG-TERM CURRENT USE OF INSULIN: ICD-10-CM

## 2020-09-08 PROCEDURE — 99214 PR OFFICE/OUTPT VISIT, EST, LEVL IV, 30-39 MIN: ICD-10-PCS | Mod: S$PBB,,, | Performed by: INTERNAL MEDICINE

## 2020-09-08 PROCEDURE — 99214 OFFICE O/P EST MOD 30 MIN: CPT | Mod: PBBFAC,PO | Performed by: INTERNAL MEDICINE

## 2020-09-08 PROCEDURE — 99999 PR PBB SHADOW E&M-EST. PATIENT-LVL IV: ICD-10-PCS | Mod: PBBFAC,,, | Performed by: INTERNAL MEDICINE

## 2020-09-08 PROCEDURE — 99999 PR PBB SHADOW E&M-EST. PATIENT-LVL IV: CPT | Mod: PBBFAC,,, | Performed by: INTERNAL MEDICINE

## 2020-09-08 PROCEDURE — 99214 OFFICE O/P EST MOD 30 MIN: CPT | Mod: S$PBB,,, | Performed by: INTERNAL MEDICINE

## 2020-09-08 RX ORDER — PRAVASTATIN SODIUM 20 MG/1
20 TABLET ORAL EVERY OTHER DAY
Qty: 45 TABLET | Refills: 1 | Status: SHIPPED | OUTPATIENT
Start: 2020-09-08 | End: 2021-02-09

## 2020-09-08 NOTE — PROGRESS NOTES
Subjective:       Patient ID: Osman Johansen is a 70 y.o. female.    Chief Complaint: Hypertension    Takes tylenol, ASA, or BC powder  Singulair helped in the past.  Zyrtec has not helped so she is not taking it.  Flonase did not help but makes her feel dry.      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     CT doc emphysema - AVILA as above.  No longer smokes.    Iron deficiency anemia - iron level and Hb improved.  Trouble tolerating iron orally.  Advised getting cscp and EGD (hx bleeding ulcer in past).  Last cscp 2012 was ok.  + fatigue     HTN - controlled with home log averaging .  Does not take the HCTZ because became lightheaded with this.    Hypothyroid - controlled.112 mcg from local pharmacy and Brand name made her have palpitations, but 125 mcg does not from mail order.  Dye? Only use mail order -   DM - controlled; outside eye exam done 7/26/17  HLD - uncontrolled ldl but diagnoses based on Calcium score of 400; stress test normal; stopped taking for a while 2nd to body aches.  could not tolerate multiple statins - Crestor, Liptior.  Starting to have body aches on 20 mg of Pravastatin.     Recall - pain Dr wanted her to see neurology.  Gets cramps on Suboxone.  Trying to wean off and down to a piece of her daily pill but can't because develops RLS that prevents her from sleeping.  She had similar events with leg cramping when on fentanyl and hydrocodone that resolved when placed on Suboxone.  She is down to a piece of Suboxone daily = 1 pill lasts about 4-6 days.  She also has uncontrolled RLS that has been exacerbated as she has tried to wean off Suboxone.  This has been happening off and on for about 6 months now.     Recall previous visit:   Follow up s/p angina admit.  Had classic angina symptoms in office.  Sent to ER.  W/u / stress test normal.  F/u w cardiology.  Calcium score done = > 1,100 = cx CAD with likely significant blockage.  If pt has cp again, will  likely get angiogram.  No chest pain since.  Does have AVILA waking in store.    Review of Systems   Constitutional: Negative for appetite change and fever.   HENT: Negative for nosebleeds and trouble swallowing.    Eyes: Negative for discharge and visual disturbance.   Respiratory: Negative for choking and shortness of breath.    Cardiovascular: Negative for chest pain and palpitations.   Gastrointestinal: Negative for abdominal pain, nausea and vomiting.   Musculoskeletal: Positive for arthralgias. Negative for joint swelling.   Skin: Negative for rash and wound.   Neurological: Negative for dizziness and syncope.   Psychiatric/Behavioral: Negative for confusion and dysphoric mood.       Objective:      Vitals:    09/08/20 1506   BP: 138/84   Pulse: 94     Physical Exam      Assessment:       1. At risk for coronary artery disease        Plan:       At risk for coronary artery disease            Medication List with Changes/Refills   Current Medications    AMLODIPINE (NORVASC) 5 MG TABLET    Take 1 tablet (5 mg total) by mouth every evening.    CALCIUM CARBONATE/VITAMIN D3 (CALCIUM 500 WITH D ORAL)    Take 1 capsule by mouth once daily.    CARBOXYMETHYLCELLULOSE (REFRESH PLUS) 0.5 % DPET    1 drop daily as needed.    DICYCLOMINE (BENTYL) 10 MG CAPSULE    Take 10 mg by mouth 4 (four) times daily as needed.     ESOMEPRAZOLE (NEXIUM) 40 MG CAPSULE    Take 1 capsule (40 mg total) by mouth before breakfast.    ESTRADIOL (CLIMARA) 0.075 MG/24 HR    APPLY TO CLEAN DRY SKIN ONCE PER WEEK    FERROUS SULFATE (FEOSOL) 325 MG (65 MG IRON) TAB TABLET    Take 1 tablet (325 mg total) by mouth daily with breakfast.    FLUTICASONE PROPIONATE (FLONASE) 50 MCG/ACTUATION NASAL SPRAY    1 spray (50 mcg total) by Each Nostril route once daily.    HYDROCHLOROTHIAZIDE (HYDRODIURIL) 12.5 MG TAB    Take 1 tablet (12.5 mg total) by mouth every other day.    HYOSCYAMINE (LEVSIN/SL) 0.125 MG SUBL    Place 1 tablet (0.125 mg total) under the  tongue every 4 (four) hours as needed.    IPRATROPIUM (ATROVENT) 42 MCG (0.06 %) NASAL SPRAY    2 sprays by Nasal route 4 (four) times daily as needed for Rhinitis.    LIDOCAINE (LIDODERM) 5 %    Place 1 patch onto the skin once daily. Remove & Discard patch within 12 hours or as directed by MD    MULTIVITAMIN (MULTIPLE VITAMIN ORAL)    Take 1 capsule by mouth once daily.    NITROGLYCERIN (NITROSTAT) 0.4 MG SL TABLET    Place 1 tablet (0.4 mg total) under the tongue every 5 (five) minutes as needed for Chest pain.    OXYBUTYNIN (DITROPAN XL) 15 MG TR24    Take 1 tablet (15 mg total) by mouth once daily.    PRAVASTATIN (PRAVACHOL) 20 MG TABLET    Take 1 tablet (20 mg total) by mouth once daily.    PROMETHAZINE (PHENERGAN) 25 MG TABLET    TAKE 1 TABLET EVERY 6 HOURS AS NEEDED FOR NAUSEA    ROPINIROLE (REQUIP) 2 MG TABLET    Take 1.5 tablets (3 mg total) by mouth nightly.    SUBOXONE 8-2 MG FILM    1 Film by Subdermal route once daily. 1 strip every 3 to 4 days    SYNTHROID 100 MCG TABLET    Take 1 tablet (100 mcg total) by mouth once daily.       Continue current management and monitor.    Counseled on regular exercise, maintenance of a healthy weight, balanced diet rich in fruits/vegetables and lean protein, and avoidance of unhealthy habits like smoking and excessive alcohol intake.   Also, counseled on importance of being compliant with medication, health appointments, diet and exercise.     No follow-ups on file.

## 2020-09-08 NOTE — PATIENT INSTRUCTIONS
Take 200 mg to 600 mg of Co Q10 to prevent or treat muscle aches potentially related to your statin prescription.

## 2020-09-16 ENCOUNTER — HOSPITAL ENCOUNTER (OUTPATIENT)
Dept: RADIOLOGY | Facility: HOSPITAL | Age: 70
Discharge: HOME | End: 2020-09-16
Attending: INTERNAL MEDICINE
Payer: MEDICARE

## 2020-09-16 DIAGNOSIS — Z78.0 POST-MENOPAUSAL: ICD-10-CM

## 2020-09-16 PROCEDURE — 77080 DXA BONE DENSITY AXIAL: CPT | Mod: TC,PO

## 2020-09-16 PROCEDURE — 77080 DEXA BONE DENSITY SPINE HIP: ICD-10-PCS | Mod: 26,,, | Performed by: RADIOLOGY

## 2020-09-16 PROCEDURE — 77080 DXA BONE DENSITY AXIAL: CPT | Mod: 26,,, | Performed by: RADIOLOGY

## 2020-09-28 ENCOUNTER — PATIENT MESSAGE (OUTPATIENT)
Dept: RESEARCH | Facility: OTHER | Age: 70
End: 2020-09-28

## 2020-10-05 ENCOUNTER — PATIENT MESSAGE (OUTPATIENT)
Dept: ADMINISTRATIVE | Facility: HOSPITAL | Age: 70
End: 2020-10-05

## 2020-10-20 ENCOUNTER — TELEPHONE (OUTPATIENT)
Dept: PAIN MEDICINE | Facility: CLINIC | Age: 70
End: 2020-10-20

## 2020-10-20 NOTE — TELEPHONE ENCOUNTER
Staff contacted the patient regarding her MRI reports not being in her Medical record but the disc was in her medical records. Patient says the reports have to be entered into her medical records for her to schedule with neurologist.     Staff asked the patient if there is any way for her to refax the report to (our office fax [phone]) so that we may resend them to the HIM department for submission into her chart.    Patient did not answer therefore staff left the above information on the patient voice message.

## 2020-10-20 NOTE — TELEPHONE ENCOUNTER
----- Message from Kera Lima sent at 10/20/2020  3:42 PM CDT -----  Regarding: Patient call back  Who called:DEN MEJIAS [[id number]]    What is the request in detail: Patient is requesting a call back. She states she had a MRI done at the outpatient MRI, and have sent over the disc and report. She states the disc is in the records, however, the report has not been placed in her file. She states she needs that done so that she can see her neurologist. She states this took place around mid August.   Please advise.    Can the clinic reply by [facility]? No    Best call back number: [phone]    Additional Information: N/A

## 2020-10-21 ENCOUNTER — TELEPHONE (OUTPATIENT)
Dept: NEUROLOGY | Facility: CLINIC | Age: 70
End: 2020-10-21
Payer: MEDICARE

## 2020-10-21 NOTE — TELEPHONE ENCOUNTER
----- Message from Barbi Myles sent at 10/21/2020  4:33 PM CDT -----  Regarding: apt.  Contact: patient  Type: Needs Medical Advice  Who Called:  patient  Best Call Back Number: [phone]  Additional Information: patient would like to speak to nurse about scheduling 6 mos. Apt. She missed.  Please call to advise.  Thanks!

## 2020-11-05 ENCOUNTER — TELEPHONE (OUTPATIENT)
Dept: FAMILY MEDICINE | Facility: CLINIC | Age: 70
End: 2020-11-05

## 2020-11-05 NOTE — TELEPHONE ENCOUNTER
----- Message from Norah Astudillo, Patient Care Assistant sent at 11/5/2020 10:53 AM CST -----  Regarding: flu shot  Contact: patient  Type: Needs Medical Advice  Who Called:  patient  Best Call Back Number: [phone] (home)   Additional Information: patient states she would like a callback regarding scheduling her elder flu shot. Thanks!

## 2020-11-24 ENCOUNTER — IMMUNIZATION (OUTPATIENT)
Dept: FAMILY MEDICINE | Facility: CLINIC | Age: 70
End: 2020-11-24
Payer: MEDICARE

## 2020-11-24 PROCEDURE — G0008 ADMIN INFLUENZA VIRUS VAC: HCPCS | Mod: PBBFAC,PO

## 2020-11-24 PROCEDURE — 90694 VACC AIIV4 NO PRSRV 0.5ML IM: CPT | Mod: PBBFAC,PO

## 2020-12-10 ENCOUNTER — TELEPHONE (OUTPATIENT)
Dept: FAMILY MEDICINE | Facility: CLINIC | Age: 70
End: 2020-12-10

## 2020-12-10 DIAGNOSIS — M79.7 FIBROMYALGIA: Primary | ICD-10-CM

## 2020-12-10 DIAGNOSIS — R20.0 HAND NUMBNESS: ICD-10-CM

## 2020-12-10 NOTE — TELEPHONE ENCOUNTER
----- Message from Lawanda Kunz sent at 12/10/2020  1:30 PM CST -----  Contact: OSMAN MEJIAS [[id number]]  Type:  Patient Returning Call    Who Called: Osman Christopher Call Back Number: [phone]   Additional Information:  pt has not received or heard anything about referral for neurology with   Dr. Chandana Pearson

## 2020-12-10 NOTE — TELEPHONE ENCOUNTER
Pt called asking for a referral to be placed for neurology, for her fibromyalgia, carpal tunnel, and also she had a MRI done and to be seen about those results. She saw Dr. Hanson with neurology looks like in 2019. She is trying to get back in and see Dr. Chandana Pearson but their office stated since its been 3 years she was seen that she would need a new referral placed. Please advise, thank you. Referral pending.

## 2020-12-10 NOTE — PATIENT INSTRUCTIONS
Patient advised to use:  - intranasal steroid   - Over the counter 24h Allegra (no drowsiness); generic ok.   - Benadryl at night  Use Mucinex or quaifenesin daily to thin mucous congestion.  Patient advised to take Mucinex BID, use nasal saline spray     
Followed protocol

## 2020-12-15 ENCOUNTER — TELEPHONE (OUTPATIENT)
Dept: PAIN MEDICINE | Facility: CLINIC | Age: 70
End: 2020-12-15

## 2020-12-15 NOTE — TELEPHONE ENCOUNTER
----- Message from Angel Garcia sent at 12/15/2020  2:56 PM CST -----  Regarding: Appointment  Contact: DEN MEJIAS [[id number]]  Name of Who is Calling: DEN MEJIAS [[id number]]      What is the request in detail: would like to speak with staff in regards to scheduling an trigger point injection appointment. Please advise      Can the clinic reply by [facility]: no      [facility]: [phone]

## 2020-12-15 NOTE — TELEPHONE ENCOUNTER
Staff contacted patient regarding her message for scheduling trigger point injections.    Staff contacted patient and schedule her an office visit on 01/08/20 @2:40pm

## 2021-01-02 ENCOUNTER — PATIENT OUTREACH (OUTPATIENT)
Dept: ADMINISTRATIVE | Facility: OTHER | Age: 71
End: 2021-01-02

## 2021-01-02 DIAGNOSIS — E11.9 TYPE 2 DIABETES MELLITUS WITHOUT COMPLICATION, WITHOUT LONG-TERM CURRENT USE OF INSULIN: Primary | ICD-10-CM

## 2021-01-04 ENCOUNTER — PATIENT MESSAGE (OUTPATIENT)
Dept: ADMINISTRATIVE | Facility: HOSPITAL | Age: 71
End: 2021-01-04

## 2021-01-26 ENCOUNTER — TELEPHONE (OUTPATIENT)
Dept: CARDIOLOGY | Facility: CLINIC | Age: 71
End: 2021-01-26

## 2021-02-03 ENCOUNTER — PATIENT OUTREACH (OUTPATIENT)
Dept: ADMINISTRATIVE | Facility: OTHER | Age: 71
End: 2021-02-03

## 2021-02-03 NOTE — TELEPHONE ENCOUNTER
Dr. Pearson would like to make sure this pt is scheduled for her follow up in Aug. with Dr. Hanson as stated in Dr. Hanson's note. Please contact pt to schedule.    General Surgery Progress Note     DATE OF SERVICE: 2/3/2021    POD #:  1 Day Post-Op  Procedure(s):  LAPAROSCOPY, EXPLORATORY,  with partial gastrectomy    Subjective:     Interval History: doing well, some upper abdominal pain. No nausea. On CPAP        Objective:    Temp:  [97.4 °F (36.3 °C)-98.2 °F (36.8 °C)] 98.2 °F (36.8 °C)  Heart Rate:  [62-86] 63  Resp:  [11-24] 14  BP: ()/(55-87) 106/66  FiO2 (%):  [38.2 %-97 %] 50 %    I/O last 3 completed shifts:  In: 1940 [I.V.:1940]  Out: 860 [Urine:800; Drains:60]      General: alert and oriented, no acute distress   Pulmonary: non labored respirations  Abdomen: soft, non-distended, Tender - appropriate, no peritonitis  Incision: clean, dry, intact. No signs of cellulitis or abscess.   Extremities: calves soft, non-tender     Recent Labs   Lab 02/03/21  0616 02/02/21  1557 02/02/21  1149   WBC 10.9 9.0 6.9   RBC 4.39* 4.86 4.65   HGB 12.1* 13.4 13.1   HCT 39.2 42.8 42.3    223 211   SEG 94 75 66     Recent Labs   Lab 02/03/21  0616 02/02/21  1725 02/02/21  1557   SODIUM 141  --  141   POTASSIUM 4.7  --  4.1   CHLORIDE 106  --  105   CO2 31  --  35*   BUN 11  --  14   CREATININE 0.53* 0.70 0.60*   GLUCOSE 128*  --  114*   CALCIUM 10.0  --  10.0   ALBUMIN 3.2*  --  3.3*   AST 13  --  15   GPT 22  --  22   BILIRUBIN 0.9  --  0.8   ALKPT 112  --  116       All labs, imaging, and notes from last 24 hours have been reviewed and noted.     Assessment:   58 year old year old male with:    Active Problems:    * No active hospital problems. *  Resolved Problems:    * No resolved hospital problems. *      Additions to problem list:     Plan for 2/3/2021:   Start clear liquids today  Wean to 4L NC during day with CPAP at night - continue to monitor chronic respiratory failure  Out of bed  D/c botello  Prophylactic lovenox to start tonight    Franck Higgins MD  [phone]  02/03/21  8:17 AM

## 2021-02-04 ENCOUNTER — OFFICE VISIT (OUTPATIENT)
Dept: SURGERY | Facility: CLINIC | Age: 71
End: 2021-02-04
Payer: MEDICARE

## 2021-02-04 ENCOUNTER — PATIENT MESSAGE (OUTPATIENT)
Dept: SURGERY | Facility: CLINIC | Age: 71
End: 2021-02-04

## 2021-02-04 ENCOUNTER — LAB VISIT (OUTPATIENT)
Dept: LAB | Facility: HOSPITAL | Age: 71
End: 2021-02-04
Attending: INTERNAL MEDICINE
Payer: MEDICARE

## 2021-02-04 VITALS
WEIGHT: 147.25 LBS | HEART RATE: 56 BPM | TEMPERATURE: 99 F | BODY MASS INDEX: 27.8 KG/M2 | HEIGHT: 61 IN | SYSTOLIC BLOOD PRESSURE: 144 MMHG | DIASTOLIC BLOOD PRESSURE: 65 MMHG

## 2021-02-04 DIAGNOSIS — K80.50 BILIARY COLIC: Primary | ICD-10-CM

## 2021-02-04 DIAGNOSIS — I10 ESSENTIAL HYPERTENSION: ICD-10-CM

## 2021-02-04 DIAGNOSIS — Z01.818 PREOP EXAMINATION: ICD-10-CM

## 2021-02-04 LAB
BASOPHILS # BLD AUTO: 0.07 K/UL (ref 0–0.2)
BASOPHILS NFR BLD: 0.8 % (ref 0–1.9)
DIFFERENTIAL METHOD: ABNORMAL
EOSINOPHIL # BLD AUTO: 0 K/UL (ref 0–0.5)
EOSINOPHIL NFR BLD: 0.1 % (ref 0–8)
ERYTHROCYTE [DISTWIDTH] IN BLOOD BY AUTOMATED COUNT: 13.6 % (ref 11.5–14.5)
HCT VFR BLD AUTO: 36 % (ref 37–48.5)
HGB BLD-MCNC: 10.9 G/DL (ref 12–16)
IMM GRANULOCYTES # BLD AUTO: 0.02 K/UL (ref 0–0.04)
IMM GRANULOCYTES NFR BLD AUTO: 0.2 % (ref 0–0.5)
LYMPHOCYTES # BLD AUTO: 2.3 K/UL (ref 1–4.8)
LYMPHOCYTES NFR BLD: 28 % (ref 18–48)
MCH RBC QN AUTO: 28.5 PG (ref 27–31)
MCHC RBC AUTO-ENTMCNC: 30.3 G/DL (ref 32–36)
MCV RBC AUTO: 94 FL (ref 82–98)
MONOCYTES # BLD AUTO: 0.7 K/UL (ref 0.3–1)
MONOCYTES NFR BLD: 8.4 % (ref 4–15)
NEUTROPHILS # BLD AUTO: 5.2 K/UL (ref 1.8–7.7)
NEUTROPHILS NFR BLD: 62.5 % (ref 38–73)
NRBC BLD-RTO: 0 /100 WBC
PLATELET # BLD AUTO: 244 K/UL (ref 150–350)
PMV BLD AUTO: 12.3 FL (ref 9.2–12.9)
RBC # BLD AUTO: 3.82 M/UL (ref 4–5.4)
WBC # BLD AUTO: 8.37 K/UL (ref 3.9–12.7)

## 2021-02-04 PROCEDURE — 80053 COMPREHEN METABOLIC PANEL: CPT

## 2021-02-04 PROCEDURE — 99999 PR PBB SHADOW E&M-EST. PATIENT-LVL IV: CPT | Mod: PBBFAC,,, | Performed by: SURGERY

## 2021-02-04 PROCEDURE — 99214 OFFICE O/P EST MOD 30 MIN: CPT | Mod: PBBFAC,PO | Performed by: SURGERY

## 2021-02-04 PROCEDURE — 85025 COMPLETE CBC W/AUTO DIFF WBC: CPT

## 2021-02-04 PROCEDURE — 99999 PR PBB SHADOW E&M-EST. PATIENT-LVL IV: ICD-10-PCS | Mod: PBBFAC,,, | Performed by: SURGERY

## 2021-02-04 PROCEDURE — 36415 COLL VENOUS BLD VENIPUNCTURE: CPT | Mod: PO

## 2021-02-04 PROCEDURE — 99204 OFFICE O/P NEW MOD 45 MIN: CPT | Mod: S$PBB,,, | Performed by: SURGERY

## 2021-02-04 PROCEDURE — 99204 PR OFFICE/OUTPT VISIT, NEW, LEVL IV, 45-59 MIN: ICD-10-PCS | Mod: S$PBB,,, | Performed by: SURGERY

## 2021-02-04 RX ORDER — SODIUM CHLORIDE 9 MG/ML
INJECTION, SOLUTION INTRAVENOUS CONTINUOUS
Status: CANCELLED | OUTPATIENT
Start: 2021-02-04

## 2021-02-04 RX ORDER — METRONIDAZOLE 500 MG/100ML
500 INJECTION, SOLUTION INTRAVENOUS
Status: CANCELLED | OUTPATIENT
Start: 2021-02-04

## 2021-02-05 ENCOUNTER — TELEPHONE (OUTPATIENT)
Dept: CARDIOLOGY | Facility: CLINIC | Age: 71
End: 2021-02-05

## 2021-02-05 LAB
ALBUMIN SERPL BCP-MCNC: 4.1 G/DL (ref 3.5–5.2)
ALP SERPL-CCNC: 56 U/L (ref 55–135)
ALT SERPL W/O P-5'-P-CCNC: 11 U/L (ref 10–44)
ANION GAP SERPL CALC-SCNC: 8 MMOL/L (ref 8–16)
AST SERPL-CCNC: 19 U/L (ref 10–40)
BILIRUB SERPL-MCNC: 0.4 MG/DL (ref 0.1–1)
BUN SERPL-MCNC: 16 MG/DL (ref 8–23)
CALCIUM SERPL-MCNC: 9.5 MG/DL (ref 8.7–10.5)
CHLORIDE SERPL-SCNC: 102 MMOL/L (ref 95–110)
CO2 SERPL-SCNC: 29 MMOL/L (ref 23–29)
CREAT SERPL-MCNC: 1 MG/DL (ref 0.5–1.4)
EST. GFR  (AFRICAN AMERICAN): >60 ML/MIN/1.73 M^2
EST. GFR  (NON AFRICAN AMERICAN): 56 ML/MIN/1.73 M^2
GLUCOSE SERPL-MCNC: 91 MG/DL (ref 70–110)
POTASSIUM SERPL-SCNC: 5.1 MMOL/L (ref 3.5–5.1)
PROT SERPL-MCNC: 7.3 G/DL (ref 6–8.4)
SODIUM SERPL-SCNC: 139 MMOL/L (ref 136–145)

## 2021-02-07 ENCOUNTER — LAB VISIT (OUTPATIENT)
Dept: FAMILY MEDICINE | Facility: CLINIC | Age: 71
End: 2021-02-07
Payer: MEDICARE

## 2021-02-07 DIAGNOSIS — Z01.818 PREOP EXAMINATION: ICD-10-CM

## 2021-02-07 PROCEDURE — U0003 INFECTIOUS AGENT DETECTION BY NUCLEIC ACID (DNA OR RNA); SEVERE ACUTE RESPIRATORY SYNDROME CORONAVIRUS 2 (SARS-COV-2) (CORONAVIRUS DISEASE [COVID-19]), AMPLIFIED PROBE TECHNIQUE, MAKING USE OF HIGH THROUGHPUT TECHNOLOGIES AS DESCRIBED BY CMS-2020-01-R: HCPCS

## 2021-02-08 LAB — SARS-COV-2 RNA RESP QL NAA+PROBE: NOT DETECTED

## 2021-02-09 ENCOUNTER — ANESTHESIA EVENT (OUTPATIENT)
Dept: SURGERY | Facility: HOSPITAL | Age: 71
End: 2021-02-09
Payer: MEDICARE

## 2021-02-09 ENCOUNTER — OFFICE VISIT (OUTPATIENT)
Dept: CARDIOLOGY | Facility: CLINIC | Age: 71
End: 2021-02-09
Payer: MEDICARE

## 2021-02-09 VITALS
SYSTOLIC BLOOD PRESSURE: 203 MMHG | HEIGHT: 61 IN | DIASTOLIC BLOOD PRESSURE: 82 MMHG | HEART RATE: 70 BPM | WEIGHT: 146.63 LBS | BODY MASS INDEX: 27.68 KG/M2

## 2021-02-09 DIAGNOSIS — M79.7 FIBROMYALGIA: ICD-10-CM

## 2021-02-09 DIAGNOSIS — I10 ESSENTIAL (PRIMARY) HYPERTENSION: ICD-10-CM

## 2021-02-09 DIAGNOSIS — I51.89 DIASTOLIC DYSFUNCTION: ICD-10-CM

## 2021-02-09 DIAGNOSIS — E78.00 HYPERCHOLESTEROLEMIA: ICD-10-CM

## 2021-02-09 DIAGNOSIS — E78.5 HYPERLIPIDEMIA, UNSPECIFIED HYPERLIPIDEMIA TYPE: Primary | ICD-10-CM

## 2021-02-09 PROCEDURE — 99999 PR PBB SHADOW E&M-EST. PATIENT-LVL IV: ICD-10-PCS | Mod: PBBFAC,,, | Performed by: PHYSICIAN ASSISTANT

## 2021-02-09 PROCEDURE — 99999 PR PBB SHADOW E&M-EST. PATIENT-LVL IV: CPT | Mod: PBBFAC,,, | Performed by: PHYSICIAN ASSISTANT

## 2021-02-09 PROCEDURE — 99214 PR OFFICE/OUTPT VISIT, EST, LEVL IV, 30-39 MIN: ICD-10-PCS | Mod: S$PBB,,, | Performed by: PHYSICIAN ASSISTANT

## 2021-02-09 PROCEDURE — 99214 OFFICE O/P EST MOD 30 MIN: CPT | Mod: S$PBB,,, | Performed by: PHYSICIAN ASSISTANT

## 2021-02-09 PROCEDURE — 99214 OFFICE O/P EST MOD 30 MIN: CPT | Mod: PBBFAC,PO | Performed by: PHYSICIAN ASSISTANT

## 2021-02-09 RX ORDER — PITAVASTATIN CALCIUM 2.09 MG/1
2 TABLET, FILM COATED ORAL NIGHTLY
Qty: 30 TABLET | Refills: 11 | Status: SHIPPED | OUTPATIENT
Start: 2021-02-09 | End: 2021-05-21

## 2021-02-09 RX ORDER — BENAZEPRIL HYDROCHLORIDE 20 MG/1
20 TABLET ORAL DAILY
COMMUNITY
End: 2021-02-09 | Stop reason: SDUPTHER

## 2021-02-09 RX ORDER — BENAZEPRIL HYDROCHLORIDE 20 MG/1
20 TABLET ORAL DAILY
Qty: 90 TABLET | Refills: 3 | Status: SHIPPED | OUTPATIENT
Start: 2021-02-09 | End: 2022-01-04 | Stop reason: CLARIF

## 2021-02-10 ENCOUNTER — HOSPITAL ENCOUNTER (OUTPATIENT)
Facility: HOSPITAL | Age: 71
Discharge: HOME | End: 2021-02-10
Attending: SURGERY | Admitting: SURGERY
Payer: MEDICARE

## 2021-02-10 ENCOUNTER — ANESTHESIA (OUTPATIENT)
Dept: SURGERY | Facility: HOSPITAL | Age: 71
End: 2021-02-10
Payer: MEDICARE

## 2021-02-10 VITALS
RESPIRATION RATE: 18 BRPM | SYSTOLIC BLOOD PRESSURE: 160 MMHG | BODY MASS INDEX: 27.38 KG/M2 | WEIGHT: 145 LBS | HEART RATE: 64 BPM | OXYGEN SATURATION: 98 % | HEIGHT: 61 IN | DIASTOLIC BLOOD PRESSURE: 74 MMHG | TEMPERATURE: 98 F

## 2021-02-10 DIAGNOSIS — K80.50 BILIARY COLIC: ICD-10-CM

## 2021-02-10 PROCEDURE — 88304 TISSUE EXAM BY PATHOLOGIST: CPT | Mod: 26,,, | Performed by: PATHOLOGY

## 2021-02-10 PROCEDURE — 36000708 HC OR TIME LEV III 1ST 15 MIN: Mod: PO | Performed by: SURGERY

## 2021-02-10 PROCEDURE — 63600175 PHARM REV CODE 636 W HCPCS: Mod: PO | Performed by: ANESTHESIOLOGY

## 2021-02-10 PROCEDURE — S0030 INJECTION, METRONIDAZOLE: HCPCS | Mod: PO | Performed by: SURGERY

## 2021-02-10 PROCEDURE — 88304 TISSUE EXAM BY PATHOLOGIST: CPT | Performed by: PATHOLOGY

## 2021-02-10 PROCEDURE — 25000003 PHARM REV CODE 250: Mod: PO | Performed by: NURSE ANESTHETIST, CERTIFIED REGISTERED

## 2021-02-10 PROCEDURE — 36000709 HC OR TIME LEV III EA ADD 15 MIN: Mod: PO | Performed by: SURGERY

## 2021-02-10 PROCEDURE — 88304 PR  SURG PATH,LEVEL III: ICD-10-PCS | Mod: 26,,, | Performed by: PATHOLOGY

## 2021-02-10 PROCEDURE — 63600175 PHARM REV CODE 636 W HCPCS: Mod: PO | Performed by: NURSE ANESTHETIST, CERTIFIED REGISTERED

## 2021-02-10 PROCEDURE — 25000003 PHARM REV CODE 250: Mod: PO | Performed by: ANESTHESIOLOGY

## 2021-02-10 PROCEDURE — D9220A PRA ANESTHESIA: Mod: ANES,,, | Performed by: ANESTHESIOLOGY

## 2021-02-10 PROCEDURE — D9220A PRA ANESTHESIA: Mod: CRNA,,, | Performed by: NURSE ANESTHETIST, CERTIFIED REGISTERED

## 2021-02-10 PROCEDURE — 27201423 OPTIME MED/SURG SUP & DEVICES STERILE SUPPLY: Mod: PO | Performed by: SURGERY

## 2021-02-10 PROCEDURE — 37000008 HC ANESTHESIA 1ST 15 MINUTES: Mod: PO | Performed by: SURGERY

## 2021-02-10 PROCEDURE — 63600175 PHARM REV CODE 636 W HCPCS: Mod: PO | Performed by: SURGERY

## 2021-02-10 PROCEDURE — 25000003 PHARM REV CODE 250: Mod: PO | Performed by: SURGERY

## 2021-02-10 PROCEDURE — 71000033 HC RECOVERY, INTIAL HOUR: Mod: PO | Performed by: SURGERY

## 2021-02-10 PROCEDURE — 47562 LAPAROSCOPIC CHOLECYSTECTOMY: CPT | Mod: ,,, | Performed by: SURGERY

## 2021-02-10 PROCEDURE — 47562 PR LAP,CHOLECYSTECTOMY: ICD-10-PCS | Mod: ,,, | Performed by: SURGERY

## 2021-02-10 PROCEDURE — 37000009 HC ANESTHESIA EA ADD 15 MINS: Mod: PO | Performed by: SURGERY

## 2021-02-10 PROCEDURE — 71000015 HC POSTOP RECOV 1ST HR: Mod: PO | Performed by: SURGERY

## 2021-02-10 PROCEDURE — D9220A PRA ANESTHESIA: ICD-10-PCS | Mod: CRNA,,, | Performed by: NURSE ANESTHETIST, CERTIFIED REGISTERED

## 2021-02-10 PROCEDURE — D9220A PRA ANESTHESIA: ICD-10-PCS | Mod: ANES,,, | Performed by: ANESTHESIOLOGY

## 2021-02-10 PROCEDURE — 71000016 HC POSTOP RECOV ADDL HR: Mod: PO | Performed by: SURGERY

## 2021-02-10 RX ORDER — HYDROMORPHONE HYDROCHLORIDE 2 MG/ML
0.2 INJECTION, SOLUTION INTRAMUSCULAR; INTRAVENOUS; SUBCUTANEOUS EVERY 5 MIN PRN
Status: COMPLETED | OUTPATIENT
Start: 2021-02-10 | End: 2021-02-10

## 2021-02-10 RX ORDER — FENTANYL CITRATE 50 UG/ML
INJECTION, SOLUTION INTRAMUSCULAR; INTRAVENOUS
Status: DISCONTINUED | OUTPATIENT
Start: 2021-02-10 | End: 2021-02-10

## 2021-02-10 RX ORDER — SODIUM CHLORIDE 0.9 % (FLUSH) 0.9 %
3 SYRINGE (ML) INJECTION
Status: DISCONTINUED | OUTPATIENT
Start: 2021-02-10 | End: 2021-02-10 | Stop reason: HOSPADM

## 2021-02-10 RX ORDER — MEPERIDINE HYDROCHLORIDE 50 MG/ML
12.5 INJECTION INTRAMUSCULAR; INTRAVENOUS; SUBCUTANEOUS ONCE AS NEEDED
Status: DISCONTINUED | OUTPATIENT
Start: 2021-02-10 | End: 2021-02-10 | Stop reason: HOSPADM

## 2021-02-10 RX ORDER — KETAMINE HCL IN 0.9 % NACL 50 MG/5 ML
SYRINGE (ML) INTRAVENOUS
Status: DISCONTINUED | OUTPATIENT
Start: 2021-02-10 | End: 2021-02-10

## 2021-02-10 RX ORDER — SODIUM CHLORIDE 9 MG/ML
INJECTION, SOLUTION INTRAVENOUS CONTINUOUS
Status: DISCONTINUED | OUTPATIENT
Start: 2021-02-10 | End: 2021-02-10 | Stop reason: HOSPADM

## 2021-02-10 RX ORDER — FENTANYL CITRATE 50 UG/ML
25 INJECTION, SOLUTION INTRAMUSCULAR; INTRAVENOUS EVERY 5 MIN PRN
Status: COMPLETED | OUTPATIENT
Start: 2021-02-10 | End: 2021-02-10

## 2021-02-10 RX ORDER — SODIUM CHLORIDE, SODIUM LACTATE, POTASSIUM CHLORIDE, CALCIUM CHLORIDE 600; 310; 30; 20 MG/100ML; MG/100ML; MG/100ML; MG/100ML
INJECTION, SOLUTION INTRAVENOUS CONTINUOUS
Status: DISPENSED | OUTPATIENT
Start: 2021-02-10

## 2021-02-10 RX ORDER — PROPOFOL 10 MG/ML
VIAL (ML) INTRAVENOUS
Status: DISCONTINUED | OUTPATIENT
Start: 2021-02-10 | End: 2021-02-10

## 2021-02-10 RX ORDER — MIDAZOLAM HYDROCHLORIDE 1 MG/ML
INJECTION INTRAMUSCULAR; INTRAVENOUS
Status: DISCONTINUED | OUTPATIENT
Start: 2021-02-10 | End: 2021-02-10

## 2021-02-10 RX ORDER — LIDOCAINE HCL/PF 100 MG/5ML
SYRINGE (ML) INTRAVENOUS
Status: DISCONTINUED | OUTPATIENT
Start: 2021-02-10 | End: 2021-02-10

## 2021-02-10 RX ORDER — DIPHENHYDRAMINE HYDROCHLORIDE 50 MG/ML
25 INJECTION INTRAMUSCULAR; INTRAVENOUS EVERY 6 HOURS PRN
Status: DISCONTINUED | OUTPATIENT
Start: 2021-02-10 | End: 2021-02-10 | Stop reason: HOSPADM

## 2021-02-10 RX ORDER — LABETALOL HYDROCHLORIDE 5 MG/ML
INJECTION, SOLUTION INTRAVENOUS
Status: DISCONTINUED | OUTPATIENT
Start: 2021-02-10 | End: 2021-02-10

## 2021-02-10 RX ORDER — HYDROCODONE BITARTRATE AND ACETAMINOPHEN 5; 325 MG/1; MG/1
1 TABLET ORAL EVERY 6 HOURS PRN
Qty: 20 TABLET | Refills: 0 | Status: SHIPPED | OUTPATIENT
Start: 2021-02-10 | End: 2021-05-21

## 2021-02-10 RX ORDER — UBIDECARENONE 30 MG
30 CAPSULE ORAL 3 TIMES DAILY
COMMUNITY

## 2021-02-10 RX ORDER — ONDANSETRON 2 MG/ML
INJECTION INTRAMUSCULAR; INTRAVENOUS
Status: DISCONTINUED | OUTPATIENT
Start: 2021-02-10 | End: 2021-02-10

## 2021-02-10 RX ORDER — OXYCODONE HYDROCHLORIDE 5 MG/1
5 TABLET ORAL
Status: DISCONTINUED | OUTPATIENT
Start: 2021-02-10 | End: 2021-02-10 | Stop reason: HOSPADM

## 2021-02-10 RX ORDER — METRONIDAZOLE 500 MG/100ML
500 INJECTION, SOLUTION INTRAVENOUS
Status: COMPLETED | OUTPATIENT
Start: 2021-02-10 | End: 2021-02-10

## 2021-02-10 RX ORDER — ROCURONIUM BROMIDE 10 MG/ML
INJECTION, SOLUTION INTRAVENOUS
Status: DISCONTINUED | OUTPATIENT
Start: 2021-02-10 | End: 2021-02-10

## 2021-02-10 RX ORDER — ONDANSETRON 2 MG/ML
4 INJECTION INTRAMUSCULAR; INTRAVENOUS EVERY 12 HOURS PRN
Status: DISCONTINUED | OUTPATIENT
Start: 2021-02-10 | End: 2021-02-10 | Stop reason: HOSPADM

## 2021-02-10 RX ORDER — HYDROCODONE BITARTRATE AND ACETAMINOPHEN 5; 325 MG/1; MG/1
1 TABLET ORAL EVERY 4 HOURS PRN
Status: DISCONTINUED | OUTPATIENT
Start: 2021-02-10 | End: 2021-02-10 | Stop reason: HOSPADM

## 2021-02-10 RX ORDER — CIPROFLOXACIN 2 MG/ML
400 INJECTION, SOLUTION INTRAVENOUS
Status: COMPLETED | OUTPATIENT
Start: 2021-02-10 | End: 2021-02-10

## 2021-02-10 RX ORDER — LIDOCAINE HYDROCHLORIDE 10 MG/ML
1 INJECTION, SOLUTION EPIDURAL; INFILTRATION; INTRACAUDAL; PERINEURAL ONCE
Status: ACTIVE | OUTPATIENT
Start: 2021-02-10

## 2021-02-10 RX ADMIN — FENTANYL CITRATE 50 MCG: 50 INJECTION, SOLUTION INTRAMUSCULAR; INTRAVENOUS at 10:02

## 2021-02-10 RX ADMIN — CIPROFLOXACIN 400 MG: 2 INJECTION, SOLUTION INTRAVENOUS at 10:02

## 2021-02-10 RX ADMIN — OXYCODONE HYDROCHLORIDE 5 MG: 5 TABLET ORAL at 11:02

## 2021-02-10 RX ADMIN — ONDANSETRON 4 MG: 2 INJECTION, SOLUTION INTRAMUSCULAR; INTRAVENOUS at 10:02

## 2021-02-10 RX ADMIN — HYDROMORPHONE HYDROCHLORIDE 0.2 MG: 2 INJECTION INTRAMUSCULAR; INTRAVENOUS; SUBCUTANEOUS at 12:02

## 2021-02-10 RX ADMIN — SODIUM CHLORIDE, SODIUM LACTATE, POTASSIUM CHLORIDE, AND CALCIUM CHLORIDE: .6; .31; .03; .02 INJECTION, SOLUTION INTRAVENOUS at 10:02

## 2021-02-10 RX ADMIN — FENTANYL CITRATE 25 MCG: 50 INJECTION INTRAMUSCULAR; INTRAVENOUS at 11:02

## 2021-02-10 RX ADMIN — Medication 10 MG: at 10:02

## 2021-02-10 RX ADMIN — HYDROMORPHONE HYDROCHLORIDE 0.2 MG: 2 INJECTION INTRAMUSCULAR; INTRAVENOUS; SUBCUTANEOUS at 11:02

## 2021-02-10 RX ADMIN — HYDROMORPHONE HYDROCHLORIDE 1 MG: 2 INJECTION INTRAMUSCULAR; INTRAVENOUS; SUBCUTANEOUS at 11:02

## 2021-02-10 RX ADMIN — ROCURONIUM BROMIDE 30 MG: 10 INJECTION, SOLUTION INTRAVENOUS at 10:02

## 2021-02-10 RX ADMIN — PROPOFOL 50 MG: 10 INJECTION, EMULSION INTRAVENOUS at 10:02

## 2021-02-10 RX ADMIN — LABETALOL HYDROCHLORIDE 10 MG: 5 INJECTION, SOLUTION INTRAVENOUS at 10:02

## 2021-02-10 RX ADMIN — LIDOCAINE HYDROCHLORIDE 50 MG: 20 INJECTION PARENTERAL at 10:02

## 2021-02-10 RX ADMIN — MIDAZOLAM HYDROCHLORIDE 2 MG: 1 INJECTION, SOLUTION INTRAMUSCULAR; INTRAVENOUS at 10:02

## 2021-02-10 RX ADMIN — PROPOFOL 100 MG: 10 INJECTION, EMULSION INTRAVENOUS at 10:02

## 2021-02-10 RX ADMIN — PROMETHAZINE HYDROCHLORIDE 6.25 MG: 25 INJECTION INTRAMUSCULAR; INTRAVENOUS at 12:02

## 2021-02-10 RX ADMIN — METRONIDAZOLE 500 MG: 500 INJECTION, SOLUTION INTRAVENOUS at 10:02

## 2021-02-18 LAB
FINAL PATHOLOGIC DIAGNOSIS: NORMAL
Lab: NORMAL

## 2021-02-23 ENCOUNTER — PATIENT OUTREACH (OUTPATIENT)
Dept: ADMINISTRATIVE | Facility: HOSPITAL | Age: 71
End: 2021-02-23

## 2021-03-03 ENCOUNTER — PATIENT MESSAGE (OUTPATIENT)
Dept: FAMILY MEDICINE | Facility: CLINIC | Age: 71
End: 2021-03-03

## 2021-03-03 ENCOUNTER — IMMUNIZATION (OUTPATIENT)
Dept: PRIMARY CARE CLINIC | Facility: CLINIC | Age: 71
End: 2021-03-03
Payer: MEDICARE

## 2021-03-03 DIAGNOSIS — Z23 NEED FOR VACCINATION: Primary | ICD-10-CM

## 2021-03-03 PROCEDURE — 91300 COVID-19, MRNA, LNP-S, PF, 30 MCG/0.3 ML DOSE VACCINE: CPT | Mod: S$GLB,,, | Performed by: FAMILY MEDICINE

## 2021-03-03 PROCEDURE — 91300 COVID-19, MRNA, LNP-S, PF, 30 MCG/0.3 ML DOSE VACCINE: ICD-10-PCS | Mod: S$GLB,,, | Performed by: FAMILY MEDICINE

## 2021-03-03 PROCEDURE — 0001A COVID-19, MRNA, LNP-S, PF, 30 MCG/0.3 ML DOSE VACCINE: ICD-10-PCS | Mod: CV19,S$GLB,, | Performed by: FAMILY MEDICINE

## 2021-03-03 PROCEDURE — 0001A COVID-19, MRNA, LNP-S, PF, 30 MCG/0.3 ML DOSE VACCINE: CPT | Mod: CV19,S$GLB,, | Performed by: FAMILY MEDICINE

## 2021-03-08 ENCOUNTER — TELEPHONE (OUTPATIENT)
Dept: NEUROLOGY | Facility: CLINIC | Age: 71
End: 2021-03-08

## 2021-03-09 ENCOUNTER — OFFICE VISIT (OUTPATIENT)
Dept: FAMILY MEDICINE | Facility: CLINIC | Age: 71
End: 2021-03-09
Payer: MEDICARE

## 2021-03-09 ENCOUNTER — LAB VISIT (OUTPATIENT)
Dept: LAB | Facility: HOSPITAL | Age: 71
End: 2021-03-09
Attending: INTERNAL MEDICINE
Payer: MEDICARE

## 2021-03-09 VITALS
HEIGHT: 61 IN | OXYGEN SATURATION: 96 % | DIASTOLIC BLOOD PRESSURE: 60 MMHG | BODY MASS INDEX: 27.97 KG/M2 | SYSTOLIC BLOOD PRESSURE: 130 MMHG | WEIGHT: 148.13 LBS | HEART RATE: 94 BPM

## 2021-03-09 DIAGNOSIS — G25.81 RLS (RESTLESS LEGS SYNDROME): ICD-10-CM

## 2021-03-09 DIAGNOSIS — E03.4 HYPOTHYROIDISM DUE TO ACQUIRED ATROPHY OF THYROID: ICD-10-CM

## 2021-03-09 DIAGNOSIS — E78.5 DYSLIPIDEMIA: ICD-10-CM

## 2021-03-09 DIAGNOSIS — R51.9 CHRONIC INTRACTABLE HEADACHE, UNSPECIFIED HEADACHE TYPE: ICD-10-CM

## 2021-03-09 DIAGNOSIS — D50.8 OTHER IRON DEFICIENCY ANEMIA: ICD-10-CM

## 2021-03-09 DIAGNOSIS — R10.11 RIGHT UPPER QUADRANT ABDOMINAL PAIN: ICD-10-CM

## 2021-03-09 DIAGNOSIS — Z91.89 AT RISK FOR CORONARY ARTERY DISEASE: ICD-10-CM

## 2021-03-09 DIAGNOSIS — I10 ESSENTIAL HYPERTENSION: Primary | ICD-10-CM

## 2021-03-09 DIAGNOSIS — G89.29 CHRONIC INTRACTABLE HEADACHE, UNSPECIFIED HEADACHE TYPE: ICD-10-CM

## 2021-03-09 DIAGNOSIS — E11.9 CONTROLLED TYPE 2 DIABETES MELLITUS WITHOUT COMPLICATION, WITHOUT LONG-TERM CURRENT USE OF INSULIN: ICD-10-CM

## 2021-03-09 DIAGNOSIS — H26.9 CATARACT, UNSPECIFIED CATARACT TYPE, UNSPECIFIED LATERALITY: ICD-10-CM

## 2021-03-09 DIAGNOSIS — Z12.31 ENCOUNTER FOR SCREENING MAMMOGRAM FOR BREAST CANCER: ICD-10-CM

## 2021-03-09 DIAGNOSIS — L65.9 HAIR LOSS: ICD-10-CM

## 2021-03-09 LAB
ESTIMATED AVG GLUCOSE: 105 MG/DL (ref 68–131)
ESTIMATED AVG GLUCOSE: 105 MG/DL (ref 68–131)
HBA1C MFR BLD: 5.3 % (ref 4–5.6)
HBA1C MFR BLD: 5.3 % (ref 4–5.6)

## 2021-03-09 PROCEDURE — 36415 COLL VENOUS BLD VENIPUNCTURE: CPT | Mod: PO | Performed by: INTERNAL MEDICINE

## 2021-03-09 PROCEDURE — 99214 OFFICE O/P EST MOD 30 MIN: CPT | Mod: S$PBB,,, | Performed by: INTERNAL MEDICINE

## 2021-03-09 PROCEDURE — 99999 PR PBB SHADOW E&M-EST. PATIENT-LVL III: ICD-10-PCS | Mod: PBBFAC,,, | Performed by: INTERNAL MEDICINE

## 2021-03-09 PROCEDURE — 99999 PR PBB SHADOW E&M-EST. PATIENT-LVL III: CPT | Mod: PBBFAC,,, | Performed by: INTERNAL MEDICINE

## 2021-03-09 PROCEDURE — 99213 OFFICE O/P EST LOW 20 MIN: CPT | Mod: PBBFAC,PO | Performed by: INTERNAL MEDICINE

## 2021-03-09 PROCEDURE — 82728 ASSAY OF FERRITIN: CPT | Performed by: INTERNAL MEDICINE

## 2021-03-09 PROCEDURE — 99214 PR OFFICE/OUTPT VISIT, EST, LEVL IV, 30-39 MIN: ICD-10-PCS | Mod: S$PBB,,, | Performed by: INTERNAL MEDICINE

## 2021-03-09 PROCEDURE — 83036 HEMOGLOBIN GLYCOSYLATED A1C: CPT | Performed by: INTERNAL MEDICINE

## 2021-03-09 PROCEDURE — 84443 ASSAY THYROID STIM HORMONE: CPT | Performed by: INTERNAL MEDICINE

## 2021-03-09 PROCEDURE — 83540 ASSAY OF IRON: CPT | Performed by: INTERNAL MEDICINE

## 2021-03-09 PROCEDURE — 80061 LIPID PANEL: CPT | Performed by: INTERNAL MEDICINE

## 2021-03-09 RX ORDER — METHOCARBAMOL 750 MG/1
750 TABLET, FILM COATED ORAL 4 TIMES DAILY PRN
Qty: 40 TABLET | Refills: 1 | Status: SHIPPED | OUTPATIENT
Start: 2021-03-09 | End: 2021-03-19

## 2021-03-10 ENCOUNTER — PATIENT OUTREACH (OUTPATIENT)
Dept: ADMINISTRATIVE | Facility: OTHER | Age: 71
End: 2021-03-10

## 2021-03-10 LAB
CHOLEST SERPL-MCNC: 239 MG/DL (ref 120–199)
CHOLEST/HDLC SERPL: 2.5 {RATIO} (ref 2–5)
FERRITIN SERPL-MCNC: 18 NG/ML (ref 20–300)
HDLC SERPL-MCNC: 95 MG/DL (ref 40–75)
HDLC SERPL: 39.7 % (ref 20–50)
IRON SERPL-MCNC: 64 UG/DL (ref 30–160)
LDLC SERPL CALC-MCNC: 120.4 MG/DL (ref 63–159)
NONHDLC SERPL-MCNC: 144 MG/DL
SATURATED IRON: 19 % (ref 20–50)
TOTAL IRON BINDING CAPACITY: 345 UG/DL (ref 250–450)
TRANSFERRIN SERPL-MCNC: 233 MG/DL (ref 200–375)
TRIGL SERPL-MCNC: 118 MG/DL (ref 30–150)
TSH SERPL DL<=0.005 MIU/L-ACNC: 0.95 UIU/ML (ref 0.4–4)
TSH SERPL DL<=0.005 MIU/L-ACNC: 0.95 UIU/ML (ref 0.4–4)

## 2021-03-11 ENCOUNTER — TELEPHONE (OUTPATIENT)
Dept: PAIN MEDICINE | Facility: CLINIC | Age: 71
End: 2021-03-11

## 2021-03-12 ENCOUNTER — OFFICE VISIT (OUTPATIENT)
Dept: PAIN MEDICINE | Facility: CLINIC | Age: 71
End: 2021-03-12
Payer: MEDICARE

## 2021-03-12 VITALS
HEIGHT: 61 IN | DIASTOLIC BLOOD PRESSURE: 63 MMHG | HEART RATE: 60 BPM | OXYGEN SATURATION: 97 % | TEMPERATURE: 98 F | SYSTOLIC BLOOD PRESSURE: 156 MMHG | WEIGHT: 147.69 LBS | RESPIRATION RATE: 18 BRPM | BODY MASS INDEX: 27.88 KG/M2

## 2021-03-12 DIAGNOSIS — M79.18 MYOFASCIAL PAIN: Primary | ICD-10-CM

## 2021-03-12 DIAGNOSIS — M47.816 LUMBAR SPONDYLOSIS: ICD-10-CM

## 2021-03-12 DIAGNOSIS — M43.06 SPONDYLOLYSIS OF LUMBAR REGION: ICD-10-CM

## 2021-03-12 DIAGNOSIS — M54.17 LUMBOSACRAL RADICULOPATHY: ICD-10-CM

## 2021-03-12 PROCEDURE — 99215 OFFICE O/P EST HI 40 MIN: CPT | Mod: PBBFAC | Performed by: NURSE PRACTITIONER

## 2021-03-12 PROCEDURE — 20553 PR INJECT TRIGGER POINTS, > 3: ICD-10-PCS | Mod: S$PBB,,, | Performed by: NURSE PRACTITIONER

## 2021-03-12 PROCEDURE — 99999 PR PBB SHADOW E&M-EST. PATIENT-LVL V: ICD-10-PCS | Mod: PBBFAC,,, | Performed by: NURSE PRACTITIONER

## 2021-03-12 PROCEDURE — 99213 OFFICE O/P EST LOW 20 MIN: CPT | Mod: 25,S$PBB,, | Performed by: NURSE PRACTITIONER

## 2021-03-12 PROCEDURE — 20553 NJX 1/MLT TRIGGER POINTS 3/>: CPT | Mod: PBBFAC | Performed by: NURSE PRACTITIONER

## 2021-03-12 PROCEDURE — 20553 NJX 1/MLT TRIGGER POINTS 3/>: CPT | Mod: S$PBB,,, | Performed by: NURSE PRACTITIONER

## 2021-03-12 PROCEDURE — 99213 PR OFFICE/OUTPT VISIT, EST, LEVL III, 20-29 MIN: ICD-10-PCS | Mod: 25,S$PBB,, | Performed by: NURSE PRACTITIONER

## 2021-03-12 PROCEDURE — 99999 PR PBB SHADOW E&M-EST. PATIENT-LVL V: CPT | Mod: PBBFAC,,, | Performed by: NURSE PRACTITIONER

## 2021-03-12 RX ORDER — BUPIVACAINE HYDROCHLORIDE 2.5 MG/ML
9 INJECTION, SOLUTION EPIDURAL; INFILTRATION; INTRACAUDAL
Status: COMPLETED | OUTPATIENT
Start: 2021-03-12 | End: 2021-03-12

## 2021-03-12 RX ORDER — METHYLPREDNISOLONE ACETATE 40 MG/ML
40 INJECTION, SUSPENSION INTRA-ARTICULAR; INTRALESIONAL; INTRAMUSCULAR; SOFT TISSUE
Status: COMPLETED | OUTPATIENT
Start: 2021-03-12 | End: 2021-03-12

## 2021-03-12 RX ADMIN — METHYLPREDNISOLONE ACETATE 40 MG: 40 INJECTION, SUSPENSION INTRA-ARTICULAR; INTRALESIONAL; INTRAMUSCULAR; SOFT TISSUE at 10:03

## 2021-03-12 RX ADMIN — BUPIVACAINE HYDROCHLORIDE 22.5 MG: 2.5 INJECTION, SOLUTION EPIDURAL; INFILTRATION; INTRACAUDAL; PERINEURAL at 10:03

## 2021-03-18 ENCOUNTER — HOSPITAL ENCOUNTER (OUTPATIENT)
Dept: RADIOLOGY | Facility: HOSPITAL | Age: 71
Discharge: HOME | End: 2021-03-18
Attending: INTERNAL MEDICINE
Payer: MEDICARE

## 2021-03-18 DIAGNOSIS — Z12.31 ENCOUNTER FOR SCREENING MAMMOGRAM FOR BREAST CANCER: ICD-10-CM

## 2021-03-18 PROCEDURE — 77067 SCR MAMMO BI INCL CAD: CPT | Mod: 26,,, | Performed by: RADIOLOGY

## 2021-03-18 PROCEDURE — 77063 MAMMO DIGITAL SCREENING BILAT WITH TOMO: ICD-10-PCS | Mod: 26,,, | Performed by: RADIOLOGY

## 2021-03-18 PROCEDURE — 77067 SCR MAMMO BI INCL CAD: CPT | Mod: TC,PO

## 2021-03-18 PROCEDURE — 77063 BREAST TOMOSYNTHESIS BI: CPT | Mod: 26,,, | Performed by: RADIOLOGY

## 2021-03-18 PROCEDURE — 77067 MAMMO DIGITAL SCREENING BILAT WITH TOMO: ICD-10-PCS | Mod: 26,,, | Performed by: RADIOLOGY

## 2021-03-23 ENCOUNTER — PATIENT OUTREACH (OUTPATIENT)
Dept: ADMINISTRATIVE | Facility: HOSPITAL | Age: 71
End: 2021-03-23

## 2021-03-24 ENCOUNTER — IMMUNIZATION (OUTPATIENT)
Dept: PRIMARY CARE CLINIC | Facility: CLINIC | Age: 71
End: 2021-03-24
Payer: MEDICARE

## 2021-03-24 DIAGNOSIS — Z23 NEED FOR VACCINATION: Primary | ICD-10-CM

## 2021-03-24 PROCEDURE — 91300 COVID-19, MRNA, LNP-S, PF, 30 MCG/0.3 ML DOSE VACCINE: ICD-10-PCS | Mod: S$GLB,,, | Performed by: FAMILY MEDICINE

## 2021-03-24 PROCEDURE — 0002A COVID-19, MRNA, LNP-S, PF, 30 MCG/0.3 ML DOSE VACCINE: CPT | Mod: CV19,S$GLB,, | Performed by: FAMILY MEDICINE

## 2021-03-24 PROCEDURE — 0002A COVID-19, MRNA, LNP-S, PF, 30 MCG/0.3 ML DOSE VACCINE: ICD-10-PCS | Mod: CV19,S$GLB,, | Performed by: FAMILY MEDICINE

## 2021-03-24 PROCEDURE — 91300 COVID-19, MRNA, LNP-S, PF, 30 MCG/0.3 ML DOSE VACCINE: CPT | Mod: S$GLB,,, | Performed by: FAMILY MEDICINE

## 2021-04-19 ENCOUNTER — PATIENT OUTREACH (OUTPATIENT)
Dept: ADMINISTRATIVE | Facility: OTHER | Age: 71
End: 2021-04-19

## 2021-04-21 ENCOUNTER — OFFICE VISIT (OUTPATIENT)
Dept: OPTOMETRY | Facility: CLINIC | Age: 71
End: 2021-04-21
Payer: MEDICARE

## 2021-04-21 DIAGNOSIS — H25.13 NUCLEAR SCLEROSIS, BILATERAL: ICD-10-CM

## 2021-04-21 PROCEDURE — 99999 PR PBB SHADOW E&M-EST. PATIENT-LVL III: CPT | Mod: PBBFAC,,, | Performed by: OPTOMETRIST

## 2021-04-21 PROCEDURE — 99999 PR PBB SHADOW E&M-EST. PATIENT-LVL III: ICD-10-PCS | Mod: PBBFAC,,, | Performed by: OPTOMETRIST

## 2021-04-21 PROCEDURE — 92004 COMPRE OPH EXAM NEW PT 1/>: CPT | Mod: S$PBB,,, | Performed by: OPTOMETRIST

## 2021-04-21 PROCEDURE — 92004 PR EYE EXAM, NEW PATIENT,COMPREHESV: ICD-10-PCS | Mod: S$PBB,,, | Performed by: OPTOMETRIST

## 2021-04-21 PROCEDURE — 99213 OFFICE O/P EST LOW 20 MIN: CPT | Mod: PBBFAC,PO | Performed by: OPTOMETRIST

## 2021-04-27 ENCOUNTER — OFFICE VISIT (OUTPATIENT)
Dept: OPHTHALMOLOGY | Facility: CLINIC | Age: 71
End: 2021-04-27
Payer: MEDICARE

## 2021-04-27 DIAGNOSIS — H25.13 NUCLEAR SCLEROSIS, BILATERAL: ICD-10-CM

## 2021-04-27 DIAGNOSIS — H25.12 NUCLEAR SCLEROTIC CATARACT OF LEFT EYE: ICD-10-CM

## 2021-04-27 DIAGNOSIS — H25.11 NUCLEAR SCLEROTIC CATARACT OF RIGHT EYE: Primary | ICD-10-CM

## 2021-04-27 PROCEDURE — 99213 OFFICE O/P EST LOW 20 MIN: CPT | Mod: PBBFAC,PO,25 | Performed by: OPHTHALMOLOGY

## 2021-04-27 PROCEDURE — 92136 IOL MASTER - OD - RIGHT EYE: ICD-10-PCS | Mod: 26,S$PBB,RT, | Performed by: OPHTHALMOLOGY

## 2021-04-27 PROCEDURE — 99999 PR PBB SHADOW E&M-EST. PATIENT-LVL III: CPT | Mod: PBBFAC,,, | Performed by: OPHTHALMOLOGY

## 2021-04-27 PROCEDURE — 92136 OPHTHALMIC BIOMETRY: CPT | Mod: PBBFAC,PO,RT | Performed by: OPHTHALMOLOGY

## 2021-04-27 PROCEDURE — 99999 PR PBB SHADOW E&M-EST. PATIENT-LVL III: ICD-10-PCS | Mod: PBBFAC,,, | Performed by: OPHTHALMOLOGY

## 2021-04-27 PROCEDURE — 99214 PR OFFICE/OUTPT VISIT, EST, LEVL IV, 30-39 MIN: ICD-10-PCS | Mod: S$PBB,,, | Performed by: OPHTHALMOLOGY

## 2021-04-27 PROCEDURE — 99214 OFFICE O/P EST MOD 30 MIN: CPT | Mod: S$PBB,,, | Performed by: OPHTHALMOLOGY

## 2021-05-12 DIAGNOSIS — Z13.9 SCREENING PROCEDURE: ICD-10-CM

## 2021-05-12 DIAGNOSIS — H25.11 NUCLEAR SCLEROTIC CATARACT OF RIGHT EYE: Primary | ICD-10-CM

## 2021-05-12 RX ORDER — PREDNISOLONE ACETATE-GATIFLOXACIN-BROMFENAC .75; 5; 1 MG/ML; MG/ML; MG/ML
1 SUSPENSION/ DROPS OPHTHALMIC 3 TIMES DAILY
Qty: 5 ML | Refills: 3 | Status: SHIPPED | OUTPATIENT
Start: 2021-05-12 | End: 2021-11-18

## 2021-05-12 RX ORDER — DICYCLOMINE HYDROCHLORIDE 10 MG/1
10 CAPSULE ORAL 4 TIMES DAILY PRN
Qty: 120 CAPSULE | Refills: 0 | Status: SHIPPED | OUTPATIENT
Start: 2021-05-12

## 2021-05-13 ENCOUNTER — LAB VISIT (OUTPATIENT)
Dept: LAB | Facility: HOSPITAL | Age: 71
End: 2021-05-13
Attending: INTERNAL MEDICINE
Payer: MEDICARE

## 2021-05-13 DIAGNOSIS — E78.5 HYPERLIPIDEMIA, UNSPECIFIED HYPERLIPIDEMIA TYPE: ICD-10-CM

## 2021-05-13 PROCEDURE — 36415 COLL VENOUS BLD VENIPUNCTURE: CPT | Mod: PO | Performed by: PHYSICIAN ASSISTANT

## 2021-05-13 PROCEDURE — 82550 ASSAY OF CK (CPK): CPT | Performed by: PHYSICIAN ASSISTANT

## 2021-05-13 PROCEDURE — 80061 LIPID PANEL: CPT | Performed by: PHYSICIAN ASSISTANT

## 2021-05-13 PROCEDURE — 80053 COMPREHEN METABOLIC PANEL: CPT | Performed by: PHYSICIAN ASSISTANT

## 2021-05-14 LAB
ALBUMIN SERPL BCP-MCNC: 3.6 G/DL (ref 3.5–5.2)
ALP SERPL-CCNC: 61 U/L (ref 55–135)
ALT SERPL W/O P-5'-P-CCNC: 17 U/L (ref 10–44)
ANION GAP SERPL CALC-SCNC: 6 MMOL/L (ref 8–16)
AST SERPL-CCNC: 20 U/L (ref 10–40)
BILIRUB SERPL-MCNC: 0.3 MG/DL (ref 0.1–1)
BUN SERPL-MCNC: 17 MG/DL (ref 8–23)
CALCIUM SERPL-MCNC: 10.3 MG/DL (ref 8.7–10.5)
CHLORIDE SERPL-SCNC: 104 MMOL/L (ref 95–110)
CHOLEST SERPL-MCNC: 208 MG/DL (ref 120–199)
CHOLEST/HDLC SERPL: 2.3 {RATIO} (ref 2–5)
CK SERPL-CCNC: 120 U/L (ref 20–180)
CO2 SERPL-SCNC: 28 MMOL/L (ref 23–29)
CREAT SERPL-MCNC: 1 MG/DL (ref 0.5–1.4)
EST. GFR  (AFRICAN AMERICAN): >60 ML/MIN/1.73 M^2
EST. GFR  (NON AFRICAN AMERICAN): 56.4 ML/MIN/1.73 M^2
GLUCOSE SERPL-MCNC: 98 MG/DL (ref 70–110)
HDLC SERPL-MCNC: 90 MG/DL (ref 40–75)
HDLC SERPL: 43.3 % (ref 20–50)
LDLC SERPL CALC-MCNC: 101.8 MG/DL (ref 63–159)
NONHDLC SERPL-MCNC: 118 MG/DL
POTASSIUM SERPL-SCNC: 5.2 MMOL/L (ref 3.5–5.1)
PROT SERPL-MCNC: 7 G/DL (ref 6–8.4)
SODIUM SERPL-SCNC: 138 MMOL/L (ref 136–145)
TRIGL SERPL-MCNC: 81 MG/DL (ref 30–150)

## 2021-05-21 ENCOUNTER — OFFICE VISIT (OUTPATIENT)
Dept: CARDIOLOGY | Facility: CLINIC | Age: 71
End: 2021-05-21
Payer: MEDICARE

## 2021-05-21 ENCOUNTER — ANESTHESIA EVENT (OUTPATIENT)
Dept: SURGERY | Facility: HOSPITAL | Age: 71
End: 2021-05-21
Payer: MEDICARE

## 2021-05-21 VITALS
SYSTOLIC BLOOD PRESSURE: 132 MMHG | DIASTOLIC BLOOD PRESSURE: 72 MMHG | WEIGHT: 147.5 LBS | BODY MASS INDEX: 27.85 KG/M2 | HEIGHT: 61 IN | HEART RATE: 54 BPM

## 2021-05-21 DIAGNOSIS — E66.3 OVERWEIGHT (BMI 25.0-29.9): Chronic | ICD-10-CM

## 2021-05-21 DIAGNOSIS — R93.1 AGATSTON CAC SCORE, >400: Primary | Chronic | ICD-10-CM

## 2021-05-21 DIAGNOSIS — E87.5 HYPERKALEMIA: Chronic | ICD-10-CM

## 2021-05-21 DIAGNOSIS — I10 ESSENTIAL (PRIMARY) HYPERTENSION: Chronic | ICD-10-CM

## 2021-05-21 DIAGNOSIS — R09.89 BRUIT OF RIGHT CAROTID ARTERY: ICD-10-CM

## 2021-05-21 DIAGNOSIS — I34.0 NON-RHEUMATIC MITRAL REGURGITATION: Chronic | ICD-10-CM

## 2021-05-21 DIAGNOSIS — E78.00 HYPERCHOLESTEROLEMIA: Chronic | ICD-10-CM

## 2021-05-21 DIAGNOSIS — N18.2 CHRONIC KIDNEY DISEASE, STAGE 2, MILDLY DECREASED GFR: ICD-10-CM

## 2021-05-21 PROCEDURE — 99214 OFFICE O/P EST MOD 30 MIN: CPT | Mod: S$PBB,,, | Performed by: INTERNAL MEDICINE

## 2021-05-21 PROCEDURE — 99214 PR OFFICE/OUTPT VISIT, EST, LEVL IV, 30-39 MIN: ICD-10-PCS | Mod: S$PBB,,, | Performed by: INTERNAL MEDICINE

## 2021-05-21 PROCEDURE — 99214 OFFICE O/P EST MOD 30 MIN: CPT | Mod: PBBFAC,PO | Performed by: INTERNAL MEDICINE

## 2021-05-21 PROCEDURE — 99999 PR PBB SHADOW E&M-EST. PATIENT-LVL IV: ICD-10-PCS | Mod: PBBFAC,,, | Performed by: INTERNAL MEDICINE

## 2021-05-21 PROCEDURE — 99999 PR PBB SHADOW E&M-EST. PATIENT-LVL IV: CPT | Mod: PBBFAC,,, | Performed by: INTERNAL MEDICINE

## 2021-05-21 RX ORDER — ROSUVASTATIN CALCIUM 10 MG/1
5 TABLET, COATED ORAL NIGHTLY
Qty: 45 TABLET | Refills: 1 | Status: SHIPPED | OUTPATIENT
Start: 2021-05-21 | End: 2021-11-02

## 2021-05-21 RX ORDER — NICOTINE POLACRILEX 2 MG
1 GUM BUCCAL DAILY
COMMUNITY
End: 2022-08-05

## 2021-05-22 RX ORDER — MOXIFLOXACIN 5 MG/ML
1 SOLUTION/ DROPS OPHTHALMIC
Status: CANCELLED | OUTPATIENT
Start: 2021-05-22 | End: 2021-05-22

## 2021-05-22 RX ORDER — TROPICAMIDE 10 MG/ML
1 SOLUTION/ DROPS OPHTHALMIC
Status: CANCELLED | OUTPATIENT
Start: 2021-05-22

## 2021-05-22 RX ORDER — KETOROLAC TROMETHAMINE 5 MG/ML
1 SOLUTION OPHTHALMIC ONCE
Status: CANCELLED | OUTPATIENT
Start: 2021-05-22 | End: 2021-05-22

## 2021-05-22 RX ORDER — LIDOCAINE HYDROCHLORIDE 40 MG/ML
1 INJECTION, SOLUTION RETROBULBAR
Status: CANCELLED | OUTPATIENT
Start: 2021-05-22

## 2021-05-22 RX ORDER — PHENYLEPHRINE HYDROCHLORIDE 25 MG/ML
1 SOLUTION/ DROPS OPHTHALMIC
Status: CANCELLED | OUTPATIENT
Start: 2021-05-22

## 2021-05-22 RX ORDER — PROPARACAINE HYDROCHLORIDE 5 MG/ML
1 SOLUTION/ DROPS OPHTHALMIC
Status: CANCELLED | OUTPATIENT
Start: 2021-05-22

## 2021-05-24 ENCOUNTER — ANESTHESIA (OUTPATIENT)
Dept: SURGERY | Facility: HOSPITAL | Age: 71
End: 2021-05-24
Payer: MEDICARE

## 2021-05-24 ENCOUNTER — HOSPITAL ENCOUNTER (OUTPATIENT)
Facility: HOSPITAL | Age: 71
Discharge: HOME | End: 2021-05-24
Attending: OPHTHALMOLOGY | Admitting: OPHTHALMOLOGY
Payer: MEDICARE

## 2021-05-24 VITALS
HEART RATE: 55 BPM | BODY MASS INDEX: 27.38 KG/M2 | OXYGEN SATURATION: 99 % | WEIGHT: 145 LBS | TEMPERATURE: 98 F | RESPIRATION RATE: 16 BRPM | DIASTOLIC BLOOD PRESSURE: 56 MMHG | HEIGHT: 61 IN | SYSTOLIC BLOOD PRESSURE: 116 MMHG

## 2021-05-24 DIAGNOSIS — H25.10 SENILE NUCLEAR SCLEROSIS: ICD-10-CM

## 2021-05-24 DIAGNOSIS — H25.11 NUCLEAR SCLEROTIC CATARACT OF RIGHT EYE: Primary | ICD-10-CM

## 2021-05-24 PROCEDURE — 25000003 PHARM REV CODE 250: Mod: PO | Performed by: NURSE ANESTHETIST, CERTIFIED REGISTERED

## 2021-05-24 PROCEDURE — 66984 XCAPSL CTRC RMVL W/O ECP: CPT | Mod: RT,,, | Performed by: OPHTHALMOLOGY

## 2021-05-24 PROCEDURE — 36000706: Mod: PO | Performed by: OPHTHALMOLOGY

## 2021-05-24 PROCEDURE — 25000003 PHARM REV CODE 250: Mod: PO | Performed by: ANESTHESIOLOGY

## 2021-05-24 PROCEDURE — 63600175 PHARM REV CODE 636 W HCPCS: Mod: PO | Performed by: NURSE ANESTHETIST, CERTIFIED REGISTERED

## 2021-05-24 PROCEDURE — D9220A PRA ANESTHESIA: ICD-10-PCS | Mod: CRNA,,, | Performed by: NURSE ANESTHETIST, CERTIFIED REGISTERED

## 2021-05-24 PROCEDURE — 36000707: Mod: PO | Performed by: OPHTHALMOLOGY

## 2021-05-24 PROCEDURE — 25000003 PHARM REV CODE 250: Mod: PO | Performed by: OPHTHALMOLOGY

## 2021-05-24 PROCEDURE — V2632 POST CHMBR INTRAOCULAR LENS: HCPCS | Mod: PO | Performed by: OPHTHALMOLOGY

## 2021-05-24 PROCEDURE — 71000015 HC POSTOP RECOV 1ST HR: Mod: PO | Performed by: OPHTHALMOLOGY

## 2021-05-24 PROCEDURE — 37000009 HC ANESTHESIA EA ADD 15 MINS: Mod: PO | Performed by: OPHTHALMOLOGY

## 2021-05-24 PROCEDURE — 25000003 PHARM REV CODE 250: Mod: PO

## 2021-05-24 PROCEDURE — D9220A PRA ANESTHESIA: Mod: CRNA,,, | Performed by: NURSE ANESTHETIST, CERTIFIED REGISTERED

## 2021-05-24 PROCEDURE — D9220A PRA ANESTHESIA: ICD-10-PCS | Mod: ANES,,, | Performed by: ANESTHESIOLOGY

## 2021-05-24 PROCEDURE — 66984 PR REMOVAL, CATARACT, W/INSRT INTRAOC LENS, W/O ENDO CYCLO: ICD-10-PCS | Mod: RT,,, | Performed by: OPHTHALMOLOGY

## 2021-05-24 PROCEDURE — 63600175 PHARM REV CODE 636 W HCPCS: Mod: PO | Performed by: OPHTHALMOLOGY

## 2021-05-24 PROCEDURE — D9220A PRA ANESTHESIA: Mod: ANES,,, | Performed by: ANESTHESIOLOGY

## 2021-05-24 PROCEDURE — 37000008 HC ANESTHESIA 1ST 15 MINUTES: Mod: PO | Performed by: OPHTHALMOLOGY

## 2021-05-24 DEVICE — IMPLANTABLE DEVICE: Type: IMPLANTABLE DEVICE | Site: EYE | Status: FUNCTIONAL

## 2021-05-24 RX ORDER — PROPARACAINE HYDROCHLORIDE 5 MG/ML
1 SOLUTION/ DROPS OPHTHALMIC
Status: DISCONTINUED | OUTPATIENT
Start: 2021-05-24 | End: 2021-05-24 | Stop reason: HOSPADM

## 2021-05-24 RX ORDER — PHENYLEPHRINE HYDROCHLORIDE 25 MG/ML
1 SOLUTION/ DROPS OPHTHALMIC
Status: COMPLETED | OUTPATIENT
Start: 2021-05-24 | End: 2021-05-24

## 2021-05-24 RX ORDER — LIDOCAINE HYDROCHLORIDE 40 MG/ML
INJECTION, SOLUTION RETROBULBAR
Status: DISCONTINUED | OUTPATIENT
Start: 2021-05-24 | End: 2021-05-24

## 2021-05-24 RX ORDER — ONDANSETRON 2 MG/ML
INJECTION INTRAMUSCULAR; INTRAVENOUS
Status: DISCONTINUED | OUTPATIENT
Start: 2021-05-24 | End: 2021-05-24

## 2021-05-24 RX ORDER — KETOROLAC TROMETHAMINE 5 MG/ML
1 SOLUTION OPHTHALMIC ONCE
Status: COMPLETED | OUTPATIENT
Start: 2021-05-24 | End: 2021-05-24

## 2021-05-24 RX ORDER — LIDOCAINE HYDROCHLORIDE 10 MG/ML
INJECTION, SOLUTION EPIDURAL; INFILTRATION; INTRACAUDAL; PERINEURAL
Status: DISCONTINUED | OUTPATIENT
Start: 2021-05-24 | End: 2021-05-24 | Stop reason: HOSPADM

## 2021-05-24 RX ORDER — OXYCODONE HYDROCHLORIDE 5 MG/1
5 TABLET ORAL
Status: DISCONTINUED | OUTPATIENT
Start: 2021-05-24 | End: 2021-05-24 | Stop reason: HOSPADM

## 2021-05-24 RX ORDER — MOXIFLOXACIN 5 MG/ML
SOLUTION/ DROPS OPHTHALMIC
Status: DISCONTINUED | OUTPATIENT
Start: 2021-05-24 | End: 2021-05-24 | Stop reason: HOSPADM

## 2021-05-24 RX ORDER — HYDROMORPHONE HYDROCHLORIDE 2 MG/ML
0.2 INJECTION, SOLUTION INTRAMUSCULAR; INTRAVENOUS; SUBCUTANEOUS EVERY 5 MIN PRN
Status: DISCONTINUED | OUTPATIENT
Start: 2021-05-24 | End: 2021-05-24 | Stop reason: HOSPADM

## 2021-05-24 RX ORDER — LIDOCAINE HYDROCHLORIDE 10 MG/ML
1 INJECTION INFILTRATION; PERINEURAL ONCE
Status: COMPLETED | OUTPATIENT
Start: 2021-05-24 | End: 2021-05-24

## 2021-05-24 RX ORDER — EPINEPHRINE 1 MG/ML
INJECTION INTRAMUSCULAR; INTRAVENOUS; SUBCUTANEOUS
Status: DISCONTINUED | OUTPATIENT
Start: 2021-05-24 | End: 2021-05-24 | Stop reason: HOSPADM

## 2021-05-24 RX ORDER — SODIUM CHLORIDE, SODIUM LACTATE, POTASSIUM CHLORIDE, CALCIUM CHLORIDE 600; 310; 30; 20 MG/100ML; MG/100ML; MG/100ML; MG/100ML
INJECTION, SOLUTION INTRAVENOUS CONTINUOUS
Status: DISCONTINUED | OUTPATIENT
Start: 2021-05-24 | End: 2021-05-24 | Stop reason: HOSPADM

## 2021-05-24 RX ORDER — MIDAZOLAM HYDROCHLORIDE 1 MG/ML
INJECTION, SOLUTION INTRAMUSCULAR; INTRAVENOUS
Status: DISCONTINUED | OUTPATIENT
Start: 2021-05-24 | End: 2021-05-24

## 2021-05-24 RX ORDER — FENTANYL CITRATE 50 UG/ML
25 INJECTION, SOLUTION INTRAMUSCULAR; INTRAVENOUS EVERY 5 MIN PRN
Status: DISCONTINUED | OUTPATIENT
Start: 2021-05-24 | End: 2021-05-24 | Stop reason: HOSPADM

## 2021-05-24 RX ORDER — PREDNISOLONE ACETATE 10 MG/ML
SUSPENSION/ DROPS OPHTHALMIC
Status: DISCONTINUED | OUTPATIENT
Start: 2021-05-24 | End: 2021-05-24 | Stop reason: HOSPADM

## 2021-05-24 RX ORDER — OFLOXACIN 3 MG/ML
1 SOLUTION/ DROPS OPHTHALMIC
Status: COMPLETED | OUTPATIENT
Start: 2021-05-24 | End: 2021-05-24

## 2021-05-24 RX ORDER — TROPICAMIDE 10 MG/ML
1 SOLUTION/ DROPS OPHTHALMIC
Status: COMPLETED | OUTPATIENT
Start: 2021-05-24 | End: 2021-05-24

## 2021-05-24 RX ORDER — ACETAMINOPHEN 325 MG/1
650 TABLET ORAL EVERY 4 HOURS PRN
Status: DISCONTINUED | OUTPATIENT
Start: 2021-05-24 | End: 2021-05-24 | Stop reason: HOSPADM

## 2021-05-24 RX ADMIN — KETOROLAC TROMETHAMINE 1 DROP: 5 SOLUTION OPHTHALMIC at 08:05

## 2021-05-24 RX ADMIN — PHENYLEPHRINE HYDROCHLORIDE 1 DROP: 25 SOLUTION/ DROPS OPHTHALMIC at 08:05

## 2021-05-24 RX ADMIN — TROPICAMIDE 1 DROP: 10 SOLUTION/ DROPS OPHTHALMIC at 08:05

## 2021-05-24 RX ADMIN — MIDAZOLAM 1 MG: 1 INJECTION INTRAMUSCULAR; INTRAVENOUS at 08:05

## 2021-05-24 RX ADMIN — OFLOXACIN 1 DROP: 3 SOLUTION/ DROPS OPHTHALMIC at 08:05

## 2021-05-24 RX ADMIN — SODIUM CHLORIDE, SODIUM LACTATE, POTASSIUM CHLORIDE, AND CALCIUM CHLORIDE: .6; .31; .03; .02 INJECTION, SOLUTION INTRAVENOUS at 08:05

## 2021-05-24 RX ADMIN — LIDOCAINE HYDROCHLORIDE 4 DROP: 40 SOLUTION RETROBULBAR; TOPICAL at 08:05

## 2021-05-24 RX ADMIN — LIDOCAINE HYDROCHLORIDE 1 ML: 10 INJECTION, SOLUTION EPIDURAL; INFILTRATION; INTRACAUDAL; PERINEURAL at 08:05

## 2021-05-24 RX ADMIN — ONDANSETRON 4 MG: 2 INJECTION, SOLUTION INTRAMUSCULAR; INTRAVENOUS at 08:05

## 2021-05-24 RX ADMIN — PROPARACAINE HYDROCHLORIDE 1 DROP: 5 SOLUTION/ DROPS OPHTHALMIC at 08:05

## 2021-05-25 ENCOUNTER — OFFICE VISIT (OUTPATIENT)
Dept: OPHTHALMOLOGY | Facility: CLINIC | Age: 71
End: 2021-05-25
Payer: MEDICARE

## 2021-05-25 ENCOUNTER — TELEPHONE (OUTPATIENT)
Dept: FAMILY MEDICINE | Facility: CLINIC | Age: 71
End: 2021-05-25

## 2021-05-25 DIAGNOSIS — Z98.41 STATUS POST CATARACT EXTRACTION AND INSERTION OF INTRAOCULAR LENS, RIGHT: Primary | ICD-10-CM

## 2021-05-25 DIAGNOSIS — Z96.1 STATUS POST CATARACT EXTRACTION AND INSERTION OF INTRAOCULAR LENS, RIGHT: Primary | ICD-10-CM

## 2021-05-25 PROCEDURE — 99213 OFFICE O/P EST LOW 20 MIN: CPT | Mod: PBBFAC,PO | Performed by: OPHTHALMOLOGY

## 2021-05-25 PROCEDURE — 99999 PR PBB SHADOW E&M-EST. PATIENT-LVL III: ICD-10-PCS | Mod: PBBFAC,,, | Performed by: OPHTHALMOLOGY

## 2021-05-25 PROCEDURE — 99999 PR PBB SHADOW E&M-EST. PATIENT-LVL III: CPT | Mod: PBBFAC,,, | Performed by: OPHTHALMOLOGY

## 2021-05-25 PROCEDURE — 99024 POSTOP FOLLOW-UP VISIT: CPT | Mod: POP,,, | Performed by: OPHTHALMOLOGY

## 2021-05-25 PROCEDURE — 99024 PR POST-OP FOLLOW-UP VISIT: ICD-10-PCS | Mod: POP,,, | Performed by: OPHTHALMOLOGY

## 2021-06-01 ENCOUNTER — HOSPITAL ENCOUNTER (OUTPATIENT)
Dept: CARDIOLOGY | Facility: HOSPITAL | Age: 71
Discharge: HOME | End: 2021-06-01
Attending: INTERNAL MEDICINE
Payer: MEDICARE

## 2021-06-01 DIAGNOSIS — R09.89 BRUIT OF RIGHT CAROTID ARTERY: ICD-10-CM

## 2021-06-01 LAB
LEFT CBA DIAS: 21 CM/S
LEFT CBA SYS: 72 CM/S
LEFT CCA DIST DIAS: 19 CM/S
LEFT CCA DIST SYS: 74 CM/S
LEFT CCA MID DIAS: 16 CM/S
LEFT CCA MID SYS: 79 CM/S
LEFT CCA PROX DIAS: 15 CM/S
LEFT CCA PROX SYS: 80 CM/S
LEFT ECA DIAS: 9 CM/S
LEFT ECA SYS: 69 CM/S
LEFT ICA DIST DIAS: 31 CM/S
LEFT ICA DIST SYS: 87 CM/S
LEFT ICA MID DIAS: 26 CM/S
LEFT ICA MID SYS: 75 CM/S
LEFT ICA PROX DIAS: 23 CM/S
LEFT ICA PROX SYS: 98 CM/S
LEFT VERTEBRAL DIAS: 11 CM/S
LEFT VERTEBRAL SYS: 52 CM/S
OHS CV CAROTID RIGHT ICA EDV HIGHEST: 34
OHS CV CAROTID ULTRASOUND LEFT ICA/CCA RATIO: 1.32
OHS CV CAROTID ULTRASOUND RIGHT ICA/CCA RATIO: 1.21
OHS CV PV CAROTID LEFT HIGHEST CCA: 80
OHS CV PV CAROTID LEFT HIGHEST ICA: 98
OHS CV PV CAROTID RIGHT HIGHEST CCA: 94
OHS CV PV CAROTID RIGHT HIGHEST ICA: 104
OHS CV US CAROTID LEFT HIGHEST EDV: 31
RIGHT CBA DIAS: 20 CM/S
RIGHT CBA SYS: 68 CM/S
RIGHT CCA DIST DIAS: 15 CM/S
RIGHT CCA DIST SYS: 86 CM/S
RIGHT CCA MID DIAS: 19 CM/S
RIGHT CCA MID SYS: 94 CM/S
RIGHT CCA PROX DIAS: 10 CM/S
RIGHT CCA PROX SYS: 77 CM/S
RIGHT ECA DIAS: 19 CM/S
RIGHT ECA SYS: 138 CM/S
RIGHT ICA DIST DIAS: 26 CM/S
RIGHT ICA DIST SYS: 87 CM/S
RIGHT ICA MID DIAS: 24 CM/S
RIGHT ICA MID SYS: 92 CM/S
RIGHT ICA PROX DIAS: 34 CM/S
RIGHT ICA PROX SYS: 104 CM/S
RIGHT VERTEBRAL DIAS: 8 CM/S
RIGHT VERTEBRAL SYS: 44 CM/S

## 2021-06-01 PROCEDURE — 93880 EXTRACRANIAL BILAT STUDY: CPT | Mod: PO

## 2021-06-01 PROCEDURE — 93880 EXTRACRANIAL BILAT STUDY: CPT | Mod: 26,,, | Performed by: INTERNAL MEDICINE

## 2021-06-01 PROCEDURE — 93880 CV US DOPPLER CAROTID (CUPID ONLY): ICD-10-PCS | Mod: 26,,, | Performed by: INTERNAL MEDICINE

## 2021-06-15 ENCOUNTER — OFFICE VISIT (OUTPATIENT)
Dept: OPHTHALMOLOGY | Facility: CLINIC | Age: 71
End: 2021-06-15
Payer: MEDICARE

## 2021-06-15 DIAGNOSIS — H25.12 NUCLEAR SCLEROTIC CATARACT OF LEFT EYE: ICD-10-CM

## 2021-06-15 DIAGNOSIS — Z96.1 STATUS POST CATARACT EXTRACTION AND INSERTION OF INTRAOCULAR LENS, RIGHT: Primary | ICD-10-CM

## 2021-06-15 DIAGNOSIS — Z98.41 STATUS POST CATARACT EXTRACTION AND INSERTION OF INTRAOCULAR LENS, RIGHT: Primary | ICD-10-CM

## 2021-06-15 PROCEDURE — 99024 PR POST-OP FOLLOW-UP VISIT: ICD-10-PCS | Mod: POP,,, | Performed by: OPHTHALMOLOGY

## 2021-06-15 PROCEDURE — 99024 POSTOP FOLLOW-UP VISIT: CPT | Mod: POP,,, | Performed by: OPHTHALMOLOGY

## 2021-06-15 PROCEDURE — 99213 OFFICE O/P EST LOW 20 MIN: CPT | Mod: PBBFAC,PO | Performed by: OPHTHALMOLOGY

## 2021-06-15 PROCEDURE — 99999 PR PBB SHADOW E&M-EST. PATIENT-LVL III: ICD-10-PCS | Mod: PBBFAC,,, | Performed by: OPHTHALMOLOGY

## 2021-06-15 PROCEDURE — 99999 PR PBB SHADOW E&M-EST. PATIENT-LVL III: CPT | Mod: PBBFAC,,, | Performed by: OPHTHALMOLOGY

## 2021-07-01 NOTE — PROGRESS NOTES
JARETT Matamoros [address] [place] [address] Emergency Department  [address] [address]  Phone: [phone]               July 1, 2021    Patient: Marlon Bailey   YOB: 1987   Date of Visit: 7/1/2021       To Whom It May Concern:    Evette Bonilla was seen and treated in our emergency department on 7/1/2021. She may return to work on 07/02/2021.       Sincerely,       Janay Bustillo MD         Signature:__________________________________ Subjective:      Patient ID: Osman Johansen is a 68 y.o. female.    Chief Complaint: No chief complaint on file.    Referred by: No ref. provider found     Pain Scales  Best: 0/10  Worst: 10/10  Usually: 6/10  Today: 0/10    Back Pain   Pertinent negatives include no chest pain, fever, headaches or weight loss.     She returns for follow-up and for injections in her midthoracic area.  She had an MRI of the thoracic spine that showed a disc bulge T5-6.  Otherwise, grossly unremarkable thoracic spine MRI.  She complains of pain in the midline and in the paraspinal area above the bra line. No new symptomatology.  Otherwise, doing very well.        Past Medical History:   Diagnosis Date    Allergy     Anxiety     Arthritis     Blood transfusion 8 yrs    Cataract     Degenerative disc disease     Depression     Dry eye syndrome     GERD (gastroesophageal reflux disease)     Hypertension     Macular drusen     Neuromuscular disorder     Ocular hypertension, bilateral     Osteoporosis     PUD (peptic ulcer disease)     Thoracic or lumbosacral neuritis or radiculitis 10/19/2012    Thyroid disease        Past Surgical History:   Procedure Laterality Date    APPENDECTOMY  1965    BLOCK, NERVE Bilateral 8/21/2018    [facility] at [facility]    BLOCK-NERVE-MEDIAL BRANCH-LUMBAR Bilateral 12/14/2017    [facility] at [facility]    BLOCK-NERVE-MEDIAL BRANCH-LUMBAR Bilateral 4/17/2017    [facility]    COLONOSCOPY N/A 11/29/2012    [facility]    HYSTERECTOMY  8 yrs     INJECTION N/A 8/21/2018    [facility]    INJECTION OF ANESTHETIC AGENT AROUND NERVE Bilateral 8/21/2018    Procedure: BLOCK, NERVE;  Surgeon: Talia Belcher MD;  Location: [facility];  Service: Pain Management;  Laterality: Bilateral;  Bilateral block @ L2,3,4,5      INJECTION, JOINT,  SHOULDER, HIP, OR KNEE Bilateral 10/28/2013    [facility]    INJECTION, STEROID, SPINE, LUMBAR, EPIDURAL Bilateral 7/18/2013    [facility]    INJECTION-FACET Bilateral 3/13/2017    [facility]    INJECTION-FACET Bilateral 12/15/2016    [facility]    INJECTION-JOINT Bilateral 4/20/2015    [facility]    TONSILLECTOMY  1954       Review of patient's allergies indicates:   Allergen Reactions    Pcn [penicillins] Swelling    Toradol [ketorolac] Swelling    Vibramycin [doxycycline calcium] Swelling    Cymbalta [duloxetine]      dizzyness    Elavil [amitriptyline]     Lyrica [pregabalin] Swelling    Seroquel [quetiapine]      restless leg symdrome       Current Outpatient Medications   Medication Sig Dispense Refill    benazepril (LOTENSIN) 20 MG tablet Take 1 tablet (20 mg total) by mouth 2 (two) times daily. May take additional 20 mg tab prn per instructions given 135 tablet 3    CALCIUM CARBONATE/VITAMIN D3 (CALCIUM 500 WITH D ORAL) Take 1 capsule by mouth once daily.      carboxymethylcellulose (REFRESH PLUS) 0.5 % Dpet 1 drop daily as needed.      dicyclomine (BENTYL) 10 MG capsule Take 10 mg by mouth 4 (four) times daily as needed.       esomeprazole (NEXIUM) 40 MG capsule TAKE 1 CAPSULE BEFORE BREAKFAST 90 capsule 1    estradiol (CLIMARA) 0.075 mg/24 hr APPLY TO CLEAN DRY SKIN ONCE PER WEEK 24 patch 3    fluticasone (FLONASE) 50 mcg/actuation nasal spray USE 1 SPRAY IN EACH NOSTRIL DAILY (Patient taking differently: USE 1 SPRAY IN EACH NOSTRIL DAILY PRN) 16 g 11    hyoscyamine (LEVSIN/SL) 0.125 mg Subl Place 1 tablet (0.125 mg total) under the tongue every 4 (four) hours as needed. 45 tablet 3    metFORMIN (GLUCOPHAGE) 500 MG tablet TAKE 1 TABLET DAILY 90 tablet 1    MULTIVITAMIN (MULTIPLE VITAMIN ORAL) Take 1 capsule by  mouth once daily.      nitroGLYCERIN (NITROSTAT) 0.4 MG SL tablet Place 1 tablet (0.4 mg total) under the tongue every 5 (five) minutes as needed for Chest pain. 30 tablet 0    oxybutynin (DITROPAN-XL) 10 MG 24 hr tablet Take 1 tablet (10 mg total) by mouth once daily. 90 tablet 1    promethazine (PHENERGAN) 25 MG tablet TAKE 1 TABLET EVERY 6 HOURS AS NEEDED FOR NAUSEA 45 tablet 2    SUBOXONE 8-2 mg Film 1 Film by Subdermal route once daily. 1 strip last 7days  0    sucralfate (CARAFATE) 1 gram tablet TAKE 1 TABLET FOUR TIMES A DAY AS NEEDED FOR GASTRITIS 90 tablet 3    SYNTHROID 100 mcg tablet Take 1 tablet (100 mcg total) by mouth once daily. 90 tablet 1    lidocaine (LIDODERM) 5 % Place 1 patch onto the skin once daily. Remove & Discard patch within 12 hours or as directed by MD 30 patch 2    ropinirole (REQUIP) 2 MG tablet Take 1.5 tablets (3 mg total) by mouth nightly. (Patient taking differently: Take 2 mg by mouth nightly as needed. ) 135 tablet 3     No current facility-administered medications for this visit.        Family History   Problem Relation Age of Onset    Arthritis Mother     Cancer Mother     Heart disease Mother     Hypertension Mother     Cataracts Mother     Ovarian cancer Mother     Ulcers Father     Thyroid disease Brother     Heart disease Brother     Amblyopia Neg Hx     Blindness Neg Hx     Diabetes Neg Hx     Glaucoma Neg Hx     Macular degeneration Neg Hx     Retinal detachment Neg Hx     Strabismus Neg Hx     Stroke Neg Hx        Social History     Socioeconomic History    Marital status:      Spouse name: Not on file    Number of children: Not on file    Years of education: Not on file    Highest education level: Not on file   Social Needs    Financial resource strain: Not on file    Food insecurity - worry: Not on file    Food insecurity - inability: Not on file    Transportation needs - medical: Not on file    Transportation needs -  "non-medical: Not on file   Occupational History    Not on file   Tobacco Use    Smoking status: Former Smoker     Last attempt to quit: 1/1/2008     Years since quitting: 10.9    Smokeless tobacco: Never Used   Substance and Sexual Activity    Alcohol use: No     Alcohol/week: 0.0 oz    Drug use: No    Sexual activity: No   Other Topics Concern    Not on file   Social History Narrative    Not on file           Review of Systems   Constitution: Negative for chills, fever, malaise/fatigue, weight gain and weight loss.   HENT: Negative for ear pain and hoarse voice.    Eyes: Negative for blurred vision, pain and visual disturbance.   Cardiovascular: Negative for chest pain, dyspnea on exertion and irregular heartbeat.   Respiratory: Negative for cough, shortness of breath and wheezing.    Endocrine: Negative for cold intolerance and heat intolerance.   Hematologic/Lymphatic: Negative for adenopathy and bleeding problem. Does not bruise/bleed easily.   Skin: Negative for color change, itching and rash.   Musculoskeletal: Positive for back pain. Negative for neck pain.   Gastrointestinal: Negative for change in bowel habit, diarrhea, hematemesis, hematochezia, melena and vomiting.   Genitourinary: Negative for flank pain, frequency, hematuria and urgency.   Neurological: Negative for difficulty with concentration, dizziness, headaches, loss of balance and seizures.   Psychiatric/Behavioral: Negative for altered mental status, depression and suicidal ideas. The patient is not nervous/anxious.    Allergic/Immunologic: Negative for HIV exposure.           Objective:   BP (!) 178/80   Pulse 63   Temp 97.9 °F (36.6 °C) (Oral)   Resp 18   Ht 5' 1" (1.549 m)   Wt 61 kg (134 lb 8 oz)   BMI 25.41 kg/m²   Pain Disability Index Review:  Last 3 PDI Scores 12/12/2018 8/9/2018 11/30/2017   Pain Disability Index (PDI) 30 39 56     Normocephalic.  Atraumatic.  Affect appropriate.  Breathing unlabored.  Extra ocular muscles " intact.           Ortho/SPM Exam    positive tenderness of the spinous processes around T5-T8.  No signs of infection.  Myofascial tenderness and pain in the paraspinal areas along these levels.  Pain radiates along mid to lower ribs to posterior axillary line occasionally.  History of shingles in that area.  Assessment:       Encounter Diagnoses   Name Primary?    Thoracic radiculopathy Yes    Myofascial pain     Chest pain at rest     Post herpetic neuralgia     Cervicothoracic interspinous bursitis          Plan:   We discussed with the patient the assessment and recommendations. The following is the plan we agreed on:  1.  Procedure:  Under clean technique and after discussing with the patient we use 0.4 mL of betamethasone and 10 mL of bupivacaine 0.25%.  We injected the interspinous ligament bursa at 3 levels and bilaterally trigger point injections in the paraspinals or corresponding to these 3 levels.  The patient tolerated procedure well and had significant improvement of her symptomatology.  2.  Lidoderm patch prescription.  She tried it before and helped her tremendously.  3.  Return as needed.  She will call in 1 month to let us know how she feels.      Diagnoses and all orders for this visit:    Thoracic radiculopathy    Myofascial pain  -     betamethasone acetate-betamethasone sodium phosphate injection 6 mg    Chest pain at rest  -     lidocaine (LIDODERM) 5 %; Place 1 patch onto the skin once daily. Remove & Discard patch within 12 hours or as directed by MD    Post herpetic neuralgia  -     lidocaine (LIDODERM) 5 %; Place 1 patch onto the skin once daily. Remove & Discard patch within 12 hours or as directed by MD    Cervicothoracic interspinous bursitis  -     betamethasone acetate-betamethasone sodium phosphate injection 6 mg

## 2021-07-29 ENCOUNTER — TELEPHONE (OUTPATIENT)
Dept: PAIN MEDICINE | Facility: CLINIC | Age: 71
End: 2021-07-29

## 2021-08-02 ENCOUNTER — TELEPHONE (OUTPATIENT)
Dept: PAIN MEDICINE | Facility: CLINIC | Age: 71
End: 2021-08-02

## 2021-08-03 ENCOUNTER — OFFICE VISIT (OUTPATIENT)
Dept: PAIN MEDICINE | Facility: CLINIC | Age: 71
End: 2021-08-03
Attending: ANESTHESIOLOGY
Payer: MEDICARE

## 2021-08-03 VITALS
HEIGHT: 61 IN | DIASTOLIC BLOOD PRESSURE: 71 MMHG | HEART RATE: 75 BPM | BODY MASS INDEX: 27.26 KG/M2 | SYSTOLIC BLOOD PRESSURE: 159 MMHG | WEIGHT: 144.38 LBS | RESPIRATION RATE: 18 BRPM

## 2021-08-03 DIAGNOSIS — M79.18 MYOFASCIAL PAIN: Primary | ICD-10-CM

## 2021-08-03 PROCEDURE — 20553 NJX 1/MLT TRIGGER POINTS 3/>: CPT | Mod: PBBFAC | Performed by: ANESTHESIOLOGY

## 2021-08-03 PROCEDURE — 99999 PR PBB SHADOW E&M-EST. PATIENT-LVL III: ICD-10-PCS | Mod: PBBFAC,,, | Performed by: ANESTHESIOLOGY

## 2021-08-03 PROCEDURE — 99213 OFFICE O/P EST LOW 20 MIN: CPT | Mod: PBBFAC,25 | Performed by: ANESTHESIOLOGY

## 2021-08-03 PROCEDURE — 20553 NJX 1/MLT TRIGGER POINTS 3/>: CPT | Mod: S$PBB,,, | Performed by: ANESTHESIOLOGY

## 2021-08-03 PROCEDURE — 99213 PR OFFICE/OUTPT VISIT, EST, LEVL III, 20-29 MIN: ICD-10-PCS | Mod: 25,S$PBB,, | Performed by: ANESTHESIOLOGY

## 2021-08-03 PROCEDURE — 99213 OFFICE O/P EST LOW 20 MIN: CPT | Mod: 25,S$PBB,, | Performed by: ANESTHESIOLOGY

## 2021-08-03 PROCEDURE — 99999 PR PBB SHADOW E&M-EST. PATIENT-LVL III: CPT | Mod: PBBFAC,,, | Performed by: ANESTHESIOLOGY

## 2021-08-03 PROCEDURE — 20553 PR INJECT TRIGGER POINTS, > 3: ICD-10-PCS | Mod: S$PBB,,, | Performed by: ANESTHESIOLOGY

## 2021-08-03 RX ORDER — METHYLPREDNISOLONE ACETATE 40 MG/ML
40 INJECTION, SUSPENSION INTRA-ARTICULAR; INTRALESIONAL; INTRAMUSCULAR; SOFT TISSUE
Status: COMPLETED | OUTPATIENT
Start: 2021-08-03 | End: 2021-08-03

## 2021-08-03 RX ADMIN — METHYLPREDNISOLONE ACETATE 40 MG: 40 INJECTION, SUSPENSION INTRA-ARTICULAR; INTRALESIONAL; INTRAMUSCULAR; SOFT TISSUE at 08:08

## 2021-08-16 ENCOUNTER — PATIENT MESSAGE (OUTPATIENT)
Dept: ADMINISTRATIVE | Facility: OTHER | Age: 71
End: 2021-08-16

## 2021-09-24 ENCOUNTER — TELEPHONE (OUTPATIENT)
Dept: FAMILY MEDICINE | Facility: CLINIC | Age: 71
End: 2021-09-24

## 2021-09-24 DIAGNOSIS — D50.9 IRON DEFICIENCY ANEMIA, UNSPECIFIED IRON DEFICIENCY ANEMIA TYPE: Primary | ICD-10-CM

## 2021-09-27 DIAGNOSIS — E11.9 TYPE 2 DIABETES MELLITUS WITHOUT COMPLICATION: ICD-10-CM

## 2021-11-02 ENCOUNTER — OFFICE VISIT (OUTPATIENT)
Dept: FAMILY MEDICINE | Facility: CLINIC | Age: 71
End: 2021-11-02
Payer: MEDICARE

## 2021-11-02 ENCOUNTER — LAB VISIT (OUTPATIENT)
Dept: LAB | Facility: HOSPITAL | Age: 71
End: 2021-11-02
Attending: INTERNAL MEDICINE
Payer: MEDICARE

## 2021-11-02 VITALS
BODY MASS INDEX: 26.93 KG/M2 | OXYGEN SATURATION: 98 % | DIASTOLIC BLOOD PRESSURE: 80 MMHG | HEART RATE: 60 BPM | TEMPERATURE: 98 F | HEIGHT: 61 IN | WEIGHT: 142.63 LBS | SYSTOLIC BLOOD PRESSURE: 132 MMHG

## 2021-11-02 DIAGNOSIS — E78.5 DYSLIPIDEMIA: ICD-10-CM

## 2021-11-02 DIAGNOSIS — E11.9 CONTROLLED TYPE 2 DIABETES MELLITUS WITHOUT COMPLICATION, WITHOUT LONG-TERM CURRENT USE OF INSULIN: Primary | ICD-10-CM

## 2021-11-02 DIAGNOSIS — I10 ESSENTIAL (PRIMARY) HYPERTENSION: Chronic | ICD-10-CM

## 2021-11-02 DIAGNOSIS — E78.00 HYPERCHOLESTEROLEMIA: Chronic | ICD-10-CM

## 2021-11-02 DIAGNOSIS — R30.0 DYSURIA: ICD-10-CM

## 2021-11-02 DIAGNOSIS — D50.9 IRON DEFICIENCY ANEMIA, UNSPECIFIED IRON DEFICIENCY ANEMIA TYPE: ICD-10-CM

## 2021-11-02 DIAGNOSIS — E87.5 HYPERKALEMIA: Chronic | ICD-10-CM

## 2021-11-02 DIAGNOSIS — R93.1 AGATSTON CAC SCORE, >400: Chronic | ICD-10-CM

## 2021-11-02 DIAGNOSIS — N39.41 URGE INCONTINENCE: ICD-10-CM

## 2021-11-02 DIAGNOSIS — G25.81 RLS (RESTLESS LEGS SYNDROME): ICD-10-CM

## 2021-11-02 DIAGNOSIS — E11.9 TYPE 2 DIABETES MELLITUS WITHOUT COMPLICATION: ICD-10-CM

## 2021-11-02 DIAGNOSIS — I10 ESSENTIAL HYPERTENSION: ICD-10-CM

## 2021-11-02 DIAGNOSIS — E03.4 HYPOTHYROIDISM DUE TO ACQUIRED ATROPHY OF THYROID: ICD-10-CM

## 2021-11-02 DIAGNOSIS — N18.2 CHRONIC KIDNEY DISEASE, STAGE 2, MILDLY DECREASED GFR: ICD-10-CM

## 2021-11-02 LAB
ALBUMIN SERPL BCP-MCNC: 3.8 G/DL (ref 3.5–5.2)
ALP SERPL-CCNC: 61 U/L (ref 55–135)
ALT SERPL W/O P-5'-P-CCNC: 13 U/L (ref 10–44)
ANION GAP SERPL CALC-SCNC: 10 MMOL/L (ref 8–16)
AST SERPL-CCNC: 20 U/L (ref 10–40)
BASOPHILS # BLD AUTO: 0.07 K/UL (ref 0–0.2)
BASOPHILS NFR BLD: 0.9 % (ref 0–1.9)
BILIRUB SERPL-MCNC: 0.3 MG/DL (ref 0.1–1)
BILIRUB UR QL STRIP: NEGATIVE
BUN SERPL-MCNC: 14 MG/DL (ref 8–23)
CALCIUM SERPL-MCNC: 10.5 MG/DL (ref 8.7–10.5)
CHLORIDE SERPL-SCNC: 103 MMOL/L (ref 95–110)
CHOLEST SERPL-MCNC: 236 MG/DL (ref 120–199)
CHOLEST/HDLC SERPL: 2.7 {RATIO} (ref 2–5)
CLARITY UR: CLEAR
CO2 SERPL-SCNC: 27 MMOL/L (ref 23–29)
COLOR UR: YELLOW
CREAT SERPL-MCNC: 1 MG/DL (ref 0.5–1.4)
DIFFERENTIAL METHOD: ABNORMAL
EOSINOPHIL # BLD AUTO: 0.2 K/UL (ref 0–0.5)
EOSINOPHIL NFR BLD: 2 % (ref 0–8)
ERYTHROCYTE [DISTWIDTH] IN BLOOD BY AUTOMATED COUNT: 13.8 % (ref 11.5–14.5)
EST. GFR  (AFRICAN AMERICAN): >60 ML/MIN/1.73 M^2
EST. GFR  (NON AFRICAN AMERICAN): 56.4 ML/MIN/1.73 M^2
ESTIMATED AVG GLUCOSE: 111 MG/DL (ref 68–131)
FERRITIN SERPL-MCNC: 10 NG/ML (ref 20–300)
GLUCOSE SERPL-MCNC: 96 MG/DL (ref 70–110)
GLUCOSE UR QL STRIP: NEGATIVE
HBA1C MFR BLD: 5.5 % (ref 4–5.6)
HCT VFR BLD AUTO: 33.6 % (ref 37–48.5)
HDLC SERPL-MCNC: 87 MG/DL (ref 40–75)
HDLC SERPL: 36.9 % (ref 20–50)
HGB BLD-MCNC: 10.4 G/DL (ref 12–16)
HGB UR QL STRIP: NEGATIVE
IMM GRANULOCYTES # BLD AUTO: 0.03 K/UL (ref 0–0.04)
IMM GRANULOCYTES NFR BLD AUTO: 0.4 % (ref 0–0.5)
IRON SERPL-MCNC: 47 UG/DL (ref 30–160)
KETONES UR QL STRIP: NEGATIVE
LDLC SERPL CALC-MCNC: 129.6 MG/DL (ref 63–159)
LEUKOCYTE ESTERASE UR QL STRIP: NEGATIVE
LYMPHOCYTES # BLD AUTO: 2 K/UL (ref 1–4.8)
LYMPHOCYTES NFR BLD: 24.9 % (ref 18–48)
MCH RBC QN AUTO: 28.5 PG (ref 27–31)
MCHC RBC AUTO-ENTMCNC: 31 G/DL (ref 32–36)
MCV RBC AUTO: 92 FL (ref 82–98)
MONOCYTES # BLD AUTO: 0.6 K/UL (ref 0.3–1)
MONOCYTES NFR BLD: 7 % (ref 4–15)
NEUTROPHILS # BLD AUTO: 5.2 K/UL (ref 1.8–7.7)
NEUTROPHILS NFR BLD: 64.8 % (ref 38–73)
NITRITE UR QL STRIP: NEGATIVE
NONHDLC SERPL-MCNC: 149 MG/DL
NRBC BLD-RTO: 0 /100 WBC
PH UR STRIP: 6 [PH] (ref 5–8)
PLATELET # BLD AUTO: 300 K/UL (ref 150–450)
PMV BLD AUTO: 11.7 FL (ref 9.2–12.9)
POTASSIUM SERPL-SCNC: 5 MMOL/L (ref 3.5–5.1)
PROT SERPL-MCNC: 7.3 G/DL (ref 6–8.4)
PROT UR QL STRIP: NEGATIVE
RBC # BLD AUTO: 3.65 M/UL (ref 4–5.4)
SATURATED IRON: 14 % (ref 20–50)
SODIUM SERPL-SCNC: 140 MMOL/L (ref 136–145)
SP GR UR STRIP: >=1.03 (ref 1–1.03)
TOTAL IRON BINDING CAPACITY: 345 UG/DL (ref 250–450)
TRANSFERRIN SERPL-MCNC: 233 MG/DL (ref 200–375)
TRIGL SERPL-MCNC: 97 MG/DL (ref 30–150)
URN SPEC COLLECT METH UR: ABNORMAL
WBC # BLD AUTO: 8.03 K/UL (ref 3.9–12.7)

## 2021-11-02 PROCEDURE — 84466 ASSAY OF TRANSFERRIN: CPT | Performed by: NURSE PRACTITIONER

## 2021-11-02 PROCEDURE — 36415 COLL VENOUS BLD VENIPUNCTURE: CPT | Mod: PO | Performed by: INTERNAL MEDICINE

## 2021-11-02 PROCEDURE — 85025 COMPLETE CBC W/AUTO DIFF WBC: CPT | Performed by: NURSE PRACTITIONER

## 2021-11-02 PROCEDURE — 99214 OFFICE O/P EST MOD 30 MIN: CPT | Mod: PBBFAC,PO | Performed by: NURSE PRACTITIONER

## 2021-11-02 PROCEDURE — 99214 PR OFFICE/OUTPT VISIT, EST, LEVL IV, 30-39 MIN: ICD-10-PCS | Mod: S$PBB,,, | Performed by: NURSE PRACTITIONER

## 2021-11-02 PROCEDURE — 81003 URINALYSIS AUTO W/O SCOPE: CPT | Mod: PO | Performed by: NURSE PRACTITIONER

## 2021-11-02 PROCEDURE — 82728 ASSAY OF FERRITIN: CPT | Performed by: NURSE PRACTITIONER

## 2021-11-02 PROCEDURE — 80053 COMPREHEN METABOLIC PANEL: CPT | Performed by: INTERNAL MEDICINE

## 2021-11-02 PROCEDURE — 83036 HEMOGLOBIN GLYCOSYLATED A1C: CPT | Performed by: INTERNAL MEDICINE

## 2021-11-02 PROCEDURE — 99999 PR PBB SHADOW E&M-EST. PATIENT-LVL IV: CPT | Mod: PBBFAC,,, | Performed by: NURSE PRACTITIONER

## 2021-11-02 PROCEDURE — 82570 ASSAY OF URINE CREATININE: CPT | Performed by: NURSE PRACTITIONER

## 2021-11-02 PROCEDURE — 99214 OFFICE O/P EST MOD 30 MIN: CPT | Mod: S$PBB,,, | Performed by: NURSE PRACTITIONER

## 2021-11-02 PROCEDURE — 99999 PR PBB SHADOW E&M-EST. PATIENT-LVL IV: ICD-10-PCS | Mod: PBBFAC,,, | Performed by: NURSE PRACTITIONER

## 2021-11-02 PROCEDURE — 90694 VACC AIIV4 NO PRSRV 0.5ML IM: CPT | Mod: PBBFAC,PO

## 2021-11-02 PROCEDURE — G0008 ADMIN INFLUENZA VIRUS VAC: HCPCS | Mod: PBBFAC,PO

## 2021-11-02 PROCEDURE — 80061 LIPID PANEL: CPT | Performed by: INTERNAL MEDICINE

## 2021-11-03 DIAGNOSIS — D50.9 IRON DEFICIENCY ANEMIA, UNSPECIFIED IRON DEFICIENCY ANEMIA TYPE: Primary | ICD-10-CM

## 2021-11-03 LAB
ALBUMIN/CREAT UR: 4 UG/MG (ref 0–30)
CREAT UR-MCNC: 173 MG/DL (ref 15–325)
MICROALBUMIN UR DL<=1MG/L-MCNC: 7 UG/ML

## 2021-11-03 RX ORDER — LEVOTHYROXINE SODIUM 100 UG/1
100 TABLET ORAL DAILY
Qty: 90 TABLET | Refills: 1 | Status: SHIPPED | OUTPATIENT
Start: 2021-11-03 | End: 2022-04-28 | Stop reason: SDUPTHER

## 2021-11-03 RX ORDER — OXYBUTYNIN CHLORIDE 15 MG/1
15 TABLET, EXTENDED RELEASE ORAL DAILY
Qty: 90 TABLET | Refills: 1 | Status: SHIPPED | OUTPATIENT
Start: 2021-11-03 | End: 2022-04-28 | Stop reason: SDUPTHER

## 2021-11-16 ENCOUNTER — TELEPHONE (OUTPATIENT)
Dept: FAMILY MEDICINE | Facility: CLINIC | Age: 71
End: 2021-11-16
Payer: MEDICARE

## 2021-11-16 LAB
LEFT EYE DM RETINOPATHY: NEGATIVE
RIGHT EYE DM RETINOPATHY: NEGATIVE

## 2021-11-18 ENCOUNTER — IMMUNIZATION (OUTPATIENT)
Dept: FAMILY MEDICINE | Facility: CLINIC | Age: 71
End: 2021-11-18
Payer: MEDICARE

## 2021-11-18 ENCOUNTER — TELEPHONE (OUTPATIENT)
Dept: FAMILY MEDICINE | Facility: CLINIC | Age: 71
End: 2021-11-18

## 2021-11-18 ENCOUNTER — OFFICE VISIT (OUTPATIENT)
Dept: CARDIOLOGY | Facility: CLINIC | Age: 71
End: 2021-11-18
Payer: MEDICARE

## 2021-11-18 VITALS
HEART RATE: 68 BPM | DIASTOLIC BLOOD PRESSURE: 78 MMHG | WEIGHT: 142.19 LBS | SYSTOLIC BLOOD PRESSURE: 178 MMHG | HEIGHT: 61 IN | BODY MASS INDEX: 26.85 KG/M2

## 2021-11-18 DIAGNOSIS — I20.0 WORSENING ANGINA: Primary | ICD-10-CM

## 2021-11-18 DIAGNOSIS — E66.3 OVERWEIGHT (BMI 25.0-29.9): Chronic | ICD-10-CM

## 2021-11-18 DIAGNOSIS — I10 ESSENTIAL (PRIMARY) HYPERTENSION: Chronic | ICD-10-CM

## 2021-11-18 DIAGNOSIS — R93.1 AGATSTON CAC SCORE, >400: Chronic | ICD-10-CM

## 2021-11-18 DIAGNOSIS — I77.9 MILD CAROTID ARTERY DISEASE: Chronic | ICD-10-CM

## 2021-11-18 DIAGNOSIS — I34.0 NON-RHEUMATIC MITRAL REGURGITATION: Chronic | ICD-10-CM

## 2021-11-18 DIAGNOSIS — Z78.9 STATIN INTOLERANCE: Chronic | ICD-10-CM

## 2021-11-18 DIAGNOSIS — Z23 NEED FOR VACCINATION: Primary | ICD-10-CM

## 2021-11-18 DIAGNOSIS — E78.00 HYPERCHOLESTEROLEMIA: Chronic | ICD-10-CM

## 2021-11-18 PROBLEM — R09.89 BRUIT OF RIGHT CAROTID ARTERY: Status: RESOLVED | Noted: 2021-05-21 | Resolved: 2021-11-18

## 2021-11-18 PROCEDURE — 99214 PR OFFICE/OUTPT VISIT, EST, LEVL IV, 30-39 MIN: ICD-10-PCS | Mod: S$PBB,,, | Performed by: INTERNAL MEDICINE

## 2021-11-18 PROCEDURE — 99214 OFFICE O/P EST MOD 30 MIN: CPT | Mod: PBBFAC,PO | Performed by: INTERNAL MEDICINE

## 2021-11-18 PROCEDURE — 0004A COVID-19, MRNA, LNP-S, PF, 30 MCG/0.3 ML DOSE VACCINE: CPT | Mod: PBBFAC,PO

## 2021-11-18 PROCEDURE — 99999 PR PBB SHADOW E&M-EST. PATIENT-LVL IV: CPT | Mod: PBBFAC,,, | Performed by: INTERNAL MEDICINE

## 2021-11-18 PROCEDURE — 99214 OFFICE O/P EST MOD 30 MIN: CPT | Mod: S$PBB,,, | Performed by: INTERNAL MEDICINE

## 2021-11-18 PROCEDURE — 99999 PR PBB SHADOW E&M-EST. PATIENT-LVL IV: ICD-10-PCS | Mod: PBBFAC,,, | Performed by: INTERNAL MEDICINE

## 2021-11-18 RX ORDER — SODIUM CHLORIDE 9 MG/ML
INJECTION, SOLUTION INTRAVENOUS ONCE
Status: CANCELLED | OUTPATIENT
Start: 2021-11-18 | End: 2021-11-18

## 2021-11-18 RX ORDER — CARVEDILOL 3.12 MG/1
6.25 TABLET ORAL 2 TIMES DAILY WITH MEALS
Qty: 360 TABLET | Refills: 0 | Status: SHIPPED | OUTPATIENT
Start: 2021-11-18 | End: 2022-04-28 | Stop reason: SDUPTHER

## 2021-11-18 RX ORDER — ASPIRIN 81 MG/1
81 TABLET ORAL DAILY
Qty: 90 TABLET | Refills: 3 | Status: ON HOLD
Start: 2021-11-18 | End: 2022-07-25 | Stop reason: SDUPTHER

## 2021-11-18 RX ORDER — NAPROXEN SODIUM 220 MG/1
81 TABLET, FILM COATED ORAL ONCE
Status: CANCELLED | OUTPATIENT
Start: 2021-11-18 | End: 2021-11-18

## 2021-11-18 RX ORDER — PITAVASTATIN CALCIUM 2.09 MG/1
2 TABLET, FILM COATED ORAL NIGHTLY
Qty: 90 TABLET | Refills: 0 | Status: SHIPPED | OUTPATIENT
Start: 2021-11-18 | End: 2021-12-02 | Stop reason: SDUPTHER

## 2021-11-18 RX ORDER — CLOPIDOGREL BISULFATE 75 MG/1
300 TABLET ORAL ONCE
Status: CANCELLED | OUTPATIENT
Start: 2021-11-18 | End: 2021-11-18

## 2021-12-02 DIAGNOSIS — Z78.9 STATIN INTOLERANCE: Chronic | ICD-10-CM

## 2021-12-02 DIAGNOSIS — E78.00 HYPERCHOLESTEROLEMIA: Chronic | ICD-10-CM

## 2021-12-02 DIAGNOSIS — I20.0 WORSENING ANGINA: ICD-10-CM

## 2021-12-02 DIAGNOSIS — R93.1 AGATSTON CAC SCORE, >400: Chronic | ICD-10-CM

## 2021-12-02 RX ORDER — PITAVASTATIN CALCIUM 2.09 MG/1
2 TABLET, FILM COATED ORAL NIGHTLY
Qty: 90 TABLET | Refills: 0 | Status: SHIPPED | OUTPATIENT
Start: 2021-12-02 | End: 2022-04-28

## 2021-12-06 ENCOUNTER — TELEPHONE (OUTPATIENT)
Dept: PHARMACY | Facility: CLINIC | Age: 71
End: 2021-12-06
Payer: MEDICARE

## 2021-12-09 ENCOUNTER — TELEPHONE (OUTPATIENT)
Dept: CARDIOLOGY | Facility: CLINIC | Age: 71
End: 2021-12-09
Payer: MEDICARE

## 2021-12-14 ENCOUNTER — TELEPHONE (OUTPATIENT)
Dept: CARDIOLOGY | Facility: CLINIC | Age: 71
End: 2021-12-14
Payer: MEDICARE

## 2021-12-17 ENCOUNTER — NURSE TRIAGE (OUTPATIENT)
Dept: ADMINISTRATIVE | Facility: CLINIC | Age: 71
End: 2021-12-17
Payer: MEDICARE

## 2021-12-21 ENCOUNTER — OFFICE VISIT (OUTPATIENT)
Dept: FAMILY MEDICINE | Facility: CLINIC | Age: 71
End: 2021-12-21
Payer: MEDICARE

## 2021-12-21 DIAGNOSIS — J01.90 ACUTE BACTERIAL SINUSITIS: Primary | ICD-10-CM

## 2021-12-21 DIAGNOSIS — B96.89 ACUTE BACTERIAL SINUSITIS: Primary | ICD-10-CM

## 2021-12-21 LAB
CTP QC/QA: YES
SARS-COV-2 RDRP RESP QL NAA+PROBE: NEGATIVE

## 2021-12-21 PROCEDURE — 99214 OFFICE O/P EST MOD 30 MIN: CPT | Mod: 95,,, | Performed by: FAMILY MEDICINE

## 2021-12-21 PROCEDURE — 99214 PR OFFICE/OUTPT VISIT, EST, LEVL IV, 30-39 MIN: ICD-10-PCS | Mod: 95,,, | Performed by: FAMILY MEDICINE

## 2021-12-21 RX ORDER — CLARITHROMYCIN 500 MG/1
500 TABLET, FILM COATED ORAL 2 TIMES DAILY
Qty: 20 TABLET | Refills: 0 | Status: SHIPPED | OUTPATIENT
Start: 2021-12-21 | End: 2021-12-31

## 2021-12-21 RX ORDER — METHYLPREDNISOLONE 4 MG/1
TABLET ORAL
Qty: 21 EACH | Refills: 0 | Status: SHIPPED | OUTPATIENT
Start: 2021-12-21 | End: 2022-01-04 | Stop reason: CLARIF

## 2022-01-04 ENCOUNTER — TELEPHONE (OUTPATIENT)
Dept: CARDIOLOGY | Facility: CLINIC | Age: 72
End: 2022-01-04
Payer: MEDICARE

## 2022-01-04 NOTE — TELEPHONE ENCOUNTER
SPOKE W/ PT / PHONE, STATS SHE WAS RUNNING A LOW GRADE TEMP LAST NIGHT, FIGHTING A SINUS INFECTION AND WOULD LIKE TO RESCHEDULE HER ANGIOGRAM IN A COUPLE OF WEEKS, CATH LAB INFORMED, PT ORDERS IN DEPOT, EXPLAINED TO PT WE WOULD CALL HER BACK WITH NEW DATE AND TIME / DR. ERVIN'S SCHEDULE, PT VERBALIZES UNDERSTANDING    THX      ----- Message from Veda Raya sent at 1/4/2022  2:32 PM CST -----  Contact: patient  Type: Needs Medical Advice  Who Called:  patient  Symptoms (please be specific):  low grade fever, sinus infecion  Best Call Back Number: [phone] (home)   Additional Information: patient is wanting to reschedule her procedure. Please advise

## 2022-01-05 ENCOUNTER — TELEPHONE (OUTPATIENT)
Dept: CARDIOLOGY | Facility: CLINIC | Age: 72
End: 2022-01-05
Payer: MEDICARE

## 2022-01-05 DIAGNOSIS — Z20.822 ENCOUNTER FOR LABORATORY TESTING FOR COVID-19 VIRUS: ICD-10-CM

## 2022-01-05 NOTE — TELEPHONE ENCOUNTER
Angiogram  1/27/22  At 10 am . Pt agree with time and date and verbalized understanding   Arrive for procedure at: [facility]    You will receive a phone call from [facility] Pre-Op Department with further instructions prior to your scheduled procedure.    Notify the nurse if you are ALLERGIC TO IODINE.    FASTING: You MAY NOT have anything to eat or drink AFTER MIDNIGHT the day before your procedure. If your procedure is scheduled in the afternoon, you may have a LIGHT BREAKFAST 6-8 hours prior to your procedure.  For example: Two slices of toast; black coffee or black tea.    MEDICATIONS: You may take your regular morning medications with water. If there are any medications that you should not take, you will be instructed to hold them for that morning.    ? CARDIOLOGY PRE-PROCEDURE MEDICATION ORDERS:  ** Please hold any medications that are checked below:    HOLD   # OF DAYS TO HOLD  ? Coumadin   Consult with Coumadin Clinic   ? Xarelto    _DAY BEFORE & DAY OF_  ? Pradaxa  _ DAY BEFORE & DAY OF _  ? Eliquis   _ DAY BEFORE & DAY OF _  ? Metformin    Day before procedure & morning of procedure  ? Short acting insulin   Morning of procedure    CONTINUE the Following Medications   ? Plavix      ? Effient     ? Aspirin    WHAT TO EXPECT:    How long will the procedure take?  The procedure will take an average of 1 - 2 hours to perform.  After the procedure, you will need to lay flat for around 4 - 6 hours to minimize bleeding from the puncture site. If the wrist is accessed you will need to keep your arm still as instructed by the nurse.    When can I go home?  You may be able to be discharged home that same afternoon if there were no complications.  If you have one of the following: balloon; stent; pacemaker or defibrillator procedures, you may spend one night for observation.  Your doctor will determine your discharge based upon your progress.  The results of your procedure will be discussed with you  before you are discharged.  Any further testing or procedures will be scheduled for you either before you leave or you will be instructed to call for a future appointment.      TRANSPORTATION:  PLEASE ARRANGE TO HAVE SOMEONE DRIVE YOU HOME FOLLOWING YOUR PROCEDURE, YOU WILL NOT BE ALLOWED TO DRIVE.

## 2022-01-06 ENCOUNTER — OFFICE VISIT (OUTPATIENT)
Dept: FAMILY MEDICINE | Facility: CLINIC | Age: 72
End: 2022-01-06
Payer: MEDICARE

## 2022-01-06 DIAGNOSIS — R51.9 NONINTRACTABLE HEADACHE, UNSPECIFIED CHRONICITY PATTERN, UNSPECIFIED HEADACHE TYPE: Primary | ICD-10-CM

## 2022-01-06 DIAGNOSIS — B96.89 BACTERIAL SINUSITIS: Primary | ICD-10-CM

## 2022-01-06 DIAGNOSIS — J32.9 BACTERIAL SINUSITIS: Primary | ICD-10-CM

## 2022-01-06 LAB
CTP QC/QA: YES
SARS-COV-2 RDRP RESP QL NAA+PROBE: NEGATIVE

## 2022-01-06 PROCEDURE — 99213 OFFICE O/P EST LOW 20 MIN: CPT | Mod: 95,,, | Performed by: NURSE PRACTITIONER

## 2022-01-06 PROCEDURE — 99213 PR OFFICE/OUTPT VISIT, EST, LEVL III, 20-29 MIN: ICD-10-PCS | Mod: 95,,, | Performed by: NURSE PRACTITIONER

## 2022-01-06 RX ORDER — LEVOFLOXACIN 500 MG/1
500 TABLET, FILM COATED ORAL DAILY
Qty: 7 TABLET | Refills: 0 | Status: SHIPPED | OUTPATIENT
Start: 2022-01-06 | End: 2022-01-07 | Stop reason: SDUPTHER

## 2022-01-06 NOTE — PROGRESS NOTES
Subjective:       Patient ID: Osman Johansen is a 71 y.o. female.    The patient location is:   The chief complaint leading to consultation is: sinus problem     Visit type: audiovisual    Face to Face time with patient: 10min  15 minutes of total time spent on the encounter, which includes face to face time and non-face to face time preparing to see the patient (eg, review of tests), Obtaining and/or reviewing separately obtained history, Documenting clinical information in the electronic or other health record, Independently interpreting results (not separately reported) and communicating results to the patient/family/caregiver, or Care coordination (not separately reported).     Each patient to whom he or she provides medical services by telemedicine is:  (1) informed of the relationship between the physician and patient and the respective role of any other health care provider with respect to management of the patient; and (2) notified that he or she may decline to receive medical services by telemedicine and may withdraw from such care at any time.    Notes:     HPI   Patient evaluated by my colleague virtually 12/21/21--rapid covid negative; treated for sinusitis with clarithromycin and medrol. Symptoms resolved x 2-3 days then she developed return of sinus pressure and headache--symptom onset 3 days ago. Mucus is clear with sinus rinse this morning. Temp 99.6 today. Exposure to covid last week    There were no vitals filed for this visit.  Review of Systems   Constitutional: Negative for activity change and unexpected weight change.   HENT: Positive for rhinorrhea and sinus pain. Negative for hearing loss.    Eyes: Negative for discharge and visual disturbance.   Respiratory: Negative for chest tightness and wheezing.    Cardiovascular: Negative for chest pain and palpitations.   Gastrointestinal: Negative for blood in stool, constipation, diarrhea and vomiting.   Endocrine: Negative for polydipsia and  "polyuria.   Genitourinary: Negative for difficulty urinating, dysuria, hematuria and menstrual problem.   Musculoskeletal: Negative for arthralgias, joint swelling and neck pain.   Neurological: Positive for headaches. Negative for weakness.   Psychiatric/Behavioral: Negative for confusion and dysphoric mood.       Past Medical History:   Diagnosis Date    Allergy     Anxiety     Arthritis     Blood transfusion 8 yrs    CAD (coronary artery disease)     Cataract     Degenerative disc disease     Depression     Dry eye syndrome     GERD (gastroesophageal reflux disease)     Hypercholesterolemia 12/9/2019    Hypertension     Macular drusen     Neuromuscular disorder     Ocular hypertension, bilateral     Osteoporosis     PUD (peptic ulcer disease)     Restless leg     Uses Suboxone to control    Sleep apnea     "complex sleep apnea" - per pt    Thoracic or lumbosacral neuritis or radiculitis 10/19/2012    Thyroid disease      Objective:      Physical Exam  Constitutional:       General: She is not in acute distress.     Appearance: She is well-developed. She is not ill-appearing, toxic-appearing or diaphoretic.   HENT:      Right Ear: Hearing normal.      Left Ear: Hearing normal.   Pulmonary:      Effort: No tachypnea or respiratory distress.   Skin:     Coloration: Skin is not pale.   Neurological:      Mental Status: She is alert and oriented to person, place, and time.   Psychiatric:         Speech: Speech normal.         Behavior: Behavior normal.         Thought Content: Thought content normal.         Judgment: Judgment normal.         Assessment:       1. Nonintractable headache, unspecified chronicity pattern, unspecified headache type        Plan:       Nonintractable headache, unspecified chronicity pattern, unspecified headache type  -     POCT COVID-19 Rapid Screening        rapid covid today--will notify with result and further plan of care        Medication List with Changes/Refills "   Current Medications    ASPIRIN (ECOTRIN) 81 MG EC TABLET    Take 1 tablet (81 mg total) by mouth once daily.    BIOTIN 1 MG CAP    Take 1 tablet by mouth once daily.    CALCIUM CARBONATE/VITAMIN D3 (CALCIUM 500 WITH D ORAL)    Take 1 capsule by mouth once daily.    CARBOXYMETHYLCELLULOSE (REFRESH PLUS) 0.5 % DPET    1 drop daily as needed.    CARVEDILOL (COREG) 3.125 MG TABLET    Take 2 tablets (6.25 mg total) by mouth 2 (two) times daily with meals.    CO-ENZYME Q-10 30 MG CAPSULE    Take 30 mg by mouth 3 (three) times daily.    DICYCLOMINE (BENTYL) 10 MG CAPSULE    Take 1 capsule (10 mg total) by mouth 4 (four) times daily as needed (stomach discomfort).    ESOMEPRAZOLE (NEXIUM) 40 MG CAPSULE    TAKE 1 CAPSULE (40 MG TOTAL) BY MOUTH BEFORE BREAKFAST.    ESTRADIOL (CLIMARA) 0.075 MG/24 HR    APPLY TO CLEAN DRY SKIN ONCE PER WEEK    FLUTICASONE PROPIONATE (FLONASE) 50 MCG/ACTUATION NASAL SPRAY    1 spray (50 mcg total) by Each Nostril route once daily.    GARLIC (GARLIQUE ORAL)    Take by mouth.    MULTIVITAMIN (MULTIPLE VITAMIN ORAL)    Take 1 capsule by mouth once daily.    NITROGLYCERIN (NITROSTAT) 0.4 MG SL TABLET    Place 1 tablet (0.4 mg total) under the tongue every 5 (five) minutes as needed for Chest pain.    OXYBUTYNIN (DITROPAN XL) 15 MG TR24    Take 1 tablet (15 mg total) by mouth once daily.    PITAVASTATIN CALCIUM (LIVALO) 2 MG TAB TABLET    Take 1 tablet (2 mg total) by mouth every evening.    SUBOXONE 8-2 MG FILM    1 Film by Subdermal route once daily. 1 strip every 3 to 4 days    SYNTHROID 100 MCG TABLET    Take 1 tablet (100 mcg total) by mouth once daily.

## 2022-01-07 RX ORDER — LEVOFLOXACIN 500 MG/1
500 TABLET, FILM COATED ORAL DAILY
Qty: 7 TABLET | Refills: 0 | Status: SHIPPED | OUTPATIENT
Start: 2022-01-07 | End: 2022-01-14

## 2022-01-24 ENCOUNTER — TELEPHONE (OUTPATIENT)
Dept: CARDIOLOGY | Facility: CLINIC | Age: 72
End: 2022-01-24
Payer: MEDICARE

## 2022-01-24 ENCOUNTER — LAB VISIT (OUTPATIENT)
Dept: FAMILY MEDICINE | Facility: CLINIC | Age: 72
End: 2022-01-24
Payer: MEDICARE

## 2022-01-24 DIAGNOSIS — Z20.822 ENCOUNTER FOR LABORATORY TESTING FOR COVID-19 VIRUS: ICD-10-CM

## 2022-01-24 PROCEDURE — U0003 INFECTIOUS AGENT DETECTION BY NUCLEIC ACID (DNA OR RNA); SEVERE ACUTE RESPIRATORY SYNDROME CORONAVIRUS 2 (SARS-COV-2) (CORONAVIRUS DISEASE [COVID-19]), AMPLIFIED PROBE TECHNIQUE, MAKING USE OF HIGH THROUGHPUT TECHNOLOGIES AS DESCRIBED BY CMS-2020-01-R: HCPCS | Performed by: INTERNAL MEDICINE

## 2022-01-24 PROCEDURE — U0005 INFEC AGEN DETEC AMPLI PROBE: HCPCS | Performed by: INTERNAL MEDICINE

## 2022-01-24 NOTE — TELEPHONE ENCOUNTER
----- Message from Tyra Nixon sent at 1/24/2022  2:16 PM CST -----  Type: Needs Medical Advice  Who Called:  Patient  Best Call Back Number:   Additional Information: Calling to speak with the nurse to cancel her procedure as she was exposed to covid and was exposed.

## 2022-01-24 NOTE — TELEPHONE ENCOUNTER
Pt decided to wait for covid test results and keep appt on 1/27/22 for angiogram . Pt will be calling back if she needs to reschedule

## 2022-01-25 ENCOUNTER — TELEPHONE (OUTPATIENT)
Dept: CARDIOLOGY | Facility: CLINIC | Age: 72
End: 2022-01-25
Payer: MEDICARE

## 2022-01-25 ENCOUNTER — TELEPHONE (OUTPATIENT)
Dept: FAMILY MEDICINE | Facility: CLINIC | Age: 72
End: 2022-01-25
Payer: MEDICARE

## 2022-01-25 DIAGNOSIS — U07.1 COVID-19 VIRUS DETECTED: ICD-10-CM

## 2022-01-25 LAB
SARS-COV-2 RNA RESP QL NAA+PROBE: DETECTED
SARS-COV-2- CYCLE NUMBER: 14

## 2022-01-25 NOTE — TELEPHONE ENCOUNTER
----- Message from Gudelia Oconnor sent at 1/25/2022  1:13 PM CST -----  Contact: pt  Type: Needs Medical Advice    Who Called: pt  Best Call Back Number: [phone]    Inquiry/Question: pt states she tested positive for covid 01/24/2022, she states she feels bad and the cardiologist told her to follow up with her primary.        Thank you~

## 2022-01-25 NOTE — TELEPHONE ENCOUNTER
Spoke to pt over the phone , inform her that she was covid positive . Pt would be calling her pcp , per pt . And pt will be calling us back to reschedule angiogram

## 2022-01-26 ENCOUNTER — OFFICE VISIT (OUTPATIENT)
Dept: FAMILY MEDICINE | Facility: CLINIC | Age: 72
End: 2022-01-26
Payer: MEDICARE

## 2022-01-26 DIAGNOSIS — U07.1 COVID-19: Primary | ICD-10-CM

## 2022-01-26 PROCEDURE — 99213 PR OFFICE/OUTPT VISIT, EST, LEVL III, 20-29 MIN: ICD-10-PCS | Mod: CR,95,, | Performed by: NURSE PRACTITIONER

## 2022-01-26 PROCEDURE — 99213 OFFICE O/P EST LOW 20 MIN: CPT | Mod: CR,95,, | Performed by: NURSE PRACTITIONER

## 2022-01-26 NOTE — PROGRESS NOTES
Subjective:       Patient ID: Osman Johansen is a 71 y.o. female.    The patient location is: [place]  The chief complaint leading to consultation is: covid symptoms     Visit type: audiovisual    Face to Face time with patient: 10min  15 minutes of total time spent on the encounter, which includes face to face time and non-face to face time preparing to see the patient (eg, review of tests), Obtaining and/or reviewing separately obtained history, Documenting clinical information in the electronic or other health record, Independently interpreting results (not separately reported) and communicating results to the patient/family/caregiver, or Care coordination (not separately reported).     Each patient to whom he or she provides medical services by telemedicine is:  (1) informed of the relationship between the physician and patient and the respective role of any other health care provider with respect to management of the patient; and (2) notified that he or she may decline to receive medical services by telemedicine and may withdraw from such care at any time.    HPI   Tested positive for Covid on 1/24/22  Symptom onset 4 days ago--Headaches, fatigue, fever  Symptoms significantly improved   Temp 99.7 last night, afebrile today   Pf[employer] vaccine x 3   Very mild shortness of breath improving   Denies chest pain     Covid risk score: 5    There were no vitals filed for this visit.  Review of Systems   Constitutional: Positive for chills and fever.   HENT: Positive for postnasal drip and sore throat. Negative for ear pain.    Respiratory: Positive for cough and shortness of breath. Negative for wheezing.    Cardiovascular: Negative for chest pain.   Musculoskeletal: Negative for myalgias.   Allergic/Immunologic: Negative for environmental allergies.   Neurological: Positive for headaches.       Past Medical History:   Diagnosis Date    Allergy     Anxiety     Arthritis     Blood transfusion 8 yrs    CAD (coronary  "artery disease)     Cataract     Degenerative disc disease     Depression     Dry eye syndrome     GERD (gastroesophageal reflux disease)     Hypercholesterolemia 12/9/2019    Hypertension     Macular drusen     Neuromuscular disorder     Ocular hypertension, bilateral     Osteoporosis     PUD (peptic ulcer disease)     Restless leg     Uses Suboxone to control    Sleep apnea     "complex sleep apnea" - per pt    Thoracic or lumbosacral neuritis or radiculitis 10/19/2012    Thyroid disease      Objective:      Physical Exam  Constitutional:       General: She is not in acute distress.     Appearance: She is well-developed. She is not ill-appearing, toxic-appearing or diaphoretic.   HENT:      Right Ear: Hearing normal.      Left Ear: Hearing normal.   Pulmonary:      Effort: No tachypnea or respiratory distress.   Skin:     Coloration: Skin is not pale.   Neurological:      Mental Status: She is alert and oriented to person, place, and time.   Psychiatric:         Speech: Speech normal.         Behavior: Behavior normal.         Thought Content: Thought content normal.         Judgment: Judgment normal.         Assessment:       1. COVID-19        Plan:       COVID-19    clinically improving  education provided on supportive care, otc medications, symptom monitoring and return precautions           Follow up for further evaluation if s/s worsen, fail to improve, or new symptoms arise.    Medication List with Changes/Refills   Current Medications    ASPIRIN (ECOTRIN) 81 MG EC TABLET    Take 1 tablet (81 mg total) by mouth once daily.    BIOTIN 1 MG CAP    Take 1 tablet by mouth once daily.    CALCIUM CARBONATE/VITAMIN D3 (CALCIUM 500 WITH D ORAL)    Take 1 capsule by mouth once daily.    CARBOXYMETHYLCELLULOSE (REFRESH PLUS) 0.5 % DPET    1 drop daily as needed.    CARVEDILOL (COREG) 3.125 MG TABLET    Take 2 tablets (6.25 mg total) by mouth 2 (two) times daily with meals.    CO-ENZYME Q-10 30 MG " CAPSULE    Take 30 mg by mouth 3 (three) times daily.    DICYCLOMINE (BENTYL) 10 MG CAPSULE    Take 1 capsule (10 mg total) by mouth 4 (four) times daily as needed (stomach discomfort).    ESOMEPRAZOLE (NEXIUM) 40 MG CAPSULE    TAKE 1 CAPSULE (40 MG TOTAL) BY MOUTH BEFORE BREAKFAST.    ESTRADIOL (CLIMARA) 0.075 MG/24 HR    APPLY TO CLEAN DRY SKIN ONCE PER WEEK    FLUTICASONE PROPIONATE (FLONASE) 50 MCG/ACTUATION NASAL SPRAY    1 spray (50 mcg total) by Each Nostril route once daily.    GARLIC (GARLIQUE ORAL)    Take by mouth.    MULTIVITAMIN (MULTIPLE VITAMIN ORAL)    Take 1 capsule by mouth once daily.    NITROGLYCERIN (NITROSTAT) 0.4 MG SL TABLET    Place 1 tablet (0.4 mg total) under the tongue every 5 (five) minutes as needed for Chest pain.    OXYBUTYNIN (DITROPAN XL) 15 MG TR24    Take 1 tablet (15 mg total) by mouth once daily.    PITAVASTATIN CALCIUM (LIVALO) 2 MG TAB TABLET    Take 1 tablet (2 mg total) by mouth every evening.    SUBOXONE 8-2 MG FILM    1 Film by Subdermal route once daily. 1 strip every 3 to 4 days    SYNTHROID 100 MCG TABLET    Take 1 tablet (100 mcg total) by mouth once daily.

## 2022-03-10 ENCOUNTER — TELEPHONE (OUTPATIENT)
Dept: CARDIOLOGY | Facility: CLINIC | Age: 72
End: 2022-03-10
Payer: MEDICARE

## 2022-03-10 NOTE — TELEPHONE ENCOUNTER
"Spoke to pt over the phone, pt states " My  is been very sick that is the reason why I have not call you to schedule the angiogram, please let dr Nugent know that I will be calling once everything get back to normal "   "

## 2022-03-31 DIAGNOSIS — K29.70 GASTRITIS, PRESENCE OF BLEEDING UNSPECIFIED, UNSPECIFIED CHRONICITY, UNSPECIFIED GASTRITIS TYPE: ICD-10-CM

## 2022-03-31 RX ORDER — ESOMEPRAZOLE MAGNESIUM 40 MG/1
CAPSULE, DELAYED RELEASE ORAL
Qty: 90 CAPSULE | Refills: 1 | Status: SHIPPED | OUTPATIENT
Start: 2022-03-31 | End: 2022-04-28 | Stop reason: SDUPTHER

## 2022-03-31 NOTE — TELEPHONE ENCOUNTER
No new care gaps identified.  Powered by [employer]. Reference number: [id number].   3/31/2022 5:57:34 AM CDT

## 2022-03-31 NOTE — TELEPHONE ENCOUNTER
This Rx Request does not qualify for assessment with the OR   Please review protocol details and the Care Due Message for extra clinical information    Reasons Rx Request may be deferred:  Patient has been seen in the ED/Hospital since the last PCP visit  Required indication for medication is not active on problem list    Note composed:8:09 AM 03/31/2022

## 2022-04-04 ENCOUNTER — TELEPHONE (OUTPATIENT)
Dept: FAMILY MEDICINE | Facility: CLINIC | Age: 72
End: 2022-04-04
Payer: MEDICARE

## 2022-04-04 NOTE — TELEPHONE ENCOUNTER
----- Message from Lucille Lopez sent at 4/4/2022 12:52 PM CDT -----  Type:  Sooner Apoointment Request    Caller is requesting a sooner appointment.  Caller declined first available appointment listed below.  Caller will not accept being placed on the waitlist and is requesting a message be sent to doctor.    Name of Caller: Sherie     When is the first available appointment?08/03    Symptoms: 6 month f/u    Best Call Back Number:[phone]

## 2022-04-14 ENCOUNTER — PATIENT MESSAGE (OUTPATIENT)
Dept: CARDIOLOGY | Facility: CLINIC | Age: 72
End: 2022-04-14
Payer: MEDICARE

## 2022-04-14 ENCOUNTER — TELEPHONE (OUTPATIENT)
Dept: CARDIOLOGY | Facility: CLINIC | Age: 72
End: 2022-04-14
Payer: MEDICARE

## 2022-04-14 DIAGNOSIS — R07.9 CHEST PAIN, UNSPECIFIED TYPE: ICD-10-CM

## 2022-04-14 DIAGNOSIS — I20.0 WORSENING ANGINA: Primary | ICD-10-CM

## 2022-04-14 NOTE — TELEPHONE ENCOUNTER
For the pt angiogram is this just a LHC? Pleaser advise she would like to schedule the angiogram but cath lab already got rid of the orders

## 2022-04-14 NOTE — TELEPHONE ENCOUNTER
----- Message from Veda Raya sent at 4/14/2022  2:22 PM CDT -----  Contact: patient  Type: Needs Medical Advice  Who Called:  patient  Best Call Back Number: [phone]  Additional Information: patient is requesting a call back to reschedule angiogram. Please advise.

## 2022-04-28 ENCOUNTER — LAB VISIT (OUTPATIENT)
Dept: LAB | Facility: HOSPITAL | Age: 72
End: 2022-04-28
Attending: NURSE PRACTITIONER
Payer: MEDICARE

## 2022-04-28 ENCOUNTER — OFFICE VISIT (OUTPATIENT)
Dept: FAMILY MEDICINE | Facility: CLINIC | Age: 72
End: 2022-04-28
Payer: MEDICARE

## 2022-04-28 VITALS
OXYGEN SATURATION: 96 % | DIASTOLIC BLOOD PRESSURE: 82 MMHG | HEART RATE: 70 BPM | HEIGHT: 61 IN | BODY MASS INDEX: 25.84 KG/M2 | WEIGHT: 136.88 LBS | SYSTOLIC BLOOD PRESSURE: 136 MMHG | TEMPERATURE: 98 F

## 2022-04-28 DIAGNOSIS — N39.41 URGE INCONTINENCE: ICD-10-CM

## 2022-04-28 DIAGNOSIS — Z12.31 ENCOUNTER FOR SCREENING MAMMOGRAM FOR MALIGNANT NEOPLASM OF BREAST: ICD-10-CM

## 2022-04-28 DIAGNOSIS — N18.30 CONTROLLED TYPE 2 DIABETES MELLITUS WITH STAGE 3 CHRONIC KIDNEY DISEASE, WITHOUT LONG-TERM CURRENT USE OF INSULIN: ICD-10-CM

## 2022-04-28 DIAGNOSIS — K29.70 GASTRITIS, PRESENCE OF BLEEDING UNSPECIFIED, UNSPECIFIED CHRONICITY, UNSPECIFIED GASTRITIS TYPE: ICD-10-CM

## 2022-04-28 DIAGNOSIS — E03.4 HYPOTHYROIDISM DUE TO ACQUIRED ATROPHY OF THYROID: ICD-10-CM

## 2022-04-28 DIAGNOSIS — E11.22 CONTROLLED TYPE 2 DIABETES MELLITUS WITH STAGE 3 CHRONIC KIDNEY DISEASE, WITHOUT LONG-TERM CURRENT USE OF INSULIN: ICD-10-CM

## 2022-04-28 DIAGNOSIS — D50.9 IRON DEFICIENCY ANEMIA, UNSPECIFIED IRON DEFICIENCY ANEMIA TYPE: ICD-10-CM

## 2022-04-28 DIAGNOSIS — E11.22 CONTROLLED TYPE 2 DIABETES MELLITUS WITH STAGE 3 CHRONIC KIDNEY DISEASE, WITHOUT LONG-TERM CURRENT USE OF INSULIN: Primary | ICD-10-CM

## 2022-04-28 DIAGNOSIS — N18.30 CONTROLLED TYPE 2 DIABETES MELLITUS WITH STAGE 3 CHRONIC KIDNEY DISEASE, WITHOUT LONG-TERM CURRENT USE OF INSULIN: Primary | ICD-10-CM

## 2022-04-28 DIAGNOSIS — I10 ESSENTIAL HYPERTENSION: ICD-10-CM

## 2022-04-28 LAB
BASOPHILS # BLD AUTO: 0.07 K/UL (ref 0–0.2)
BASOPHILS NFR BLD: 0.8 % (ref 0–1.9)
DIFFERENTIAL METHOD: ABNORMAL
EOSINOPHIL # BLD AUTO: 0.2 K/UL (ref 0–0.5)
EOSINOPHIL NFR BLD: 1.9 % (ref 0–8)
ERYTHROCYTE [DISTWIDTH] IN BLOOD BY AUTOMATED COUNT: 13.2 % (ref 11.5–14.5)
HCT VFR BLD AUTO: 33.2 % (ref 37–48.5)
HGB BLD-MCNC: 10.3 G/DL (ref 12–16)
IMM GRANULOCYTES # BLD AUTO: 0.02 K/UL (ref 0–0.04)
IMM GRANULOCYTES NFR BLD AUTO: 0.2 % (ref 0–0.5)
LYMPHOCYTES # BLD AUTO: 2.1 K/UL (ref 1–4.8)
LYMPHOCYTES NFR BLD: 24.9 % (ref 18–48)
MCH RBC QN AUTO: 28 PG (ref 27–31)
MCHC RBC AUTO-ENTMCNC: 31 G/DL (ref 32–36)
MCV RBC AUTO: 90 FL (ref 82–98)
MONOCYTES # BLD AUTO: 0.6 K/UL (ref 0.3–1)
MONOCYTES NFR BLD: 7.6 % (ref 4–15)
NEUTROPHILS # BLD AUTO: 5.3 K/UL (ref 1.8–7.7)
NEUTROPHILS NFR BLD: 64.6 % (ref 38–73)
NRBC BLD-RTO: 0 /100 WBC
PLATELET # BLD AUTO: 320 K/UL (ref 150–450)
PMV BLD AUTO: 11.5 FL (ref 9.2–12.9)
RBC # BLD AUTO: 3.68 M/UL (ref 4–5.4)
WBC # BLD AUTO: 8.27 K/UL (ref 3.9–12.7)

## 2022-04-28 PROCEDURE — 99214 PR OFFICE/OUTPT VISIT, EST, LEVL IV, 30-39 MIN: ICD-10-PCS | Mod: S$PBB,,, | Performed by: NURSE PRACTITIONER

## 2022-04-28 PROCEDURE — 99999 PR PBB SHADOW E&M-EST. PATIENT-LVL III: CPT | Mod: PBBFAC,,, | Performed by: NURSE PRACTITIONER

## 2022-04-28 PROCEDURE — 83036 HEMOGLOBIN GLYCOSYLATED A1C: CPT | Performed by: NURSE PRACTITIONER

## 2022-04-28 PROCEDURE — 80053 COMPREHEN METABOLIC PANEL: CPT | Performed by: NURSE PRACTITIONER

## 2022-04-28 PROCEDURE — 82728 ASSAY OF FERRITIN: CPT | Performed by: NURSE PRACTITIONER

## 2022-04-28 PROCEDURE — 84443 ASSAY THYROID STIM HORMONE: CPT | Performed by: NURSE PRACTITIONER

## 2022-04-28 PROCEDURE — 99999 PR PBB SHADOW E&M-EST. PATIENT-LVL III: ICD-10-PCS | Mod: PBBFAC,,, | Performed by: NURSE PRACTITIONER

## 2022-04-28 PROCEDURE — 99213 OFFICE O/P EST LOW 20 MIN: CPT | Mod: PBBFAC,PO | Performed by: NURSE PRACTITIONER

## 2022-04-28 PROCEDURE — 84466 ASSAY OF TRANSFERRIN: CPT | Performed by: NURSE PRACTITIONER

## 2022-04-28 PROCEDURE — 99214 OFFICE O/P EST MOD 30 MIN: CPT | Mod: S$PBB,,, | Performed by: NURSE PRACTITIONER

## 2022-04-28 PROCEDURE — 36415 COLL VENOUS BLD VENIPUNCTURE: CPT | Mod: PO | Performed by: NURSE PRACTITIONER

## 2022-04-28 PROCEDURE — 85025 COMPLETE CBC W/AUTO DIFF WBC: CPT | Performed by: NURSE PRACTITIONER

## 2022-04-28 RX ORDER — ESOMEPRAZOLE MAGNESIUM 40 MG/1
40 CAPSULE, DELAYED RELEASE ORAL
Qty: 90 CAPSULE | Refills: 3 | Status: SHIPPED | OUTPATIENT
Start: 2022-04-28 | End: 2023-02-13

## 2022-04-28 RX ORDER — BENAZEPRIL HYDROCHLORIDE 20 MG/1
20 TABLET ORAL EVERY MORNING
Qty: 90 TABLET | Refills: 3 | Status: SHIPPED | OUTPATIENT
Start: 2022-04-28 | End: 2022-05-11 | Stop reason: SDUPTHER

## 2022-04-28 RX ORDER — FLUTICASONE PROPIONATE 50 MCG
1 SPRAY, SUSPENSION (ML) NASAL DAILY
Qty: 16 G | Refills: 11 | Status: SHIPPED | OUTPATIENT
Start: 2022-04-28 | End: 2023-07-25 | Stop reason: SDUPTHER

## 2022-04-28 RX ORDER — OXYBUTYNIN CHLORIDE 15 MG/1
15 TABLET, EXTENDED RELEASE ORAL EVERY MORNING
Qty: 90 TABLET | Refills: 3 | Status: SHIPPED | OUTPATIENT
Start: 2022-04-28 | End: 2022-07-21 | Stop reason: CLARIF

## 2022-04-28 RX ORDER — CARVEDILOL 3.12 MG/1
6.25 TABLET ORAL 2 TIMES DAILY WITH MEALS
Qty: 360 TABLET | Refills: 3 | Status: SHIPPED | OUTPATIENT
Start: 2022-04-28 | End: 2022-05-26

## 2022-04-28 RX ORDER — LEVOTHYROXINE SODIUM 100 UG/1
100 TABLET ORAL DAILY
Qty: 90 TABLET | Refills: 3 | Status: SHIPPED | OUTPATIENT
Start: 2022-04-28 | End: 2022-05-30

## 2022-04-28 RX ORDER — NITROGLYCERIN 0.4 MG/1
0.4 TABLET SUBLINGUAL EVERY 5 MIN PRN
Qty: 25 TABLET | Refills: 2 | Status: SHIPPED | OUTPATIENT
Start: 2022-04-28 | End: 2022-07-01

## 2022-04-28 NOTE — PROGRESS NOTES
"Subjective:       Patient ID: Osman Johansen is a 72 y.o. female.    Chief Complaint: Medication Refill    HPI   Patient presents for routine checkup  Due for labs and mammo    T2DM: controlled with lifestyle  CKD III: stable   DEMARIO: not taking supplement. Previously requiring infusions    HTN controlled  Angina: chronic; Recall angina admit last year.  Had classic angina symptoms in office.  Sent to ER.  W/u / stress test normal.  F/u w cardiology.  Calcium score done = > 1,100 = cx CAD with likely significant blockage.  intolerant of statin. CP + AVILA roughly once/week ongoing for months, relieved with nitro. scheduled for angiogram 5/2/22 (Dr. Nugent)    Vitals:    04/28/22 1446   BP: 136/82   Pulse: 70   Temp: 98.3 °F (36.8 °C)     Review of Systems   Constitutional: Negative for diaphoresis and fever.   HENT: Negative for facial swelling and trouble swallowing.    Respiratory: Negative for cough.    Cardiovascular: Negative for leg swelling.   Gastrointestinal: Negative for abdominal pain.   Genitourinary: Negative for difficulty urinating.   Musculoskeletal: Negative for gait problem.   Skin: Negative for pallor and rash.   Neurological: Negative for speech difficulty.   Psychiatric/Behavioral: Negative for confusion. The patient is not nervous/anxious.        Past Medical History:   Diagnosis Date    Allergy     Anticoagulant long-term use     Anxiety     Arthritis     Blood transfusion 8 yrs    CAD (coronary artery disease)     Cataract     COPD (chronic obstructive pulmonary disease)     Degenerative disc disease     Depression     Dry eye syndrome     GERD (gastroesophageal reflux disease)     Hypercholesterolemia 12/09/2019    Hypertension     Macular drusen     Neuromuscular disorder     Ocular hypertension, bilateral     Osteoporosis     PUD (peptic ulcer disease)     Restless leg     Uses Suboxone to control    Sleep apnea     "complex sleep apnea" - per pt, no cpap    Thoracic " or lumbosacral neuritis or radiculitis 10/19/2012    Thyroid disease      Objective:      Physical Exam  Vitals and nursing note reviewed.   Constitutional:       General: She is not in acute distress.     Appearance: She is not diaphoretic.   HENT:      Head: Normocephalic.   Eyes:      General:         Right eye: No discharge.         Left eye: No discharge.   Neck:      Trachea: No tracheal deviation.   Cardiovascular:      Rate and Rhythm: Normal rate.      Pulses:           Dorsalis pedis pulses are 2+ on the right side and 2+ on the left side.      Heart sounds: Normal heart sounds.   Pulmonary:      Effort: Pulmonary effort is normal.      Breath sounds: Normal breath sounds.   Musculoskeletal:      Right lower leg: No edema.      Left lower leg: No edema.   Feet:      Right foot:      Protective Sensation: 10 sites tested. 10 sites sensed.      Skin integrity: Skin integrity normal.      Left foot:      Protective Sensation: 10 sites tested. 10 sites sensed.      Skin integrity: Skin integrity normal.   Skin:     Coloration: Skin is not pale.   Neurological:      Mental Status: She is alert and oriented to person, place, and time.   Psychiatric:         Speech: Speech normal.         Behavior: Behavior normal.         Thought Content: Thought content normal.         Judgment: Judgment normal.         Assessment:       1. Controlled type 2 diabetes mellitus with stage 3 chronic kidney disease, without long-term current use of insulin    2. Iron deficiency anemia, unspecified iron deficiency anemia type    3. Encounter for screening mammogram for malignant neoplasm of breast    4. Gastritis, presence of bleeding unspecified, unspecified chronicity, unspecified gastritis type    5. URI (upper respiratory infection)    6. Hypothyroidism due to acquired atrophy of thyroid    7. Urge incontinence    8. Essential hypertension        Plan:       Controlled type 2 diabetes mellitus with stage 3 chronic kidney  disease, without long-term current use of insulin  -     Comprehensive Metabolic Panel; Future; Expected date: 04/28/2022  -     TSH; Future; Expected date: 04/28/2022  -     Hemoglobin A1C; Future; Expected date: 04/28/2022    Iron deficiency anemia, unspecified iron deficiency anemia type  -     CBC Auto Differential; Future; Expected date: 04/28/2022  -     Ferritin; Future; Expected date: 04/28/2022  -     Iron and TIBC; Future; Expected date: 04/28/2022    Encounter for screening mammogram for malignant neoplasm of breast  -     Mammo Digital Screening Bilat w/ Mik; Future; Expected date: 04/28/2022    Gastritis, presence of bleeding unspecified, unspecified chronicity, unspecified gastritis type  -     esomeprazole (NEXIUM) 40 MG capsule; Take 1 capsule (40 mg total) by mouth before breakfast.  Dispense: 90 capsule; Refill: 3    URI (upper respiratory infection)    Hypothyroidism due to acquired atrophy of thyroid  -     SYNTHROID 100 mcg tablet; Take 1 tablet (100 mcg total) by mouth once daily.  Dispense: 90 tablet; Refill: 3    Urge incontinence  -     oxybutynin (DITROPAN XL) 15 MG TR24; Take 1 tablet (15 mg total) by mouth every morning.  Dispense: 90 tablet; Refill: 3    Essential hypertension  -     benazepriL (LOTENSIN) 20 MG tablet; Take 1 tablet (20 mg total) by mouth every morning.  Dispense: 90 tablet; Refill: 3  -     carvediloL (COREG) 3.125 MG tablet; Take 2 tablets (6.25 mg total) by mouth 2 (two) times daily with meals.  Dispense: 360 tablet; Refill: 3    Other orders  -     fluticasone propionate (FLONASE) 50 mcg/actuation nasal spray; 1 spray (50 mcg total) by Each Nostril route once daily.  Dispense: 16 g; Refill: 11  -     nitroGLYCERIN (NITROSTAT) 0.4 MG SL tablet; Place 1 tablet (0.4 mg total) under the tongue every 5 (five) minutes as needed for Chest pain.  Dispense: 25 tablet; Refill: 2            Follow up in about 6 months (around 10/28/2022).    Medication List with  Changes/Refills   Current Medications    ASPIRIN (ECOTRIN) 81 MG EC TABLET    Take 1 tablet (81 mg total) by mouth once daily.    BIOTIN 1 MG CAP    Take 1 tablet by mouth once daily.    CALCIUM CARBONATE/VITAMIN D3 (CALCIUM 500 WITH D ORAL)    Take 1 capsule by mouth once daily.    CARBOXYMETHYLCELLULOSE (REFRESH PLUS) 0.5 % DPET    1 drop daily as needed.    CO-ENZYME Q-10 30 MG CAPSULE    Take 30 mg by mouth 3 (three) times daily.    DICYCLOMINE (BENTYL) 10 MG CAPSULE    Take 1 capsule (10 mg total) by mouth 4 (four) times daily as needed (stomach discomfort).    GARLIC (GARLIQUE ORAL)    Take by mouth.    MULTIVITAMIN (MULTIPLE VITAMIN ORAL)    Take 1 capsule by mouth once daily.    SUBOXONE 8-2 MG FILM    1 Film by Subdermal route once daily. 1 strip every 3 to 4 days   Changed and/or Refilled Medications    Modified Medication Previous Medication    BENAZEPRIL (LOTENSIN) 20 MG TABLET benazepriL (LOTENSIN) 20 MG tablet       Take 1 tablet (20 mg total) by mouth every morning.    Take 20 mg by mouth every morning.    CARVEDILOL (COREG) 3.125 MG TABLET carvediloL (COREG) 3.125 MG tablet       Take 2 tablets (6.25 mg total) by mouth 2 (two) times daily with meals.    Take 2 tablets (6.25 mg total) by mouth 2 (two) times daily with meals.    ESOMEPRAZOLE (NEXIUM) 40 MG CAPSULE esomeprazole (NEXIUM) 40 MG capsule       Take 1 capsule (40 mg total) by mouth before breakfast.    TAKE 1 CAPSULE BEFORE BREAKFAST    FLUTICASONE PROPIONATE (FLONASE) 50 MCG/ACTUATION NASAL SPRAY fluticasone propionate (FLONASE) 50 mcg/actuation nasal spray       1 spray (50 mcg total) by Each Nostril route once daily.    1 spray (50 mcg total) by Each Nostril route once daily.    NITROGLYCERIN (NITROSTAT) 0.4 MG SL TABLET nitroGLYCERIN (NITROSTAT) 0.4 MG SL tablet       Place 1 tablet (0.4 mg total) under the tongue every 5 (five) minutes as needed for Chest pain.    Place 1 tablet (0.4 mg total) under the tongue every 5 (five) minutes as  needed for Chest pain.    OXYBUTYNIN (DITROPAN XL) 15 MG TR24 oxybutynin (DITROPAN XL) 15 MG TR24       Take 1 tablet (15 mg total) by mouth every morning.    Take 1 tablet (15 mg total) by mouth once daily.    SYNTHROID 100 MCG TABLET SYNTHROID 100 mcg tablet       Take 1 tablet (100 mcg total) by mouth once daily.    Take 1 tablet (100 mcg total) by mouth once daily.   Discontinued Medications    ESTRADIOL (CLIMARA) 0.075 MG/24 HR    APPLY TO CLEAN DRY SKIN ONCE PER WEEK    PITAVASTATIN CALCIUM (LIVALO) 2 MG TAB TABLET    Take 1 tablet (2 mg total) by mouth every evening.

## 2022-04-29 DIAGNOSIS — E11.9 CONTROLLED TYPE 2 DIABETES MELLITUS WITHOUT COMPLICATION, WITHOUT LONG-TERM CURRENT USE OF INSULIN: ICD-10-CM

## 2022-04-29 DIAGNOSIS — N18.30 STAGE 3 CHRONIC KIDNEY DISEASE, UNSPECIFIED WHETHER STAGE 3A OR 3B CKD: ICD-10-CM

## 2022-04-29 DIAGNOSIS — D50.9 IRON DEFICIENCY ANEMIA, UNSPECIFIED IRON DEFICIENCY ANEMIA TYPE: Primary | ICD-10-CM

## 2022-04-29 LAB
ALBUMIN SERPL BCP-MCNC: 3.9 G/DL (ref 3.5–5.2)
ALP SERPL-CCNC: 65 U/L (ref 55–135)
ALT SERPL W/O P-5'-P-CCNC: 14 U/L (ref 10–44)
ANION GAP SERPL CALC-SCNC: 8 MMOL/L (ref 8–16)
AST SERPL-CCNC: 18 U/L (ref 10–40)
BILIRUB SERPL-MCNC: 0.3 MG/DL (ref 0.1–1)
BUN SERPL-MCNC: 25 MG/DL (ref 8–23)
CALCIUM SERPL-MCNC: 10.2 MG/DL (ref 8.7–10.5)
CHLORIDE SERPL-SCNC: 100 MMOL/L (ref 95–110)
CO2 SERPL-SCNC: 28 MMOL/L (ref 23–29)
CREAT SERPL-MCNC: 1.3 MG/DL (ref 0.5–1.4)
EST. GFR  (AFRICAN AMERICAN): 47 ML/MIN/1.73 M^2
EST. GFR  (NON AFRICAN AMERICAN): 40.8 ML/MIN/1.73 M^2
ESTIMATED AVG GLUCOSE: 111 MG/DL (ref 68–131)
FERRITIN SERPL-MCNC: 10 NG/ML (ref 20–300)
GLUCOSE SERPL-MCNC: 108 MG/DL (ref 70–110)
HBA1C MFR BLD: 5.5 % (ref 4–5.6)
IRON SERPL-MCNC: 63 UG/DL (ref 30–160)
POTASSIUM SERPL-SCNC: 5.5 MMOL/L (ref 3.5–5.1)
PROT SERPL-MCNC: 7.5 G/DL (ref 6–8.4)
SATURATED IRON: 15 % (ref 20–50)
SODIUM SERPL-SCNC: 136 MMOL/L (ref 136–145)
TOTAL IRON BINDING CAPACITY: 419 UG/DL (ref 250–450)
TRANSFERRIN SERPL-MCNC: 283 MG/DL (ref 200–375)
TSH SERPL DL<=0.005 MIU/L-ACNC: 2.26 UIU/ML (ref 0.4–4)

## 2022-05-02 ENCOUNTER — TELEPHONE (OUTPATIENT)
Dept: VASCULAR SURGERY | Facility: CLINIC | Age: 72
End: 2022-05-02
Payer: MEDICARE

## 2022-05-02 NOTE — TELEPHONE ENCOUNTER
----- Message from John Cai MA sent at 5/2/2022  4:23 PM CDT -----  Contact: DEN MEJIAS [[id number]]  Type: Needs Medical Advice    Who Called:DEN MEJIAS [[id number]]  Best Call Back Number: [phone]  Inquiry/Question: Would you kindly call DEN MEJIAS [[id number]] regarding triple bypass surgery  Thank you~

## 2022-05-03 NOTE — TELEPHONE ENCOUNTER
----- Message from John Cai MA sent at 5/2/2022  4:23 PM CDT -----  Contact: DEN MEJIAS [[id number]]  Type: Needs Medical Advice    Who Called:DEN MEJIAS [[id number]]  Best Call Back Number: [phone]  Inquiry/Question: Would you kindly call DEN MEJIAS [[id number]] regarding triple bypass surgery  Thank you~

## 2022-05-09 ENCOUNTER — PATIENT MESSAGE (OUTPATIENT)
Dept: SMOKING CESSATION | Facility: CLINIC | Age: 72
End: 2022-05-09
Payer: MEDICARE

## 2022-05-11 ENCOUNTER — PATIENT MESSAGE (OUTPATIENT)
Dept: FAMILY MEDICINE | Facility: CLINIC | Age: 72
End: 2022-05-11
Payer: MEDICARE

## 2022-05-11 DIAGNOSIS — I10 ESSENTIAL HYPERTENSION: ICD-10-CM

## 2022-05-12 RX ORDER — BENAZEPRIL HYDROCHLORIDE 20 MG/1
20 TABLET ORAL EVERY MORNING
Qty: 30 TABLET | Refills: 0 | Status: SHIPPED | OUTPATIENT
Start: 2022-05-12 | End: 2022-09-19 | Stop reason: SDUPTHER

## 2022-05-25 NOTE — PROGRESS NOTES
"Subjective:    Patient ID:  Osman Johansen is a 72 y.o. female who presents for Coronary Artery Disease and Hypertension        HPI    STATUS POST CARDIAC CATHETERIZATION THREE-VESSEL CORONARY DISEASE DISCUSSED FINDINGS AND OPTIONS, DOING OK, MILD ANGINA, BACK ON LOTENSIN FOR BP,DID NOT DO WELL WITH NORVASC, NO CHANGE WITH COREG,  SEE ROS  Past Medical History:   Diagnosis Date    Allergy     Anticoagulant long-term use     Anxiety     Arthritis     Blood transfusion 8 yrs    CAD (coronary artery disease)     Cataract     COPD (chronic obstructive pulmonary disease)     Degenerative disc disease     Depression     Dry eye syndrome     GERD (gastroesophageal reflux disease)     Hypercholesterolemia 12/09/2019    Hypertension     Macular drusen     Neuromuscular disorder     Ocular hypertension, bilateral     Osteoporosis     PUD (peptic ulcer disease)     Restless leg     Uses Suboxone to control    Sleep apnea     "complex sleep apnea" - per pt, no cpap    Thoracic or lumbosacral neuritis or radiculitis 10/19/2012    Thyroid disease      Past Surgical History:   Procedure Laterality Date    APPENDECTOMY  1965    CARDIAC CATHETERIZATION      CATARACT EXTRACTION W/  INTRAOCULAR LENS IMPLANT Right 5/24/2021    Procedure: EXTRACTION, CATARACT, WITH IOL INSERTION;  Surgeon: Bebe Lynn MD;  Location: [facility] OR;  Service: Ophthalmology;  Laterality: Right;  Right/DM    CORONARY ANGIOGRAPHY N/A 5/2/2022    Procedure: ANGIOGRAM, CORONARY ARTERY;  Surgeon: Sourav Nugent MD;  Location: [facility] CATH;  Service: Cardiology;  Laterality: N/A;    HYSTERECTOMY  8 yrs     INJECTION OF ANESTHETIC AGENT AROUND NERVE Bilateral 8/21/2018    Procedure: BLOCK, NERVE;  Surgeon: Talia Belcher MD;  Location: [facility] [place] PAIN MGT;  Service: Pain Management;  Laterality: Bilateral;  Bilateral block @ L2,3,4,5      INJECTION OF ANESTHETIC AGENT AROUND NERVE Bilateral 11/18/2019    Procedure: BLOCK, NERVE, L2-L3-L4-L5 " ME DIAL BRANCH;  Surgeon: Talia Belcher MD;  Location: [facility] PAIN MGT;  Service: Pain Management;  Laterality: Bilateral;    INJECTION OF FACET JOINT Bilateral 2019    Procedure: INJECTION, FACET JOINT, L3-L4 AND L4-L5 AND T7-T8 LIGAMENT;  Surgeon: Talia Belcher MD;  Location: [facility] PAIN MGT;  Service: Pain Management;  Laterality: Bilateral;    LAPAROSCOPIC CHOLECYSTECTOMY N/A 2/10/2021    Procedure: CHOLECYSTECTOMY, LAPAROSCOPIC;  Surgeon: John Morrow MD;  Location: [facility] OR;  Service: General;  Laterality: N/A;    LEFT HEART CATHETERIZATION Left 2022    Procedure: Left heart cath;  Surgeon: Sourav Nugent MD;  [facility];  Service: Cardiology;  Laterality: Left;    SINUS SURGERY      x2-one for CSF leak    TONSILLECTOMY       Family History   Problem Relation Age of Onset    Arthritis Mother     Cancer Mother     Heart disease Mother     Hypertension Mother     Cataracts Mother     Ovarian cancer Mother     Ulcers Father     Thyroid disease Brother     Heart disease Brother     Amblyopia Neg Hx     Blindness Neg Hx     Diabetes Neg Hx     Glaucoma Neg Hx     Macular degeneration Neg Hx     Retinal detachment Neg Hx     Strabismus Neg Hx     Stroke Neg Hx      Social History     Socioeconomic History    Marital status:    Tobacco Use    Smoking status: Former Smoker     Quit date: 2008     Years since quittin.4    Smokeless tobacco: Never Used   Substance and Sexual Activity    Alcohol use: No     Alcohol/week: 0.0 standard drinks    Drug use: No    Sexual activity: Never     Social Determinants of Health     Financial Resource Strain: Low Risk     Difficulty of Paying Living Expenses: Not hard at all   Food Insecurity: No Food Insecurity    Worried About Running Out of Food in the Last Year: Never true    Ran Out of Food in the Last Year: Never true   Transportation Needs: Unknown    Lack of Transportation (Non-Medical): No   Social  Connections: Unknown    Frequency of Communication with Friends and Family: More than three times a week    Frequency of Social Gatherings with Friends and Family: Once a week       Review of patient's allergies indicates:   Allergen Reactions    Pcn [penicillins] Swelling    Toradol [ketorolac] Swelling    Vibramycin [doxycycline calcium] Swelling    Zetia [ezetimibe] Other (See Comments)    Crestor [rosuvastatin]      Body aches    Cymbalta [duloxetine]      dizzyness    Elavil [amitriptyline]     Lyrica [pregabalin] Swelling    Pravastatin Other (See Comments)     Flu -like symptoms   Body aches     Seroquel [quetiapine]      restless leg symdrome       Current Outpatient Medications:     aspirin (ECOTRIN) 81 MG EC tablet, Take 1 tablet (81 mg total) by mouth once daily., Disp: 90 tablet, Rfl: 3    benazepriL (LOTENSIN) 20 MG tablet, Take 1 tablet (20 mg total) by mouth every morning. Short term supply while pt waits for mail order., Disp: 30 tablet, Rfl: 0    biotin 1 mg Cap, Take 1 tablet by mouth once daily., Disp: , Rfl:     CALCIUM CARBONATE/VITAMIN D3 (CALCIUM 500 WITH D ORAL), Take 1 capsule by mouth once daily., Disp: , Rfl:     carboxymethylcellulose (REFRESH PLUS) 0.5 % Dpet, 1 drop daily as needed., Disp: , Rfl:     co-enzyme Q-10 30 mg capsule, Take 30 mg by mouth 3 (three) times daily., Disp: , Rfl:     dicyclomine (BENTYL) 10 MG capsule, Take 1 capsule (10 mg total) by mouth 4 (four) times daily as needed (stomach discomfort)., Disp: 120 capsule, Rfl: 0    esomeprazole (NEXIUM) 40 MG capsule, Take 1 capsule (40 mg total) by mouth before breakfast., Disp: 90 capsule, Rfl: 3    fluticasone propionate (FLONASE) 50 mcg/actuation nasal spray, 1 spray (50 mcg total) by Each Nostril route once daily., Disp: 16 g, Rfl: 11    garlic (GARLIQUE ORAL), Take by mouth., Disp: , Rfl:     MULTIVITAMIN (MULTIPLE VITAMIN ORAL), Take 1 capsule by mouth once daily., Disp: , Rfl:      nitroGLYCERIN (NITROSTAT) 0.4 MG SL tablet, Place 1 tablet (0.4 mg total) under the tongue every 5 (five) minutes as needed for Chest pain., Disp: 25 tablet, Rfl: 2    oxybutynin (DITROPAN XL) 15 MG TR24, Take 1 tablet (15 mg total) by mouth every morning., Disp: 90 tablet, Rfl: 3    SUBOXONE 8-2 mg Film, 1 Film by Subdermal route once daily. 1 strip every 3 to 4 days, Disp: , Rfl: 0    SYNTHROID 100 mcg tablet, Take 1 tablet (100 mcg total) by mouth once daily., Disp: 90 tablet, Rfl: 3    evolocumab (REPATHA SURECLICK) 140 mg/mL PnIj, Inject 1 mL (140 mg total) into the skin every 14 (fourteen) days., Disp: 2 each, Rfl: 3    isosorbide mononitrate (IMDUR) 30 MG 24 hr tablet, Take 1 tablet (30 mg total) by mouth once daily., Disp: 30 tablet, Rfl: 1    metoprolol succinate (TOPROL-XL) 25 MG 24 hr tablet, Take 1 tablet (25 mg total) by mouth once daily., Disp: 30 tablet, Rfl: 2  No current facility-administered medications for this visit.    Facility-Administered Medications Ordered in Other Visits:     lactated ringers infusion, , Intravenous, Continuous, Jim Mcdonough MD, Last Rate: 10 mL/hr at 02/10/21 1003, Restarted at 02/10/21 1035    LIDOcaine (PF) 10 mg/ml (1%) injection 10 mg, 1 mL, Intradermal, Once, Jim Mcdonough MD    Review of Systems   Constitutional: Negative for chills, diaphoresis, fever and malaise/fatigue.   HENT: Negative for congestion and nosebleeds.    Eyes: Negative.    Cardiovascular: Negative for chest pain (MILD), claudication, cyanosis, dyspnea on exertion (MILD), irregular heartbeat, leg swelling, near-syncope, orthopnea, palpitations, paroxysmal nocturnal dyspnea and syncope.   Respiratory: Negative for cough, hemoptysis, shortness of breath and wheezing.    Endocrine: Negative.    Hematologic/Lymphatic: Negative for adenopathy. Does not bruise/bleed easily.   Skin: Negative for color change and rash.   Musculoskeletal: Negative for back pain and falls.        FIBROMYALGIA   "  Gastrointestinal: Negative for abdominal pain, change in bowel habit, jaundice, melena and nausea.   Genitourinary: Negative for dysuria and flank pain.   Neurological: Negative for brief paralysis, focal weakness, headaches, light-headedness, loss of balance and weakness.   Psychiatric/Behavioral: Negative for altered mental status and depression.        Objective:      Vitals:    05/26/22 1533 05/26/22 1553   BP: (!) 156/74 138/72   Pulse: 61    Weight: 62.7 kg (138 lb 3.7 oz)    Height: 5' 1" (1.549 m)    PainSc: 0-No pain      Body mass index is 26.12 kg/m².    Physical Exam  Constitutional:       Appearance: Normal appearance.   HENT:      Head: Normocephalic and atraumatic.   Eyes:      General: No scleral icterus.     Extraocular Movements: Extraocular movements intact.   Neck:      Vascular: No carotid bruit.   Cardiovascular:      Rate and Rhythm: Normal rate and regular rhythm.      Pulses: Normal pulses.           Carotid pulses are 2+ on the right side and 2+ on the left side.       Radial pulses are 2+ on the left side.        Femoral pulses are 2+ on the right side and 2+ on the left side.       Posterior tibial pulses are 2+ on the right side and 2+ on the left side.      Heart sounds: Murmur heard.      Comments: R RADIAL OK  Pulmonary:      Effort: Pulmonary effort is normal.      Breath sounds: Normal breath sounds. No rales.   Abdominal:      Palpations: Abdomen is soft.      Tenderness: There is no abdominal tenderness.   Musculoskeletal:      Cervical back: Neck supple.      Right lower leg: No edema.      Left lower leg: No edema.   Skin:     General: Skin is warm and dry.      Capillary Refill: Capillary refill takes less than 2 seconds.   Neurological:      General: No focal deficit present.      Mental Status: She is alert and oriented to person, place, and time.   Psychiatric:         Mood and Affect: Mood normal.         Behavior: Behavior normal.                 ..    Chemistry      "   Component Value Date/Time     04/28/2022 1524    K 5.5 (H) 04/28/2022 1524     04/28/2022 1524    CO2 28 04/28/2022 1524    BUN 25 (H) 04/28/2022 1524    CREATININE 1.3 04/28/2022 1524     04/28/2022 1524        Component Value Date/Time    CALCIUM 10.2 04/28/2022 1524    ALKPHOS 65 04/28/2022 1524    AST 18 04/28/2022 1524    ALT 14 04/28/2022 1524    BILITOT 0.3 04/28/2022 1524    ESTGFRAFRICA 47.0 (A) 04/28/2022 1524    EGFRNONAA 40.8 (A) 04/28/2022 1524            ..  Lab Results   Component Value Date    CHOL 236 (H) 11/02/2021    CHOL 208 (H) 05/13/2021    CHOL 239 (H) 03/09/2021     Lab Results   Component Value Date    HDL 87 (H) 11/02/2021    HDL 90 (H) 05/13/2021    HDL 95 (H) 03/09/2021     Lab Results   Component Value Date    LDLCALC 129.6 11/02/2021    LDLCALC 101.8 05/13/2021    LDLCALC 120.4 03/09/2021     Lab Results   Component Value Date    TRIG 97 11/02/2021    TRIG 81 05/13/2021    TRIG 118 03/09/2021     Lab Results   Component Value Date    CHOLHDL 36.9 11/02/2021    CHOLHDL 43.3 05/13/2021    CHOLHDL 39.7 03/09/2021     ..  Lab Results   Component Value Date    WBC 8.27 04/28/2022    HGB 10.3 (L) 04/28/2022    HCT 33.2 (L) 04/28/2022    MCV 90 04/28/2022     04/28/2022       Test(s) Reviewed  I have reviewed the following in detail:  [] Stress test   [x] Angiography   [] Echocardiogram   [x] Labs   [] Other:       Assessment:         ICD-10-CM ICD-9-CM   1. Coronary artery disease involving native coronary artery of native heart with angina pectoris  I25.119 414.01     413.9   2. Non-rheumatic mitral regurgitation  I34.0 424.0   3. Hypercholesterolemia  E78.00 272.0   4. Essential hypertension  I10 401.9   5. Overweight (BMI 25.0-29.9)  E66.3 278.02   6. Statin intolerance  Z78.9 995.27     Problem List Items Addressed This Visit        Cardiac/Vascular    Coronary artery disease involving native coronary artery of native heart with angina pectoris - Primary     Relevant Medications    evolocumab (REPATHA SURECLICK) 140 mg/mL PnIj    Non-rheumatic mitral regurgitation    Hypercholesterolemia    Relevant Medications    evolocumab (REPATHA SURECLICK) 140 mg/mL PnIj    Essential hypertension       Endocrine    Overweight (BMI 25.0-29.9)       Other    Statin intolerance    Relevant Medications    evolocumab (REPATHA SURECLICK) 140 mg/mL PnIj           Plan:         ADD IMDUR, AND TOPROL, FOR ANGINA CAD, START REPATHA, INTOLERANT OF ALL STATINS AND ZETIA,  TO BE SEEN BY CT SURGERY REFERRED FOR CABG.ALL OTHER CV CLINICALLY STABLE, CLASS 2 ANGINA, NO HF, NO TIA, NO CLINICAL ARRHYTHMIA,CONTINUE CURRENT MEDS, EDUCATION, DIET, EXERCISE, DISCUSSED PLAN WITH THE PATIENT AND HER , 2-3 MO  Coronary artery disease involving native coronary artery of native heart with angina pectoris  -     evolocumab (REPATHA SURECLICK) 140 mg/mL PnIj; Inject 1 mL (140 mg total) into the skin every 14 (fourteen) days.  Dispense: 2 each; Refill: 3    Non-rheumatic mitral regurgitation    Hypercholesterolemia  Comments:  START REPATHA  Orders:  -     evolocumab (REPATHA SURECLICK) 140 mg/mL PnIj; Inject 1 mL (140 mg total) into the skin every 14 (fourteen) days.  Dispense: 2 each; Refill: 3    Essential hypertension  Comments:  MOSTLY CONTROLLED    Overweight (BMI 25.0-29.9)    Statin intolerance  -     evolocumab (REPATHA SURECLICK) 140 mg/mL PnIj; Inject 1 mL (140 mg total) into the skin every 14 (fourteen) days.  Dispense: 2 each; Refill: 3    Other orders  -     metoprolol succinate (TOPROL-XL) 25 MG 24 hr tablet; Take 1 tablet (25 mg total) by mouth once daily.  Dispense: 30 tablet; Refill: 2  -     isosorbide mononitrate (IMDUR) 30 MG 24 hr tablet; Take 1 tablet (30 mg total) by mouth once daily.  Dispense: 30 tablet; Refill: 1    RTC Low level/low impact aerobic exercise 5x's/wk. Heart healthy diet and risk factor modification.    See labs and med orders.    Aerobic exercise 5x's/wk. Heart  healthy diet and risk factor modification.    See labs and med orders.

## 2022-05-26 ENCOUNTER — OFFICE VISIT (OUTPATIENT)
Dept: CARDIOLOGY | Facility: CLINIC | Age: 72
End: 2022-05-26
Payer: MEDICARE

## 2022-05-26 ENCOUNTER — HOSPITAL ENCOUNTER (OUTPATIENT)
Dept: RADIOLOGY | Facility: HOSPITAL | Age: 72
Discharge: HOME | End: 2022-05-26
Attending: NURSE PRACTITIONER
Payer: MEDICARE

## 2022-05-26 VITALS
HEART RATE: 61 BPM | DIASTOLIC BLOOD PRESSURE: 72 MMHG | HEIGHT: 61 IN | SYSTOLIC BLOOD PRESSURE: 138 MMHG | WEIGHT: 138.25 LBS | BODY MASS INDEX: 26.1 KG/M2

## 2022-05-26 DIAGNOSIS — E66.3 OVERWEIGHT (BMI 25.0-29.9): Chronic | ICD-10-CM

## 2022-05-26 DIAGNOSIS — Z78.9 STATIN INTOLERANCE: Chronic | ICD-10-CM

## 2022-05-26 DIAGNOSIS — I25.119 CORONARY ARTERY DISEASE INVOLVING NATIVE CORONARY ARTERY OF NATIVE HEART WITH ANGINA PECTORIS: Primary | Chronic | ICD-10-CM

## 2022-05-26 DIAGNOSIS — E78.00 HYPERCHOLESTEROLEMIA: Chronic | ICD-10-CM

## 2022-05-26 DIAGNOSIS — Z12.31 ENCOUNTER FOR SCREENING MAMMOGRAM FOR MALIGNANT NEOPLASM OF BREAST: ICD-10-CM

## 2022-05-26 DIAGNOSIS — I10 ESSENTIAL HYPERTENSION: Chronic | ICD-10-CM

## 2022-05-26 DIAGNOSIS — I34.0 NON-RHEUMATIC MITRAL REGURGITATION: Chronic | ICD-10-CM

## 2022-05-26 PROCEDURE — 99999 PR PBB SHADOW E&M-EST. PATIENT-LVL III: CPT | Mod: PBBFAC,,, | Performed by: INTERNAL MEDICINE

## 2022-05-26 PROCEDURE — 99214 OFFICE O/P EST MOD 30 MIN: CPT | Mod: S$PBB,,, | Performed by: INTERNAL MEDICINE

## 2022-05-26 PROCEDURE — 77063 BREAST TOMOSYNTHESIS BI: CPT | Mod: 26,,, | Performed by: RADIOLOGY

## 2022-05-26 PROCEDURE — 99213 OFFICE O/P EST LOW 20 MIN: CPT | Mod: PBBFAC,PO | Performed by: INTERNAL MEDICINE

## 2022-05-26 PROCEDURE — 77067 MAMMO DIGITAL SCREENING BILAT WITH TOMO: ICD-10-PCS | Mod: 26,,, | Performed by: RADIOLOGY

## 2022-05-26 PROCEDURE — 99999 PR PBB SHADOW E&M-EST. PATIENT-LVL III: ICD-10-PCS | Mod: PBBFAC,,, | Performed by: INTERNAL MEDICINE

## 2022-05-26 PROCEDURE — 77063 MAMMO DIGITAL SCREENING BILAT WITH TOMO: ICD-10-PCS | Mod: 26,,, | Performed by: RADIOLOGY

## 2022-05-26 PROCEDURE — 77067 SCR MAMMO BI INCL CAD: CPT | Mod: 26,,, | Performed by: RADIOLOGY

## 2022-05-26 PROCEDURE — 77067 SCR MAMMO BI INCL CAD: CPT | Mod: TC,PO

## 2022-05-26 PROCEDURE — 99214 PR OFFICE/OUTPT VISIT, EST, LEVL IV, 30-39 MIN: ICD-10-PCS | Mod: S$PBB,,, | Performed by: INTERNAL MEDICINE

## 2022-05-26 RX ORDER — METOPROLOL SUCCINATE 25 MG/1
25 TABLET, EXTENDED RELEASE ORAL DAILY
Qty: 30 TABLET | Refills: 2 | Status: SHIPPED | OUTPATIENT
Start: 2022-05-26 | End: 2022-08-05

## 2022-05-26 RX ORDER — ISOSORBIDE MONONITRATE 30 MG/1
30 TABLET, EXTENDED RELEASE ORAL DAILY
Qty: 30 TABLET | Refills: 1 | Status: SHIPPED | OUTPATIENT
Start: 2022-05-26 | End: 2022-07-01

## 2022-05-26 RX ORDER — EVOLOCUMAB 140 MG/ML
140 INJECTION, SOLUTION SUBCUTANEOUS
Qty: 2 EACH | Refills: 3 | Status: SHIPPED | OUTPATIENT
Start: 2022-05-26 | End: 2022-10-24 | Stop reason: SDUPTHER

## 2022-05-31 ENCOUNTER — OFFICE VISIT (OUTPATIENT)
Dept: VASCULAR SURGERY | Facility: CLINIC | Age: 72
End: 2022-05-31
Payer: MEDICARE

## 2022-05-31 VITALS
HEIGHT: 61 IN | WEIGHT: 137.81 LBS | SYSTOLIC BLOOD PRESSURE: 151 MMHG | HEART RATE: 66 BPM | BODY MASS INDEX: 26.02 KG/M2 | DIASTOLIC BLOOD PRESSURE: 70 MMHG

## 2022-05-31 DIAGNOSIS — I25.119 CORONARY ARTERY DISEASE INVOLVING NATIVE CORONARY ARTERY OF NATIVE HEART WITH ANGINA PECTORIS: Primary | ICD-10-CM

## 2022-05-31 DIAGNOSIS — M79.7 FIBROMYALGIA: ICD-10-CM

## 2022-05-31 DIAGNOSIS — I20.89 ANGINA OF EFFORT: ICD-10-CM

## 2022-05-31 DIAGNOSIS — I20.0 WORSENING ANGINA: ICD-10-CM

## 2022-05-31 DIAGNOSIS — I10 ESSENTIAL HYPERTENSION: ICD-10-CM

## 2022-05-31 DIAGNOSIS — G89.4 CHRONIC PAIN SYNDROME: ICD-10-CM

## 2022-05-31 PROCEDURE — 99999 PR PBB SHADOW E&M-EST. PATIENT-LVL IV: ICD-10-PCS | Mod: PBBFAC,,, | Performed by: THORACIC SURGERY (CARDIOTHORACIC VASCULAR SURGERY)

## 2022-05-31 PROCEDURE — 99205 PR OFFICE/OUTPT VISIT, NEW, LEVL V, 60-74 MIN: ICD-10-PCS | Mod: S$PBB,,, | Performed by: THORACIC SURGERY (CARDIOTHORACIC VASCULAR SURGERY)

## 2022-05-31 PROCEDURE — 99999 PR PBB SHADOW E&M-EST. PATIENT-LVL IV: CPT | Mod: PBBFAC,,, | Performed by: THORACIC SURGERY (CARDIOTHORACIC VASCULAR SURGERY)

## 2022-05-31 PROCEDURE — 99214 OFFICE O/P EST MOD 30 MIN: CPT | Mod: PBBFAC,PO | Performed by: THORACIC SURGERY (CARDIOTHORACIC VASCULAR SURGERY)

## 2022-05-31 PROCEDURE — 99205 OFFICE O/P NEW HI 60 MIN: CPT | Mod: S$PBB,,, | Performed by: THORACIC SURGERY (CARDIOTHORACIC VASCULAR SURGERY)

## 2022-05-31 RX ORDER — ESTRADIOL 0.07 MG/D
22 FILM, EXTENDED RELEASE TRANSDERMAL WEEKLY
COMMUNITY
End: 2023-06-06

## 2022-05-31 NOTE — PROGRESS NOTES
CHIEF COMPLAINT:   Chief Complaint   Patient presents with    Coronary Artery Disease     [place]/CAD       REFERRING PROVIDER: Sourav Nugent MD    Reason for consult:  Evaluation of coronary artery disease    History of Present Illness     Patient is a 72 y.o. female who developed chest pain with activities.  This was worked up with calcium scoring which was abnormal.  Eventually she agreed to undergo cardiac catheterization which reveals multivessel coronary artery disease.  She complains of occasional chest pain usually at night.  Also occurs when she walks a treadmill or is vigorously active.    Patient Active Problem List    Diagnosis Date Noted    Statin intolerance 05/26/2022    Essential hypertension 04/28/2022    Worsening angina 11/18/2021    Mild carotid artery disease 11/18/2021    Senile nuclear sclerosis 05/24/2021    Hyperkalemia 05/21/2021    Chronic kidney disease, stage 2, mildly decreased GFR 05/21/2021    Overweight (BMI 25.0-29.9) 05/21/2021    Biliary colic 02/10/2021    Agatston CAC score, >400 12/09/2019    Hypercholesterolemia 12/09/2019    Chronic pain 11/18/2019    Complex sleep apnea syndrome 06/27/2019    Non-rheumatic mitral regurgitation 06/14/2019    Diastolic dysfunction 06/14/2019    Angina of effort 06/14/2019    At risk for coronary artery disease 06/14/2019    Chest pain 06/04/2019    Coronary artery disease involving native coronary artery of native heart with angina pectoris     Lumbar spondylosis 08/21/2018    RLS (restless legs syndrome) 05/03/2017    Subjective memory complaints 05/03/2017    Decreased ferritin 05/03/2017    Rigidity (muscles) 05/03/2017    Accelerated hypertension 01/19/2017    Essential (primary) hypertension 01/18/2017    Fibromyalgia 01/18/2017    Spondylosis of lumbar joint 12/15/2016    Low back pain 04/20/2015    Trochanteric bursitis 12/10/2014    Iliotibial band tendonitis 12/10/2014    Myofascial pain syndrome,  "cervical 11/20/2013    Menopause 02/20/2013    Abdominal pain, other specified site 11/20/2012    Diarrhea 11/20/2012    Occipital neuralgia 11/13/2012    Myalgia and myositis 11/13/2012    DDD (degenerative disc disease), lumbar 11/13/2012    Sacroiliac joint pain 11/13/2012    Spondylolysis of lumbar region 10/19/2012    DDD (degenerative disc disease), cervical 10/19/2012    Thoracic or lumbosacral neuritis or radiculitis 10/19/2012     Past Medical History:   Diagnosis Date    Allergy     Anticoagulant long-term use     Anxiety     Arthritis     Blood transfusion 8 yrs    CAD (coronary artery disease)     Cataract     COPD (chronic obstructive pulmonary disease)     Degenerative disc disease     Depression     Dry eye syndrome     GERD (gastroesophageal reflux disease)     Hypercholesterolemia 12/09/2019    Hypertension     Macular drusen     Neuromuscular disorder     Ocular hypertension, bilateral     Osteoporosis     PUD (peptic ulcer disease)     Restless leg     Uses Suboxone to control    Sleep apnea     "complex sleep apnea" - per pt, no cpap    Thoracic or lumbosacral neuritis or radiculitis 10/19/2012    Thyroid disease       Past Surgical History:   Procedure Laterality Date    APPENDECTOMY  1965    CARDIAC CATHETERIZATION      CATARACT EXTRACTION W/  INTRAOCULAR LENS IMPLANT Right 5/24/2021    Procedure: EXTRACTION, CATARACT, WITH IOL INSERTION;  Surgeon: Bebe Lynn MD;  Location: [facility] OR;  Service: Ophthalmology;  Laterality: Right;  Right/DM    CORONARY ANGIOGRAPHY N/A 5/2/2022    Procedure: ANGIOGRAM, CORONARY ARTERY;  Surgeon: Sourav Nugent MD;  Location: [facility] CATH;  Service: Cardiology;  Laterality: N/A;    HYSTERECTOMY  8 yrs     INJECTION OF ANESTHETIC AGENT AROUND NERVE Bilateral 8/21/2018    Procedure: BLOCK, NERVE;  Surgeon: Talia Belcher MD;  Location: [facility] PAIN MGT;  Service: Pain Management;  Laterality: Bilateral;  Bilateral block @ " L2,3,4,5      INJECTION OF ANESTHETIC AGENT AROUND NERVE Bilateral 11/18/2019    Procedure: BLOCK, NERVE, L2-L3-L4-L5 ME DIAL BRANCH;  Surgeon: Talia Belcher MD;  Location: [facility] PAIN MGT;  Service: Pain Management;  Laterality: Bilateral;    INJECTION OF FACET JOINT Bilateral 12/16/2019    Procedure: INJECTION, FACET JOINT, L3-L4 AND L4-L5 AND T7-T8 LIGAMENT;  Surgeon: Talia Belcher MD;  Location: [facility] PAIN MGT;  Service: Pain Management;  Laterality: Bilateral;    LAPAROSCOPIC CHOLECYSTECTOMY N/A 2/10/2021    Procedure: CHOLECYSTECTOMY, LAPAROSCOPIC;  Surgeon: John Morrow MD;  Location: [facility] OR;  Service: General;  Laterality: N/A;    LEFT HEART CATHETERIZATION Left 5/2/2022    Procedure: Left heart cath;  Surgeon: Sourav Nugent MD;  Location: [facility] CATH;  Service: Cardiology;  Laterality: Left;    SINUS SURGERY      x2-one for CSF leak    TONSILLECTOMY  1954      Medication List with Changes/Refills   Current Medications    ASPIRIN (ECOTRIN) 81 MG EC TABLET    Take 1 tablet (81 mg total) by mouth once daily.    BENAZEPRIL (LOTENSIN) 20 MG TABLET    Take 1 tablet (20 mg total) by mouth every morning. Short term supply while pt waits for mail order.    BIOTIN 1 MG CAP    Take 1 tablet by mouth once daily.    CALCIUM CARBONATE/VITAMIN D3 (CALCIUM 500 WITH D ORAL)    Take 1 capsule by mouth once daily.    CARBOXYMETHYLCELLULOSE (REFRESH PLUS) 0.5 % DPET    1 drop daily as needed.    CO-ENZYME Q-10 30 MG CAPSULE    Take 30 mg by mouth 3 (three) times daily.    DICYCLOMINE (BENTYL) 10 MG CAPSULE    Take 1 capsule (10 mg total) by mouth 4 (four) times daily as needed (stomach discomfort).    ESOMEPRAZOLE (NEXIUM) 40 MG CAPSULE    Take 1 capsule (40 mg total) by mouth before breakfast.    ESTRADIOL (VIVELLE-DOT) 0.075 MG/24 HR    Place 1 patch onto the skin once a week.    EVOLOCUMAB (REPATHA SURECLICK) 140 MG/ML PNIJ    Inject 1 mL (140 mg total) into the skin every 14 (fourteen) days.    FLUTICASONE PROPIONATE  (FLONASE) 50 MCG/ACTUATION NASAL SPRAY    1 spray (50 mcg total) by Each Nostril route once daily.    GARLIC (GARLIQUE ORAL)    Take by mouth.    ISOSORBIDE MONONITRATE (IMDUR) 30 MG 24 HR TABLET    Take 1 tablet (30 mg total) by mouth once daily.    METOPROLOL SUCCINATE (TOPROL-XL) 25 MG 24 HR TABLET    Take 1 tablet (25 mg total) by mouth once daily.    MULTIVITAMIN (MULTIPLE VITAMIN ORAL)    Take 1 capsule by mouth once daily.    NITROGLYCERIN (NITROSTAT) 0.4 MG SL TABLET    Place 1 tablet (0.4 mg total) under the tongue every 5 (five) minutes as needed for Chest pain.    OXYBUTYNIN (DITROPAN XL) 15 MG TR24    Take 1 tablet (15 mg total) by mouth every morning.    SUBOXONE 8-2 MG FILM    1 Film by Subdermal route once daily. 1 strip every 3 to 4 days    SYNTHROID 100 MCG TABLET    TAKE 1 TABLET EVERY DAY     Review of patient's allergies indicates:   Allergen Reactions    Pcn [penicillins] Swelling    Toradol [ketorolac] Swelling    Vibramycin [doxycycline calcium] Swelling    Zetia [ezetimibe] Other (See Comments)    Crestor [rosuvastatin]      Body aches    Cymbalta [duloxetine]      dizzyness    Elavil [amitriptyline]     Lyrica [pregabalin] Swelling    Pravastatin Other (See Comments)     Flu -like symptoms   Body aches     Seroquel [quetiapine]      restless leg symdrome      Social History     Tobacco Use    Smoking status: Former Smoker     Quit date: 2008     Years since quittin.4    Smokeless tobacco: Never Used   Substance Use Topics    Alcohol use: No     Alcohol/week: 0.0 standard drinks      Family History   Problem Relation Age of Onset    Arthritis Mother     Cancer Mother     Heart disease Mother     Hypertension Mother     Cataracts Mother     Ovarian cancer Mother     Ulcers Father     Thyroid disease Brother     Heart disease Brother     Amblyopia Neg Hx     Blindness Neg Hx     Diabetes Neg Hx     Glaucoma Neg Hx     Macular degeneration Neg Hx     Retinal  "detachment Neg Hx     Strabismus Neg Hx     Stroke Neg Hx       Review of Systems  General:  No significant weight changes.  Positive easy fatigability.  HEENT:  Positive headaches occasional blurred vision.  Neck:  No complaints.  Respiratory:  No shortness of breath or cough.  Cardiac:  Positive chest tightness with significant activities.  GI:  Positive indigestion abdominal pain.  :  No complaints.  Musculoskeletal:  Positive arthralgias.  Neurologic:  Fibromyalgia.    Objective: Physical Exam     Vitals:    05/31/22 1408 05/31/22 1417   BP: (!) 175/73 (!) 151/70   Pulse: 65 66   Weight: 62.5 kg (137 lb 12.6 oz)    Height: 5' 1" (1.549 m)    PainSc: 0-No pain        Objective    General:  Pleasant lady in no obvious distress.  HEENT:  Normocephalic, atraumatic.  Good facial symmetry.  Neck:  Trachea is midline.  There are no carotid bruits.  There is no jugular venous distention.  Lungs:  Clear bilaterally.  Chest:  No chest wall abnormalities.  Heart:  Regular rate and rhythm.  No rubs or murmurs.  Abdomen:  No organomegaly.  Extremities:  No cyanosis, clubbing, edema.  Vascular:  Right radial pulses not palpable.  Left radial 2+, dorsalis pedis 2+ bilaterally.  Neurologic:  Alert oriented x4.  No focal deficits.    Data Review:   Lab Results   Component Value Date    WBC 8.27 04/28/2022    HGB 10.3 (L) 04/28/2022    HCT 33.2 (L) 04/28/2022    MCV 90 04/28/2022     04/28/2022   ,   BMP   Lab Results   Component Value Date     04/28/2022    K 5.5 (H) 04/28/2022     04/28/2022    CO2 28 04/28/2022    BUN 25 (H) 04/28/2022    CREATININE 1.3 04/28/2022    CALCIUM 10.2 04/28/2022    ANIONGAP 8 04/28/2022    ESTGFRAFRICA 47.0 (A) 04/28/2022    EGFRNONAA 40.8 (A) 04/28/2022   ,   CMP  Sodium   Date Value Ref Range Status   04/28/2022 136 136 - 145 mmol/L Final     Potassium   Date Value Ref Range Status   04/28/2022 5.5 (H) 3.5 - 5.1 mmol/L Final     Comment:     *No Visible Hemolysis "     Chloride   Date Value Ref Range Status   04/28/2022 100 95 - 110 mmol/L Final     CO2   Date Value Ref Range Status   04/28/2022 28 23 - 29 mmol/L Final     Glucose   Date Value Ref Range Status   04/28/2022 108 70 - 110 mg/dL Final     BUN   Date Value Ref Range Status   04/28/2022 25 (H) 8 - 23 mg/dL Final     Creatinine   Date Value Ref Range Status   04/28/2022 1.3 0.5 - 1.4 mg/dL Final     Calcium   Date Value Ref Range Status   04/28/2022 10.2 8.7 - 10.5 mg/dL Final     Total Protein   Date Value Ref Range Status   04/28/2022 7.5 6.0 - 8.4 g/dL Final     Albumin   Date Value Ref Range Status   04/28/2022 3.9 3.5 - 5.2 g/dL Final     Total Bilirubin   Date Value Ref Range Status   04/28/2022 0.3 0.1 - 1.0 mg/dL Final     Comment:     For infants and newborns, interpretation of results should be based  on gestational age, weight and in agreement with clinical  observations.    Premature Infant recommended reference ranges:  Up to 24 hours.............<8.0 mg/dL  Up to 48 hours............<12.0 mg/dL  3-5 days..................<15.0 mg/dL  6-29 days.................<15.0 mg/dL       Alkaline Phosphatase   Date Value Ref Range Status   04/28/2022 65 55 - 135 U/L Final     AST   Date Value Ref Range Status   04/28/2022 18 10 - 40 U/L Final     ALT   Date Value Ref Range Status   04/28/2022 14 10 - 44 U/L Final     Anion Gap   Date Value Ref Range Status   04/28/2022 8 8 - 16 mmol/L Final     eGFR if    Date Value Ref Range Status   04/28/2022 47.0 (A) >60 mL/min/1.73 m^2 Final     eGFR if non    Date Value Ref Range Status   04/28/2022 40.8 (A) >60 mL/min/1.73 m^2 Final     Comment:     Calculation used to obtain the estimated glomerular filtration  rate (eGFR) is the CKD-EPI equation.      ,   No results found for: INR, PROTIME    EKG:  Vent. Rate : 067 BPM     Atrial Rate : 067 BPM      P-R Int : 132 ms          QRS Dur : 088 ms       QT Int : 384 ms       P-R-T Axes : 021 017  052 degrees      QTc Int : 405 ms     Normal sinus rhythm   Normal ECG   When compared with ECG of 05-DEC-2002 14:58,   No significant change was found   Confirmed by Félix HERZOG MD, Bernard FRANCE ([id number]) on 5/4/2022 6:04:27 AM     Referred By: TALIA ERVIN           Confirmed By:Bernard Heard III, MD      Specimen Collected: 05/02/22 08:06            CARDIAC CATH 05/02/2022  [facility]  Coronary Findings      Diagnostic  Dominance: Right      Left Anterior Descending   The vessel is moderate in size. The vessel is moderately calcified.   Mid LAD lesion was 60% stenosed.   First Diagonal Branch   1st Diag lesion was 50% stenosed.   Left Circumflex   Mid Cx lesion was 95% stenosed.   Second Obtuse Marginal Branch   2nd Mrg lesion was 95% stenosed. The lesion was calcified. The calcification amount was mild.   Right Coronary Artery   Prox RCA lesion was 95% stenosed.   Mid RCA lesion was 100% stenosed.   Mid RCA to Dist RCA lesion was 100% stenosed.   Right Posterior Descending Artery   Collaterals   RPDA filled by collaterals from Dist LAD.      Third Right Posterolateral Branch   Collaterals   3rd RPL filled by collaterals from Dist Cx.             Assessment:     1. Coronary artery disease native arteries native heart with angina and documented spasm.  2. Exertional angina.  3. Fibromyalgia     Plan:     A have to personally review the images from her cardiac catheterization.  Will likely recommend coronary artery bypass surgery.  I discussed the risks of surgery including death, stroke, bleeding, infection, arrhythmias, heart failure, lung failure, kidney failure, need for blood transfusions.  She gives informed consent to proceed.

## 2022-06-08 ENCOUNTER — TELEPHONE (OUTPATIENT)
Dept: VASCULAR SURGERY | Facility: CLINIC | Age: 72
End: 2022-06-08
Payer: MEDICARE

## 2022-06-08 NOTE — TELEPHONE ENCOUNTER
----- Message from Barbara Crump sent at 6/8/2022  1:54 PM CDT -----  Contact: [phone]  Type: Needs Medical Advice  Who Called:  Pt    Best Call Back Number: [phone]    Additional Information: Pt is calling to see if imaging was reviewed and if she can schedule for her surgery. Pls call back and advise

## 2022-06-09 DIAGNOSIS — I25.119 CORONARY ARTERY DISEASE INVOLVING NATIVE CORONARY ARTERY OF NATIVE HEART WITH ANGINA PECTORIS: Primary | ICD-10-CM

## 2022-06-09 NOTE — TELEPHONE ENCOUNTER
Pre-op 6/24 10:00AM  Surgery 6/27 7:00AM @ [facility]  No blood thinners. Ok to stay on ASA81 until day prior to surgery

## 2022-06-22 ENCOUNTER — SPECIALTY PHARMACY (OUTPATIENT)
Dept: PHARMACY | Facility: CLINIC | Age: 72
End: 2022-06-22
Payer: MEDICARE

## 2022-06-22 NOTE — TELEPHONE ENCOUNTER
Kev, this is Fly Schultz with [facility].  We are working on your prescription that your doctor has sent us. We will be working with your insurance to get this approved for you. We will be calling you along the way with updates on your medication.  If you have any questions, you can reach us at [phone].    Welcome call outcome: Patient/caregiver reached     PA submitted for Repatha (Key: [id number])

## 2022-06-23 ENCOUNTER — SPECIALTY PHARMACY (OUTPATIENT)
Dept: PHARMACY | Facility: CLINIC | Age: 72
End: 2022-06-23
Payer: MEDICARE

## 2022-06-23 DIAGNOSIS — E78.00 HYPERCHOLESTEROLEMIA: Primary | ICD-10-CM

## 2022-06-23 NOTE — TELEPHONE ENCOUNTER
Specialty Pharmacy - Initial Clinical Assessment    Specialty Medication Orders Linked to Encounter    Flowsheet Row Most Recent Value   Medication #1 evolocumab (REPATHA SURECLICK) 140 mg/mL PnIj (Order#[id number], Rx#[id number])        Patient Diagnosis   E78.00 - Hypercholesterolemia    Subjective    Osman Johansen is a 72 y.o. female, who is followed by the specialty pharmacy service for management and education.    Recent Encounters     Date Type Provider Description    06/23/2022 Specialty Pharmacy Viviana Garza, PharmD Initial Clinical Assessment    06/22/2022 Specialty Pharmacy Fly Schultz, Nikhil Referral Authorization        Clinical call attempts since last clinical assessment   No call attempts found.     Current Outpatient Medications   Medication Sig    aspirin (ECOTRIN) 81 MG EC tablet Take 1 tablet (81 mg total) by mouth once daily.    benazepriL (LOTENSIN) 20 MG tablet Take 1 tablet (20 mg total) by mouth every morning. Short term supply while pt waits for mail order.    biotin 1 mg Cap Take 1 tablet by mouth once daily.    CALCIUM CARBONATE/VITAMIN D3 (CALCIUM 500 WITH D ORAL) Take 1 capsule by mouth once daily.    carboxymethylcellulose (REFRESH PLUS) 0.5 % Dpet 1 drop daily as needed.    [START ON 6/25/2022] chlorhexidine (PERIDEX) 0.12 % solution Use as directed 10 mLs in the mouth or throat 2 (two) times daily.    co-enzyme Q-10 30 mg capsule Take 30 mg by mouth 3 (three) times daily.    dicyclomine (BENTYL) 10 MG capsule Take 1 capsule (10 mg total) by mouth 4 (four) times daily as needed (stomach discomfort).    esomeprazole (NEXIUM) 40 MG capsule Take 1 capsule (40 mg total) by mouth before breakfast.    estradioL (VIVELLE-DOT) 0.075 mg/24 hr Place 1 patch onto the skin once a week.    evolocumab (REPATHA SURECLICK) 140 mg/mL PnIj Inject 1 mL (140 mg total) into the skin every 14 (fourteen) days.    fluticasone propionate (FLONASE) 50 mcg/actuation nasal spray 1 spray  (50 mcg total) by Each Nostril route once daily.    garlic (GARLIQUE ORAL) Take by mouth.    isosorbide mononitrate (IMDUR) 30 MG 24 hr tablet Take 1 tablet (30 mg total) by mouth once daily.    metoprolol succinate (TOPROL-XL) 25 MG 24 hr tablet Take 1 tablet (25 mg total) by mouth once daily.    MULTIVITAMIN (MULTIPLE VITAMIN ORAL) Take 1 capsule by mouth once daily.    [START ON 6/25/2022] mupirocin (BACTROBAN) 2 % ointment Apply topically 2 (two) times daily.    nitroGLYCERIN (NITROSTAT) 0.4 MG SL tablet Place 1 tablet (0.4 mg total) under the tongue every 5 (five) minutes as needed for Chest pain.    oxybutynin (DITROPAN XL) 15 MG TR24 Take 1 tablet (15 mg total) by mouth every morning.    SUBOXONE 8-2 mg Film 1 Film by Subdermal route once daily. 1 strip every 3 to 4 days    SYNTHROID 100 mcg tablet TAKE 1 TABLET EVERY DAY   Last reviewed on 5/31/2022  2:53 PM by Talib Torres MD    Review of patient's allergies indicates:   Allergen Reactions    Pcn [penicillins] Swelling    Toradol [ketorolac] Swelling    Vibramycin [doxycycline calcium] Swelling    Zetia [ezetimibe] Other (See Comments)    Crestor [rosuvastatin]      Body aches    Cymbalta [duloxetine]      dizzyness    Elavil [amitriptyline]     Lyrica [pregabalin] Swelling    Pravastatin Other (See Comments)     Flu -like symptoms   Body aches     Seroquel [quetiapine]      restless leg symdrome   Last reviewed on  5/31/2022 2:53 PM by Talib Torres    Drug Interactions    Drug interactions evaluated: yes  Clinically relevant drug interactions identified: no  Provided the patient with educational material regarding drug interactions: not applicable         Adverse Effects    *All other systems reviewed and are negative       Assessment Questions - Documented Responses    Flowsheet Row Most Recent Value   Assessment    Medication Reconciliation completed for patient Yes   During the past 4 weeks, has patient missed any  activities due to condition or medication? No   During the past 4 weeks, did patient have any of the following urgent care visits? None   Goals of Therapy Status Discussed (new start)   Status of the patients ability to self-administer: Caregiver to administer   All education points have been covered with patient? Yes, supplemental printed education provided   Welcome packet contents reviewed and discussed with patient? Yes   Assesment completed? Yes   Plan Therapy being initiated   Do you need to open a clinical intervention (i-vent)? No   Do you want to schedule first shipment? Yes   Medication #1 Assessment Info    Patient status New medication   Is this medication appropriate for the patient? Yes   Is this medication effective? Not yet started        Refill Questions - Documented Responses    Flowsheet Row Most Recent Value   Patient Availability and HIPAA Verification    Does patient want to proceed with activity? Yes   HIPAA/medical authority confirmed? Yes   Relationship to patient of person spoken to? Self   Refill Screening Questions    When does the patient need to receive the medication? 07/06/22   Refill Delivery Questions    How will the patient receive the medication? Delivery Tamela   When does the patient need to receive the medication? 07/06/22   Shipping Address Home   Address in [facility] confirmed and updated if neccessary? Yes   Expected Copay ($) 124.27   Is the patient able to afford the medication copay? Yes   Payment Method CC on file   Days supply of Refill 28   Supplies needed? No supplies needed   Refill activity completed? Yes   Refill activity plan Refill scheduled   Shipment/Pickup Date: 07/06/22          Objective    She has a past medical history of Allergy, Anticoagulant long-term use, Anxiety, Arthritis, Blood transfusion (8 yrs), CAD (coronary artery disease), Cataract, COPD (chronic obstructive pulmonary disease), Degenerative disc disease, Depression, Dry eye syndrome,  "GERD (gastroesophageal reflux disease), Hypercholesterolemia (12/09/2019), Hypertension, Macular drusen, Neuromuscular disorder, Ocular hypertension, bilateral, Osteoporosis, PUD (peptic ulcer disease), Restless leg, Sleep apnea, Thoracic or lumbosacral neuritis or radiculitis (10/19/2012), and Thyroid disease.    Tried/failed medications: multiple statins    BP Readings from Last 4 Encounters:   05/31/22 (!) 151/70   05/26/22 138/72   05/02/22 (!) 164/68   04/28/22 136/82     Ht Readings from Last 4 Encounters:   05/31/22 5' 1" (1.549 m)   05/26/22 5' 1" (1.549 m)   05/02/22 5' 1" (1.549 m)   04/28/22 5' 1" (1.549 m)     Wt Readings from Last 4 Encounters:   05/31/22 62.5 kg (137 lb 12.6 oz)   05/26/22 62.7 kg (138 lb 3.7 oz)   05/02/22 61.2 kg (135 lb)   04/28/22 62.1 kg (136 lb 14.5 oz)       The goals of prescribed drug therapy management include:  · Supporting patient to meet the prescriber's medical treatment objectives  · Improving or maintaining quality of life  · Maintaining optimal therapy adherence  · Minimizing and managing side effects      Goals of Therapy Status: Discussed (new start)    Assessment/Plan  Patient plans to start therapy on 07/06/22      Indication, dosage, appropriateness, effectiveness, safety and convenience of her specialty medication(s) were reviewed today.     Patient Education   Patient received education on the following:    Expectations and possible outcomes of therapy   Proper use, timely administration, and missed dose management   Duration of therapy   Side effects, including prevention, minimization, and management   Contraindications and safety precautions   New or changed medications, including prescribe and over the counter medications and supplements   Reviews recommended vaccinations, as appropriate   Storage, safe handling, and disposal        Tasks added this encounter   7/27/2022 - Refill Call (Auto Added)   Tasks due within next 3 months   No tasks due. "     Viviana Garza, PharmD  [facility]  [address]  [facility] [address]  Phone: [phone]  Fax: [phone]

## 2022-06-23 NOTE — TELEPHONE ENCOUNTER
PA approved for Repatha 140mg/ml from 6/23/22-12/20/22    [facility] in network  Copay: $124.27  No LIS    BI Complete. Sending to FA.

## 2022-06-24 DIAGNOSIS — I25.10 CORONARY ATHEROSCLEROSIS OF NATIVE CORONARY ARTERY: ICD-10-CM

## 2022-06-24 DIAGNOSIS — I25.119 CORONARY ARTERY DISEASE INVOLVING NATIVE CORONARY ARTERY OF NATIVE HEART WITH ANGINA PECTORIS: Primary | ICD-10-CM

## 2022-06-24 RX ORDER — LIDOCAINE HYDROCHLORIDE 10 MG/ML
1 INJECTION, SOLUTION EPIDURAL; INFILTRATION; INTRACAUDAL; PERINEURAL ONCE
Status: CANCELLED | OUTPATIENT
Start: 2022-06-24 | End: 2022-06-24

## 2022-06-24 RX ORDER — MUPIROCIN 20 MG/G
OINTMENT TOPICAL
Status: CANCELLED | OUTPATIENT
Start: 2022-06-24

## 2022-06-24 RX ORDER — CHLORHEXIDINE GLUCONATE ORAL RINSE 1.2 MG/ML
10 SOLUTION DENTAL
Status: CANCELLED | OUTPATIENT
Start: 2022-06-24

## 2022-07-01 ENCOUNTER — TELEPHONE (OUTPATIENT)
Dept: VASCULAR SURGERY | Facility: CLINIC | Age: 72
End: 2022-07-01
Payer: MEDICARE

## 2022-07-01 NOTE — TELEPHONE ENCOUNTER
----- Message from Sweta Samuel sent at 7/1/2022 10:02 AM CDT -----  Regarding: hospital follow up  Contact: Rosa Maria babatunde Middleton  Type:  Sooner Appointment Request    Caller is requesting a sooner appointment.  Caller declined first available appointment listed below.  Caller will not accept being placed on the waitlist and is requesting a message be sent to doctor.    Name of Caller:  Rosa Maria fine [facility]  When is the first available appointment?    Symptoms:  discharged 07/01/22 [facility] dx CAD  Best Call Back Number:  [phone] (home)   Additional Information:  Patient needs to be seen in 3 weeks. Please call patient to advise.Thanks!

## 2022-07-01 NOTE — TELEPHONE ENCOUNTER
----- Message from John Cai MA sent at 7/1/2022 10:18 AM CDT -----  Contact: DEN MEJIAS [[id number]]  .Type: Needs Medical Advice    Who Called:DEN MEJIAS [[id number]]  Best Call Back Number: [phone]  Inquiry/Question: Please call DEN MEJIAS [[id number]] regarding hospital follow up  Thank you~

## 2022-07-01 NOTE — TELEPHONE ENCOUNTER
----- Message from Sweta Samuel sent at 7/1/2022 10:02 AM CDT -----  Regarding: hospital follow up  Contact: Rosa Maria babatunde Middleton  Type:  Sooner Appointment Request    Caller is requesting a sooner appointment.  Caller declined first available appointment listed below.  Caller will not accept being placed on the waitlist and is requesting a message be sent to doctor.    Name of Caller:  Rosa Maria fine [facility]  When is the first available appointment?    Symptoms:  discharged 07/01/22 [facility] dx CAD  Best Call Back Number:  [phone] (home)   Additional Information:  Patient needs to be seen in 3 weeks. Please call patient to advise.Thanks!

## 2022-07-03 PROBLEM — Z71.89 ACP (ADVANCE CARE PLANNING): Status: ACTIVE | Noted: 2022-07-03

## 2022-07-03 PROBLEM — E03.9 HYPOTHYROIDISM: Status: ACTIVE | Noted: 2022-07-03

## 2022-07-05 ENCOUNTER — TELEPHONE (OUTPATIENT)
Dept: VASCULAR SURGERY | Facility: CLINIC | Age: 72
End: 2022-07-05
Payer: MEDICARE

## 2022-07-05 ENCOUNTER — TELEPHONE (OUTPATIENT)
Dept: FAMILY MEDICINE | Facility: CLINIC | Age: 72
End: 2022-07-05
Payer: MEDICARE

## 2022-07-05 NOTE — TELEPHONE ENCOUNTER
----- Message from Harshad Patricia sent at 7/5/2022 12:40 PM CDT -----  Contact: Self  Type:  Patient Returning Call    Who Called:  Patient  Who Left Message for Patient:  ??  Does the patient know what this is regarding?:  appt  Best Call Back Number:  [phone]   Additional Information:  States staff contacted her regarding sooner appt time.

## 2022-07-05 NOTE — TELEPHONE ENCOUNTER
----- Message from Barbara Crump sent at 7/5/2022 12:49 PM CDT -----  Contact: [phone]  Type:  Sooner Appointment Request    Caller is requesting a sooner appointment.  Caller declined first available appointment listed below.  Caller will not accept being placed on the waitlist and is requesting a message be sent to doctor.    Name of Caller:  Pt  When is the first available appointment?   Symptoms:  Er fu- chest pains  Best Call Back Number:  [phone]    Additional Information:  Pls call back and advise. Discharge was 7/4.

## 2022-07-14 ENCOUNTER — OFFICE VISIT (OUTPATIENT)
Dept: FAMILY MEDICINE | Facility: CLINIC | Age: 72
End: 2022-07-14
Payer: MEDICARE

## 2022-07-14 ENCOUNTER — TELEPHONE (OUTPATIENT)
Dept: FAMILY MEDICINE | Facility: CLINIC | Age: 72
End: 2022-07-14

## 2022-07-14 ENCOUNTER — HOSPITAL ENCOUNTER (OUTPATIENT)
Dept: RADIOLOGY | Facility: HOSPITAL | Age: 72
Discharge: HOME | End: 2022-07-14
Attending: NURSE PRACTITIONER
Payer: MEDICARE

## 2022-07-14 VITALS
SYSTOLIC BLOOD PRESSURE: 116 MMHG | DIASTOLIC BLOOD PRESSURE: 76 MMHG | WEIGHT: 136.88 LBS | BODY MASS INDEX: 25.87 KG/M2 | OXYGEN SATURATION: 97 % | HEART RATE: 63 BPM

## 2022-07-14 DIAGNOSIS — I10 ESSENTIAL HYPERTENSION: ICD-10-CM

## 2022-07-14 DIAGNOSIS — N18.31 STAGE 3A CHRONIC KIDNEY DISEASE: ICD-10-CM

## 2022-07-14 DIAGNOSIS — Z95.1 S/P CABG (CORONARY ARTERY BYPASS GRAFT): Primary | ICD-10-CM

## 2022-07-14 DIAGNOSIS — R06.89 DECREASED BREATH SOUNDS AT LEFT LUNG BASE: ICD-10-CM

## 2022-07-14 DIAGNOSIS — Z95.1 S/P CABG (CORONARY ARTERY BYPASS GRAFT): ICD-10-CM

## 2022-07-14 DIAGNOSIS — Z09 HOSPITAL DISCHARGE FOLLOW-UP: Primary | ICD-10-CM

## 2022-07-14 DIAGNOSIS — M79.89 SWELLING OF LEFT LOWER EXTREMITY: ICD-10-CM

## 2022-07-14 PROCEDURE — 99495 TCM SERVICES (MODERATE COMPLEXITY): ICD-10-PCS | Mod: S$PBB,,, | Performed by: NURSE PRACTITIONER

## 2022-07-14 PROCEDURE — 71046 X-RAY EXAM CHEST 2 VIEWS: CPT | Mod: 26,,, | Performed by: RADIOLOGY

## 2022-07-14 PROCEDURE — 99495 TRANSJ CARE MGMT MOD F2F 14D: CPT | Mod: S$PBB,,, | Performed by: NURSE PRACTITIONER

## 2022-07-14 PROCEDURE — 99999 PR PBB SHADOW E&M-EST. PATIENT-LVL IV: ICD-10-PCS | Mod: PBBFAC,,, | Performed by: NURSE PRACTITIONER

## 2022-07-14 PROCEDURE — 71046 X-RAY EXAM CHEST 2 VIEWS: CPT | Mod: TC,FY,PO

## 2022-07-14 PROCEDURE — 71046 XR CHEST PA AND LATERAL: ICD-10-PCS | Mod: 26,,, | Performed by: RADIOLOGY

## 2022-07-14 PROCEDURE — 99999 PR PBB SHADOW E&M-EST. PATIENT-LVL IV: CPT | Mod: PBBFAC,,, | Performed by: NURSE PRACTITIONER

## 2022-07-14 PROCEDURE — 99214 OFFICE O/P EST MOD 30 MIN: CPT | Mod: PBBFAC,25,PO | Performed by: NURSE PRACTITIONER

## 2022-07-14 NOTE — TELEPHONE ENCOUNTER
----- Message from Claritza May MA sent at 7/14/2022  9:12 AM CDT -----  Regarding: questions  Good morning,      Pt just left her appt at 9:13 am and mentioned that she wanted to ask you about a nutritionist. Pt is on a low sodium and low cholesterol diet. Pt has been doing that since her heart surgery. She would like to see the nutritionist to help her come up with some type of plan.        Pt can be reached at [phone]

## 2022-07-14 NOTE — PROGRESS NOTES
"Subjective:       Patient ID: Osman Johansen is a 72 y.o. female.    Chief Complaint: hospital f/u (Heart Surgery )    HPI  Admitted to [facility] 7/3/22-7/4/22 for CP a week post CABG, see discharge summary:  "HPI:   71 yo F PMHx CAD, recent 5 vessel CABG on 6/27 with Dr. Olivier, HLD, GERD, hypothyroidism presents with chest pain 2 days after DC following CABG. Mild SOB. Denies N/V/diaphoresis. Pt has been doing well, doing her PT, eating and drinking. Yesterday pt began having her "angina" like pain for which she was taking nitro prior to CABG. She also reported some right ankle pain and BL LE swelling improved with FORD hose. Cardiology consulted in ED due to elevated troponin and recommended DC home if troponin plateaued, however repeat slightly higher.      In ED /78, improved to 147 systolic     CBC/CMP unremarkable     Troponin 2.31 then 2.37     CxR: left basilar effusion     BL US: no e/o DVT     * No surgery found *       Hospital Course:   Admitted with CP a week after CABG. Vitals stable. Initially troponin elevated but this downtrended. Echo obtained with good function. Cardiology consulted, recommended PO lasix at DC. Cards and CT surgery follow up in place. Pt was hemodynamically stable and in no distress on day of discharge. Detailed return precautions outlined. Pt expressed understanding and appreciation.      Echo:  · The estimated PA systolic pressure is 29 mmHg.  · Normal systolic function.  · The estimated ejection fraction is 65%.  · Indeterminate left ventricular diastolic function.  · Normal right ventricular size with normal right ventricular systolic function.  · Mild to moderate tricuspid regurgitation.  · Mild left atrial enlargement.  · Normal central venous pressure (3 mmHg)."    Dr. Wellington saw patient during observation--chest pain improved with controlled blood pressure, suspected mild volume overload. Started Lasix 20mg twice a week. Due for dose today. Reports mild LLE edema. " "Daily weights at home--stable. Denies fever, cough or worsening AVILA.     Patient scheduled to see cardiology tomorrow and Dr Olivier in 5 days    Doing incentive spirometer hourly while awake  Has a friend who is a nurse from out of town staying with her--  Doing own ADLs. Walking on treadmill 3x/day for 10 mins  Feeling great     Vitals:    07/14/22 0845   BP: 116/76   Pulse: 63     Review of Systems   Constitutional: Negative for chills, diaphoresis and fever.   Respiratory: Negative for cough and wheezing.         AVILA   Cardiovascular: Positive for leg swelling.   Gastrointestinal: Negative for diarrhea, nausea and vomiting.   Genitourinary: Negative for difficulty urinating.   Skin: Negative for color change.   Neurological: Negative for dizziness and light-headedness.       Past Medical History:   Diagnosis Date    Allergy     Anticoagulant long-term use     Anxiety     Arthritis     Blood transfusion 8 yrs    CAD (coronary artery disease)     Cataract     COPD (chronic obstructive pulmonary disease)     mild no inhalers    Degenerative disc disease     Depression     Dry eye syndrome     Fibromyalgia     GERD (gastroesophageal reflux disease)     Hypercholesterolemia 12/09/2019    Hypertension     IBS (irritable bowel syndrome)     Macular drusen     Neuromuscular disorder     Ocular hypertension, bilateral     Osteoporosis     PUD (peptic ulcer disease)     Restless leg     Uses Suboxone to control    Sleep apnea     "complex sleep apnea" - per pt, no cpap    Thoracic or lumbosacral neuritis or radiculitis 10/19/2012    Thyroid disease     hashimoto's     Objective:      Physical Exam  Vitals and nursing note reviewed.   Constitutional:       General: She is not in acute distress.     Appearance: She is not diaphoretic.   HENT:      Head: Normocephalic.   Eyes:      General:         Right eye: No discharge.         Left eye: No discharge.   Neck:      Trachea: No tracheal deviation. "   Cardiovascular:      Rate and Rhythm: Normal rate.      Heart sounds: Normal heart sounds.      Comments: Mild nonpitting edema LLE  Pulmonary:      Effort: Pulmonary effort is normal.      Breath sounds: Examination of the left-lower field reveals decreased breath sounds. Decreased breath sounds present. No wheezing, rhonchi or rales.   Chest:      Comments: Sternotomy well approximated CDI no erythema   Skin:     Coloration: Skin is not pale.      Findings: Ecchymosis present.             Comments: Metter site CDI no erythema   Ecchymosis generalized to LLE, tender to touch    Neurological:      Mental Status: She is alert and oriented to person, place, and time.   Psychiatric:         Speech: Speech normal.         Behavior: Behavior normal.         Thought Content: Thought content normal.         Judgment: Judgment normal.         Assessment:       1. Hospital discharge follow-up    2. S/P CABG (coronary artery bypass graft)    3. Decreased breath sounds at left lung base    4. Swelling of left lower extremity    5. Essential hypertension        Plan:       Hospital discharge follow-up    S/P CABG (coronary artery bypass graft)  -     X-Ray Chest PA And Lateral; Future; Expected date: 07/14/2022    Decreased breath sounds at left lung base  -     X-Ray Chest PA And Lateral; Future; Expected date: 07/14/2022    Swelling of left lower extremity    Essential hypertension      BP good  Weight stable  No worsening symptoms  CXR today to ensure pleural effusion not worsening. Continue IS and activity   Continue Lasix, daily weights  Elevate, compression stocking  education provided on supportive care, symptom monitoring and return/ER precautions   FU with cardiology and [facility] as scheduled       Transitional Care Note    Family and/or Caretaker present at visit?  Yes.  Diagnostic tests reviewed/disposition: No diagnosic tests pending after this hospitalization.  Disease/illness education: yes  Home health/community  services discussion/referrals: Patient does not have home health established from hospital visit.  They do not need home health.  If needed, we will set up home health for the patient.   Establishment or re-establishment of referral orders for community resources: No other necessary community resources.   Discussion with other health care providers: No discussion with other health care providers necessary.             Medication List with Changes/Refills   Current Medications    ASPIRIN (ECOTRIN) 81 MG EC TABLET    Take 1 tablet (81 mg total) by mouth once daily.    BENAZEPRIL (LOTENSIN) 20 MG TABLET    Take 1 tablet (20 mg total) by mouth every morning. Short term supply while pt waits for mail order.    BIOTIN 1 MG CAP    Take 1 mg by mouth once daily.    CALCIUM CARBONATE/VITAMIN D3 (CALCIUM 500 WITH D ORAL)    Take 1 capsule by mouth once daily.    CARBOXYMETHYLCELLULOSE (REFRESH PLUS) 0.5 % DPET    Place 1 drop into both eyes daily as needed (DRY EYES).    CO-ENZYME Q-10 30 MG CAPSULE    Take 30 mg by mouth 3 (three) times daily.    DICYCLOMINE (BENTYL) 10 MG CAPSULE    Take 1 capsule (10 mg total) by mouth 4 (four) times daily as needed (stomach discomfort).    ESOMEPRAZOLE (NEXIUM) 40 MG CAPSULE    Take 1 capsule (40 mg total) by mouth before breakfast.    ESTRADIOL (VIVELLE-DOT) 0.075 MG/24 HR    Place 1 patch onto the skin once a week.    EVOLOCUMAB (REPATHA SURECLICK) 140 MG/ML PNIJ    Inject 1 mL (140 mg total) into the skin every 14 (fourteen) days.    FLUTICASONE PROPIONATE (FLONASE) 50 MCG/ACTUATION NASAL SPRAY    1 spray (50 mcg total) by Each Nostril route once daily.    FUROSEMIDE (LASIX) 20 MG TABLET    Take 2 tablets (40 mg total) by mouth twice a week.    GARLIC (GARLIQUE ORAL)    Take 1 capsule by mouth Daily.    HYDROCODONE-ACETAMINOPHEN (NORCO)  MG PER TABLET    Take 1 tablet by mouth every 4 (four) hours as needed for Pain.    METOPROLOL SUCCINATE (TOPROL-XL) 25 MG 24 HR TABLET    Take  1 tablet (25 mg total) by mouth once daily.    MULTIVITAMIN (MULTIPLE VITAMIN ORAL)    Take 1 capsule by mouth once daily.    OXYBUTYNIN (DITROPAN XL) 15 MG TR24    Take 1 tablet (15 mg total) by mouth every morning.    SUBOXONE 8-2 MG FILM    Place 0.25 Film under the tongue as needed (Pt breaks up 1 strip every 3 to 4 days prn restless leg).    SYNTHROID 100 MCG TABLET    TAKE 1 TABLET EVERY DAY

## 2022-07-14 NOTE — TELEPHONE ENCOUNTER
We do not have a dietician here--there is one at [facility]--order placed, patient will need to call [facility] to schedule

## 2022-07-15 ENCOUNTER — OFFICE VISIT (OUTPATIENT)
Dept: CARDIOLOGY | Facility: CLINIC | Age: 72
End: 2022-07-15
Payer: MEDICARE

## 2022-07-15 VITALS
WEIGHT: 135.56 LBS | HEART RATE: 69 BPM | DIASTOLIC BLOOD PRESSURE: 64 MMHG | BODY MASS INDEX: 25.59 KG/M2 | SYSTOLIC BLOOD PRESSURE: 123 MMHG | HEIGHT: 61 IN

## 2022-07-15 DIAGNOSIS — Z95.1 S/P CABG (CORONARY ARTERY BYPASS GRAFT): Primary | ICD-10-CM

## 2022-07-15 DIAGNOSIS — E78.00 HYPERCHOLESTEROLEMIA: ICD-10-CM

## 2022-07-15 DIAGNOSIS — N18.2 CHRONIC KIDNEY DISEASE, STAGE 2, MILDLY DECREASED GFR: ICD-10-CM

## 2022-07-15 DIAGNOSIS — I25.119 CORONARY ARTERY DISEASE INVOLVING NATIVE CORONARY ARTERY OF NATIVE HEART WITH ANGINA PECTORIS: ICD-10-CM

## 2022-07-15 DIAGNOSIS — I10 ESSENTIAL HYPERTENSION: ICD-10-CM

## 2022-07-15 DIAGNOSIS — I10 ESSENTIAL (PRIMARY) HYPERTENSION: ICD-10-CM

## 2022-07-15 DIAGNOSIS — E03.9 HYPOTHYROIDISM, UNSPECIFIED TYPE: ICD-10-CM

## 2022-07-15 DIAGNOSIS — E66.3 OVERWEIGHT (BMI 25.0-29.9): ICD-10-CM

## 2022-07-15 PROCEDURE — 99213 OFFICE O/P EST LOW 20 MIN: CPT | Mod: PBBFAC,PO | Performed by: PHYSICIAN ASSISTANT

## 2022-07-15 PROCEDURE — 99214 PR OFFICE/OUTPT VISIT, EST, LEVL IV, 30-39 MIN: ICD-10-PCS | Mod: S$PBB,,, | Performed by: PHYSICIAN ASSISTANT

## 2022-07-15 PROCEDURE — 99999 PR PBB SHADOW E&M-EST. PATIENT-LVL III: ICD-10-PCS | Mod: PBBFAC,,, | Performed by: PHYSICIAN ASSISTANT

## 2022-07-15 PROCEDURE — 99214 OFFICE O/P EST MOD 30 MIN: CPT | Mod: S$PBB,,, | Performed by: PHYSICIAN ASSISTANT

## 2022-07-15 PROCEDURE — 99999 PR PBB SHADOW E&M-EST. PATIENT-LVL III: CPT | Mod: PBBFAC,,, | Performed by: PHYSICIAN ASSISTANT

## 2022-07-15 RX ORDER — ISOSORBIDE MONONITRATE 30 MG/1
30 TABLET, EXTENDED RELEASE ORAL DAILY
Qty: 30 TABLET | Refills: 11 | Status: SHIPPED | OUTPATIENT
Start: 2022-07-15 | End: 2022-09-13

## 2022-07-15 NOTE — PROGRESS NOTES
Subjective:    Patient ID:  Osman Johansen is a 72 y.o. female who presents for follow-up of CAD.        HPI  Ms. Johansen is a very pleasant lady who follows with Dr. Nugent. She presents today for follow up after CABG. She underwent 5 vessel CABG on 6/27 and did well postoperatively. She was discharged on 7/1. She returned to the ED on 7/3 with c/o chest pain similar to her prior angina. She also had some LE edema. She was diuresed and discharged home.     She has had 2 episodes of CP since discharge. She reports they resolved when she took a 1/4 tablet of Imdur. She reports her Imdur was stopped at discharge after her CABG.     Her weight is stable. No PND, orthopnea, or LE edema.     She had CXR yesterday that showed persistent small L pleural effusion.     She does have NAOMI and has not been wearing her CPAP as she can not tolerate her current mask.     Review of Systems   Constitutional: Negative for chills, diaphoresis, fever, weight gain and weight loss.   HENT: Negative for sore throat.    Eyes: Negative for blurred vision, vision loss in left eye, vision loss in right eye and visual disturbance.   Cardiovascular: Negative for chest pain, claudication, dyspnea on exertion, leg swelling, near-syncope, orthopnea, palpitations, paroxysmal nocturnal dyspnea and syncope.   Respiratory: Negative for cough, hemoptysis, shortness of breath, sputum production and wheezing.    Endocrine: Negative for cold intolerance and heat intolerance.   Hematologic/Lymphatic: Negative for adenopathy. Does not bruise/bleed easily.   Skin: Negative for rash.   Musculoskeletal: Negative for falls, muscle weakness and myalgias.   Gastrointestinal: Negative for abdominal pain, change in bowel habit, constipation, diarrhea, melena and nausea.   Genitourinary: Negative for bladder incontinence.   Neurological: Negative for dizziness, focal weakness, headaches, light-headedness, numbness and weakness.   Psychiatric/Behavioral:  "Negative for altered mental status.         Vitals:    07/15/22 1500   BP: 123/64   BP Location: Left arm   Patient Position: Sitting   BP Method: Medium (Automatic)   Pulse: 69   Weight: 61.5 kg (135 lb 9.3 oz)   Height: 5' 1" (1.549 m)   Body mass index is 25.62 kg/m².      Objective:    Physical Exam  Constitutional:       General: She is not in acute distress.     Appearance: She is well-developed.   HENT:      Head: Normocephalic and atraumatic.   Eyes:      General: No scleral icterus.     Conjunctiva/sclera: Conjunctivae normal.      Pupils: Pupils are equal, round, and reactive to light.   Neck:      Vascular: No JVD.      Trachea: No tracheal deviation.   Cardiovascular:      Rate and Rhythm: Normal rate and regular rhythm.      Heart sounds: No murmur heard.    No friction rub. No gallop.   Pulmonary:      Effort: Pulmonary effort is normal. No respiratory distress.      Breath sounds: Normal breath sounds. No wheezing or rales.   Chest:      Chest wall: No tenderness.      Comments: Sternotomy healing well.   Abdominal:      General: Bowel sounds are normal. There is no distension.      Palpations: Abdomen is soft.      Tenderness: There is no abdominal tenderness.   Musculoskeletal:         General: No tenderness.      Cervical back: Neck supple.   Skin:     General: Skin is warm and dry.      Findings: No erythema or rash.   Neurological:      Mental Status: She is alert and oriented to person, place, and time.   Psychiatric:         Behavior: Behavior normal.           Assessment:       Problem List Items Addressed This Visit        Cardiology Problems    Coronary artery disease involving native coronary artery of native heart with angina pectoris    Essential (primary) hypertension    Essential hypertension    Hypercholesterolemia       Other    Chronic kidney disease, stage 2, mildly decreased GFR    Hypothyroidism    Overweight (BMI 25.0-29.9)    S/P CABG (coronary artery bypass graft) - Primary    " Relevant Orders    Cardiac Rehab Phase II           Plan:       Resume Imdur 30mg 1/2 tablet QAM  Continue benazapril 20mg QAM Toprol XL 25mg QHS.   If BP < 100/50, will decrease benazapril to 10mg daily.   Monitor BP and HR.   Continue daily weights.   Lasix prn weight gain > 2 lbs overnight or > 5 lbs in 1 week.   Referral placed for Phase II Cardiac Rehab.   F/U with Dr. Nugent as scheduled.   F/U with Dr. Stiles for NAOMI. Expressed importance of having NAOMI treated.

## 2022-07-18 ENCOUNTER — PATIENT MESSAGE (OUTPATIENT)
Dept: FAMILY MEDICINE | Facility: CLINIC | Age: 72
End: 2022-07-18
Payer: MEDICARE

## 2022-07-18 DIAGNOSIS — D50.9 IRON DEFICIENCY ANEMIA, UNSPECIFIED IRON DEFICIENCY ANEMIA TYPE: ICD-10-CM

## 2022-07-18 DIAGNOSIS — E03.4 HYPOTHYROIDISM DUE TO ACQUIRED ATROPHY OF THYROID: ICD-10-CM

## 2022-07-18 DIAGNOSIS — E11.9 CONTROLLED TYPE 2 DIABETES MELLITUS WITHOUT COMPLICATION, WITHOUT LONG-TERM CURRENT USE OF INSULIN: ICD-10-CM

## 2022-07-18 DIAGNOSIS — E78.5 DYSLIPIDEMIA: Primary | ICD-10-CM

## 2022-07-19 ENCOUNTER — PATIENT MESSAGE (OUTPATIENT)
Dept: FAMILY MEDICINE | Facility: CLINIC | Age: 72
End: 2022-07-19
Payer: MEDICARE

## 2022-07-19 ENCOUNTER — PATIENT MESSAGE (OUTPATIENT)
Dept: CARDIOLOGY | Facility: CLINIC | Age: 72
End: 2022-07-19
Payer: MEDICARE

## 2022-07-19 ENCOUNTER — OFFICE VISIT (OUTPATIENT)
Dept: CARDIAC SURGERY | Facility: CLINIC | Age: 72
End: 2022-07-19
Payer: MEDICARE

## 2022-07-19 VITALS
DIASTOLIC BLOOD PRESSURE: 73 MMHG | HEART RATE: 63 BPM | SYSTOLIC BLOOD PRESSURE: 131 MMHG | HEIGHT: 61 IN | BODY MASS INDEX: 25.56 KG/M2 | WEIGHT: 135.38 LBS

## 2022-07-19 DIAGNOSIS — J90 PLEURAL EFFUSION ON LEFT: Primary | ICD-10-CM

## 2022-07-19 PROCEDURE — 99999 PR PBB SHADOW E&M-EST. PATIENT-LVL IV: ICD-10-PCS | Mod: PBBFAC,,, | Performed by: THORACIC SURGERY (CARDIOTHORACIC VASCULAR SURGERY)

## 2022-07-19 PROCEDURE — 99024 POSTOP FOLLOW-UP VISIT: CPT | Mod: POP,,, | Performed by: THORACIC SURGERY (CARDIOTHORACIC VASCULAR SURGERY)

## 2022-07-19 PROCEDURE — 99214 OFFICE O/P EST MOD 30 MIN: CPT | Mod: PBBFAC,PO | Performed by: THORACIC SURGERY (CARDIOTHORACIC VASCULAR SURGERY)

## 2022-07-19 PROCEDURE — 99024 PR POST-OP FOLLOW-UP VISIT: ICD-10-PCS | Mod: POP,,, | Performed by: THORACIC SURGERY (CARDIOTHORACIC VASCULAR SURGERY)

## 2022-07-19 PROCEDURE — 99999 PR PBB SHADOW E&M-EST. PATIENT-LVL IV: CPT | Mod: PBBFAC,,, | Performed by: THORACIC SURGERY (CARDIOTHORACIC VASCULAR SURGERY)

## 2022-07-19 NOTE — TELEPHONE ENCOUNTER
Please see [employer] message and advise    I did pend the requested labs     She does have some orders in that Mrs. May placed do we use those are you want to place.     Please advise thank you

## 2022-07-19 NOTE — PROGRESS NOTES
"Subjective:       Osman Johansen presents to the clinic after undergoing coronary artery bypass x5 with left internal thoracic artery to left anterior descending reverse saphenous vein graft to obtuse marginal 2, obtuse marginal 3, diagonal 1, and right posterior descending along with endarterectomy of the 2nd obtuse marginal branch [place] on 06/27/2022.  She had a brief readmission to the hospital for left-sided chest pain that she thought was angina.  She had a mildly elevated troponin.  Her pains reoccur but mostly in the mornings as a sharp pain that radiates from the front to the back.  They are associated with higher blood pressures.  She was able to have normal activities throughout the day otherwise without pain..       Objective:      /73   Pulse 63   Ht 5' 1" (1.549 m)   Wt 61.4 kg (135 lb 6.4 oz)   BMI 25.58 kg/m²     Objective  General:  She looks well.  Chest:  Sternum is stable.  Sternotomy incisions are healed.  Lungs:  Diminished breath sounds at the posterior and lateral left base.  Breath sounds are otherwise normal.  Extremities:  Expected ecchymosis of the left thigh and lower leg.  Incisions are otherwise healing well.    Chest x-ray 07/14/2022.  I reviewed the study personally.  There is a small to moderate left pleural effusion.       Assessment:     1. Post CABG:  Healing well.  2. Left-sided chest pain which patient considers to be angina, but it has more the sound of pleuritic chest pain; and is associated with a left pleural effusion.       Plan:      1. Continue any current medications.  2. Patient is taking Lasix twice weekly.  I recommend she do this daily.  3. Chest x-ray in 2 weeks  4. Follow up:  8 weeks    "

## 2022-07-21 PROBLEM — R41.0 TRANSIENT CONFUSION: Status: ACTIVE | Noted: 2022-07-21

## 2022-07-21 PROBLEM — J90 PLEURAL EFFUSION ON LEFT: Status: ACTIVE | Noted: 2022-07-21

## 2022-07-21 PROBLEM — N18.9 ACUTE KIDNEY INJURY SUPERIMPOSED ON CHRONIC KIDNEY DISEASE: Status: ACTIVE | Noted: 2022-07-21

## 2022-07-21 PROBLEM — N17.9 ACUTE KIDNEY INJURY SUPERIMPOSED ON CHRONIC KIDNEY DISEASE: Status: ACTIVE | Noted: 2022-07-21

## 2022-07-22 PROBLEM — R79.89 ELEVATED TROPONIN: Status: ACTIVE | Noted: 2017-05-03

## 2022-07-22 PROBLEM — I63.81 ACUTE LACUNAR STROKE: Status: ACTIVE | Noted: 2022-07-22

## 2022-07-22 PROBLEM — I63.9 ACUTE CVA (CEREBROVASCULAR ACCIDENT): Status: ACTIVE | Noted: 2022-07-22

## 2022-07-22 PROBLEM — R41.0 TRANSIENT CONFUSION: Status: RESOLVED | Noted: 2022-07-21 | Resolved: 2022-07-22

## 2022-07-25 ENCOUNTER — DOCUMENTATION ONLY (OUTPATIENT)
Dept: CARDIOLOGY | Facility: HOSPITAL | Age: 72
End: 2022-07-25
Payer: MEDICARE

## 2022-07-25 DIAGNOSIS — I63.9 ACUTE CVA (CEREBROVASCULAR ACCIDENT): ICD-10-CM

## 2022-07-25 DIAGNOSIS — Z95.818 STATUS POST PLACEMENT OF IMPLANTABLE LOOP RECORDER: Primary | ICD-10-CM

## 2022-07-27 ENCOUNTER — LAB VISIT (OUTPATIENT)
Dept: LAB | Facility: HOSPITAL | Age: 72
End: 2022-07-27
Attending: INTERNAL MEDICINE
Payer: MEDICARE

## 2022-07-27 ENCOUNTER — SPECIALTY PHARMACY (OUTPATIENT)
Dept: PHARMACY | Facility: CLINIC | Age: 72
End: 2022-07-27
Payer: MEDICARE

## 2022-07-27 DIAGNOSIS — D50.9 IRON DEFICIENCY ANEMIA, UNSPECIFIED IRON DEFICIENCY ANEMIA TYPE: ICD-10-CM

## 2022-07-27 DIAGNOSIS — E03.4 HYPOTHYROIDISM DUE TO ACQUIRED ATROPHY OF THYROID: ICD-10-CM

## 2022-07-27 DIAGNOSIS — E11.9 CONTROLLED TYPE 2 DIABETES MELLITUS WITHOUT COMPLICATION, WITHOUT LONG-TERM CURRENT USE OF INSULIN: ICD-10-CM

## 2022-07-27 DIAGNOSIS — E78.5 DYSLIPIDEMIA: ICD-10-CM

## 2022-07-27 LAB
ALBUMIN SERPL BCP-MCNC: 4 G/DL (ref 3.5–5.2)
ALP SERPL-CCNC: 93 U/L (ref 55–135)
ALT SERPL W/O P-5'-P-CCNC: 15 U/L (ref 10–44)
ANION GAP SERPL CALC-SCNC: 15 MMOL/L (ref 8–16)
AST SERPL-CCNC: 21 U/L (ref 10–40)
BASOPHILS # BLD AUTO: 0.09 K/UL (ref 0–0.2)
BASOPHILS NFR BLD: 1.3 % (ref 0–1.9)
BILIRUB SERPL-MCNC: 0.4 MG/DL (ref 0.1–1)
BUN SERPL-MCNC: 39 MG/DL (ref 8–23)
CALCIUM SERPL-MCNC: 10 MG/DL (ref 8.7–10.5)
CHLORIDE SERPL-SCNC: 101 MMOL/L (ref 95–110)
CHOLEST SERPL-MCNC: 162 MG/DL (ref 120–199)
CHOLEST/HDLC SERPL: 2.1 {RATIO} (ref 2–5)
CO2 SERPL-SCNC: 22 MMOL/L (ref 23–29)
CREAT SERPL-MCNC: 1.9 MG/DL (ref 0.5–1.4)
DIFFERENTIAL METHOD: ABNORMAL
EOSINOPHIL # BLD AUTO: 0.3 K/UL (ref 0–0.5)
EOSINOPHIL NFR BLD: 4.1 % (ref 0–8)
ERYTHROCYTE [DISTWIDTH] IN BLOOD BY AUTOMATED COUNT: 16 % (ref 11.5–14.5)
EST. GFR  (AFRICAN AMERICAN): 29.7 ML/MIN/1.73 M^2
EST. GFR  (NON AFRICAN AMERICAN): 25.8 ML/MIN/1.73 M^2
ESTIMATED AVG GLUCOSE: 108 MG/DL (ref 68–131)
FERRITIN SERPL-MCNC: 86 NG/ML (ref 20–300)
GLUCOSE SERPL-MCNC: 98 MG/DL (ref 70–110)
HBA1C MFR BLD: 5.4 % (ref 4–5.6)
HCT VFR BLD AUTO: 34.4 % (ref 37–48.5)
HDLC SERPL-MCNC: 77 MG/DL (ref 40–75)
HDLC SERPL: 47.5 % (ref 20–50)
HGB BLD-MCNC: 10.9 G/DL (ref 12–16)
IMM GRANULOCYTES # BLD AUTO: 0.03 K/UL (ref 0–0.04)
IMM GRANULOCYTES NFR BLD AUTO: 0.4 % (ref 0–0.5)
IRON SERPL-MCNC: 84 UG/DL (ref 30–160)
LDLC SERPL CALC-MCNC: 60.2 MG/DL (ref 63–159)
LYMPHOCYTES # BLD AUTO: 1.6 K/UL (ref 1–4.8)
LYMPHOCYTES NFR BLD: 23.1 % (ref 18–48)
MCH RBC QN AUTO: 29.3 PG (ref 27–31)
MCHC RBC AUTO-ENTMCNC: 31.7 G/DL (ref 32–36)
MCV RBC AUTO: 93 FL (ref 82–98)
MONOCYTES # BLD AUTO: 0.6 K/UL (ref 0.3–1)
MONOCYTES NFR BLD: 7.8 % (ref 4–15)
NEUTROPHILS # BLD AUTO: 4.5 K/UL (ref 1.8–7.7)
NEUTROPHILS NFR BLD: 63.3 % (ref 38–73)
NONHDLC SERPL-MCNC: 85 MG/DL
NRBC BLD-RTO: 0 /100 WBC
PLATELET # BLD AUTO: 283 K/UL (ref 150–450)
PMV BLD AUTO: 11.7 FL (ref 9.2–12.9)
POTASSIUM SERPL-SCNC: 5.2 MMOL/L (ref 3.5–5.1)
PROT SERPL-MCNC: 7.5 G/DL (ref 6–8.4)
RBC # BLD AUTO: 3.72 M/UL (ref 4–5.4)
SATURATED IRON: 23 % (ref 20–50)
SODIUM SERPL-SCNC: 138 MMOL/L (ref 136–145)
TOTAL IRON BINDING CAPACITY: 371 UG/DL (ref 250–450)
TRANSFERRIN SERPL-MCNC: 251 MG/DL (ref 200–375)
TRIGL SERPL-MCNC: 124 MG/DL (ref 30–150)
TSH SERPL DL<=0.005 MIU/L-ACNC: 3.17 UIU/ML (ref 0.4–4)
WBC # BLD AUTO: 7.09 K/UL (ref 3.9–12.7)

## 2022-07-27 PROCEDURE — 84443 ASSAY THYROID STIM HORMONE: CPT | Performed by: INTERNAL MEDICINE

## 2022-07-27 PROCEDURE — 82728 ASSAY OF FERRITIN: CPT | Performed by: INTERNAL MEDICINE

## 2022-07-27 PROCEDURE — 83036 HEMOGLOBIN GLYCOSYLATED A1C: CPT | Performed by: INTERNAL MEDICINE

## 2022-07-27 PROCEDURE — 80053 COMPREHEN METABOLIC PANEL: CPT | Performed by: INTERNAL MEDICINE

## 2022-07-27 PROCEDURE — 84466 ASSAY OF TRANSFERRIN: CPT | Performed by: INTERNAL MEDICINE

## 2022-07-27 PROCEDURE — 80061 LIPID PANEL: CPT | Performed by: INTERNAL MEDICINE

## 2022-07-27 PROCEDURE — 85025 COMPLETE CBC W/AUTO DIFF WBC: CPT | Performed by: INTERNAL MEDICINE

## 2022-07-27 PROCEDURE — 36415 COLL VENOUS BLD VENIPUNCTURE: CPT | Mod: PO | Performed by: INTERNAL MEDICINE

## 2022-07-27 NOTE — TELEPHONE ENCOUNTER
Specialty Pharmacy - Refill Coordination    Specialty Medication Orders Linked to Encounter    Flowsheet Row Most Recent Value   Medication #1 evolocumab (REPATHA SURECLICK) 140 mg/mL PnIj (Order#[id number], Rx#[id number])          Refill Questions - Documented Responses    Flowsheet Row Most Recent Value   Patient Availability and HIPAA Verification    Does patient want to proceed with activity? Yes   HIPAA/medical authority confirmed? Yes   Relationship to patient of person spoken to? Spouse/Significant Other   Refill Screening Questions    Would patient like to speak to a pharmacist? No   When does the patient need to receive the medication? 08/03/22   Refill Delivery Questions    When does the patient need to receive the medication? 08/03/22   Shipping Address Home   Address in [facility] confirmed and updated if neccessary? Yes   Expected Copay ($) 128   Is the patient able to afford the medication copay? Yes   Payment Method CC on file   Days supply of Refill 28   Supplies needed? No supplies needed   Refill activity completed? Yes   Refill activity plan Refill scheduled   Shipment/Pickup Date: 07/28/22          Current Outpatient Medications   Medication Sig    aspirin (ECOTRIN) 81 MG EC tablet Take 1 tablet (81 mg total) by mouth once daily. for 21 days    benazepriL (LOTENSIN) 20 MG tablet Take 1 tablet (20 mg total) by mouth every morning. Short term supply while pt waits for mail order. (Patient taking differently: Take 20 mg by mouth once daily. Short term supply while pt waits for mail order.)    biotin 1 mg Cap Take 1 mg by mouth once daily.    CALCIUM CARBONATE/VITAMIN D3 (CALCIUM 500 WITH D ORAL) Take 1 capsule by mouth once daily.    carboxymethylcellulose (REFRESH PLUS) 0.5 % Dpet Place 1 drop into both eyes daily as needed (DRY EYES).    clopidogreL (PLAVIX) 75 mg tablet Take 1 tablet (75 mg total) by mouth once daily.    co-enzyme Q-10 30 mg capsule Take 30 mg by mouth 3 (three)  times daily.    dicyclomine (BENTYL) 10 MG capsule Take 1 capsule (10 mg total) by mouth 4 (four) times daily as needed (stomach discomfort).    esomeprazole (NEXIUM) 40 MG capsule Take 1 capsule (40 mg total) by mouth before breakfast.    estradioL (VIVELLE-DOT) 0.075 mg/24 hr Place 1 patch onto the skin once a week.    evolocumab (REPATHA SURECLICK) 140 mg/mL PnIj Inject 1 mL (140 mg total) into the skin every 14 (fourteen) days.    fluticasone propionate (FLONASE) 50 mcg/actuation nasal spray 1 spray (50 mcg total) by Each Nostril route once daily. (Patient taking differently: 1 spray by Each Nostril route every evening.)    furosemide (LASIX) 20 MG tablet Take 2 tablets (40 mg total) by mouth twice a week. (Patient taking differently: Take 20 mg by mouth once daily.)    garlic (GARLIQUE ORAL) Take 1 capsule by mouth Daily.    HYDROcodone-acetaminophen (NORCO)  mg per tablet Take 1 tablet by mouth every 4 (four) hours as needed for Pain.    isosorbide mononitrate (IMDUR) 30 MG 24 hr tablet Take 1 tablet (30 mg total) by mouth once daily.    metoprolol succinate (TOPROL-XL) 25 MG 24 hr tablet Take 1 tablet (25 mg total) by mouth once daily. (Patient taking differently: Take 25 mg by mouth every evening.)    MULTIVITAMIN (MULTIPLE VITAMIN ORAL) Take 1 capsule by mouth once daily.    SUBOXONE 8-2 mg Film Place 0.25 Film under the tongue as needed (Pt breaks up 1 strip every 3 to 4 days prn restless leg).    SYNTHROID 100 mcg tablet TAKE 1 TABLET EVERY DAY (Patient taking differently: Take 100 mcg by mouth before breakfast. **BRAND MEDICALLY NECESSARY**)   Last reviewed on 7/21/2022 11:55 PM by Juan Esparza MD    Review of patient's allergies indicates:   Allergen Reactions    Lyrica [pregabalin] Swelling    Pcn [penicillins] Swelling    Toradol [ketorolac] Swelling    Vibramycin [doxycycline calcium] Shortness Of Breath and Swelling    Zetia [ezetimibe] Other (See Comments)    Crestor  [rosuvastatin]      Body aches    Cymbalta [duloxetine]      dizzyness    Elavil [amitriptyline] Other (See Comments)     Restless leg    Pravastatin Other (See Comments)     Flu -like symptoms   Body aches     Seroquel [quetiapine]      restless leg symdrome    Last reviewed on  7/22/2022 1:24 AM by Crystal Campa      Tasks added this encounter   8/24/2022 - Refill Call (Auto Added)   Tasks due within next 3 months   No tasks due.     Viviana Garza, PharmD  [facility]  [address] [facility] [address]  Phone: [phone]  Fax: [phone]

## 2022-07-28 ENCOUNTER — PATIENT MESSAGE (OUTPATIENT)
Dept: FAMILY MEDICINE | Facility: CLINIC | Age: 72
End: 2022-07-28
Payer: MEDICARE

## 2022-07-29 ENCOUNTER — NURSE TRIAGE (OUTPATIENT)
Dept: ADMINISTRATIVE | Facility: CLINIC | Age: 72
End: 2022-07-29
Payer: MEDICARE

## 2022-07-29 NOTE — TELEPHONE ENCOUNTER
"Spoke to pt over the phone , informed her "TAKE IMDUR AND DECREASE LISINOPRIL IN 1/2 ". Pt states " I take benazepril and I already took that table this am . I will take the imdur . I do not take lisinipril "   "

## 2022-07-29 NOTE — TELEPHONE ENCOUNTER
OOC Rn  Patient Calling about medicine adjustment being made.  Heart bypass.   Dr. Nugent,  Implanted heart monitor on Monday 7/25/22  Medication adjusted meds and now it runs sob,  Wakes up high b/p, takes meds,   Held Indur this morning,      147/70   Denies symptoms at this time.   When starts to drops dizziness, light headed and sob all evening and through the night.  Care advise is to hear from Dr. Nugent today,   For any new symptoms or worsening symptoms OOC RN.   Call back.  Patient VU.      The implant that she has sends messages to her and she pressed the button.      Reason for Disposition   Taking BP medications and feels is having side effects (e.g., impotence, cough, dizziness)    Additional Information   Negative: Sounds like a life-threatening emergency to the triager   Negative: Pregnant 20 or more weeks or postpartum (< 6 weeks after delivery) with new hand or face swelling   Negative: Pregnant 20 or more weeks or postpartum with Systolic BP >= 140 OR Diastolic >= 90   Negative: Systolic BP >= 160 OR Diastolic >= 100, and any cardiac or neurologic symptoms (e.g., chest pain, difficulty breathing, unsteady gait, blurred vision)   Negative: Patient sounds very sick or weak to the triager   Negative: Systolic BP >= 200 OR Diastolic >= 120 and having NO cardiac or neurologic symptoms   Negative: Systolic BP >= 180 OR Diastolic >= 110, and missed most recent dose of blood pressure medication   Negative: Systolic BP >= 180 OR Diastolic >= 110   Negative: Patient wants to be seen   Negative: Ran out of BP medications    Protocols used: BLOOD PRESSURE - HIGH-A-OH

## 2022-08-03 ENCOUNTER — HOSPITAL ENCOUNTER (OUTPATIENT)
Dept: RADIOLOGY | Facility: HOSPITAL | Age: 72
Discharge: HOME | End: 2022-08-03
Attending: THORACIC SURGERY (CARDIOTHORACIC VASCULAR SURGERY)
Payer: MEDICARE

## 2022-08-03 ENCOUNTER — PATIENT OUTREACH (OUTPATIENT)
Dept: ADMINISTRATIVE | Facility: HOSPITAL | Age: 72
End: 2022-08-03
Payer: MEDICARE

## 2022-08-03 ENCOUNTER — OFFICE VISIT (OUTPATIENT)
Dept: FAMILY MEDICINE | Facility: CLINIC | Age: 72
End: 2022-08-03
Payer: MEDICARE

## 2022-08-03 VITALS
WEIGHT: 133.63 LBS | OXYGEN SATURATION: 97 % | SYSTOLIC BLOOD PRESSURE: 120 MMHG | HEART RATE: 85 BPM | DIASTOLIC BLOOD PRESSURE: 72 MMHG | BODY MASS INDEX: 26.23 KG/M2 | TEMPERATURE: 98 F | HEIGHT: 60 IN

## 2022-08-03 DIAGNOSIS — Z09 HOSPITAL DISCHARGE FOLLOW-UP: Primary | ICD-10-CM

## 2022-08-03 DIAGNOSIS — I10 ESSENTIAL HYPERTENSION: ICD-10-CM

## 2022-08-03 DIAGNOSIS — N17.9 AKI (ACUTE KIDNEY INJURY): ICD-10-CM

## 2022-08-03 DIAGNOSIS — I25.10 CORONARY ARTERY DISEASE INVOLVING NATIVE CORONARY ARTERY OF NATIVE HEART WITHOUT ANGINA PECTORIS: ICD-10-CM

## 2022-08-03 DIAGNOSIS — B02.9 HERPES ZOSTER WITHOUT COMPLICATION: ICD-10-CM

## 2022-08-03 DIAGNOSIS — F33.9 DEPRESSION, RECURRENT: ICD-10-CM

## 2022-08-03 DIAGNOSIS — J90 PLEURAL EFFUSION ON LEFT: ICD-10-CM

## 2022-08-03 DIAGNOSIS — E11.9 CONTROLLED TYPE 2 DIABETES MELLITUS WITHOUT COMPLICATION, WITHOUT LONG-TERM CURRENT USE OF INSULIN: ICD-10-CM

## 2022-08-03 DIAGNOSIS — E78.5 DYSLIPIDEMIA: ICD-10-CM

## 2022-08-03 PROBLEM — I77.9 MILD CAROTID ARTERY DISEASE: Status: RESOLVED | Noted: 2021-11-18 | Resolved: 2022-08-03

## 2022-08-03 PROCEDURE — 71046 XR CHEST PA AND LATERAL: ICD-10-PCS | Mod: 26,,, | Performed by: RADIOLOGY

## 2022-08-03 PROCEDURE — 99999 PR PBB SHADOW E&M-EST. PATIENT-LVL V: ICD-10-PCS | Mod: PBBFAC,,, | Performed by: INTERNAL MEDICINE

## 2022-08-03 PROCEDURE — 99215 PR OFFICE/OUTPT VISIT, EST, LEVL V, 40-54 MIN: ICD-10-PCS | Mod: S$PBB,,, | Performed by: INTERNAL MEDICINE

## 2022-08-03 PROCEDURE — 71046 X-RAY EXAM CHEST 2 VIEWS: CPT | Mod: TC,FY,PO

## 2022-08-03 PROCEDURE — 71046 X-RAY EXAM CHEST 2 VIEWS: CPT | Mod: 26,,, | Performed by: RADIOLOGY

## 2022-08-03 PROCEDURE — 99215 OFFICE O/P EST HI 40 MIN: CPT | Mod: PBBFAC,25,PO | Performed by: INTERNAL MEDICINE

## 2022-08-03 PROCEDURE — 99215 OFFICE O/P EST HI 40 MIN: CPT | Mod: S$PBB,,, | Performed by: INTERNAL MEDICINE

## 2022-08-03 PROCEDURE — 99999 PR PBB SHADOW E&M-EST. PATIENT-LVL V: CPT | Mod: PBBFAC,,, | Performed by: INTERNAL MEDICINE

## 2022-08-03 RX ORDER — VALACYCLOVIR HYDROCHLORIDE 1 G/1
1000 TABLET, FILM COATED ORAL DAILY
Qty: 7 TABLET | Refills: 2 | Status: SHIPPED | OUTPATIENT
Start: 2022-08-03 | End: 2022-12-06 | Stop reason: SDUPTHER

## 2022-08-03 RX ORDER — DOCUSATE SODIUM 100 MG/1
100 CAPSULE, LIQUID FILLED ORAL 2 TIMES DAILY
COMMUNITY
End: 2022-08-05

## 2022-08-03 RX ORDER — SENNOSIDES 15 MG
TABLET ORAL
COMMUNITY

## 2022-08-03 NOTE — LETTER
FAX      AUTHORIZATION FOR RELEASE OF   CONFIDENTIAL INFORMATION        Dear Dr. Terrell,      We are seeing Osman Johansen, date of birth 1950, in the clinic at [facility]. Galindo Stiles MD is the patient's PCP. Osman Johansen has an outstanding lab/procedure at the time we reviewed their chart. In order to help keep their health information updated, Osman Díazmesydni has authorized us to request the following medical record(s):        ()  MAMMOGRAM  (WITHIN 2 YRS)        ()  COLONOSCOPY (WITHIN 10 YRS)      ()  PAP SMEAR (WITHIN 3 YRS)             ()  OUTSIDE LAB RESULTS     ()  DEXA SCAN (WITHIN 3 YRS)             (x) DM EYE EXAM (WITHIN 48 MONTHS)           ()  FOOT EXAM (WITHIN 1yr.)                 () ____________________     () OUTSIDE IMMUNIZATIONS                                  Please fax records to [facility] [phone].     If you have any questions, please contact [place] at [phone]                Patient Name: Osman Johansen  : 1950  Patient Phone #: [phone]

## 2022-08-03 NOTE — Clinical Note
Get eye exam done with in one year doctor exam Dr Barros in [facility] in [place].  I had made a copy of this report but it never showed up.  She will see him again in September.

## 2022-08-03 NOTE — PROGRESS NOTES
Subjective:       Patient ID: Osman Johansen is a 72 y.o. female.    Chief Complaint: Annual Exam    Patient had recent CABG.  Subsequently, she developed an embolic stroke.   noticed she was confused and irrational at home.   JODI - worsening.  Was on lasix last week, so I had her hold it.  GFR at that time was in 20s.  Now off 1 week.  Echo 7/22 EF wnl and no diastolic dysfunction.  No swelling since being off lasix.  States was given lasix for left leg swelling post vein harvesting when compression stockings were not working.      Complains of rash on left buttocks that itches and burns.  Just started.       Mood good.     CAD - s/p CABG.  No chest pain.   CVA - embolic.  Wearing loop recorder now.    Dr Nugent, Dr Torres    HTN - controlled.  On digital HTN program.  Does not take the HCTZ because became lightheaded with this.    Hypothyroid - controlled.112 mcg from local pharmacy and Brand name made her have palpitations, but 125 mcg does not from mail order.  Dye? Only use mail order -   DM - controlled; outside eye exam done 7/26/17  HLD - controlled on Repatha.  Statin intolerance. could not tolerate multiple statins - Crestor, Liptior.  Starting to have body aches on 20 mg of Pravastatin.   Depression - stable     Iron deficiency anemia - improved.   Trouble tolerating iron orally even only 3x per week or every other day.  Advised getting cscp and EGD (hx bleeding ulcer in past).  Last cscp 2012 was ok.  + fatigue   RLS - treated      CT doc emphysema - AVILA as above.  No longer smokes.       Previously:   Recall - pain Dr wanted her to see neurology.  Gets cramps on Suboxone.  Trying to wean off and down to a piece of her daily pill but can't because develops RLS that prevents her from sleeping.  She had similar events with leg cramping when on fentanyl and hydrocodone that resolved when placed on Suboxone.  She is down to a piece of Suboxone daily = 1 pill lasts about 4-6 days.  She also has  uncontrolled RLS that has been exacerbated as she has tried to wean off Suboxone.  This has been happening off and on for about 6 months now.      Recall previous visit:   Follow up s/p angina admit.  Had classic angina symptoms in office.  Sent to ER.  W/u / stress test normal.  F/u w cardiology.  Calcium score done = > 1,100 = cx CAD with likely significant blockage.  If pt has cp again, will likely get angiogram.  No chest pain since.  Does have AVILA waking in store.     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        Review of Systems   Constitutional: Negative for appetite change and fever.   HENT: Negative for nosebleeds and trouble swallowing.    Eyes: Negative for discharge and visual disturbance.   Respiratory: Negative for choking and shortness of breath.    Cardiovascular: Negative for chest pain and palpitations.   Gastrointestinal: Negative for abdominal pain, nausea and vomiting.   Musculoskeletal: Negative for arthralgias and joint swelling.   Skin: Positive for rash. Negative for wound.   Neurological: Negative for dizziness and syncope.   Psychiatric/Behavioral: Negative for confusion and dysphoric mood.       Objective:      Vitals:    08/03/22 1101   BP: 120/72   Pulse: 85   Temp: 98.4 °F (36.9 °C)     Physical Exam  Vitals reviewed.   Eyes:      Conjunctiva/sclera: Conjunctivae normal.   Cardiovascular:      Rate and Rhythm: Normal rate and regular rhythm.   Pulmonary:      Effort: Pulmonary effort is normal.      Breath sounds: Normal breath sounds.   Musculoskeletal:      Cervical back: Normal range of motion.      Comments: Normal ROM bilateral    Skin:     General: Skin is warm and dry.      Comments: Left buttocks + vesicular rash on erythematous patch   Neurological:      Cranial Nerves: No cranial nerve deficit (grossly intact).   Psychiatric:      Comments: Alert and orientated           Assessment:       1. Hospital discharge follow-up    2. Coronary artery  disease involving native coronary artery of native heart without angina pectoris    3. JODI (acute kidney injury)    4. Controlled type 2 diabetes mellitus without complication, without long-term current use of insulin    5. Essential hypertension    6. Dyslipidemia    7. Herpes zoster without complication    8. Depression, recurrent        Plan:       Hospital discharge follow-up    Coronary artery disease involving native coronary artery of native heart without angina pectoris    JODI (acute kidney injury)  -     Basic Metabolic Panel; Future; Expected date: 08/03/2022  -     Comprehensive Metabolic Panel; Future; Expected date: 12/01/2022    Controlled type 2 diabetes mellitus without complication, without long-term current use of insulin  -     Hemoglobin A1C; Future; Expected date: 12/01/2022    Essential hypertension  -     Comprehensive Metabolic Panel; Future; Expected date: 12/01/2022    Dyslipidemia  -     Comprehensive Metabolic Panel; Future; Expected date: 12/01/2022    Herpes zoster without complication  -     valACYclovir (VALTREX) 1000 MG tablet; Take 1 tablet (1,000 mg total) by mouth once daily. for 7 days  Dispense: 7 tablet; Refill: 2    Depression, recurrent            Medication List with Changes/Refills   New Medications    VALACYCLOVIR (VALTREX) 1000 MG TABLET    Take 1 tablet (1,000 mg total) by mouth once daily. for 7 days   Current Medications    ASPIRIN (ECOTRIN) 81 MG EC TABLET    Take 1 tablet (81 mg total) by mouth once daily. for 21 days    BENAZEPRIL (LOTENSIN) 20 MG TABLET    Take 1 tablet (20 mg total) by mouth every morning. Short term supply while pt waits for mail order.    BIOTIN 1 MG CAP    Take 1 mg by mouth once daily.    CALCIUM CARBONATE/VITAMIN D3 (CALCIUM 500 WITH D ORAL)    Take 1 capsule by mouth once daily.    CARBOXYMETHYLCELLULOSE (REFRESH PLUS) 0.5 % DPET    Place 1 drop into both eyes daily as needed (DRY EYES).    CLOPIDOGREL (PLAVIX) 75 MG TABLET    Take 1 tablet  (75 mg total) by mouth once daily.    CO-ENZYME Q-10 30 MG CAPSULE    Take 30 mg by mouth 3 (three) times daily.    DICYCLOMINE (BENTYL) 10 MG CAPSULE    Take 1 capsule (10 mg total) by mouth 4 (four) times daily as needed (stomach discomfort).    DOCUSATE SODIUM (COLACE) 100 MG CAPSULE    Take 100 mg by mouth 2 (two) times daily.    ESOMEPRAZOLE (NEXIUM) 40 MG CAPSULE    Take 1 capsule (40 mg total) by mouth before breakfast.    ESTRADIOL (VIVELLE-DOT) 0.075 MG/24 HR    Place 1 patch onto the skin once a week.    EVOLOCUMAB (REPATHA SURECLICK) 140 MG/ML PNIJ    Inject 1 mL (140 mg total) into the skin every 14 (fourteen) days.    FLUTICASONE PROPIONATE (FLONASE) 50 MCG/ACTUATION NASAL SPRAY    1 spray (50 mcg total) by Each Nostril route once daily.    GARLIC (GARLIQUE ORAL)    Take 1 capsule by mouth Daily.    HYDROCODONE-ACETAMINOPHEN (NORCO)  MG PER TABLET    Take 1 tablet by mouth every 4 (four) hours as needed for Pain.    ISOSORBIDE MONONITRATE (IMDUR) 30 MG 24 HR TABLET    Take 1 tablet (30 mg total) by mouth once daily.    METOPROLOL SUCCINATE (TOPROL-XL) 25 MG 24 HR TABLET    Take 1 tablet (25 mg total) by mouth once daily.    MULTIVITAMIN (MULTIPLE VITAMIN ORAL)    Take 1 capsule by mouth once daily.    SENNOSIDES (LAXATIVE, SENNOSIDES,) 25 MG TAB    Take by mouth.    SUBOXONE 8-2 MG FILM    Place 0.25 Film under the tongue as needed (Pt breaks up 1 strip every 3 to 4 days prn restless leg).    SYNTHROID 100 MCG TABLET    TAKE 1 TABLET EVERY DAY   Discontinued Medications    FUROSEMIDE (LASIX) 20 MG TABLET    Take 2 tablets (40 mg total) by mouth twice a week.       Continue current management and monitor.  If JODI has not improved, send to renal; suspect it will now 1 week off lasix    Follow up in about 4 months (around 12/3/2022).     Total time spent on patient's needs today in preparing for the visit, the actual visit including counseling, managing the patient's needs and electronic record  documentation was more than 40 minutes

## 2022-08-03 NOTE — PROGRESS NOTES
Non-compliant report chart audits DIABETIC EYE EXAM.   Chart review completed for HM test overdue (mammograms, Colonoscopies, pap smears, DM labs, and/or EYE EXAMs)      Care Everywhere and media, updates requested and reviewed.      Records requested from Dr Terrell      Outreach:  Diabetic eye exam

## 2022-08-04 ENCOUNTER — OFFICE VISIT (OUTPATIENT)
Dept: CARDIOLOGY | Facility: CLINIC | Age: 72
End: 2022-08-04
Payer: MEDICARE

## 2022-08-04 ENCOUNTER — PATIENT OUTREACH (OUTPATIENT)
Dept: ADMINISTRATIVE | Facility: HOSPITAL | Age: 72
End: 2022-08-04
Payer: MEDICARE

## 2022-08-04 ENCOUNTER — CLINICAL SUPPORT (OUTPATIENT)
Dept: CARDIOLOGY | Facility: HOSPITAL | Age: 72
End: 2022-08-04
Attending: INTERNAL MEDICINE
Payer: MEDICARE

## 2022-08-04 VITALS
WEIGHT: 133.63 LBS | SYSTOLIC BLOOD PRESSURE: 136 MMHG | DIASTOLIC BLOOD PRESSURE: 76 MMHG | BODY MASS INDEX: 26.23 KG/M2 | HEART RATE: 79 BPM | HEIGHT: 60 IN

## 2022-08-04 DIAGNOSIS — E66.3 OVERWEIGHT (BMI 25.0-29.9): Chronic | ICD-10-CM

## 2022-08-04 DIAGNOSIS — Z95.818 STATUS POST PLACEMENT OF IMPLANTABLE LOOP RECORDER: ICD-10-CM

## 2022-08-04 DIAGNOSIS — N18.32 STAGE 3B CHRONIC KIDNEY DISEASE: Chronic | ICD-10-CM

## 2022-08-04 DIAGNOSIS — Z79.02 LONG TERM (CURRENT) USE OF ANTITHROMBOTICS/ANTIPLATELETS: Chronic | ICD-10-CM

## 2022-08-04 DIAGNOSIS — R09.89 LABILE HYPERTENSION: Primary | ICD-10-CM

## 2022-08-04 DIAGNOSIS — I63.9 ACUTE CVA (CEREBROVASCULAR ACCIDENT): ICD-10-CM

## 2022-08-04 DIAGNOSIS — D50.8 OTHER IRON DEFICIENCY ANEMIA: ICD-10-CM

## 2022-08-04 DIAGNOSIS — R07.89 CHEST WALL PAIN: ICD-10-CM

## 2022-08-04 PROBLEM — D50.9 IRON DEFICIENCY ANEMIA: Status: ACTIVE | Noted: 2022-08-04

## 2022-08-04 PROCEDURE — 93285 PRGRMG DEV EVAL SCRMS IP: CPT | Mod: 26,,, | Performed by: INTERNAL MEDICINE

## 2022-08-04 PROCEDURE — 93285 CARDIAC DEVICE CHECK - IN CLINIC & HOSPITAL: ICD-10-PCS | Mod: 26,,, | Performed by: INTERNAL MEDICINE

## 2022-08-04 PROCEDURE — 99999 PR PBB SHADOW E&M-EST. PATIENT-LVL III: ICD-10-PCS | Mod: PBBFAC,,, | Performed by: INTERNAL MEDICINE

## 2022-08-04 PROCEDURE — 99213 OFFICE O/P EST LOW 20 MIN: CPT | Mod: PBBFAC,PO | Performed by: INTERNAL MEDICINE

## 2022-08-04 PROCEDURE — 99999 PR PBB SHADOW E&M-EST. PATIENT-LVL III: CPT | Mod: PBBFAC,,, | Performed by: INTERNAL MEDICINE

## 2022-08-04 PROCEDURE — 99213 OFFICE O/P EST LOW 20 MIN: CPT | Mod: S$PBB,,, | Performed by: INTERNAL MEDICINE

## 2022-08-04 PROCEDURE — 99213 PR OFFICE/OUTPT VISIT, EST, LEVL III, 20-29 MIN: ICD-10-PCS | Mod: S$PBB,,, | Performed by: INTERNAL MEDICINE

## 2022-08-04 RX ORDER — CLOPIDOGREL BISULFATE 75 MG/1
75 TABLET ORAL DAILY
Qty: 90 TABLET | Refills: 1 | Status: SHIPPED | OUTPATIENT
Start: 2022-08-04 | End: 2022-11-15 | Stop reason: SDUPTHER

## 2022-08-04 RX ORDER — FERROUS GLUCONATE 324(38)MG
324 TABLET ORAL
Qty: 90 TABLET | Refills: 0 | Status: SHIPPED | OUTPATIENT
Start: 2022-08-04 | End: 2023-08-10

## 2022-08-04 NOTE — PROGRESS NOTES
"Subjective:    Patient ID:  Osman Johansen is a 72 y.o. female who presents for Dizziness, Headache, and AVILA        HPI  S/P CABG, THEN CVA, NO AFIB, REQUESTED OFFICE VISIT LABILE BP, CHANGED MEDS, GRF 40, SOME CHEST WALL PAIN NO TIA TYPE SYMPTOMS NO NEAR-SYNCOPE, SEE REVIEW OF SYSTEMS    Past Medical History:   Diagnosis Date    Allergy     Anemia     Anxiety     Arthritis     Blood transfusion 8 yrs    CAD (coronary artery disease)     Cataract     Chronic diastolic CHF (congestive heart failure)     CKD (chronic kidney disease), stage II     COPD (chronic obstructive pulmonary disease)     mild no inhalers    Degenerative disc disease     Depression     Dry eye syndrome     Fibromyalgia     Fluid overload     GERD (gastroesophageal reflux disease)     Hypercholesterolemia 12/09/2019    Hypertension     Hypothyroidism     IBS (irritable bowel syndrome)     Lower extremity edema     Macular drusen     Neuromuscular disorder     Ocular hypertension, bilateral     Osteoporosis     Pleural effusion     PUD (peptic ulcer disease)     Restless leg     Uses Suboxone to control    Sleep apnea     "complex sleep apnea" - per pt, no cpap    Thoracic or lumbosacral neuritis or radiculitis 10/19/2012    Thyroid disease     hashimoto's     Past Surgical History:   Procedure Laterality Date    APPENDECTOMY  1965    CARDIAC CATHETERIZATION      CATARACT EXTRACTION W/  INTRAOCULAR LENS IMPLANT Right 5/24/2021    Procedure: EXTRACTION, CATARACT, WITH IOL INSERTION;  Surgeon: Bebe Lynn MD;  Location: [facility] OR;  Service: Ophthalmology;  Laterality: Right;  Right/DM    CORONARY ANGIOGRAPHY N/A 5/2/2022    Procedure: ANGIOGRAM, CORONARY ARTERY;  Surgeon: Sourav Nugent MD;  Location: [facility] CATH;  Service: Cardiology;  Laterality: N/A;    CORONARY ARTERY BYPASS GRAFT      CORONARY ARTERY BYPASS GRAFT (CABG) N/A 6/27/2022    Procedure: CORONARY ARTERY BYPASS GRAFT (CABG) X 5;  Surgeon: " Talib Torres MD;  Location: [facility] OR;  Service: Cardiovascular;  Laterality: N/A;    ENDOSCOPIC HARVEST OF VEIN Left 6/27/2022    Procedure: SURGICAL PROCUREMENT, VEIN, ENDOSCOPIC;  Surgeon: Talib Torres MD;  Location: [facility] OR;  Service: Cardiovascular;  Laterality: Left;    HYSTERECTOMY  8 yrs     INJECTION OF ANESTHETIC AGENT AROUND NERVE Bilateral 8/21/2018    Procedure: BLOCK, NERVE;  Surgeon: Talia Belcher MD;  Location: [facility] PAIN MGT;  Service: Pain Management;  Laterality: Bilateral;  Bilateral block @ L2,3,4,5      INJECTION OF ANESTHETIC AGENT AROUND NERVE Bilateral 11/18/2019    Procedure: BLOCK, NERVE, L2-L3-L4-L5 ME DIAL BRANCH;  Surgeon: Talia Belcher MD;  Location: [facility] PAIN MGT;  Service: Pain Management;  Laterality: Bilateral;    INJECTION OF FACET JOINT Bilateral 12/16/2019    Procedure: INJECTION, FACET JOINT, L3-L4 AND L4-L5 AND T7-T8 LIGAMENT;  Surgeon: Talia Belcher MD;  Location: [facility] PAIN MGT;  Service: Pain Management;  Laterality: Bilateral;    INSERTION OF IMPLANTABLE LOOP RECORDER Left 7/25/2022    Procedure: Insertion, Implantable Loop Recorder;  Surgeon: Sourav Nugent MD;  Location: [facility] CATH;  Service: Cardiology;  Laterality: Left;    LAPAROSCOPIC CHOLECYSTECTOMY N/A 2/10/2021    Procedure: CHOLECYSTECTOMY, LAPAROSCOPIC;  Surgeon: John Morrow MD;  Location: [facility] OR;  Service: General;  Laterality: N/A;    LEFT HEART CATHETERIZATION Left 5/2/2022    Procedure: Left heart cath;  Surgeon: Sourav Nugent MD;  Location: [facility] CATH;  Service: Cardiology;  Laterality: Left;    SINUS SURGERY      x2-one for CSF leak    TONSILLECTOMY  1954     Family History   Problem Relation Age of Onset    Arthritis Mother     Cancer Mother     Heart disease Mother     Hypertension Mother     Cataracts Mother     Ovarian cancer Mother     Ulcers Father     Thyroid disease Brother     Heart disease Brother     Amblyopia Neg Hx     Blindness Neg Hx     Diabetes Neg Hx      Glaucoma Neg Hx     Macular degeneration Neg Hx     Retinal detachment Neg Hx     Strabismus Neg Hx     Stroke Neg Hx      Social History     Socioeconomic History    Marital status:    Tobacco Use    Smoking status: Former Smoker     Quit date: 2008     Years since quittin.6    Smokeless tobacco: Never Used   Substance and Sexual Activity    Alcohol use: Yes     Comment: rarely    Drug use: No    Sexual activity: Never     Social Determinants of Health     Financial Resource Strain: Low Risk     Difficulty of Paying Living Expenses: Not hard at all   Food Insecurity: No Food Insecurity    Worried About Running Out of Food in the Last Year: Never true    Ran Out of Food in the Last Year: Never true   Transportation Needs: Unknown    Lack of Transportation (Non-Medical): No   Social Connections: Unknown    Frequency of Communication with Friends and Family: More than three times a week    Frequency of Social Gatherings with Friends and Family: Once a week       Review of patient's allergies indicates:   Allergen Reactions    Lyrica [pregabalin] Swelling    Pcn [penicillins] Swelling    Toradol [ketorolac] Swelling    Vibramycin [doxycycline calcium] Shortness Of Breath and Swelling    Zetia [ezetimibe] Other (See Comments)    Crestor [rosuvastatin]      Body aches    Cymbalta [duloxetine]      dizzyness    Elavil [amitriptyline] Other (See Comments)     Restless leg    Pravastatin Other (See Comments)     Flu -like symptoms   Body aches     Seroquel [quetiapine]      restless leg symdrome       Current Outpatient Medications:     aspirin (ECOTRIN) 81 MG EC tablet, Take 1 tablet (81 mg total) by mouth once daily. for 21 days, Disp: , Rfl:     benazepriL (LOTENSIN) 20 MG tablet, Take 1 tablet (20 mg total) by mouth every morning. Short term supply while pt waits for mail order. (Patient taking differently: Take 20 mg by mouth once daily. Short term supply while pt waits for  mail order.), Disp: 30 tablet, Rfl: 0    CALCIUM CARBONATE/VITAMIN D3 (CALCIUM 500 WITH D ORAL), Take 1 capsule by mouth once daily., Disp: , Rfl:     carboxymethylcellulose (REFRESH PLUS) 0.5 % Dpet, Place 1 drop into both eyes daily as needed (DRY EYES)., Disp: , Rfl:     clopidogreL (PLAVIX) 75 mg tablet, Take 1 tablet (75 mg total) by mouth once daily., Disp: 90 tablet, Rfl: 1    co-enzyme Q-10 30 mg capsule, Take 30 mg by mouth 3 (three) times daily., Disp: , Rfl:     dicyclomine (BENTYL) 10 MG capsule, Take 1 capsule (10 mg total) by mouth 4 (four) times daily as needed (stomach discomfort)., Disp: 120 capsule, Rfl: 0    esomeprazole (NEXIUM) 40 MG capsule, Take 1 capsule (40 mg total) by mouth before breakfast., Disp: 90 capsule, Rfl: 3    estradioL (VIVELLE-DOT) 0.075 mg/24 hr, Place 1 patch onto the skin once a week., Disp: , Rfl:     evolocumab (REPATHA SURECLICK) 140 mg/mL PnIj, Inject 1 mL (140 mg total) into the skin every 14 (fourteen) days., Disp: 2 each, Rfl: 3    ferrous gluconate (FERGON) 324 MG tablet, Take 1 tablet (324 mg total) by mouth daily with breakfast., Disp: 90 tablet, Rfl: 0    fluticasone propionate (FLONASE) 50 mcg/actuation nasal spray, 1 spray (50 mcg total) by Each Nostril route once daily. (Patient taking differently: 1 spray by Each Nostril route every evening.), Disp: 16 g, Rfl: 11    garlic (GARLIQUE ORAL), Take 1 capsule by mouth Daily., Disp: , Rfl:     HYDROcodone-acetaminophen (NORCO)  mg per tablet, Take 1 tablet by mouth every 4 (four) hours as needed for Pain. (Patient not taking: No sig reported), Disp: 25 tablet, Rfl: 0    isosorbide mononitrate (IMDUR) 30 MG 24 hr tablet, Take 1 tablet (30 mg total) by mouth once daily., Disp: 30 tablet, Rfl: 11    MULTIVITAMIN (MULTIPLE VITAMIN ORAL), Take 1 capsule by mouth once daily., Disp: , Rfl:     sennosides (LAXATIVE, SENNOSIDES,) 25 mg Tab, Take by mouth., Disp: , Rfl:     SUBOXONE 8-2 mg Film, Place  0.25 Film under the tongue as needed (Pt breaks up 1 strip every 3 to 4 days prn restless leg)., Disp: , Rfl: 0    SYNTHROID 100 mcg tablet, TAKE 1 TABLET EVERY DAY (Patient taking differently: Take 100 mcg by mouth before breakfast. **BRAND MEDICALLY NECESSARY**), Disp: 90 tablet, Rfl: 3    valACYclovir (VALTREX) 1000 MG tablet, Take 1 tablet (1,000 mg total) by mouth once daily. for 7 days, Disp: 7 tablet, Rfl: 2  No current facility-administered medications for this visit.    Facility-Administered Medications Ordered in Other Visits:     lactated ringers infusion, , Intravenous, Continuous, Jim Mcdonough MD, Stopped at 06/27/22 1714    LIDOcaine (PF) 10 mg/ml (1%) injection 10 mg, 1 mL, Intradermal, Once, Jim Mcdonough MD    Review of Systems   Constitutional: Negative.   Eyes: Negative for blurred vision and visual disturbance.   Cardiovascular: Negative for chest pain (CHEST WALL), dyspnea on exertion (MILD), irregular heartbeat, leg swelling, near-syncope, orthopnea, palpitations, paroxysmal nocturnal dyspnea and syncope.   Respiratory: Negative for cough and hemoptysis.    Hematologic/Lymphatic: Negative for adenopathy. Does not bruise/bleed easily.   Gastrointestinal: Negative for abdominal pain and dysphagia.   Genitourinary: Negative for dysuria and flank pain.   Neurological: Positive for headaches and light-headedness. Negative for dizziness, focal weakness and weakness.   Psychiatric/Behavioral: Negative for altered mental status and depression.        Objective:      Vitals:    08/04/22 1457 08/04/22 1529   BP: (!) 142/80 136/76   Pulse: 79    Weight: 60.6 kg (133 lb 9.6 oz)    Height: 5' (1.524 m)    PainSc: 0-No pain      Body mass index is 26.09 kg/m².    Physical Exam  HENT:      Head: Normocephalic and atraumatic.   Eyes:      General: No scleral icterus.     Extraocular Movements: Extraocular movements intact.   Cardiovascular:      Rate and Rhythm: Normal rate and regular rhythm.       Pulses: Normal pulses.      Heart sounds: Murmur heard.     No friction rub. No gallop.   Pulmonary:      Effort: Pulmonary effort is normal.      Breath sounds: Normal breath sounds.   Abdominal:      Palpations: Abdomen is soft.      Tenderness: There is no abdominal tenderness.   Musculoskeletal:      Cervical back: Neck supple.   Skin:     General: Skin is warm and dry.      Capillary Refill: Capillary refill takes less than 2 seconds.      Coloration: Skin is pale.   Neurological:      General: No focal deficit present.      Mental Status: She is alert.   Psychiatric:         Mood and Affect: Mood normal.         Behavior: Behavior normal.                 ..    Chemistry        Component Value Date/Time     08/03/2022 1222    K 4.5 08/03/2022 1222     08/03/2022 1222    CO2 23 08/03/2022 1222    BUN 24 (H) 08/03/2022 1222    CREATININE 1.4 08/03/2022 1222    GLU 95 08/03/2022 1222        Component Value Date/Time    CALCIUM 10.0 08/03/2022 1222    ALKPHOS 93 07/27/2022 1159    AST 21 07/27/2022 1159    ALT 15 07/27/2022 1159    BILITOT 0.4 07/27/2022 1159    ESTGFRAFRICA 29.7 (A) 07/27/2022 1159    EGFRNONAA 25.8 (A) 07/27/2022 1159            ..  Lab Results   Component Value Date    CHOL 162 07/27/2022    CHOL 139 07/22/2022    CHOL 236 (H) 11/02/2021     Lab Results   Component Value Date    HDL 77 (H) 07/27/2022    HDL 73 07/22/2022    HDL 87 (H) 11/02/2021     Lab Results   Component Value Date    LDLCALC 60.2 (L) 07/27/2022    LDLCALC 40.0 (L) 07/22/2022    LDLCALC 129.6 11/02/2021     Lab Results   Component Value Date    TRIG 124 07/27/2022    TRIG 130 07/22/2022    TRIG 97 11/02/2021     Lab Results   Component Value Date    CHOLHDL 47.5 07/27/2022    CHOLHDL 52.5 (H) 07/22/2022    CHOLHDL 36.9 11/02/2021     ..  Lab Results   Component Value Date    WBC 7.09 07/27/2022    HGB 10.9 (L) 07/27/2022    HCT 34.4 (L) 07/27/2022    MCV 93 07/27/2022     07/27/2022       Test(s) Reviewed  I  have reviewed the following in detail:  [] Stress test   [] Angiography   [] Echocardiogram   [x] Labs   [x] Other:       Assessment:         ICD-10-CM ICD-9-CM   1. Labile hypertension  R09.89 401.9   2. Chest wall pain  R07.89 786.52   3. Stage 3b chronic kidney disease  N18.32 585.3   4. Overweight (BMI 25.0-29.9)  E66.3 278.02   5. Other iron deficiency anemia  D50.8 280.8   6. Long term (current) use of antithrombotics/antiplatelets  Z79.02 V58.63     Problem List Items Addressed This Visit        Cardiac/Vascular    Labile hypertension - Primary       Renal/    Stage 3b chronic kidney disease       Hematology    Long term (current) use of antithrombotics/antiplatelets       Oncology    Iron deficiency anemia       Endocrine    Overweight (BMI 25.0-29.9)       Orthopedic    Chest wall pain           Plan:     CHANGE LISINOPRIL TO 1/2 BID, HOLD FOR SBP < 110, ADD FE IRON IN THE FORM OF RISK GLUCONATE, START CARDIAC REHAB TO START TOMORROW CLASS 1 ANGINA NO OVERT HEART FAILURE NO TIA TYPE SYMPTOMS WATCH FOR ARRHYTHMIA, DISCUSSED PLAN WITH THE PATIENT AND HER  RETURN TO CLINIC IN 3 MONTHS, INCREASED CV RISK WITH CKD      Labile hypertension    Chest wall pain    Stage 3b chronic kidney disease    Overweight (BMI 25.0-29.9)    Other iron deficiency anemia    Long term (current) use of antithrombotics/antiplatelets    Other orders  -     clopidogreL (PLAVIX) 75 mg tablet; Take 1 tablet (75 mg total) by mouth once daily.  Dispense: 90 tablet; Refill: 1  -     ferrous gluconate (FERGON) 324 MG tablet; Take 1 tablet (324 mg total) by mouth daily with breakfast.  Dispense: 90 tablet; Refill: 0    RTC Low level/low impact aerobic exercise 5x's/wk. Heart healthy diet and risk factor modification.    See labs and med orders.    Aerobic exercise 5x's/wk. Heart healthy diet and risk factor modification.    See labs and med orders.

## 2022-08-04 NOTE — PROGRESS NOTES
Non-compliant report chart audits. Chart review completed 08/04/2022 for HM test overdue.    Care Everywhere and media, updates requested and reviewed.      HM updated with external eye exam

## 2022-08-17 PROBLEM — F33.9 DEPRESSION, RECURRENT: Status: ACTIVE | Noted: 2022-08-17

## 2022-08-24 ENCOUNTER — SPECIALTY PHARMACY (OUTPATIENT)
Dept: PHARMACY | Facility: CLINIC | Age: 72
End: 2022-08-24
Payer: MEDICARE

## 2022-08-24 ENCOUNTER — CLINICAL SUPPORT (OUTPATIENT)
Dept: CARDIOLOGY | Facility: HOSPITAL | Age: 72
End: 2022-08-24
Payer: MEDICARE

## 2022-08-24 DIAGNOSIS — Z95.818 PRESENCE OF OTHER CARDIAC IMPLANTS AND GRAFTS: ICD-10-CM

## 2022-08-24 PROCEDURE — 93298 REM INTERROG DEV EVAL SCRMS: CPT | Mod: ,,, | Performed by: INTERNAL MEDICINE

## 2022-08-24 PROCEDURE — 93298 CARDIAC DEVICE CHECK - REMOTE: ICD-10-PCS | Mod: ,,, | Performed by: INTERNAL MEDICINE

## 2022-08-24 NOTE — TELEPHONE ENCOUNTER
Specialty Pharmacy - Refill Coordination    Specialty Medication Orders Linked to Encounter    Flowsheet Row Most Recent Value   Medication #1 evolocumab (REPATHA SURECLICK) 140 mg/mL PnIj (Order#[id number], Rx#[id number])          Refill Questions - Documented Responses    Flowsheet Row Most Recent Value   Patient Availability and HIPAA Verification    Does patient want to proceed with activity? Yes   HIPAA/medical authority confirmed? Yes   Relationship to patient of person spoken to? Self   Refill Screening Questions    Would patient like to speak to a pharmacist? No   When does the patient need to receive the medication? 08/31/22   Refill Delivery Questions    How will the patient receive the medication? Delivery Tamela   When does the patient need to receive the medication? 08/31/22   Shipping Address Home   Address in [facility] confirmed and updated if neccessary? Yes   Expected Copay ($) 128   Is the patient able to afford the medication copay? Yes   Payment Method CC on file   Days supply of Refill 28   Supplies needed? No supplies needed   Refill activity completed? Yes   Refill activity plan Refill scheduled   Shipment/Pickup Date: 08/29/22          Current Outpatient Medications   Medication Sig    aspirin (ECOTRIN) 81 MG EC tablet Take 1 tablet (81 mg total) by mouth once daily. for 21 days    benazepriL (LOTENSIN) 20 MG tablet Take 1 tablet (20 mg total) by mouth every morning. Short term supply while pt waits for mail order. (Patient taking differently: Take 20 mg by mouth once daily. Short term supply while pt waits for mail order.)    CALCIUM CARBONATE/VITAMIN D3 (CALCIUM 500 WITH D ORAL) Take 1 capsule by mouth once daily.    carboxymethylcellulose (REFRESH PLUS) 0.5 % Dpet Place 1 drop into both eyes daily as needed (DRY EYES).    clopidogreL (PLAVIX) 75 mg tablet Take 1 tablet (75 mg total) by mouth once daily.    co-enzyme Q-10 30 mg capsule Take 30 mg by mouth 3 (three) times daily.     dicyclomine (BENTYL) 10 MG capsule Take 1 capsule (10 mg total) by mouth 4 (four) times daily as needed (stomach discomfort).    esomeprazole (NEXIUM) 40 MG capsule Take 1 capsule (40 mg total) by mouth before breakfast.    estradioL (VIVELLE-DOT) 0.075 mg/24 hr Place 1 patch onto the skin once a week.    evolocumab (REPATHA SURECLICK) 140 mg/mL PnIj Inject 1 mL (140 mg total) into the skin every 14 (fourteen) days.    ferrous gluconate (FERGON) 324 MG tablet Take 1 tablet (324 mg total) by mouth daily with breakfast.    fluticasone propionate (FLONASE) 50 mcg/actuation nasal spray 1 spray (50 mcg total) by Each Nostril route once daily. (Patient taking differently: 1 spray by Each Nostril route every evening.)    garlic (GARLIQUE ORAL) Take 1 capsule by mouth Daily.    HYDROcodone-acetaminophen (NORCO)  mg per tablet Take 1 tablet by mouth every 4 (four) hours as needed for Pain. (Patient not taking: No sig reported)    isosorbide mononitrate (IMDUR) 30 MG 24 hr tablet Take 1 tablet (30 mg total) by mouth once daily.    MULTIVITAMIN (MULTIPLE VITAMIN ORAL) Take 1 capsule by mouth once daily.    sennosides (LAXATIVE, SENNOSIDES,) 25 mg Tab Take by mouth.    SUBOXONE 8-2 mg Film Place 0.25 Film under the tongue as needed (Pt breaks up 1 strip every 3 to 4 days prn restless leg).    SYNTHROID 100 mcg tablet TAKE 1 TABLET EVERY DAY (Patient taking differently: Take 100 mcg by mouth before breakfast. **BRAND MEDICALLY NECESSARY**)    valACYclovir (VALTREX) 1000 MG tablet Take 1 tablet (1,000 mg total) by mouth once daily. for 7 days   Last reviewed on 8/5/2022  9:17 AM by Isabel Wolff RN    Review of patient's allergies indicates:   Allergen Reactions    Lyrica [pregabalin] Swelling    Pcn [penicillins] Swelling    Toradol [ketorolac] Swelling    Vibramycin [doxycycline calcium] Shortness Of Breath and Swelling    Zetia [ezetimibe] Other (See Comments)    Crestor [rosuvastatin]       Body aches    Cymbalta [duloxetine]      dizzyness    Elavil [amitriptyline] Other (See Comments)     Restless leg    Pravastatin Other (See Comments)     Flu -like symptoms   Body aches     Seroquel [quetiapine]      restless leg symdrome    Last reviewed on  8/10/2022 11:50 AM by Dee Dee Galan      Tasks added this encounter   9/21/2022 - Refill Call (Auto Added)   Tasks due within next 3 months   No tasks due.     [facility] - Specialty Pharmacy  [address] [facility] [address]  Phone: [phone]  Fax: [phone]

## 2022-09-13 ENCOUNTER — OFFICE VISIT (OUTPATIENT)
Dept: VASCULAR SURGERY | Facility: CLINIC | Age: 72
End: 2022-09-13
Payer: MEDICARE

## 2022-09-13 VITALS
SYSTOLIC BLOOD PRESSURE: 114 MMHG | HEIGHT: 60 IN | BODY MASS INDEX: 26.01 KG/M2 | WEIGHT: 132.5 LBS | DIASTOLIC BLOOD PRESSURE: 67 MMHG | HEART RATE: 61 BPM

## 2022-09-13 DIAGNOSIS — Z95.1 S/P CABG (CORONARY ARTERY BYPASS GRAFT): Primary | ICD-10-CM

## 2022-09-13 PROCEDURE — 99999 PR PBB SHADOW E&M-EST. PATIENT-LVL IV: ICD-10-PCS | Mod: PBBFAC,,, | Performed by: THORACIC SURGERY (CARDIOTHORACIC VASCULAR SURGERY)

## 2022-09-13 PROCEDURE — 99214 OFFICE O/P EST MOD 30 MIN: CPT | Mod: PBBFAC,PN | Performed by: THORACIC SURGERY (CARDIOTHORACIC VASCULAR SURGERY)

## 2022-09-13 PROCEDURE — 99999 PR PBB SHADOW E&M-EST. PATIENT-LVL IV: CPT | Mod: PBBFAC,,, | Performed by: THORACIC SURGERY (CARDIOTHORACIC VASCULAR SURGERY)

## 2022-09-13 PROCEDURE — 99024 PR POST-OP FOLLOW-UP VISIT: ICD-10-PCS | Mod: POP,,, | Performed by: THORACIC SURGERY (CARDIOTHORACIC VASCULAR SURGERY)

## 2022-09-13 PROCEDURE — 99024 POSTOP FOLLOW-UP VISIT: CPT | Mod: POP,,, | Performed by: THORACIC SURGERY (CARDIOTHORACIC VASCULAR SURGERY)

## 2022-09-13 RX ORDER — OXYBUTYNIN CHLORIDE 15 MG/1
TABLET, EXTENDED RELEASE ORAL
COMMUNITY
Start: 2022-08-06 | End: 2023-03-06

## 2022-09-13 NOTE — PROGRESS NOTES
Subjective:       Osman Johansen  presents to the clinic after undergoing coronary artery bypass x5 with left internal thoracic artery to left anterior descending reverse saphenous vein graft to obtuse marginal 2, obtuse marginal 3, diagonal 1, and right posterior descending along with endarterectomy of the 2nd obtuse marginal branch at [facility] on 06/27/2022.  Overall she feels well.  She notices a slight bump deep within the sternotomy incision which is not generally tender.  She is had some chest wall soreness after receiving a large hug and performing some exercises.       Objective:      /67   Pulse 61   Ht 5' (1.524 m)   Wt 60.1 kg (132 lb 8 oz)   BMI 25.88 kg/m²     Objective:  Vital signs: (most recent): Blood pressure 114/67, pulse 61, height 5' (1.524 m), weight 60.1 kg (132 lb 8 oz).    General:  She looks quite well.    Chest:  Sternotomy incision is healed.  Sternum is stable.  The small lump is noticeable and likely related to a sternal wire which is palpable only because she has minimal adipose/subcutaneous chest wall tissue.  Lungs:  Clear bilaterally.    Heart: Regular rate and rhythm.    Extremities:  Leg incisions are healed.  No edema.       Assessment:      Doing well postoperatively.      Plan:      1. Continue any current medications.  2. Wound care discussed.  3. Pt is to increase activities as tolerated.  4. Follow up:  As needed

## 2022-09-19 ENCOUNTER — PATIENT MESSAGE (OUTPATIENT)
Dept: CARDIOLOGY | Facility: CLINIC | Age: 72
End: 2022-09-19
Payer: MEDICARE

## 2022-09-19 DIAGNOSIS — I10 ESSENTIAL HYPERTENSION: ICD-10-CM

## 2022-09-19 RX ORDER — BENAZEPRIL HYDROCHLORIDE 20 MG/1
20 TABLET ORAL DAILY
Qty: 30 TABLET | Refills: 0 | Status: SHIPPED | OUTPATIENT
Start: 2022-09-19 | End: 2022-11-08

## 2022-09-19 NOTE — TELEPHONE ENCOUNTER
----- Message from Lois Ellington sent at 9/19/2022 10:58 AM CDT -----  Contact: Patient  Type:  RX Refill Request    Who Called: Patient     Refill or New Rx: refill     RX Name and Strength: benazepriL (LOTENSIN) 20 MG tablet        How is the patient currently taking it? (ex. 1XDay): once a day     Is this a 30 day or 90 day RX: 90    Preferred Pharmacy with phone number:     [facility]  [address] [address] [facility]  [address] [address]  [place] [address]  Phone: [phone] Fax: [phone]           Local or Mail Order: local     Ordering Provider: Sourav Nugent     Would the patient rather a call back or a response via [facility]? Call     Best Call Back Number: [phone] (home)      Additional Information:  Patient stated that she is completely out of the medicine

## 2022-09-19 NOTE — TELEPHONE ENCOUNTER
----- Message from Nomi Preciado sent at 9/19/2022 11:31 AM CDT -----  Contact: Pt  Type: RX Refill Request  Who Called:Pt  Refill or New RX:Refill  RX Name and Strength:benazepriL (LOTENSIN) 20 MG tablet  How is the patient currently taking it: As Directed  Is this a 30 or 90 day : as Directed  Preferred Pharmacy/Phone Number:#    [facility] [place] [facility] - [address] [facility] [address]  [address] [facility] [address]  [place] [address]  Phone: [phone] Fax: [phone]    [facility] - [place], [place] - [address] [facility]  [address] [facility] [address]  Phone: [phone] Fax: [phone]    Best Call Back Number:[phone]    Additional Information :Pt was calling to get an immediate refill for medication pt stated she is completely out and pt also stated [facility] would need a new prescription written up and sent over for pt 90 day supply Thank you

## 2022-09-21 ENCOUNTER — SPECIALTY PHARMACY (OUTPATIENT)
Dept: PHARMACY | Facility: CLINIC | Age: 72
End: 2022-09-21
Payer: MEDICARE

## 2022-09-21 NOTE — TELEPHONE ENCOUNTER
Specialty Pharmacy - Refill Coordination    Specialty Medication Orders Linked to Encounter      Flowsheet Row Most Recent Value   Medication #1 evolocumab (REPATHA SURECLICK) 140 mg/mL PnIj (Order#[id number], Rx#[id number])            Refill Questions - Documented Responses      Flowsheet Row Most Recent Value   Patient Availability and HIPAA Verification    Does patient want to proceed with activity? Yes   HIPAA/medical authority confirmed? Yes   Relationship to patient of person spoken to? Self   Refill Screening Questions    Would patient like to speak to a pharmacist? No   When does the patient need to receive the medication? 10/01/22   Refill Delivery Questions    How will the patient receive the medication? Delivery Tamela   When does the patient need to receive the medication? 10/01/22   Shipping Address Home   Address in [facility] confirmed and updated if neccessary? Yes   Expected Copay ($) 128   Is the patient able to afford the medication copay? Yes   Payment Method CC on file   Days supply of Refill 28   Supplies needed? No supplies needed   Refill activity completed? Yes   Refill activity plan Refill scheduled   Shipment/Pickup Date: 09/26/22            Current Outpatient Medications   Medication Sig    aspirin (ECOTRIN) 81 MG EC tablet Take 1 tablet (81 mg total) by mouth once daily. for 21 days    benazepriL (LOTENSIN) 20 MG tablet Take 1 tablet (20 mg total) by mouth every morning. Short term supply while pt waits for mail order.    benazepriL (LOTENSIN) 20 MG tablet Take 1 tablet (20 mg total) by mouth once daily. Short term supply while pt waits for mail order.    CALCIUM CARBONATE/VITAMIN D3 (CALCIUM 500 WITH D ORAL) Take 1 capsule by mouth once daily.    carboxymethylcellulose (REFRESH PLUS) 0.5 % Dpet Place 1 drop into both eyes daily as needed (DRY EYES).    clopidogreL (PLAVIX) 75 mg tablet Take 1 tablet (75 mg total) by mouth once daily.    co-enzyme Q-10 30 mg capsule Take 30 mg by  mouth 3 (three) times daily.    dicyclomine (BENTYL) 10 MG capsule Take 1 capsule (10 mg total) by mouth 4 (four) times daily as needed (stomach discomfort).    esomeprazole (NEXIUM) 40 MG capsule Take 1 capsule (40 mg total) by mouth before breakfast.    estradioL (VIVELLE-DOT) 0.075 mg/24 hr Place 1 patch onto the skin once a week.    evolocumab (REPATHA SURECLICK) 140 mg/mL PnIj Inject 1 mL (140 mg total) into the skin every 14 (fourteen) days.    ferrous gluconate (FERGON) 324 MG tablet Take 1 tablet (324 mg total) by mouth daily with breakfast.    fluticasone propionate (FLONASE) 50 mcg/actuation nasal spray 1 spray (50 mcg total) by Each Nostril route once daily. (Patient taking differently: 1 spray by Each Nostril route every evening.)    garlic (GARLIQUE ORAL) Take 1 capsule by mouth Daily.    MULTIVITAMIN (MULTIPLE VITAMIN ORAL) Take 1 capsule by mouth once daily.    oxybutynin (DITROPAN XL) 15 MG TR24     sennosides (LAXATIVE, SENNOSIDES,) 25 mg Tab Take by mouth.    SYNTHROID 100 mcg tablet TAKE 1 TABLET EVERY DAY (Patient taking differently: Take 100 mcg by mouth before breakfast. **BRAND MEDICALLY NECESSARY**)    valACYclovir (VALTREX) 1000 MG tablet Take 1 tablet (1,000 mg total) by mouth once daily. for 7 days   Last reviewed on 9/19/2022 11:44 AM by Cha Nolen MA    Review of patient's allergies indicates:   Allergen Reactions    Lyrica [pregabalin] Swelling    Pcn [penicillins] Swelling    Toradol [ketorolac] Swelling    Vibramycin [doxycycline calcium] Shortness Of Breath and Swelling    Zetia [ezetimibe] Other (See Comments)    Crestor [rosuvastatin]      Body aches    Cymbalta [duloxetine]      dizzyness    Elavil [amitriptyline] Other (See Comments)     Restless leg    Pravastatin Other (See Comments)     Flu -like symptoms   Body aches     Seroquel [quetiapine]      restless leg symdrome    Last reviewed on  9/19/2022 3:52 PM by Soila Felton      Tasks added this encounter   10/22/2022 -  Refill Call (Auto Added)   Tasks due within next 3 months   No tasks due.     [facility] - [facility]ty Pharmacy  [address] [facility] [address]  [facility] [address]  Phone: [phone]  Fax: [phone]

## 2022-09-23 ENCOUNTER — CLINICAL SUPPORT (OUTPATIENT)
Dept: CARDIOLOGY | Facility: HOSPITAL | Age: 72
End: 2022-09-23
Payer: MEDICARE

## 2022-09-23 DIAGNOSIS — Z95.818 PRESENCE OF OTHER CARDIAC IMPLANTS AND GRAFTS: ICD-10-CM

## 2022-10-03 ENCOUNTER — TELEPHONE (OUTPATIENT)
Dept: CARDIOLOGY | Facility: CLINIC | Age: 72
End: 2022-10-03
Payer: MEDICARE

## 2022-10-03 NOTE — TELEPHONE ENCOUNTER
----- Message from Mahnaz Cadet sent at 10/3/2022 11:04 AM CDT -----  Type:  RX Refill Request    Who Called:  pt  Refill or New Rx:  refill  RX Name and Strength:  benazepriL (LOTENSIN) 20 MG tablet  How is the patient currently taking it? (ex. 1XDay):  as directed  Is this a 30 day or 90 day RX:  90  Preferred Pharmacy with phone number:    [facility] Delivery - [place], [place] - [address] [facility]  [address] [facility] [address]  Phone: [phone] Fax: [phone]      [facility] - [address] [address] [address]  [address] [address] [address]  [place] [address]  Phone: [phone] Fax: [phone]      Local or Mail Order:  mail/local  Ordering Provider:  Parag [place] Call Back Number:  [phone] (home)     Additional Information:  pt said can a 30 day be sent to the local pharmacy and the other to her mail pharmacy--she sent this request in 2 weeks ago and still do not have it--please call and advise--thank you

## 2022-10-10 ENCOUNTER — TELEPHONE (OUTPATIENT)
Dept: CARDIOLOGY | Facility: HOSPITAL | Age: 72
End: 2022-10-10
Payer: MEDICARE

## 2022-10-10 NOTE — TELEPHONE ENCOUNTER
Notification received from home monitor of patient Symptom activated episode  Symptom + occasional PVCs escalated to follow up      Patient c/o dizzy, SOB, +nausea, denies any palpitations, occasional chest pain, but states she does not need to go to ED for symptoms, BP that day at 5:20pm 121/76, patient states her BP is high in am usually and then does drop down low and can cause dizziness later    Aspirin 81mg daily  Benazepril 20mg daily  Plavix 75mg daily

## 2022-10-18 ENCOUNTER — OFFICE VISIT (OUTPATIENT)
Dept: PAIN MEDICINE | Facility: CLINIC | Age: 72
End: 2022-10-18
Payer: MEDICARE

## 2022-10-18 VITALS
SYSTOLIC BLOOD PRESSURE: 130 MMHG | HEART RATE: 63 BPM | TEMPERATURE: 97 F | DIASTOLIC BLOOD PRESSURE: 70 MMHG | HEIGHT: 61 IN | WEIGHT: 132 LBS | RESPIRATION RATE: 18 BRPM | BODY MASS INDEX: 24.92 KG/M2

## 2022-10-18 DIAGNOSIS — M79.18 MYOFASCIAL PAIN: Primary | ICD-10-CM

## 2022-10-18 DIAGNOSIS — M47.812 CERVICAL SPONDYLOSIS: ICD-10-CM

## 2022-10-18 DIAGNOSIS — M47.816 LUMBAR SPONDYLOSIS: ICD-10-CM

## 2022-10-18 PROCEDURE — 99999 PR PBB SHADOW E&M-EST. PATIENT-LVL IV: ICD-10-PCS | Mod: PBBFAC,,, | Performed by: NURSE PRACTITIONER

## 2022-10-18 PROCEDURE — 99213 PR OFFICE/OUTPT VISIT, EST, LEVL III, 20-29 MIN: ICD-10-PCS | Mod: S$PBB,,, | Performed by: NURSE PRACTITIONER

## 2022-10-18 PROCEDURE — 99214 OFFICE O/P EST MOD 30 MIN: CPT | Mod: PBBFAC | Performed by: NURSE PRACTITIONER

## 2022-10-18 PROCEDURE — 99213 OFFICE O/P EST LOW 20 MIN: CPT | Mod: S$PBB,,, | Performed by: NURSE PRACTITIONER

## 2022-10-18 PROCEDURE — 99999 PR PBB SHADOW E&M-EST. PATIENT-LVL IV: CPT | Mod: PBBFAC,,, | Performed by: NURSE PRACTITIONER

## 2022-10-18 NOTE — PROGRESS NOTES
Chronic Pain-Tele-Medicine-Established Note (Follow up visit)         SUBJECTIVE:    Interval History 10/18/2022:  The patient is here for increased back pain. The pain is tight in nature without radiation. She originally wanted TPIs today. However, she had a flu shot yesterday, Repatha 2 days ago and has a current shingles outbreak. Her pain today is 2/10.    Interval History 8/3/2021:  The patient presents to clinic today with return of neck pain. She states that since her TPI at last visit that she had complete resolution of sxs until about 10d ago, which saw the return of her neck pain and gluteal sxs same as prior to TPI. She would like TPI again today as it has brought her significant relief in the past. She endorses continuation of her chronic back pain. Her pain today is 7/10.    Interval History 3/12/2021:  The patient has a follow up today for increased neck pain. She is having aching pain across the neck with associated headaches. She is also having middle back tightness. She is not having radiation into the arms or numbness. She would like trigger point injections as these have been helpful in the past. She previously was seeing neurology but was lost to follow up. She denies nausea or vomiting currently. She had a cholecystectomy on 2/10/21 from which she has recovered well. She had her first Covid vaccine on 2/3/21 and is scheduled for the next on 2/24/21. She also has a history of chronic back pain. Her pain today is 7/10.    Interval History 7/22/2020:  The patient has an audio visit today to discuss back pain.  She had TPIs at last OV which gave her some short term benefit.  She was scheduled for facet injections which she cancelled.  She would like to update her imaging prior to another procedure.  Her pain today is 6/10.    Interval History 7/7/2020:  The patient is here for follow up of back pain.  She has been doing well until the past month or so.  She is having more middle and lower back pain.   She is not having any radiation into the legs at this time.  She describes the pain as aching and stabbing.  She has been stretching and walking at home.  Her pain today is 4/10.    Interval History 1/20/2020:  The patient returns for follow up of middle and lower back pain.  She is now s/p bilateral L3-4 and L4-5 facet joint injections as well as T7-8 interspinous ligament injection on 12/16/19 with about 50% relief.  She is still having right sided lower back pain which starts at the spine and often radiates to her hip.  She denies associated numbness.  She says that the area is tender to touch and feels swollen sometimes.  She has tried ice and heat without benefit.  Her pain today is 2/10.    Interval History 12/13/2019:  The patient is here for follow up of back pain.  She is s/p Bilateral L2,3,4,5 MBB with steroids.  She is reporting limited benefit this time.  Previous provided her significant benefit.  She is having pain across the lower back, worse on the left side.  She denies radiation into the legs.  She is also having middle thoracic pain.  This does not radiate to the front of her body.  She is not having any numbness.  She would like to schedule another procedure.  Her pain today is 5/10.    Previous Encounter:  Osman Johansen presents to the clinic for a follow-up appointment for lower back pain. She reports increased low back pain over the last few weeks. She describes this pain as constant, sharp, and aching. She denies any radiating leg pain. She previously had 90% relief of her pain with bilateral L2,3,4,5 MBB and T7-8 interspinous ligament injection, last done in August 2018. She would like to repeat this procedure. She continues to participate in a home exercise routine. She denies any other health changes. Her pain today is 5/10.    Pain Disability Index Review:  Last 3 PDI Scores 10/18/2022 8/3/2021 3/12/2021   Pain Disability Index (PDI) 12 25 31       Pain Medications:  None    Opioid  Contract: no     report:  Reviewed and consistent with medication use as prescribed.    Pain Procedures:   7/18/13 Bilateral L5 TF JOCE  10/28/13 Bilateral SI joint injection  4/20/15 Bilateral SI joint injection  12/15/16 Bilateral L3-4 facet joint injections- 100% relief for 6 weeks  3/13/17 Bilateral L3-4 facet joint injections- 30% relief  4/17/17 Bilateral L2,3,4,5 MBB with steroids- significant benefit for 7 months  12/14/17 Bilateral L2,3,4,5 MBB with steroids and Interspinal ligament injection- 90% relief for 7 months  8/21/2018- Bilateral L2,3,4,5 MBB with steroids and T7-8 interspinous ligament injections   11/18/19 Bilateral L2,3,4,5 MBB with steroids  12/16/19 bilateral L3-4 and L4-5 facet joint injections as well as T7-8 interspinous ligament injection- 50% relief  3/12/21 bilateral cervical and paraspinal TPI, R gluteal TPI x1 (5 sites total)    Physical Therapy/Home Exercise: per self does stretching    Imaging:     Lumbar MRI 12/10/16    Narrative   MRI LUMBAR SPINE WITHOUT CONTRAST    COMPARISON: None    TECHNIQUE:  Sagittal T1, T2, and STIR;  axial T1 and T2 sequences through the lumbar spine were acquired without contrast.    FINDINGS: Vertebral body height and alignment are within normal limits.  The conus terminates at T12-L1.  Moderate loss of disc height is present at L4-5.  Marrow signal is mildly heterogeneous.  No focal osseous lesion.    L1-L2:  No disc herniation. No spinal canal or neuroforaminal narrowing.    L2-L3:  Mild diffuse disc bulging is present without spinal canal or neuroforaminal narrowing.    L3-L4:  Mild diffuse disc bulging and moderate bilateral facet arthropathy result in mild spinal canal narrowing.    L4-L5:  Mild diffuse disc bulging is present without spinal canal or neuroforaminal narrowing.    L5-S1:  No disc herniation. No spinal canal or neuroforaminal narrowing.    Several small gallstones noted.   Impression     Degenerative lumbar spondylosis with mild L3-4  spinal canal narrowing and no significant neuroforaminal narrowing.  Cholelithiasis       Narrative     MRI of the thoracic spine without contrast    Technique: Multiplanar multisequence MR imaging of the thoracic spine was performed without contrast.    Findings: There is mild levoscoliosis of the lumbar spine.  This may be positional.  Vertebral bodies otherwise demonstrate normal height alignment.  The marrow signal of the vertebral bodies is within normal limits.  There is mild disc desiccation noted throughout the thoracic spine.  No significant disc space narrowing.  No significant disc protrusions are identified.  The central canal is widely patent.  No significant spondylosis.  The cord is normal in signal and caliber.      Impression         1.  Mild levoscoliosis of the thoracic spine otherwise essentially unremarkable MRI of the thoracic spine.     Narrative & Impression  EXAMINATION:  XR HIPS BILATERAL 2 VIEW INCL AP PELVIS     CLINICAL HISTORY:  Pain in right hip     TECHNIQUE:  AP view of the pelvis and frogleg lateral views of both hips were performed.     COMPARISON:  Hip and pelvic x-ray of April 19, 2011     FINDINGS:  A fracture of either hip is not seen.  No dislocation is noted.  The acetabula are well maintained.  There is sclerosis adjacent to the inferior left sacroiliac joint unchanged from the prior study and consistent with chronic sacroiliitis.  Degenerative disc disease is seen at the lower lumbar spine.     Impression:     Chronic left sacroiliitis.  Degenerative disc disease at L4-5.  Otherwise negative radiographs of both hips and pelvis.      CMP  Sodium   Date Value Ref Range Status   08/03/2022 137 136 - 145 mmol/L Final     Potassium   Date Value Ref Range Status   08/03/2022 4.5 3.5 - 5.1 mmol/L Final     Chloride   Date Value Ref Range Status   08/03/2022 103 95 - 110 mmol/L Final     CO2   Date Value Ref Range Status   08/03/2022 23 23 - 29 mmol/L Final     Glucose   Date Value  Ref Range Status   08/03/2022 95 70 - 110 mg/dL Final     BUN   Date Value Ref Range Status   08/03/2022 24 (H) 8 - 23 mg/dL Final     Creatinine   Date Value Ref Range Status   08/03/2022 1.4 0.5 - 1.4 mg/dL Final     Calcium   Date Value Ref Range Status   08/03/2022 10.0 8.7 - 10.5 mg/dL Final     Total Protein   Date Value Ref Range Status   07/27/2022 7.5 6.0 - 8.4 g/dL Final     Albumin   Date Value Ref Range Status   07/27/2022 4.0 3.5 - 5.2 g/dL Final     Total Bilirubin   Date Value Ref Range Status   07/27/2022 0.4 0.1 - 1.0 mg/dL Final     Comment:     For infants and newborns, interpretation of results should be based  on gestational age, weight and in agreement with clinical  observations.    Premature Infant recommended reference ranges:  Up to 24 hours.............<8.0 mg/dL  Up to 48 hours............<12.0 mg/dL  3-5 days..................<15.0 mg/dL  6-29 days.................<15.0 mg/dL       Alkaline Phosphatase   Date Value Ref Range Status   07/27/2022 93 55 - 135 U/L Final     AST   Date Value Ref Range Status   07/27/2022 21 10 - 40 U/L Final     ALT   Date Value Ref Range Status   07/27/2022 15 10 - 44 U/L Final     Anion Gap   Date Value Ref Range Status   08/03/2022 11 8 - 16 mmol/L Final     eGFR if    Date Value Ref Range Status   07/27/2022 29.7 (A) >60 mL/min/1.73 m^2 Final     eGFR if non    Date Value Ref Range Status   07/27/2022 25.8 (A) >60 mL/min/1.73 m^2 Final     Comment:     Calculation used to obtain the estimated glomerular filtration  rate (eGFR) is the CKD-EPI equation.        Lab Results   Component Value Date    WBC 7.09 07/27/2022    HGB 10.9 (L) 07/27/2022    HCT 34.4 (L) 07/27/2022    MCV 93 07/27/2022     07/27/2022         Allergies:   Review of patient's allergies indicates:   Allergen Reactions    Pcn [penicillins] Swelling    Toradol [ketorolac] Swelling    Vibramycin [doxycycline calcium] Swelling    Cymbalta [duloxetine]       dizzyness    Elavil [amitriptyline]     Lyrica [pregabalin] Swelling    Seroquel [quetiapine]      restless leg symdrome       Current Medications:   Current Outpatient Medications   Medication Sig Dispense Refill    aspirin (ECOTRIN) 81 MG EC tablet Take 1 tablet (81 mg total) by mouth once daily. for 21 days      benazepriL (LOTENSIN) 20 MG tablet Take 1 tablet (20 mg total) by mouth every morning. Short term supply while pt waits for mail order. 30 tablet 0    benazepriL (LOTENSIN) 20 MG tablet Take 1 tablet (20 mg total) by mouth once daily. Short term supply while pt waits for mail order. 30 tablet 0    CALCIUM CARBONATE/VITAMIN D3 (CALCIUM 500 WITH D ORAL) Take 1 capsule by mouth once daily.      carboxymethylcellulose (REFRESH PLUS) 0.5 % Dpet Place 1 drop into both eyes daily as needed (DRY EYES).      clopidogreL (PLAVIX) 75 mg tablet Take 1 tablet (75 mg total) by mouth once daily. 90 tablet 1    co-enzyme Q-10 30 mg capsule Take 30 mg by mouth 3 (three) times daily.      dicyclomine (BENTYL) 10 MG capsule Take 1 capsule (10 mg total) by mouth 4 (four) times daily as needed (stomach discomfort). 120 capsule 0    esomeprazole (NEXIUM) 40 MG capsule Take 1 capsule (40 mg total) by mouth before breakfast. 90 capsule 3    estradioL (VIVELLE-DOT) 0.075 mg/24 hr Place 1 patch onto the skin once a week.      evolocumab (REPATHA SURECLICK) 140 mg/mL PnIj Inject 1 mL (140 mg total) into the skin every 14 (fourteen) days. 2 each 3    ferrous gluconate (FERGON) 324 MG tablet Take 1 tablet (324 mg total) by mouth daily with breakfast. 90 tablet 0    fluticasone propionate (FLONASE) 50 mcg/actuation nasal spray 1 spray (50 mcg total) by Each Nostril route once daily. (Patient taking differently: 1 spray by Each Nostril route every evening.) 16 g 11    garlic (GARLIQUE ORAL) Take 1 capsule by mouth Daily.      MULTIVITAMIN (MULTIPLE VITAMIN ORAL) Take 1 capsule by mouth once daily.      oxybutynin (DITROPAN XL) 15  MG TR24       sennosides (LAXATIVE, SENNOSIDES,) 25 mg Tab Take by mouth.      SYNTHROID 100 mcg tablet TAKE 1 TABLET EVERY DAY (Patient taking differently: Take 100 mcg by mouth before breakfast. **BRAND MEDICALLY NECESSARY**) 90 tablet 3    valACYclovir (VALTREX) 1000 MG tablet Take 1 tablet (1,000 mg total) by mouth once daily. for 7 days 7 tablet 2     No current facility-administered medications for this visit.     Facility-Administered Medications Ordered in Other Visits   Medication Dose Route Frequency Provider Last Rate Last Admin    lactated ringers infusion   Intravenous Continuous Jim Mcdonough MD   Stopped at 06/27/22 1714    LIDOcaine (PF) 10 mg/ml (1%) injection 10 mg  1 mL Intradermal Once Jim Mcdonough MD           REVIEW OF SYSTEMS:    GENERAL:  No weight loss, malaise or fevers.  HEENT:  Negative for frequent or significant headaches.  NECK:  Negative for lumps, goiter, pain and significant neck swelling.  RESPIRATORY:  Negative for cough, wheezing or shortness of breath.  CARDIOVASCULAR:  Negative for chest pain, leg swelling or palpitations.  GI:  Negative for abdominal discomfort, blood in stools or black stools or change in bowel habits. GERD.  MUSCULOSKELETAL:  See HPI.  SKIN:  Negative for lesions, rash, and itching.  PSYCH: H/O anxiety and opioid dependence.  HEMATOLOGY/LYMPHOLOGY:  On Plavix.  NEURO:   No history of headaches, syncope, paralysis, seizures or tremors.  All other reviewed and negative other than HPI.    Past Medical History:  Past Medical History:   Diagnosis Date    Allergy     Anemia     Anxiety     Arthritis     Blood transfusion 8 yrs    CAD (coronary artery disease)     Cataract     Chronic diastolic CHF (congestive heart failure)     CKD (chronic kidney disease), stage II     COPD (chronic obstructive pulmonary disease)     mild no inhalers    Degenerative disc disease     Depression     Dry eye syndrome     Fibromyalgia     Fluid overload     GERD  "(gastroesophageal reflux disease)     Hypercholesterolemia 12/09/2019    Hypertension     Hypothyroidism     IBS (irritable bowel syndrome)     Lower extremity edema     Macular drusen     Neuromuscular disorder     Ocular hypertension, bilateral     Osteoporosis     Pleural effusion     PUD (peptic ulcer disease)     Restless leg     Uses Suboxone to control    Sleep apnea     "complex sleep apnea" - per pt, no cpap    Thoracic or lumbosacral neuritis or radiculitis 10/19/2012    Thyroid disease     hashimoto's       Past Surgical History:  Past Surgical History:   Procedure Laterality Date    APPENDECTOMY  1965    CARDIAC CATHETERIZATION      CATARACT EXTRACTION W/  INTRAOCULAR LENS IMPLANT Right 5/24/2021    Procedure: EXTRACTION, CATARACT, WITH IOL INSERTION;  Surgeon: Bebe Lynn MD;  Location: [facility] OR;  Service: Ophthalmology;  Laterality: Right;  Right/DM    CORONARY ANGIOGRAPHY N/A 5/2/2022    Procedure: ANGIOGRAM, CORONARY ARTERY;  Surgeon: Sourav Nugent MD;  Location: [facility] CATH;  Service: Cardiology;  Laterality: N/A;    CORONARY ARTERY BYPASS GRAFT      CORONARY ARTERY BYPASS GRAFT (CABG) N/A 6/27/2022    Procedure: CORONARY ARTERY BYPASS GRAFT (CABG) X 5;  Surgeon: Talib Torres MD;  Location: [facility] OR;  Service: Cardiovascular;  Laterality: N/A;    ENDOSCOPIC HARVEST OF VEIN Left 6/27/2022    Procedure: SURGICAL PROCUREMENT, VEIN, ENDOSCOPIC;  Surgeon: Talib Torres MD;  Location: [facility] OR;  Service: Cardiovascular;  Laterality: Left;    HYSTERECTOMY  8 yrs     INJECTION OF ANESTHETIC AGENT AROUND NERVE Bilateral 8/21/2018    Procedure: BLOCK, NERVE;  Surgeon: Talia Belcher MD;  Location: [facility] PAIN MGT;  Service: Pain Management;  Laterality: Bilateral;  Bilateral block @ L2,3,4,5      INJECTION OF ANESTHETIC AGENT AROUND NERVE Bilateral 11/18/2019    Procedure: BLOCK, NERVE, L2-L3-L4-L5 ME DIAL BRANCH;  Surgeon: Talia Belcher MD;  Location: [facility] PAIN MGT;  Service: Pain Management;  " "Laterality: Bilateral;    INJECTION OF FACET JOINT Bilateral 2019    Procedure: INJECTION, FACET JOINT, L3-L4 AND L4-L5 AND T7-T8 LIGAMENT;  Surgeon: Talia Belcher MD;  Location: [facility] PAIN MGT;  Service: Pain Management;  Laterality: Bilateral;    INSERTION OF IMPLANTABLE LOOP RECORDER Left 2022    Procedure: Insertion, Implantable Loop Recorder;  Surgeon: Sourav Nugent MD;  Location: [facility] CATH;  Service: Cardiology;  Laterality: Left;    LAPAROSCOPIC CHOLECYSTECTOMY N/A 2/10/2021    Procedure: CHOLECYSTECTOMY, LAPAROSCOPIC;  Surgeon: John Morrow MD;  Location: [facility] OR;  Service: General;  Laterality: N/A;    LEFT HEART CATHETERIZATION Left 2022    Procedure: Left heart cath;  Surgeon: Sourav Nugent MD;  Location: [facility] CATH;  Service: Cardiology;  Laterality: Left;    SINUS SURGERY      x2-one for CSF leak    TONSILLECTOMY         Family History:  Family History   Problem Relation Age of Onset    Arthritis Mother     Cancer Mother     Heart disease Mother     Hypertension Mother     Cataracts Mother     Ovarian cancer Mother     Ulcers Father     Thyroid disease Brother     Heart disease Brother     Amblyopia Neg Hx     Blindness Neg Hx     Diabetes Neg Hx     Glaucoma Neg Hx     Macular degeneration Neg Hx     Retinal detachment Neg Hx     Strabismus Neg Hx     Stroke Neg Hx        Social History:  Social History     Socioeconomic History    Marital status:    Tobacco Use    Smoking status: Former     Types: Cigarettes     Quit date: 2008     Years since quittin.8    Smokeless tobacco: Never   Substance and Sexual Activity    Alcohol use: Yes     Comment: rarely    Drug use: No    Sexual activity: Never       OBJECTIVE:    /70   Pulse 63   Temp 97.1 °F (36.2 °C) (Oral)   Resp 18   Ht 5' 1" (1.549 m)   Wt 59.9 kg (132 lb)   BMI 24.94 kg/m²     PHYSICAL EXAMINATION     General appearance: Well appearing, in no acute distress, alert and oriented x3.  Psych:  Mood " and affect appropriate.  Skin: Midline chest scar. Small vesicular blisters to back.  Neck: TTP over cervical and thoracic paraspinals. Full ROM with mild pain on extension and lateral rotation. Positive facet loading.  Back: Straight leg raise in the sitting position is negative for radicular pain bilaterally. Positive facet loading bilaterally. TTP over lumbar paraspinals and coccyx.  Extremities: Peripheral joint ROM is full and pain free without obvious instability or laxity in all four extremities. No deformities, edema, or skin discoloration. Good capillary refill.  Musculoskeletal: Bilateral upper and lower extremity strength is normal and symmetric.  No atrophy or tone abnormalities are noted.   Neuro: No loss of sensation noted.  Gait: Normal.    ASSESSMENT: 72 y.o. year old female with back pain, consistent with the following diagnoses:     1. Myofascial pain        2. Lumbar spondylosis        3. Cervical spondylosis                PLAN:     - RTC in 3 weeks for TPIs if shingles has cleared.    - Encouraged the patient to resume a home exercise routine to help with pain and strengthening.     - Counseled patient regarding the importance of constant sleeping habits and physical therapy.        The above plan and management options were discussed at length with patient. Patient is in agreement with the above and verbalized understanding.    Meche Clinton  10/18/2022       Trigger Point Injection:   The procedure was discussed with the patient including complications of nerve damage, bleeding, infection, and failure of pain relief.   Trigger points were identified by palpation and marked. Alcohol prep of sites done. A mixture of 9.5mL 0.25% bupivacaine +20mg Depo-Medrol was prepared (10 mL total).   A 27-gauge needle was advanced to the point of maximal tenderness, and medication was injected after negative aspiration. All sites done in the same manner. Patient tolerated the procedure well and without  complications. Sites injected included: splenius capitis and thoracic paraspinals bilaterally; right gluteal muscle x1 (6mL total). She denies any adverse effects including dizziness or shortness or breath.    I have personally taken the history and examined this patient and agree with the resident's note as stated above.

## 2022-10-23 ENCOUNTER — CLINICAL SUPPORT (OUTPATIENT)
Dept: CARDIOLOGY | Facility: HOSPITAL | Age: 72
End: 2022-10-23
Payer: MEDICARE

## 2022-10-23 DIAGNOSIS — Z95.818 PRESENCE OF OTHER CARDIAC IMPLANTS AND GRAFTS: ICD-10-CM

## 2022-10-23 PROCEDURE — 93298 REM INTERROG DEV EVAL SCRMS: CPT | Mod: ,,, | Performed by: INTERNAL MEDICINE

## 2022-10-23 PROCEDURE — 93298 CARDIAC DEVICE CHECK - REMOTE: ICD-10-PCS | Mod: ,,, | Performed by: INTERNAL MEDICINE

## 2022-10-24 ENCOUNTER — TELEPHONE (OUTPATIENT)
Dept: CARDIOLOGY | Facility: HOSPITAL | Age: 72
End: 2022-10-24

## 2022-10-24 ENCOUNTER — SPECIALTY PHARMACY (OUTPATIENT)
Dept: PHARMACY | Facility: CLINIC | Age: 72
End: 2022-10-24
Payer: MEDICARE

## 2022-10-24 DIAGNOSIS — Z78.9 STATIN INTOLERANCE: Chronic | ICD-10-CM

## 2022-10-24 DIAGNOSIS — E78.00 HYPERCHOLESTEROLEMIA: Chronic | ICD-10-CM

## 2022-10-24 DIAGNOSIS — I25.119 CORONARY ARTERY DISEASE INVOLVING NATIVE CORONARY ARTERY OF NATIVE HEART WITH ANGINA PECTORIS: Chronic | ICD-10-CM

## 2022-10-24 RX ORDER — EVOLOCUMAB 140 MG/ML
140 INJECTION, SOLUTION SUBCUTANEOUS
Qty: 2 EACH | Refills: 3 | Status: SHIPPED | OUTPATIENT
Start: 2022-10-24 | End: 2022-12-14 | Stop reason: SDUPTHER

## 2022-10-24 NOTE — TELEPHONE ENCOUNTER
"ILR Report  Monitoring period: 10/22/22-10/23/22    Battery Status: OK    Device Defined Counters:  Symptom Events:1  EGMs illustrate SR/SB w/ frequent PVC's                             Meds:  Lotensin 20 mg (pt. takes according to BP)  Plavix 75 mg  Aspirin 81 mg  Imdur 30 mg (1/4) prn elevated BP            Symptoms: pt. reports she felt "flutters" She said, it started Friday evening. She had an episode of blurred vision and lightheadedness. She reports she went to sleep and woke up and she still felt the flutters and a "strange" feeling in her chest. Her BP at the time was 135/60      Message sent to Dr. Nugent for review    "

## 2022-10-27 NOTE — TELEPHONE ENCOUNTER
Specialty Pharmacy - Refill Coordination    Specialty Medication Orders Linked to Encounter      Flowsheet Row Most Recent Value   Medication #1 evolocumab (REPATHA SURECLICK) 140 mg/mL PnIj (Order#[id number], Rx#[id number])            Refill Questions - Documented Responses      Flowsheet Row Most Recent Value   Patient Availability and HIPAA Verification    Does patient want to proceed with activity? Yes   HIPAA/medical authority confirmed? Yes   Relationship to patient of person spoken to? Self   Refill Screening Questions    Would patient like to speak to a pharmacist? No   When does the patient need to receive the medication? 10/31/22   Refill Delivery Questions    How will the patient receive the medication? [facility]   When does the patient need to receive the medication? 10/31/22   Shipping Address Home   Address in [facility] confirmed and updated if neccessary? Yes   Expected Copay ($) 128   Payment Method CC on file   Days supply of Refill 28   Supplies needed? No supplies needed   Refill activity completed? Yes   Refill activity plan Refill scheduled   Shipment/Pickup Date: 10/28/22            Current Outpatient Medications   Medication Sig    aspirin (ECOTRIN) 81 MG EC tablet Take 1 tablet (81 mg total) by mouth once daily. for 21 days    benazepriL (LOTENSIN) 20 MG tablet Take 1 tablet (20 mg total) by mouth every morning. Short term supply while pt waits for mail order.    benazepriL (LOTENSIN) 20 MG tablet Take 1 tablet (20 mg total) by mouth once daily. Short term supply while pt waits for mail order.    CALCIUM CARBONATE/VITAMIN D3 (CALCIUM 500 WITH D ORAL) Take 1 capsule by mouth once daily.    carboxymethylcellulose (REFRESH PLUS) 0.5 % Dpet Place 1 drop into both eyes daily as needed (DRY EYES).    clopidogreL (PLAVIX) 75 mg tablet Take 1 tablet (75 mg total) by mouth once daily.    co-enzyme Q-10 30 mg capsule Take 30 mg by mouth 3 (three) times daily.    dicyclomine (BENTYL) 10 MG capsule  Take 1 capsule (10 mg total) by mouth 4 (four) times daily as needed (stomach discomfort).    esomeprazole (NEXIUM) 40 MG capsule Take 1 capsule (40 mg total) by mouth before breakfast.    estradioL (VIVELLE-DOT) 0.075 mg/24 hr Place 1 patch onto the skin once a week.    evolocumab (REPATHA SURECLICK) 140 mg/mL PnIj Inject 1 mL (140 mg total) into the skin every 14 (fourteen) days.    ferrous gluconate (FERGON) 324 MG tablet Take 1 tablet (324 mg total) by mouth daily with breakfast.    fluticasone propionate (FLONASE) 50 mcg/actuation nasal spray 1 spray (50 mcg total) by Each Nostril route once daily. (Patient taking differently: 1 spray by Each Nostril route every evening.)    garlic (GARLIQUE ORAL) Take 1 capsule by mouth Daily.    MULTIVITAMIN (MULTIPLE VITAMIN ORAL) Take 1 capsule by mouth once daily.    oxybutynin (DITROPAN XL) 15 MG TR24     sennosides (LAXATIVE, SENNOSIDES,) 25 mg Tab Take by mouth.    SYNTHROID 100 mcg tablet TAKE 1 TABLET EVERY DAY (Patient taking differently: Take 100 mcg by mouth before breakfast. **BRAND MEDICALLY NECESSARY**)    valACYclovir (VALTREX) 1000 MG tablet Take 1 tablet (1,000 mg total) by mouth once daily. for 7 days   Last reviewed on 10/18/2022  3:13 PM by BISHOP Rodriguez    Review of patient's allergies indicates:   Allergen Reactions    Lyrica [pregabalin] Swelling    Pcn [penicillins] Swelling    Toradol [ketorolac] Swelling    Vibramycin [doxycycline calcium] Shortness Of Breath and Swelling    Zetia [ezetimibe] Other (See Comments)    Crestor [rosuvastatin]      Body aches    Cymbalta [duloxetine]      dizzyness    Elavil [amitriptyline] Other (See Comments)     Restless leg    Pravastatin Other (See Comments)     Flu -like symptoms   Body aches     Seroquel [quetiapine]      restless leg symdrome    Last reviewed on  10/18/2022 3:13 PM by Meche Cavazos      Tasks added this encounter   11/27/2022 - Refill Call (Auto Added)   Tasks due within next 3  months   No tasks due.     Carli Navarro, PharmD  [facility]  [address]  [facility] [address]  Phone: [phone]  Fax: [phone]

## 2022-11-07 ENCOUNTER — TELEPHONE (OUTPATIENT)
Dept: PAIN MEDICINE | Facility: CLINIC | Age: 72
End: 2022-11-07
Payer: MEDICARE

## 2022-11-08 ENCOUNTER — OFFICE VISIT (OUTPATIENT)
Dept: PAIN MEDICINE | Facility: CLINIC | Age: 72
End: 2022-11-08
Payer: MEDICARE

## 2022-11-08 ENCOUNTER — TELEPHONE (OUTPATIENT)
Dept: CARDIOLOGY | Facility: HOSPITAL | Age: 72
End: 2022-11-08
Payer: MEDICARE

## 2022-11-08 VITALS
HEART RATE: 77 BPM | HEIGHT: 61 IN | DIASTOLIC BLOOD PRESSURE: 88 MMHG | TEMPERATURE: 98 F | SYSTOLIC BLOOD PRESSURE: 150 MMHG | WEIGHT: 138.88 LBS | RESPIRATION RATE: 18 BRPM | BODY MASS INDEX: 26.22 KG/M2

## 2022-11-08 DIAGNOSIS — M46.1 BILATERAL SACROILIITIS: ICD-10-CM

## 2022-11-08 DIAGNOSIS — M43.06 SPONDYLOLYSIS OF LUMBAR REGION: ICD-10-CM

## 2022-11-08 DIAGNOSIS — M25.561 BILATERAL CHRONIC KNEE PAIN: ICD-10-CM

## 2022-11-08 DIAGNOSIS — M79.18 MYOFASCIAL PAIN: Primary | ICD-10-CM

## 2022-11-08 DIAGNOSIS — G89.29 BILATERAL CHRONIC KNEE PAIN: ICD-10-CM

## 2022-11-08 DIAGNOSIS — M25.562 BILATERAL CHRONIC KNEE PAIN: ICD-10-CM

## 2022-11-08 DIAGNOSIS — M54.17 LUMBOSACRAL RADICULOPATHY: ICD-10-CM

## 2022-11-08 PROCEDURE — 99214 OFFICE O/P EST MOD 30 MIN: CPT | Mod: PBBFAC,25 | Performed by: NURSE PRACTITIONER

## 2022-11-08 PROCEDURE — 99999 PR PBB SHADOW E&M-EST. PATIENT-LVL IV: ICD-10-PCS | Mod: PBBFAC,,, | Performed by: NURSE PRACTITIONER

## 2022-11-08 PROCEDURE — 20553 PR INJECT TRIGGER POINTS, > 3: ICD-10-PCS | Mod: S$PBB,,, | Performed by: NURSE PRACTITIONER

## 2022-11-08 PROCEDURE — 99213 PR OFFICE/OUTPT VISIT, EST, LEVL III, 20-29 MIN: ICD-10-PCS | Mod: 25,S$PBB,, | Performed by: NURSE PRACTITIONER

## 2022-11-08 PROCEDURE — 99213 OFFICE O/P EST LOW 20 MIN: CPT | Mod: 25,S$PBB,, | Performed by: NURSE PRACTITIONER

## 2022-11-08 PROCEDURE — 99999 PR PBB SHADOW E&M-EST. PATIENT-LVL IV: CPT | Mod: PBBFAC,,, | Performed by: NURSE PRACTITIONER

## 2022-11-08 PROCEDURE — 20553 NJX 1/MLT TRIGGER POINTS 3/>: CPT | Mod: PBBFAC | Performed by: NURSE PRACTITIONER

## 2022-11-08 PROCEDURE — 20553 NJX 1/MLT TRIGGER POINTS 3/>: CPT | Mod: S$PBB,,, | Performed by: NURSE PRACTITIONER

## 2022-11-08 RX ORDER — BUPIVACAINE HYDROCHLORIDE 2.5 MG/ML
9 INJECTION, SOLUTION EPIDURAL; INFILTRATION; INTRACAUDAL
Status: COMPLETED | OUTPATIENT
Start: 2022-11-08 | End: 2022-11-08

## 2022-11-08 RX ORDER — TRIAMCINOLONE ACETONIDE 40 MG/ML
15 INJECTION, SUSPENSION INTRA-ARTICULAR; INTRAMUSCULAR ONCE
Status: COMPLETED | OUTPATIENT
Start: 2022-11-08 | End: 2022-11-08

## 2022-11-08 RX ADMIN — BUPIVACAINE HYDROCHLORIDE 22.5 MG: 2.5 INJECTION, SOLUTION EPIDURAL; INFILTRATION; INTRACAUDAL; PERINEURAL at 03:11

## 2022-11-08 RX ADMIN — TRIAMCINOLONE ACETONIDE 16 MG: 40 INJECTION, SUSPENSION INTRA-ARTICULAR; INTRAMUSCULAR at 03:11

## 2022-11-08 NOTE — TELEPHONE ENCOUNTER
Notification received from home monitor:  Symptomatic SR/SB with PVCs     ABBOTT ILR implanted for suspected AF  Patient Symptom activated episodes x6 noted 10/26-11/7, 3x on 11/4  Patient states episode lasted through the night into the next day, nauseated, BP low, fluttering, stabbing/pressure, no pain down arms like before    All EGM c/w SR with PVC    11/4 EGM        Next clinic visit 11/22/22  Cardiac medications:  Benazepril 20mg daily, hold for BP <110, may take extra evening if BP remains high  Plavix 75mg daily  Aspirin 81mg daily  Patient states Imdur PRN for high BP, not on med profile

## 2022-11-08 NOTE — PROGRESS NOTES
Chronic Pain-Tele-Medicine-Established Note (Follow up visit)         SUBJECTIVE:    Interval History 11/8/2022:  The patient present today for increased muscle pain. At her last OV, she requested TPIs but she had a shingles outbreak. Today, she said she is clear from shingles and denies any contraindication to TPIs. However, she does have a cardiac history and blood pressure is slightly elevated today. No SOB or chest pain. No leg swelling. She report more diffuse pain recently, mainly to the neck, mid back, lower back, hips and knees. No new injury or trauma.     Interval History 10/18/2022:  The patient is here for increased back pain. The pain is tight in nature without radiation. She originally wanted TPIs today. However, she had a flu shot yesterday, Repatha 2 days ago and has a current shingles outbreak. Her pain today is 2/10.    Interval History 8/3/2021:  The patient presents to clinic today with return of neck pain. She states that since her TPI at last visit that she had complete resolution of sxs until about 10d ago, which saw the return of her neck pain and gluteal sxs same as prior to TPI. She would like TPI again today as it has brought her significant relief in the past. She endorses continuation of her chronic back pain. Her pain today is 7/10.    Interval History 3/12/2021:  The patient has a follow up today for increased neck pain. She is having aching pain across the neck with associated headaches. She is also having middle back tightness. She is not having radiation into the arms or numbness. She would like trigger point injections as these have been helpful in the past. She previously was seeing neurology but was lost to follow up. She denies nausea or vomiting currently. She had a cholecystectomy on 2/10/21 from which she has recovered well. She had her first Covid vaccine on 2/3/21 and is scheduled for the next on 2/24/21. She also has a history of chronic back pain. Her pain today is  7/10.    Interval History 7/22/2020:  The patient has an audio visit today to discuss back pain.  She had TPIs at last OV which gave her some short term benefit.  She was scheduled for facet injections which she cancelled.  She would like to update her imaging prior to another procedure.  Her pain today is 6/10.    Interval History 7/7/2020:  The patient is here for follow up of back pain.  She has been doing well until the past month or so.  She is having more middle and lower back pain.  She is not having any radiation into the legs at this time.  She describes the pain as aching and stabbing.  She has been stretching and walking at home.  Her pain today is 4/10.    Interval History 1/20/2020:  The patient returns for follow up of middle and lower back pain.  She is now s/p bilateral L3-4 and L4-5 facet joint injections as well as T7-8 interspinous ligament injection on 12/16/19 with about 50% relief.  She is still having right sided lower back pain which starts at the spine and often radiates to her hip.  She denies associated numbness.  She says that the area is tender to touch and feels swollen sometimes.  She has tried ice and heat without benefit.  Her pain today is 2/10.    Interval History 12/13/2019:  The patient is here for follow up of back pain.  She is s/p Bilateral L2,3,4,5 MBB with steroids.  She is reporting limited benefit this time.  Previous provided her significant benefit.  She is having pain across the lower back, worse on the left side.  She denies radiation into the legs.  She is also having middle thoracic pain.  This does not radiate to the front of her body.  She is not having any numbness.  She would like to schedule another procedure.  Her pain today is 5/10.    Previous Encounter:  Osman Johansen presents to the clinic for a follow-up appointment for lower back pain. She reports increased low back pain over the last few weeks. She describes this pain as constant, sharp, and aching.  She denies any radiating leg pain. She previously had 90% relief of her pain with bilateral L2,3,4,5 MBB and T7-8 interspinous ligament injection, last done in August 2018. She would like to repeat this procedure. She continues to participate in a home exercise routine. She denies any other health changes. Her pain today is 5/10.    Pain Disability Index Review:  Last 3 PDI Scores 11/8/2022 10/18/2022 8/3/2021   Pain Disability Index (PDI) 8 12 25       Pain Medications:  None    Opioid Contract: no     report:  Reviewed and consistent with medication use as prescribed.    Pain Procedures:   7/18/13 Bilateral L5 TF JOCE  10/28/13 Bilateral SI joint injection  4/20/15 Bilateral SI joint injection  12/15/16 Bilateral L3-4 facet joint injections- 100% relief for 6 weeks  3/13/17 Bilateral L3-4 facet joint injections- 30% relief  4/17/17 Bilateral L2,3,4,5 MBB with steroids- significant benefit for 7 months  12/14/17 Bilateral L2,3,4,5 MBB with steroids and Interspinal ligament injection- 90% relief for 7 months  8/21/2018- Bilateral L2,3,4,5 MBB with steroids and T7-8 interspinous ligament injections   11/18/19 Bilateral L2,3,4,5 MBB with steroids  12/16/19 bilateral L3-4 and L4-5 facet joint injections as well as T7-8 interspinous ligament injection- 50% relief  3/12/21 bilateral cervical and paraspinal TPI, R gluteal TPI x1 (5 sites total)    Physical Therapy/Home Exercise: per self does stretching    Imaging:     Lumbar MRI 12/10/16    Narrative   MRI LUMBAR SPINE WITHOUT CONTRAST    COMPARISON: None    TECHNIQUE:  Sagittal T1, T2, and STIR;  axial T1 and T2 sequences through the lumbar spine were acquired without contrast.    FINDINGS: Vertebral body height and alignment are within normal limits.  The conus terminates at T12-L1.  Moderate loss of disc height is present at L4-5.  Marrow signal is mildly heterogeneous.  No focal osseous lesion.    L1-L2:  No disc herniation. No spinal canal or neuroforaminal  narrowing.    L2-L3:  Mild diffuse disc bulging is present without spinal canal or neuroforaminal narrowing.    L3-L4:  Mild diffuse disc bulging and moderate bilateral facet arthropathy result in mild spinal canal narrowing.    L4-L5:  Mild diffuse disc bulging is present without spinal canal or neuroforaminal narrowing.    L5-S1:  No disc herniation. No spinal canal or neuroforaminal narrowing.    Several small gallstones noted.   Impression     Degenerative lumbar spondylosis with mild L3-4 spinal canal narrowing and no significant neuroforaminal narrowing.  Cholelithiasis       Narrative     MRI of the thoracic spine without contrast    Technique: Multiplanar multisequence MR imaging of the thoracic spine was performed without contrast.    Findings: There is mild levoscoliosis of the lumbar spine.  This may be positional.  Vertebral bodies otherwise demonstrate normal height alignment.  The marrow signal of the vertebral bodies is within normal limits.  There is mild disc desiccation noted throughout the thoracic spine.  No significant disc space narrowing.  No significant disc protrusions are identified.  The central canal is widely patent.  No significant spondylosis.  The cord is normal in signal and caliber.      Impression         1.  Mild levoscoliosis of the thoracic spine otherwise essentially unremarkable MRI of the thoracic spine.     Narrative & Impression  EXAMINATION:  XR HIPS BILATERAL 2 VIEW INCL AP PELVIS     CLINICAL HISTORY:  Pain in right hip     TECHNIQUE:  AP view of the pelvis and frogleg lateral views of both hips were performed.     COMPARISON:  Hip and pelvic x-ray of April 19, 2011     FINDINGS:  A fracture of either hip is not seen.  No dislocation is noted.  The acetabula are well maintained.  There is sclerosis adjacent to the inferior left sacroiliac joint unchanged from the prior study and consistent with chronic sacroiliitis.  Degenerative disc disease is seen at the lower lumbar  spine.     Impression:     Chronic left sacroiliitis.  Degenerative disc disease at L4-5.  Otherwise negative radiographs of both hips and pelvis.      CMP  Sodium   Date Value Ref Range Status   08/03/2022 137 136 - 145 mmol/L Final     Potassium   Date Value Ref Range Status   08/03/2022 4.5 3.5 - 5.1 mmol/L Final     Chloride   Date Value Ref Range Status   08/03/2022 103 95 - 110 mmol/L Final     CO2   Date Value Ref Range Status   08/03/2022 23 23 - 29 mmol/L Final     Glucose   Date Value Ref Range Status   08/03/2022 95 70 - 110 mg/dL Final     BUN   Date Value Ref Range Status   08/03/2022 24 (H) 8 - 23 mg/dL Final     Creatinine   Date Value Ref Range Status   08/03/2022 1.4 0.5 - 1.4 mg/dL Final     Calcium   Date Value Ref Range Status   08/03/2022 10.0 8.7 - 10.5 mg/dL Final     Total Protein   Date Value Ref Range Status   07/27/2022 7.5 6.0 - 8.4 g/dL Final     Albumin   Date Value Ref Range Status   07/27/2022 4.0 3.5 - 5.2 g/dL Final     Total Bilirubin   Date Value Ref Range Status   07/27/2022 0.4 0.1 - 1.0 mg/dL Final     Comment:     For infants and newborns, interpretation of results should be based  on gestational age, weight and in agreement with clinical  observations.    Premature Infant recommended reference ranges:  Up to 24 hours.............<8.0 mg/dL  Up to 48 hours............<12.0 mg/dL  3-5 days..................<15.0 mg/dL  6-29 days.................<15.0 mg/dL       Alkaline Phosphatase   Date Value Ref Range Status   07/27/2022 93 55 - 135 U/L Final     AST   Date Value Ref Range Status   07/27/2022 21 10 - 40 U/L Final     ALT   Date Value Ref Range Status   07/27/2022 15 10 - 44 U/L Final     Anion Gap   Date Value Ref Range Status   08/03/2022 11 8 - 16 mmol/L Final     eGFR if    Date Value Ref Range Status   07/27/2022 29.7 (A) >60 mL/min/1.73 m^2 Final     eGFR if non    Date Value Ref Range Status   07/27/2022 25.8 (A) >60 mL/min/1.73 m^2  Final     Comment:     Calculation used to obtain the estimated glomerular filtration  rate (eGFR) is the CKD-EPI equation.        Lab Results   Component Value Date    WBC 7.09 07/27/2022    HGB 10.9 (L) 07/27/2022    HCT 34.4 (L) 07/27/2022    MCV 93 07/27/2022     07/27/2022         Allergies:   Review of patient's allergies indicates:   Allergen Reactions    Pcn [penicillins] Swelling    Toradol [ketorolac] Swelling    Vibramycin [doxycycline calcium] Swelling    Cymbalta [duloxetine]      dizzyness    Elavil [amitriptyline]     Lyrica [pregabalin] Swelling    Seroquel [quetiapine]      restless leg symdrome       Current Medications:   Current Outpatient Medications   Medication Sig Dispense Refill    aspirin (ECOTRIN) 81 MG EC tablet Take 1 tablet (81 mg total) by mouth once daily. for 21 days      benazepriL (LOTENSIN) 20 MG tablet Take 1 tablet (20 mg total) by mouth every morning. Short term supply while pt waits for mail order. 30 tablet 0    CALCIUM CARBONATE/VITAMIN D3 (CALCIUM 500 WITH D ORAL) Take 1 capsule by mouth once daily.      carboxymethylcellulose (REFRESH PLUS) 0.5 % Dpet Place 1 drop into both eyes daily as needed (DRY EYES).      clopidogreL (PLAVIX) 75 mg tablet Take 1 tablet (75 mg total) by mouth once daily. 90 tablet 1    co-enzyme Q-10 30 mg capsule Take 30 mg by mouth 3 (three) times daily.      dicyclomine (BENTYL) 10 MG capsule Take 1 capsule (10 mg total) by mouth 4 (four) times daily as needed (stomach discomfort). 120 capsule 0    esomeprazole (NEXIUM) 40 MG capsule Take 1 capsule (40 mg total) by mouth before breakfast. 90 capsule 3    estradioL (VIVELLE-DOT) 0.075 mg/24 hr Place 1 patch onto the skin once a week.      evolocumab (REPATHA SURECLICK) 140 mg/mL PnIj Inject 1 mL (140 mg total) into the skin every 14 (fourteen) days. 2 each 3    ferrous gluconate (FERGON) 324 MG tablet Take 1 tablet (324 mg total) by mouth daily with breakfast. 90 tablet 0    fluticasone  propionate (FLONASE) 50 mcg/actuation nasal spray 1 spray (50 mcg total) by Each Nostril route once daily. (Patient taking differently: 1 spray by Each Nostril route every evening.) 16 g 11    garlic (GARLIQUE ORAL) Take 1 capsule by mouth Daily.      MULTIVITAMIN (MULTIPLE VITAMIN ORAL) Take 1 capsule by mouth once daily.      oxybutynin (DITROPAN XL) 15 MG TR24       sennosides (LAXATIVE, SENNOSIDES,) 25 mg Tab Take by mouth.      SYNTHROID 100 mcg tablet TAKE 1 TABLET EVERY DAY (Patient taking differently: Take 100 mcg by mouth before breakfast. **BRAND MEDICALLY NECESSARY**) 90 tablet 3    valACYclovir (VALTREX) 1000 MG tablet Take 1 tablet (1,000 mg total) by mouth once daily. for 7 days 7 tablet 2     No current facility-administered medications for this visit.     Facility-Administered Medications Ordered in Other Visits   Medication Dose Route Frequency Provider Last Rate Last Admin    lactated ringers infusion   Intravenous Continuous Jim Mcdonough MD   Stopped at 06/27/22 1714    LIDOcaine (PF) 10 mg/ml (1%) injection 10 mg  1 mL Intradermal Once Jim Mcdonough MD           REVIEW OF SYSTEMS:    GENERAL:  No weight loss, malaise or fevers.  HEENT:  Negative for frequent or significant headaches.  NECK:  Negative for lumps, goiter, pain and significant neck swelling.  RESPIRATORY:  Negative for cough, wheezing or shortness of breath.  CARDIOVASCULAR:  Negative for chest pain, leg swelling or palpitations.  GI:  Negative for abdominal discomfort, blood in stools or black stools or change in bowel habits. GERD.  MUSCULOSKELETAL:  See HPI.  SKIN:  Negative for lesions, rash, and itching.  PSYCH: H/O anxiety and opioid dependence.  HEMATOLOGY/LYMPHOLOGY:  On Plavix.  NEURO:   No history of headaches, syncope, paralysis, seizures or tremors.  All other reviewed and negative other than HPI.    Past Medical History:  Past Medical History:   Diagnosis Date    Allergy     Anemia     Anxiety     Arthritis      "Blood transfusion 8 yrs    CAD (coronary artery disease)     Cataract     Chronic diastolic CHF (congestive heart failure)     CKD (chronic kidney disease), stage II     COPD (chronic obstructive pulmonary disease)     mild no inhalers    Degenerative disc disease     Depression     Dry eye syndrome     Fibromyalgia     Fluid overload     GERD (gastroesophageal reflux disease)     Hypercholesterolemia 12/09/2019    Hypertension     Hypothyroidism     IBS (irritable bowel syndrome)     Lower extremity edema     Macular drusen     Neuromuscular disorder     Ocular hypertension, bilateral     Osteoporosis     Pleural effusion     PUD (peptic ulcer disease)     Restless leg     Uses Suboxone to control    Sleep apnea     "complex sleep apnea" - per pt, no cpap    Thoracic or lumbosacral neuritis or radiculitis 10/19/2012    Thyroid disease     hashimoto's       Past Surgical History:  Past Surgical History:   Procedure Laterality Date    APPENDECTOMY  1965    CARDIAC CATHETERIZATION      CATARACT EXTRACTION W/  INTRAOCULAR LENS IMPLANT Right 5/24/2021    Procedure: EXTRACTION, CATARACT, WITH IOL INSERTION;  Surgeon: Bebe Lynn MD;  Location: [facility] OR;  Service: Ophthalmology;  Laterality: Right;  Right/DM    CORONARY ANGIOGRAPHY N/A 5/2/2022    Procedure: ANGIOGRAM, CORONARY ARTERY;  Surgeon: Sourav Nugent MD;  Location: [facility] CATH;  Service: Cardiology;  Laterality: N/A;    CORONARY ARTERY BYPASS GRAFT      CORONARY ARTERY BYPASS GRAFT (CABG) N/A 6/27/2022    Procedure: CORONARY ARTERY BYPASS GRAFT (CABG) X 5;  Surgeon: Talib Torres MD;  Location: [facility] OR;  Service: Cardiovascular;  Laterality: N/A;    ENDOSCOPIC HARVEST OF VEIN Left 6/27/2022    Procedure: SURGICAL PROCUREMENT, VEIN, ENDOSCOPIC;  Surgeon: Talib Torres MD;  Location: [facility] OR;  Service: Cardiovascular;  Laterality: Left;    HYSTERECTOMY  8 yrs     INJECTION OF ANESTHETIC AGENT AROUND NERVE Bilateral 8/21/2018    Procedure: BLOCK, " NERVE;  Surgeon: Talia Belcher MD;  Location: [facility], [place] [place] [facility]IN MGT;  Service: Pain Management;  Laterality: Bilateral;  Bilateral block @ L2,3,4,5      INJECTION OF ANESTHETIC AGENT AROUND NERVE Bilateral 2019    Procedure: BLOCK, NERVE, L2-L3-L4-L5 ME DIAL BRANCH;  Surgeon: Talia Belcher MD;  Location: [facility], [place];  Service: Pain Management;  Laterality: Bilateral;    INJECTION OF FACET JOINT Bilateral 2019    Procedure: INJECTION, FACET JOINT, L3-L4 AND L4-L5 AND T7-T8 LIGAMENT;  Surgeon: Talia Belcher MD;  Location: [facility], [place], [facility];  Service: Pain Management;  Laterality: Bilateral;    INSERTION OF IMPLANTABLE LOOP RECORDER Left 2022    Procedure: Insertion, Implantable Loop Recorder;  Surgeon: Sourav Nugent MD;  Location: [facility] CATH;  Service: Cardiology;  Laterality: Left;    LAPAROSCOPIC CHOLECYSTECTOMY N/A 2/10/2021    Procedure: CHOLECYSTECTOMY, LAPAROSCOPIC;  Surgeon: John Morrow MD;  Location: [facility] OR;  Service: General;  Laterality: N/A;    LEFT HEART CATHETERIZATION Left 2022    Procedure: Left heart cath;  Surgeon: Sourav Nugent MD;  Location: [facility] CATH;  Service: Cardiology;  Laterality: Left;    SINUS SURGERY      x2-one for CSF leak    TONSILLECTOMY         Family History:  Family History   Problem Relation Age of Onset    Arthritis Mother     Cancer Mother     Heart disease Mother     Hypertension Mother     Cataracts Mother     Ovarian cancer Mother     Ulcers Father     Thyroid disease Brother     Heart disease Brother     Amblyopia Neg Hx     Blindness Neg Hx     Diabetes Neg Hx     Glaucoma Neg Hx     Macular degeneration Neg Hx     Retinal detachment Neg Hx     Strabismus Neg Hx     Stroke Neg Hx        Social History:  Social History     Socioeconomic History    Marital status:    Tobacco Use    Smoking status: Former     Types: Cigarettes     Quit date: 2008     Years since quittin.8    Smokeless tobacco: Never   Substance and Sexual Activity    Alcohol  "use: Yes     Comment: rarely    Drug use: No    Sexual activity: Never       OBJECTIVE:    BP (!) 150/88   Pulse 77   Temp 98 °F (36.7 °C) (Oral)   Resp 18   Ht 5' 1" (1.549 m)   Wt 63 kg (138 lb 14.2 oz)   BMI 26.24 kg/m²     PHYSICAL EXAMINATION     General appearance: Well appearing, in no acute distress, alert and oriented x3.  Psych:  Mood and affect appropriate.  Skin: Midline chest scar. No visible blisters.  Neck: TTP over cervical and thoracic paraspinals. Full ROM with mild pain on extension and lateral rotation. Positive facet loading.  Back: Straight leg raise in the sitting position is negative for radicular pain bilaterally. Positive facet loading bilaterally. TTP over lumbar paraspinals and coccyx.  Extremities: Peripheral joint ROM is full and pain free without obvious instability or laxity in all four extremities. No deformities, edema, or skin discoloration. Good capillary refill.  Musculoskeletal: Bilateral upper and lower extremity strength is normal and symmetric.  No atrophy or tone abnormalities are noted.   Neuro: No loss of sensation noted.  Gait: Normal.    ASSESSMENT: 72 y.o. year old female with back pain, consistent with the following diagnoses:     1. Myofascial pain        2. Spondylolysis of lumbar region        3. Lumbosacral radiculopathy        4. Bilateral chronic knee pain        5. Bilateral sacroiliitis                PLAN:     - Previous imaging was reviewed and discussed with the patient today.    - Will give TPIs today as per below with minimal steroid dose. She is aware to monitor her blood pressure.    - Encouraged the patient to resume a home exercise routine to help with pain and strengthening.     - Counseled patient regarding the importance of constant sleeping habits and physical therapy.        The above plan and management options were discussed at length with patient. Patient is in agreement with the above and verbalized understanding.    Meche GARCIA" Mary Beth  11/08/2022       Trigger Point Injection:   The procedure was discussed with the patient including complications of nerve damage, bleeding, infection, and failure of pain relief.   Trigger points were identified by palpation and marked. Alcohol prep of sites done. A mixture of 9.5mL 0.25% bupivacaine +15 mg Kenalog was prepared (10 mL total).   A 27-gauge needle was advanced to the point of maximal tenderness, and medication was injected after negative aspiration. All sites done in the same manner. Patient tolerated the procedure well and without complications. Sites injected included: splenius capitis, thoracic paraspinals, and lumbar paraspinals muscles bilaterally (6 sites total).

## 2022-11-10 LAB
LEFT EYE DM RETINOPATHY: NEGATIVE
RIGHT EYE DM RETINOPATHY: NEGATIVE

## 2022-11-15 DIAGNOSIS — I63.9 ACUTE CVA (CEREBROVASCULAR ACCIDENT): ICD-10-CM

## 2022-11-15 DIAGNOSIS — Z95.1 S/P CABG (CORONARY ARTERY BYPASS GRAFT): Primary | ICD-10-CM

## 2022-11-15 DIAGNOSIS — Z95.818 PRESENCE OF OTHER CARDIAC IMPLANTS AND GRAFTS: ICD-10-CM

## 2022-11-15 DIAGNOSIS — I10 ESSENTIAL HYPERTENSION: ICD-10-CM

## 2022-11-16 RX ORDER — BENAZEPRIL HYDROCHLORIDE 20 MG/1
20 TABLET ORAL EVERY MORNING
Qty: 30 TABLET | Refills: 0 | Status: SHIPPED | OUTPATIENT
Start: 2022-11-16 | End: 2022-11-22 | Stop reason: SDUPTHER

## 2022-11-16 RX ORDER — CLOPIDOGREL BISULFATE 75 MG/1
75 TABLET ORAL DAILY
Qty: 90 TABLET | Refills: 1 | Status: SHIPPED | OUTPATIENT
Start: 2022-11-16 | End: 2023-04-24 | Stop reason: SDUPTHER

## 2022-11-22 ENCOUNTER — OFFICE VISIT (OUTPATIENT)
Dept: CARDIOLOGY | Facility: CLINIC | Age: 72
End: 2022-11-22
Payer: MEDICARE

## 2022-11-22 ENCOUNTER — CLINICAL SUPPORT (OUTPATIENT)
Dept: CARDIOLOGY | Facility: HOSPITAL | Age: 72
End: 2022-11-22
Payer: MEDICARE

## 2022-11-22 VITALS
BODY MASS INDEX: 25.1 KG/M2 | WEIGHT: 132.94 LBS | HEIGHT: 61 IN | HEART RATE: 65 BPM | DIASTOLIC BLOOD PRESSURE: 72 MMHG | SYSTOLIC BLOOD PRESSURE: 126 MMHG

## 2022-11-22 DIAGNOSIS — E78.00 HYPERCHOLESTEROLEMIA: Chronic | ICD-10-CM

## 2022-11-22 DIAGNOSIS — I25.119 CORONARY ARTERY DISEASE INVOLVING NATIVE CORONARY ARTERY OF NATIVE HEART WITH ANGINA PECTORIS: Primary | Chronic | ICD-10-CM

## 2022-11-22 DIAGNOSIS — I34.0 NON-RHEUMATIC MITRAL REGURGITATION: Chronic | ICD-10-CM

## 2022-11-22 DIAGNOSIS — N18.32 STAGE 3B CHRONIC KIDNEY DISEASE: ICD-10-CM

## 2022-11-22 DIAGNOSIS — Z79.02 LONG TERM (CURRENT) USE OF ANTITHROMBOTICS/ANTIPLATELETS: Chronic | ICD-10-CM

## 2022-11-22 DIAGNOSIS — I10 ESSENTIAL HYPERTENSION: Chronic | ICD-10-CM

## 2022-11-22 DIAGNOSIS — Z95.818 PRESENCE OF OTHER CARDIAC IMPLANTS AND GRAFTS: ICD-10-CM

## 2022-11-22 PROCEDURE — 99214 OFFICE O/P EST MOD 30 MIN: CPT | Mod: S$PBB,,, | Performed by: INTERNAL MEDICINE

## 2022-11-22 PROCEDURE — 99214 PR OFFICE/OUTPT VISIT, EST, LEVL IV, 30-39 MIN: ICD-10-PCS | Mod: S$PBB,,, | Performed by: INTERNAL MEDICINE

## 2022-11-22 PROCEDURE — 99999 PR PBB SHADOW E&M-EST. PATIENT-LVL III: ICD-10-PCS | Mod: PBBFAC,,, | Performed by: INTERNAL MEDICINE

## 2022-11-22 PROCEDURE — 99999 PR PBB SHADOW E&M-EST. PATIENT-LVL III: CPT | Mod: PBBFAC,,, | Performed by: INTERNAL MEDICINE

## 2022-11-22 PROCEDURE — 99213 OFFICE O/P EST LOW 20 MIN: CPT | Mod: PBBFAC,PO | Performed by: INTERNAL MEDICINE

## 2022-11-22 RX ORDER — BENAZEPRIL HYDROCHLORIDE 20 MG/1
30 TABLET ORAL EVERY MORNING
Qty: 135 TABLET | Refills: 1 | Status: SHIPPED | OUTPATIENT
Start: 2022-11-22 | End: 2023-01-18

## 2022-11-22 RX ORDER — ISOSORBIDE MONONITRATE 30 MG/1
30 TABLET, EXTENDED RELEASE ORAL DAILY
COMMUNITY
End: 2022-12-30 | Stop reason: SDUPTHER

## 2022-11-22 NOTE — PROGRESS NOTES
"Subjective:    Patient ID:  Osman Johansen is a 72 y.o. female who presents for Hypertension and Coronary Artery Disease        HPI    LAST BMP, GFR 40, DOING WELL, DONE WITH REHAB, FEELS BETTER, NO SIGNIFICANT ARRHYTHMIA ON LOOP, BP UP IN AM, SEE ROS  Past Medical History:   Diagnosis Date    Allergy     Anemia     Anxiety     Arthritis     Blood transfusion 8 yrs    CAD (coronary artery disease)     Cataract     Chronic diastolic CHF (congestive heart failure)     CKD (chronic kidney disease), stage II     COPD (chronic obstructive pulmonary disease)     mild no inhalers    Degenerative disc disease     Depression     Dry eye syndrome     Fibromyalgia     Fluid overload     GERD (gastroesophageal reflux disease)     Hypercholesterolemia 12/09/2019    Hypertension     Hypothyroidism     IBS (irritable bowel syndrome)     Lower extremity edema     Macular drusen     Neuromuscular disorder     Ocular hypertension, bilateral     Osteoporosis     Pleural effusion     PUD (peptic ulcer disease)     Restless leg     Uses Suboxone to control    Sleep apnea     "complex sleep apnea" - per pt, no cpap    Thoracic or lumbosacral neuritis or radiculitis 10/19/2012    Thyroid disease     hashimoto's     Past Surgical History:   Procedure Laterality Date    APPENDECTOMY  1965    CARDIAC CATHETERIZATION      CATARACT EXTRACTION W/  INTRAOCULAR LENS IMPLANT Right 5/24/2021    Procedure: EXTRACTION, CATARACT, WITH IOL INSERTION;  Surgeon: Bebe Lynn MD;  Location: [facility] OR;  Service: Ophthalmology;  Laterality: Right;  Right/DM    CORONARY ANGIOGRAPHY N/A 5/2/2022    Procedure: ANGIOGRAM, CORONARY ARTERY;  Surgeon: Sourav Nugent MD;  Location: [facility] CATH;  Service: Cardiology;  Laterality: N/A;    CORONARY ARTERY BYPASS GRAFT      CORONARY ARTERY BYPASS GRAFT (CABG) N/A 6/27/2022    Procedure: CORONARY ARTERY BYPASS GRAFT (CABG) X 5;  Surgeon: Talib Torres MD;  Location: [facility] " OR;  Service: Cardiovascular;  Laterality: N/A;    ENDOSCOPIC HARVEST OF VEIN Left 6/27/2022    Procedure: SURGICAL PROCUREMENT, VEIN, ENDOSCOPIC;  Surgeon: Talib Torres MD;  Location: [facility] OR;  Service: Cardiovascular;  Laterality: Left;    HYSTERECTOMY  8 yrs     INJECTION OF ANESTHETIC AGENT AROUND NERVE Bilateral 8/21/2018    Procedure: BLOCK, NERVE;  Surgeon: Talia Belcher MD;  Location: [facility] [place], operated by [facility] PAIN MGT;  Service: Pain Management;  Laterality: Bilateral;  Bilateral block @ L2,3,4,5      INJECTION OF ANESTHETIC AGENT AROUND NERVE Bilateral 11/18/2019    Procedure: BLOCK, NERVE, L2-L3-L4-L5 ME DIAL BRANCH;  Surgeon: Talia Belcher MD;  Location: [facility] [place], operated [facility]IN [facility];  Service: Pain Management;  Laterality: Bilateral;    INJECTION OF FACET JOINT Bilateral 12/16/2019    Procedure: INJECTION, FACET JOINT, L3-L4 AND L4-L5 AND T7-T8 LIGAMENT;  Surgeon: Talia Belcher MD;  Location: [facility] [place], operated [facility]IN [facility];  Service: Pain Management;  Laterality: Bilateral;    INSERTION OF IMPLANTABLE LOOP RECORDER Left 7/25/2022    Procedure: Insertion, Implantable Loop Recorder;  Surgeon: Sourav Nugent MD;  Location: [facility] CATH;  Service: Cardiology;  Laterality: Left;    LAPAROSCOPIC CHOLECYSTECTOMY N/A 2/10/2021    Procedure: CHOLECYSTECTOMY, LAPAROSCOPIC;  Surgeon: John Morrow MD;  Location: [facility] OR;  Service: General;  Laterality: N/A;    LEFT HEART CATHETERIZATION Left 5/2/2022    Procedure: Left heart cath;  Surgeon: Sourav Nugent MD;  Location: [facility] CATH;  Service: Cardiology;  Laterality: Left;    SINUS SURGERY      x2-one for CSF leak    TONSILLECTOMY  1954     Family History   Problem Relation Age of Onset    Arthritis Mother     Cancer Mother     Heart disease Mother     Hypertension Mother     Cataracts Mother     Ovarian cancer Mother     Ulcers Father     Thyroid disease Brother     Heart disease Brother     Amblyopia Neg Hx     Blindness Neg Hx     Diabetes Neg Hx     Glaucoma Neg Hx     Macular  degeneration Neg Hx     Retinal detachment Neg Hx     Strabismus Neg Hx     Stroke Neg Hx      Social History     Socioeconomic History    Marital status:    Tobacco Use    Smoking status: Former     Types: Cigarettes     Quit date: 2008     Years since quittin.9    Smokeless tobacco: Never   Substance and Sexual Activity    Alcohol use: Yes     Comment: rarely    Drug use: No    Sexual activity: Never       Review of patient's allergies indicates:   Allergen Reactions    Lyrica [pregabalin] Swelling    Pcn [penicillins] Swelling    Toradol [ketorolac] Swelling    Vibramycin [doxycycline calcium] Shortness Of Breath and Swelling    Zetia [ezetimibe] Other (See Comments)    Crestor [rosuvastatin]      Body aches    Cymbalta [duloxetine]      dizzyness    Elavil [amitriptyline] Other (See Comments)     Restless leg    Pravastatin Other (See Comments)     Flu -like symptoms   Body aches     Seroquel [quetiapine]      restless leg symdrome       Current Outpatient Medications:     CALCIUM CARBONATE/VITAMIN D3 (CALCIUM 500 WITH D ORAL), Take 1 capsule by mouth once daily., Disp: , Rfl:     carboxymethylcellulose (REFRESH PLUS) 0.5 % Dpet, Place 1 drop into both eyes daily as needed (DRY EYES)., Disp: , Rfl:     clopidogreL (PLAVIX) 75 mg tablet, Take 1 tablet (75 mg total) by mouth once daily., Disp: 90 tablet, Rfl: 1    co-enzyme Q-10 30 mg capsule, Take 30 mg by mouth 3 (three) times daily., Disp: , Rfl:     dicyclomine (BENTYL) 10 MG capsule, Take 1 capsule (10 mg total) by mouth 4 (four) times daily as needed (stomach discomfort)., Disp: 120 capsule, Rfl: 0    esomeprazole (NEXIUM) 40 MG capsule, Take 1 capsule (40 mg total) by mouth before breakfast., Disp: 90 capsule, Rfl: 3    estradioL (VIVELLE-DOT) 0.075 mg/24 hr, Place 1 patch onto the skin once a week., Disp: , Rfl:     evolocumab (REPATHA SURECLICK) 140 mg/mL PnIj, Inject 1 mL (140 mg total) into the skin every 14  (fourteen) days., Disp: 2 each, Rfl: 3    ferrous gluconate (FERGON) 324 MG tablet, Take 1 tablet (324 mg total) by mouth daily with breakfast., Disp: 90 tablet, Rfl: 0    fluticasone propionate (FLONASE) 50 mcg/actuation nasal spray, 1 spray (50 mcg total) by Each Nostril route once daily. (Patient taking differently: 1 spray by Each Nostril route every evening.), Disp: 16 g, Rfl: 11    garlic (GARLIQUE ORAL), Take 1 capsule by mouth Daily., Disp: , Rfl:     isosorbide mononitrate (IMDUR) 30 MG 24 hr tablet, Take 30 mg by mouth once daily., Disp: , Rfl:     MULTIVITAMIN (MULTIPLE VITAMIN ORAL), Take 1 capsule by mouth once daily., Disp: , Rfl:     oxybutynin (DITROPAN XL) 15 MG TR24, , Disp: , Rfl:     sennosides (LAXATIVE, SENNOSIDES,) 25 mg Tab, Take by mouth., Disp: , Rfl:     SYNTHROID 100 mcg tablet, TAKE 1 TABLET EVERY DAY (Patient taking differently: Take 100 mcg by mouth before breakfast. **BRAND MEDICALLY NECESSARY**), Disp: 90 tablet, Rfl: 3    aspirin (ECOTRIN) 81 MG EC tablet, Take 1 tablet (81 mg total) by mouth once daily. for 21 days, Disp: , Rfl:     benazepriL (LOTENSIN) 20 MG tablet, Take 1.5 tablets (30 mg total) by mouth every morning. Short term supply while pt waits for mail order., Disp: 135 tablet, Rfl: 1    valACYclovir (VALTREX) 1000 MG tablet, Take 1 tablet (1,000 mg total) by mouth once daily. for 7 days, Disp: 7 tablet, Rfl: 2  No current facility-administered medications for this visit.    Facility-Administered Medications Ordered in Other Visits:     lactated ringers infusion, , Intravenous, Continuous, Jim Mdconough MD, Stopped at 06/27/22 1714    LIDOcaine (PF) 10 mg/ml (1%) injection 10 mg, 1 mL, Intradermal, Once, Jim Mcdonough MD    Review of Systems   Constitutional: Negative. Negative for chills, decreased appetite, diaphoresis, fever and malaise/fatigue.   Eyes:  Negative for blurred vision and visual disturbance.   Cardiovascular:  Negative for chest pain,  "claudication, cyanosis, dyspnea on exertion (MILD), irregular heartbeat (OCC), leg swelling, near-syncope, orthopnea, palpitations, paroxysmal nocturnal dyspnea and syncope.   Respiratory:  Negative for cough, hemoptysis and shortness of breath.    Endocrine: Negative for polyphagia and polyuria.   Hematologic/Lymphatic: Negative for adenopathy. Does not bruise/bleed easily.   Skin:  Negative for color change and rash.   Musculoskeletal:  Negative for back pain and falls.   Gastrointestinal:  Negative for abdominal pain, dysphagia, jaundice, melena and nausea.   Genitourinary:  Negative for dysuria and flank pain.   Neurological:  Negative for dizziness, focal weakness, headaches, light-headedness, seizures and weakness.   Psychiatric/Behavioral:  Negative for altered mental status and depression.       Objective:      Vitals:    11/22/22 1611   BP: 126/72   Pulse: 65   Weight: 60.3 kg (132 lb 15 oz)   Height: 5' 1" (1.549 m)   PainSc: 0-No pain     Body mass index is 25.12 kg/m².    Physical Exam            ..    Chemistry        Component Value Date/Time     08/03/2022 1222    K 4.5 08/03/2022 1222     08/03/2022 1222    CO2 23 08/03/2022 1222    BUN 24 (H) 08/03/2022 1222    CREATININE 1.4 08/03/2022 1222    GLU 95 08/03/2022 1222        Component Value Date/Time    CALCIUM 10.0 08/03/2022 1222    ALKPHOS 93 07/27/2022 1159    AST 21 07/27/2022 1159    ALT 15 07/27/2022 1159    BILITOT 0.4 07/27/2022 1159    ESTGFRAFRICA 29.7 (A) 07/27/2022 1159    EGFRNONAA 25.8 (A) 07/27/2022 1159            ..  Lab Results   Component Value Date    CHOL 162 07/27/2022    CHOL 139 07/22/2022    CHOL 236 (H) 11/02/2021     Lab Results   Component Value Date    HDL 77 (H) 07/27/2022    HDL 73 07/22/2022    HDL 87 (H) 11/02/2021     Lab Results   Component Value Date    LDLCALC 60.2 (L) 07/27/2022    LDLCALC 40.0 (L) 07/22/2022    LDLCALC 129.6 11/02/2021     Lab Results   Component Value Date    TRIG 124 07/27/2022    " TRIG 130 07/22/2022    TRIG 97 11/02/2021     Lab Results   Component Value Date    CHOLHDL 47.5 07/27/2022    CHOLHDL 52.5 (H) 07/22/2022    CHOLHDL 36.9 11/02/2021     ..  Lab Results   Component Value Date    WBC 7.09 07/27/2022    HGB 10.9 (L) 07/27/2022    HCT 34.4 (L) 07/27/2022    MCV 93 07/27/2022     07/27/2022       Test(s) Reviewed  I have reviewed the following in detail:  [] Stress test   [] Angiography   [] Echocardiogram   [x] Labs   [] Other:       Assessment:         ICD-10-CM ICD-9-CM   1. Coronary artery disease involving native coronary artery of native heart with angina pectoris  I25.119 414.01     413.9   2. Non-rheumatic mitral regurgitation  I34.0 424.0   3. Long term (current) use of antithrombotics/antiplatelets  Z79.02 V58.63   4. Stage 3b chronic kidney disease  N18.32 585.3   5. Essential hypertension  I10 401.9   6. Hypercholesterolemia  E78.00 272.0     Problem List Items Addressed This Visit          Cardiac/Vascular    Coronary artery disease involving native coronary artery of native heart with angina pectoris - Primary    Relevant Orders    Comprehensive Metabolic Panel    Lipid Panel    Non-rheumatic mitral regurgitation    Hypercholesterolemia    Relevant Orders    Comprehensive Metabolic Panel    Lipid Panel    Essential hypertension    Relevant Medications    benazepriL (LOTENSIN) 20 MG tablet    Other Relevant Orders    Comprehensive Metabolic Panel       Renal/    Stage 3b chronic kidney disease       Hematology    Long term (current) use of antithrombotics/antiplatelets    Relevant Orders    Hemoglobin        Plan:     CHANGE LOTENSIN TO TAKE IN PM, 1/2 EXTRA AM IF NEEDED, ALL CV CLINICALLY STABLE, CLASS 1 ANGINA, NO HF, NO TIA, NO CLINICAL ARRHYTHMIA,CONTINUE CURRENT MEDS, EDUCATION, DIET, EXERCISE , INCREASED CV RISK WITH CKD, RETURN TO CLINIC IN 6 MONTHS WITH LABS      Coronary artery disease involving native coronary artery of native heart with angina  pectoris  -     Comprehensive Metabolic Panel; Future; Expected date: 05/22/2023  -     Lipid Panel; Future; Expected date: 05/22/2023    Non-rheumatic mitral regurgitation    Long term (current) use of antithrombotics/antiplatelets  -     Hemoglobin; Future; Expected date: 05/22/2023    Stage 3b chronic kidney disease    Essential hypertension  -     benazepriL (LOTENSIN) 20 MG tablet; Take 1.5 tablets (30 mg total) by mouth every morning. Short term supply while pt waits for mail order.  Dispense: 135 tablet; Refill: 1  -     Comprehensive Metabolic Panel; Future; Expected date: 05/22/2023    Hypercholesterolemia  -     Comprehensive Metabolic Panel; Future; Expected date: 05/22/2023  -     Lipid Panel; Future; Expected date: 05/22/2023  RTC Low level/low impact aerobic exercise 5x's/wk. Heart healthy diet and risk factor modification.    See labs and med orders.    Aerobic exercise 5x's/wk. Heart healthy diet and risk factor modification.    See labs and med orders.

## 2022-11-25 ENCOUNTER — SPECIALTY PHARMACY (OUTPATIENT)
Dept: PHARMACY | Facility: CLINIC | Age: 72
End: 2022-11-25
Payer: MEDICARE

## 2022-11-25 DIAGNOSIS — E78.00 HYPERCHOLESTEROLEMIA: Primary | ICD-10-CM

## 2022-11-25 NOTE — TELEPHONE ENCOUNTER
Specialty Pharmacy - Refill Coordination    Specialty Medication Orders Linked to Encounter      Flowsheet Row Most Recent Value   Medication #1 evolocumab (REPATHA SURECLICK) 140 mg/mL PnIj (Order#[id number], Rx#[id number])            Refill Questions - Documented Responses      Flowsheet Row Most Recent Value   Patient Availability and HIPAA Verification    Does patient want to proceed with activity? Yes   HIPAA/medical authority confirmed? Yes   Relationship to patient of person spoken to? Self   Refill Screening Questions    Would patient like to speak to a pharmacist? No   When does the patient need to receive the medication? 11/28/22   Refill Delivery Questions    How will the patient receive the medication? [facility]   When does the patient need to receive the medication? 11/28/22   Shipping Address Home   Address in [facility] confirmed and updated if neccessary? Yes   Expected Copay ($) 128   Is the patient able to afford the medication copay? Yes   Payment Method zero copay   Days supply of Refill 28   Supplies needed? No supplies needed   Refill activity completed? Yes   Refill activity plan Refill scheduled   Shipment/Pickup Date: 11/25/22            Current Outpatient Medications   Medication Sig    aspirin (ECOTRIN) 81 MG EC tablet Take 1 tablet (81 mg total) by mouth once daily. for 21 days    benazepriL (LOTENSIN) 20 MG tablet Take 1.5 tablets (30 mg total) by mouth every morning. Short term supply while pt waits for mail order.    CALCIUM CARBONATE/VITAMIN D3 (CALCIUM 500 WITH D ORAL) Take 1 capsule by mouth once daily.    carboxymethylcellulose (REFRESH PLUS) 0.5 % Dpet Place 1 drop into both eyes daily as needed (DRY EYES).    clopidogreL (PLAVIX) 75 mg tablet Take 1 tablet (75 mg total) by mouth once daily.    co-enzyme Q-10 30 mg capsule Take 30 mg by mouth 3 (three) times daily.    dicyclomine (BENTYL) 10 MG capsule Take 1 capsule (10 mg total) by mouth 4 (four) times daily as needed  (stomach discomfort).    esomeprazole (NEXIUM) 40 MG capsule Take 1 capsule (40 mg total) by mouth before breakfast.    estradioL (VIVELLE-DOT) 0.075 mg/24 hr Place 1 patch onto the skin once a week.    evolocumab (REPATHA SURECLICK) 140 mg/mL PnIj Inject 1 mL (140 mg total) into the skin every 14 (fourteen) days.    ferrous gluconate (FERGON) 324 MG tablet Take 1 tablet (324 mg total) by mouth daily with breakfast.    fluticasone propionate (FLONASE) 50 mcg/actuation nasal spray 1 spray (50 mcg total) by Each Nostril route once daily. (Patient taking differently: 1 spray by Each Nostril route every evening.)    garlic (GARLIQUE ORAL) Take 1 capsule by mouth Daily.    isosorbide mononitrate (IMDUR) 30 MG 24 hr tablet Take 30 mg by mouth once daily.    MULTIVITAMIN (MULTIPLE VITAMIN ORAL) Take 1 capsule by mouth once daily.    oxybutynin (DITROPAN XL) 15 MG TR24     sennosides (LAXATIVE, SENNOSIDES,) 25 mg Tab Take by mouth.    SYNTHROID 100 mcg tablet TAKE 1 TABLET EVERY DAY (Patient taking differently: Take 100 mcg by mouth before breakfast. **BRAND MEDICALLY NECESSARY**)    valACYclovir (VALTREX) 1000 MG tablet Take 1 tablet (1,000 mg total) by mouth once daily. for 7 days   Last reviewed on 11/22/2022  4:12 PM by Yenni Bhatia MA    Review of patient's allergies indicates:   Allergen Reactions    Lyrica [pregabalin] Swelling    Pcn [penicillins] Swelling    Toradol [ketorolac] Swelling    Vibramycin [doxycycline calcium] Shortness Of Breath and Swelling    Zetia [ezetimibe] Other (See Comments)    Crestor [rosuvastatin]      Body aches    Cymbalta [duloxetine]      dizzyness    Elavil [amitriptyline] Other (See Comments)     Restless leg    Pravastatin Other (See Comments)     Flu -like symptoms   Body aches     Seroquel [quetiapine]      restless leg symdrome    Last reviewed on  11/22/2022 4:11 PM by Yenni Bhatia      Tasks added this encounter   12/19/2022 - Refill Call (Auto Added)   Tasks due within  next 3 months   No tasks due.     Amie Berkowitz, PharmD  [facility]  [address]  [facility] [address]  Phone: [phone]  Fax: [phone]

## 2022-12-01 ENCOUNTER — LAB VISIT (OUTPATIENT)
Dept: LAB | Facility: HOSPITAL | Age: 72
End: 2022-12-01
Attending: INTERNAL MEDICINE
Payer: MEDICARE

## 2022-12-01 DIAGNOSIS — N17.9 AKI (ACUTE KIDNEY INJURY): ICD-10-CM

## 2022-12-01 DIAGNOSIS — E11.9 CONTROLLED TYPE 2 DIABETES MELLITUS WITHOUT COMPLICATION, WITHOUT LONG-TERM CURRENT USE OF INSULIN: ICD-10-CM

## 2022-12-01 DIAGNOSIS — I10 ESSENTIAL HYPERTENSION: ICD-10-CM

## 2022-12-01 DIAGNOSIS — E78.5 DYSLIPIDEMIA: ICD-10-CM

## 2022-12-01 LAB
ALBUMIN SERPL BCP-MCNC: 3.8 G/DL (ref 3.5–5.2)
ALP SERPL-CCNC: 55 U/L (ref 55–135)
ALT SERPL W/O P-5'-P-CCNC: 15 U/L (ref 10–44)
ANION GAP SERPL CALC-SCNC: 9 MMOL/L (ref 8–16)
AST SERPL-CCNC: 18 U/L (ref 10–40)
BILIRUB SERPL-MCNC: 0.4 MG/DL (ref 0.1–1)
BUN SERPL-MCNC: 26 MG/DL (ref 8–23)
CALCIUM SERPL-MCNC: 9.6 MG/DL (ref 8.7–10.5)
CHLORIDE SERPL-SCNC: 103 MMOL/L (ref 95–110)
CO2 SERPL-SCNC: 24 MMOL/L (ref 23–29)
CREAT SERPL-MCNC: 1.2 MG/DL (ref 0.5–1.4)
EST. GFR  (NO RACE VARIABLE): 47.8 ML/MIN/1.73 M^2
ESTIMATED AVG GLUCOSE: 111 MG/DL (ref 68–131)
GLUCOSE SERPL-MCNC: 89 MG/DL (ref 70–110)
HBA1C MFR BLD: 5.5 % (ref 4–5.6)
POTASSIUM SERPL-SCNC: 4.6 MMOL/L (ref 3.5–5.1)
PROT SERPL-MCNC: 7.1 G/DL (ref 6–8.4)
SODIUM SERPL-SCNC: 136 MMOL/L (ref 136–145)

## 2022-12-01 PROCEDURE — 83036 HEMOGLOBIN GLYCOSYLATED A1C: CPT | Performed by: INTERNAL MEDICINE

## 2022-12-01 PROCEDURE — 80053 COMPREHEN METABOLIC PANEL: CPT | Performed by: INTERNAL MEDICINE

## 2022-12-01 PROCEDURE — 36415 COLL VENOUS BLD VENIPUNCTURE: CPT | Mod: PO | Performed by: INTERNAL MEDICINE

## 2022-12-06 ENCOUNTER — OFFICE VISIT (OUTPATIENT)
Dept: FAMILY MEDICINE | Facility: CLINIC | Age: 72
End: 2022-12-06
Payer: MEDICARE

## 2022-12-06 VITALS
HEART RATE: 58 BPM | OXYGEN SATURATION: 97 % | TEMPERATURE: 98 F | SYSTOLIC BLOOD PRESSURE: 128 MMHG | BODY MASS INDEX: 25.34 KG/M2 | WEIGHT: 134.25 LBS | HEIGHT: 61 IN | DIASTOLIC BLOOD PRESSURE: 76 MMHG

## 2022-12-06 DIAGNOSIS — Z12.4 PAP SMEAR FOR CERVICAL CANCER SCREENING: ICD-10-CM

## 2022-12-06 DIAGNOSIS — E03.4 HYPOTHYROIDISM DUE TO ACQUIRED ATROPHY OF THYROID: ICD-10-CM

## 2022-12-06 DIAGNOSIS — I10 ESSENTIAL HYPERTENSION: Primary | ICD-10-CM

## 2022-12-06 DIAGNOSIS — B02.9 HERPES ZOSTER WITHOUT COMPLICATION: ICD-10-CM

## 2022-12-06 DIAGNOSIS — E78.5 DYSLIPIDEMIA: ICD-10-CM

## 2022-12-06 DIAGNOSIS — Z78.0 POST-MENOPAUSAL: ICD-10-CM

## 2022-12-06 DIAGNOSIS — E11.9 CONTROLLED TYPE 2 DIABETES MELLITUS WITHOUT COMPLICATION, WITHOUT LONG-TERM CURRENT USE OF INSULIN: ICD-10-CM

## 2022-12-06 DIAGNOSIS — Z79.899 ENCOUNTER FOR LONG-TERM (CURRENT) USE OF MEDICATIONS: ICD-10-CM

## 2022-12-06 DIAGNOSIS — Z12.11 SCREEN FOR COLON CANCER: ICD-10-CM

## 2022-12-06 PROCEDURE — 82570 ASSAY OF URINE CREATININE: CPT | Performed by: INTERNAL MEDICINE

## 2022-12-06 PROCEDURE — 82043 UR ALBUMIN QUANTITATIVE: CPT | Performed by: INTERNAL MEDICINE

## 2022-12-06 PROCEDURE — 99214 OFFICE O/P EST MOD 30 MIN: CPT | Mod: S$PBB,,, | Performed by: INTERNAL MEDICINE

## 2022-12-06 PROCEDURE — 99999 PR PBB SHADOW E&M-EST. PATIENT-LVL V: ICD-10-PCS | Mod: PBBFAC,,, | Performed by: INTERNAL MEDICINE

## 2022-12-06 PROCEDURE — 99214 PR OFFICE/OUTPT VISIT, EST, LEVL IV, 30-39 MIN: ICD-10-PCS | Mod: S$PBB,,, | Performed by: INTERNAL MEDICINE

## 2022-12-06 PROCEDURE — 99999 PR PBB SHADOW E&M-EST. PATIENT-LVL V: CPT | Mod: PBBFAC,,, | Performed by: INTERNAL MEDICINE

## 2022-12-06 PROCEDURE — 99215 OFFICE O/P EST HI 40 MIN: CPT | Mod: PBBFAC,PO | Performed by: INTERNAL MEDICINE

## 2022-12-06 RX ORDER — BUPRENORPHINE HYDROCHLORIDE, NALOXONE HYDROCHLORIDE 8; 2 MG/1; MG/1
1 FILM, SOLUBLE BUCCAL; SUBLINGUAL DAILY PRN
COMMUNITY
Start: 2022-11-25

## 2022-12-06 RX ORDER — VALACYCLOVIR HYDROCHLORIDE 1 G/1
1000 TABLET, FILM COATED ORAL DAILY
Qty: 7 TABLET | Refills: 5 | Status: SHIPPED | OUTPATIENT
Start: 2022-12-06 | End: 2022-12-07 | Stop reason: SDUPTHER

## 2022-12-06 RX ORDER — PROMETHAZINE HYDROCHLORIDE 25 MG/1
25 TABLET ORAL EVERY 6 HOURS PRN
Qty: 45 TABLET | Refills: 5 | Status: SHIPPED | OUTPATIENT
Start: 2022-12-06 | End: 2022-12-07 | Stop reason: SDUPTHER

## 2022-12-06 NOTE — PROGRESS NOTES
Subjective:       Patient ID: Osman Johansen is a 72 y.o. female.    Chief Complaint: Hypertension    CAD - s/p CABG.  No chest pain.   CVA - embolic x2 post CABG.  Wearing loop recorder now.  Mild residual confusion/ forgetful. Had JODI with lasix; no swelling, so no need   Dr Nugent, Dr Torres    CKD III - improved.     HTN - controlled.  On digital HTN program.  Does not take the HCTZ because became lightheaded with this.    Hypothyroid - controlled.112 mcg from local pharmacy and Brand name made her have palpitations, but 125 mcg does not from mail order.  Dye? Only use mail order -   DM - controlled; outside eye exam done 7/26/17  HLD - controlled on Repatha.  Statin intolerance. could not tolerate multiple statins - Crestor, Liptior.  Starting to have body aches on 20 mg of Pravastatin.   Depression - stable     Iron deficiency anemia - improved.   Trouble tolerating iron orally even only 3x per week or every other day.  Advised getting cscp and EGD (hx bleeding ulcer in past).  Last cscp 2012 was ok.  + fatigue   RLS - treated      CT doc emphysema - AVILA as above.  No longer smokes.       Previously:   Recall - pain Dr wanted her to see neurology.  Gets cramps on Suboxone.  Trying to wean off and down to a piece of her daily pill but can't because develops RLS that prevents her from sleeping.  She had similar events with leg cramping when on fentanyl and hydrocodone that resolved when placed on Suboxone.  She is down to a piece of Suboxone daily = 1 pill lasts about 4-6 days.  She also has uncontrolled RLS that has been exacerbated as she has tried to wean off Suboxone.  This has been happening off and on for about 6 months now.      Recall previous visit:   Follow up s/p angina admit.  Had classic angina symptoms in office.  Sent to ER.  W/u / stress test normal.  F/u w cardiology.  Calcium score done = > 1,100 = cx CAD with likely significant blockage.  If pt has cp again, will likely get angiogram.  No  chest pain since.  Does have AVILA waking in store.     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        Review of Systems   Constitutional:  Negative for appetite change and fever.   HENT:  Negative for nosebleeds and trouble swallowing.    Eyes:  Negative for discharge and visual disturbance.   Respiratory:  Negative for choking and shortness of breath.    Cardiovascular:  Negative for chest pain and palpitations.   Gastrointestinal:  Negative for abdominal pain, nausea and vomiting.   Musculoskeletal:  Negative for arthralgias and joint swelling.   Skin:  Positive for rash. Negative for wound.   Neurological:  Negative for dizziness and syncope.   Psychiatric/Behavioral:  Negative for confusion and dysphoric mood.      Objective:      Vitals:    12/06/22 1618   BP: 128/76   Pulse: (!) 58   Temp: 98 °F (36.7 °C)     Physical Exam  Vitals reviewed.   Eyes:      Conjunctiva/sclera: Conjunctivae normal.   Cardiovascular:      Rate and Rhythm: Normal rate and regular rhythm.   Pulmonary:      Effort: Pulmonary effort is normal.      Breath sounds: Normal breath sounds.   Musculoskeletal:      Cervical back: Normal range of motion.      Comments: Normal ROM bilateral    Skin:     General: Skin is warm and dry.      Comments: Left buttocks + vesicular rash on erythematous patch   Neurological:      Cranial Nerves: No cranial nerve deficit (grossly intact).   Psychiatric:      Comments: Alert and orientated         Assessment:       1. Essential hypertension    2. Hypothyroidism due to acquired atrophy of thyroid    3. Controlled type 2 diabetes mellitus without complication, without long-term current use of insulin    4. Dyslipidemia    5. Post-menopausal    6. Pap smear for cervical cancer screening    7. Screen for colon cancer    8. Encounter for long-term (current) use of medications        Plan:       Essential hypertension  -     Comprehensive Metabolic Panel; Future; Expected date:  06/04/2023    Hypothyroidism due to acquired atrophy of thyroid  -     TSH; Future; Expected date: 06/04/2023    Controlled type 2 diabetes mellitus without complication, without long-term current use of insulin  -     Microalbumin/Creatinine Ratio, Urine; Future; Expected date: 12/06/2022  -     Hemoglobin A1C; Future; Expected date: 06/04/2023    Dyslipidemia  -     Lipid Panel; Future; Expected date: 06/04/2023    Post-menopausal  -     DXA Bone Density Spine And Hip; Future; Expected date: 12/06/2022    Pap smear for cervical cancer screening  -     Ambulatory referral/consult to Gynecology; Future; Expected date: 12/13/2022    Screen for colon cancer  -     Case Request Endoscopy: COLONOSCOPY    Encounter for long-term (current) use of medications  -     CBC Auto Differential; Future; Expected date: 06/04/2023    Herpes zoster without complication  -     valACYclovir (VALTREX) 1000 MG tablet; Take 1 tablet (1,000 mg total) by mouth once daily. for 7 days  Dispense: 7 tablet; Refill: 5    Other orders  -     promethazine (PHENERGAN) 25 MG tablet; Take 1 tablet (25 mg total) by mouth every 6 (six) hours as needed for Nausea.  Dispense: 45 tablet; Refill: 5          Medication List with Changes/Refills   New Medications    PROMETHAZINE (PHENERGAN) 25 MG TABLET    Take 1 tablet (25 mg total) by mouth every 6 (six) hours as needed for Nausea.   Current Medications    ASPIRIN (ECOTRIN) 81 MG EC TABLET    Take 1 tablet (81 mg total) by mouth once daily. for 21 days    BENAZEPRIL (LOTENSIN) 20 MG TABLET    Take 1.5 tablets (30 mg total) by mouth every morning. Short term supply while pt waits for mail order.    CALCIUM CARBONATE/VITAMIN D3 (CALCIUM 500 WITH D ORAL)    Take 1 capsule by mouth once daily.    CARBOXYMETHYLCELLULOSE (REFRESH PLUS) 0.5 % DPET    Place 1 drop into both eyes daily as needed (DRY EYES).    CLOPIDOGREL (PLAVIX) 75 MG TABLET    Take 1 tablet (75 mg total) by mouth once daily.    CO-ENZYME Q-10  30 MG CAPSULE    Take 30 mg by mouth 3 (three) times daily.    DICYCLOMINE (BENTYL) 10 MG CAPSULE    Take 1 capsule (10 mg total) by mouth 4 (four) times daily as needed (stomach discomfort).    ESOMEPRAZOLE (NEXIUM) 40 MG CAPSULE    Take 1 capsule (40 mg total) by mouth before breakfast.    ESTRADIOL (VIVELLE-DOT) 0.075 MG/24 HR    Place 1 patch onto the skin once a week.    EVOLOCUMAB (REPATHA SURECLICK) 140 MG/ML PNIJ    Inject 1 mL (140 mg total) into the skin every 14 (fourteen) days.    FERROUS GLUCONATE (FERGON) 324 MG TABLET    Take 1 tablet (324 mg total) by mouth daily with breakfast.    FLUTICASONE PROPIONATE (FLONASE) 50 MCG/ACTUATION NASAL SPRAY    1 spray (50 mcg total) by Each Nostril route once daily.    GARLIC (GARLIQUE ORAL)    Take 1 capsule by mouth Daily.    ISOSORBIDE MONONITRATE (IMDUR) 30 MG 24 HR TABLET    Take 30 mg by mouth once daily.    MULTIVITAMIN (MULTIPLE VITAMIN ORAL)    Take 1 capsule by mouth once daily.    OXYBUTYNIN (DITROPAN XL) 15 MG TR24        SENNOSIDES (LAXATIVE, SENNOSIDES,) 25 MG TAB    Take by mouth.    SUBOXONE 8-2 MG    DISSOLVE 1 FILM UNDER THE TONGUE TWICE DAILY    SYNTHROID 100 MCG TABLET    TAKE 1 TABLET EVERY DAY   Changed and/or Refilled Medications    Modified Medication Previous Medication    VALACYCLOVIR (VALTREX) 1000 MG TABLET valACYclovir (VALTREX) 1000 MG tablet       Take 1 tablet (1,000 mg total) by mouth once daily. for 7 days    Take 1 tablet (1,000 mg total) by mouth once daily. for 7 days           Follow up in about 6 months (around 6/6/2023).

## 2022-12-07 ENCOUNTER — PATIENT OUTREACH (OUTPATIENT)
Dept: ADMINISTRATIVE | Facility: HOSPITAL | Age: 72
End: 2022-12-07
Payer: MEDICARE

## 2022-12-07 ENCOUNTER — PATIENT MESSAGE (OUTPATIENT)
Dept: FAMILY MEDICINE | Facility: CLINIC | Age: 72
End: 2022-12-07
Payer: MEDICARE

## 2022-12-07 ENCOUNTER — TELEPHONE (OUTPATIENT)
Dept: OBSTETRICS AND GYNECOLOGY | Facility: CLINIC | Age: 72
End: 2022-12-07
Payer: MEDICARE

## 2022-12-07 DIAGNOSIS — B02.9 HERPES ZOSTER WITHOUT COMPLICATION: ICD-10-CM

## 2022-12-07 LAB
ALBUMIN/CREAT UR: NORMAL UG/MG (ref 0–30)
CREAT UR-MCNC: 45 MG/DL (ref 15–325)
MICROALBUMIN UR DL<=1MG/L-MCNC: <5 UG/ML

## 2022-12-07 RX ORDER — PROMETHAZINE HYDROCHLORIDE 25 MG/1
25 TABLET ORAL EVERY 6 HOURS PRN
Qty: 45 TABLET | Refills: 5 | Status: SHIPPED | OUTPATIENT
Start: 2022-12-07 | End: 2023-03-21

## 2022-12-07 RX ORDER — VALACYCLOVIR HYDROCHLORIDE 1 G/1
1000 TABLET, FILM COATED ORAL DAILY
Qty: 7 TABLET | Refills: 5 | Status: SHIPPED | OUTPATIENT
Start: 2022-12-07 | End: 2023-08-20

## 2022-12-07 NOTE — TELEPHONE ENCOUNTER
No new care gaps identified.  [facility] Embedded Care Gaps. Reference number: [id number]. 12/07/2022   2:50:46 PM CST

## 2022-12-07 NOTE — TELEPHONE ENCOUNTER
----- Message from Kamilah Jeffries LPN sent at 12/7/2022  2:18 PM CST -----  Regarding: FW: appointment  Contact: pt  Can either provider see this patient sooner?  ----- Message -----  From: Norah Astudillo Patient Care Assistant  Sent: 12/7/2022   2:16 PM CST  To: Oma [facility]ARRERA Staff  Subject: appointment                                      Type:  Sooner Appointment Request    Caller is requesting a sooner appointment.  Caller declined first available appointment listed below.  Caller will not accept being placed on the waitlist and is requesting a message be sent to doctor.    Name of Caller:  pt   When is the first available appointment?  03/2023  Symptoms:  bleeding while urinating   Best Call Back Number:  [phone] (home)     Additional Information:  please call pt to advise. Thanks!

## 2022-12-07 NOTE — TELEPHONE ENCOUNTER
----- Message from Kamilah Jeffries LPN sent at 12/7/2022  2:18 PM CST -----  Regarding: FW: appointment  Contact: pt  Can either provider see this patient sooner?  ----- Message -----  From: Norah Astudillo Patient Care Assistant  Sent: 12/7/2022   2:16 PM CST  To: Oma BARRERA Staff  Subject: appointment                                      Type:  Sooner Appointment Request    Caller is requesting a sooner appointment.  Caller declined first available appointment listed below.  Caller will not accept being placed on the waitlist and is requesting a message be sent to doctor.    Name of Caller:  pt   When is the first available appointment?  03/2023  Symptoms:  bleeding while urinating   Best Call Back Number:  [phone] (home)     Additional Information:  please call pt to advise. Thanks!

## 2022-12-12 ENCOUNTER — PATIENT OUTREACH (OUTPATIENT)
Dept: ADMINISTRATIVE | Facility: HOSPITAL | Age: 72
End: 2022-12-12
Payer: MEDICARE

## 2022-12-13 ENCOUNTER — PATIENT OUTREACH (OUTPATIENT)
Dept: ADMINISTRATIVE | Facility: HOSPITAL | Age: 72
End: 2022-12-13
Payer: MEDICARE

## 2022-12-14 ENCOUNTER — SPECIALTY PHARMACY (OUTPATIENT)
Dept: PHARMACY | Facility: CLINIC | Age: 72
End: 2022-12-14
Payer: MEDICARE

## 2022-12-14 DIAGNOSIS — E78.00 HYPERCHOLESTEROLEMIA: Primary | ICD-10-CM

## 2022-12-14 DIAGNOSIS — I25.119 CORONARY ARTERY DISEASE INVOLVING NATIVE CORONARY ARTERY OF NATIVE HEART WITH ANGINA PECTORIS: Chronic | ICD-10-CM

## 2022-12-14 DIAGNOSIS — E78.00 HYPERCHOLESTEROLEMIA: Chronic | ICD-10-CM

## 2022-12-14 DIAGNOSIS — Z78.9 STATIN INTOLERANCE: Chronic | ICD-10-CM

## 2022-12-14 RX ORDER — EVOLOCUMAB 140 MG/ML
140 INJECTION, SOLUTION SUBCUTANEOUS
Qty: 2 EACH | Refills: 3 | Status: SHIPPED | OUTPATIENT
Start: 2022-12-14 | End: 2023-05-09

## 2022-12-14 NOTE — TELEPHONE ENCOUNTER
Incoming call from pt reports she is flying to [place] 12/19/22 and will not be back for 2 weeks. Next Repatha dose due 12/26/22 zero doses on hand. Next available delivery date to pt would be Friday 12/16/22 and unable to ship to [place]. Pt states her flight is at 7 am Friday so unable to receive Friday delivery and bad weather today plus past shipping cut-off time so unable to schedule delivery tomorrow.     Advised Repatha transfer to [facility] to schedule delivery to [place] or transfer to local pharmacy for fill before she leaves. Pt requests transfer to local pharmacy- [facility]. Called pharmacy confirmed with LAWANDA León they have Repatha pens in stock. Prescription transferred to [facility] pt notified and advised she take only 1 pen with her to [place] and keep the other in its original container stored in fridge. Voiced underatdnding- no further questions or concerns. Requests to continue with [facility] services next month.

## 2022-12-14 NOTE — TELEPHONE ENCOUNTER
----- Message from Mahnaz Cadet sent at 12/14/2022  2:41 PM CST -----  Type: Needs Medical Advice  Who Called:  Jim from [facility]  Symptoms (please be specific):  said pt evolocumab (REPATHA SURECLICK) 140 mg/mL PnIj need a PA and she leaving to go out of town--please call and advise  Best Call Back Number: [phone]  Additional Information: thank you

## 2022-12-22 ENCOUNTER — CLINICAL SUPPORT (OUTPATIENT)
Dept: CARDIOLOGY | Facility: HOSPITAL | Age: 72
End: 2022-12-22
Payer: MEDICARE

## 2022-12-22 ENCOUNTER — PATIENT MESSAGE (OUTPATIENT)
Dept: ADMINISTRATIVE | Facility: OTHER | Age: 72
End: 2022-12-22
Payer: MEDICARE

## 2022-12-22 DIAGNOSIS — Z95.818 PRESENCE OF OTHER CARDIAC IMPLANTS AND GRAFTS: ICD-10-CM

## 2022-12-22 PROCEDURE — 93298 CARDIAC DEVICE CHECK - REMOTE: ICD-10-PCS | Mod: ,,, | Performed by: INTERNAL MEDICINE

## 2022-12-22 PROCEDURE — 93298 REM INTERROG DEV EVAL SCRMS: CPT | Mod: ,,, | Performed by: INTERNAL MEDICINE

## 2022-12-30 ENCOUNTER — SPECIALTY PHARMACY (OUTPATIENT)
Dept: PHARMACY | Facility: CLINIC | Age: 72
End: 2022-12-30
Payer: MEDICARE

## 2022-12-30 ENCOUNTER — PATIENT MESSAGE (OUTPATIENT)
Dept: CARDIOLOGY | Facility: CLINIC | Age: 72
End: 2022-12-30
Payer: MEDICARE

## 2022-12-30 RX ORDER — ISOSORBIDE MONONITRATE 30 MG/1
30 TABLET, EXTENDED RELEASE ORAL DAILY
Qty: 30 TABLET | Refills: 0 | Status: SHIPPED | OUTPATIENT
Start: 2022-12-30 | End: 2023-01-18

## 2022-12-30 NOTE — TELEPHONE ENCOUNTER
Specialty Pharmacy - Refill Coordination  Specialty Pharmacy - Medication/Referral Authorization    Specialty Medication Orders Linked to Encounter      Flowsheet Row Most Recent Value   Medication #1 evolocumab (REPATHA SURECLICK) 140 mg/mL PnIj (Order#[id number], Rx#)            Refill Questions - Documented Responses      Flowsheet Row Most Recent Value   Patient Availability and HIPAA Verification    Does patient want to proceed with activity? Yes   HIPAA/medical authority confirmed? Yes   Relationship to patient of person spoken to? Self   Refill Screening Questions    Would patient like to speak to a pharmacist? No   When does the patient need to receive the medication? 01/04/23   Refill Delivery Questions    How will the patient receive the medication? MEDRx   When does the patient need to receive the medication? 01/04/23   Shipping Address Home   Address in [facility] confirmed and updated if neccessary? Yes   Expected Copay ($) 0   Is the patient able to afford the medication copay? Yes   Payment Method zero copay   Days supply of Refill 28   Supplies needed? No supplies needed   Refill activity completed? Yes   Refill activity plan Refill scheduled   Shipment/Pickup Date: 01/03/23            Current Outpatient Medications   Medication Sig    aspirin (ECOTRIN) 81 MG EC tablet Take 1 tablet (81 mg total) by mouth once daily. for 21 days    benazepriL (LOTENSIN) 20 MG tablet Take 1.5 tablets (30 mg total) by mouth every morning. Short term supply while pt waits for mail order.    CALCIUM CARBONATE/VITAMIN D3 (CALCIUM 500 WITH D ORAL) Take 1 capsule by mouth once daily.    carboxymethylcellulose (REFRESH PLUS) 0.5 % Dpet Place 1 drop into both eyes daily as needed (DRY EYES).    clopidogreL (PLAVIX) 75 mg tablet Take 1 tablet (75 mg total) by mouth once daily.    co-enzyme Q-10 30 mg capsule Take 30 mg by mouth 3 (three) times daily.    dicyclomine (BENTYL) 10 MG capsule Take 1 capsule (10 mg total) by  mouth 4 (four) times daily as needed (stomach discomfort).    esomeprazole (NEXIUM) 40 MG capsule Take 1 capsule (40 mg total) by mouth before breakfast.    estradioL (VIVELLE-DOT) 0.075 mg/24 hr Place 1 patch onto the skin once a week.    evolocumab (REPATHA SURECLICK) 140 mg/mL PnIj Inject 1 mL (140 mg total) into the skin every 14 (fourteen) days.    evolocumab (REPATHA SURECLICK) 140 mg/mL PnIj Inject one mL (140 mg) under the skin every 14 days    ferrous gluconate (FERGON) 324 MG tablet Take 1 tablet (324 mg total) by mouth daily with breakfast.    fluticasone propionate (FLONASE) 50 mcg/actuation nasal spray 1 spray (50 mcg total) by Each Nostril route once daily. (Patient taking differently: 1 spray by Each Nostril route every evening.)    garlic (GARLIQUE ORAL) Take 1 capsule by mouth Daily.    isosorbide mononitrate (IMDUR) 30 MG 24 hr tablet Take 1 tablet (30 mg total) by mouth once daily.    MULTIVITAMIN (MULTIPLE VITAMIN ORAL) Take 1 capsule by mouth once daily.    oxybutynin (DITROPAN XL) 15 MG TR24     promethazine (PHENERGAN) 25 MG tablet Take 1 tablet (25 mg total) by mouth every 6 (six) hours as needed for Nausea.    sennosides (LAXATIVE, SENNOSIDES,) 25 mg Tab Take by mouth.    SUBOXONE 8-2 mg DISSOLVE 1 FILM UNDER THE TONGUE TWICE DAILY    SYNTHROID 100 mcg tablet TAKE 1 TABLET EVERY DAY (Patient taking differently: Take 100 mcg by mouth before breakfast. **BRAND MEDICALLY NECESSARY**)    valACYclovir (VALTREX) 1000 MG tablet Take 1 tablet (1,000 mg total) by mouth once daily. for 7 days   Last reviewed on 12/6/2022  4:32 PM by Galindo Stiles MD    Review of patient's allergies indicates:   Allergen Reactions    Lyrica [pregabalin] Swelling    Pcn [penicillins] Swelling    Toradol [ketorolac] Swelling    Vibramycin [doxycycline calcium] Shortness Of Breath and Swelling    Zetia [ezetimibe] Other (See Comments)    Crestor [rosuvastatin]      Body aches    Cymbalta [duloxetine]      dizzyness     Elavil [amitriptyline] Other (See Comments)     Restless leg    Pravastatin Other (See Comments)     Flu -like symptoms   Body aches     Seroquel [quetiapine]      restless leg symdrome    Last reviewed on  12/30/2022 2:08 PM by Joann Snow      Tasks added this encounter   12/30/2022 - Referral Authorization - Reauthorization   Tasks due within next 3 months   1/16/2023 - Refill Call (Auto Added)     Lee Odonnell, PharmD  [facility]  [address] [place], [place] [address]  [facility] [address]  Phone: [phone]  Fax: [phone]

## 2022-12-30 NOTE — TELEPHONE ENCOUNTER
-PA approved from 1/1/22 to 12/31/23  Case ID: [id number]    Benefits Investigation     Insurance name: [employer] Medicare Part D   Copay: $138.08  LIS LVL: none     Patient approved jose raul funding application  Jose Raul information:   CARD #: [id number]  BIN: [id number]  PCN: [id number]  GROUP: [id number]  Effective 11/30/22 - 11/29/23

## 2022-12-30 NOTE — TELEPHONE ENCOUNTER
Incoming call from pt stating she was not able to  Repatha from [facility] before leaving for [place] and wants rx back at [facility] to be shipped to her home when she returns. Outgoing call to [facility] to transfer RX back. PA required. Routing to assigned [facility] to work on PA.

## 2023-01-04 ENCOUNTER — PATIENT MESSAGE (OUTPATIENT)
Dept: ADMINISTRATIVE | Facility: OTHER | Age: 73
End: 2023-01-04
Payer: MEDICARE

## 2023-01-05 ENCOUNTER — OFFICE VISIT (OUTPATIENT)
Dept: OBSTETRICS AND GYNECOLOGY | Facility: CLINIC | Age: 73
End: 2023-01-05
Payer: MEDICARE

## 2023-01-05 VITALS
HEIGHT: 61 IN | BODY MASS INDEX: 25.48 KG/M2 | WEIGHT: 134.94 LBS | DIASTOLIC BLOOD PRESSURE: 80 MMHG | SYSTOLIC BLOOD PRESSURE: 170 MMHG

## 2023-01-05 DIAGNOSIS — N94.10 DYSPAREUNIA IN FEMALE: ICD-10-CM

## 2023-01-05 DIAGNOSIS — Z12.4 PAP SMEAR FOR CERVICAL CANCER SCREENING: ICD-10-CM

## 2023-01-05 DIAGNOSIS — N93.0 POSTCOITAL BLEEDING: Primary | ICD-10-CM

## 2023-01-05 DIAGNOSIS — R31.9 HEMATURIA, UNSPECIFIED TYPE: ICD-10-CM

## 2023-01-05 LAB
BILIRUB UR QL STRIP: NEGATIVE
CLARITY UR REFRACT.AUTO: CLEAR
COLOR UR AUTO: YELLOW
GLUCOSE UR QL STRIP: NEGATIVE
HGB UR QL STRIP: NEGATIVE
KETONES UR QL STRIP: NEGATIVE
LEUKOCYTE ESTERASE UR QL STRIP: NEGATIVE
NITRITE UR QL STRIP: NEGATIVE
PH UR STRIP: 5 [PH] (ref 5–8)
PROT UR QL STRIP: NEGATIVE
SP GR UR STRIP: 1.02 (ref 1–1.03)
URN SPEC COLLECT METH UR: NORMAL

## 2023-01-05 PROCEDURE — 99203 OFFICE O/P NEW LOW 30 MIN: CPT | Mod: S$PBB,,, | Performed by: OBSTETRICS & GYNECOLOGY

## 2023-01-05 PROCEDURE — 99214 OFFICE O/P EST MOD 30 MIN: CPT | Mod: PBBFAC,PN | Performed by: OBSTETRICS & GYNECOLOGY

## 2023-01-05 PROCEDURE — 81003 URINALYSIS AUTO W/O SCOPE: CPT | Performed by: OBSTETRICS & GYNECOLOGY

## 2023-01-05 PROCEDURE — 99999 PR PBB SHADOW E&M-EST. PATIENT-LVL IV: CPT | Mod: PBBFAC,,, | Performed by: OBSTETRICS & GYNECOLOGY

## 2023-01-05 PROCEDURE — 99203 PR OFFICE/OUTPT VISIT, NEW, LEVL III, 30-44 MIN: ICD-10-PCS | Mod: S$PBB,,, | Performed by: OBSTETRICS & GYNECOLOGY

## 2023-01-05 PROCEDURE — 99999 PR PBB SHADOW E&M-EST. PATIENT-LVL IV: ICD-10-PCS | Mod: PBBFAC,,, | Performed by: OBSTETRICS & GYNECOLOGY

## 2023-01-05 NOTE — PROGRESS NOTES
"Chief Complaint   Patient presents with    Vaginal Bleeding     Has had bleeding for last 7-8 months, started with sex and pain during intercourse, but then progressed to noticing blood when wiping after urination.         History of Present Illness   72 y.o. F patient presents today for bleeding and pain with sex.     C/o pain with intercourse  C/o postcoital bleeding  Also noticed pink with wiping after urination.     She has a history of a prior hysterectomy before Preethi 2005. Hyst due to AUB.   Per Notes 2013, cervix, uterus, and ovaries are surgically absent.  In her 20s abnormal pap, then normal since then. She no longer needs pap smears per documentation by former GYN.   She is not currently on HRT. She was previously on ERT, estrogen patch. Has not used the patch in 1-2yr. She is not a candidate for ERT. She has a h/o CVA, CAD, CKD, CHTN, DM, HLD, & Hypothyroidism.   She is currently on blood thinner; Plavix & ASA.     Past medical and surgical history reviewed.   I have reviewed the patient's medical history in detail and updated the computerized patient record.    Review of patient's allergies indicates:   Allergen Reactions    Lyrica [pregabalin] Swelling    Pcn [penicillins] Swelling    Toradol [ketorolac] Swelling    Vibramycin [doxycycline calcium] Shortness Of Breath and Swelling    Zetia [ezetimibe] Other (See Comments)    Crestor [rosuvastatin]      Body aches    Cymbalta [duloxetine]      dizzyness    Elavil [amitriptyline] Other (See Comments)     Restless leg    Pravastatin Other (See Comments)     Flu -like symptoms   Body aches     Seroquel [quetiapine]      restless leg symdrome       Review of Systems  Constitutional: negative for chills and fatigue  Gastrointestinal: negative for abdominal pain, constipation, diarrhea, nausea and vomiting  Genitourinary:negative for  genital lesions and vaginal discharge, dysuria, frequency and hematuria    Physical Examination:  BP (!) 170/80   Ht 5' 1" " (1.549 m)   Wt 61.2 kg (134 lb 14.7 oz)   BMI 25.49 kg/m²    Physical Exam:               Genitourinary:    Inguinal canal normal.   The external female genitalia was normal.   Labial bartholins normal.There is an absent right adnexa and an absent left adnexa. Vaginal cuff normal.  Vaginal atrophy noted. Cervix is absent.Uterus is absent.                   Assessment/Plan:  Postcoital bleeding    Hematuria, unspecified type  -     Urinalysis, Reflex to Urine Culture Urine, Catheterized    Dyspareunia in female    Vaginal Atrophy likely causing dyspareunia and postcoital bleeding: Recommended vaginal lubricants & moisturizers.

## 2023-01-10 ENCOUNTER — HOSPITAL ENCOUNTER (OUTPATIENT)
Dept: RADIOLOGY | Facility: HOSPITAL | Age: 73
Discharge: HOME | End: 2023-01-10
Attending: INTERNAL MEDICINE
Payer: MEDICARE

## 2023-01-10 DIAGNOSIS — Z78.0 POST-MENOPAUSAL: ICD-10-CM

## 2023-01-10 PROCEDURE — 77080 DXA BONE DENSITY AXIAL: CPT | Mod: TC,PO

## 2023-01-10 PROCEDURE — 77080 DEXA BONE DENSITY SPINE HIP: ICD-10-PCS | Mod: 26,,, | Performed by: RADIOLOGY

## 2023-01-10 PROCEDURE — 77080 DXA BONE DENSITY AXIAL: CPT | Mod: 26,,, | Performed by: RADIOLOGY

## 2023-01-13 ENCOUNTER — PATIENT MESSAGE (OUTPATIENT)
Dept: FAMILY MEDICINE | Facility: CLINIC | Age: 73
End: 2023-01-13
Payer: MEDICARE

## 2023-01-13 DIAGNOSIS — M80.00XA AGE-RELATED OSTEOPOROSIS WITH CURRENT PATHOLOGICAL FRACTURE, INITIAL ENCOUNTER: Primary | ICD-10-CM

## 2023-01-16 ENCOUNTER — PATIENT MESSAGE (OUTPATIENT)
Dept: FAMILY MEDICINE | Facility: CLINIC | Age: 73
End: 2023-01-16
Payer: MEDICARE

## 2023-01-16 DIAGNOSIS — M80.00XA AGE-RELATED OSTEOPOROSIS WITH CURRENT PATHOLOGICAL FRACTURE, INITIAL ENCOUNTER: Primary | ICD-10-CM

## 2023-01-17 ENCOUNTER — PATIENT MESSAGE (OUTPATIENT)
Dept: FAMILY MEDICINE | Facility: CLINIC | Age: 73
End: 2023-01-17
Payer: MEDICARE

## 2023-01-17 RX ORDER — ALENDRONATE SODIUM 70 MG/1
70 TABLET ORAL
Qty: 12 TABLET | Refills: 4 | Status: SHIPPED | OUTPATIENT
Start: 2023-01-17 | End: 2023-10-02

## 2023-01-17 RX ORDER — ALENDRONATE SODIUM 70 MG/1
70 TABLET ORAL
Qty: 4 TABLET | Refills: 0 | Status: SHIPPED | OUTPATIENT
Start: 2023-01-17 | End: 2023-01-17

## 2023-01-21 ENCOUNTER — CLINICAL SUPPORT (OUTPATIENT)
Dept: CARDIOLOGY | Facility: HOSPITAL | Age: 73
End: 2023-01-21
Payer: MEDICARE

## 2023-01-21 DIAGNOSIS — Z95.818 PRESENCE OF OTHER CARDIAC IMPLANTS AND GRAFTS: ICD-10-CM

## 2023-01-24 ENCOUNTER — TELEPHONE (OUTPATIENT)
Dept: GASTROENTEROLOGY | Facility: CLINIC | Age: 73
End: 2023-01-24
Payer: MEDICARE

## 2023-01-24 ENCOUNTER — SPECIALTY PHARMACY (OUTPATIENT)
Dept: PHARMACY | Facility: CLINIC | Age: 73
End: 2023-01-24
Payer: MEDICARE

## 2023-01-24 NOTE — TELEPHONE ENCOUNTER
Specialty Pharmacy - Refill Coordination    Specialty Medication Orders Linked to Encounter      Flowsheet Row Most Recent Value   Medication #1 evolocumab (REPATHA SURECLICK) 140 mg/mL PnIj (Order#[id number], Rx#[id number])            Refill Questions - Documented Responses      Flowsheet Row Most Recent Value   Patient Availability and HIPAA Verification    Does patient want to proceed with activity? Yes   HIPAA/medical authority confirmed? Yes   Relationship to patient of person spoken to? Self   Refill Screening Questions    Would patient like to speak to a pharmacist? No   When does the patient need to receive the medication? 02/01/23   Refill Delivery Questions    How will the patient receive the medication? MEDRx   When does the patient need to receive the medication? 02/01/23   Shipping Address Home   Address in [facility] confirmed and updated if neccessary? Yes   Expected Copay ($) 0   Is the patient able to afford the medication copay? Yes   Payment Method zero copay   Days supply of Refill 28   Supplies needed? No supplies needed   Refill activity completed? Yes   Refill activity plan Refill scheduled   Shipment/Pickup Date: 01/30/23            Current Outpatient Medications   Medication Sig    alendronate (FOSAMAX) 70 MG tablet Take 1 tablet (70 mg total) by mouth every 7 days.    amLODIPine (NORVASC) 5 MG tablet Take 1.25 mg by mouth once daily.    aspirin (ECOTRIN) 81 MG EC tablet Take 1 tablet (81 mg total) by mouth once daily. for 21 days    benazepriL (LOTENSIN) 20 MG tablet Take 20 mg by mouth once daily.    CALCIUM CARBONATE/VITAMIN D3 (CALCIUM 500 WITH D ORAL) Take 1 capsule by mouth once daily.    carboxymethylcellulose (REFRESH PLUS) 0.5 % Dpet Place 1 drop into both eyes daily as needed (DRY EYES).    clopidogreL (PLAVIX) 75 mg tablet Take 1 tablet (75 mg total) by mouth once daily.    co-enzyme Q-10 30 mg capsule Take 30 mg by mouth 3 (three) times daily.    dicyclomine (BENTYL) 10 MG  capsule Take 1 capsule (10 mg total) by mouth 4 (four) times daily as needed (stomach discomfort).    esomeprazole (NEXIUM) 40 MG capsule Take 1 capsule (40 mg total) by mouth before breakfast.    estradioL (VIVELLE-DOT) 0.075 mg/24 hr Place 1 patch onto the skin once a week.    evolocumab (REPATHA SURECLICK) 140 mg/mL PnIj Inject 1 mL (140 mg total) into the skin every 14 (fourteen) days.    evolocumab (REPATHA SURECLICK) 140 mg/mL PnIj Inject one mL (140 mg) under the skin every 14 days (Patient not taking: Reported on 1/5/2023)    ferrous gluconate (FERGON) 324 MG tablet Take 1 tablet (324 mg total) by mouth daily with breakfast.    fluticasone propionate (FLONASE) 50 mcg/actuation nasal spray 1 spray (50 mcg total) by Each Nostril route once daily. (Patient taking differently: 1 spray by Each Nostril route every evening.)    garlic (GARLIQUE ORAL) Take 1 capsule by mouth Daily.    MULTIVITAMIN (MULTIPLE VITAMIN ORAL) Take 1 capsule by mouth once daily.    oxybutynin (DITROPAN XL) 15 MG TR24     promethazine (PHENERGAN) 25 MG tablet Take 1 tablet (25 mg total) by mouth every 6 (six) hours as needed for Nausea.    sennosides (LAXATIVE, SENNOSIDES,) 25 mg Tab Take by mouth.    SUBOXONE 8-2 mg DISSOLVE 1 FILM UNDER THE TONGUE TWICE DAILY    SYNTHROID 100 mcg tablet TAKE 1 TABLET EVERY DAY (Patient taking differently: Take 100 mcg by mouth before breakfast. **BRAND MEDICALLY NECESSARY**)    valACYclovir (VALTREX) 1000 MG tablet Take 1 tablet (1,000 mg total) by mouth once daily. for 7 days   Last reviewed on 1/5/2023  1:20 PM by Heidi Marquez LPN    Review of patient's allergies indicates:   Allergen Reactions    Lyrica [pregabalin] Swelling    Pcn [penicillins] Swelling    Toradol [ketorolac] Swelling    Vibramycin [doxycycline calcium] Shortness Of Breath and Swelling    Zetia [ezetimibe] Other (See Comments)    Crestor [rosuvastatin]      Body aches    Cymbalta [duloxetine]      dizzyness    Elavil  [amitriptyline] Other (See Comments)     Restless leg    Pravastatin Other (See Comments)     Flu -like symptoms   Body aches     Seroquel [quetiapine]      restless leg symdrome    Last reviewed on  1/17/2023 7:21 AM by Fabby Mendez      Tasks added this encounter   2/22/2023 - Refill Call (Auto Added)   Tasks due within next 3 months   No tasks due.     Alea Matias, PharmD  [facility]  [address] [place], [place] [address]  [facility] [address]  Phone: [phone]  Fax: [phone]

## 2023-01-26 ENCOUNTER — OFFICE VISIT (OUTPATIENT)
Dept: FAMILY MEDICINE | Facility: CLINIC | Age: 73
End: 2023-01-26
Payer: MEDICARE

## 2023-01-26 VITALS
SYSTOLIC BLOOD PRESSURE: 156 MMHG | HEIGHT: 61 IN | BODY MASS INDEX: 25.84 KG/M2 | DIASTOLIC BLOOD PRESSURE: 72 MMHG | HEART RATE: 75 BPM | OXYGEN SATURATION: 98 % | WEIGHT: 136.88 LBS

## 2023-01-26 DIAGNOSIS — M80.00XA AGE-RELATED OSTEOPOROSIS WITH CURRENT PATHOLOGICAL FRACTURE, INITIAL ENCOUNTER: ICD-10-CM

## 2023-01-26 DIAGNOSIS — I25.119 CORONARY ARTERY DISEASE INVOLVING NATIVE CORONARY ARTERY OF NATIVE HEART WITH ANGINA PECTORIS: ICD-10-CM

## 2023-01-26 DIAGNOSIS — M46.1 BILATERAL SACROILIITIS: ICD-10-CM

## 2023-01-26 DIAGNOSIS — E11.22 CONTROLLED TYPE 2 DIABETES MELLITUS WITH STAGE 3 CHRONIC KIDNEY DISEASE, WITHOUT LONG-TERM CURRENT USE OF INSULIN: ICD-10-CM

## 2023-01-26 DIAGNOSIS — N18.32 STAGE 3B CHRONIC KIDNEY DISEASE: ICD-10-CM

## 2023-01-26 DIAGNOSIS — N18.30 CONTROLLED TYPE 2 DIABETES MELLITUS WITH STAGE 3 CHRONIC KIDNEY DISEASE, WITHOUT LONG-TERM CURRENT USE OF INSULIN: ICD-10-CM

## 2023-01-26 DIAGNOSIS — F33.9 DEPRESSION, RECURRENT: ICD-10-CM

## 2023-01-26 DIAGNOSIS — I10 ESSENTIAL HYPERTENSION: Primary | ICD-10-CM

## 2023-01-26 PROCEDURE — 99999 PR PBB SHADOW E&M-EST. PATIENT-LVL V: CPT | Mod: PBBFAC,,, | Performed by: INTERNAL MEDICINE

## 2023-01-26 PROCEDURE — 99214 PR OFFICE/OUTPT VISIT, EST, LEVL IV, 30-39 MIN: ICD-10-PCS | Mod: S$PBB,,, | Performed by: INTERNAL MEDICINE

## 2023-01-26 PROCEDURE — 99215 OFFICE O/P EST HI 40 MIN: CPT | Mod: PBBFAC,PO | Performed by: INTERNAL MEDICINE

## 2023-01-26 PROCEDURE — 99999 PR PBB SHADOW E&M-EST. PATIENT-LVL V: ICD-10-PCS | Mod: PBBFAC,,, | Performed by: INTERNAL MEDICINE

## 2023-01-26 PROCEDURE — 99214 OFFICE O/P EST MOD 30 MIN: CPT | Mod: S$PBB,,, | Performed by: INTERNAL MEDICINE

## 2023-01-26 NOTE — PROGRESS NOTES
Subjective:       Patient ID: Osman Johansen is a 72 y.o. female.  Chief Complaint: Hypertension     HPI    CAD - s/p CABG.  No chest pain.   CVA - embolic x2 post CABG.  Wearing loop recorder now.  Mild residual confusion/ forgetful. Had JODI with lasix; no swelling, so no need   Dr Nugent, Dr Torres     CKD III - stable     Osteopenia with high 10 yr fracture risk warranting treatment.  Discussed; she will start     HTN - controlled.  On digital HTN program.  Does not take the HCTZ because became lightheaded with this.    Hypothyroid - controlled.112 mcg from local pharmacy and Brand name made her have palpitations, but 125 mcg does not from mail order.  Dye? Only use mail order -   DM - controlled; outside eye exam done 7/26/17  HLD - controlled on Repatha.  Statin intolerance. could not tolerate multiple statins - Crestor, Liptior.  Starting to have body aches on 20 mg of Pravastatin.   Depression - stable      Iron deficiency anemia - improved.   Trouble tolerating iron orally even only 3x per week or every other day.  Advised getting cscp and EGD (hx bleeding ulcer in past).  Last cscp 2012 was ok.  + fatigue   RLS - treated      CT doc emphysema - AVILA as above.  No longer smokes.    Assessment:       1. Essential hypertension    2. Age-related osteoporosis with current pathological fracture, initial encounter    3. Controlled type 2 diabetes mellitus with stage 3 chronic kidney disease, without long-term current use of insulin    4. Bilateral sacroiliitis    5. Depression, recurrent    6. Coronary artery disease involving native coronary artery of native heart with angina pectoris    7. Stage 3b chronic kidney disease          Plan:       Essential hypertension    Age-related osteoporosis with current pathological fracture, initial encounter    Controlled type 2 diabetes mellitus with stage 3 chronic kidney disease, without long-term current use of insulin    Bilateral sacroiliitis    Depression,  recurrent    Coronary artery disease involving native coronary artery of native heart with angina pectoris    Stage 3b chronic kidney disease            Continue current management and monitor.  Other diagnoses were reviewed and found stable and will continue to monitor.  Counseled on regular exercise, maintenance of a healthy weight, balanced diet rich in fruits/vegetables and lean protein, and avoidance of unhealthy habits like smoking and excessive alcohol intake.   Also, counseled on importance of being compliant with medication, health appointments, diet and exercise.     No follow-ups on file.      Medication List with Changes/Refills   Current Medications    ALENDRONATE (FOSAMAX) 70 MG TABLET    Take 1 tablet (70 mg total) by mouth every 7 days.    AMLODIPINE (NORVASC) 5 MG TABLET    Take 1.25 mg by mouth once daily.    ASPIRIN (ECOTRIN) 81 MG EC TABLET    Take 1 tablet (81 mg total) by mouth once daily. for 21 days    BENAZEPRIL (LOTENSIN) 20 MG TABLET    Take 20 mg by mouth once daily.    CALCIUM CARBONATE/VITAMIN D3 (CALCIUM 500 WITH D ORAL)    Take 1 capsule by mouth once daily.    CARBOXYMETHYLCELLULOSE (REFRESH PLUS) 0.5 % DPET    Place 1 drop into both eyes daily as needed (DRY EYES).    CLOPIDOGREL (PLAVIX) 75 MG TABLET    Take 1 tablet (75 mg total) by mouth once daily.    CO-ENZYME Q-10 30 MG CAPSULE    Take 30 mg by mouth 3 (three) times daily.    DICYCLOMINE (BENTYL) 10 MG CAPSULE    Take 1 capsule (10 mg total) by mouth 4 (four) times daily as needed (stomach discomfort).    ESOMEPRAZOLE (NEXIUM) 40 MG CAPSULE    Take 1 capsule (40 mg total) by mouth before breakfast.    ESTRADIOL (VIVELLE-DOT) 0.075 MG/24 HR    Place 1 patch onto the skin once a week.    EVOLOCUMAB (REPATHA SURECLICK) 140 MG/ML PNIJ    Inject 1 mL (140 mg total) into the skin every 14 (fourteen) days.    EVOLOCUMAB (REPATHA SURECLICK) 140 MG/ML PNIJ    Inject one mL (140 mg) under the skin every 14 days    FERROUS GLUCONATE  (FERGON) 324 MG TABLET    Take 1 tablet (324 mg total) by mouth daily with breakfast.    FLUTICASONE PROPIONATE (FLONASE) 50 MCG/ACTUATION NASAL SPRAY    1 spray (50 mcg total) by Each Nostril route once daily.    GARLIC (GARLIQUE ORAL)    Take 1 capsule by mouth Daily.    MULTIVITAMIN (MULTIPLE VITAMIN ORAL)    Take 1 capsule by mouth once daily.    OXYBUTYNIN (DITROPAN XL) 15 MG TR24        PROMETHAZINE (PHENERGAN) 25 MG TABLET    Take 1 tablet (25 mg total) by mouth every 6 (six) hours as needed for Nausea.    SENNOSIDES (LAXATIVE, SENNOSIDES,) 25 MG TAB    Take by mouth.    SUBOXONE 8-2 MG    DISSOLVE 1 FILM UNDER THE TONGUE TWICE DAILY    SYNTHROID 100 MCG TABLET    TAKE 1 TABLET EVERY DAY    VALACYCLOVIR (VALTREX) 1000 MG TABLET    Take 1 tablet (1,000 mg total) by mouth once daily. for 7 days       BP Readings from Last 3 Encounters:   01/26/23 (!) 156/72   01/05/23 (!) 170/80   12/06/22 128/76     Hemoglobin A1C   Date Value Ref Range Status   12/01/2022 5.5 4.0 - 5.6 % Final     Comment:     ADA Screening Guidelines:  5.7-6.4%  Consistent with prediabetes  >or=6.5%  Consistent with diabetes    High levels of fetal hemoglobin interfere with the HbA1C  assay. Heterozygous hemoglobin variants (HbS, HgC, etc)do  not significantly interfere with this assay.   However, presence of multiple variants may affect accuracy.     07/27/2022 5.4 4.0 - 5.6 % Final     Comment:     ADA Screening Guidelines:  5.7-6.4%  Consistent with prediabetes  >or=6.5%  Consistent with diabetes    High levels of fetal hemoglobin interfere with the HbA1C  assay. Heterozygous hemoglobin variants (HbS, HgC, etc)do  not significantly interfere with this assay.   However, presence of multiple variants may affect accuracy.     04/28/2022 5.5 4.0 - 5.6 % Final     Comment:     ADA Screening Guidelines:  5.7-6.4%  Consistent with prediabetes  >or=6.5%  Consistent with diabetes    High levels of fetal hemoglobin interfere with the HbA1C  assay.  Heterozygous hemoglobin variants (HbS, HgC, etc)do  not significantly interfere with this assay.   However, presence of multiple variants may affect accuracy.       Lab Results   Component Value Date    TSH 3.169 07/27/2022     Lab Results   Component Value Date    LDLCALC 60.2 (L) 07/27/2022    LDLCALC 40.0 (L) 07/22/2022    LDLCALC 129.6 11/02/2021     Lab Results   Component Value Date    TRIG 124 07/27/2022    TRIG 130 07/22/2022    TRIG 97 11/02/2021     Wt Readings from Last 3 Encounters:   01/26/23 62.1 kg (136 lb 14.5 oz)   01/05/23 61.2 kg (134 lb 14.7 oz)   12/06/22 60.9 kg (134 lb 4.2 oz)     Lab Results   Component Value Date    HGB 10.9 (L) 07/27/2022    HCT 34.4 (L) 07/27/2022    WBC 7.09 07/27/2022    ALT 15 12/01/2022    AST 18 12/01/2022     12/01/2022    K 4.6 12/01/2022    CREATININE 1.2 12/01/2022           Review of Systems   Constitutional:  Negative for activity change and unexpected weight change.   HENT:  Negative for hearing loss, rhinorrhea and trouble swallowing.    Eyes:  Negative for discharge and visual disturbance.   Respiratory:  Negative for chest tightness and wheezing.    Cardiovascular:  Negative for chest pain and palpitations.   Gastrointestinal:  Negative for blood in stool, constipation, diarrhea and vomiting.   Endocrine: Negative for polydipsia and polyuria.   Genitourinary:  Negative for difficulty urinating, dysuria, hematuria and menstrual problem.   Musculoskeletal:  Negative for arthralgias, joint swelling and neck pain.   Neurological:  Negative for weakness and headaches.   Psychiatric/Behavioral:  Negative for confusion and dysphoric mood.          Objective:      Vitals:    01/26/23 0957   BP: (!) 156/72   Pulse:      Physical Exam  Vitals reviewed.   Constitutional:       Appearance: Normal appearance.   Eyes:      Conjunctiva/sclera: Conjunctivae normal.   Cardiovascular:      Rate and Rhythm: Normal rate.   Pulmonary:      Effort: Pulmonary effort is  normal.      Breath sounds: Normal breath sounds.   Musculoskeletal:      Cervical back: Normal range of motion.      Comments: Normal ROM bilateral    Skin:     General: Skin is warm and dry.   Neurological:      Mental Status: She is alert.      Cranial Nerves: Cranial nerve deficit: grossly intact.   Psychiatric:      Comments: Alert and orientated

## 2023-02-10 ENCOUNTER — TELEPHONE (OUTPATIENT)
Dept: GASTROENTEROLOGY | Facility: CLINIC | Age: 73
End: 2023-02-10
Payer: MEDICARE

## 2023-02-20 ENCOUNTER — CLINICAL SUPPORT (OUTPATIENT)
Dept: CARDIOLOGY | Facility: HOSPITAL | Age: 73
End: 2023-02-20
Payer: MEDICARE

## 2023-02-20 ENCOUNTER — SPECIALTY PHARMACY (OUTPATIENT)
Dept: PHARMACY | Facility: CLINIC | Age: 73
End: 2023-02-20
Payer: MEDICARE

## 2023-02-20 ENCOUNTER — TELEPHONE (OUTPATIENT)
Dept: FAMILY MEDICINE | Facility: CLINIC | Age: 73
End: 2023-02-20
Payer: MEDICARE

## 2023-02-20 DIAGNOSIS — Z95.818 PRESENCE OF OTHER CARDIAC IMPLANTS AND GRAFTS: ICD-10-CM

## 2023-02-20 PROCEDURE — 93298 CARDIAC DEVICE CHECK - REMOTE: ICD-10-PCS | Mod: ,,, | Performed by: INTERNAL MEDICINE

## 2023-02-20 PROCEDURE — 93298 REM INTERROG DEV EVAL SCRMS: CPT | Mod: ,,, | Performed by: INTERNAL MEDICINE

## 2023-02-20 RX ORDER — EVOLOCUMAB 140 MG/ML
INJECTION, SOLUTION SUBCUTANEOUS
Qty: 2 EACH | Refills: 1 | Status: SHIPPED | OUTPATIENT
Start: 2023-02-20 | End: 2023-04-19 | Stop reason: SDUPTHER

## 2023-02-20 NOTE — TELEPHONE ENCOUNTER
Incoming call from pt for repatha refill, pt reports next injection 3/1. Refills requested, call back once received

## 2023-02-22 NOTE — TELEPHONE ENCOUNTER
Specialty Pharmacy - Refill Coordination    Specialty Medication Orders Linked to Encounter      Flowsheet Row Most Recent Value   Medication #1 evolocumab (REPATHA SURECLICK) 140 mg/mL PnIj (Order#[id number], Rx#)            Refill Questions - Documented Responses      Flowsheet Row Most Recent Value   Patient Availability and HIPAA Verification    Does patient want to proceed with activity? Yes   HIPAA/medical authority confirmed? Yes   Relationship to patient of person spoken to? Self   Refill Screening Questions    Would patient like to speak to a pharmacist? No   When does the patient need to receive the medication? 03/01/23   Refill Delivery Questions    How will the patient receive the medication? [facility]   When does the patient need to receive the medication? 03/01/23   Shipping Address Home   Address in [facility] confirmed and updated if neccessary? Yes   Expected Copay ($) 0   Is the patient able to afford the medication copay? Yes   Payment Method zero copay   Days supply of Refill 28   Supplies needed? No supplies needed   Refill activity completed? Yes   Refill activity plan Refill scheduled   Shipment/Pickup Date: 02/27/23            Current Outpatient Medications   Medication Sig    alendronate (FOSAMAX) 70 MG tablet Take 1 tablet (70 mg total) by mouth every 7 days. (Patient not taking: Reported on 1/26/2023)    amLODIPine (NORVASC) 5 MG tablet Take 1.25 mg by mouth once daily.    aspirin (ECOTRIN) 81 MG EC tablet Take 1 tablet (81 mg total) by mouth once daily. for 21 days    benazepriL (LOTENSIN) 20 MG tablet Take 20 mg by mouth once daily.    CALCIUM CARBONATE/VITAMIN D3 (CALCIUM 500 WITH D ORAL) Take 1 capsule by mouth once daily.    carboxymethylcellulose (REFRESH PLUS) 0.5 % Dpet Place 1 drop into both eyes daily as needed (DRY EYES).    clopidogreL (PLAVIX) 75 mg tablet Take 1 tablet (75 mg total) by mouth once daily.    co-enzyme Q-10 30 mg capsule Take 30 mg by mouth 3 (three) times  daily.    dicyclomine (BENTYL) 10 MG capsule Take 1 capsule (10 mg total) by mouth 4 (four) times daily as needed (stomach discomfort). (Patient not taking: Reported on 1/26/2023)    esomeprazole (NEXIUM) 40 MG capsule TAKE 1 CAPSULE (40 MG TOTAL) BY MOUTH BEFORE BREAKFAST.    estradioL (VIVELLE-DOT) 0.075 mg/24 hr Place 1 patch onto the skin once a week.    evolocumab (REPATHA SURECLICK) 140 mg/mL PnIj Inject 1 mL (140 mg total) into the skin every 14 (fourteen) days.    evolocumab (REPATHA SURECLICK) 140 mg/mL PnIj Inject one mL (140 mg) under the skin every 14 days    ferrous gluconate (FERGON) 324 MG tablet Take 1 tablet (324 mg total) by mouth daily with breakfast.    fluticasone propionate (FLONASE) 50 mcg/actuation nasal spray 1 spray (50 mcg total) by Each Nostril route once daily. (Patient taking differently: 1 spray by Each Nostril route every evening.)    garlic (GARLIQUE ORAL) Take 1 capsule by mouth Daily.    MULTIVITAMIN (MULTIPLE VITAMIN ORAL) Take 1 capsule by mouth once daily.    oxybutynin (DITROPAN XL) 15 MG TR24     promethazine (PHENERGAN) 25 MG tablet Take 1 tablet (25 mg total) by mouth every 6 (six) hours as needed for Nausea.    sennosides (LAXATIVE, SENNOSIDES,) 25 mg Tab Take by mouth.    SUBOXONE 8-2 mg DISSOLVE 1 FILM UNDER THE TONGUE TWICE DAILY    SYNTHROID 100 mcg tablet TAKE 1 TABLET EVERY DAY (Patient taking differently: Take 100 mcg by mouth before breakfast. **BRAND MEDICALLY NECESSARY**)    valACYclovir (VALTREX) 1000 MG tablet Take 1 tablet (1,000 mg total) by mouth once daily. for 7 days   Last reviewed on 1/26/2023  9:52 AM by Galindo Stiles MD    Review of patient's allergies indicates:   Allergen Reactions    Lyrica [pregabalin] Swelling    Pcn [penicillins] Swelling    Toradol [ketorolac] Swelling    Vibramycin [doxycycline calcium] Shortness Of Breath and Swelling    Zetia [ezetimibe] Other (See Comments)    Crestor [rosuvastatin]      Body aches    Cymbalta [duloxetine]       dizzyness    Elavil [amitriptyline] Other (See Comments)     Restless leg    Pravastatin Other (See Comments)     Flu -like symptoms   Body aches     Seroquel [quetiapine]      restless leg symdrome    Last reviewed on  1/26/2023 9:52 AM by Galindo Stiles      Tasks added this encounter   3/22/2023 - Refill Call (Auto Added)   Tasks due within next 3 months   No tasks due.     [facility]  [address]  [facility] [address]  Phone: [phone]  Fax: [phone]

## 2023-02-28 ENCOUNTER — OFFICE VISIT (OUTPATIENT)
Dept: FAMILY MEDICINE | Facility: CLINIC | Age: 73
End: 2023-02-28
Payer: MEDICARE

## 2023-02-28 ENCOUNTER — HOSPITAL ENCOUNTER (OUTPATIENT)
Dept: RADIOLOGY | Facility: HOSPITAL | Age: 73
Discharge: HOME | End: 2023-02-28
Attending: NURSE PRACTITIONER
Payer: MEDICARE

## 2023-02-28 VITALS
WEIGHT: 133.81 LBS | BODY MASS INDEX: 25.27 KG/M2 | HEIGHT: 61 IN | OXYGEN SATURATION: 97 % | DIASTOLIC BLOOD PRESSURE: 84 MMHG | HEART RATE: 80 BPM | SYSTOLIC BLOOD PRESSURE: 150 MMHG | TEMPERATURE: 98 F

## 2023-02-28 DIAGNOSIS — N18.30 STAGE 3 CHRONIC KIDNEY DISEASE, UNSPECIFIED WHETHER STAGE 3A OR 3B CKD: ICD-10-CM

## 2023-02-28 DIAGNOSIS — I10 ESSENTIAL HYPERTENSION: ICD-10-CM

## 2023-02-28 DIAGNOSIS — R51.9 ACUTE INTRACTABLE HEADACHE, UNSPECIFIED HEADACHE TYPE: Primary | ICD-10-CM

## 2023-02-28 DIAGNOSIS — R51.9 ACUTE INTRACTABLE HEADACHE, UNSPECIFIED HEADACHE TYPE: ICD-10-CM

## 2023-02-28 PROCEDURE — 70450 CT HEAD WITHOUT CONTRAST: ICD-10-PCS | Mod: 26,,, | Performed by: RADIOLOGY

## 2023-02-28 PROCEDURE — 99215 OFFICE O/P EST HI 40 MIN: CPT | Mod: PBBFAC,PO,25 | Performed by: NURSE PRACTITIONER

## 2023-02-28 PROCEDURE — 99999 PR PBB SHADOW E&M-EST. PATIENT-LVL V: ICD-10-PCS | Mod: PBBFAC,,, | Performed by: NURSE PRACTITIONER

## 2023-02-28 PROCEDURE — 99215 OFFICE O/P EST HI 40 MIN: CPT | Mod: S$PBB,,, | Performed by: NURSE PRACTITIONER

## 2023-02-28 PROCEDURE — 99215 PR OFFICE/OUTPT VISIT, EST, LEVL V, 40-54 MIN: ICD-10-PCS | Mod: S$PBB,,, | Performed by: NURSE PRACTITIONER

## 2023-02-28 PROCEDURE — 70450 CT HEAD/BRAIN W/O DYE: CPT | Mod: TC,PO

## 2023-02-28 PROCEDURE — 70450 CT HEAD/BRAIN W/O DYE: CPT | Mod: 26,,, | Performed by: RADIOLOGY

## 2023-02-28 PROCEDURE — 99999 PR PBB SHADOW E&M-EST. PATIENT-LVL V: CPT | Mod: PBBFAC,,, | Performed by: NURSE PRACTITIONER

## 2023-02-28 RX ORDER — METHYLPREDNISOLONE 4 MG/1
TABLET ORAL
Qty: 21 EACH | Refills: 0 | Status: SHIPPED | OUTPATIENT
Start: 2023-02-28 | End: 2023-03-21

## 2023-02-28 NOTE — PROGRESS NOTES
"Subjective:       Patient ID: Osman Johansen is a 73 y.o. female.    Chief Complaint: Migraine    HPI  Headache x 2 weeks--describes as severe. Intractable, unchanged since onset.  Denies head trauma  Location: Top of head. Radiates to temples, also feels tension from neck   Throbbing   Intermittent blurry vision   Tylenol 1000mg not helping  Distant hx of migraines--not similar. No nausea or photophobia.   Denies sinus pressure/pain, numbness, weakness, confusion, speech problem     HTN: BP consistently elevated  Currently taking 30mg benazepril, and 1.25mg amldoipine. Becomes hypotensive with minimal adjustment     Vitals:    02/28/23 0947   BP: (!) 150/84   Pulse: 80   Temp: 97.8 °F (36.6 °C)     Review of Systems   Constitutional:  Negative for fever.   HENT:  Negative for sinus pressure and sinus pain.    Eyes:  Positive for visual disturbance.   Gastrointestinal:  Negative for nausea and vomiting.   Neurological:  Positive for light-headedness and headaches. Negative for syncope, speech difficulty, weakness and numbness.   Psychiatric/Behavioral:  Negative for confusion.      Past Medical History:   Diagnosis Date    Allergy     Anemia     Anxiety     Arthritis     Blood transfusion 8 yrs    CAD (coronary artery disease)     Cataract     Chronic diastolic CHF (congestive heart failure)     CKD (chronic kidney disease), stage II     COPD (chronic obstructive pulmonary disease)     mild no inhalers    Degenerative disc disease     Depression     Dry eye syndrome     Fibromyalgia     Fluid overload     GERD (gastroesophageal reflux disease)     Hypercholesterolemia 12/09/2019    Hypertension     Hypothyroidism     IBS (irritable bowel syndrome)     Lower extremity edema     Macular drusen     Neuromuscular disorder     Ocular hypertension, bilateral     Osteoporosis     Pleural effusion     PUD (peptic ulcer disease)     Restless leg     Uses Suboxone to control    Sleep apnea     "complex sleep apnea" - per " pt, no cpap    Thoracic or lumbosacral neuritis or radiculitis 10/19/2012    Thyroid disease     hashimoto's     Objective:      Physical Exam  Vitals and nursing note reviewed.   Constitutional:       General: She is not in acute distress.     Appearance: She is not diaphoretic.   HENT:      Head: Normocephalic.   Eyes:      General:         Right eye: No discharge.         Left eye: No discharge.      Pupils: Pupils are equal, round, and reactive to light.   Neck:      Trachea: No tracheal deviation.   Cardiovascular:      Rate and Rhythm: Normal rate.      Heart sounds: Normal heart sounds.   Pulmonary:      Effort: Pulmonary effort is normal.      Breath sounds: Normal breath sounds.   Skin:     Coloration: Skin is not pale.   Neurological:      Mental Status: She is alert and oriented to person, place, and time.      Cranial Nerves: No dysarthria or facial asymmetry.      Sensory: Sensation is intact.      Motor: Motor function is intact.      Coordination: Coordination is intact.      Gait: Gait is intact.   Psychiatric:         Speech: Speech normal.         Behavior: Behavior normal.         Thought Content: Thought content normal.         Judgment: Judgment normal.       Assessment:       1. Acute intractable headache, unspecified headache type    2. Stage 3 chronic kidney disease, unspecified whether stage 3a or 3b CKD    3. Essential hypertension        Plan:       Acute intractable headache, unspecified headache type  -     methylPREDNISolone (MEDROL DOSEPACK) 4 mg tablet; use as directed  Dispense: 21 each; Refill: 0  -     CT Head Without Contrast; Future; Expected date: 02/28/2023  -     CBC Auto Differential; Future; Expected date: 02/28/2023  -     Comprehensive Metabolic Panel; Future; Expected date: 02/28/2023  -     Sedimentation rate; Future; Expected date: 02/28/2023  -     C-Reactive Protein; Future; Expected date: 02/28/2023    Stage 3 chronic kidney disease, unspecified whether stage 3a or  3b CKD    Essential hypertension   Increase amlodipine to 5mg. Monitor BP closely         Workup scheduled today--to ER for worsening symptoms   Start robaxin (has Rx at home)       Follow up in about 1 week (around 3/7/2023).    Medication List with Changes/Refills   New Medications    METHYLPREDNISOLONE (MEDROL DOSEPACK) 4 MG TABLET    use as directed   Current Medications    ALENDRONATE (FOSAMAX) 70 MG TABLET    Take 1 tablet (70 mg total) by mouth every 7 days.    AMLODIPINE (NORVASC) 5 MG TABLET    Take 2.5 mg by mouth once daily.    ASPIRIN (ECOTRIN) 81 MG EC TABLET    Take 1 tablet (81 mg total) by mouth once daily. for 21 days    BENAZEPRIL (LOTENSIN) 20 MG TABLET    Take 20 mg by mouth once daily.    CALCIUM CARBONATE/VITAMIN D3 (CALCIUM 500 WITH D ORAL)    Take 1 capsule by mouth once daily.    CARBOXYMETHYLCELLULOSE (REFRESH PLUS) 0.5 % DPET    Place 1 drop into both eyes daily as needed (DRY EYES).    CLOPIDOGREL (PLAVIX) 75 MG TABLET    Take 1 tablet (75 mg total) by mouth once daily.    CO-ENZYME Q-10 30 MG CAPSULE    Take 30 mg by mouth 3 (three) times daily.    DICYCLOMINE (BENTYL) 10 MG CAPSULE    Take 1 capsule (10 mg total) by mouth 4 (four) times daily as needed (stomach discomfort).    ESOMEPRAZOLE (NEXIUM) 40 MG CAPSULE    TAKE 1 CAPSULE (40 MG TOTAL) BY MOUTH BEFORE BREAKFAST.    ESTRADIOL (VIVELLE-DOT) 0.075 MG/24 HR    Place 1 patch onto the skin once a week.    EVOLOCUMAB (REPATHA SURECLICK) 140 MG/ML PNIJ    Inject 1 mL (140 mg total) into the skin every 14 (fourteen) days.    EVOLOCUMAB (REPATHA SURECLICK) 140 MG/ML PNIJ    Inject one mL (140 mg) under the skin every 14 days    FERROUS GLUCONATE (FERGON) 324 MG TABLET    Take 1 tablet (324 mg total) by mouth daily with breakfast.    FLUTICASONE PROPIONATE (FLONASE) 50 MCG/ACTUATION NASAL SPRAY    1 spray (50 mcg total) by Each Nostril route once daily.    GARLIC (GARLIQUE ORAL)    Take 1 capsule by mouth Daily.    MULTIVITAMIN (MULTIPLE  VITAMIN ORAL)    Take 1 capsule by mouth once daily.    OXYBUTYNIN (DITROPAN XL) 15 MG TR24        PROMETHAZINE (PHENERGAN) 25 MG TABLET    Take 1 tablet (25 mg total) by mouth every 6 (six) hours as needed for Nausea.    SENNOSIDES (LAXATIVE, SENNOSIDES,) 25 MG TAB    Take by mouth.    SUBOXONE 8-2 MG    Place 1 each under the tongue.    SYNTHROID 100 MCG TABLET    TAKE 1 TABLET EVERY DAY    VALACYCLOVIR (VALTREX) 1000 MG TABLET    Take 1 tablet (1,000 mg total) by mouth once daily. for 7 days

## 2023-03-20 ENCOUNTER — SPECIALTY PHARMACY (OUTPATIENT)
Dept: PHARMACY | Facility: CLINIC | Age: 73
End: 2023-03-20
Payer: MEDICARE

## 2023-03-20 NOTE — TELEPHONE ENCOUNTER
Specialty Pharmacy - Refill Coordination    Specialty Medication Orders Linked to Encounter      Flowsheet Row Most Recent Value   Medication #1 evolocumab (REPATHA SURECLICK) 140 mg/mL PnIj (Order#[id number], Rx#[id number])            Refill Questions - Documented Responses      Flowsheet Row Most Recent Value   Refill Screening Questions    Would patient like to speak to a pharmacist? No   When does the patient need to receive the medication? 03/29/23   Refill Delivery Questions    How will the patient receive the medication? [facility]   When does the patient need to receive the medication? 03/29/23   Address in [facility] confirmed and updated if neccessary? Yes   Expected Copay ($) 0   Is the patient able to afford the medication copay? Yes   Payment Method zero copay   Days supply of Refill 28   Supplies needed? No supplies needed   Refill activity completed? Yes   Refill activity plan Refill scheduled   Shipment/Pickup Date: 03/23/23            Current Outpatient Medications   Medication Sig    alendronate (FOSAMAX) 70 MG tablet Take 1 tablet (70 mg total) by mouth every 7 days.    amLODIPine (NORVASC) 5 MG tablet Take 2.5 mg by mouth once daily.    aspirin (ECOTRIN) 81 MG EC tablet Take 1 tablet (81 mg total) by mouth once daily. for 21 days    benazepriL (LOTENSIN) 20 MG tablet Take 20 mg by mouth once daily.    CALCIUM CARBONATE/VITAMIN D3 (CALCIUM 500 WITH D ORAL) Take 1 capsule by mouth once daily.    carboxymethylcellulose (REFRESH PLUS) 0.5 % Dpet Place 1 drop into both eyes daily as needed (DRY EYES).    clopidogreL (PLAVIX) 75 mg tablet Take 1 tablet (75 mg total) by mouth once daily.    co-enzyme Q-10 30 mg capsule Take 30 mg by mouth 3 (three) times daily.    dicyclomine (BENTYL) 10 MG capsule Take 1 capsule (10 mg total) by mouth 4 (four) times daily as needed (stomach discomfort).    esomeprazole (NEXIUM) 40 MG capsule TAKE 1 CAPSULE (40 MG TOTAL) BY MOUTH BEFORE BREAKFAST.    estradioL  (VIVELLE-DOT) 0.075 mg/24 hr Place 1 patch onto the skin once a week.    evolocumab (REPATHA SURECLICK) 140 mg/mL PnIj Inject 1 mL (140 mg total) into the skin every 14 (fourteen) days.    evolocumab (REPATHA SURECLICK) 140 mg/mL PnIj Inject one mL (140 mg) under the skin every 14 days    ferrous gluconate (FERGON) 324 MG tablet Take 1 tablet (324 mg total) by mouth daily with breakfast.    fluticasone propionate (FLONASE) 50 mcg/actuation nasal spray 1 spray (50 mcg total) by Each Nostril route once daily. (Patient taking differently: 1 spray by Each Nostril route every evening.)    garlic (GARLIQUE ORAL) Take 1 capsule by mouth Daily.    methylPREDNISolone (MEDROL DOSEPACK) 4 mg tablet use as directed    MULTIVITAMIN (MULTIPLE VITAMIN ORAL) Take 1 capsule by mouth once daily.    oxybutynin (DITROPAN XL) 15 MG TR24 TAKE 1 TABLET (15 MG TOTAL) BY MOUTH EVERY MORNING.    promethazine (PHENERGAN) 25 MG tablet Take 1 tablet (25 mg total) by mouth every 6 (six) hours as needed for Nausea.    sennosides (LAXATIVE, SENNOSIDES,) 25 mg Tab Take by mouth.    SUBOXONE 8-2 mg Place 1 each under the tongue.    SYNTHROID 100 mcg tablet TAKE 1 TABLET EVERY DAY (Patient taking differently: Take 100 mcg by mouth before breakfast. **BRAND MEDICALLY NECESSARY**)    valACYclovir (VALTREX) 1000 MG tablet Take 1 tablet (1,000 mg total) by mouth once daily. for 7 days   Last reviewed on 2/28/2023  9:47 AM by Celio Ojeda MA    Review of patient's allergies indicates:   Allergen Reactions    Lyrica [pregabalin] Swelling    Pcn [penicillins] Swelling    Toradol [ketorolac] Swelling    Vibramycin [doxycycline calcium] Shortness Of Breath and Swelling    Zetia [ezetimibe] Other (See Comments)    Crestor [rosuvastatin]      Body aches    Cymbalta [duloxetine]      dizzyness    Elavil [amitriptyline] Other (See Comments)     Restless leg    Pravastatin Other (See Comments)     Flu -like symptoms   Body aches     Seroquel [quetiapine]       restless leg symdrome    Last reviewed on  2/28/2023 9:45 AM by Celio Ojeda      Tasks added this encounter   4/19/2023 - Refill Call (Auto Added)   Tasks due within next 3 months   No tasks due.     Gudelia Pat, PharmD  [facility]  [address]  [facility] [address]  Phone: [phone]  Fax: [phone]

## 2023-03-21 ENCOUNTER — OFFICE VISIT (OUTPATIENT)
Dept: FAMILY MEDICINE | Facility: CLINIC | Age: 73
End: 2023-03-21
Payer: MEDICARE

## 2023-03-21 VITALS
HEIGHT: 61 IN | SYSTOLIC BLOOD PRESSURE: 128 MMHG | WEIGHT: 133.38 LBS | DIASTOLIC BLOOD PRESSURE: 76 MMHG | OXYGEN SATURATION: 97 % | TEMPERATURE: 98 F | HEART RATE: 67 BPM | BODY MASS INDEX: 25.18 KG/M2

## 2023-03-21 DIAGNOSIS — R51.9 ACUTE INTRACTABLE HEADACHE, UNSPECIFIED HEADACHE TYPE: Primary | ICD-10-CM

## 2023-03-21 DIAGNOSIS — N93.0 POSTCOITAL BLEEDING: ICD-10-CM

## 2023-03-21 DIAGNOSIS — I10 ESSENTIAL (PRIMARY) HYPERTENSION: ICD-10-CM

## 2023-03-21 DIAGNOSIS — Z86.73 HISTORY OF CVA (CEREBROVASCULAR ACCIDENT): ICD-10-CM

## 2023-03-21 DIAGNOSIS — N18.30 STAGE 3 CHRONIC KIDNEY DISEASE, UNSPECIFIED WHETHER STAGE 3A OR 3B CKD: ICD-10-CM

## 2023-03-21 PROCEDURE — 99214 OFFICE O/P EST MOD 30 MIN: CPT | Mod: S$PBB,,, | Performed by: NURSE PRACTITIONER

## 2023-03-21 PROCEDURE — 99999 PR PBB SHADOW E&M-EST. PATIENT-LVL V: CPT | Mod: PBBFAC,,, | Performed by: NURSE PRACTITIONER

## 2023-03-21 PROCEDURE — 99214 PR OFFICE/OUTPT VISIT, EST, LEVL IV, 30-39 MIN: ICD-10-PCS | Mod: S$PBB,,, | Performed by: NURSE PRACTITIONER

## 2023-03-21 PROCEDURE — 99999 PR PBB SHADOW E&M-EST. PATIENT-LVL V: ICD-10-PCS | Mod: PBBFAC,,, | Performed by: NURSE PRACTITIONER

## 2023-03-21 PROCEDURE — 99215 OFFICE O/P EST HI 40 MIN: CPT | Mod: PBBFAC,PO | Performed by: NURSE PRACTITIONER

## 2023-03-21 NOTE — PROGRESS NOTES
"Subjective:       Patient ID: Osman Johansen is a 73 y.o. female.    Chief Complaint: Headache    HPI  Recall I saw patient for acute intractable headache  Workup including labs and CT head unremarkable  Headache improved but remains- mild intermittent since.     Patient reports vaginal bleeding with wiping x >6 months  Saw GYN who associated with vaginal atrophy, recommended lubricants. Urine at that time negative.  She is requesting urology eval--feels blood is from urinary tract. Denies hematuria, dysuria or frequency     Vitals:    03/21/23 1422   BP: 128/76   Pulse: 67   Temp: 97.8 °F (36.6 °C)     Review of Systems   Constitutional:  Negative for fever.   Genitourinary:  Positive for vaginal bleeding.   Neurological:  Positive for headaches.     Past Medical History:   Diagnosis Date    Allergy     Anemia     Anxiety     Arthritis     Blood transfusion 8 yrs    CAD (coronary artery disease)     Cataract     Chronic diastolic CHF (congestive heart failure)     CKD (chronic kidney disease), stage II     COPD (chronic obstructive pulmonary disease)     mild no inhalers    Degenerative disc disease     Depression     Dry eye syndrome     Fibromyalgia     Fluid overload     GERD (gastroesophageal reflux disease)     Hypercholesterolemia 12/09/2019    Hypertension     Hypothyroidism     IBS (irritable bowel syndrome)     Lower extremity edema     Macular drusen     Neuromuscular disorder     Ocular hypertension, bilateral     Osteoporosis     Pleural effusion     PUD (peptic ulcer disease)     Restless leg     Uses Suboxone to control    Sleep apnea     "complex sleep apnea" - per pt, no cpap    Thoracic or lumbosacral neuritis or radiculitis 10/19/2012    Thyroid disease     hashimoto's     Objective:      Physical Exam  Constitutional:       General: She is not in acute distress.     Appearance: She is well-developed. She is not ill-appearing, toxic-appearing or diaphoretic.   HENT:      Right Ear: Hearing " normal.      Left Ear: Hearing normal.   Pulmonary:      Effort: No tachypnea or respiratory distress.   Skin:     Coloration: Skin is not pale.   Neurological:      Mental Status: She is alert and oriented to person, place, and time.   Psychiatric:         Speech: Speech normal.         Behavior: Behavior normal.         Thought Content: Thought content normal.         Judgment: Judgment normal.       Assessment:       1. Acute intractable headache, unspecified headache type    2. Postcoital bleeding        Plan:     Osman was seen today for headache.    Diagnoses and all orders for this visit:    Acute intractable headache, unspecified headache type  -     Ambulatory referral/consult to Neurology; Future    Postcoital bleeding  -     Ambulatory referral/consult to Urology; Future    Essential (primary) hypertension   controlled    History of CVA (cerebrovascular accident)    Stage 3 chronic kidney disease, unspecified whether stage 3a or 3b CKD   stable        education provided on supportive care, symptom monitoring and return precautions       Medication List with Changes/Refills   Current Medications    ALENDRONATE (FOSAMAX) 70 MG TABLET    Take 1 tablet (70 mg total) by mouth every 7 days.    AMLODIPINE (NORVASC) 5 MG TABLET    Take 2.5 mg by mouth once daily.    ASPIRIN (ECOTRIN) 81 MG EC TABLET    Take 1 tablet (81 mg total) by mouth once daily. for 21 days    BENAZEPRIL (LOTENSIN) 20 MG TABLET    Take 20 mg by mouth once daily.    CALCIUM CARBONATE/VITAMIN D3 (CALCIUM 500 WITH D ORAL)    Take 1 capsule by mouth once daily.    CARBOXYMETHYLCELLULOSE (REFRESH PLUS) 0.5 % DPET    Place 1 drop into both eyes daily as needed (DRY EYES).    CLOPIDOGREL (PLAVIX) 75 MG TABLET    Take 1 tablet (75 mg total) by mouth once daily.    CO-ENZYME Q-10 30 MG CAPSULE    Take 30 mg by mouth 3 (three) times daily.    DICYCLOMINE (BENTYL) 10 MG CAPSULE    Take 1 capsule (10 mg total) by mouth 4 (four) times daily as needed  (stomach discomfort).    ESOMEPRAZOLE (NEXIUM) 40 MG CAPSULE    TAKE 1 CAPSULE (40 MG TOTAL) BY MOUTH BEFORE BREAKFAST.    ESTRADIOL (VIVELLE-DOT) 0.075 MG/24 HR    Place 1 patch onto the skin once a week.    EVOLOCUMAB (REPATHA SURECLICK) 140 MG/ML PNIJ    Inject 1 mL (140 mg total) into the skin every 14 (fourteen) days.    EVOLOCUMAB (REPATHA SURECLICK) 140 MG/ML PNIJ    Inject one mL (140 mg) under the skin every 14 days    FERROUS GLUCONATE (FERGON) 324 MG TABLET    Take 1 tablet (324 mg total) by mouth daily with breakfast.    FLUTICASONE PROPIONATE (FLONASE) 50 MCG/ACTUATION NASAL SPRAY    1 spray (50 mcg total) by Each Nostril route once daily.    GARLIC (GARLIQUE ORAL)    Take 1 capsule by mouth Daily.    MULTIVITAMIN (MULTIPLE VITAMIN ORAL)    Take 1 capsule by mouth once daily.    OXYBUTYNIN (DITROPAN XL) 15 MG TR24    TAKE 1 TABLET (15 MG TOTAL) BY MOUTH EVERY MORNING.    SENNOSIDES (LAXATIVE, SENNOSIDES,) 25 MG TAB    Take by mouth.    SUBOXONE 8-2 MG    Place 1 each under the tongue.    SYNTHROID 100 MCG TABLET    TAKE 1 TABLET EVERY DAY    VALACYCLOVIR (VALTREX) 1000 MG TABLET    Take 1 tablet (1,000 mg total) by mouth once daily. for 7 days   Discontinued Medications    METHYLPREDNISOLONE (MEDROL DOSEPACK) 4 MG TABLET    use as directed    PROMETHAZINE (PHENERGAN) 25 MG TABLET    Take 1 tablet (25 mg total) by mouth every 6 (six) hours as needed for Nausea.

## 2023-03-22 ENCOUNTER — CLINICAL SUPPORT (OUTPATIENT)
Dept: CARDIOLOGY | Facility: HOSPITAL | Age: 73
End: 2023-03-22
Payer: MEDICARE

## 2023-03-22 ENCOUNTER — PATIENT MESSAGE (OUTPATIENT)
Dept: FAMILY MEDICINE | Facility: CLINIC | Age: 73
End: 2023-03-22
Payer: MEDICARE

## 2023-03-22 DIAGNOSIS — N93.0 POSTCOITAL BLEEDING: Primary | ICD-10-CM

## 2023-03-22 DIAGNOSIS — Z95.818 PRESENCE OF OTHER CARDIAC IMPLANTS AND GRAFTS: ICD-10-CM

## 2023-04-03 RX ORDER — BENAZEPRIL HYDROCHLORIDE 20 MG/1
20 TABLET ORAL DAILY
Qty: 90 TABLET | Refills: 0 | Status: SHIPPED | OUTPATIENT
Start: 2023-04-03 | End: 2023-06-01 | Stop reason: ALTCHOICE

## 2023-04-03 NOTE — TELEPHONE ENCOUNTER
----- Message from Ghulam Pappas sent at 4/3/2023  2:11 PM CDT -----  Contact: [facility]  Type: Needs Medical Advice  Who Called:  [facility]    Pharmacy name and phone #:    [facility] - [place], [place] - [address]   [address]  [facility] [address]  Phone: [phone] Fax: [phone]          Best Call Back Number: [phone]  Additional Information: [facility] states they sent over a fax last week for benazepriL (LOTENSIN) 20 MG tablet to be refilled. Please advise.

## 2023-04-04 ENCOUNTER — PATIENT MESSAGE (OUTPATIENT)
Dept: FAMILY MEDICINE | Facility: CLINIC | Age: 73
End: 2023-04-04
Payer: MEDICARE

## 2023-04-04 NOTE — TELEPHONE ENCOUNTER
Requesting new script for robaxin shows was last prescribed in 2021    Please advise thank you     Did pend previous incase

## 2023-04-05 RX ORDER — METHOCARBAMOL 750 MG/1
750 TABLET, FILM COATED ORAL 4 TIMES DAILY
Qty: 40 TABLET | Refills: 3 | Status: SHIPPED | OUTPATIENT
Start: 2023-04-05 | End: 2023-07-21

## 2023-04-10 ENCOUNTER — TELEPHONE (OUTPATIENT)
Dept: CARDIOLOGY | Facility: HOSPITAL | Age: 73
End: 2023-04-10
Payer: MEDICARE

## 2023-04-10 NOTE — TELEPHONE ENCOUNTER
Call placed to patient in regards to notification received of Symptom button 3/22/23 on billable report    3/22/23, c/w SR/SB with occasional PVC      Patient states her BP was low that time, per her log she keeps, 87/47 63bpm, back to baseline today

## 2023-04-11 ENCOUNTER — PATIENT MESSAGE (OUTPATIENT)
Dept: ADMINISTRATIVE | Facility: HOSPITAL | Age: 73
End: 2023-04-11
Payer: MEDICARE

## 2023-04-19 ENCOUNTER — SPECIALTY PHARMACY (OUTPATIENT)
Dept: PHARMACY | Facility: CLINIC | Age: 73
End: 2023-04-19
Payer: MEDICARE

## 2023-04-19 RX ORDER — EVOLOCUMAB 140 MG/ML
INJECTION, SOLUTION SUBCUTANEOUS
Qty: 2 EACH | Refills: 1 | Status: SHIPPED | OUTPATIENT
Start: 2023-04-19 | End: 2023-06-12 | Stop reason: SDUPTHER

## 2023-04-19 NOTE — TELEPHONE ENCOUNTER
Specialty Pharmacy - Refill Coordination    Specialty Medication Orders Linked to Encounter      Flowsheet Row Most Recent Value   Medication #1 evolocumab (REPATHA SURECLICK) 140 mg/mL PnIj (Order#[id number], Rx#[id number])            Refill Questions - Documented Responses      Flowsheet Row Most Recent Value   Patient Availability and HIPAA Verification    Does patient want to proceed with activity? Yes   HIPAA/medical authority confirmed? Yes   Relationship to patient of person spoken to? Self   Refill Screening Questions    Would patient like to speak to a pharmacist? No   When does the patient need to receive the medication? 04/26/23   Refill Delivery Questions    How will the patient receive the medication? [facility]   When does the patient need to receive the medication? 04/26/23   Shipping Address Home   Address in [facility] confirmed and updated if neccessary? Yes   Expected Copay ($) 0   Is the patient able to afford the medication copay? Yes   Payment Method zero copay   Days supply of Refill 28   Supplies needed? No supplies needed   Refill activity completed? Yes   Refill activity plan Refill scheduled   Shipment/Pickup Date: 04/21/23            Current Outpatient Medications   Medication Sig    alendronate (FOSAMAX) 70 MG tablet Take 1 tablet (70 mg total) by mouth every 7 days.    amLODIPine (NORVASC) 5 MG tablet Take 2.5 mg by mouth once daily.    aspirin (ECOTRIN) 81 MG EC tablet Take 1 tablet (81 mg total) by mouth once daily. for 21 days    benazepriL (LOTENSIN) 20 MG tablet Take 1 tablet (20 mg total) by mouth once daily.    CALCIUM CARBONATE/VITAMIN D3 (CALCIUM 500 WITH D ORAL) Take 1 capsule by mouth once daily.    carboxymethylcellulose (REFRESH PLUS) 0.5 % Dpet Place 1 drop into both eyes daily as needed (DRY EYES).    clopidogreL (PLAVIX) 75 mg tablet Take 1 tablet (75 mg total) by mouth once daily.    co-enzyme Q-10 30 mg capsule Take 30 mg by mouth 3 (three) times daily.     dicyclomine (BENTYL) 10 MG capsule Take 1 capsule (10 mg total) by mouth 4 (four) times daily as needed (stomach discomfort).    esomeprazole (NEXIUM) 40 MG capsule TAKE 1 CAPSULE (40 MG TOTAL) BY MOUTH BEFORE BREAKFAST.    estradioL (VIVELLE-DOT) 0.075 mg/24 hr Place 1 patch onto the skin once a week.    evolocumab (REPATHA SURECLICK) 140 mg/mL PnIj Inject 1 mL (140 mg total) into the skin every 14 (fourteen) days.    evolocumab (REPATHA SURECLICK) 140 mg/mL PnIj Inject one mL (140 mg) under the skin every 14 days    ferrous gluconate (FERGON) 324 MG tablet Take 1 tablet (324 mg total) by mouth daily with breakfast.    fluticasone propionate (FLONASE) 50 mcg/actuation nasal spray 1 spray (50 mcg total) by Each Nostril route once daily. (Patient taking differently: 1 spray by Each Nostril route every evening.)    garlic (GARLIQUE ORAL) Take 1 capsule by mouth Daily.    MULTIVITAMIN (MULTIPLE VITAMIN ORAL) Take 1 capsule by mouth once daily.    oxybutynin (DITROPAN XL) 15 MG TR24 TAKE 1 TABLET (15 MG TOTAL) BY MOUTH EVERY MORNING.    sennosides (LAXATIVE, SENNOSIDES,) 25 mg Tab Take by mouth.    SUBOXONE 8-2 mg Place 1 each under the tongue.    SYNTHROID 100 mcg tablet TAKE 1 TABLET EVERY DAY (Patient taking differently: Take 100 mcg by mouth before breakfast. **BRAND MEDICALLY NECESSARY**)    valACYclovir (VALTREX) 1000 MG tablet Take 1 tablet (1,000 mg total) by mouth once daily. for 7 days   Last reviewed on 3/21/2023  2:21 PM by Celio Ojeda MA    Review of patient's allergies indicates:   Allergen Reactions    Lyrica [pregabalin] Swelling    Pcn [penicillins] Swelling    Toradol [ketorolac] Swelling    Vibramycin [doxycycline calcium] Shortness Of Breath and Swelling    Zetia [ezetimibe] Other (See Comments)    Crestor [rosuvastatin]      Body aches    Cymbalta [duloxetine]      dizzyness    Elavil [amitriptyline] Other (See Comments)     Restless leg    Pravastatin Other (See Comments)     Flu -like symptoms    Body aches     Seroquel [quetiapine]      restless leg symdrome    Last reviewed on  4/5/2023 4:20 PM by Galindo Stiles      Tasks added this encounter   5/17/2023 - Refill Call (Auto Added)   Tasks due within next 3 months   No tasks due.     [facility]  [address]  [facility] [address]  Phone: [phone]  Fax: [phone]

## 2023-04-21 ENCOUNTER — CLINICAL SUPPORT (OUTPATIENT)
Dept: CARDIOLOGY | Facility: HOSPITAL | Age: 73
End: 2023-04-21
Payer: MEDICARE

## 2023-04-21 DIAGNOSIS — Z95.818 PRESENCE OF OTHER CARDIAC IMPLANTS AND GRAFTS: ICD-10-CM

## 2023-04-21 PROCEDURE — 93298 REM INTERROG DEV EVAL SCRMS: CPT | Mod: ,,, | Performed by: INTERNAL MEDICINE

## 2023-04-21 PROCEDURE — 93298 CARDIAC DEVICE CHECK - REMOTE: ICD-10-PCS | Mod: ,,, | Performed by: INTERNAL MEDICINE

## 2023-04-24 ENCOUNTER — TELEPHONE (OUTPATIENT)
Dept: CARDIOLOGY | Facility: HOSPITAL | Age: 73
End: 2023-04-24
Payer: MEDICARE

## 2023-04-24 NOTE — TELEPHONE ENCOUNTER
"ILR Report.  Monitoring period:  04/20/23-04/22/23    Battery Status: OK    Device Defined Counters:    Symptom Events: x 2  EGMs illustrate SR/SB.                                 Symptoms:Pt. Reports she had "mild chest pain underneath her left breast shooting through my back, like angina. It didn't last long and was not like before I had my surgery"      Most recent BP this am-150/78      Meds:  Aspirin 81 mg  Lotensin 20 mg daily  Plavix 75 mg daily  Pt. Reports she takes 1/4 of Amlodipine       Message sent to Dr. Nugent for review    "

## 2023-04-28 ENCOUNTER — TELEPHONE (OUTPATIENT)
Dept: CARDIOLOGY | Facility: HOSPITAL | Age: 73
End: 2023-04-28
Payer: MEDICARE

## 2023-04-28 NOTE — TELEPHONE ENCOUNTER
ILR Report   Monitoring period: 4/22/23-4/26/23    Battery Status: OK     Device Defined Counters:    Symptom events: 1  EGM illustrates SR/SB on 4/26/2023 @ 1044 pm                            V rate histogram:            Symptom: per symptom comment shortness of breath    Message sent to Dr. Nugent for review of symptom

## 2023-05-09 ENCOUNTER — OFFICE VISIT (OUTPATIENT)
Dept: NEUROLOGY | Facility: CLINIC | Age: 73
End: 2023-05-09
Payer: MEDICARE

## 2023-05-09 VITALS
RESPIRATION RATE: 17 BRPM | WEIGHT: 128.44 LBS | HEIGHT: 61 IN | SYSTOLIC BLOOD PRESSURE: 134 MMHG | BODY MASS INDEX: 24.25 KG/M2 | HEART RATE: 83 BPM | DIASTOLIC BLOOD PRESSURE: 79 MMHG

## 2023-05-09 DIAGNOSIS — M54.81 OCCIPITAL NEURALGIA, UNSPECIFIED LATERALITY: ICD-10-CM

## 2023-05-09 DIAGNOSIS — I10 ESSENTIAL (PRIMARY) HYPERTENSION: ICD-10-CM

## 2023-05-09 DIAGNOSIS — G47.31 COMPLEX SLEEP APNEA SYNDROME: ICD-10-CM

## 2023-05-09 DIAGNOSIS — G43.719 INTRACTABLE CHRONIC MIGRAINE WITHOUT AURA AND WITHOUT STATUS MIGRAINOSUS: Primary | ICD-10-CM

## 2023-05-09 DIAGNOSIS — R51.9 ACUTE INTRACTABLE HEADACHE, UNSPECIFIED HEADACHE TYPE: ICD-10-CM

## 2023-05-09 DIAGNOSIS — Z95.1 S/P CABG (CORONARY ARTERY BYPASS GRAFT): ICD-10-CM

## 2023-05-09 DIAGNOSIS — I25.119 CORONARY ARTERY DISEASE INVOLVING NATIVE CORONARY ARTERY OF NATIVE HEART WITH ANGINA PECTORIS: ICD-10-CM

## 2023-05-09 PROCEDURE — 99999 PR PBB SHADOW E&M-EST. PATIENT-LVL V: CPT | Mod: PBBFAC,,, | Performed by: NURSE PRACTITIONER

## 2023-05-09 PROCEDURE — 99215 OFFICE O/P EST HI 40 MIN: CPT | Mod: PBBFAC,PO | Performed by: NURSE PRACTITIONER

## 2023-05-09 PROCEDURE — 99999 PR PBB SHADOW E&M-EST. PATIENT-LVL V: ICD-10-PCS | Mod: PBBFAC,,, | Performed by: NURSE PRACTITIONER

## 2023-05-09 PROCEDURE — 99215 PR OFFICE/OUTPT VISIT, EST, LEVL V, 40-54 MIN: ICD-10-PCS | Mod: S$PBB,,, | Performed by: NURSE PRACTITIONER

## 2023-05-09 PROCEDURE — 99215 OFFICE O/P EST HI 40 MIN: CPT | Mod: S$PBB,,, | Performed by: NURSE PRACTITIONER

## 2023-05-09 RX ORDER — UBROGEPANT 50 MG/1
TABLET ORAL
Qty: 16 TABLET | Refills: 11 | Status: SHIPPED | OUTPATIENT
Start: 2023-05-09 | End: 2023-08-10 | Stop reason: ALTCHOICE

## 2023-05-09 NOTE — ASSESSMENT & PLAN NOTE
Blood pressure controlled today. She reports bradycardia recently on LOOP recorder. Cannot do beta blocker.

## 2023-05-09 NOTE — PATIENT INSTRUCTIONS
Please call our clinic at [phone] or send a message on the [employer] portal if there are any changes to the plan described below, for example,if you are not contacted for the requested tests, referral(s) within one week, if you are unable to receive the medications prescribed, or if you feel you need to change the treatment course for any reason.     TESTING:  -- none at this time    REFERRALS:  -- follow up with Dr. Stiles for CPAP supplies. Untreated sleep apnea can lead to heart issues and difficult to treat headaches  -- follow up with Dr. Belcher about getting repeat injections    PREVENTION (use daily regardless of headache):  -- continue current medications  -- start magnesium in ONE of the following preparations -               1. Magnesium oxide 800mg daily (the most common over the counter kind, may causes loose stools)              2. Magnesium citrate 400-500mg daily (harder to find, but more neutral on the bowels)              3. Magnesium glycinate 400mg daily (hardest to find, look online, but most bowel-neutral, best absorbed)     AS-NEEDED TREATMENT (use total no more than 10 days per month unless otherwise stated):  -- TRIAL Ubrelvy with next migraine. You can repeat two hours later if needed. With this medication do not drink grapefruit juice or eat grapefruit or some medications like ketoconazole, itraconazole, or antibiotics clarithromycin

## 2023-05-09 NOTE — ASSESSMENT & PLAN NOTE
Not currently on CPAP. We discussed untreated sleep apnea can lead to difficult to treat headaches. She will follow up with .

## 2023-05-09 NOTE — ASSESSMENT & PLAN NOTE
Non tender today. Occipital nerve blocks typically only provide relief with patient having active pain.
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
## 2023-05-11 ENCOUNTER — TELEPHONE (OUTPATIENT)
Dept: PHARMACY | Facility: CLINIC | Age: 73
End: 2023-05-11
Payer: MEDICARE

## 2023-05-11 ENCOUNTER — TELEPHONE (OUTPATIENT)
Dept: CARDIOLOGY | Facility: HOSPITAL | Age: 73
End: 2023-05-11
Payer: MEDICARE

## 2023-05-11 NOTE — TELEPHONE ENCOUNTER
"ILR Report  Monitoring period:5/7/23-5/7/23    Battery Status:OK    Device Defined Counters:  Symptom Event:1  EGM illustrate NSR,SB, PACs.    Called pt. To notify symptom was sinus (reviewed with Dr. Nugent) Pt. Reports she has been having chest pain for 3 days. She said it is different this time. She describes it as throbbing pain that radiates under her left armpit, left breast in a "u shape" She said her BP has also been high as high as 197/95 this morning it was 179/85. She said she is normally able to get her BP down by taking an extra 1/4 of Amlodipine. She denies shortness of breath. She reports headaches, but she just saw her neurologist and she said they ran some tests. Discussed ED precautions with pt should chest pain worsen or persist., she verbalized understanding. She currently denies active chest pain        Pt. Scheduled for f/u with  6/1  "

## 2023-05-15 ENCOUNTER — SPECIALTY PHARMACY (OUTPATIENT)
Dept: PHARMACY | Facility: CLINIC | Age: 73
End: 2023-05-15
Payer: MEDICARE

## 2023-05-15 NOTE — TELEPHONE ENCOUNTER
Specialty Pharmacy - Refill Coordination    Specialty Medication Orders Linked to Encounter      Flowsheet Row Most Recent Value   Medication #1 evolocumab (REPATHA SURECLICK) 140 mg/mL PnIj (Order#[id number], Rx#[id number])            Refill Questions - Documented Responses      Flowsheet Row Most Recent Value   Refill Screening Questions    Would patient like to speak to a pharmacist? No   When does the patient need to receive the medication? 05/25/23   Refill Delivery Questions    How will the patient receive the medication? [facility]   When does the patient need to receive the medication? 05/25/23   Shipping Address Home   Address in [facility] confirmed and updated if neccessary? Yes   Expected Copay ($) 0   Is the patient able to afford the medication copay? Yes   Payment Method zero copay   Days supply of Refill 28   Supplies needed? No supplies needed   Refill activity completed? Yes   Refill activity plan Refill scheduled   Shipment/Pickup Date: 05/22/23            Current Outpatient Medications   Medication Sig    alendronate (FOSAMAX) 70 MG tablet Take 1 tablet (70 mg total) by mouth every 7 days.    amLODIPine (NORVASC) 5 MG tablet Take 2.5 mg by mouth once daily.    aspirin (ECOTRIN) 81 MG EC tablet Take 1 tablet (81 mg total) by mouth once daily. for 21 days    benazepriL (LOTENSIN) 20 MG tablet Take 1 tablet (20 mg total) by mouth once daily.    CALCIUM CARBONATE/VITAMIN D3 (CALCIUM 500 WITH D ORAL) Take 1 capsule by mouth once daily.    carboxymethylcellulose (REFRESH PLUS) 0.5 % Dpet Place 1 drop into both eyes daily as needed (DRY EYES).    clopidogreL (PLAVIX) 75 mg tablet Take 1 tablet (75 mg total) by mouth once daily.    co-enzyme Q-10 30 mg capsule Take 30 mg by mouth 3 (three) times daily.    dicyclomine (BENTYL) 10 MG capsule Take 1 capsule (10 mg total) by mouth 4 (four) times daily as needed (stomach discomfort).    esomeprazole (NEXIUM) 40 MG capsule TAKE 1 CAPSULE (40 MG TOTAL) BY  MOUTH BEFORE BREAKFAST.    estradioL (VIVELLE-DOT) 0.075 mg/24 hr Place 1 patch onto the skin once a week.    evolocumab (REPATHA SURECLICK) 140 mg/mL PnIj Inject 1 mL (140 mg) under the skin every 14 days    ferrous gluconate (FERGON) 324 MG tablet Take 1 tablet (324 mg total) by mouth daily with breakfast.    fluticasone propionate (FLONASE) 50 mcg/actuation nasal spray 1 spray (50 mcg total) by Each Nostril route once daily. (Patient taking differently: 1 spray by Each Nostril route every evening.)    garlic (GARLIQUE ORAL) Take 1 capsule by mouth Daily.    MULTIVITAMIN (MULTIPLE VITAMIN ORAL) Take 1 capsule by mouth once daily.    oxybutynin (DITROPAN XL) 15 MG TR24 TAKE 1 TABLET (15 MG TOTAL) BY MOUTH EVERY MORNING.    sennosides (LAXATIVE, SENNOSIDES,) 25 mg Tab Take by mouth.    SUBOXONE 8-2 mg Place 1 each under the tongue.    SYNTHROID 100 mcg tablet TAKE 1 TABLET EVERY DAY (Patient taking differently: Take 100 mcg by mouth before breakfast. **BRAND MEDICALLY NECESSARY**)    ubrogepant (UBRELVY) 50 mg tablet Take 1 tablet by mouth at onset of migraine. May repeat in 2 hours if needed. Max 2 tablets per day.    valACYclovir (VALTREX) 1000 MG tablet Take 1 tablet (1,000 mg total) by mouth once daily. for 7 days   Last reviewed on 5/9/2023  1:11 PM by Irma Meek MA    Review of patient's allergies indicates:   Allergen Reactions    Lyrica [pregabalin] Swelling    Pcn [penicillins] Swelling    Toradol [ketorolac] Swelling    Vibramycin [doxycycline calcium] Shortness Of Breath and Swelling    Zetia [ezetimibe] Other (See Comments)    Crestor [rosuvastatin]      Body aches    Cymbalta [duloxetine]      dizzyness    Elavil [amitriptyline] Other (See Comments)     Restless leg    Pravastatin Other (See Comments)     Flu -like symptoms   Body aches     Seroquel [quetiapine]      restless leg symdrome    Last reviewed on  5/9/2023 1:14 PM by Fabiana Stiles      Tasks added this encounter   No tasks added.    Tasks due within next 3 months   5/17/2023 - Refill Coordination Outreach (1 time occurrence)     [facility]  [address]  [facility] [address]  Phone: [phone]  Fax: [phone]

## 2023-05-17 ENCOUNTER — TELEPHONE (OUTPATIENT)
Dept: NEUROLOGY | Facility: CLINIC | Age: 73
End: 2023-05-17
Payer: MEDICARE

## 2023-05-17 NOTE — TELEPHONE ENCOUNTER
Called patient and notified that the appointment for her 3 month follow up would need to be rescheduled since provider will be out of the office. Rescheduled. New Date/Time confirmed. Verbalized understanding.

## 2023-05-21 ENCOUNTER — CLINICAL SUPPORT (OUTPATIENT)
Dept: CARDIOLOGY | Facility: HOSPITAL | Age: 73
End: 2023-05-21
Payer: MEDICARE

## 2023-05-21 DIAGNOSIS — Z95.818 PRESENCE OF OTHER CARDIAC IMPLANTS AND GRAFTS: ICD-10-CM

## 2023-05-30 ENCOUNTER — LAB VISIT (OUTPATIENT)
Dept: LAB | Facility: HOSPITAL | Age: 73
End: 2023-05-30
Attending: INTERNAL MEDICINE
Payer: MEDICARE

## 2023-05-30 DIAGNOSIS — E03.4 HYPOTHYROIDISM DUE TO ACQUIRED ATROPHY OF THYROID: ICD-10-CM

## 2023-05-30 DIAGNOSIS — E78.5 DYSLIPIDEMIA: ICD-10-CM

## 2023-05-30 DIAGNOSIS — E11.9 CONTROLLED TYPE 2 DIABETES MELLITUS WITHOUT COMPLICATION, WITHOUT LONG-TERM CURRENT USE OF INSULIN: ICD-10-CM

## 2023-05-30 DIAGNOSIS — I10 ESSENTIAL HYPERTENSION: ICD-10-CM

## 2023-05-30 DIAGNOSIS — Z79.899 ENCOUNTER FOR LONG-TERM (CURRENT) USE OF MEDICATIONS: ICD-10-CM

## 2023-05-30 LAB
ALBUMIN SERPL BCP-MCNC: 3.9 G/DL (ref 3.5–5.2)
ALP SERPL-CCNC: 61 U/L (ref 55–135)
ALT SERPL W/O P-5'-P-CCNC: 13 U/L (ref 10–44)
ANION GAP SERPL CALC-SCNC: 9 MMOL/L (ref 8–16)
AST SERPL-CCNC: 20 U/L (ref 10–40)
BASOPHILS # BLD AUTO: 0.08 K/UL (ref 0–0.2)
BASOPHILS NFR BLD: 1.1 % (ref 0–1.9)
BILIRUB SERPL-MCNC: 0.3 MG/DL (ref 0.1–1)
BUN SERPL-MCNC: 18 MG/DL (ref 8–23)
CALCIUM SERPL-MCNC: 9.9 MG/DL (ref 8.7–10.5)
CHLORIDE SERPL-SCNC: 105 MMOL/L (ref 95–110)
CHOLEST SERPL-MCNC: 190 MG/DL (ref 120–199)
CHOLEST/HDLC SERPL: 1.9 {RATIO} (ref 2–5)
CO2 SERPL-SCNC: 25 MMOL/L (ref 23–29)
CREAT SERPL-MCNC: 1.2 MG/DL (ref 0.5–1.4)
DIFFERENTIAL METHOD: ABNORMAL
EOSINOPHIL # BLD AUTO: 0.1 K/UL (ref 0–0.5)
EOSINOPHIL NFR BLD: 1.6 % (ref 0–8)
ERYTHROCYTE [DISTWIDTH] IN BLOOD BY AUTOMATED COUNT: 13.1 % (ref 11.5–14.5)
EST. GFR  (NO RACE VARIABLE): 47.5 ML/MIN/1.73 M^2
ESTIMATED AVG GLUCOSE: 114 MG/DL (ref 68–131)
GLUCOSE SERPL-MCNC: 93 MG/DL (ref 70–110)
HBA1C MFR BLD: 5.6 % (ref 4–5.6)
HCT VFR BLD AUTO: 35.5 % (ref 37–48.5)
HDLC SERPL-MCNC: 99 MG/DL (ref 40–75)
HDLC SERPL: 52.1 % (ref 20–50)
HGB BLD-MCNC: 10.8 G/DL (ref 12–16)
IMM GRANULOCYTES # BLD AUTO: 0.04 K/UL (ref 0–0.04)
IMM GRANULOCYTES NFR BLD AUTO: 0.6 % (ref 0–0.5)
LDLC SERPL CALC-MCNC: 74 MG/DL (ref 63–159)
LYMPHOCYTES # BLD AUTO: 1.6 K/UL (ref 1–4.8)
LYMPHOCYTES NFR BLD: 22.9 % (ref 18–48)
MCH RBC QN AUTO: 28.8 PG (ref 27–31)
MCHC RBC AUTO-ENTMCNC: 30.4 G/DL (ref 32–36)
MCV RBC AUTO: 95 FL (ref 82–98)
MONOCYTES # BLD AUTO: 0.5 K/UL (ref 0.3–1)
MONOCYTES NFR BLD: 7.3 % (ref 4–15)
NEUTROPHILS # BLD AUTO: 4.7 K/UL (ref 1.8–7.7)
NEUTROPHILS NFR BLD: 66.5 % (ref 38–73)
NONHDLC SERPL-MCNC: 91 MG/DL
NRBC BLD-RTO: 0 /100 WBC
PLATELET # BLD AUTO: 292 K/UL (ref 150–450)
PMV BLD AUTO: 11.4 FL (ref 9.2–12.9)
POTASSIUM SERPL-SCNC: 5.2 MMOL/L (ref 3.5–5.1)
PROT SERPL-MCNC: 7.2 G/DL (ref 6–8.4)
RBC # BLD AUTO: 3.75 M/UL (ref 4–5.4)
SODIUM SERPL-SCNC: 139 MMOL/L (ref 136–145)
TRIGL SERPL-MCNC: 85 MG/DL (ref 30–150)
TSH SERPL DL<=0.005 MIU/L-ACNC: 1.9 UIU/ML (ref 0.4–4)
WBC # BLD AUTO: 7.03 K/UL (ref 3.9–12.7)

## 2023-05-30 PROCEDURE — 85025 COMPLETE CBC W/AUTO DIFF WBC: CPT | Performed by: INTERNAL MEDICINE

## 2023-05-30 PROCEDURE — 84443 ASSAY THYROID STIM HORMONE: CPT | Performed by: INTERNAL MEDICINE

## 2023-05-30 PROCEDURE — 80053 COMPREHEN METABOLIC PANEL: CPT | Performed by: INTERNAL MEDICINE

## 2023-05-30 PROCEDURE — 36415 COLL VENOUS BLD VENIPUNCTURE: CPT | Mod: PO | Performed by: INTERNAL MEDICINE

## 2023-05-30 PROCEDURE — 80061 LIPID PANEL: CPT | Performed by: INTERNAL MEDICINE

## 2023-05-30 PROCEDURE — 83036 HEMOGLOBIN GLYCOSYLATED A1C: CPT | Performed by: INTERNAL MEDICINE

## 2023-06-01 ENCOUNTER — OFFICE VISIT (OUTPATIENT)
Dept: CARDIOLOGY | Facility: CLINIC | Age: 73
End: 2023-06-01
Payer: MEDICARE

## 2023-06-01 VITALS
DIASTOLIC BLOOD PRESSURE: 68 MMHG | HEART RATE: 64 BPM | SYSTOLIC BLOOD PRESSURE: 139 MMHG | HEIGHT: 61 IN | BODY MASS INDEX: 25.48 KG/M2 | WEIGHT: 134.94 LBS

## 2023-06-01 DIAGNOSIS — I10 ESSENTIAL (PRIMARY) HYPERTENSION: Chronic | ICD-10-CM

## 2023-06-01 DIAGNOSIS — Z95.1 S/P CABG (CORONARY ARTERY BYPASS GRAFT): Chronic | ICD-10-CM

## 2023-06-01 DIAGNOSIS — Z79.02 LONG TERM (CURRENT) USE OF ANTITHROMBOTICS/ANTIPLATELETS: Chronic | ICD-10-CM

## 2023-06-01 DIAGNOSIS — E87.5 HYPERKALEMIA: ICD-10-CM

## 2023-06-01 DIAGNOSIS — R07.89 ATYPICAL CHEST PAIN: Primary | ICD-10-CM

## 2023-06-01 PROCEDURE — 99214 PR OFFICE/OUTPT VISIT, EST, LEVL IV, 30-39 MIN: ICD-10-PCS | Mod: S$PBB,,, | Performed by: INTERNAL MEDICINE

## 2023-06-01 PROCEDURE — 99999 PR PBB SHADOW E&M-EST. PATIENT-LVL V: ICD-10-PCS | Mod: PBBFAC,,, | Performed by: INTERNAL MEDICINE

## 2023-06-01 PROCEDURE — 99215 OFFICE O/P EST HI 40 MIN: CPT | Mod: PBBFAC,PO | Performed by: INTERNAL MEDICINE

## 2023-06-01 PROCEDURE — 99214 OFFICE O/P EST MOD 30 MIN: CPT | Mod: S$PBB,,, | Performed by: INTERNAL MEDICINE

## 2023-06-01 PROCEDURE — 99999 PR PBB SHADOW E&M-EST. PATIENT-LVL V: CPT | Mod: PBBFAC,,, | Performed by: INTERNAL MEDICINE

## 2023-06-01 RX ORDER — OLMESARTAN MEDOXOMIL 20 MG/1
20 TABLET ORAL DAILY
Qty: 90 TABLET | Refills: 1 | Status: SHIPPED | OUTPATIENT
Start: 2023-06-01 | End: 2023-07-26 | Stop reason: ALTCHOICE

## 2023-06-01 NOTE — PROGRESS NOTES
"Subjective:    Patient ID:  Osamn Johansen is a 73 y.o. female who presents for Coronary Artery Disease, Chest Pain, and Hypertension        HPI    RECENT LABS NOTED CMP POTASSIUM 5.2, LDL 74 HDL 99, TRIGLYCERIDE 85, HAD RECENT SHINGLES LOWER BACK, , ALSO HAVING MIGRAINES, LOOP RECORDER OK, , REPORTS  L SIDED CP, CONSTANT, BETTER WITH AMLODIPINE, IF BP TOO LOW FEELS BAD,TO HAVE SLEEP EVALUATION,   Past Medical History:   Diagnosis Date    Allergy     Anemia     Anxiety     Arthritis     Blood transfusion 8 yrs    CAD (coronary artery disease)     Cataract     Chronic diastolic CHF (congestive heart failure)     CKD (chronic kidney disease), stage II     COPD (chronic obstructive pulmonary disease)     mild no inhalers    Degenerative disc disease     Depression     Dry eye syndrome     Fibromyalgia     Fluid overload     GERD (gastroesophageal reflux disease)     Headache     Hypercholesterolemia 12/09/2019    Hypertension     Hypothyroidism     IBS (irritable bowel syndrome)     Lower extremity edema     Macular drusen     Neuromuscular disorder     Ocular hypertension, bilateral     Osteoporosis     Pleural effusion     PUD (peptic ulcer disease)     Restless leg     Uses Suboxone to control    Sleep apnea     "complex sleep apnea" - per pt, no cpap    Thoracic or lumbosacral neuritis or radiculitis 10/19/2012    Thyroid disease     hashimoto's     Past Surgical History:   Procedure Laterality Date    ADENOIDECTOMY  1955    APPENDECTOMY  1965    CARDIAC CATHETERIZATION      CATARACT EXTRACTION W/  INTRAOCULAR LENS IMPLANT Right 05/24/2021    Procedure: EXTRACTION, CATARACT, WITH IOL INSERTION;  Surgeon: Bebe Lynn MD;  Location: [facility] OR;  Service: Ophthalmology;  Laterality: Right;  Right/DM    CHOLECYSTECTOMY  2021    CORONARY ANGIOGRAPHY N/A 05/02/2022    Procedure: ANGIOGRAM, CORONARY ARTERY;  Surgeon: Sourav Nugent MD;  Location: [facility] CATH;  Service: Cardiology;  Laterality: N/A;    CORONARY ARTERY " BYPASS GRAFT      CORONARY ARTERY BYPASS GRAFT (CABG) N/A 06/27/2022    Procedure: CORONARY ARTERY BYPASS GRAFT (CABG) X 5;  Surgeon: Talib Torres MD;  Location: [facility] OR;  Service: Cardiovascular;  Laterality: N/A;    ENDOSCOPIC HARVEST OF VEIN Left 06/27/2022    Procedure: SURGICAL PROCUREMENT, VEIN, ENDOSCOPIC;  Surgeon: Talib Torres MD;  Location: [facility] OR;  Service: Cardiovascular;  Laterality: Left;    EYE SURGERY  2021    Cataract surgery    HYSTERECTOMY  8 yrs     INJECTION OF ANESTHETIC AGENT AROUND NERVE Bilateral 08/21/2018    Procedure: BLOCK, NERVE;  Surgeon: Talia Belcher MD;  Location: [facility] [place] PAIN MGT;  Service: Pain Management;  Laterality: Bilateral;  Bilateral block @ L2,3,4,5      INJECTION OF ANESTHETIC AGENT AROUND NERVE Bilateral 11/18/2019    Procedure: BLOCK, NERVE, L2-L3-L4-L5 ME DIAL BRANCH;  Surgeon: Talia Belcher MD;  Location: [facility] [place] [facility]IN MGT;  Service: Pain Management;  Laterality: Bilateral;    INJECTION OF FACET JOINT Bilateral 12/16/2019    Procedure: INJECTION, FACET JOINT, L3-L4 AND L4-L5 AND T7-T8 LIGAMENT;  Surgeon: Talia Belcher MD;  Location: [facility] [place] [facility]IN [facility]GT;  Service: Pain Management;  Laterality: Bilateral;    INSERTION OF IMPLANTABLE LOOP RECORDER Left 07/25/2022    Procedure: Insertion, Implantable Loop Recorder;  Surgeon: Sourav Nugent MD;  Location: [facility] CATH;  Service: Cardiology;  Laterality: Left;    LAPAROSCOPIC CHOLECYSTECTOMY N/A 02/10/2021    Procedure: CHOLECYSTECTOMY, LAPAROSCOPIC;  Surgeon: John Morrow MD;  Location: [facility] OR;  Service: General;  Laterality: N/A;    LEFT HEART CATHETERIZATION Left 05/02/2022    Procedure: Left heart cath;  Surgeon: Sourav Nugent MD;  Location: [facility] CATH;  Service: Cardiology;  Laterality: Left;    SINUS SURGERY      x2-one for CSF leak    TONSILLECTOMY  1954     Family History   Problem Relation Age of Onset    Ulcers Father     Arthritis Mother     Cancer Mother     Heart disease Mother     Hypertension Mother      Cataracts Mother     Ovarian cancer Mother     Thyroid disease Brother     Heart disease Brother     Stroke Paternal Aunt             Amblyopia Neg Hx     Blindness Neg Hx     Diabetes Neg Hx     Glaucoma Neg Hx     Macular degeneration Neg Hx     Retinal detachment Neg Hx     Strabismus Neg Hx     Breast cancer Neg Hx      Social History     Socioeconomic History    Marital status:    Tobacco Use    Smoking status: Former     Types: Cigarettes     Quit date: 2008     Years since quitting: 15.4    Smokeless tobacco: Never   Substance and Sexual Activity    Alcohol use: Yes     Comment: rarely    Drug use: No    Sexual activity: Yes     Partners: Male       Review of patient's allergies indicates:   Allergen Reactions    Lyrica [pregabalin] Swelling    Pcn [penicillins] Swelling    Toradol [ketorolac] Swelling    Vibramycin [doxycycline calcium] Shortness Of Breath and Swelling    Zetia [ezetimibe] Other (See Comments)    Crestor [rosuvastatin]      Body aches    Cymbalta [duloxetine]      dizzyness    Elavil [amitriptyline] Other (See Comments)     Restless leg    Pravastatin Other (See Comments)     Flu -like symptoms   Body aches     Seroquel [quetiapine]      restless leg symdrome       Current Outpatient Medications:     alendronate (FOSAMAX) 70 MG tablet, Take 1 tablet (70 mg total) by mouth every 7 days., Disp: 12 tablet, Rfl: 4    amLODIPine (NORVASC) 5 MG tablet, Take 2.5 mg by mouth once daily., Disp: , Rfl:     CALCIUM CARBONATE/VITAMIN D3 (CALCIUM 500 WITH D ORAL), Take 1 capsule by mouth once daily., Disp: , Rfl:     carboxymethylcellulose (REFRESH PLUS) 0.5 % Dpet, Place 1 drop into both eyes daily as needed (DRY EYES)., Disp: , Rfl:     clopidogreL (PLAVIX) 75 mg tablet, Take 1 tablet (75 mg total) by mouth once daily., Disp: 90 tablet, Rfl: 0    co-enzyme Q-10 30 mg capsule, Take 30 mg by mouth 3 (three) times daily., Disp: , Rfl:     dicyclomine (BENTYL) 10 MG capsule, Take 1 capsule  (10 mg total) by mouth 4 (four) times daily as needed (stomach discomfort)., Disp: 120 capsule, Rfl: 0    esomeprazole (NEXIUM) 40 MG capsule, TAKE 1 CAPSULE (40 MG TOTAL) BY MOUTH BEFORE BREAKFAST., Disp: 90 capsule, Rfl: 3    evolocumab (REPATHA SURECLICK) 140 mg/mL PnIj, Inject 1 mL (140 mg) under the skin every 14 days, Disp: 2 each, Rfl: 1    ferrous gluconate (FERGON) 324 MG tablet, Take 1 tablet (324 mg total) by mouth daily with breakfast., Disp: 90 tablet, Rfl: 0    fluticasone propionate (FLONASE) 50 mcg/actuation nasal spray, 1 spray (50 mcg total) by Each Nostril route once daily. (Patient taking differently: 1 spray by Each Nostril route every evening.), Disp: 16 g, Rfl: 11    garlic (GARLIQUE ORAL), Take 1 capsule by mouth Daily., Disp: , Rfl:     MULTIVITAMIN (MULTIPLE VITAMIN ORAL), Take 1 capsule by mouth once daily., Disp: , Rfl:     oxybutynin (DITROPAN XL) 15 MG TR24, TAKE 1 TABLET (15 MG TOTAL) BY MOUTH EVERY MORNING., Disp: 90 tablet, Rfl: 3    sennosides (LAXATIVE, SENNOSIDES,) 25 mg Tab, Take by mouth., Disp: , Rfl:     SUBOXONE 8-2 mg, Place 1 each under the tongue., Disp: , Rfl:     SYNTHROID 100 mcg tablet, TAKE 1 TABLET EVERY DAY (Patient taking differently: Take 100 mcg by mouth before breakfast. **BRAND MEDICALLY NECESSARY**), Disp: 90 tablet, Rfl: 3    ubrogepant (UBRELVY) 50 mg tablet, Take 1 tablet by mouth at onset of migraine. May repeat in 2 hours if needed. Max 2 tablets per day., Disp: 16 tablet, Rfl: 11    aspirin (ECOTRIN) 81 MG EC tablet, Take 1 tablet (81 mg total) by mouth once daily. for 21 days, Disp: , Rfl:     estradioL (VIVELLE-DOT) 0.075 mg/24 hr, Place 22 patches onto the skin once a week., Disp: , Rfl:     olmesartan (BENICAR) 20 MG tablet, Take 1 tablet (20 mg total) by mouth once daily., Disp: 90 tablet, Rfl: 1    valACYclovir (VALTREX) 1000 MG tablet, Take 1 tablet (1,000 mg total) by mouth once daily. for 7 days, Disp: 7 tablet, Rfl: 5  No current  "facility-administered medications for this visit.    Facility-Administered Medications Ordered in Other Visits:     lactated ringers infusion, , Intravenous, Continuous, Jim Mcdonough MD, Stopped at 06/27/22 1714    LIDOcaine (PF) 10 mg/ml (1%) injection 10 mg, 1 mL, Intradermal, Once, Jim Mcdonough MD    Review of Systems   Constitutional: Negative. Negative for chills, decreased appetite, diaphoresis, fever and malaise/fatigue.   Eyes:  Negative for blurred vision and visual disturbance.   Cardiovascular:  Positive for chest pain (SHARP AND WITH HIGH BP). Negative for claudication, cyanosis, dyspnea on exertion (MILD), irregular heartbeat (OCC), leg swelling, near-syncope, orthopnea, palpitations, paroxysmal nocturnal dyspnea and syncope.   Respiratory:  Negative for cough, hemoptysis and shortness of breath.    Endocrine: Negative for polyphagia and polyuria.   Hematologic/Lymphatic: Negative for adenopathy. Does not bruise/bleed easily.   Skin:  Positive for rash. Negative for color change.   Musculoskeletal:  Negative for back pain and falls.   Gastrointestinal:  Negative for abdominal pain, dysphagia, jaundice, melena and nausea.   Genitourinary:  Negative for dysuria and flank pain.   Neurological:  Positive for headaches. Negative for dizziness, focal weakness, light-headedness, paresthesias and weakness.   Psychiatric/Behavioral:  Negative for altered mental status and depression.    Allergic/Immunologic: Negative for persistent infections.      Objective:      Vitals:    06/01/23 1538 06/01/23 1544   BP: (!) 156/70 139/68   Pulse: 64    Weight: 61.2 kg (134 lb 14.7 oz)    Height: 5' 1" (1.549 m)    PainSc: 0-No pain      Body mass index is 25.49 kg/m².    Physical Exam  HENT:      Head: Normocephalic and atraumatic.   Eyes:      Extraocular Movements: Extraocular movements intact.      Conjunctiva/sclera: Conjunctivae normal.   Cardiovascular:      Rate and Rhythm: Normal rate and regular rhythm.     "  Pulses: Normal pulses.      Heart sounds: Murmur heard.     No friction rub. No gallop.   Pulmonary:      Effort: Pulmonary effort is normal.      Breath sounds: Normal breath sounds.   Abdominal:      Palpations: Abdomen is soft.      Tenderness: There is no abdominal tenderness.   Musculoskeletal:      Cervical back: Neck supple.   Skin:     General: Skin is warm and dry.      Capillary Refill: Capillary refill takes less than 2 seconds.      Coloration: Skin is not pale.   Neurological:      General: No focal deficit present.      Mental Status: She is alert.   Psychiatric:         Mood and Affect: Mood normal.         Speech: Speech normal.         Behavior: Behavior normal.             ..    Chemistry        Component Value Date/Time     05/30/2023 1244    K 5.2 (H) 05/30/2023 1244     05/30/2023 1244    CO2 25 05/30/2023 1244    BUN 18 05/30/2023 1244    CREATININE 1.2 05/30/2023 1244    GLU 93 05/30/2023 1244        Component Value Date/Time    CALCIUM 9.9 05/30/2023 1244    ALKPHOS 61 05/30/2023 1244    AST 20 05/30/2023 1244    ALT 13 05/30/2023 1244    BILITOT 0.3 05/30/2023 1244    ESTGFRAFRICA 29.7 (A) 07/27/2022 1159    EGFRNONAA 25.8 (A) 07/27/2022 1159            ..  Lab Results   Component Value Date    CHOL 190 05/30/2023    CHOL 162 07/27/2022    CHOL 139 07/22/2022     Lab Results   Component Value Date    HDL 99 (H) 05/30/2023    HDL 77 (H) 07/27/2022    HDL 73 07/22/2022     Lab Results   Component Value Date    LDLCALC 74.0 05/30/2023    LDLCALC 60.2 (L) 07/27/2022    LDLCALC 40.0 (L) 07/22/2022     Lab Results   Component Value Date    TRIG 85 05/30/2023    TRIG 124 07/27/2022    TRIG 130 07/22/2022     Lab Results   Component Value Date    CHOLHDL 52.1 (H) 05/30/2023    CHOLHDL 47.5 07/27/2022    CHOLHDL 52.5 (H) 07/22/2022     ..  Lab Results   Component Value Date    WBC 7.03 05/30/2023    HGB 10.8 (L) 05/30/2023    HCT 35.5 (L) 05/30/2023    MCV 95 05/30/2023      05/30/2023       Test(s) Reviewed  I have reviewed the following in detail:  [] Stress test   [] Angiography   [] Echocardiogram   [x] Labs   [x] Other:       Assessment:         ICD-10-CM ICD-9-CM   1. Atypical chest pain  R07.89 786.59   2. Essential (primary) hypertension  I10 401.9   3. S/P CABG (coronary artery bypass graft)  Z95.1 V45.81   4. Long term (current) use of antithrombotics/antiplatelets  Z79.02 V58.63   5. Hyperkalemia  E87.5 276.7     Problem List Items Addressed This Visit          Cardiac/Vascular    Essential (primary) hypertension    S/P CABG (coronary artery bypass graft)    Relevant Orders    Exercise Stress - EKG       Renal/    Hyperkalemia       Hematology    Long term (current) use of antithrombotics/antiplatelets       Other    Atypical chest pain - Primary    Relevant Orders    Exercise Stress - EKG        Plan:     CHANGE LISINOPRIL TO BENICAR, PRN NORVASC, AND WATCH BLOOD PRESSURE AGREE WITH SLEEP MEDICINE EVALUATION,  TROUBLE WITH CPAP, THAT IS PROBABLY CONTRIBUTING TO ELEVATED BLOOD PRESSURE IN THE MORNING, STRESS EKG TO ASSESS FOR ANY ISCHEMIA ON THE EKG SYMPTOMS ATYPICAL FOR ANGINA, NO OVERT HEART FAILURE NO TIA TYPE SYMPTOMS NO NEAR-SYNCOPE DIET EXERCISE STAY ACTIVE, RETURN TO CLINIC IN 3-4 MO, BLOOD PRESSURE LOG      Atypical chest pain  Comments:  STRESS EKG  Orders:  -     Exercise Stress - EKG; Future    Essential (primary) hypertension    S/P CABG (coronary artery bypass graft)  -     Exercise Stress - EKG; Future    Long term (current) use of antithrombotics/antiplatelets  Comments:  CONTINUE    Hyperkalemia  Comments:  MILD DECREASE INTAKE    Other orders  -     olmesartan (BENICAR) 20 MG tablet; Take 1 tablet (20 mg total) by mouth once daily.  Dispense: 90 tablet; Refill: 1    RTC Low level/low impact aerobic exercise 5x's/wk. Heart healthy diet and risk factor modification.    See labs and med orders.    Aerobic exercise 5x's/wk. Heart healthy diet and risk factor  modification.    See labs and med orders.

## 2023-06-06 ENCOUNTER — OFFICE VISIT (OUTPATIENT)
Dept: FAMILY MEDICINE | Facility: CLINIC | Age: 73
End: 2023-06-06
Payer: MEDICARE

## 2023-06-06 VITALS
WEIGHT: 133.63 LBS | DIASTOLIC BLOOD PRESSURE: 80 MMHG | HEIGHT: 61 IN | HEART RATE: 69 BPM | OXYGEN SATURATION: 97 % | BODY MASS INDEX: 25.23 KG/M2 | SYSTOLIC BLOOD PRESSURE: 130 MMHG

## 2023-06-06 DIAGNOSIS — E03.4 HYPOTHYROIDISM DUE TO ACQUIRED ATROPHY OF THYROID: ICD-10-CM

## 2023-06-06 DIAGNOSIS — E78.5 DYSLIPIDEMIA: ICD-10-CM

## 2023-06-06 DIAGNOSIS — I10 ESSENTIAL HYPERTENSION: Primary | ICD-10-CM

## 2023-06-06 DIAGNOSIS — E11.9 CONTROLLED TYPE 2 DIABETES MELLITUS WITHOUT COMPLICATION, WITHOUT LONG-TERM CURRENT USE OF INSULIN: ICD-10-CM

## 2023-06-06 PROCEDURE — 99214 OFFICE O/P EST MOD 30 MIN: CPT | Mod: S$PBB,,, | Performed by: INTERNAL MEDICINE

## 2023-06-06 PROCEDURE — 99214 OFFICE O/P EST MOD 30 MIN: CPT | Mod: PBBFAC,PO | Performed by: INTERNAL MEDICINE

## 2023-06-06 PROCEDURE — 99999 PR PBB SHADOW E&M-EST. PATIENT-LVL IV: ICD-10-PCS | Mod: PBBFAC,,, | Performed by: INTERNAL MEDICINE

## 2023-06-06 PROCEDURE — 99999 PR PBB SHADOW E&M-EST. PATIENT-LVL IV: CPT | Mod: PBBFAC,,, | Performed by: INTERNAL MEDICINE

## 2023-06-06 PROCEDURE — 99214 PR OFFICE/OUTPT VISIT, EST, LEVL IV, 30-39 MIN: ICD-10-PCS | Mod: S$PBB,,, | Performed by: INTERNAL MEDICINE

## 2023-06-06 RX ORDER — LEVOTHYROXINE SODIUM 100 UG/1
100 TABLET ORAL
Qty: 90 TABLET | Refills: 3 | Status: SHIPPED | OUTPATIENT
Start: 2023-06-06 | End: 2023-12-12 | Stop reason: SDUPTHER

## 2023-06-06 NOTE — PROGRESS NOTES
Subjective:       Patient ID: Osman Johansen is a 73 y.o. female.  Chief Complaint: Follow-up and Hypertension     HPI    CAD - s/p CABG.  some atypical chest pain.  Cardiology aware and w/u   CVA - embolic x2 post CABG.  Wearing loop recorder now.  Mild residual confusion/ forgetful. Had JODI with lasix; no swelling, so no need   Dr Nugent, Dr Torres     CKD III - stable      Osteopenia with high 10 yr fracture risk warranting treatment.  Discussed; she will start     HTN - controlled.  On digital HTN program.  Does not take the HCTZ because became lightheaded with this.    Hypothyroid - controlled.112 mcg from local pharmacy and Brand name made her have palpitations, but 125 mcg does not from mail order.  Dye? Only use mail order -   DM - controlled; outside eye exam done 7/26/17  HLD - controlled on Repatha.  Statin intolerance. could not tolerate multiple statins - Crestor, Liptior.  Starting to have body aches on 20 mg of Pravastatin.   Depression - stable      Iron deficiency anemia - improved.   Trouble tolerating iron orally even only 3x per week or every other day.  Advised getting cscp and EGD (hx bleeding ulcer in past).  Last cscp 2012 was ok.  + fatigue   RLS - treated      CT doc emphysema - AVILA as above.  No longer smokes.    Low back strain.  Robaxin helping.        Assessment:       1. Essential hypertension    2. Controlled type 2 diabetes mellitus without complication, without long-term current use of insulin    3. Dyslipidemia    4. Hypothyroidism due to acquired atrophy of thyroid        Plan:       Essential hypertension  -     Comprehensive Metabolic Panel; Future; Expected date: 12/03/2023    Controlled type 2 diabetes mellitus without complication, without long-term current use of insulin  -     Hemoglobin A1C; Future; Expected date: 12/03/2023  -     Microalbumin/Creatinine Ratio, Urine; Future; Expected date: 12/03/2023    Dyslipidemia  -     Lipid Panel; Future; Expected date:  12/03/2023    Hypothyroidism due to acquired atrophy of thyroid  -     TSH; Future; Expected date: 12/03/2023  -     SYNTHROID 100 mcg tablet; Take 1 tablet (100 mcg total) by mouth before breakfast. **BRAND MEDICALLY NECESSARY**  Dispense: 90 tablet; Refill: 3            Continue current management and monitor.  Other diagnoses were reviewed and found stable and will continue to monitor.  Counseled on regular exercise, maintenance of a healthy weight, balanced diet rich in fruits/vegetables and lean protein, and avoidance of unhealthy habits like smoking and excessive alcohol intake.   Also, counseled on importance of being compliant with medication, health appointments, diet and exercise.     Follow up in about 6 months (around 12/6/2023).      Medication List with Changes/Refills   Current Medications    ALENDRONATE (FOSAMAX) 70 MG TABLET    Take 1 tablet (70 mg total) by mouth every 7 days.    AMLODIPINE (NORVASC) 5 MG TABLET    Take 2.5 mg by mouth once daily.    ASPIRIN (ECOTRIN) 81 MG EC TABLET    Take 1 tablet (81 mg total) by mouth once daily. for 21 days    CALCIUM CARBONATE/VITAMIN D3 (CALCIUM 500 WITH D ORAL)    Take 1 capsule by mouth once daily.    CARBOXYMETHYLCELLULOSE (REFRESH PLUS) 0.5 % DPET    Place 1 drop into both eyes daily as needed (DRY EYES).    CLOPIDOGREL (PLAVIX) 75 MG TABLET    Take 1 tablet (75 mg total) by mouth once daily.    CO-ENZYME Q-10 30 MG CAPSULE    Take 30 mg by mouth 3 (three) times daily.    DICYCLOMINE (BENTYL) 10 MG CAPSULE    Take 1 capsule (10 mg total) by mouth 4 (four) times daily as needed (stomach discomfort).    ESOMEPRAZOLE (NEXIUM) 40 MG CAPSULE    TAKE 1 CAPSULE (40 MG TOTAL) BY MOUTH BEFORE BREAKFAST.    EVOLOCUMAB (REPATHA SURECLICK) 140 MG/ML PNIJ    Inject 1 mL (140 mg) under the skin every 14 days    FERROUS GLUCONATE (FERGON) 324 MG TABLET    Take 1 tablet (324 mg total) by mouth daily with breakfast.    FLUTICASONE PROPIONATE (FLONASE) 50 MCG/ACTUATION  NASAL SPRAY    1 spray (50 mcg total) by Each Nostril route once daily.    GARLIC (GARLIQUE ORAL)    Take 1 capsule by mouth Daily.    MULTIVITAMIN (MULTIPLE VITAMIN ORAL)    Take 1 capsule by mouth once daily.    OLMESARTAN (BENICAR) 20 MG TABLET    Take 1 tablet (20 mg total) by mouth once daily.    OXYBUTYNIN (DITROPAN XL) 15 MG TR24    TAKE 1 TABLET (15 MG TOTAL) BY MOUTH EVERY MORNING.    SENNOSIDES (LAXATIVE, SENNOSIDES,) 25 MG TAB    Take by mouth.    SUBOXONE 8-2 MG    Place 1 each under the tongue.    UBROGEPANT (UBRELVY) 50 MG TABLET    Take 1 tablet by mouth at onset of migraine. May repeat in 2 hours if needed. Max 2 tablets per day.    VALACYCLOVIR (VALTREX) 1000 MG TABLET    Take 1 tablet (1,000 mg total) by mouth once daily. for 7 days   Changed and/or Refilled Medications    Modified Medication Previous Medication    SYNTHROID 100 MCG TABLET SYNTHROID 100 mcg tablet       Take 1 tablet (100 mcg total) by mouth before breakfast. **BRAND MEDICALLY NECESSARY**    TAKE 1 TABLET EVERY DAY   Discontinued Medications    ESTRADIOL (VIVELLE-DOT) 0.075 MG/24 HR    Place 22 patches onto the skin once a week.       BP Readings from Last 3 Encounters:   06/06/23 130/80   06/01/23 139/68   05/09/23 134/79     Hemoglobin A1C   Date Value Ref Range Status   05/30/2023 5.6 4.0 - 5.6 % Final     Comment:     ADA Screening Guidelines:  5.7-6.4%  Consistent with prediabetes  >or=6.5%  Consistent with diabetes    High levels of fetal hemoglobin interfere with the HbA1C  assay. Heterozygous hemoglobin variants (HbS, HgC, etc)do  not significantly interfere with this assay.   However, presence of multiple variants may affect accuracy.     12/01/2022 5.5 4.0 - 5.6 % Final     Comment:     ADA Screening Guidelines:  5.7-6.4%  Consistent with prediabetes  >or=6.5%  Consistent with diabetes    High levels of fetal hemoglobin interfere with the HbA1C  assay. Heterozygous hemoglobin variants (HbS, HgC, etc)do  not significantly  interfere with this assay.   However, presence of multiple variants may affect accuracy.     07/27/2022 5.4 4.0 - 5.6 % Final     Comment:     ADA Screening Guidelines:  5.7-6.4%  Consistent with prediabetes  >or=6.5%  Consistent with diabetes    High levels of fetal hemoglobin interfere with the HbA1C  assay. Heterozygous hemoglobin variants (HbS, HgC, etc)do  not significantly interfere with this assay.   However, presence of multiple variants may affect accuracy.       Lab Results   Component Value Date    TSH 1.898 05/30/2023     Lab Results   Component Value Date    LDLCALC 74.0 05/30/2023    LDLCALC 60.2 (L) 07/27/2022    LDLCALC 40.0 (L) 07/22/2022     Lab Results   Component Value Date    TRIG 85 05/30/2023    TRIG 124 07/27/2022    TRIG 130 07/22/2022     Wt Readings from Last 3 Encounters:   06/06/23 60.6 kg (133 lb 9.6 oz)   06/01/23 61.2 kg (134 lb 14.7 oz)   05/09/23 58.3 kg (128 lb 6.7 oz)     Lab Results   Component Value Date    HGB 10.8 (L) 05/30/2023    HCT 35.5 (L) 05/30/2023    WBC 7.03 05/30/2023    ALT 13 05/30/2023    AST 20 05/30/2023     05/30/2023    K 5.2 (H) 05/30/2023    CREATININE 1.2 05/30/2023           Review of Systems   Constitutional:  Negative for diaphoresis and fever.   HENT:  Negative for drooling and nosebleeds.    Eyes:  Negative for discharge and redness.   Respiratory:  Negative for apnea and choking.    Cardiovascular:  Positive for chest pain. Negative for palpitations.   Gastrointestinal:  Negative for abdominal pain and nausea.   Musculoskeletal:  Positive for back pain.   Skin:  Negative for color change.   Neurological:  Negative for seizures and syncope.   Psychiatric/Behavioral:  Negative for behavioral problems.          Objective:      Vitals:    06/06/23 1558   BP: 130/80   Pulse: 69     Physical Exam  Vitals reviewed.   Constitutional:       Appearance: Normal appearance.   Eyes:      Conjunctiva/sclera: Conjunctivae normal.   Cardiovascular:      Rate  and Rhythm: Normal rate.      Pulses:           Dorsalis pedis pulses are 2+ on the right side and 2+ on the left side.   Pulmonary:      Effort: Pulmonary effort is normal.      Breath sounds: Normal breath sounds.   Musculoskeletal:      Cervical back: Normal range of motion.      Comments: Normal ROM bilateral    Feet:      Right foot:      Protective Sensation: 4 sites tested.  4 sites sensed.      Left foot:      Protective Sensation: 4 sites tested.  4 sites sensed.   Skin:     General: Skin is warm and dry.   Neurological:      Mental Status: She is alert.      Cranial Nerves: Cranial nerve deficit: grossly intact.   Psychiatric:      Comments: Alert and orientated

## 2023-06-08 ENCOUNTER — TELEPHONE (OUTPATIENT)
Dept: PAIN MEDICINE | Facility: CLINIC | Age: 73
End: 2023-06-08
Payer: MEDICARE

## 2023-06-08 NOTE — TELEPHONE ENCOUNTER
Staff tried to contact patient to confirm tomorrow appt. Patient did not  and staff left a voicemail.

## 2023-06-09 ENCOUNTER — OFFICE VISIT (OUTPATIENT)
Dept: PAIN MEDICINE | Facility: CLINIC | Age: 73
End: 2023-06-09
Payer: MEDICARE

## 2023-06-09 VITALS
DIASTOLIC BLOOD PRESSURE: 75 MMHG | HEIGHT: 61 IN | WEIGHT: 134.5 LBS | HEART RATE: 72 BPM | SYSTOLIC BLOOD PRESSURE: 146 MMHG | BODY MASS INDEX: 25.39 KG/M2

## 2023-06-09 DIAGNOSIS — M43.06 SPONDYLOLYSIS OF LUMBAR REGION: ICD-10-CM

## 2023-06-09 DIAGNOSIS — M79.18 MYOFASCIAL PAIN: Primary | ICD-10-CM

## 2023-06-09 DIAGNOSIS — M51.36 DDD (DEGENERATIVE DISC DISEASE), LUMBAR: ICD-10-CM

## 2023-06-09 DIAGNOSIS — M54.17 LUMBOSACRAL RADICULOPATHY: ICD-10-CM

## 2023-06-09 PROCEDURE — 99213 PR OFFICE/OUTPT VISIT, EST, LEVL III, 20-29 MIN: ICD-10-PCS | Mod: 25,S$PBB,, | Performed by: NURSE PRACTITIONER

## 2023-06-09 PROCEDURE — 99999 PR PBB SHADOW E&M-EST. PATIENT-LVL V: ICD-10-PCS | Mod: PBBFAC,,, | Performed by: NURSE PRACTITIONER

## 2023-06-09 PROCEDURE — 20553 NJX 1/MLT TRIGGER POINTS 3/>: CPT | Mod: S$PBB,,, | Performed by: NURSE PRACTITIONER

## 2023-06-09 PROCEDURE — 20553 NJX 1/MLT TRIGGER POINTS 3/>: CPT | Mod: PBBFAC | Performed by: NURSE PRACTITIONER

## 2023-06-09 PROCEDURE — 99215 OFFICE O/P EST HI 40 MIN: CPT | Mod: PBBFAC | Performed by: NURSE PRACTITIONER

## 2023-06-09 PROCEDURE — 99999 PR PBB SHADOW E&M-EST. PATIENT-LVL V: CPT | Mod: PBBFAC,,, | Performed by: NURSE PRACTITIONER

## 2023-06-09 PROCEDURE — 99213 OFFICE O/P EST LOW 20 MIN: CPT | Mod: 25,S$PBB,, | Performed by: NURSE PRACTITIONER

## 2023-06-09 PROCEDURE — 20553 PR INJECT TRIGGER POINTS, > 3: ICD-10-PCS | Mod: S$PBB,,, | Performed by: NURSE PRACTITIONER

## 2023-06-09 RX ORDER — BUPIVACAINE HYDROCHLORIDE 2.5 MG/ML
9 INJECTION, SOLUTION EPIDURAL; INFILTRATION; INTRACAUDAL
Status: COMPLETED | OUTPATIENT
Start: 2023-06-09 | End: 2023-06-09

## 2023-06-09 RX ADMIN — BUPIVACAINE HYDROCHLORIDE 22.5 MG: 2.5 INJECTION, SOLUTION EPIDURAL; INFILTRATION; INTRACAUDAL; PERINEURAL at 02:06

## 2023-06-09 RX ADMIN — TRIAMCINOLONE ACETONIDE 10 MG: 10 INJECTION, SUSPENSION INTRA-ARTICULAR; INTRALESIONAL at 02:06

## 2023-06-09 NOTE — PROGRESS NOTES
Chronic Pain-Tele-Medicine-Established Note (Follow up visit)         SUBJECTIVE:    Interval History 6/9/2023:  The patient presents for follow up of back and neck pain. She has been having increased pain over the past 2 weeks. She has radiation down the back/side of the left leg which is burning in nature. She has been having more muscle pain and tightness. She has had TPIs in the past with benefit. She would like to repeat today. She is also interested in scheduling a lumbar procedure. Her pain today is 5/10.    Interval History 11/8/2022:  The patient present today for increased muscle pain. At her last OV, she requested TPIs but she had a shingles outbreak. Today, she said she is clear from shingles and denies any contraindication to TPIs. However, she does have a cardiac history and blood pressure is slightly elevated today. No SOB or chest pain. No leg swelling. She report more diffuse pain recently, mainly to the neck, mid back, lower back, hips and knees. No new injury or trauma.     Interval History 10/18/2022:  The patient is here for increased back pain. The pain is tight in nature without radiation. She originally wanted TPIs today. However, she had a flu shot yesterday, Repatha 2 days ago and has a current shingles outbreak. Her pain today is 2/10.    Interval History 8/3/2021:  The patient presents to clinic today with return of neck pain. She states that since her TPI at last visit that she had complete resolution of sxs until about 10d ago, which saw the return of her neck pain and gluteal sxs same as prior to TPI. She would like TPI again today as it has brought her significant relief in the past. She endorses continuation of her chronic back pain. Her pain today is 7/10.    Interval History 3/12/2021:  The patient has a follow up today for increased neck pain. She is having aching pain across the neck with associated headaches. She is also having middle back tightness. She is not having radiation  into the arms or numbness. She would like trigger point injections as these have been helpful in the past. She previously was seeing neurology but was lost to follow up. She denies nausea or vomiting currently. She had a cholecystectomy on 2/10/21 from which she has recovered well. She had her first Covid vaccine on 2/3/21 and is scheduled for the next on 2/24/21. She also has a history of chronic back pain. Her pain today is 7/10.    Interval History 7/22/2020:  The patient has an audio visit today to discuss back pain.  She had TPIs at last OV which gave her some short term benefit.  She was scheduled for facet injections which she cancelled.  She would like to update her imaging prior to another procedure.  Her pain today is 6/10.    Interval History 7/7/2020:  The patient is here for follow up of back pain.  She has been doing well until the past month or so.  She is having more middle and lower back pain.  She is not having any radiation into the legs at this time.  She describes the pain as aching and stabbing.  She has been stretching and walking at home.  Her pain today is 4/10.    Interval History 1/20/2020:  The patient returns for follow up of middle and lower back pain.  She is now s/p bilateral L3-4 and L4-5 facet joint injections as well as T7-8 interspinous ligament injection on 12/16/19 with about 50% relief.  She is still having right sided lower back pain which starts at the spine and often radiates to her hip.  She denies associated numbness.  She says that the area is tender to touch and feels swollen sometimes.  She has tried ice and heat without benefit.  Her pain today is 2/10.    Interval History 12/13/2019:  The patient is here for follow up of back pain.  She is s/p Bilateral L2,3,4,5 MBB with steroids.  She is reporting limited benefit this time.  Previous provided her significant benefit.  She is having pain across the lower back, worse on the left side.  She denies radiation into the  legs.  She is also having middle thoracic pain.  This does not radiate to the front of her body.  She is not having any numbness.  She would like to schedule another procedure.  Her pain today is 5/10.    Previous Encounter:  Osman Johansen presents to the clinic for a follow-up appointment for lower back pain. She reports increased low back pain over the last few weeks. She describes this pain as constant, sharp, and aching. She denies any radiating leg pain. She previously had 90% relief of her pain with bilateral L2,3,4,5 MBB and T7-8 interspinous ligament injection, last done in August 2018. She would like to repeat this procedure. She continues to participate in a home exercise routine. She denies any other health changes. Her pain today is 5/10.    Pain Disability Index Review:  Last 3 PDI Scores 11/8/2022 10/18/2022 8/3/2021   Pain Disability Index (PDI) 8 12 25       Pain Medications:  None    Opioid Contract: no     report:  Reviewed and consistent with medication use as prescribed.    Pain Procedures:   7/18/13 Bilateral L5 TF JOCE  10/28/13 Bilateral SI joint injection  4/20/15 Bilateral SI joint injection  12/15/16 Bilateral L3-4 facet joint injections- 100% relief for 6 weeks  3/13/17 Bilateral L3-4 facet joint injections- 30% relief  4/17/17 Bilateral L2,3,4,5 MBB with steroids- significant benefit for 7 months  12/14/17 Bilateral L2,3,4,5 MBB with steroids and Interspinal ligament injection- 90% relief for 7 months  8/21/2018- Bilateral L2,3,4,5 MBB with steroids and T7-8 interspinous ligament injections   11/18/19 Bilateral L2,3,4,5 MBB with steroids  12/16/19 bilateral L3-4 and L4-5 facet joint injections as well as T7-8 interspinous ligament injection- 50% relief  3/12/21 bilateral cervical and paraspinal TPI, R gluteal TPI x1 (5 sites total)    Physical Therapy/Home Exercise: per self does stretching    Imaging:     Lumbar MRI 12/10/16    Narrative   MRI LUMBAR SPINE WITHOUT  CONTRAST    COMPARISON: None    TECHNIQUE:  Sagittal T1, T2, and STIR;  axial T1 and T2 sequences through the lumbar spine were acquired without contrast.    FINDINGS: Vertebral body height and alignment are within normal limits.  The conus terminates at T12-L1.  Moderate loss of disc height is present at L4-5.  Marrow signal is mildly heterogeneous.  No focal osseous lesion.    L1-L2:  No disc herniation. No spinal canal or neuroforaminal narrowing.    L2-L3:  Mild diffuse disc bulging is present without spinal canal or neuroforaminal narrowing.    L3-L4:  Mild diffuse disc bulging and moderate bilateral facet arthropathy result in mild spinal canal narrowing.    L4-L5:  Mild diffuse disc bulging is present without spinal canal or neuroforaminal narrowing.    L5-S1:  No disc herniation. No spinal canal or neuroforaminal narrowing.    Several small gallstones noted.   Impression     Degenerative lumbar spondylosis with mild L3-4 spinal canal narrowing and no significant neuroforaminal narrowing.  Cholelithiasis       Narrative     MRI of the thoracic spine without contrast    Technique: Multiplanar multisequence MR imaging of the thoracic spine was performed without contrast.    Findings: There is mild levoscoliosis of the lumbar spine.  This may be positional.  Vertebral bodies otherwise demonstrate normal height alignment.  The marrow signal of the vertebral bodies is within normal limits.  There is mild disc desiccation noted throughout the thoracic spine.  No significant disc space narrowing.  No significant disc protrusions are identified.  The central canal is widely patent.  No significant spondylosis.  The cord is normal in signal and caliber.      Impression         1.  Mild levoscoliosis of the thoracic spine otherwise essentially unremarkable MRI of the thoracic spine.     Narrative & Impression  EXAMINATION:  XR HIPS BILATERAL 2 VIEW INCL AP PELVIS     CLINICAL HISTORY:  Pain in right hip      TECHNIQUE:  AP view of the pelvis and frogleg lateral views of both hips were performed.     COMPARISON:  Hip and pelvic x-ray of April 19, 2011     FINDINGS:  A fracture of either hip is not seen.  No dislocation is noted.  The acetabula are well maintained.  There is sclerosis adjacent to the inferior left sacroiliac joint unchanged from the prior study and consistent with chronic sacroiliitis.  Degenerative disc disease is seen at the lower lumbar spine.     Impression:     Chronic left sacroiliitis.  Degenerative disc disease at L4-5.  Otherwise negative radiographs of both hips and pelvis.      CMP  Sodium   Date Value Ref Range Status   05/30/2023 139 136 - 145 mmol/L Final     Potassium   Date Value Ref Range Status   05/30/2023 5.2 (H) 3.5 - 5.1 mmol/L Final     Chloride   Date Value Ref Range Status   05/30/2023 105 95 - 110 mmol/L Final     CO2   Date Value Ref Range Status   05/30/2023 25 23 - 29 mmol/L Final     Glucose   Date Value Ref Range Status   05/30/2023 93 70 - 110 mg/dL Final     BUN   Date Value Ref Range Status   05/30/2023 18 8 - 23 mg/dL Final     Creatinine   Date Value Ref Range Status   05/30/2023 1.2 0.5 - 1.4 mg/dL Final     Calcium   Date Value Ref Range Status   05/30/2023 9.9 8.7 - 10.5 mg/dL Final     Total Protein   Date Value Ref Range Status   05/30/2023 7.2 6.0 - 8.4 g/dL Final     Albumin   Date Value Ref Range Status   05/30/2023 3.9 3.5 - 5.2 g/dL Final     Total Bilirubin   Date Value Ref Range Status   05/30/2023 0.3 0.1 - 1.0 mg/dL Final     Comment:     For infants and newborns, interpretation of results should be based  on gestational age, weight and in agreement with clinical  observations.    Premature Infant recommended reference ranges:  Up to 24 hours.............<8.0 mg/dL  Up to 48 hours............<12.0 mg/dL  3-5 days..................<15.0 mg/dL  6-29 days.................<15.0 mg/dL       Alkaline Phosphatase   Date Value Ref Range Status   05/30/2023 61 55  - 135 U/L Final     AST   Date Value Ref Range Status   05/30/2023 20 10 - 40 U/L Final     ALT   Date Value Ref Range Status   05/30/2023 13 10 - 44 U/L Final     Anion Gap   Date Value Ref Range Status   05/30/2023 9 8 - 16 mmol/L Final     eGFR if    Date Value Ref Range Status   07/27/2022 29.7 (A) >60 mL/min/1.73 m^2 Final     eGFR if non    Date Value Ref Range Status   07/27/2022 25.8 (A) >60 mL/min/1.73 m^2 Final     Comment:     Calculation used to obtain the estimated glomerular filtration  rate (eGFR) is the CKD-EPI equation.        Lab Results   Component Value Date    WBC 7.03 05/30/2023    HGB 10.8 (L) 05/30/2023    HCT 35.5 (L) 05/30/2023    MCV 95 05/30/2023     05/30/2023         Allergies:   Review of patient's allergies indicates:   Allergen Reactions    Pcn [penicillins] Swelling    Toradol [ketorolac] Swelling    Vibramycin [doxycycline calcium] Swelling    Cymbalta [duloxetine]      dizzyness    Elavil [amitriptyline]     Lyrica [pregabalin] Swelling    Seroquel [quetiapine]      restless leg symdrome       Current Medications:   Current Outpatient Medications   Medication Sig Dispense Refill    alendronate (FOSAMAX) 70 MG tablet Take 1 tablet (70 mg total) by mouth every 7 days. 12 tablet 4    amLODIPine (NORVASC) 5 MG tablet Take 2.5 mg by mouth once daily.      aspirin (ECOTRIN) 81 MG EC tablet Take 1 tablet (81 mg total) by mouth once daily. for 21 days      CALCIUM CARBONATE/VITAMIN D3 (CALCIUM 500 WITH D ORAL) Take 1 capsule by mouth once daily.      carboxymethylcellulose (REFRESH PLUS) 0.5 % Dpet Place 1 drop into both eyes daily as needed (DRY EYES).      clopidogreL (PLAVIX) 75 mg tablet Take 1 tablet (75 mg total) by mouth once daily. 90 tablet 0    co-enzyme Q-10 30 mg capsule Take 30 mg by mouth 3 (three) times daily.      dicyclomine (BENTYL) 10 MG capsule Take 1 capsule (10 mg total) by mouth 4 (four) times daily as needed (stomach  discomfort). 120 capsule 0    esomeprazole (NEXIUM) 40 MG capsule TAKE 1 CAPSULE (40 MG TOTAL) BY MOUTH BEFORE BREAKFAST. 90 capsule 3    evolocumab (REPATHA SURECLICK) 140 mg/mL PnIj Inject 1 mL (140 mg) under the skin every 14 days 2 each 1    ferrous gluconate (FERGON) 324 MG tablet Take 1 tablet (324 mg total) by mouth daily with breakfast. 90 tablet 0    fluticasone propionate (FLONASE) 50 mcg/actuation nasal spray 1 spray (50 mcg total) by Each Nostril route once daily. (Patient taking differently: 1 spray by Each Nostril route every evening.) 16 g 11    garlic (GARLIQUE ORAL) Take 1 capsule by mouth Daily.      MULTIVITAMIN (MULTIPLE VITAMIN ORAL) Take 1 capsule by mouth once daily.      olmesartan (BENICAR) 20 MG tablet Take 1 tablet (20 mg total) by mouth once daily. 90 tablet 1    oxybutynin (DITROPAN XL) 15 MG TR24 TAKE 1 TABLET (15 MG TOTAL) BY MOUTH EVERY MORNING. 90 tablet 3    sennosides (LAXATIVE, SENNOSIDES,) 25 mg Tab Take by mouth.      SUBOXONE 8-2 mg Place 1 each under the tongue.      SYNTHROID 100 mcg tablet Take 1 tablet (100 mcg total) by mouth before breakfast. **BRAND MEDICALLY NECESSARY** 90 tablet 3    ubrogepant (UBRELVY) 50 mg tablet Take 1 tablet by mouth at onset of migraine. May repeat in 2 hours if needed. Max 2 tablets per day. 16 tablet 11    valACYclovir (VALTREX) 1000 MG tablet Take 1 tablet (1,000 mg total) by mouth once daily. for 7 days 7 tablet 5     No current facility-administered medications for this visit.     Facility-Administered Medications Ordered in Other Visits   Medication Dose Route Frequency Provider Last Rate Last Admin    lactated ringers infusion   Intravenous Continuous Jim Mcdonough MD   Stopped at 06/27/22 0344    LIDOcaine (PF) 10 mg/ml (1%) injection 10 mg  1 mL Intradermal Once Jim Mcdonough MD           REVIEW OF SYSTEMS:    GENERAL:  No weight loss, malaise or fevers.  HEENT:  Negative for frequent or significant headaches.  NECK:  Negative for  "lumps, goiter, pain and significant neck swelling.  RESPIRATORY:  Negative for cough, wheezing or shortness of breath.  CARDIOVASCULAR:  Negative for chest pain, leg swelling or palpitations.  GI:  Negative for abdominal discomfort, blood in stools or black stools or change in bowel habits. GERD.  MUSCULOSKELETAL:  See HPI.  SKIN:  Negative for lesions, rash, and itching.  PSYCH: H/O anxiety and opioid dependence.  HEMATOLOGY/LYMPHOLOGY:  On Plavix.  NEURO:   No history of headaches, syncope, paralysis, seizures or tremors.  All other reviewed and negative other than HPI.    Past Medical History:  Past Medical History:   Diagnosis Date    Allergy     Anemia     Anxiety     Arthritis     Blood transfusion 8 yrs    CAD (coronary artery disease)     Cataract     Chronic diastolic CHF (congestive heart failure)     CKD (chronic kidney disease), stage II     COPD (chronic obstructive pulmonary disease)     mild no inhalers    Degenerative disc disease     Depression     Dry eye syndrome     Fibromyalgia     Fluid overload     GERD (gastroesophageal reflux disease)     Headache     Hypercholesterolemia 12/09/2019    Hypertension     Hypothyroidism     IBS (irritable bowel syndrome)     Lower extremity edema     Macular drusen     Neuromuscular disorder     Ocular hypertension, bilateral     Osteoporosis     Pleural effusion     PUD (peptic ulcer disease)     Restless leg     Uses Suboxone to control    Sleep apnea     "complex sleep apnea" - per pt, no cpap    Thoracic or lumbosacral neuritis or radiculitis 10/19/2012    Thyroid disease     hashimoto's       Past Surgical History:  Past Surgical History:   Procedure Laterality Date    ADENOIDECTOMY  1955    APPENDECTOMY  1965    CARDIAC CATHETERIZATION      CATARACT EXTRACTION W/  INTRAOCULAR LENS IMPLANT Right 05/24/2021    Procedure: EXTRACTION, CATARACT, WITH IOL INSERTION;  Surgeon: Bebe Lynn MD;  Location: [facility] OR;  Service: Ophthalmology;  Laterality: Right; "  Right/DM    CHOLECYSTECTOMY  2021    CORONARY ANGIOGRAPHY N/A 05/02/2022    Procedure: ANGIOGRAM, CORONARY ARTERY;  Surgeon: Sourav Nugent MD;  Location: [facility] CATH;  Service: Cardiology;  Laterality: N/A;    CORONARY ARTERY BYPASS GRAFT      CORONARY ARTERY BYPASS GRAFT (CABG) N/A 06/27/2022    Procedure: CORONARY ARTERY BYPASS GRAFT (CABG) X 5;  Surgeon: Talib Torres MD;  Location: [facility] OR;  Service: Cardiovascular;  Laterality: N/A;    ENDOSCOPIC HARVEST OF VEIN Left 06/27/2022    Procedure: SURGICAL PROCUREMENT, VEIN, ENDOSCOPIC;  Surgeon: Talib Torres MD;  Location: [facility] OR;  Service: Cardiovascular;  Laterality: Left;    EYE SURGERY  2021    Cataract surgery    HYSTERECTOMY  8 yrs     INJECTION OF ANESTHETIC AGENT AROUND NERVE Bilateral 08/21/2018    Procedure: BLOCK, NERVE;  Surgeon: Talia Belcher MD;  Location: [facility] PAIN MGT;  Service: Pain Management;  Laterality: Bilateral;  Bilateral block @ L2,3,4,5      INJECTION OF ANESTHETIC AGENT AROUND NERVE Bilateral 11/18/2019    Procedure: BLOCK, NERVE, L2-L3-L4-L5 ME DIAL BRANCH;  Surgeon: Talia Belcher MD;  Location: [facility] PAIN MGT;  Service: Pain Management;  Laterality: Bilateral;    INJECTION OF FACET JOINT Bilateral 12/16/2019    Procedure: INJECTION, FACET JOINT, L3-L4 AND L4-L5 AND T7-T8 LIGAMENT;  Surgeon: Talia Belcher MD;  Location: [facility] PAIN MGT;  Service: Pain Management;  Laterality: Bilateral;    INSERTION OF IMPLANTABLE LOOP RECORDER Left 07/25/2022    Procedure: Insertion, Implantable Loop Recorder;  Surgeon: Sourav Nugent MD;  Location: [facility] CATH;  Service: Cardiology;  Laterality: Left;    LAPAROSCOPIC CHOLECYSTECTOMY N/A 02/10/2021    Procedure: CHOLECYSTECTOMY, LAPAROSCOPIC;  Surgeon: John Morrow MD;  Location: [facility] OR;  Service: General;  Laterality: N/A;    LEFT HEART CATHETERIZATION Left 05/02/2022    Procedure: Left heart cath;  Surgeon: Sourav Nugent MD;  Location: [facility] CATH;  Service: Cardiology;  Laterality: Left;     "SINUS SURGERY      x2-one for CSF leak    TONSILLECTOMY         Family History:  Family History   Problem Relation Age of Onset    Ulcers Father     Arthritis Mother     Cancer Mother     Heart disease Mother     Hypertension Mother     Cataracts Mother     Ovarian cancer Mother     Thyroid disease Brother     Heart disease Brother     Stroke Paternal Aunt             Amblyopia Neg Hx     Blindness Neg Hx     Diabetes Neg Hx     Glaucoma Neg Hx     Macular degeneration Neg Hx     Retinal detachment Neg Hx     Strabismus Neg Hx     Breast cancer Neg Hx        Social History:  Social History     Socioeconomic History    Marital status:    Tobacco Use    Smoking status: Former     Types: Cigarettes     Quit date: 2008     Years since quitting: 15.4    Smokeless tobacco: Never   Substance and Sexual Activity    Alcohol use: Yes     Comment: rarely    Drug use: No    Sexual activity: Yes     Partners: Male       OBJECTIVE:    BP (!) 146/75 (BP Location: Right arm, Patient Position: Sitting, BP Method: Medium (Automatic))   Pulse 72   Ht 5' 1" (1.549 m)   Wt 61 kg (134 lb 7.7 oz)   BMI 25.41 kg/m²     PHYSICAL EXAMINATION     General appearance: Well appearing, in no acute distress, alert and oriented x3.  Psych:  Mood and affect appropriate.  Skin: Midline chest scar.   Neck: TTP over cervical and thoracic paraspinals. Full ROM with mild pain on extension and lateral rotation. Positive facet loading.  Back: Straight leg raise in the sitting position is negative for radicular pain bilaterally. Positive facet loading bilaterally, L>R. TTP over lumbar paraspinals and coccyx.  Extremities: Peripheral joint ROM is full and pain free without obvious instability or laxity in all four extremities. No deformities, edema, or skin discoloration. Good capillary refill.  Musculoskeletal: Bilateral upper and lower extremity strength is normal and symmetric.  No atrophy or tone abnormalities are noted.   Neuro: " Decreased sensation at an L4 distribution.  Gait: Normal.    ASSESSMENT: 73 y.o. year old female with back pain, consistent with the following diagnoses:     1. Myofascial pain  BUPivacaine (PF) 0.25% (2.5 mg/ml) injection 22.5 mg    triamcinolone acetonide injection 10 mg      2. DDD (degenerative disc disease), lumbar  Procedure Order to Pain Management      3. Spondylolysis of lumbar region        4. Lumbosacral radiculopathy              PLAN:     - Previous imaging was reviewed and discussed with the patient today.    - Will give TPIs today as per below with minimal steroid dose. She is aware to monitor her blood pressure.    - Encouraged the patient to resume a home exercise routine to help with pain and strengthening.     - Counseled patient regarding the importance of constant sleeping habits and physical therapy.        The above plan and management options were discussed at length with patient. Patient is in agreement with the above and verbalized understanding.    Meche Clinton  06/09/2023       Trigger Point Injection:   The procedure was discussed with the patient including complications of nerve damage, bleeding, infection, and failure of pain relief.   Trigger points were identified by palpation and marked. Alcohol prep of sites done. A mixture of 9 mL 0.25% bupivacaine +10 mg Kenalog was prepared (10 mL total).   A 27-gauge needle was advanced to the point of maximal tenderness, and medication was injected after negative aspiration. All sites done in the same manner. Patient tolerated the procedure well and without complications. Sites injected included: splenius capitis, thoracic paraspinals, and lumbar paraspinals muscles bilaterally (8 sites total).

## 2023-06-12 ENCOUNTER — SPECIALTY PHARMACY (OUTPATIENT)
Dept: PHARMACY | Facility: CLINIC | Age: 73
End: 2023-06-12
Payer: MEDICARE

## 2023-06-12 ENCOUNTER — TELEPHONE (OUTPATIENT)
Dept: CARDIOLOGY | Facility: CLINIC | Age: 73
End: 2023-06-12
Payer: MEDICARE

## 2023-06-12 RX ORDER — EVOLOCUMAB 140 MG/ML
140 INJECTION, SOLUTION SUBCUTANEOUS
Qty: 2 EACH | Refills: 1 | Status: ACTIVE | OUTPATIENT
Start: 2023-06-12 | End: 2023-08-10 | Stop reason: SDUPTHER

## 2023-06-12 RX ORDER — EVOLOCUMAB 140 MG/ML
INJECTION, SOLUTION SUBCUTANEOUS
Qty: 2 EACH | Refills: 1 | Status: SHIPPED | OUTPATIENT
Start: 2023-06-12 | End: 2023-06-12 | Stop reason: SDUPTHER

## 2023-06-12 NOTE — TELEPHONE ENCOUNTER
Outgoing call to pt regarding Repatha refill. Refill request sent to authorizing provider. Pt is due 6/24. Will follow up once refills received.

## 2023-06-12 NOTE — TELEPHONE ENCOUNTER
----- Message from Shena Kennedy sent at 6/12/2023  4:58 PM CDT -----  Requesting clearance of Plavix 7 days to schedule Lumbar L4-L5 IL Joi, please advise

## 2023-06-13 NOTE — TELEPHONE ENCOUNTER
Specialty Pharmacy - Refill Coordination    Specialty Medication Orders Linked to Encounter      Flowsheet Row Most Recent Value   Medication #1 evolocumab (REPATHA SURECLICK) 140 mg/mL PnIj (Order#[id number], Rx#[id number])            Refill Questions - Documented Responses      Flowsheet Row Most Recent Value   Patient Availability and HIPAA Verification    Does patient want to proceed with activity? Yes   HIPAA/medical authority confirmed? Yes   Relationship to patient of person spoken to? Self   Refill Screening Questions    Would patient like to speak to a pharmacist? No   When does the patient need to receive the medication? 06/23/23   Refill Delivery Questions    How will the patient receive the medication? MEDRx   When does the patient need to receive the medication? 06/23/23   Shipping Address Home   Address in [facility] confirmed and updated if neccessary? Yes   Expected Copay ($) 0   Is the patient able to afford the medication copay? Yes   Payment Method zero copay   Days supply of Refill 28   Supplies needed? No supplies needed   Refill activity completed? Yes   Refill activity plan Refill scheduled   Shipment/Pickup Date: 06/15/23            Current Outpatient Medications   Medication Sig    alendronate (FOSAMAX) 70 MG tablet Take 1 tablet (70 mg total) by mouth every 7 days.    amLODIPine (NORVASC) 5 MG tablet Take 2.5 mg by mouth once daily.    aspirin (ECOTRIN) 81 MG EC tablet Take 1 tablet (81 mg total) by mouth once daily. for 21 days    CALCIUM CARBONATE/VITAMIN D3 (CALCIUM 500 WITH D ORAL) Take 1 capsule by mouth once daily.    carboxymethylcellulose (REFRESH PLUS) 0.5 % Dpet Place 1 drop into both eyes daily as needed (DRY EYES).    clopidogreL (PLAVIX) 75 mg tablet Take 1 tablet (75 mg total) by mouth once daily.    co-enzyme Q-10 30 mg capsule Take 30 mg by mouth 3 (three) times daily.    dicyclomine (BENTYL) 10 MG capsule Take 1 capsule (10 mg total) by mouth 4 (four) times daily as  needed (stomach discomfort).    esomeprazole (NEXIUM) 40 MG capsule TAKE 1 CAPSULE (40 MG TOTAL) BY MOUTH BEFORE BREAKFAST.    evolocumab (REPATHA SURECLICK) 140 mg/mL PnIj Inject 1 mL (140 mg total) into the skin every 14 (fourteen) days.    ferrous gluconate (FERGON) 324 MG tablet Take 1 tablet (324 mg total) by mouth daily with breakfast.    fluticasone propionate (FLONASE) 50 mcg/actuation nasal spray 1 spray (50 mcg total) by Each Nostril route once daily. (Patient taking differently: 1 spray by Each Nostril route every evening.)    garlic (GARLIQUE ORAL) Take 1 capsule by mouth Daily.    MULTIVITAMIN (MULTIPLE VITAMIN ORAL) Take 1 capsule by mouth once daily.    olmesartan (BENICAR) 20 MG tablet Take 1 tablet (20 mg total) by mouth once daily.    oxybutynin (DITROPAN XL) 15 MG TR24 TAKE 1 TABLET (15 MG TOTAL) BY MOUTH EVERY MORNING.    sennosides (LAXATIVE, SENNOSIDES,) 25 mg Tab Take by mouth.    SUBOXONE 8-2 mg Place 1 each under the tongue.    SYNTHROID 100 mcg tablet Take 1 tablet (100 mcg total) by mouth before breakfast. **BRAND MEDICALLY NECESSARY**    ubrogepant (UBRELVY) 50 mg tablet Take 1 tablet by mouth at onset of migraine. May repeat in 2 hours if needed. Max 2 tablets per day.    valACYclovir (VALTREX) 1000 MG tablet Take 1 tablet (1,000 mg total) by mouth once daily. for 7 days   Last reviewed on 6/9/2023  1:39 PM by BISHOP Rodriguez    Review of patient's allergies indicates:   Allergen Reactions    Lyrica [pregabalin] Swelling    Pcn [penicillins] Swelling    Toradol [ketorolac] Swelling    Vibramycin [doxycycline calcium] Shortness Of Breath and Swelling    Zetia [ezetimibe] Other (See Comments)    Crestor [rosuvastatin]      Body aches    Cymbalta [duloxetine]      dizzyness    Elavil [amitriptyline] Other (See Comments)     Restless leg    Pravastatin Other (See Comments)     Flu -like symptoms   Body aches     Seroquel [quetiapine]      restless leg symdrome    Last reviewed on   6/9/2023 1:39 PM by Meche Cavazos      Tasks added this encounter   No tasks added.   Tasks due within next 3 months   6/13/2023 - Refill Coordination Outreach (1 time occurrence)     [facility]  [address]  [facility] [address]  Phone: [phone]  Fax: [phone]

## 2023-06-15 ENCOUNTER — PATIENT MESSAGE (OUTPATIENT)
Dept: PAIN MEDICINE | Facility: CLINIC | Age: 73
End: 2023-06-15
Payer: MEDICARE

## 2023-06-15 ENCOUNTER — TELEPHONE (OUTPATIENT)
Dept: PAIN MEDICINE | Facility: OTHER | Age: 73
End: 2023-06-15
Payer: MEDICARE

## 2023-06-15 NOTE — TELEPHONE ENCOUNTER
----- Message from Talia Belcher MD sent at 6/15/2023  1:43 PM CDT -----  Not sure what the message means. Are we to hold Plavix or not  ----- Message -----  From: Shena Kennedy  Sent: 6/15/2023  11:28 AM CDT  To: Talia Belcher MD    You'll move forward with  procedure with acceptable  CV risk   ----- Message -----  From: Talia Belcher MD  Sent: 6/14/2023   1:21 PM CDT  To: Shena Kennedy    ok  ----- Message -----  From: Shena Kennedy  Sent: 6/14/2023  12:57 PM CDT  To: MD Dr. Ye Valdez, please advise.           Acceptable CV risk     Priscilla Carbajal LPN routed conversation to Sourav Nugent MD 17 hours ago (5:01 PM)     Priscilla Carbajal LPN 17 hours ago (5:00 PM)     CG   ----- Message from Shena Kennedy sent at 6/12/2023  4:58 PM CDT -----   Requesting clearance of Plavix 7 days to schedule Lumbar L4-L5 IL Joi, please advise

## 2023-06-20 ENCOUNTER — CLINICAL SUPPORT (OUTPATIENT)
Dept: CARDIOLOGY | Facility: HOSPITAL | Age: 73
End: 2023-06-20
Payer: MEDICARE

## 2023-06-20 DIAGNOSIS — Z95.818 PRESENCE OF OTHER CARDIAC IMPLANTS AND GRAFTS: ICD-10-CM

## 2023-06-20 PROCEDURE — 93298 REM INTERROG DEV EVAL SCRMS: CPT | Mod: ,,, | Performed by: INTERNAL MEDICINE

## 2023-06-20 PROCEDURE — 93298 CARDIAC DEVICE CHECK - REMOTE: ICD-10-PCS | Mod: ,,, | Performed by: INTERNAL MEDICINE

## 2023-07-06 ENCOUNTER — PATIENT MESSAGE (OUTPATIENT)
Dept: PHARMACY | Facility: CLINIC | Age: 73
End: 2023-07-06
Payer: MEDICARE

## 2023-07-10 ENCOUNTER — SPECIALTY PHARMACY (OUTPATIENT)
Dept: PHARMACY | Facility: CLINIC | Age: 73
End: 2023-07-10
Payer: MEDICARE

## 2023-07-10 DIAGNOSIS — I63.9 ACUTE CVA (CEREBROVASCULAR ACCIDENT): ICD-10-CM

## 2023-07-10 DIAGNOSIS — Z95.1 S/P CABG (CORONARY ARTERY BYPASS GRAFT): ICD-10-CM

## 2023-07-10 DIAGNOSIS — Z95.818 PRESENCE OF OTHER CARDIAC IMPLANTS AND GRAFTS: ICD-10-CM

## 2023-07-10 RX ORDER — AMLODIPINE BESYLATE 5 MG/1
2.5 TABLET ORAL DAILY
Qty: 30 TABLET | Refills: 6 | Status: SHIPPED | OUTPATIENT
Start: 2023-07-10 | End: 2023-08-01 | Stop reason: DRUGHIGH

## 2023-07-10 RX ORDER — CLOPIDOGREL BISULFATE 75 MG/1
75 TABLET ORAL DAILY
Qty: 90 TABLET | Refills: 0 | Status: SHIPPED | OUTPATIENT
Start: 2023-07-10 | End: 2023-11-23 | Stop reason: SDUPTHER

## 2023-07-10 NOTE — TELEPHONE ENCOUNTER
Incoming call from patient for refill of Repatha.  Reported next dose due on 07/23/23.  Patient agreed for call back on 07/17/23.  Refill call pended.

## 2023-07-10 NOTE — TELEPHONE ENCOUNTER
----- Message from No Ovalle sent at 7/10/2023  2:04 PM CDT -----  Contact: pt  Type: Needs Medical Advice  Who Called:  pt  Best Call Back Number: [phone]      Additional Information: Pt is calling she needs refill on Amalopine 5mgs and Plavix 75mgs .Please call back and advise.

## 2023-07-17 NOTE — TELEPHONE ENCOUNTER
Specialty Pharmacy - Refill Coordination    Specialty Medication Orders Linked to Encounter      Flowsheet Row Most Recent Value   Medication #1 evolocumab (REPATHA SURECLICK) 140 mg/mL PnIj (Order#[id number], Rx#[id number])            Refill Questions - Documented Responses      Flowsheet Row Most Recent Value   Patient Availability and HIPAA Verification    Does patient want to proceed with activity? Yes   HIPAA/medical authority confirmed? Yes   Relationship to patient of person spoken to? Self   Refill Screening Questions    Would patient like to speak to a pharmacist? No   When does the patient need to receive the medication? 07/23/23   Refill Delivery Questions    How will the patient receive the medication? MEDRx   When does the patient need to receive the medication? 07/23/23   Shipping Address Home   Address in [facility] confirmed and updated if neccessary? Yes   Expected Copay ($) 0   Is the patient able to afford the medication copay? Yes   Payment Method zero copay   Days supply of Refill 28   Supplies needed? No supplies needed   Refill activity completed? Yes   Refill activity plan Refill scheduled   Shipment/Pickup Date: 07/20/23            Current Outpatient Medications   Medication Sig    alendronate (FOSAMAX) 70 MG tablet Take 1 tablet (70 mg total) by mouth every 7 days.    amLODIPine (NORVASC) 5 MG tablet Take 0.5 tablets (2.5 mg total) by mouth once daily.    aspirin (ECOTRIN) 81 MG EC tablet Take 1 tablet (81 mg total) by mouth once daily. for 21 days    CALCIUM CARBONATE/VITAMIN D3 (CALCIUM 500 WITH D ORAL) Take 1 capsule by mouth once daily.    carboxymethylcellulose (REFRESH PLUS) 0.5 % Dpet Place 1 drop into both eyes daily as needed (DRY EYES).    clopidogreL (PLAVIX) 75 mg tablet Take 1 tablet (75 mg total) by mouth once daily.    co-enzyme Q-10 30 mg capsule Take 30 mg by mouth 3 (three) times daily.    dicyclomine (BENTYL) 10 MG capsule Take 1 capsule (10 mg total) by mouth 4  (four) times daily as needed (stomach discomfort).    esomeprazole (NEXIUM) 40 MG capsule TAKE 1 CAPSULE (40 MG TOTAL) BY MOUTH BEFORE BREAKFAST.    evolocumab (REPATHA SURECLICK) 140 mg/mL PnIj Inject 1 mL (140 mg total) into the skin every 14 (fourteen) days.    ferrous gluconate (FERGON) 324 MG tablet Take 1 tablet (324 mg total) by mouth daily with breakfast.    fluticasone propionate (FLONASE) 50 mcg/actuation nasal spray 1 spray (50 mcg total) by Each Nostril route once daily. (Patient taking differently: 1 spray by Each Nostril route every evening.)    garlic (GARLIQUE ORAL) Take 1 capsule by mouth Daily.    MULTIVITAMIN (MULTIPLE VITAMIN ORAL) Take 1 capsule by mouth once daily.    olmesartan (BENICAR) 20 MG tablet Take 1 tablet (20 mg total) by mouth once daily.    oxybutynin (DITROPAN XL) 15 MG TR24 TAKE 1 TABLET (15 MG TOTAL) BY MOUTH EVERY MORNING.    sennosides (LAXATIVE, SENNOSIDES,) 25 mg Tab Take by mouth.    SUBOXONE 8-2 mg Place 1 each under the tongue.    SYNTHROID 100 mcg tablet Take 1 tablet (100 mcg total) by mouth before breakfast. **BRAND MEDICALLY NECESSARY**    ubrogepant (UBRELVY) 50 mg tablet Take 1 tablet by mouth at onset of migraine. May repeat in 2 hours if needed. Max 2 tablets per day.    valACYclovir (VALTREX) 1000 MG tablet Take 1 tablet (1,000 mg total) by mouth once daily. for 7 days   Last reviewed on 6/9/2023  1:39 PM by BISHOP Rodriguez    Review of patient's allergies indicates:   Allergen Reactions    Lyrica [pregabalin] Swelling    Pcn [penicillins] Swelling    Toradol [ketorolac] Swelling    Vibramycin [doxycycline calcium] Shortness Of Breath and Swelling    Zetia [ezetimibe] Other (See Comments)    Crestor [rosuvastatin]      Body aches    Cymbalta [duloxetine]      dizzyness    Elavil [amitriptyline] Other (See Comments)     Restless leg    Pravastatin Other (See Comments)     Flu -like symptoms   Body aches     Seroquel [quetiapine]      restless leg  symdrome    Last reviewed on  7/10/2023 2:53 PM by Bettina Handley      Tasks added this encounter   No tasks added.   Tasks due within next 3 months   7/17/2023 - Refill Coordination Outreach (1 time occurrence)     Caitie Sterling, PharmD  [facility]  [address]  [facility] [address]  Phone: [phone]  Fax: [phone]

## 2023-07-20 ENCOUNTER — CLINICAL SUPPORT (OUTPATIENT)
Dept: CARDIOLOGY | Facility: HOSPITAL | Age: 73
End: 2023-07-20
Payer: MEDICARE

## 2023-07-20 DIAGNOSIS — Z95.818 PRESENCE OF OTHER CARDIAC IMPLANTS AND GRAFTS: ICD-10-CM

## 2023-07-21 RX ORDER — METHOCARBAMOL 750 MG/1
750 TABLET, FILM COATED ORAL 4 TIMES DAILY
Qty: 40 TABLET | Refills: 3 | Status: SHIPPED | OUTPATIENT
Start: 2023-07-21 | End: 2023-12-12 | Stop reason: SDUPTHER

## 2023-07-21 NOTE — TELEPHONE ENCOUNTER
No care due was identified.  [facility] Embedded Care Due Messages. Reference number: [id number].   7/21/2023 8:05:31 AM CDT

## 2023-07-25 ENCOUNTER — PATIENT MESSAGE (OUTPATIENT)
Dept: CARDIOLOGY | Facility: CLINIC | Age: 73
End: 2023-07-25
Payer: MEDICARE

## 2023-07-25 RX ORDER — FLUTICASONE PROPIONATE 50 MCG
1 SPRAY, SUSPENSION (ML) NASAL DAILY
Qty: 16 G | Refills: 11 | Status: SHIPPED | OUTPATIENT
Start: 2023-07-25 | End: 2023-10-15 | Stop reason: SDUPTHER

## 2023-07-26 ENCOUNTER — PATIENT MESSAGE (OUTPATIENT)
Dept: CARDIOLOGY | Facility: CLINIC | Age: 73
End: 2023-07-26
Payer: MEDICARE

## 2023-07-26 DIAGNOSIS — I10 ESSENTIAL (PRIMARY) HYPERTENSION: Primary | ICD-10-CM

## 2023-07-26 RX ORDER — BENAZEPRIL HYDROCHLORIDE 20 MG/1
20 TABLET ORAL 2 TIMES DAILY
Qty: 180 TABLET | Refills: 0 | Status: SHIPPED | OUTPATIENT
Start: 2023-07-26 | End: 2023-08-01 | Stop reason: SDUPTHER

## 2023-07-26 RX ORDER — BENAZEPRIL HYDROCHLORIDE 20 MG/1
20 TABLET ORAL DAILY
Qty: 30 TABLET | Refills: 0 | Status: SHIPPED | OUTPATIENT
Start: 2023-07-26 | End: 2023-08-01 | Stop reason: SDUPTHER

## 2023-08-01 ENCOUNTER — PATIENT MESSAGE (OUTPATIENT)
Dept: CARDIOLOGY | Facility: CLINIC | Age: 73
End: 2023-08-01
Payer: MEDICARE

## 2023-08-01 DIAGNOSIS — I10 ESSENTIAL (PRIMARY) HYPERTENSION: ICD-10-CM

## 2023-08-01 RX ORDER — AMLODIPINE BESYLATE 2.5 MG/1
2.5 TABLET ORAL DAILY
Qty: 90 TABLET | Refills: 0 | Status: SHIPPED | OUTPATIENT
Start: 2023-08-01 | End: 2023-09-27 | Stop reason: SDUPTHER

## 2023-08-01 RX ORDER — BENAZEPRIL HYDROCHLORIDE 20 MG/1
20 TABLET ORAL 2 TIMES DAILY
Qty: 180 TABLET | Refills: 0 | Status: SHIPPED | OUTPATIENT
Start: 2023-08-01 | End: 2023-10-16

## 2023-08-02 ENCOUNTER — TELEPHONE (OUTPATIENT)
Dept: CARDIOLOGY | Facility: CLINIC | Age: 73
End: 2023-08-02
Payer: MEDICARE

## 2023-08-02 NOTE — TELEPHONE ENCOUNTER
SPOKE W/ PHARMACIST - MEDICATION CLEARED     ----- Message from Sada Moreau sent at 8/2/2023  8:16 AM CDT -----  Type: Needs Medical Advice  Who Called:  [facility]       Best Call Back Number: [phone]  Additional Information:shannon checking on certificatiojn for pt  benazepriL (LOTENSIN) 20 MG tablet / olmesartan 20 mg  please advise

## 2023-08-10 ENCOUNTER — OFFICE VISIT (OUTPATIENT)
Dept: NEUROLOGY | Facility: CLINIC | Age: 73
End: 2023-08-10
Payer: MEDICARE

## 2023-08-10 ENCOUNTER — SPECIALTY PHARMACY (OUTPATIENT)
Dept: PHARMACY | Facility: CLINIC | Age: 73
End: 2023-08-10
Payer: MEDICARE

## 2023-08-10 VITALS
HEART RATE: 65 BPM | WEIGHT: 131.31 LBS | RESPIRATION RATE: 17 BRPM | TEMPERATURE: 97 F | SYSTOLIC BLOOD PRESSURE: 145 MMHG | HEIGHT: 61 IN | BODY MASS INDEX: 24.79 KG/M2 | DIASTOLIC BLOOD PRESSURE: 70 MMHG

## 2023-08-10 DIAGNOSIS — Z95.1 S/P CABG (CORONARY ARTERY BYPASS GRAFT): Primary | ICD-10-CM

## 2023-08-10 DIAGNOSIS — G44.86 CERVICOGENIC HEADACHE: ICD-10-CM

## 2023-08-10 DIAGNOSIS — G43.719 INTRACTABLE CHRONIC MIGRAINE WITHOUT AURA AND WITHOUT STATUS MIGRAINOSUS: Primary | ICD-10-CM

## 2023-08-10 PROCEDURE — 99999 PR PBB SHADOW E&M-EST. PATIENT-LVL V: ICD-10-PCS | Mod: PBBFAC,,, | Performed by: NURSE PRACTITIONER

## 2023-08-10 PROCEDURE — 99213 OFFICE O/P EST LOW 20 MIN: CPT | Mod: S$PBB,,, | Performed by: NURSE PRACTITIONER

## 2023-08-10 PROCEDURE — 99213 PR OFFICE/OUTPT VISIT, EST, LEVL III, 20-29 MIN: ICD-10-PCS | Mod: S$PBB,,, | Performed by: NURSE PRACTITIONER

## 2023-08-10 PROCEDURE — 99215 OFFICE O/P EST HI 40 MIN: CPT | Mod: PBBFAC,PO | Performed by: NURSE PRACTITIONER

## 2023-08-10 PROCEDURE — 99999 PR PBB SHADOW E&M-EST. PATIENT-LVL V: CPT | Mod: PBBFAC,,, | Performed by: NURSE PRACTITIONER

## 2023-08-10 RX ORDER — BUTALBITAL, ACETAMINOPHEN AND CAFFEINE 50; 325; 40 MG/1; MG/1; MG/1
TABLET ORAL
Qty: 10 TABLET | Refills: 0 | Status: SHIPPED | OUTPATIENT
Start: 2023-08-10 | End: 2023-09-11 | Stop reason: SDUPTHER

## 2023-08-10 RX ORDER — EVOLOCUMAB 140 MG/ML
140 INJECTION, SOLUTION SUBCUTANEOUS
Qty: 2 EACH | Refills: 1 | Status: ACTIVE | OUTPATIENT
Start: 2023-08-10 | End: 2023-10-04 | Stop reason: SDUPTHER

## 2023-08-10 RX ORDER — PROMETHAZINE HYDROCHLORIDE 25 MG/1
25 TABLET ORAL EVERY 6 HOURS PRN
COMMUNITY
Start: 2023-06-12 | End: 2024-02-06
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
## 2023-08-10 NOTE — TELEPHONE ENCOUNTER
Outgoing call regarding repatha refill; per pt, she's due to inject on 8/20; informed her that a refill request was sent to md, and once approved [facility] will follow up to schedule delivery

## 2023-08-10 NOTE — PATIENT INSTRUCTIONS
Please call our clinic at [phone] or send a message on the [facility] portal if there are any changes to the plan described below, for example,if you are not contacted for the requested tests, referral(s) within one week, if you are unable to receive the medications prescribed, or if you feel you need to change the treatment course for any reason.     TESTING:  -- none at this time    REFERRALS:  -- continue with pain management     PREVENTION (use daily regardless of headache):  -- continue current medications    AS-NEEDED TREATMENT (use total no more than 10 days per month unless otherwise stated):  -- TRIAL Fioricet with next severe attack. You can repeat two hours later if needed.

## 2023-08-15 ENCOUNTER — PATIENT MESSAGE (OUTPATIENT)
Dept: ADMINISTRATIVE | Facility: HOSPITAL | Age: 73
End: 2023-08-15
Payer: MEDICARE

## 2023-08-15 NOTE — TELEPHONE ENCOUNTER
Specialty Pharmacy - Refill Coordination    Specialty Medication Orders Linked to Encounter      Flowsheet Row Most Recent Value   Medication #1 evolocumab (REPATHA SURECLICK) 140 mg/mL PnIj (Order#[id number], Rx#[id number])            Refill Questions - Documented Responses      Flowsheet Row Most Recent Value   Patient Availability and HIPAA Verification    Does patient want to proceed with activity? Yes   HIPAA/medical authority confirmed? Yes   Relationship to patient of person spoken to? Self   Refill Screening Questions    Would patient like to speak to a pharmacist? No   When does the patient need to receive the medication? 08/20/23   Refill Delivery Questions    How will the patient receive the medication? [facility]   When does the patient need to receive the medication? 08/20/23   Shipping Address Home   Address in [facility] confirmed and updated if neccessary? Yes   Expected Copay ($) 0   Is the patient able to afford the medication copay? Yes   Payment Method zero copay   Days supply of Refill 28   Supplies needed? No supplies needed   Refill activity completed? Yes   Refill activity plan Refill scheduled   Shipment/Pickup Date: 08/17/23            Current Outpatient Medications   Medication Sig    alendronate (FOSAMAX) 70 MG tablet Take 1 tablet (70 mg total) by mouth every 7 days.    amLODIPine (NORVASC) 2.5 MG tablet Take 1 tablet (2.5 mg total) by mouth once daily.    aspirin (ECOTRIN) 81 MG EC tablet Take 1 tablet (81 mg total) by mouth once daily. for 21 days    benazepriL (LOTENSIN) 20 MG tablet Take 1 tablet (20 mg total) by mouth 2 (two) times daily.    butalbital-acetaminophen-caffeine -40 mg (FIORICET, ESGIC) -40 mg per tablet 1 tab PO PRN migraine. No more than 10 tab per month.    CALCIUM CARBONATE/VITAMIN D3 (CALCIUM 500 WITH D ORAL) Take 1 capsule by mouth once daily.    carboxymethylcellulose (REFRESH PLUS) 0.5 % Dpet Place 1 drop into both eyes daily as needed (DRY EYES).     clopidogreL (PLAVIX) 75 mg tablet Take 1 tablet (75 mg total) by mouth once daily.    co-enzyme Q-10 30 mg capsule Take 30 mg by mouth 3 (three) times daily.    dicyclomine (BENTYL) 10 MG capsule Take 1 capsule (10 mg total) by mouth 4 (four) times daily as needed (stomach discomfort).    esomeprazole (NEXIUM) 40 MG capsule TAKE 1 CAPSULE (40 MG TOTAL) BY MOUTH BEFORE BREAKFAST.    evolocumab (REPATHA SURECLICK) 140 mg/mL PnIj Inject 1 mL (140 mg total) into the skin every 14 (fourteen) days.    fluticasone propionate (FLONASE) 50 mcg/actuation nasal spray 1 spray (50 mcg total) by Each Nostril route once daily.    garlic (GARLIQUE ORAL) Take 1 capsule by mouth Daily.    methocarbamoL (ROBAXIN) 750 MG Tab Take 1 tablet (750 mg total) by mouth 4 (four) times daily.    MULTIVITAMIN (MULTIPLE VITAMIN ORAL) Take 1 capsule by mouth once daily.    oxybutynin (DITROPAN XL) 15 MG TR24 TAKE 1 TABLET (15 MG TOTAL) BY MOUTH EVERY MORNING.    promethazine (PHENERGAN) 25 MG tablet Take 25 mg by mouth every 6 (six) hours as needed.    sennosides (LAXATIVE, SENNOSIDES,) 25 mg Tab Take by mouth.    SUBOXONE 8-2 mg Place 1 each under the tongue.    SYNTHROID 100 mcg tablet Take 1 tablet (100 mcg total) by mouth before breakfast. **BRAND MEDICALLY NECESSARY**    valACYclovir (VALTREX) 1000 MG tablet Take 1 tablet (1,000 mg total) by mouth once daily. for 7 days   Last reviewed on 8/10/2023  2:41 PM by Fabiana Stiles NP    Review of patient's allergies indicates:   Allergen Reactions    Lyrica [pregabalin] Swelling    Pcn [penicillins] Swelling    Toradol [ketorolac] Swelling    Vibramycin [doxycycline calcium] Shortness Of Breath and Swelling    Zetia [ezetimibe] Other (See Comments)    Crestor [rosuvastatin]      Body aches    Cymbalta [duloxetine]      dizzyness    Elavil [amitriptyline] Other (See Comments)     Restless leg    Pravastatin Other (See Comments)     Flu -like symptoms   Body aches     Seroquel [quetiapine]       restless leg symdrome    Last reviewed on  8/10/2023 2:41 PM by Fabiana Stiles      Tasks added this encounter   No tasks added.   Tasks due within next 3 months   8/13/2023 - Refill Coordination Outreach (1 time occurrence)     [facility]  [address]  [facility] [address]  Phone: [phone]  Fax: [phone]

## 2023-08-18 ENCOUNTER — PATIENT MESSAGE (OUTPATIENT)
Dept: ADMINISTRATIVE | Facility: HOSPITAL | Age: 73
End: 2023-08-18
Payer: MEDICARE

## 2023-08-18 ENCOUNTER — PATIENT OUTREACH (OUTPATIENT)
Dept: ADMINISTRATIVE | Facility: HOSPITAL | Age: 73
End: 2023-08-18
Payer: MEDICARE

## 2023-08-18 DIAGNOSIS — Z12.31 ENCOUNTER FOR SCREENING MAMMOGRAM FOR MALIGNANT NEOPLASM OF BREAST: Primary | ICD-10-CM

## 2023-08-18 NOTE — PROGRESS NOTES
Population Health Chart Review & Patient Outreach Details:     Reason for Outreach Encounter:     [x]  Non-Compliant Report   []  Payor Report ([employer], PHN, BCBS, MSSP, MCIP, UHC, etc.)   []  Pre-Visit Chart Review     Updates Requested / Reviewed:     []  Care Everywhere    []     []  External Sources ([facility], [facility], [employer], etc.)   []  Care Team Updated    Patient Outreach Method:    []  Telephone Outreach Completed   [] Successful   [] Left Voicemail   [] Unable to Contact (wrong number, no voicemail)  [x]  [facility] Outreach Sent  []  Letter Outreach Mailed  []  Fax Sent for External Records  []  External Records Upload    Health Maintenance Topics Addressed and Outreach Outcomes / Actions Taken:        [x]      Breast Cancer Screening []  Mammo Scheduled      []  External Records Requested     []  Added Reminder to Complete to Upcoming Primary Care Appt Notes     []  Patient Declined     []  Patient Will Call Back to Schedule     []  Patient Will Schedule with External Provider / Order Routed if Applicable             []       Cervical Cancer Screening []  Pap Scheduled      []  External Records Requested     []  Added Reminder to Complete to Upcoming Primary Care Appt Notes     []  Patient Declined     []  Patient Will Call Back to Schedule     []  Patient Will Schedule with External Provider               [x]          Colorectal Cancer Screening []  Colonoscopy Case Request or Referral Placed     []  External Records Requested     []  Added Reminder to Complete to Upcoming Primary Care Appt Notes     []  Patient Declined     []  Patient Will Call Back to Schedule     []  Patient Will Schedule with External Provider     []  Fit Kit Mailed (add the SmartPhrase under additional notes)     []  Reminded Patient to Complete Home Test             [x]      Diabetic Eye Exam []  Eye Camera Scheduled or Optometry Referral Placed     []  External Records Requested     []  Added Reminder to Complete to  Upcoming Primary Care Appt Notes     []  Patient Declined     []  Patient Will Call Back to Schedule     []  Patient Will Schedule with External Provider             []      Blood Pressure Control []  Primary Care Follow Up Visit Scheduled     []  Remote Blood Pressure Reading Captured     []  Added Reminder to Complete to Upcoming Primary Care Appt Notes     []  Patient Declined     []  Patient Will Call Back / Patient Will Send Portal Message with Reading     []  Patient Will Call Back to Schedule Provider Visit             []       HbA1c & Other Labs []  Lab Appt Scheduled for Due Labs     []  Primary Care Follow Up Visit Scheduled      []  Reminded Patient to Complete Home Test     []  Added Reminder to Complete to Upcoming Primary Care Appt Notes     []  Patient Declined     []  Patient Will Call Back to Schedule     []  Patient Will Schedule with External Provider / Order Routed if Applicable           []    Schedule Primary Care Appt []  Primary Care Appt Scheduled     []  Patient Declined     []  Patient Will Call Back to Schedule     []  Pt Established with External Provider & Updated Care Team             []      Medication Adherence []  Primary Care Appointment Scheduled     []  Added Reminder to Upcoming Primary Care Appt Notes     []  Patient Reminded to  Prescription     []  Patient Declined, Provider Notified if Needed     []  Sent Provider Message to Review and/or Add Exclusion to Problem List             []      Osteoporosis Screening []  DXA Appointment Scheduled     []  External Records Requested     []  Added Reminder to Complete to Upcoming Primary Care Appt Notes     []  Patient Declined     []  Patient Will Call Back to Schedule     []  Patient Will Schedule with External Provider / Order Routed if Applicable     Additional Care Coordinator Notes:         Further Action Needed If Patient Returns Outreach:

## 2023-08-19 ENCOUNTER — CLINICAL SUPPORT (OUTPATIENT)
Dept: CARDIOLOGY | Facility: HOSPITAL | Age: 73
End: 2023-08-19
Payer: MEDICARE

## 2023-08-19 DIAGNOSIS — Z95.818 PRESENCE OF OTHER CARDIAC IMPLANTS AND GRAFTS: ICD-10-CM

## 2023-08-19 PROCEDURE — 93298 REM INTERROG DEV EVAL SCRMS: CPT | Mod: ,,, | Performed by: INTERNAL MEDICINE

## 2023-08-19 PROCEDURE — 93298 CARDIAC DEVICE CHECK - REMOTE: ICD-10-PCS | Mod: ,,, | Performed by: INTERNAL MEDICINE

## 2023-08-23 ENCOUNTER — PATIENT MESSAGE (OUTPATIENT)
Dept: NEUROLOGY | Facility: CLINIC | Age: 73
End: 2023-08-23
Payer: MEDICARE

## 2023-08-29 ENCOUNTER — OFFICE VISIT (OUTPATIENT)
Dept: PAIN MEDICINE | Facility: CLINIC | Age: 73
End: 2023-08-29
Payer: MEDICARE

## 2023-08-29 VITALS
WEIGHT: 130.5 LBS | RESPIRATION RATE: 19 BRPM | TEMPERATURE: 99 F | BODY MASS INDEX: 24.64 KG/M2 | DIASTOLIC BLOOD PRESSURE: 72 MMHG | HEART RATE: 64 BPM | HEIGHT: 61 IN | SYSTOLIC BLOOD PRESSURE: 131 MMHG

## 2023-08-29 DIAGNOSIS — M43.06 SPONDYLOLYSIS OF LUMBAR REGION: ICD-10-CM

## 2023-08-29 DIAGNOSIS — M51.36 DDD (DEGENERATIVE DISC DISEASE), LUMBAR: ICD-10-CM

## 2023-08-29 DIAGNOSIS — M54.17 LUMBOSACRAL RADICULOPATHY: ICD-10-CM

## 2023-08-29 DIAGNOSIS — M79.18 MYOFASCIAL PAIN: Primary | ICD-10-CM

## 2023-08-29 PROCEDURE — 99213 PR OFFICE/OUTPT VISIT, EST, LEVL III, 20-29 MIN: ICD-10-PCS | Mod: 25,S$PBB,, | Performed by: NURSE PRACTITIONER

## 2023-08-29 PROCEDURE — 99215 OFFICE O/P EST HI 40 MIN: CPT | Mod: PBBFAC | Performed by: NURSE PRACTITIONER

## 2023-08-29 PROCEDURE — 99999 PR PBB SHADOW E&M-EST. PATIENT-LVL V: CPT | Mod: PBBFAC,,, | Performed by: NURSE PRACTITIONER

## 2023-08-29 PROCEDURE — 20553 NJX 1/MLT TRIGGER POINTS 3/>: CPT | Mod: PBBFAC | Performed by: NURSE PRACTITIONER

## 2023-08-29 PROCEDURE — 99999PBSHW PR PBB SHADOW TECHNICAL ONLY FILED TO HB: Mod: PBBFAC,,,

## 2023-08-29 PROCEDURE — 99999 PR PBB SHADOW E&M-EST. PATIENT-LVL V: ICD-10-PCS | Mod: PBBFAC,,, | Performed by: NURSE PRACTITIONER

## 2023-08-29 PROCEDURE — 20553 PR INJECT TRIGGER POINTS, > 3: ICD-10-PCS | Mod: S$PBB,,, | Performed by: NURSE PRACTITIONER

## 2023-08-29 PROCEDURE — 20553 NJX 1/MLT TRIGGER POINTS 3/>: CPT | Mod: S$PBB,,, | Performed by: NURSE PRACTITIONER

## 2023-08-29 PROCEDURE — 99213 OFFICE O/P EST LOW 20 MIN: CPT | Mod: 25,S$PBB,, | Performed by: NURSE PRACTITIONER

## 2023-08-29 PROCEDURE — 99999PBSHW PR PBB SHADOW TECHNICAL ONLY FILED TO HB: ICD-10-PCS | Mod: PBBFAC,,,

## 2023-08-29 RX ORDER — BUPIVACAINE HYDROCHLORIDE 5 MG/ML
7 INJECTION, SOLUTION EPIDURAL; INTRACAUDAL
Status: COMPLETED | OUTPATIENT
Start: 2023-08-29 | End: 2023-08-29

## 2023-08-29 RX ORDER — METHYLPREDNISOLONE ACETATE 40 MG/ML
10 INJECTION, SUSPENSION INTRA-ARTICULAR; INTRALESIONAL; INTRAMUSCULAR; SOFT TISSUE
Status: COMPLETED | OUTPATIENT
Start: 2023-08-29 | End: 2023-08-29

## 2023-08-29 RX ADMIN — BUPIVACAINE HYDROCHLORIDE 35 MG: 5 INJECTION, SOLUTION EPIDURAL; INTRACAUDAL at 03:08

## 2023-08-29 RX ADMIN — METHYLPREDNISOLONE ACETATE 10 MG: 40 INJECTION, SUSPENSION INTRA-ARTICULAR; INTRALESIONAL; INTRAMUSCULAR; SOFT TISSUE at 03:08

## 2023-08-29 NOTE — PROGRESS NOTES
Chronic Pain-Tele-Medicine-Established Note (Follow up visit)         SUBJECTIVE:    Interval History 8/29/2023:  The patient is here for neck and head pain. She had trigger point injections at last OV with significant benefit for almost 2 months. She was recently seen by neurology and started on Fioricet which does help. They recommended repeat TPIs with me today. She is also having more middle and lower back pain recently. She has tried stretching without much benefit. No significant leg pain. Blood pressure is fairly well controlled today. Her pain today is 3/10.    Interval History 6/9/2023:  The patient presents for follow up of back and neck pain. She has been having increased pain over the past 2 weeks. She has radiation down the back/side of the left leg which is burning in nature. She has been having more muscle pain and tightness. She has had TPIs in the past with benefit. She would like to repeat today. She is also interested in scheduling a lumbar procedure. Her pain today is 5/10.    Interval History 11/8/2022:  The patient present today for increased muscle pain. At her last OV, she requested TPIs but she had a shingles outbreak. Today, she said she is clear from shingles and denies any contraindication to TPIs. However, she does have a cardiac history and blood pressure is slightly elevated today. No SOB or chest pain. No leg swelling. She report more diffuse pain recently, mainly to the neck, mid back, lower back, hips and knees. No new injury or trauma.     Interval History 10/18/2022:  The patient is here for increased back pain. The pain is tight in nature without radiation. She originally wanted TPIs today. However, she had a flu shot yesterday, Repatha 2 days ago and has a current shingles outbreak. Her pain today is 2/10.    Interval History 8/3/2021:  The patient presents to clinic today with return of neck pain. She states that since her TPI at last visit that she had complete resolution of  sxs until about 10d ago, which saw the return of her neck pain and gluteal sxs same as prior to TPI. She would like TPI again today as it has brought her significant relief in the past. She endorses continuation of her chronic back pain. Her pain today is 7/10.    Interval History 3/12/2021:  The patient has a follow up today for increased neck pain. She is having aching pain across the neck with associated headaches. She is also having middle back tightness. She is not having radiation into the arms or numbness. She would like trigger point injections as these have been helpful in the past. She previously was seeing neurology but was lost to follow up. She denies nausea or vomiting currently. She had a cholecystectomy on 2/10/21 from which she has recovered well. She had her first Covid vaccine on 2/3/21 and is scheduled for the next on 2/24/21. She also has a history of chronic back pain. Her pain today is 7/10.    Interval History 7/22/2020:  The patient has an audio visit today to discuss back pain.  She had TPIs at last OV which gave her some short term benefit.  She was scheduled for facet injections which she cancelled.  She would like to update her imaging prior to another procedure.  Her pain today is 6/10.    Interval History 7/7/2020:  The patient is here for follow up of back pain.  She has been doing well until the past month or so.  She is having more middle and lower back pain.  She is not having any radiation into the legs at this time.  She describes the pain as aching and stabbing.  She has been stretching and walking at home.  Her pain today is 4/10.    Interval History 1/20/2020:  The patient returns for follow up of middle and lower back pain.  She is now s/p bilateral L3-4 and L4-5 facet joint injections as well as T7-8 interspinous ligament injection on 12/16/19 with about 50% relief.  She is still having right sided lower back pain which starts at the spine and often radiates to her hip.  She  denies associated numbness.  She says that the area is tender to touch and feels swollen sometimes.  She has tried ice and heat without benefit.  Her pain today is 2/10.    Interval History 12/13/2019:  The patient is here for follow up of back pain.  She is s/p Bilateral L2,3,4,5 MBB with steroids.  She is reporting limited benefit this time.  Previous provided her significant benefit.  She is having pain across the lower back, worse on the left side.  She denies radiation into the legs.  She is also having middle thoracic pain.  This does not radiate to the front of her body.  She is not having any numbness.  She would like to schedule another procedure.  Her pain today is 5/10.    Previous Encounter:  Osman Johansen presents to the clinic for a follow-up appointment for lower back pain. She reports increased low back pain over the last few weeks. She describes this pain as constant, sharp, and aching. She denies any radiating leg pain. She previously had 90% relief of her pain with bilateral L2,3,4,5 MBB and T7-8 interspinous ligament injection, last done in August 2018. She would like to repeat this procedure. She continues to participate in a home exercise routine. She denies any other health changes. Her pain today is 5/10.    Pain Disability Index Review:      8/29/2023     2:49 PM 11/8/2022     1:43 PM 10/18/2022     2:55 PM   Last 3 PDI Scores   Pain Disability Index (PDI) 40 8 12       Pain Medications:  None    Opioid Contract: no     report:  Reviewed and consistent with medication use as prescribed.    Pain Procedures:   7/18/13 Bilateral L5 TF JOCE  10/28/13 Bilateral SI joint injection  4/20/15 Bilateral SI joint injection  12/15/16 Bilateral L3-4 facet joint injections- 100% relief for 6 weeks  3/13/17 Bilateral L3-4 facet joint injections- 30% relief  4/17/17 Bilateral L2,3,4,5 MBB with steroids- significant benefit for 7 months  12/14/17 Bilateral L2,3,4,5 MBB with steroids and Interspinal  ligament injection- 90% relief for 7 months  8/21/2018- Bilateral L2,3,4,5 MBB with steroids and T7-8 interspinous ligament injections   11/18/19 Bilateral L2,3,4,5 MBB with steroids  12/16/19 bilateral L3-4 and L4-5 facet joint injections as well as T7-8 interspinous ligament injection- 50% relief  3/12/21 bilateral cervical and paraspinal TPI, R gluteal TPI x1 (5 sites total)    Physical Therapy/Home Exercise: per self does stretching    Imaging:     Lumbar MRI 12/10/16    Narrative   MRI LUMBAR SPINE WITHOUT CONTRAST    COMPARISON: None    TECHNIQUE:  Sagittal T1, T2, and STIR;  axial T1 and T2 sequences through the lumbar spine were acquired without contrast.    FINDINGS: Vertebral body height and alignment are within normal limits.  The conus terminates at T12-L1.  Moderate loss of disc height is present at L4-5.  Marrow signal is mildly heterogeneous.  No focal osseous lesion.    L1-L2:  No disc herniation. No spinal canal or neuroforaminal narrowing.    L2-L3:  Mild diffuse disc bulging is present without spinal canal or neuroforaminal narrowing.    L3-L4:  Mild diffuse disc bulging and moderate bilateral facet arthropathy result in mild spinal canal narrowing.    L4-L5:  Mild diffuse disc bulging is present without spinal canal or neuroforaminal narrowing.    L5-S1:  No disc herniation. No spinal canal or neuroforaminal narrowing.    Several small gallstones noted.   Impression     Degenerative lumbar spondylosis with mild L3-4 spinal canal narrowing and no significant neuroforaminal narrowing.  Cholelithiasis       Narrative     MRI of the thoracic spine without contrast    Technique: Multiplanar multisequence MR imaging of the thoracic spine was performed without contrast.    Findings: There is mild levoscoliosis of the lumbar spine.  This may be positional.  Vertebral bodies otherwise demonstrate normal height alignment.  The marrow signal of the vertebral bodies is within normal limits.  There is mild  disc desiccation noted throughout the thoracic spine.  No significant disc space narrowing.  No significant disc protrusions are identified.  The central canal is widely patent.  No significant spondylosis.  The cord is normal in signal and caliber.      Impression         1.  Mild levoscoliosis of the thoracic spine otherwise essentially unremarkable MRI of the thoracic spine.     Narrative & Impression  EXAMINATION:  XR HIPS BILATERAL 2 VIEW INCL AP PELVIS     CLINICAL HISTORY:  Pain in right hip     TECHNIQUE:  AP view of the pelvis and frogleg lateral views of both hips were performed.     COMPARISON:  Hip and pelvic x-ray of April 19, 2011     FINDINGS:  A fracture of either hip is not seen.  No dislocation is noted.  The acetabula are well maintained.  There is sclerosis adjacent to the inferior left sacroiliac joint unchanged from the prior study and consistent with chronic sacroiliitis.  Degenerative disc disease is seen at the lower lumbar spine.     Impression:     Chronic left sacroiliitis.  Degenerative disc disease at L4-5.  Otherwise negative radiographs of both hips and pelvis.      CMP  Sodium   Date Value Ref Range Status   05/30/2023 139 136 - 145 mmol/L Final     Potassium   Date Value Ref Range Status   05/30/2023 5.2 (H) 3.5 - 5.1 mmol/L Final     Chloride   Date Value Ref Range Status   05/30/2023 105 95 - 110 mmol/L Final     CO2   Date Value Ref Range Status   05/30/2023 25 23 - 29 mmol/L Final     Glucose   Date Value Ref Range Status   05/30/2023 93 70 - 110 mg/dL Final     BUN   Date Value Ref Range Status   05/30/2023 18 8 - 23 mg/dL Final     Creatinine   Date Value Ref Range Status   05/30/2023 1.2 0.5 - 1.4 mg/dL Final     Calcium   Date Value Ref Range Status   05/30/2023 9.9 8.7 - 10.5 mg/dL Final     Total Protein   Date Value Ref Range Status   05/30/2023 7.2 6.0 - 8.4 g/dL Final     Albumin   Date Value Ref Range Status   05/30/2023 3.9 3.5 - 5.2 g/dL Final     Total Bilirubin    Date Value Ref Range Status   05/30/2023 0.3 0.1 - 1.0 mg/dL Final     Comment:     For infants and newborns, interpretation of results should be based  on gestational age, weight and in agreement with clinical  observations.    Premature Infant recommended reference ranges:  Up to 24 hours.............<8.0 mg/dL  Up to 48 hours............<12.0 mg/dL  3-5 days..................<15.0 mg/dL  6-29 days.................<15.0 mg/dL       Alkaline Phosphatase   Date Value Ref Range Status   05/30/2023 61 55 - 135 U/L Final     AST   Date Value Ref Range Status   05/30/2023 20 10 - 40 U/L Final     ALT   Date Value Ref Range Status   05/30/2023 13 10 - 44 U/L Final     Anion Gap   Date Value Ref Range Status   05/30/2023 9 8 - 16 mmol/L Final     eGFR if    Date Value Ref Range Status   07/27/2022 29.7 (A) >60 mL/min/1.73 m^2 Final     eGFR if non    Date Value Ref Range Status   07/27/2022 25.8 (A) >60 mL/min/1.73 m^2 Final     Comment:     Calculation used to obtain the estimated glomerular filtration  rate (eGFR) is the CKD-EPI equation.        Lab Results   Component Value Date    WBC 7.03 05/30/2023    HGB 10.8 (L) 05/30/2023    HCT 35.5 (L) 05/30/2023    MCV 95 05/30/2023     05/30/2023         Allergies:   Review of patient's allergies indicates:   Allergen Reactions    Pcn [penicillins] Swelling    Toradol [ketorolac] Swelling    Vibramycin [doxycycline calcium] Swelling    Cymbalta [duloxetine]      dizzyness    Elavil [amitriptyline]     Lyrica [pregabalin] Swelling    Seroquel [quetiapine]      restless leg symdrome       Current Medications:   Current Outpatient Medications   Medication Sig Dispense Refill    alendronate (FOSAMAX) 70 MG tablet Take 1 tablet (70 mg total) by mouth every 7 days. 12 tablet 4    amLODIPine (NORVASC) 2.5 MG tablet Take 1 tablet (2.5 mg total) by mouth once daily. 90 tablet 0    benazepriL (LOTENSIN) 20 MG tablet Take 1 tablet (20 mg total)  by mouth 2 (two) times daily. 180 tablet 0    butalbital-acetaminophen-caffeine -40 mg (FIORICET, ESGIC) -40 mg per tablet 1 tab PO PRN migraine. No more than 10 tab per month. 10 tablet 0    CALCIUM CARBONATE/VITAMIN D3 (CALCIUM 500 WITH D ORAL) Take 1 capsule by mouth once daily.      carboxymethylcellulose (REFRESH PLUS) 0.5 % Dpet Place 1 drop into both eyes daily as needed (DRY EYES).      clopidogreL (PLAVIX) 75 mg tablet Take 1 tablet (75 mg total) by mouth once daily. 90 tablet 0    co-enzyme Q-10 30 mg capsule Take 30 mg by mouth 3 (three) times daily.      dicyclomine (BENTYL) 10 MG capsule Take 1 capsule (10 mg total) by mouth 4 (four) times daily as needed (stomach discomfort). 120 capsule 0    esomeprazole (NEXIUM) 40 MG capsule TAKE 1 CAPSULE (40 MG TOTAL) BY MOUTH BEFORE BREAKFAST. 90 capsule 3    evolocumab (REPATHA SURECLICK) 140 mg/mL PnIj Inject 1 mL (140 mg total) into the skin every 14 (fourteen) days. 2 each 1    fluticasone propionate (FLONASE) 50 mcg/actuation nasal spray 1 spray (50 mcg total) by Each Nostril route once daily. 16 g 11    garlic (GARLIQUE ORAL) Take 1 capsule by mouth Daily.      methocarbamoL (ROBAXIN) 750 MG Tab Take 1 tablet (750 mg total) by mouth 4 (four) times daily. 40 tablet 3    MULTIVITAMIN (MULTIPLE VITAMIN ORAL) Take 1 capsule by mouth once daily.      oxybutynin (DITROPAN XL) 15 MG TR24 TAKE 1 TABLET (15 MG TOTAL) BY MOUTH EVERY MORNING. 90 tablet 3    promethazine (PHENERGAN) 25 MG tablet Take 25 mg by mouth every 6 (six) hours as needed.      sennosides (LAXATIVE, SENNOSIDES,) 25 mg Tab Take by mouth.      SUBOXONE 8-2 mg Place 1 each under the tongue.      SYNTHROID 100 mcg tablet Take 1 tablet (100 mcg total) by mouth before breakfast. **BRAND MEDICALLY NECESSARY** 90 tablet 3    valACYclovir (VALTREX) 1000 MG tablet TAKE ONE TABLET BY MOUTH EVERY DAY FOR 7 DAYS 7 tablet 5    aspirin (ECOTRIN) 81 MG EC tablet Take 1 tablet (81 mg total) by mouth  once daily. for 21 days       No current facility-administered medications for this visit.     Facility-Administered Medications Ordered in Other Visits   Medication Dose Route Frequency Provider Last Rate Last Admin    lactated ringers infusion   Intravenous Continuous Jim Mcdonough MD   Stopped at 06/27/22 1398    LIDOcaine (PF) 10 mg/ml (1%) injection 10 mg  1 mL Intradermal Once Jim Mcdonough MD           REVIEW OF SYSTEMS:    GENERAL:  No weight loss, malaise or fevers.  HEENT:  Positive for frequent headaches- followed by neurology.  NECK:  Negative for lumps, goiter, pain and significant neck swelling.  RESPIRATORY:  Negative for cough, wheezing or shortness of breath.  CARDIOVASCULAR:  Negative for chest pain, leg swelling or palpitations.  GI:  Negative for abdominal discomfort, blood in stools or black stools or change in bowel habits. GERD.  MUSCULOSKELETAL:  See HPI.  SKIN:  Negative for lesions, rash, and itching.  PSYCH: H/O anxiety and opioid dependence.  HEMATOLOGY/LYMPHOLOGY:  On Plavix.  NEURO:   No history of syncope, paralysis, seizures or tremors.  All other reviewed and negative other than HPI.    Past Medical History:  Past Medical History:   Diagnosis Date    Allergy     Anemia     Anxiety     Arthritis     Blood transfusion 8 yrs    CAD (coronary artery disease)     Cataract     Chronic diastolic CHF (congestive heart failure)     CKD (chronic kidney disease), stage II     COPD (chronic obstructive pulmonary disease)     mild no inhalers    Degenerative disc disease     Depression     Dry eye syndrome     Fibromyalgia     Fluid overload     GERD (gastroesophageal reflux disease)     Headache     Hypercholesterolemia 12/09/2019    Hypertension     Hypothyroidism     IBS (irritable bowel syndrome)     Lower extremity edema     Macular drusen     Neuromuscular disorder     Ocular hypertension, bilateral     Osteoporosis     Pleural effusion     PUD (peptic ulcer disease)     Restless  "leg     Uses Suboxone to control    Sleep apnea     "complex sleep apnea" - per pt, no cpap    Thoracic or lumbosacral neuritis or radiculitis 10/19/2012    Thyroid disease     hashimoto's       Past Surgical History:  Past Surgical History:   Procedure Laterality Date    ADENOIDECTOMY  1955    APPENDECTOMY  1965    CARDIAC CATHETERIZATION      CATARACT EXTRACTION W/  INTRAOCULAR LENS IMPLANT Right 05/24/2021    Procedure: EXTRACTION, CATARACT, WITH IOL INSERTION;  Surgeon: Bebe Lynn MD;  Location: [facility] OR;  Service: Ophthalmology;  Laterality: Right;  Right/DM    CHOLECYSTECTOMY  2021    CORONARY ANGIOGRAPHY N/A 05/02/2022    Procedure: ANGIOGRAM, CORONARY ARTERY;  Surgeon: Sourav Nugent MD;  Location: [facility] CATH;  Service: Cardiology;  Laterality: N/A;    CORONARY ARTERY BYPASS GRAFT      CORONARY ARTERY BYPASS GRAFT (CABG) N/A 06/27/2022    Procedure: CORONARY ARTERY BYPASS GRAFT (CABG) X 5;  Surgeon: Talib Torres MD;  Location: [facility] OR;  Service: Cardiovascular;  Laterality: N/A;    ENDOSCOPIC HARVEST OF VEIN Left 06/27/2022    Procedure: SURGICAL PROCUREMENT, VEIN, ENDOSCOPIC;  Surgeon: Talib Torres MD;  Location: [facility] OR;  Service: Cardiovascular;  Laterality: Left;    EYE SURGERY  2021    Cataract surgery    HYSTERECTOMY  8 yrs     INJECTION OF ANESTHETIC AGENT AROUND NERVE Bilateral 08/21/2018    Procedure: BLOCK, NERVE;  Surgeon: Talia Belcher MD;  Location: [facility] PAIN MGT;  Service: Pain Management;  Laterality: Bilateral;  Bilateral block @ L2,3,4,5      INJECTION OF ANESTHETIC AGENT AROUND NERVE Bilateral 11/18/2019    Procedure: BLOCK, NERVE, L2-L3-L4-L5 ME DIAL BRANCH;  Surgeon: Talia Belcher MD;  Location: [facility] PAIN MGT;  Service: Pain Management;  Laterality: Bilateral;    INJECTION OF FACET JOINT Bilateral 12/16/2019    Procedure: INJECTION, FACET JOINT, L3-L4 AND L4-L5 AND T7-T8 LIGAMENT;  Surgeon: Talia Belcher MD;  Location: [facility] PAIN MGT;  Service: Pain Management;  Laterality: " Bilateral;    INSERTION OF IMPLANTABLE LOOP RECORDER Left 2022    Procedure: Insertion, Implantable Loop Recorder;  Surgeon: Sourav Nugent MD;  Location: [facility] CATH;  Service: Cardiology;  Laterality: Left;    LAPAROSCOPIC CHOLECYSTECTOMY N/A 02/10/2021    Procedure: CHOLECYSTECTOMY, LAPAROSCOPIC;  Surgeon: John Morrow MD;  Location: [facility] OR;  Service: General;  Laterality: N/A;    LEFT HEART CATHETERIZATION Left 2022    Procedure: Left heart cath;  Surgeon: Sourav Nugent MD;  Location: [facility] CATH;  Service: Cardiology;  Laterality: Left;    SINUS SURGERY      x2-one for CSF leak    TONSILLECTOMY         Family History:  Family History   Problem Relation Age of Onset    Ulcers Father     Arthritis Mother     Cancer Mother     Heart disease Mother     Hypertension Mother     Cataracts Mother     Ovarian cancer Mother     Thyroid disease Brother     Heart disease Brother     Stroke Paternal Aunt             Amblyopia Neg Hx     Blindness Neg Hx     Diabetes Neg Hx     Glaucoma Neg Hx     Macular degeneration Neg Hx     Retinal detachment Neg Hx     Strabismus Neg Hx     Breast cancer Neg Hx        Social History:  Social History     Socioeconomic History    Marital status:    Tobacco Use    Smoking status: Former     Current packs/day: 0.00     Types: Cigarettes     Quit date: 2008     Years since quitting: 15.6    Smokeless tobacco: Never   Substance and Sexual Activity    Alcohol use: Yes     Comment: rarely    Drug use: No    Sexual activity: Yes     Partners: Male     Social Determinants of Health     Financial Resource Strain: Low Risk  (2021)    Overall Financial Resource Strain (CARDIA)     Difficulty of Paying Living Expenses: Not hard at all   Food Insecurity: No Food Insecurity (2021)    Hunger Vital Sign     Worried About Running Out of Food in the Last Year: Never true     Ran Out of Food in the Last Year: Never true   Transportation Needs: Unknown (2021)  "   PRAPARE - Transportation     Lack of Transportation (Non-Medical): No   Social Connections: Unknown (8/16/2021)    Social Connection and Isolation Panel [NHANES]     Frequency of Communication with Friends and Family: More than three times a week     Frequency of Social Gatherings with Friends and Family: Once a week       OBJECTIVE:    /72 (BP Location: Right arm, Patient Position: Sitting)   Pulse 64   Temp 98.6 °F (37 °C) (Oral)   Resp 19   Ht 5' 1" (1.549 m)   Wt 59.2 kg (130 lb 8.2 oz)   BMI 24.66 kg/m²     PHYSICAL EXAMINATION     General appearance: Well appearing, in no acute distress, alert and oriented x3.  Psych:  Mood and affect appropriate.  Skin: Midline chest scar.   Neck: TTP over cervical and thoracic paraspinals as well as trapezius muscles. Full ROM with pain on extension and lateral rotation. Positive facet loading.  Back: Straight leg raise in the sitting position is negative for radicular pain bilaterally. Positive facet loading bilaterally. TTP over thoracic and lumbar paraspinals.  Extremities: Peripheral joint ROM is full and pain free without obvious instability or laxity in all four extremities. No deformities, edema, or skin discoloration. Good capillary refill.  Musculoskeletal: Bilateral upper and lower extremity strength is normal and symmetric.  No atrophy or tone abnormalities are noted.   Neuro: No loss of sensation noted.  Gait: Normal.    ASSESSMENT: 73 y.o. year old female with neck and back pain, consistent with the following diagnoses:     1. Myofascial pain        2. DDD (degenerative disc disease), lumbar        3. Spondylolysis of lumbar region        4. Lumbosacral radiculopathy                PLAN:     - Previous imaging was reviewed and discussed with the patient today.    - Will give TPIs today as per below with minimal steroid dose. Blood pressure has been well controlled.     - Consider lumbar procedures.    - Encouraged the patient to resume a home " exercise routine to help with pain and strengthening.     - Counseled patient regarding the importance of constant sleeping habits and physical therapy.        The above plan and management options were discussed at length with patient. Patient is in agreement with the above and verbalized understanding.    Meche Clinton  08/29/2023       Trigger Point Injection:   The procedure was discussed with the patient including complications of nerve damage, bleeding, infection, and failure of pain relief.   Trigger points were identified by palpation and marked. Alcohol prep of sites done. A mixture of 7 mL 0.50% bupivacaine +10 mg Depomedrol was prepared (8 mL total).   A 27-gauge needle was advanced to the point of maximal tenderness, and medication was injected after negative aspiration. All sites done in the same manner. Patient tolerated the procedure well and without complications. Sites injected included: trapezius, thoracic paraspinals, and lumbar paraspinals muscles bilaterally (6 sites total).

## 2023-09-06 ENCOUNTER — TELEPHONE (OUTPATIENT)
Dept: CARDIOLOGY | Facility: HOSPITAL | Age: 73
End: 2023-09-06
Payer: MEDICARE

## 2023-09-06 NOTE — TELEPHONE ENCOUNTER
"ILR Report  Monitoring period: 9/1/23-9/4/23    Device Defined Counters:  Symptom Events: 1  EGM illustrates SR, ST/SVT, PVC on 9/4 @ 1:17 pm                          HR histogram:        Meds:  Amlodipine 2.5 mg daily (bid if BP elevated)  Lotensin 20 mg bid  Plavix 75 mg  Aspirin 81 mg    Symptom: Pt. reports she was getting ready to go shopping and she developed chest pain pressure that radiated down left arm "felt like a BP cuff pain down arm" she said she took a 1/2 Nitroglycerin and it subsided. She laid down for about 20 mins and then went shopping. She said the pain recurred again at 4 pm, but subsided on its own. Again at 6:59 pm she had chest pressure under left breast, piercing to back. She states, "it was very mild" per her notes she still had it 11:55 that night. BP 6:59 was 148/58 P 57    Pt. Has stress test scheduled 9/27 and f/u with Dr. Nugent 10/2      Message sent to Dr. Nugent for review  "

## 2023-09-11 DIAGNOSIS — G43.719 INTRACTABLE CHRONIC MIGRAINE WITHOUT AURA AND WITHOUT STATUS MIGRAINOSUS: ICD-10-CM

## 2023-09-11 RX ORDER — BUTALBITAL, ACETAMINOPHEN AND CAFFEINE 50; 325; 40 MG/1; MG/1; MG/1
TABLET ORAL
Qty: 10 TABLET | Refills: 0 | Status: SHIPPED | OUTPATIENT
Start: 2023-09-11 | End: 2023-10-15 | Stop reason: SDUPTHER

## 2023-09-12 ENCOUNTER — TELEPHONE (OUTPATIENT)
Dept: CARDIOLOGY | Facility: HOSPITAL | Age: 73
End: 2023-09-12
Payer: MEDICARE

## 2023-09-12 ENCOUNTER — PATIENT MESSAGE (OUTPATIENT)
Dept: CARDIOLOGY | Facility: HOSPITAL | Age: 73
End: 2023-09-12
Payer: MEDICARE

## 2023-09-12 NOTE — TELEPHONE ENCOUNTER
Call placed to patient in regards to multiple symptom episodes noted:    Most recent:  9/12 @ 5:49am, patient c/o chest pain  5:20 woke up /72 HR 55-back to bed  5:43 woke up again with c/o chest pain, 167/79 HR 62, took amlodipine and lotensin, sent Symptom      7:42 BP took Fosamax, right after noted chest pain again down left arm 188/91 HR 59    *suggested to patient to notify Dr. Stiles of exacerbation of symptoms post Fosamax    Reviewed upcoming appts for stress test and in clinic with REJI Jimenes

## 2023-09-18 ENCOUNTER — CLINICAL SUPPORT (OUTPATIENT)
Dept: CARDIOLOGY | Facility: HOSPITAL | Age: 73
End: 2023-09-18
Payer: MEDICARE

## 2023-09-18 DIAGNOSIS — Z95.818 PRESENCE OF OTHER CARDIAC IMPLANTS AND GRAFTS: ICD-10-CM

## 2023-09-27 ENCOUNTER — TELEPHONE (OUTPATIENT)
Dept: CARDIOLOGY | Facility: HOSPITAL | Age: 73
End: 2023-09-27
Payer: MEDICARE

## 2023-09-27 ENCOUNTER — CLINICAL SUPPORT (OUTPATIENT)
Dept: CARDIOLOGY | Facility: HOSPITAL | Age: 73
End: 2023-09-27
Attending: INTERNAL MEDICINE
Payer: MEDICARE

## 2023-09-27 VITALS — HEIGHT: 61 IN | WEIGHT: 130 LBS | BODY MASS INDEX: 24.55 KG/M2

## 2023-09-27 DIAGNOSIS — Z95.1 S/P CABG (CORONARY ARTERY BYPASS GRAFT): Chronic | ICD-10-CM

## 2023-09-27 DIAGNOSIS — I10 ESSENTIAL (PRIMARY) HYPERTENSION: ICD-10-CM

## 2023-09-27 DIAGNOSIS — R07.89 ATYPICAL CHEST PAIN: ICD-10-CM

## 2023-09-27 PROCEDURE — 93016 EXERCISE STRESS - EKG (CUPID ONLY): ICD-10-PCS | Mod: ,,, | Performed by: INTERNAL MEDICINE

## 2023-09-27 PROCEDURE — 93017 CV STRESS TEST TRACING ONLY: CPT | Mod: PO

## 2023-09-27 PROCEDURE — 93018 CV STRESS TEST I&R ONLY: CPT | Mod: ,,, | Performed by: INTERNAL MEDICINE

## 2023-09-27 PROCEDURE — 93016 CV STRESS TEST SUPVJ ONLY: CPT | Mod: ,,, | Performed by: INTERNAL MEDICINE

## 2023-09-27 PROCEDURE — 93018 EXERCISE STRESS - EKG (CUPID ONLY): ICD-10-PCS | Mod: ,,, | Performed by: INTERNAL MEDICINE

## 2023-09-27 RX ORDER — AMLODIPINE BESYLATE 2.5 MG/1
2.5 TABLET ORAL DAILY
Qty: 90 TABLET | Refills: 3 | Status: SHIPPED | OUTPATIENT
Start: 2023-09-27 | End: 2023-10-02 | Stop reason: DRUGHIGH

## 2023-09-27 NOTE — TELEPHONE ENCOUNTER
ILR Report  Monitoring period: 9/12/2023 - 9/27/2023     Battery Status: OK     Device Defined Counters:  Symptom Events: 1  EGMs illustrate PACs, PVCs, NSR, SB.                         Symptoms: pt. reports she had chest pain that radiated down her arm. Her BP this morning @ 7 am was 182/86. She took her Lotensin and 1/2 Imdur. She states she is out of her Amlodipine.  She said she took a Zyrtec and her BP shot up to 196 she had chest pain again so she laid down She took a half of ,nitro, she was still having chest pain so she took another 1/2 of nitro and her BP went down to 148 systolic. She had stress test today and she said her BP was 200. She currently denies any symptoms and most recent BP is 126/28. She said she has been having a headache for 2-3 days. She said the past few days the Amlodipine and Lotensin have not been controlling BP. She takes Lotensin once a day and doesn't take in the evening if her BP is 120's or below. She ran out of Amlodipine today.      Next f/u Dr. Nugent 10/2

## 2023-09-27 NOTE — TELEPHONE ENCOUNTER
Notified pt. Per Dr. Nugent,continue current medications. Refill sent to [facility] pharmacy per pt. Request. She said she will  and resume

## 2023-09-28 LAB
CV STRESS BASE HR: 77 BPM
DIASTOLIC BLOOD PRESSURE: 101 MMHG
OHS CV CPX 1 MINUTE RECOVERY HEART RATE: 121 BPM
OHS CV CPX 85 PERCENT MAX PREDICTED HEART RATE MALE: 120
OHS CV CPX ESTIMATED METS: 9
OHS CV CPX MAX PREDICTED HEART RATE: 141
OHS CV CPX PATIENT IS FEMALE: 1
OHS CV CPX PATIENT IS MALE: 0
OHS CV CPX PEAK DIASTOLIC BLOOD PRESSURE: 77 MMHG
OHS CV CPX PEAK HEAR RATE: 131 BPM
OHS CV CPX PEAK RATE PRESSURE PRODUCT: NORMAL
OHS CV CPX PEAK SYSTOLIC BLOOD PRESSURE: 203 MMHG
OHS CV CPX PERCENT MAX PREDICTED HEART RATE ACHIEVED: 93
OHS CV CPX RATE PRESSURE PRODUCT PRESENTING: NORMAL
STRESS ECHO POST EXERCISE DUR MIN: 4 MINUTES
STRESS ECHO POST EXERCISE DUR SEC: 47 SECONDS
STRESS ST DEPRESSION: 0.6 MM
SYSTOLIC BLOOD PRESSURE: 193 MMHG

## 2023-10-02 ENCOUNTER — OFFICE VISIT (OUTPATIENT)
Dept: CARDIOLOGY | Facility: CLINIC | Age: 73
End: 2023-10-02
Payer: MEDICARE

## 2023-10-02 VITALS
HEIGHT: 65 IN | HEART RATE: 61 BPM | BODY MASS INDEX: 22.15 KG/M2 | WEIGHT: 132.94 LBS | DIASTOLIC BLOOD PRESSURE: 72 MMHG | SYSTOLIC BLOOD PRESSURE: 158 MMHG

## 2023-10-02 DIAGNOSIS — G47.33 OSA ON CPAP: ICD-10-CM

## 2023-10-02 DIAGNOSIS — I10 UNCONTROLLED HYPERTENSION: Chronic | ICD-10-CM

## 2023-10-02 DIAGNOSIS — I20.0 CRESCENDO ANGINA: Primary | ICD-10-CM

## 2023-10-02 DIAGNOSIS — R94.39 ABNORMAL STRESS ECG: ICD-10-CM

## 2023-10-02 DIAGNOSIS — Z95.1 S/P CABG (CORONARY ARTERY BYPASS GRAFT): Chronic | ICD-10-CM

## 2023-10-02 DIAGNOSIS — I34.0 NON-RHEUMATIC MITRAL REGURGITATION: Chronic | ICD-10-CM

## 2023-10-02 PROCEDURE — 99215 PR OFFICE/OUTPT VISIT, EST, LEVL V, 40-54 MIN: ICD-10-PCS | Mod: S$PBB,,, | Performed by: INTERNAL MEDICINE

## 2023-10-02 PROCEDURE — 99215 OFFICE O/P EST HI 40 MIN: CPT | Mod: PBBFAC,PO | Performed by: INTERNAL MEDICINE

## 2023-10-02 PROCEDURE — 99215 OFFICE O/P EST HI 40 MIN: CPT | Mod: S$PBB,,, | Performed by: INTERNAL MEDICINE

## 2023-10-02 PROCEDURE — 99999 PR PBB SHADOW E&M-EST. PATIENT-LVL V: CPT | Mod: PBBFAC,,, | Performed by: INTERNAL MEDICINE

## 2023-10-02 PROCEDURE — 99999 PR PBB SHADOW E&M-EST. PATIENT-LVL V: ICD-10-PCS | Mod: PBBFAC,,, | Performed by: INTERNAL MEDICINE

## 2023-10-02 RX ORDER — NAPROXEN SODIUM 220 MG/1
81 TABLET, FILM COATED ORAL ONCE
Status: CANCELLED | OUTPATIENT
Start: 2023-10-02 | End: 2023-10-02

## 2023-10-02 RX ORDER — SODIUM CHLORIDE 9 MG/ML
INJECTION, SOLUTION INTRAVENOUS ONCE
Status: CANCELLED | OUTPATIENT
Start: 2023-10-02 | End: 2023-10-02

## 2023-10-02 RX ORDER — AMLODIPINE BESYLATE 5 MG/1
5 TABLET ORAL DAILY
Qty: 90 TABLET | Refills: 0 | Status: SHIPPED | OUTPATIENT
Start: 2023-10-02 | End: 2023-11-30 | Stop reason: SDUPTHER

## 2023-10-02 RX ORDER — CARVEDILOL 3.12 MG/1
3.12 TABLET ORAL 2 TIMES DAILY WITH MEALS
Qty: 180 TABLET | Refills: 0 | Status: SHIPPED | OUTPATIENT
Start: 2023-10-02 | End: 2024-03-20

## 2023-10-02 RX ORDER — CLOPIDOGREL BISULFATE 75 MG/1
300 TABLET ORAL ONCE
Status: CANCELLED | OUTPATIENT
Start: 2023-10-02 | End: 2023-10-02

## 2023-10-02 NOTE — PATIENT INSTRUCTIONS
Angiogram 10/19 at 8 am    Arrive for procedure at: [facility]    You will receive a phone call from [facility] Pre-Op Department with further instructions and exact arrival time prior to your scheduled procedure.    Notify the nurse if you are ALLERGIC TO IODINE.    FASTING: You MAY NOT have anything to eat or drink AFTER MIDNIGHT the day before your procedure. If your procedure is scheduled in the afternoon, you may have a LIGHT BREAKFAST 6-8 hours prior to your procedure.  For example: Two slices of toast; black coffee or black tea.    MEDICATIONS: You may take your regular morning medications with water. If there are any medications that you should not take, you will be instructed to hold them for that morning.    CARDIOLOGY PRE-PROCEDURE MEDICATION ORDERS:  ** Please hold any medications that are checked below:    HOLD   # OF DAYS TO HOLD  Coumadin   Consult with Coumadin Clinic   Xarelto    _DAY BEFORE & DAY OF_  Pradaxa  _ DAY BEFORE & DAY OF _  Eliquis   _ DAY BEFORE & DAY OF _  Metformin    Day before procedure & morning of procedure  Short acting insulin   Morning of procedure    CONTINUE the Following Medications   Plavix      Effient     Aspirin    WHAT TO EXPECT:    How long will the procedure take?  The procedure will take an average of 1 - 2 hours to perform.  After the procedure, you will need to lay flat for around 4 - 6 hours to minimize bleeding from the puncture site. If the wrist is accessed you will need to keep your arm still as instructed by the nurse.    When can I go home?  You may be able to be discharged home that same afternoon if there were no complications.  If you have one of the following: balloon; stent; pacemaker or defibrillator procedures, you may spend one night for observation.  Your doctor will determine your discharge based upon your progress.  The results of your procedure will be discussed with you before you are discharged.  Any further testing or procedures  will be scheduled for you either before you leave or you will be instructed to call for a future appointment.      TRANSPORTATION:  PLEASE ARRANGE TO HAVE SOMEONE DRIVE YOU HOME FOLLOWING YOUR PROCEDURE, YOU WILL NOT BE ALLOWED TO DRIVE.

## 2023-10-02 NOTE — PROGRESS NOTES
"Subjective:    Patient ID:  Osman Johansen is a 73 y.o. female who presents for Follow-up (RESULTS FRO STRESS TEST), Chest Pain, Shortness of Breath, and Hypertension        Follow-up  Associated symptoms include chest pain and headaches. Pertinent negatives include no abdominal pain, chills, coughing, diaphoresis, fever, nausea, rash or weakness.       ABNORMAL STRESS EKG NON DIAGNOSTIC, BP UP, TAKING EXTRA MEDS, ALMOST ON A DAILY BASIS FAMILY ISSUES, ST CHANGES ON LOOP RECORDER INTERROGATION, USING CPAP, SAW DR CHAMBERS, SLEEP MEDICINE, NO TIA TYPE SYMPTOMS NO NEAR-SYNCOPE , STATES SOMETHING IS WRONG IN REGARD TO HER ANGINA SEE ROS  Past Medical History:   Diagnosis Date    Allergy     Anemia     Anxiety     Arthritis     Blood transfusion 8 yrs    CAD (coronary artery disease)     Cataract     Chronic diastolic CHF (congestive heart failure)     CKD (chronic kidney disease), stage II     COPD (chronic obstructive pulmonary disease)     mild no inhalers    Degenerative disc disease     Depression     Dry eye syndrome     Fibromyalgia     Fluid overload     GERD (gastroesophageal reflux disease)     Headache     Hypercholesterolemia 12/09/2019    Hypertension     Hypothyroidism     IBS (irritable bowel syndrome)     Lower extremity edema     Macular drusen     Neuromuscular disorder     Ocular hypertension, bilateral     Osteoporosis     Pleural effusion     PUD (peptic ulcer disease)     Restless leg     Uses Suboxone to control    Sleep apnea     "complex sleep apnea" - per pt, no cpap    Thoracic or lumbosacral neuritis or radiculitis 10/19/2012    Thyroid disease     hashimoto's     Past Surgical History:   Procedure Laterality Date    ADENOIDECTOMY  1955    APPENDECTOMY  1965    CARDIAC CATHETERIZATION      CATARACT EXTRACTION W/  INTRAOCULAR LENS IMPLANT Right 05/24/2021    Procedure: EXTRACTION, CATARACT, WITH IOL INSERTION;  Surgeon: Bebe Lynn MD;  Location: [facility] OR;  Service: Ophthalmology;  " Laterality: Right;  Right/DM    CHOLECYSTECTOMY  2021    CORONARY ANGIOGRAPHY N/A 05/02/2022    Procedure: ANGIOGRAM, CORONARY ARTERY;  Surgeon: Sourav Nugent MD;  Location: [facility] CATH;  Service: Cardiology;  Laterality: N/A;    CORONARY ARTERY BYPASS GRAFT      CORONARY ARTERY BYPASS GRAFT (CABG) N/A 06/27/2022    Procedure: CORONARY ARTERY BYPASS GRAFT (CABG) X 5;  Surgeon: Talib Torres MD;  Location: [facility] OR;  Service: Cardiovascular;  Laterality: N/A;    ENDOSCOPIC HARVEST OF VEIN Left 06/27/2022    Procedure: SURGICAL PROCUREMENT, VEIN, ENDOSCOPIC;  Surgeon: Talib Torres MD;  Location: [facility] OR;  Service: Cardiovascular;  Laterality: Left;    EYE SURGERY  2021    Cataract surgery    HYSTERECTOMY  8 yrs     INJECTION OF ANESTHETIC AGENT AROUND NERVE Bilateral 08/21/2018    Procedure: BLOCK, NERVE;  Surgeon: Talia Belcher MD;  Location: [facility] PAIN MGT;  Service: Pain Management;  Laterality: Bilateral;  Bilateral block @ L2,3,4,5      INJECTION OF ANESTHETIC AGENT AROUND NERVE Bilateral 11/18/2019    Procedure: BLOCK, NERVE, L2-L3-L4-L5 ME DIAL BRANCH;  Surgeon: Talia Belcher MD;  Location: [facility] PAIN MGT;  Service: Pain Management;  Laterality: Bilateral;    INJECTION OF FACET JOINT Bilateral 12/16/2019    Procedure: INJECTION, FACET JOINT, L3-L4 AND L4-L5 AND T7-T8 LIGAMENT;  Surgeon: Talia Belcher MD;  Location: [facility] PAIN MGT;  Service: Pain Management;  Laterality: Bilateral;    INSERTION OF IMPLANTABLE LOOP RECORDER Left 07/25/2022    Procedure: Insertion, Implantable Loop Recorder;  Surgeon: Sourav Nugent MD;  Location: [facility] CATH;  Service: Cardiology;  Laterality: Left;    LAPAROSCOPIC CHOLECYSTECTOMY N/A 02/10/2021    Procedure: CHOLECYSTECTOMY, LAPAROSCOPIC;  Surgeon: John Mrorow MD;  Location: [facility] OR;  Service: General;  Laterality: N/A;    LEFT HEART CATHETERIZATION Left 05/02/2022    Procedure: Left heart cath;  Surgeon: Sourav Nugent MD;  Location: [facility] CATH;  Service: Cardiology;   Laterality: Left;    SINUS SURGERY      x2-one for CSF leak    TONSILLECTOMY       Family History   Problem Relation Age of Onset    Ulcers Father     Arthritis Mother     Cancer Mother     Heart disease Mother     Hypertension Mother     Cataracts Mother     Ovarian cancer Mother     Thyroid disease Brother     Heart disease Brother     Stroke Paternal Aunt             Amblyopia Neg Hx     Blindness Neg Hx     Diabetes Neg Hx     Glaucoma Neg Hx     Macular degeneration Neg Hx     Retinal detachment Neg Hx     Strabismus Neg Hx     Breast cancer Neg Hx      Social History     Socioeconomic History    Marital status:    Tobacco Use    Smoking status: Former     Current packs/day: 0.00     Types: Cigarettes     Quit date: 2008     Years since quitting: 15.7    Smokeless tobacco: Never   Substance and Sexual Activity    Alcohol use: Yes     Comment: rarely    Drug use: No    Sexual activity: Yes     Partners: Male     Social Determinants of Health     Financial Resource Strain: Low Risk  (2021)    Overall Financial Resource Strain (CARDIA)     Difficulty of Paying Living Expenses: Not hard at all   Food Insecurity: No Food Insecurity (2021)    Hunger Vital Sign     Worried About Running Out of Food in the Last Year: Never true     Ran Out of Food in the Last Year: Never true   Transportation Needs: Unknown (2021)    PRAPARE - Transportation     Lack of Transportation (Non-Medical): No   Social Connections: Unknown (2021)    Social Connection and Isolation Panel [NHANES]     Frequency of Communication with Friends and Family: More than three times a week     Frequency of Social Gatherings with Friends and Family: Once a week       Review of patient's allergies indicates:   Allergen Reactions    Lyrica [pregabalin] Swelling    Pcn [penicillins] Swelling    Toradol [ketorolac] Swelling    Vibramycin [doxycycline calcium] Shortness Of Breath and Swelling    Zetia [ezetimibe] Other  (See Comments)    Crestor [rosuvastatin]      Body aches    Cymbalta [duloxetine]      dizzyness    Elavil [amitriptyline] Other (See Comments)     Restless leg    Pravastatin Other (See Comments)     Flu -like symptoms   Body aches     Seroquel [quetiapine]      restless leg symdrome       Current Outpatient Medications:     benazepriL (LOTENSIN) 20 MG tablet, Take 1 tablet (20 mg total) by mouth 2 (two) times daily., Disp: 180 tablet, Rfl: 0    butalbital-acetaminophen-caffeine -40 mg (FIORICET, ESGIC) -40 mg per tablet, 1 tab PO PRN migraine. No more than 10 tab per month., Disp: 10 tablet, Rfl: 0    CALCIUM CARBONATE/VITAMIN D3 (CALCIUM 500 WITH D ORAL), Take 1 capsule by mouth once daily., Disp: , Rfl:     carboxymethylcellulose (REFRESH PLUS) 0.5 % Dpet, Place 1 drop into both eyes daily as needed (DRY EYES)., Disp: , Rfl:     clopidogreL (PLAVIX) 75 mg tablet, Take 1 tablet (75 mg total) by mouth once daily., Disp: 90 tablet, Rfl: 0    co-enzyme Q-10 30 mg capsule, Take 30 mg by mouth 3 (three) times daily., Disp: , Rfl:     dicyclomine (BENTYL) 10 MG capsule, Take 1 capsule (10 mg total) by mouth 4 (four) times daily as needed (stomach discomfort)., Disp: 120 capsule, Rfl: 0    esomeprazole (NEXIUM) 40 MG capsule, TAKE 1 CAPSULE (40 MG TOTAL) BY MOUTH BEFORE BREAKFAST., Disp: 90 capsule, Rfl: 3    evolocumab (REPATHA SURECLICK) 140 mg/mL PnIj, Inject 1 mL (140 mg total) into the skin every 14 (fourteen) days., Disp: 2 each, Rfl: 1    fluticasone propionate (FLONASE) 50 mcg/actuation nasal spray, 1 spray (50 mcg total) by Each Nostril route once daily., Disp: 16 g, Rfl: 11    garlic (GARLIQUE ORAL), Take 1 capsule by mouth Daily., Disp: , Rfl:     methocarbamoL (ROBAXIN) 750 MG Tab, Take 1 tablet (750 mg total) by mouth 4 (four) times daily., Disp: 40 tablet, Rfl: 3    MULTIVITAMIN (MULTIPLE VITAMIN ORAL), Take 1 capsule by mouth once daily., Disp: , Rfl:     oxybutynin (DITROPAN XL) 15 MG TR24,  TAKE 1 TABLET (15 MG TOTAL) BY MOUTH EVERY MORNING., Disp: 90 tablet, Rfl: 3    promethazine (PHENERGAN) 25 MG tablet, Take 25 mg by mouth every 6 (six) hours as needed., Disp: , Rfl:     sennosides (LAXATIVE, SENNOSIDES,) 25 mg Tab, Take by mouth., Disp: , Rfl:     SUBOXONE 8-2 mg, Place 1 each under the tongue., Disp: , Rfl:     SYNTHROID 100 mcg tablet, Take 1 tablet (100 mcg total) by mouth before breakfast. **BRAND MEDICALLY NECESSARY**, Disp: 90 tablet, Rfl: 3    valACYclovir (VALTREX) 1000 MG tablet, TAKE ONE TABLET BY MOUTH EVERY DAY FOR 7 DAYS, Disp: 7 tablet, Rfl: 5    amLODIPine (NORVASC) 5 MG tablet, Take 1 tablet (5 mg total) by mouth once daily., Disp: 90 tablet, Rfl: 0    aspirin (ECOTRIN) 81 MG EC tablet, Take 1 tablet (81 mg total) by mouth once daily. for 21 days, Disp: , Rfl:     carvediloL (COREG) 3.125 MG tablet, Take 1 tablet (3.125 mg total) by mouth 2 (two) times daily with meals., Disp: 180 tablet, Rfl: 0  No current facility-administered medications for this visit.    Facility-Administered Medications Ordered in Other Visits:     lactated ringers infusion, , Intravenous, Continuous, Jim Mcdonough MD, Stopped at 06/27/22 1714    LIDOcaine (PF) 10 mg/ml (1%) injection 10 mg, 1 mL, Intradermal, Once, Jim Mcdonough MD    Review of Systems   Constitutional: Negative. Negative for chills, decreased appetite, diaphoresis, fever and malaise/fatigue.   Eyes:  Negative for blurred vision and visual disturbance.   Cardiovascular:  Positive for chest pain. Negative for claudication, cyanosis, dyspnea on exertion (MILD), irregular heartbeat (OCC), leg swelling, near-syncope, orthopnea, palpitations, paroxysmal nocturnal dyspnea and syncope.   Respiratory:  Negative for cough, hemoptysis and shortness of breath.    Endocrine: Negative for polyphagia and polyuria.   Hematologic/Lymphatic: Negative for adenopathy. Does not bruise/bleed easily.   Skin:  Negative for color change and rash.  "  Musculoskeletal:  Negative for back pain and falls.   Gastrointestinal:  Negative for abdominal pain, dysphagia, jaundice, melena and nausea.   Genitourinary:  Negative for dysuria and flank pain.   Neurological:  Positive for headaches. Negative for dizziness, focal weakness, light-headedness, paresthesias and weakness.   Psychiatric/Behavioral:  Negative for altered mental status and depression.    Allergic/Immunologic: Negative for persistent infections.        Objective:      Vitals:    10/02/23 1447 10/02/23 1511   BP: (!) 163/75 (!) 158/72   Pulse: 61    Weight: 60.3 kg (132 lb 15 oz)    Height: 5' 5" (1.651 m)    PainSc: 0-No pain      Body mass index is 22.12 kg/m².    Physical Exam  HENT:      Head: Normocephalic and atraumatic.   Eyes:      Extraocular Movements: Extraocular movements intact.      Conjunctiva/sclera: Conjunctivae normal.   Cardiovascular:      Rate and Rhythm: Normal rate and regular rhythm. No extrasystoles are present.     Pulses: Normal pulses.           Carotid pulses are 2+ on the right side and 2+ on the left side.       Radial pulses are 2+ on the right side and 2+ on the left side.        Posterior tibial pulses are 2+ on the right side and 2+ on the left side.      Heart sounds: Murmur heard.      Systolic murmur is present with a grade of 2/6 at the lower left sternal border.      No friction rub. No gallop.   Pulmonary:      Effort: Pulmonary effort is normal.      Breath sounds: Normal breath sounds and air entry.   Abdominal:      Palpations: Abdomen is soft.      Tenderness: There is no abdominal tenderness.   Musculoskeletal:      Cervical back: Neck supple.   Skin:     General: Skin is warm and dry.      Capillary Refill: Capillary refill takes less than 2 seconds.      Coloration: Skin is not pale.   Neurological:      General: No focal deficit present.      Mental Status: She is alert.   Psychiatric:         Mood and Affect: Mood normal.         Speech: Speech normal.   "       Behavior: Behavior normal.                 ..    Chemistry        Component Value Date/Time     05/30/2023 1244    K 5.2 (H) 05/30/2023 1244     05/30/2023 1244    CO2 25 05/30/2023 1244    BUN 18 05/30/2023 1244    CREATININE 1.2 05/30/2023 1244    GLU 93 05/30/2023 1244        Component Value Date/Time    CALCIUM 9.9 05/30/2023 1244    ALKPHOS 61 05/30/2023 1244    AST 20 05/30/2023 1244    ALT 13 05/30/2023 1244    BILITOT 0.3 05/30/2023 1244    ESTGFRAFRICA 29.7 (A) 07/27/2022 1159    EGFRNONAA 25.8 (A) 07/27/2022 1159            ..  Lab Results   Component Value Date    CHOL 190 05/30/2023    CHOL 162 07/27/2022    CHOL 139 07/22/2022     Lab Results   Component Value Date    HDL 99 (H) 05/30/2023    HDL 77 (H) 07/27/2022    HDL 73 07/22/2022     Lab Results   Component Value Date    LDLCALC 74.0 05/30/2023    LDLCALC 60.2 (L) 07/27/2022    LDLCALC 40.0 (L) 07/22/2022     Lab Results   Component Value Date    TRIG 85 05/30/2023    TRIG 124 07/27/2022    TRIG 130 07/22/2022     Lab Results   Component Value Date    CHOLHDL 52.1 (H) 05/30/2023    CHOLHDL 47.5 07/27/2022    CHOLHDL 52.5 (H) 07/22/2022     ..  Lab Results   Component Value Date    WBC 7.03 05/30/2023    HGB 10.8 (L) 05/30/2023    HCT 35.5 (L) 05/30/2023    MCV 95 05/30/2023     05/30/2023       Test(s) Reviewed  I have reviewed the following in detail:  [x] Stress test   [] Angiography   [] Echocardiogram   [x] Labs   [] Other:       Assessment:         ICD-10-CM ICD-9-CM   1. Crescendo angina  I20.0 411.1   2. Uncontrolled hypertension  I10 401.9   3. NAOMI on CPAP  G47.33 327.23   4. Non-rheumatic mitral regurgitation  I34.0 424.0   5. S/P CABG (coronary artery bypass graft)  Z95.1 V45.81   6. Abnormal stress ECG  R94.39 794.39     Problem List Items Addressed This Visit          Cardiac/Vascular    Uncontrolled hypertension    Relevant Orders    Vital signs    Cardiac Monitoring - Adult    Basic metabolic panel     Non-rheumatic mitral regurgitation    S/P CABG (coronary artery bypass graft)    Relevant Orders    Case Request-Cath Lab: ANGIOGRAM, CORONARY, INCLUDING BYPASS GRAFT, WITH LEFT HEART CATHETERIZATION (Completed)    Vital signs    Cardiac Monitoring - Adult    Basic metabolic panel    Dimao angina - Primary    Relevant Orders    Case Request-Cath Lab: ANGIOGRAM, CORONARY, INCLUDING BYPASS GRAFT, WITH LEFT HEART CATHETERIZATION (Completed)    Vital signs    Cardiac Monitoring - Adult    Basic metabolic panel    Abnormal stress ECG       Other    NAOMI on CPAP        Plan:     INCREASE NORVASC TO 5 MG, ADD CARVEDILOL, AND MONITOR BLOOD PRESSURE CRESCENDO ANGINA TYPE SYMPTOMS WITH ST CHANGES ON LOOP RECORDER INTERROGATION ALSO ON HIS STRESS TEST WILL NEED FURTHER EVALUATION CARDIAC CATHETERIZATION WITH GRAFT ANGIOGRAPHY POSSIBLE INTERVENTION POSSIBLE PROGRESSION OF CAD, GRAFTS ISSUE, CLASS 3 ANGINA NO OVERT HEART FAILURE RELATIVELY STABLE ARRHYTHMIA DIET AVOID EXCESS EXERCISE, RETURN TO CLINIC IN FEW WEEKS AFTER TESTS       Radhanddesiree angina  -     Case Request-Cath Lab: ANGIOGRAM, CORONARY, INCLUDING BYPASS GRAFT, WITH LEFT HEART CATHETERIZATION; Standing  -     Establish IV access and maintain saline lock; Standing  -     Hold Warfarin; Standing  -     Hold Enoxaparin/subcutaneous Heparin; Standing  -     Hold Heparin drip; Standing  -     Height and weight; Standing  -     Verify informed consent; Standing  -     Vital signs; Standing  -     Cardiac Monitoring - Adult; Standing  -     Pulse Oximetry Q4H; Standing  -     Diet NPO; Standing  -     CBC auto differential; Standing  -     Basic metabolic panel; Standing    Uncontrolled hypertension  -     Establish IV access and maintain saline lock; Standing  -     Hold Warfarin; Standing  -     Hold Enoxaparin/subcutaneous Heparin; Standing  -     Hold Heparin drip; Standing  -     Height and weight; Standing  -     Verify informed consent; Standing  -     Vital signs;  Standing  -     Cardiac Monitoring - Adult; Standing  -     Pulse Oximetry Q4H; Standing  -     Diet NPO; Standing  -     CBC auto differential; Standing  -     Basic metabolic panel; Standing    NAOMI on CPAP    Non-rheumatic mitral regurgitation    S/P CABG (coronary artery bypass graft)  -     Case Request-Cath Lab: ANGIOGRAM, CORONARY, INCLUDING BYPASS GRAFT, WITH LEFT HEART CATHETERIZATION; Standing  -     Establish IV access and maintain saline lock; Standing  -     Hold Warfarin; Standing  -     Hold Enoxaparin/subcutaneous Heparin; Standing  -     Hold Heparin drip; Standing  -     Height and weight; Standing  -     Verify informed consent; Standing  -     Vital signs; Standing  -     Cardiac Monitoring - Adult; Standing  -     Pulse Oximetry Q4H; Standing  -     Diet NPO; Standing  -     CBC auto differential; Standing  -     Basic metabolic panel; Standing    Abnormal stress ECG    Other orders  -     0.9%  NaCl infusion  -     aspirin chewable tablet 81 mg  -     clopidogreL tablet 300 mg  -     amLODIPine (NORVASC) 5 MG tablet; Take 1 tablet (5 mg total) by mouth once daily.  Dispense: 90 tablet; Refill: 0  -     carvediloL (COREG) 3.125 MG tablet; Take 1 tablet (3.125 mg total) by mouth 2 (two) times daily with meals.  Dispense: 180 tablet; Refill: 0    RTC Low level/low impact aerobic exercise 5x's/wk. Heart healthy diet and risk factor modification.    See labs and med orders.    Aerobic exercise 5x's/wk. Heart healthy diet and risk factor modification.    See labs and med orders.

## 2023-10-04 DIAGNOSIS — Z95.1 S/P CABG (CORONARY ARTERY BYPASS GRAFT): ICD-10-CM

## 2023-10-04 RX ORDER — EVOLOCUMAB 140 MG/ML
140 INJECTION, SOLUTION SUBCUTANEOUS
Qty: 2 EACH | Refills: 3 | Status: ACTIVE | OUTPATIENT
Start: 2023-10-04 | End: 2024-02-01 | Stop reason: SDUPTHER

## 2023-10-06 ENCOUNTER — TELEPHONE (OUTPATIENT)
Dept: CARDIOLOGY | Facility: HOSPITAL | Age: 73
End: 2023-10-06
Payer: MEDICARE

## 2023-10-06 NOTE — TELEPHONE ENCOUNTER
ILR Report  Monitoring period: 10/02/23-10/04/23    Device Defined Counters:  Symptom Events: 1  EGM illustrates SR, PACs, on 10/04/23 10:42pm.                            HR histogram:            Symptom: per symptom on ILR pt. description: fluttering        Angiogram scheduled 10/19    Message sent to Dr. Nugent for review

## 2023-10-14 DIAGNOSIS — I10 ESSENTIAL (PRIMARY) HYPERTENSION: ICD-10-CM

## 2023-10-15 DIAGNOSIS — G43.719 INTRACTABLE CHRONIC MIGRAINE WITHOUT AURA AND WITHOUT STATUS MIGRAINOSUS: ICD-10-CM

## 2023-10-16 RX ORDER — BENAZEPRIL HYDROCHLORIDE 20 MG/1
20 TABLET ORAL 2 TIMES DAILY
Qty: 180 TABLET | Refills: 1 | Status: SHIPPED | OUTPATIENT
Start: 2023-10-16 | End: 2023-11-30 | Stop reason: SDUPTHER

## 2023-10-16 RX ORDER — FLUTICASONE PROPIONATE 50 MCG
1 SPRAY, SUSPENSION (ML) NASAL DAILY
Qty: 32 G | Refills: 2 | Status: SHIPPED | OUTPATIENT
Start: 2023-10-16

## 2023-10-16 RX ORDER — BUTALBITAL, ACETAMINOPHEN AND CAFFEINE 50; 325; 40 MG/1; MG/1; MG/1
TABLET ORAL
Qty: 10 TABLET | Refills: 0 | Status: SHIPPED | OUTPATIENT
Start: 2023-10-16 | End: 2023-11-14 | Stop reason: SDUPTHER

## 2023-10-16 NOTE — TELEPHONE ENCOUNTER
Refill Decision Note   Osman Johansen  is requesting a refill authorization.  Brief Assessment and Rationale for Refill:  Approve     Medication Therapy Plan:         Comments:     Note composed:6:14 PM 10/16/2023

## 2023-10-16 NOTE — TELEPHONE ENCOUNTER
No care due was identified.  [facility] Embedded Care Due Messages. Reference number: [id number].   10/15/2023 7:36:17 PM CDT

## 2023-10-18 ENCOUNTER — CLINICAL SUPPORT (OUTPATIENT)
Dept: CARDIOLOGY | Facility: HOSPITAL | Age: 73
End: 2023-10-18
Payer: MEDICARE

## 2023-10-18 DIAGNOSIS — Z95.818 PRESENCE OF OTHER CARDIAC IMPLANTS AND GRAFTS: ICD-10-CM

## 2023-10-18 PROCEDURE — 93298 CARDIAC DEVICE CHECK - REMOTE: ICD-10-PCS | Mod: ,,, | Performed by: INTERNAL MEDICINE

## 2023-10-18 PROCEDURE — 93298 REM INTERROG DEV EVAL SCRMS: CPT | Mod: ,,, | Performed by: INTERNAL MEDICINE

## 2023-10-24 LAB
LEFT EYE DM RETINOPATHY: NEGATIVE
RIGHT EYE DM RETINOPATHY: NEGATIVE

## 2023-10-25 ENCOUNTER — TELEPHONE (OUTPATIENT)
Dept: CARDIOLOGY | Facility: CLINIC | Age: 73
End: 2023-10-25
Payer: MEDICARE

## 2023-10-27 DIAGNOSIS — I25.10 CORONARY ARTERY DISEASE, UNSPECIFIED VESSEL OR LESION TYPE, UNSPECIFIED WHETHER ANGINA PRESENT, UNSPECIFIED WHETHER NATIVE OR TRANSPLANTED HEART: Primary | ICD-10-CM

## 2023-10-27 RX ORDER — SODIUM CHLORIDE 9 MG/ML
INJECTION, SOLUTION INTRAVENOUS ONCE
Status: CANCELLED | OUTPATIENT
Start: 2023-10-27 | End: 2023-10-27

## 2023-10-27 RX ORDER — SODIUM CHLORIDE 0.9 % (FLUSH) 0.9 %
10 SYRINGE (ML) INJECTION
Status: SHIPPED | OUTPATIENT
Start: 2023-10-27

## 2023-11-13 ENCOUNTER — TELEPHONE (OUTPATIENT)
Dept: CARDIOLOGY | Facility: CLINIC | Age: 73
End: 2023-11-13
Payer: MEDICARE

## 2023-11-13 NOTE — TELEPHONE ENCOUNTER
Please advise: pt had six episodes of angina yesterday, even though she was wearing her nitro patch. Four of the episodes she took 1/2 of a nitro tablet, and on two of those episodes she had to take the other half.  She states she is feeling fine today.

## 2023-11-13 NOTE — TELEPHONE ENCOUNTER
----- Message from Sydnee Singleton sent at 11/13/2023  2:12 PM CST -----  Regarding: appointment  Contact: patient  Type:  Sooner Appointment Request    Caller is requesting a sooner appointment.  Caller declined first available appointment listed below.  Caller will not accept being placed on the waitlist and is requesting a message be sent to doctor.    Name of Caller:  patient  When is the first available appointment?    Symptoms:  stent follow up  Would the patient rather a call back or a response via [facility]? call  Best Call Back Number:  [phone]    Additional Information:  Please call patient to schedule.  Thanks!

## 2023-11-14 DIAGNOSIS — G43.719 INTRACTABLE CHRONIC MIGRAINE WITHOUT AURA AND WITHOUT STATUS MIGRAINOSUS: ICD-10-CM

## 2023-11-15 RX ORDER — BUTALBITAL, ACETAMINOPHEN AND CAFFEINE 50; 325; 40 MG/1; MG/1; MG/1
TABLET ORAL
Qty: 10 TABLET | Refills: 0 | Status: SHIPPED | OUTPATIENT
Start: 2023-11-15 | End: 2023-12-12 | Stop reason: SDUPTHER

## 2023-11-18 ENCOUNTER — CLINICAL SUPPORT (OUTPATIENT)
Dept: CARDIOLOGY | Facility: HOSPITAL | Age: 73
End: 2023-11-18
Attending: INTERNAL MEDICINE
Payer: MEDICARE

## 2023-11-18 ENCOUNTER — CLINICAL SUPPORT (OUTPATIENT)
Dept: CARDIOLOGY | Facility: HOSPITAL | Age: 73
End: 2023-11-18
Payer: MEDICARE

## 2023-11-18 DIAGNOSIS — I49.8 OTHER SPECIFIED CARDIAC ARRHYTHMIAS: ICD-10-CM

## 2023-11-18 PROCEDURE — 93298 REM INTERROG DEV EVAL SCRMS: CPT | Mod: ,,, | Performed by: INTERNAL MEDICINE

## 2023-11-18 PROCEDURE — 93298 CARDIAC DEVICE CHECK - REMOTE: ICD-10-PCS | Mod: ,,, | Performed by: INTERNAL MEDICINE

## 2023-11-21 LAB
OHS CV AF BURDEN PERCENT: < 1
OHS CV DC REMOTE DEVICE TYPE: NORMAL

## 2023-11-23 DIAGNOSIS — Z95.1 S/P CABG (CORONARY ARTERY BYPASS GRAFT): ICD-10-CM

## 2023-11-23 DIAGNOSIS — Z95.818 PRESENCE OF OTHER CARDIAC IMPLANTS AND GRAFTS: ICD-10-CM

## 2023-11-23 DIAGNOSIS — I63.9 ACUTE CVA (CEREBROVASCULAR ACCIDENT): ICD-10-CM

## 2023-11-25 RX ORDER — CLOPIDOGREL BISULFATE 75 MG/1
75 TABLET ORAL DAILY
Qty: 90 TABLET | Refills: 0 | Status: SHIPPED | OUTPATIENT
Start: 2023-11-25 | End: 2024-02-16 | Stop reason: SDUPTHER

## 2023-11-27 ENCOUNTER — TELEPHONE (OUTPATIENT)
Dept: CARDIOLOGY | Facility: CLINIC | Age: 73
End: 2023-11-27
Payer: MEDICARE

## 2023-11-29 ENCOUNTER — LAB VISIT (OUTPATIENT)
Dept: LAB | Facility: HOSPITAL | Age: 73
End: 2023-11-29
Attending: INTERNAL MEDICINE
Payer: MEDICARE

## 2023-11-29 DIAGNOSIS — I10 ESSENTIAL HYPERTENSION: ICD-10-CM

## 2023-11-29 DIAGNOSIS — E78.5 DYSLIPIDEMIA: ICD-10-CM

## 2023-11-29 DIAGNOSIS — E11.9 CONTROLLED TYPE 2 DIABETES MELLITUS WITHOUT COMPLICATION, WITHOUT LONG-TERM CURRENT USE OF INSULIN: ICD-10-CM

## 2023-11-29 DIAGNOSIS — E03.4 HYPOTHYROIDISM DUE TO ACQUIRED ATROPHY OF THYROID: ICD-10-CM

## 2023-11-29 LAB
ALBUMIN SERPL BCP-MCNC: 3.7 G/DL (ref 3.5–5.2)
ALP SERPL-CCNC: 74 U/L (ref 55–135)
ALT SERPL W/O P-5'-P-CCNC: 8 U/L (ref 10–44)
ANION GAP SERPL CALC-SCNC: 12 MMOL/L (ref 8–16)
AST SERPL-CCNC: 19 U/L (ref 10–40)
BILIRUB SERPL-MCNC: 0.3 MG/DL (ref 0.1–1)
BUN SERPL-MCNC: 17 MG/DL (ref 8–23)
CALCIUM SERPL-MCNC: 9.5 MG/DL (ref 8.7–10.5)
CHLORIDE SERPL-SCNC: 105 MMOL/L (ref 95–110)
CHOLEST SERPL-MCNC: 193 MG/DL (ref 120–199)
CHOLEST/HDLC SERPL: 2 {RATIO} (ref 2–5)
CO2 SERPL-SCNC: 23 MMOL/L (ref 23–29)
CREAT SERPL-MCNC: 1 MG/DL (ref 0.5–1.4)
EST. GFR  (NO RACE VARIABLE): 59.1 ML/MIN/1.73 M^2
ESTIMATED AVG GLUCOSE: 111 MG/DL (ref 68–131)
GLUCOSE SERPL-MCNC: 92 MG/DL (ref 70–110)
HBA1C MFR BLD: 5.5 % (ref 4–5.6)
HDLC SERPL-MCNC: 98 MG/DL (ref 40–75)
HDLC SERPL: 50.8 % (ref 20–50)
LDLC SERPL CALC-MCNC: 84.2 MG/DL (ref 63–159)
NONHDLC SERPL-MCNC: 95 MG/DL
POTASSIUM SERPL-SCNC: 5.2 MMOL/L (ref 3.5–5.1)
PROT SERPL-MCNC: 7 G/DL (ref 6–8.4)
SODIUM SERPL-SCNC: 140 MMOL/L (ref 136–145)
TRIGL SERPL-MCNC: 54 MG/DL (ref 30–150)
TSH SERPL DL<=0.005 MIU/L-ACNC: 3.1 UIU/ML (ref 0.4–4)

## 2023-11-29 PROCEDURE — 80053 COMPREHEN METABOLIC PANEL: CPT | Performed by: INTERNAL MEDICINE

## 2023-11-29 PROCEDURE — 84443 ASSAY THYROID STIM HORMONE: CPT | Performed by: INTERNAL MEDICINE

## 2023-11-29 PROCEDURE — 36415 COLL VENOUS BLD VENIPUNCTURE: CPT | Mod: PO | Performed by: INTERNAL MEDICINE

## 2023-11-29 PROCEDURE — 80061 LIPID PANEL: CPT | Performed by: INTERNAL MEDICINE

## 2023-11-29 PROCEDURE — 83036 HEMOGLOBIN GLYCOSYLATED A1C: CPT | Performed by: INTERNAL MEDICINE

## 2023-11-30 ENCOUNTER — OFFICE VISIT (OUTPATIENT)
Dept: CARDIOLOGY | Facility: CLINIC | Age: 73
End: 2023-11-30
Payer: MEDICARE

## 2023-11-30 VITALS
WEIGHT: 135.13 LBS | BODY MASS INDEX: 25.53 KG/M2 | SYSTOLIC BLOOD PRESSURE: 136 MMHG | DIASTOLIC BLOOD PRESSURE: 72 MMHG | HEART RATE: 57 BPM

## 2023-11-30 DIAGNOSIS — Z79.02 LONG TERM (CURRENT) USE OF ANTITHROMBOTICS/ANTIPLATELETS: Chronic | ICD-10-CM

## 2023-11-30 DIAGNOSIS — I10 ESSENTIAL (PRIMARY) HYPERTENSION: Chronic | ICD-10-CM

## 2023-11-30 DIAGNOSIS — Z95.5 STENTED CORONARY ARTERY: ICD-10-CM

## 2023-11-30 DIAGNOSIS — Z78.9 STATIN INTOLERANCE: Chronic | ICD-10-CM

## 2023-11-30 DIAGNOSIS — I25.10 CORONARY ARTERY DISEASE INVOLVING NATIVE CORONARY ARTERY OF NATIVE HEART WITHOUT ANGINA PECTORIS: Primary | Chronic | ICD-10-CM

## 2023-11-30 DIAGNOSIS — E78.00 HYPERCHOLESTEROLEMIA: Chronic | ICD-10-CM

## 2023-11-30 PROCEDURE — 99214 OFFICE O/P EST MOD 30 MIN: CPT | Mod: S$PBB,,, | Performed by: INTERNAL MEDICINE

## 2023-11-30 PROCEDURE — 99999 PR PBB SHADOW E&M-EST. PATIENT-LVL III: ICD-10-PCS | Mod: PBBFAC,,, | Performed by: INTERNAL MEDICINE

## 2023-11-30 PROCEDURE — 99999 PR PBB SHADOW E&M-EST. PATIENT-LVL III: CPT | Mod: PBBFAC,,, | Performed by: INTERNAL MEDICINE

## 2023-11-30 PROCEDURE — 99214 PR OFFICE/OUTPT VISIT, EST, LEVL IV, 30-39 MIN: ICD-10-PCS | Mod: S$PBB,,, | Performed by: INTERNAL MEDICINE

## 2023-11-30 PROCEDURE — 99213 OFFICE O/P EST LOW 20 MIN: CPT | Mod: PBBFAC,PO | Performed by: INTERNAL MEDICINE

## 2023-11-30 RX ORDER — BENAZEPRIL HYDROCHLORIDE 20 MG/1
20 TABLET ORAL 2 TIMES DAILY
Qty: 180 TABLET | Refills: 1 | Status: SHIPPED | OUTPATIENT
Start: 2023-11-30 | End: 2024-11-29

## 2023-11-30 RX ORDER — AMLODIPINE BESYLATE 5 MG/1
5 TABLET ORAL DAILY
Qty: 90 TABLET | Refills: 1 | Status: SHIPPED | OUTPATIENT
Start: 2023-11-30 | End: 2024-02-26

## 2023-11-30 RX ORDER — BEMPEDOIC ACID 180 MG/1
180 TABLET, FILM COATED ORAL DAILY
Qty: 90 TABLET | Refills: 1 | Status: SHIPPED | OUTPATIENT
Start: 2023-11-30

## 2023-11-30 RX ORDER — ISOSORBIDE MONONITRATE 30 MG/1
30 TABLET, EXTENDED RELEASE ORAL DAILY
Qty: 90 TABLET | Refills: 1 | Status: SHIPPED | OUTPATIENT
Start: 2023-11-30

## 2023-11-30 NOTE — PROGRESS NOTES
Subjective:    Patient ID:  Osman Johansen is a 73 y.o. female who presents for Coronary Artery Disease        HPI    STATUS POST PCI WITH THE CIRCUMFLEX ARTERY, DOING WELL,FEELS MUCH BETTER RECENT LABS NOTED LDL 84, HDL 98, TRIGLYCERIDE 54, TSH OK, BP UP IN AM, NOT CONSISTENT WITH CPAP, SEE ROS  Past Medical History:   Diagnosis Date    Allergy     Anemia     Anxiety     Arthritis     Blood transfusion 8 yrs    CAD (coronary artery disease)     Cataract     Chronic diastolic CHF (congestive heart failure)     CKD (chronic kidney disease), stage II     COPD (chronic obstructive pulmonary disease)     mild no inhalers    Degenerative disc disease     Depression     Dry eye syndrome     Fibromyalgia     Fluid overload     GERD (gastroesophageal reflux disease)     Headache     Hypercholesterolemia 12/09/2019    Hypertension     Hypothyroidism     IBS (irritable bowel syndrome)     Lower extremity edema     Macular drusen     Neuromuscular disorder     Ocular hypertension, bilateral     Osteoporosis     Pleural effusion     PUD (peptic ulcer disease)     Restless leg     Uses Suboxone to control    Sleep apnea     cpap    Stroke     2 strokes-after CABG    Thoracic or lumbosacral neuritis or radiculitis 10/19/2012    Thyroid disease     hashimoto's     Past Surgical History:   Procedure Laterality Date    ADENOIDECTOMY  1955    ANGIOGRAM, CORONARY, WITH LEFT HEART CATHETERIZATION  10/26/2023    Procedure: Left Heart Cath;  Surgeon: Sourav Nugent MD;  Location: [facility] CATH;  Service: Cardiology;;    APPENDECTOMY  1965    ARTERIOGRAPHY OF AORTIC ROOT  10/26/2023    Procedure: AO Root;  Surgeon: Sourav Nugent MD;  Location: [facility] CATH;  Service: Cardiology;;    CARDIAC CATHETERIZATION      CATARACT EXTRACTION W/  INTRAOCULAR LENS IMPLANT Right 05/24/2021    Procedure: EXTRACTION, CATARACT, WITH IOL INSERTION;  Surgeon: Bebe Lynn MD;  Location: [facility] OR;  Service: Ophthalmology;  Laterality: Right;  Right/DM     CHOLECYSTECTOMY  2021    CORONARY ANGIOGRAPHY N/A 05/02/2022    Procedure: ANGIOGRAM, CORONARY ARTERY;  Surgeon: Sourav Nugent MD;  Location: [facility] CATH;  Service: Cardiology;  Laterality: N/A;    CORONARY ANGIOGRAPHY  11/9/2023    Procedure: Coronary angiogram study;  Surgeon: Sourav Nugent MD;  Location: [facility] CATH;  Service: Cardiology;;    CORONARY ARTERY BYPASS GRAFT      CORONARY ARTERY BYPASS GRAFT (CABG) N/A 06/27/2022    Procedure: CORONARY ARTERY BYPASS GRAFT (CABG) X 5;  Surgeon: Talib Torres MD;  Location: [facility] OR;  Service: Cardiovascular;  Laterality: N/A;    CORONARY BYPASS GRAFT ANGIOGRAPHY  10/26/2023    Procedure: Bypass graft study;  Surgeon: Sourav Nugent MD;  Location: [facility] CATH;  Service: Cardiology;;    ENDOSCOPIC HARVEST OF VEIN Left 06/27/2022    Procedure: SURGICAL PROCUREMENT, VEIN, ENDOSCOPIC;  Surgeon: Talib Torres MD;  Location: [facility] OR;  Service: Cardiovascular;  Laterality: Left;    EYE SURGERY  2021    Cataract surgery    HYSTERECTOMY  8 yrs     INJECTION OF ANESTHETIC AGENT AROUND NERVE Bilateral 08/21/2018    Procedure: BLOCK, NERVE;  Surgeon: Talia Belcher MD;  Location: [facility] PAIN MGT;  Service: Pain Management;  Laterality: Bilateral;  Bilateral block @ L2,3,4,5      INJECTION OF ANESTHETIC AGENT AROUND NERVE Bilateral 11/18/2019    Procedure: BLOCK, NERVE, L2-L3-L4-L5 ME DIAL BRANCH;  Surgeon: Talia Belcher MD;  Location: [facility] PAIN MGT;  Service: Pain Management;  Laterality: Bilateral;    INJECTION OF FACET JOINT Bilateral 12/16/2019    Procedure: INJECTION, FACET JOINT, L3-L4 AND L4-L5 AND T7-T8 LIGAMENT;  Surgeon: Talia Belcher MD;  Location: [facility] PAIN MGT;  Service: Pain Management;  Laterality: Bilateral;    INSERTION OF IMPLANTABLE LOOP RECORDER Left 07/25/2022    Procedure: Insertion, Implantable Loop Recorder;  Surgeon: Sourav Nugent MD;  Location: [facility] CATH;  Service: Cardiology;  Laterality: Left;    LAPAROSCOPIC CHOLECYSTECTOMY N/A 02/10/2021     Procedure: CHOLECYSTECTOMY, LAPAROSCOPIC;  Surgeon: John Morrow MD;  Location: [facility] OR;  Service: General;  Laterality: N/A;    LEFT HEART CATHETERIZATION Left 2022    Procedure: Left heart cath;  Surgeon: Sourav Nugent MD;  Location: [facility] CATH;  Service: Cardiology;  Laterality: Left;    LEFT HEART CATHETERIZATION Left 10/26/2023    Procedure: Left heart cath;  Surgeon: Sourav Nugent MD;  Location: [facility] CATH;  Service: Cardiology;  Laterality: Left;    PERCUTANEOUS CORONARY INTERVENTION, ARTERY  2023    Procedure: DANILO LCX;  Surgeon: Sourav Nugent MD;  Location: [facility] CATH;  Service: Cardiology;;    SINUS SURGERY      x2-one for CSF leak    TONSILLECTOMY       Family History   Problem Relation Age of Onset    Ulcers Father     Arthritis Mother     Cancer Mother     Heart disease Mother     Hypertension Mother     Cataracts Mother     Ovarian cancer Mother     Thyroid disease Brother     Heart disease Brother     Stroke Paternal Aunt             Amblyopia Neg Hx     Blindness Neg Hx     Diabetes Neg Hx     Glaucoma Neg Hx     Macular degeneration Neg Hx     Retinal detachment Neg Hx     Strabismus Neg Hx     Breast cancer Neg Hx      Social History     Socioeconomic History    Marital status:    Tobacco Use    Smoking status: Former     Current packs/day: 0.00     Types: Cigarettes     Quit date: 2008     Years since quitting: 15.9    Smokeless tobacco: Never   Substance and Sexual Activity    Alcohol use: Yes     Comment: rarely    Drug use: No    Sexual activity: Yes     Partners: Male     Social Determinants of Health     Financial Resource Strain: Low Risk  (2023)    Overall Financial Resource Strain (CARDIA)     Difficulty of Paying Living Expenses: Not hard at all   Food Insecurity: No Food Insecurity (2023)    Hunger Vital Sign     Worried About Running Out of Food in the Last Year: Never true     Ran Out of Food in the Last Year: Never true   Transportation  "Needs: No Transportation Needs (11/26/2023)    PRAPARE - Transportation     Lack of Transportation (Medical): No     Lack of Transportation (Non-Medical): No   Physical Activity: Insufficiently Active (11/26/2023)    Exercise Vital Sign     Days of Exercise per Week: 3 days     Minutes of Exercise per Session: 30 min   Stress: Stress Concern Present (11/26/2023)    [employer] - Occupational Stress Questionnaire     Feeling of Stress : To some extent   Social Connections: Unknown (11/26/2023)    Social Connection and Isolation Panel [NHANES]     Frequency of Communication with Friends and Family: More than three times a week     Frequency of Social Gatherings with Friends and Family: Three times a week     Active Member of Clubs or Organizations: Yes     Attends Club or Organization Meetings: More than 4 times per year     Marital Status:    Housing Stability: Unknown (11/26/2023)    Housing Stability Vital Sign     Unable to Pay for Housing in the Last Year: No     Unstable Housing in the Last Year: No       Review of patient's allergies indicates:   Allergen Reactions    Lyrica [pregabalin] Swelling    Pcn [penicillins] Swelling    Toradol [ketorolac] Swelling    Vibramycin [doxycycline calcium] Shortness Of Breath and Swelling    Zetia [ezetimibe] Other (See Comments)    Alendronate      "Felt like I was having a heart attack"    Crestor [rosuvastatin]      Body aches    Cymbalta [duloxetine]      dizzyness    Elavil [amitriptyline] Other (See Comments)     Restless leg    Pravastatin Other (See Comments)     Flu -like symptoms   Body aches     Seroquel [quetiapine]      restless leg symdrome       Current Outpatient Medications:     aspirin (ECOTRIN) 81 MG EC tablet, Take 1 tablet (81 mg total) by mouth once daily. for 21 days, Disp: , Rfl:     butalbital-acetaminophen-caffeine -40 mg (FIORICET, ESGIC) -40 mg per tablet, 1 tab PO PRN migraine. No more than 10 tab " per month., Disp: 10 tablet, Rfl: 0    CALCIUM CARBONATE/VITAMIN D3 (CALCIUM 500 WITH D ORAL), Take 1 capsule by mouth once daily., Disp: , Rfl:     carboxymethylcellulose (REFRESH PLUS) 0.5 % Dpet, Place 1 drop into both eyes daily as needed (DRY EYES)., Disp: , Rfl:     carvediloL (COREG) 3.125 MG tablet, Take 1 tablet (3.125 mg total) by mouth 2 (two) times daily with meals., Disp: 180 tablet, Rfl: 0    clopidogreL (PLAVIX) 75 mg tablet, Take 1 tablet (75 mg total) by mouth once daily., Disp: 90 tablet, Rfl: 0    co-enzyme Q-10 30 mg capsule, Take 30 mg by mouth 3 (three) times daily., Disp: , Rfl:     dicyclomine (BENTYL) 10 MG capsule, Take 1 capsule (10 mg total) by mouth 4 (four) times daily as needed (stomach discomfort)., Disp: 120 capsule, Rfl: 0    esomeprazole (NEXIUM) 40 MG capsule, TAKE 1 CAPSULE (40 MG TOTAL) BY MOUTH BEFORE BREAKFAST., Disp: 90 capsule, Rfl: 3    evolocumab (REPATHA SURECLICK) 140 mg/mL PnIj, Inject 1 mL (140 mg total) into the skin every 14 (fourteen) days., Disp: 2 each, Rfl: 3    fluticasone propionate (FLONASE) 50 mcg/actuation nasal spray, 1 spray (50 mcg total) by Each Nostril route once daily. (Patient taking differently: 1 spray by Each Nostril route 2 (two) times a day.), Disp: 32 g, Rfl: 2    garlic (GARLIQUE ORAL), Take 1 capsule by mouth Daily., Disp: , Rfl:     methocarbamoL (ROBAXIN) 750 MG Tab, Take 1 tablet (750 mg total) by mouth 4 (four) times daily. (Patient taking differently: Take 750 mg by mouth 4 (four) times daily as needed.), Disp: 40 tablet, Rfl: 3    MULTIVITAMIN (MULTIPLE VITAMIN ORAL), Take 1 capsule by mouth once daily., Disp: , Rfl:     nitroGLYCERIN 0.2 mg/hr TD PT24 (NITRODUR) 0.2 mg/hr, Place 1 patch onto the skin once daily., Disp: 30 patch, Rfl: 3    oxybutynin (DITROPAN XL) 15 MG TR24, TAKE 1 TABLET (15 MG TOTAL) BY MOUTH EVERY MORNING., Disp: 90 tablet, Rfl: 3    promethazine (PHENERGAN) 25 MG tablet, Take 25 mg by mouth every 6 (six) hours as  needed., Disp: , Rfl:     sennosides (LAXATIVE, SENNOSIDES,) 25 mg Tab, Take by mouth., Disp: , Rfl:     SUBOXONE 8-2 mg, Place 1 each under the tongue daily as needed (restless legs)., Disp: , Rfl:     SYNTHROID 100 mcg tablet, Take 1 tablet (100 mcg total) by mouth before breakfast. **BRAND MEDICALLY NECESSARY**, Disp: 90 tablet, Rfl: 3    valACYclovir (VALTREX) 1000 MG tablet, TAKE ONE TABLET BY MOUTH EVERY DAY FOR 7 DAYS, Disp: 7 tablet, Rfl: 5    amLODIPine (NORVASC) 5 MG tablet, Take 1 tablet (5 mg total) by mouth once daily., Disp: 90 tablet, Rfl: 1    bempedoic acid (NEXLETOL) 180 mg Tab, Take 1 tablet (180 mg total) by mouth Daily., Disp: 90 tablet, Rfl: 1    benazepriL (LOTENSIN) 20 MG tablet, Take 1 tablet (20 mg total) by mouth 2 (two) times daily., Disp: 180 tablet, Rfl: 1    isosorbide mononitrate (IMDUR) 30 MG 24 hr tablet, Take 1 tablet (30 mg total) by mouth once daily., Disp: 90 tablet, Rfl: 1    Current Facility-Administered Medications:     sodium chloride 0.9% flush 10 mL, 10 mL, Intravenous, PRN, Sourav Nugent MD    Facility-Administered Medications Ordered in Other Visits:     lactated ringers infusion, , Intravenous, Continuous, Jim Mcdonough MD, Stopped at 06/27/22 1714    LIDOcaine (PF) 10 mg/ml (1%) injection 10 mg, 1 mL, Intradermal, Once, Jim Mcdonough MD    Review of Systems   Constitutional: Negative. Negative for chills, decreased appetite, diaphoresis, fever and malaise/fatigue.   HENT:  Negative for congestion and nosebleeds.    Eyes:  Negative for blurred vision and visual disturbance.   Cardiovascular:  Negative for chest pain, claudication, cyanosis, dyspnea on exertion, irregular heartbeat, leg swelling, near-syncope, orthopnea, palpitations, paroxysmal nocturnal dyspnea and syncope.   Respiratory:  Positive for snoring. Negative for cough, hemoptysis and shortness of breath.    Endocrine: Negative for polyphagia and polyuria.   Hematologic/Lymphatic: Negative for  adenopathy. Does not bruise/bleed easily.   Skin:  Negative for color change and rash.   Musculoskeletal:  Negative for back pain and falls.   Gastrointestinal:  Negative for abdominal pain, dysphagia, jaundice, melena and nausea.   Genitourinary:  Negative for dysuria and flank pain.   Neurological:  Negative for dizziness, focal weakness, headaches, light-headedness, paresthesias and weakness.   Psychiatric/Behavioral:  Negative for altered mental status and depression.    Allergic/Immunologic: Negative for hives and persistent infections.        Objective:      Vitals:    11/30/23 1353 11/30/23 1405   BP: (!) 175/75 136/72   Pulse: (!) 57    Weight: 61.3 kg (135 lb 2.3 oz)    PainSc: 0-No pain      Body mass index is 25.53 kg/m².    Physical Exam  HENT:      Head: Normocephalic and atraumatic.   Eyes:      Extraocular Movements: Extraocular movements intact.      Conjunctiva/sclera: Conjunctivae normal.   Cardiovascular:      Rate and Rhythm: Normal rate and regular rhythm. No extrasystoles are present.     Pulses: Normal pulses.           Carotid pulses are 2+ on the right side and 2+ on the left side.       Radial pulses are 2+ on the right side and 2+ on the left side.        Posterior tibial pulses are 2+ on the right side and 2+ on the left side.      Heart sounds: Murmur heard.      Systolic murmur is present with a grade of 2/6 at the lower left sternal border.      No friction rub. No gallop.   Pulmonary:      Effort: Pulmonary effort is normal.      Breath sounds: Normal breath sounds and air entry.   Abdominal:      Palpations: Abdomen is soft.      Tenderness: There is no abdominal tenderness.   Musculoskeletal:      Cervical back: Neck supple.   Skin:     General: Skin is warm and dry.      Capillary Refill: Capillary refill takes less than 2 seconds.      Coloration: Skin is not pale.   Neurological:      General: No focal deficit present.      Mental Status: She is alert.   Psychiatric:         Mood  and Affect: Mood normal.         Speech: Speech normal.         Behavior: Behavior normal.                 ..    Chemistry        Component Value Date/Time     11/29/2023 1312    K 5.2 (H) 11/29/2023 1312     11/29/2023 1312    CO2 23 11/29/2023 1312    BUN 17 11/29/2023 1312    CREATININE 1.0 11/29/2023 1312    GLU 92 11/29/2023 1312        Component Value Date/Time    CALCIUM 9.5 11/29/2023 1312    ALKPHOS 74 11/29/2023 1312    AST 19 11/29/2023 1312    ALT 8 (L) 11/29/2023 1312    BILITOT 0.3 11/29/2023 1312    ESTGFRAFRICA 29.7 (A) 07/27/2022 1159    EGFRNONAA 25.8 (A) 07/27/2022 1159            ..  Lab Results   Component Value Date    CHOL 193 11/29/2023    CHOL 190 05/30/2023    CHOL 162 07/27/2022     Lab Results   Component Value Date    HDL 98 (H) 11/29/2023    HDL 99 (H) 05/30/2023    HDL 77 (H) 07/27/2022     Lab Results   Component Value Date    LDLCALC 84.2 11/29/2023    LDLCALC 74.0 05/30/2023    LDLCALC 60.2 (L) 07/27/2022     Lab Results   Component Value Date    TRIG 54 11/29/2023    TRIG 85 05/30/2023    TRIG 124 07/27/2022     Lab Results   Component Value Date    CHOLHDL 50.8 (H) 11/29/2023    CHOLHDL 52.1 (H) 05/30/2023    CHOLHDL 47.5 07/27/2022     ..  Lab Results   Component Value Date    WBC 6.19 11/09/2023    HGB 9.8 (L) 11/09/2023    HCT 31.2 (L) 11/09/2023    MCV 94 11/09/2023     11/09/2023       Test(s) Reviewed  I have reviewed the following in detail:  [] Stress test   [x] Angiography   [] Echocardiogram   [x] Labs   [] Other:       Assessment:         ICD-10-CM ICD-9-CM   1. Coronary artery disease involving native coronary artery of native heart without angina pectoris  I25.10 414.01   2. Long term (current) use of antithrombotics/antiplatelets  Z79.02 V58.63   3. Essential (primary) hypertension  I10 401.9   4. Stented coronary artery  Z95.5 V45.82   5. Hypercholesterolemia  E78.00 272.0   6. Statin intolerance  Z78.9 995.27     Problem List Items Addressed This  Visit          Cardiac/Vascular    Essential (primary) hypertension    Relevant Medications    benazepriL (LOTENSIN) 20 MG tablet    Coronary artery disease involving native coronary artery of native heart without angina pectoris - Primary    Relevant Medications    bempedoic acid (NEXLETOL) 180 mg Tab    Hypercholesterolemia    Relevant Medications    bempedoic acid (NEXLETOL) 180 mg Tab       Hematology    Long term (current) use of antithrombotics/antiplatelets       Other    Statin intolerance    Relevant Medications    bempedoic acid (NEXLETOL) 180 mg Tab     Other Visit Diagnoses       Stented coronary artery                 Plan:     USE CPAP TO GET STRAP, COULD CAUSE BP TO GO UP IN AM, EXPLAINED, DAILY ASA PLAVIX, STATIN AND ZETIA INTOLERNAT, ADD NEXLETOL, LDL NOT AT TARGET ON REPATHA, P.R.N. NITROGLYCERIN FOR BLOOD PRESSURE,ALL CV CLINICALLY STABLE, NO ANGINA, NO HF, NO TIA, NO CLINICAL ARRHYTHMIA,CONTINUE CURRENT MEDS, EDUCATION, DIET, EXERCISE , RETURN TO CLINIC IN 4 MO      Coronary artery disease involving native coronary artery of native heart without angina pectoris  -     bempedoic acid (NEXLETOL) 180 mg Tab; Take 1 tablet (180 mg total) by mouth Daily.  Dispense: 90 tablet; Refill: 1    Long term (current) use of antithrombotics/antiplatelets    Essential (primary) hypertension  -     benazepriL (LOTENSIN) 20 MG tablet; Take 1 tablet (20 mg total) by mouth 2 (two) times daily.  Dispense: 180 tablet; Refill: 1    Stented coronary artery    Hypercholesterolemia  -     bempedoic acid (NEXLETOL) 180 mg Tab; Take 1 tablet (180 mg total) by mouth Daily.  Dispense: 90 tablet; Refill: 1    Statin intolerance  -     bempedoic acid (NEXLETOL) 180 mg Tab; Take 1 tablet (180 mg total) by mouth Daily.  Dispense: 90 tablet; Refill: 1    Other orders  -     amLODIPine (NORVASC) 5 MG tablet; Take 1 tablet (5 mg total) by mouth once daily.  Dispense: 90 tablet; Refill: 1  -     isosorbide mononitrate (IMDUR) 30 MG  24 hr tablet; Take 1 tablet (30 mg total) by mouth once daily.  Dispense: 90 tablet; Refill: 1    RTC Low level/low impact aerobic exercise 5x's/wk. Heart healthy diet and risk factor modification.    See labs and med orders.    Aerobic exercise 5x's/wk. Heart healthy diet and risk factor modification.    See labs and med orders.

## 2023-12-12 ENCOUNTER — OFFICE VISIT (OUTPATIENT)
Dept: FAMILY MEDICINE | Facility: CLINIC | Age: 73
End: 2023-12-12
Payer: MEDICARE

## 2023-12-12 ENCOUNTER — LAB VISIT (OUTPATIENT)
Dept: LAB | Facility: HOSPITAL | Age: 73
End: 2023-12-12
Attending: INTERNAL MEDICINE
Payer: MEDICARE

## 2023-12-12 VITALS
DIASTOLIC BLOOD PRESSURE: 84 MMHG | TEMPERATURE: 98 F | HEART RATE: 61 BPM | SYSTOLIC BLOOD PRESSURE: 138 MMHG | HEIGHT: 61 IN | BODY MASS INDEX: 25.39 KG/M2 | OXYGEN SATURATION: 97 % | WEIGHT: 134.5 LBS

## 2023-12-12 DIAGNOSIS — E78.5 DYSLIPIDEMIA: ICD-10-CM

## 2023-12-12 DIAGNOSIS — T46.6X5A MYALGIA DUE TO STATIN: ICD-10-CM

## 2023-12-12 DIAGNOSIS — D64.9 NORMOCYTIC ANEMIA: ICD-10-CM

## 2023-12-12 DIAGNOSIS — Z79.899 ENCOUNTER FOR LONG-TERM (CURRENT) USE OF MEDICATIONS: ICD-10-CM

## 2023-12-12 DIAGNOSIS — M81.0 AGE-RELATED OSTEOPOROSIS WITHOUT CURRENT PATHOLOGICAL FRACTURE: ICD-10-CM

## 2023-12-12 DIAGNOSIS — M54.50 CHRONIC MIDLINE LOW BACK PAIN WITHOUT SCIATICA: ICD-10-CM

## 2023-12-12 DIAGNOSIS — G43.719 INTRACTABLE CHRONIC MIGRAINE WITHOUT AURA AND WITHOUT STATUS MIGRAINOSUS: ICD-10-CM

## 2023-12-12 DIAGNOSIS — N39.41 URGE INCONTINENCE OF URINE: ICD-10-CM

## 2023-12-12 DIAGNOSIS — E11.9 CONTROLLED TYPE 2 DIABETES MELLITUS WITHOUT COMPLICATION, WITHOUT LONG-TERM CURRENT USE OF INSULIN: ICD-10-CM

## 2023-12-12 DIAGNOSIS — K29.70 GASTRITIS, PRESENCE OF BLEEDING UNSPECIFIED, UNSPECIFIED CHRONICITY, UNSPECIFIED GASTRITIS TYPE: ICD-10-CM

## 2023-12-12 DIAGNOSIS — I10 ESSENTIAL HYPERTENSION: Primary | ICD-10-CM

## 2023-12-12 DIAGNOSIS — G89.29 CHRONIC MIDLINE LOW BACK PAIN WITHOUT SCIATICA: ICD-10-CM

## 2023-12-12 DIAGNOSIS — M79.10 MYALGIA DUE TO STATIN: ICD-10-CM

## 2023-12-12 DIAGNOSIS — E03.4 HYPOTHYROIDISM DUE TO ACQUIRED ATROPHY OF THYROID: ICD-10-CM

## 2023-12-12 LAB
BASOPHILS # BLD AUTO: 0.06 K/UL (ref 0–0.2)
BASOPHILS NFR BLD: 0.8 % (ref 0–1.9)
DIFFERENTIAL METHOD: ABNORMAL
EOSINOPHIL # BLD AUTO: 0.1 K/UL (ref 0–0.5)
EOSINOPHIL NFR BLD: 1.5 % (ref 0–8)
ERYTHROCYTE [DISTWIDTH] IN BLOOD BY AUTOMATED COUNT: 13.9 % (ref 11.5–14.5)
HCT VFR BLD AUTO: 31.7 % (ref 37–48.5)
HGB BLD-MCNC: 10.1 G/DL (ref 12–16)
IMM GRANULOCYTES # BLD AUTO: 0.02 K/UL (ref 0–0.04)
IMM GRANULOCYTES NFR BLD AUTO: 0.3 % (ref 0–0.5)
IRON SERPL-MCNC: 73 UG/DL (ref 30–160)
LYMPHOCYTES # BLD AUTO: 1.6 K/UL (ref 1–4.8)
LYMPHOCYTES NFR BLD: 21.7 % (ref 18–48)
MCH RBC QN AUTO: 30.1 PG (ref 27–31)
MCHC RBC AUTO-ENTMCNC: 31.9 G/DL (ref 32–36)
MCV RBC AUTO: 95 FL (ref 82–98)
MONOCYTES # BLD AUTO: 0.5 K/UL (ref 0.3–1)
MONOCYTES NFR BLD: 7.4 % (ref 4–15)
NEUTROPHILS # BLD AUTO: 4.9 K/UL (ref 1.8–7.7)
NEUTROPHILS NFR BLD: 68.3 % (ref 38–73)
NRBC BLD-RTO: 0 /100 WBC
PLATELET # BLD AUTO: 285 K/UL (ref 150–450)
PMV BLD AUTO: 11.6 FL (ref 9.2–12.9)
RBC # BLD AUTO: 3.35 M/UL (ref 4–5.4)
SATURATED IRON: 19 % (ref 20–50)
TOTAL IRON BINDING CAPACITY: 380 UG/DL (ref 250–450)
TRANSFERRIN SERPL-MCNC: 257 MG/DL (ref 200–375)
WBC # BLD AUTO: 7.2 K/UL (ref 3.9–12.7)

## 2023-12-12 PROCEDURE — 83540 ASSAY OF IRON: CPT | Performed by: INTERNAL MEDICINE

## 2023-12-12 PROCEDURE — 82728 ASSAY OF FERRITIN: CPT | Performed by: INTERNAL MEDICINE

## 2023-12-12 PROCEDURE — 84466 ASSAY OF TRANSFERRIN: CPT | Performed by: INTERNAL MEDICINE

## 2023-12-12 PROCEDURE — 99999 PR PBB SHADOW E&M-EST. PATIENT-LVL V: ICD-10-PCS | Mod: PBBFAC,,, | Performed by: INTERNAL MEDICINE

## 2023-12-12 PROCEDURE — 36415 COLL VENOUS BLD VENIPUNCTURE: CPT | Mod: PO | Performed by: INTERNAL MEDICINE

## 2023-12-12 PROCEDURE — 99999 PR PBB SHADOW E&M-EST. PATIENT-LVL V: CPT | Mod: PBBFAC,,, | Performed by: INTERNAL MEDICINE

## 2023-12-12 PROCEDURE — 99214 OFFICE O/P EST MOD 30 MIN: CPT | Mod: S$PBB,,, | Performed by: INTERNAL MEDICINE

## 2023-12-12 PROCEDURE — 99214 PR OFFICE/OUTPT VISIT, EST, LEVL IV, 30-39 MIN: ICD-10-PCS | Mod: S$PBB,,, | Performed by: INTERNAL MEDICINE

## 2023-12-12 PROCEDURE — 85025 COMPLETE CBC W/AUTO DIFF WBC: CPT | Performed by: INTERNAL MEDICINE

## 2023-12-12 PROCEDURE — 99215 OFFICE O/P EST HI 40 MIN: CPT | Mod: PBBFAC,PO | Performed by: INTERNAL MEDICINE

## 2023-12-12 RX ORDER — METHOCARBAMOL 750 MG/1
1500 TABLET, FILM COATED ORAL 4 TIMES DAILY
Qty: 60 TABLET | Refills: 3 | Status: SHIPPED | OUTPATIENT
Start: 2023-12-12

## 2023-12-12 RX ORDER — ESOMEPRAZOLE MAGNESIUM 40 MG/1
40 CAPSULE, DELAYED RELEASE ORAL
Qty: 90 CAPSULE | Refills: 3 | Status: SHIPPED | OUTPATIENT
Start: 2023-12-12

## 2023-12-12 RX ORDER — OXYBUTYNIN CHLORIDE 15 MG/1
15 TABLET, EXTENDED RELEASE ORAL EVERY MORNING
Qty: 90 TABLET | Refills: 3 | Status: SHIPPED | OUTPATIENT
Start: 2023-12-12

## 2023-12-12 RX ORDER — LEVOTHYROXINE SODIUM 100 UG/1
100 TABLET ORAL
Qty: 90 TABLET | Refills: 3 | Status: SHIPPED | OUTPATIENT
Start: 2023-12-12

## 2023-12-12 NOTE — PROGRESS NOTES
Subjective:       Patient ID: Osman Johansen is a 73 y.o. female.  Chief Complaint: Follow-up (6 month)     HPI    CAD - 2 new stents 11/23 in blockage with in 1 yr of CABG.  Still has chest pain occasional - improved and on nitro patch.     CVA - embolic x2 post CABG.  Wearing loop recorder now.  Mild residual confusion/ forgetful. Had JODI with lasix; no swelling, so no need   Dr Nugent,     CKD III - stable      Osteopenia with high 10 yr fracture risk warranting treatment, but could not tolerate treatment with fosamax.   Prolia ordered today     HTN - controlled.  On digital HTN program.  Does not take the HCTZ because became lightheaded with this.    Hypothyroid - controlled.112 mcg from local pharmacy and Brand name made her have palpitations, but 125 mcg does not from mail order.  Dye? Only use mail order -   DM - controlled; outside eye exam done 7/26/17  HLD - controlled on Repatha.  Statin intolerance. could not tolerate multiple statins - Crestor, Liptior.  Starting to have body aches on 20 mg of Pravastatin.   Depression - stable      Iron deficiency anemia - worse; improved in past s/p iv iron infusion.  Feels more fatigued.   Trouble tolerating iron orally even only 3x per week or every other day.  Advised getting cscp and EGD (hx bleeding ulcer in past).  Last cscp 2012 was ok.  + fatigue   RLS - treated      CT doc emphysema - AVILA as above.  No longer smokes.    Assessment:       1. Essential hypertension    2. Dyslipidemia    3. Hypothyroidism due to acquired atrophy of thyroid    4. Myalgia due to statin    5. Normocytic anemia    6. Encounter for long-term (current) use of medications    7. Chronic midline low back pain without sciatica    8. Gastritis, presence of bleeding unspecified, unspecified chronicity, unspecified gastritis type    9. Urge incontinence of urine    10. Age-related osteoporosis without current pathological fracture    11. Controlled type 2 diabetes mellitus without  complication, without long-term current use of insulin        Plan:       Essential hypertension  -     Comprehensive Metabolic Panel; Future; Expected date: 06/09/2024    Dyslipidemia    Hypothyroidism due to acquired atrophy of thyroid  -     SYNTHROID 100 mcg tablet; Take 1 tablet (100 mcg total) by mouth before breakfast. **BRAND MEDICALLY NECESSARY**  Dispense: 90 tablet; Refill: 3  -     TSH; Future; Expected date: 06/09/2024    Myalgia due to statin    Normocytic anemia  -     Iron and TIBC; Future; Expected date: 12/12/2023  -     Ferritin; Future; Expected date: 12/12/2023  -     CBC Auto Differential; Future; Expected date: 12/12/2023  -     CBC Auto Differential; Future; Expected date: 06/09/2024    Encounter for long-term (current) use of medications    Chronic midline low back pain without sciatica  -     methocarbamoL (ROBAXIN) 750 MG Tab; Take 2 tablets (1,500 mg total) by mouth 4 (four) times daily.  Dispense: 60 tablet; Refill: 3    Gastritis, presence of bleeding unspecified, unspecified chronicity, unspecified gastritis type  -     esomeprazole (NEXIUM) 40 MG capsule; Take 1 capsule (40 mg total) by mouth before breakfast.  Dispense: 90 capsule; Refill: 3    Urge incontinence of urine  -     oxybutynin (DITROPAN XL) 15 MG TR24; Take 1 tablet (15 mg total) by mouth every morning.  Dispense: 90 tablet; Refill: 3    Age-related osteoporosis without current pathological fracture    Controlled type 2 diabetes mellitus without complication, without long-term current use of insulin  -     Hemoglobin A1C; Future; Expected date: 06/09/2024    Other orders  -     denosumab (PROLIA) injection 60 mg            Continue current management and monitor.  Other diagnoses were reviewed and found stable and will continue to monitor.  Counseled on regular exercise, maintenance of a healthy weight, balanced diet rich in fruits/vegetables and lean protein, and avoidance of unhealthy habits like smoking and excessive  alcohol intake.   Also, counseled on importance of being compliant with medication, health appointments, diet and exercise.     Follow up in about 6 months (around 6/12/2024).      Medication List with Changes/Refills   Current Medications    AMLODIPINE (NORVASC) 5 MG TABLET    Take 1 tablet (5 mg total) by mouth once daily.    ASPIRIN (ECOTRIN) 81 MG EC TABLET    Take 1 tablet (81 mg total) by mouth once daily. for 21 days    BEMPEDOIC ACID (NEXLETOL) 180 MG TAB    Take 1 tablet (180 mg total) by mouth Daily.    BENAZEPRIL (LOTENSIN) 20 MG TABLET    Take 1 tablet (20 mg total) by mouth 2 (two) times daily.    BUTALBITAL-ACETAMINOPHEN-CAFFEINE -40 MG (FIORICET, ESGIC) -40 MG PER TABLET    1 tab PO PRN migraine. No more than 10 tab per month.    CALCIUM CARBONATE/VITAMIN D3 (CALCIUM 500 WITH D ORAL)    Take 1 capsule by mouth once daily.    CARBOXYMETHYLCELLULOSE (REFRESH PLUS) 0.5 % DPET    Place 1 drop into both eyes daily as needed (DRY EYES).    CARVEDILOL (COREG) 3.125 MG TABLET    Take 1 tablet (3.125 mg total) by mouth 2 (two) times daily with meals.    CLOPIDOGREL (PLAVIX) 75 MG TABLET    Take 1 tablet (75 mg total) by mouth once daily.    CO-ENZYME Q-10 30 MG CAPSULE    Take 30 mg by mouth 3 (three) times daily.    DICYCLOMINE (BENTYL) 10 MG CAPSULE    Take 1 capsule (10 mg total) by mouth 4 (four) times daily as needed (stomach discomfort).    EVOLOCUMAB (REPATHA SURECLICK) 140 MG/ML PNIJ    Inject 1 mL (140 mg total) into the skin every 14 (fourteen) days.    FLUTICASONE PROPIONATE (FLONASE) 50 MCG/ACTUATION NASAL SPRAY    1 spray (50 mcg total) by Each Nostril route once daily.    GARLIC (GARLIQUE ORAL)    Take 1 capsule by mouth Daily.    ISOSORBIDE MONONITRATE (IMDUR) 30 MG 24 HR TABLET    Take 1 tablet (30 mg total) by mouth once daily.    MULTIVITAMIN (MULTIPLE VITAMIN ORAL)    Take 1 capsule by mouth once daily.    NITROGLYCERIN 0.2 MG/HR TD PT24 (NITRODUR) 0.2 MG/HR    Place 1 patch onto  the skin once daily.    PROMETHAZINE (PHENERGAN) 25 MG TABLET    Take 25 mg by mouth every 6 (six) hours as needed.    SENNOSIDES (LAXATIVE, SENNOSIDES,) 25 MG TAB    Take by mouth.    SUBOXONE 8-2 MG    Place 1 each under the tongue daily as needed (restless legs).    VALACYCLOVIR (VALTREX) 1000 MG TABLET    TAKE ONE TABLET BY MOUTH EVERY DAY FOR 7 DAYS   Changed and/or Refilled Medications    Modified Medication Previous Medication    ESOMEPRAZOLE (NEXIUM) 40 MG CAPSULE esomeprazole (NEXIUM) 40 MG capsule       Take 1 capsule (40 mg total) by mouth before breakfast.    TAKE 1 CAPSULE (40 MG TOTAL) BY MOUTH BEFORE BREAKFAST.    METHOCARBAMOL (ROBAXIN) 750 MG TAB methocarbamoL (ROBAXIN) 750 MG Tab       Take 2 tablets (1,500 mg total) by mouth 4 (four) times daily.    Take 1 tablet (750 mg total) by mouth 4 (four) times daily.    OXYBUTYNIN (DITROPAN XL) 15 MG TR24 oxybutynin (DITROPAN XL) 15 MG TR24       Take 1 tablet (15 mg total) by mouth every morning.    TAKE 1 TABLET (15 MG TOTAL) BY MOUTH EVERY MORNING.    SYNTHROID 100 MCG TABLET SYNTHROID 100 mcg tablet       Take 1 tablet (100 mcg total) by mouth before breakfast. **BRAND MEDICALLY NECESSARY**    Take 1 tablet (100 mcg total) by mouth before breakfast. **BRAND MEDICALLY NECESSARY**       BP Readings from Last 3 Encounters:   12/12/23 (!) 148/62   11/30/23 136/72   11/09/23 (!) 140/60     Hemoglobin A1C   Date Value Ref Range Status   11/29/2023 5.5 4.0 - 5.6 % Final     Comment:     ADA Screening Guidelines:  5.7-6.4%  Consistent with prediabetes  >or=6.5%  Consistent with diabetes    High levels of fetal hemoglobin interfere with the HbA1C  assay. Heterozygous hemoglobin variants (HbS, HgC, etc)do  not significantly interfere with this assay.   However, presence of multiple variants may affect accuracy.     05/30/2023 5.6 4.0 - 5.6 % Final     Comment:     ADA Screening Guidelines:  5.7-6.4%  Consistent with prediabetes  >or=6.5%  Consistent with  diabetes    High levels of fetal hemoglobin interfere with the HbA1C  assay. Heterozygous hemoglobin variants (HbS, HgC, etc)do  not significantly interfere with this assay.   However, presence of multiple variants may affect accuracy.     12/01/2022 5.5 4.0 - 5.6 % Final     Comment:     ADA Screening Guidelines:  5.7-6.4%  Consistent with prediabetes  >or=6.5%  Consistent with diabetes    High levels of fetal hemoglobin interfere with the HbA1C  assay. Heterozygous hemoglobin variants (HbS, HgC, etc)do  not significantly interfere with this assay.   However, presence of multiple variants may affect accuracy.       Lab Results   Component Value Date    TSH 3.105 11/29/2023     Lab Results   Component Value Date    LDLCALC 84.2 11/29/2023    LDLCALC 74.0 05/30/2023    LDLCALC 60.2 (L) 07/27/2022     Lab Results   Component Value Date    TRIG 54 11/29/2023    TRIG 85 05/30/2023    TRIG 124 07/27/2022     Wt Readings from Last 3 Encounters:   12/12/23 61 kg (134 lb 7.7 oz)   11/30/23 61.3 kg (135 lb 2.3 oz)   11/09/23 60.6 kg (133 lb 9.6 oz)     Lab Results   Component Value Date    HGB 9.8 (L) 11/09/2023    HCT 31.2 (L) 11/09/2023    WBC 6.19 11/09/2023    ALT 8 (L) 11/29/2023    AST 19 11/29/2023     11/29/2023    K 5.2 (H) 11/29/2023    CREATININE 1.0 11/29/2023           Review of Systems        Objective:      Vitals:    12/12/23 1455   BP: (!) 148/62   Pulse: 61   Temp: 98.1 °F (36.7 °C)     Physical Exam  Vitals reviewed.   Constitutional:       Appearance: Normal appearance.   Eyes:      Conjunctiva/sclera: Conjunctivae normal.   Cardiovascular:      Rate and Rhythm: Normal rate.   Pulmonary:      Effort: Pulmonary effort is normal.      Breath sounds: Normal breath sounds.   Musculoskeletal:      Cervical back: Normal range of motion.      Comments: Normal ROM bilateral    Skin:     General: Skin is warm and dry.   Neurological:      Mental Status: She is alert.      Cranial Nerves: Cranial nerve  deficit: grossly intact.   Psychiatric:      Comments: Alert and orientated

## 2023-12-13 ENCOUNTER — TELEPHONE (OUTPATIENT)
Dept: FAMILY MEDICINE | Facility: CLINIC | Age: 73
End: 2023-12-13
Payer: MEDICARE

## 2023-12-13 DIAGNOSIS — D50.9 IRON DEFICIENCY ANEMIA, UNSPECIFIED IRON DEFICIENCY ANEMIA TYPE: Primary | ICD-10-CM

## 2023-12-13 LAB — FERRITIN SERPL-MCNC: 8 NG/ML (ref 20–300)

## 2023-12-13 RX ORDER — HEPARIN 100 UNIT/ML
500 SYRINGE INTRAVENOUS
Status: CANCELLED | OUTPATIENT
Start: 2023-12-18

## 2023-12-13 RX ORDER — BUTALBITAL, ACETAMINOPHEN AND CAFFEINE 50; 325; 40 MG/1; MG/1; MG/1
TABLET ORAL
Qty: 10 TABLET | Refills: 0 | Status: SHIPPED | OUTPATIENT
Start: 2023-12-13 | End: 2024-01-13 | Stop reason: SDUPTHER

## 2023-12-13 RX ORDER — DIPHENHYDRAMINE HYDROCHLORIDE 50 MG/ML
50 INJECTION INTRAMUSCULAR; INTRAVENOUS ONCE AS NEEDED
Status: CANCELLED | OUTPATIENT
Start: 2023-12-18

## 2023-12-13 RX ORDER — EPINEPHRINE 0.3 MG/.3ML
0.3 INJECTION SUBCUTANEOUS ONCE AS NEEDED
Status: CANCELLED | OUTPATIENT
Start: 2023-12-18

## 2023-12-13 RX ORDER — SODIUM CHLORIDE 0.9 % (FLUSH) 0.9 %
10 SYRINGE (ML) INJECTION
Status: CANCELLED | OUTPATIENT
Start: 2023-12-18

## 2023-12-13 NOTE — TELEPHONE ENCOUNTER
Called and left message for patient to return call for results       Fixed FOBT cards and placed at the  for patient to  .    Also going to send my chart message

## 2023-12-13 NOTE — TELEPHONE ENCOUNTER
Please, call and inform patient:   Iron is low.  Needs to complete FOBT cards.    Iv iron infusion ordered.

## 2023-12-14 ENCOUNTER — TELEPHONE (OUTPATIENT)
Dept: FAMILY MEDICINE | Facility: CLINIC | Age: 73
End: 2023-12-14
Payer: MEDICARE

## 2023-12-14 NOTE — TELEPHONE ENCOUNTER
----- Message from Fabienbrendon Meek sent at 12/13/2023  4:48 PM CST -----  Regarding: ret call  Contact: patient  Type:  Patient Returning Call    Who Called:  Patient     Who Left Message for Patient:  Fabby Mendez    Does the patient know what this is regarding?:  Yes    Best Call Back Number:  [phone] or [phone]    Additional Information:  Thanks

## 2023-12-14 NOTE — TELEPHONE ENCOUNTER
----- Message from Sydnee Singleton sent at 12/14/2023 11:08 AM CST -----  Regarding: return call  Contact: patient  Type:  Patient Returning Call    Who Called:  patient  Who Left Message for Patient:  Tesha  Does the patient know what this is regarding?: infusion  Best Call Back Number:  [phone]    Additional Information:  Please call patient to advise.  Thanks!

## 2023-12-14 NOTE — TELEPHONE ENCOUNTER
----- Message from Shena Gracia sent at 12/14/2023 11:37 AM CST -----  Contact: Self  Type:  Patient Returning Call    Who Called:  Pt   Who Left Message for Patient:  Minal   Does the patient know what this is regarding?:    Best Call Back Number:  [phone]    Additional Information:  Please call

## 2023-12-15 ENCOUNTER — TELEPHONE (OUTPATIENT)
Dept: INFUSION THERAPY | Facility: HOSPITAL | Age: 73
End: 2023-12-15
Payer: MEDICARE

## 2023-12-15 ENCOUNTER — PATIENT MESSAGE (OUTPATIENT)
Dept: FAMILY MEDICINE | Facility: CLINIC | Age: 73
End: 2023-12-15
Payer: MEDICARE

## 2023-12-15 NOTE — TELEPHONE ENCOUNTER
----- Message from Carmela Diaz sent at 12/13/2023  4:34 PM CST -----  Type:  Patient Returning Call    Who Called:  Patient   Who Left Message for Patient:  Tesha  Does the patient know what this is regarding?:  yes  Best Call Back Number:    Additional Information: Patient returning missed call

## 2023-12-18 ENCOUNTER — PATIENT OUTREACH (OUTPATIENT)
Dept: ADMINISTRATIVE | Facility: HOSPITAL | Age: 73
End: 2023-12-18
Payer: MEDICARE

## 2023-12-18 NOTE — PROGRESS NOTES
Population Health Chart Review & Patient Outreach Details    Outreach Performed: NO    Additional Pop Health Notes:           Updates Requested / Reviewed:      Updated Care Coordination Note         Health Maintenance Topics Overdue:    Health Maintenance Due   Topic Date Due    RSV Vaccine (Age 60+ and Pregnant patients) (1 - 1-dose 60+ series) Never done    Colorectal Cancer Screening  11/29/2022    Mammogram  05/26/2023    COVID-19 Vaccine (4 - 2023-24 season) 09/01/2023    Eye Exam  11/10/2023         Health Maintenance Topic(s) Outreach Outcomes & Actions Taken:    Eye Exam - Outreach Outcomes & Actions Taken  : Diabetic Eye External Records Uploaded, Care Team & History Updated if Applicable

## 2023-12-19 ENCOUNTER — INFUSION (OUTPATIENT)
Dept: INFUSION THERAPY | Facility: HOSPITAL | Age: 73
End: 2023-12-19
Attending: INTERNAL MEDICINE
Payer: MEDICARE

## 2023-12-19 ENCOUNTER — CLINICAL SUPPORT (OUTPATIENT)
Dept: CARDIOLOGY | Facility: HOSPITAL | Age: 73
End: 2023-12-19
Attending: INTERNAL MEDICINE
Payer: MEDICARE

## 2023-12-19 ENCOUNTER — CLINICAL SUPPORT (OUTPATIENT)
Dept: CARDIOLOGY | Facility: HOSPITAL | Age: 73
End: 2023-12-19
Payer: MEDICARE

## 2023-12-19 ENCOUNTER — TELEPHONE (OUTPATIENT)
Dept: INFUSION THERAPY | Facility: HOSPITAL | Age: 73
End: 2023-12-19

## 2023-12-19 VITALS
BODY MASS INDEX: 25.63 KG/M2 | SYSTOLIC BLOOD PRESSURE: 218 MMHG | HEART RATE: 81 BPM | WEIGHT: 135.56 LBS | DIASTOLIC BLOOD PRESSURE: 90 MMHG

## 2023-12-19 DIAGNOSIS — I49.8 OTHER SPECIFIED CARDIAC ARRHYTHMIAS: ICD-10-CM

## 2023-12-19 PROCEDURE — 93298 REM INTERROG DEV EVAL SCRMS: CPT | Mod: ,,, | Performed by: INTERNAL MEDICINE

## 2023-12-19 NOTE — TELEPHONE ENCOUNTER
----- Message from Carmela Diaz sent at 12/19/2023  4:05 PM CST -----  Type:  Patient Returning Call    Who Called:  Patient   Who Left Message for Patient:  Tesha  Does the patient know what this is regarding?:  yes  Best Call Back Number:  [phone]  Additional Information:  Patient returning call

## 2023-12-19 NOTE — PLAN OF CARE
"  Problem: Adult Inpatient Plan of Care  Goal: Plan of Care Review  Outcome: Ongoing, Progressing  Flowsheets (Taken 12/19/2023 5166)  Plan of Care Reviewed With:   patient   spouse   Pt here to receive feraheme and prolia. Pt states that she "does not want prolia injection today because she is leaving to go out of town in the morning and does not want to feel bad". VS obtained and blood pressure noted to be elevated 220/87 per machine and 218/80 manually. Dr. Stiles notified of elevated blood pressures as well as pt has a headache 3/10. Dr. Stiles did not want pt to receive feraheme today and wanted pt to go to the ER to get bp under control as well as to remind pt to be compliant with her bp meds. Dr. Stiles' instructions relayed to pt. Pt stated "I am not going to the ER right now my pressure will go down." Pt feels like she has been under a lot of stress and this to be the culprit of her elevated bp. Dr. Stiles notified via secure chat that pt stated she would not be going to the ER at this time. Pt discharged in NAD with instructions to go to the ER per Dr. Stiles for bp control.  "

## 2023-12-19 NOTE — PLAN OF CARE
Problem: Adult Inpatient Plan of Care  Goal: Patient-Specific Goal (Individualized)  Outcome: Ongoing, Progressing  Flowsheets (Taken 12/19/2023 1439)  Anxieties, Fears or Concerns: none  Individualized Care Needs: recliner,  chairside     Problem: Fatigue  Goal: Improved Activity Tolerance  Outcome: Ongoing, Progressing  Intervention: Promote Improved Energy  Flowsheets (Taken 12/19/2023 1439)  Fatigue Management: frequent rest breaks encouraged  Sleep/Rest Enhancement: regular sleep/rest pattern promoted  Activity Management: Ambulated -L4

## 2023-12-23 ENCOUNTER — PATIENT MESSAGE (OUTPATIENT)
Dept: ADMINISTRATIVE | Facility: OTHER | Age: 73
End: 2023-12-23
Payer: MEDICARE

## 2023-12-29 ENCOUNTER — CLINICAL SUPPORT (OUTPATIENT)
Dept: CARDIOLOGY | Facility: HOSPITAL | Age: 73
End: 2023-12-29
Attending: INTERNAL MEDICINE
Payer: MEDICARE

## 2023-12-29 ENCOUNTER — CLINICAL SUPPORT (OUTPATIENT)
Dept: CARDIOLOGY | Facility: HOSPITAL | Age: 73
End: 2023-12-29
Payer: MEDICARE

## 2023-12-29 DIAGNOSIS — I49.8 OTHER SPECIFIED CARDIAC ARRHYTHMIAS: ICD-10-CM

## 2024-01-04 ENCOUNTER — INFUSION (OUTPATIENT)
Dept: INFUSION THERAPY | Facility: HOSPITAL | Age: 74
End: 2024-01-04
Attending: INTERNAL MEDICINE
Payer: MEDICARE

## 2024-01-04 VITALS — HEART RATE: 70 BPM | SYSTOLIC BLOOD PRESSURE: 166 MMHG | RESPIRATION RATE: 16 BRPM | DIASTOLIC BLOOD PRESSURE: 77 MMHG

## 2024-01-04 DIAGNOSIS — M81.0 AGE-RELATED OSTEOPOROSIS WITHOUT CURRENT PATHOLOGICAL FRACTURE: Primary | ICD-10-CM

## 2024-01-04 DIAGNOSIS — D50.9 IRON DEFICIENCY ANEMIA, UNSPECIFIED IRON DEFICIENCY ANEMIA TYPE: ICD-10-CM

## 2024-01-04 PROCEDURE — 96374 THER/PROPH/DIAG INJ IV PUSH: CPT | Mod: PN

## 2024-01-04 PROCEDURE — 63600175 PHARM REV CODE 636 W HCPCS: Mod: JZ,JG,PN | Performed by: INTERNAL MEDICINE

## 2024-01-04 PROCEDURE — 25000003 PHARM REV CODE 250: Mod: PN | Performed by: INTERNAL MEDICINE

## 2024-01-04 PROCEDURE — A4216 STERILE WATER/SALINE, 10 ML: HCPCS | Mod: PN | Performed by: INTERNAL MEDICINE

## 2024-01-04 RX ORDER — SODIUM CHLORIDE 0.9 % (FLUSH) 0.9 %
10 SYRINGE (ML) INJECTION
OUTPATIENT
Start: 2024-01-04

## 2024-01-04 RX ORDER — SODIUM CHLORIDE 0.9 % (FLUSH) 0.9 %
10 SYRINGE (ML) INJECTION
Status: DISCONTINUED | OUTPATIENT
Start: 2024-01-04 | End: 2024-01-04 | Stop reason: HOSPADM

## 2024-01-04 RX ORDER — HEPARIN 100 UNIT/ML
500 SYRINGE INTRAVENOUS
OUTPATIENT
Start: 2024-01-04

## 2024-01-04 RX ORDER — DIPHENHYDRAMINE HYDROCHLORIDE 50 MG/ML
50 INJECTION, SOLUTION INTRAMUSCULAR; INTRAVENOUS ONCE AS NEEDED
OUTPATIENT
Start: 2024-01-04

## 2024-01-04 RX ORDER — EPINEPHRINE 0.3 MG/.3ML
0.3 INJECTION SUBCUTANEOUS ONCE AS NEEDED
OUTPATIENT
Start: 2024-01-04

## 2024-01-04 RX ADMIN — Medication 10 ML: at 02:01

## 2024-01-04 RX ADMIN — FERUMOXYTOL 510 MG: 510 INJECTION INTRAVENOUS at 02:01

## 2024-01-04 RX ADMIN — SODIUM CHLORIDE: 9 INJECTION, SOLUTION INTRAVENOUS at 02:01

## 2024-01-04 NOTE — PLAN OF CARE
Problem: Fatigue  Goal: Improved Activity Tolerance  Outcome: Ongoing, Progressing     Problem: Adult Inpatient Plan of Care  Goal: Plan of Care Review  Outcome: Met   Tolerated infusion without difficulty.

## 2024-01-09 LAB
OHS CV AF BURDEN PERCENT: < 1
OHS CV AF BURDEN PERCENT: < 1
OHS CV DC REMOTE DEVICE TYPE: NORMAL
OHS CV DC REMOTE DEVICE TYPE: NORMAL

## 2024-01-13 DIAGNOSIS — G43.719 INTRACTABLE CHRONIC MIGRAINE WITHOUT AURA AND WITHOUT STATUS MIGRAINOSUS: ICD-10-CM

## 2024-01-16 ENCOUNTER — CLINICAL SUPPORT (OUTPATIENT)
Dept: CARDIOLOGY | Facility: HOSPITAL | Age: 74
End: 2024-01-16
Payer: MEDICARE

## 2024-01-16 DIAGNOSIS — I49.8 OTHER SPECIFIED CARDIAC ARRHYTHMIAS: ICD-10-CM

## 2024-01-16 RX ORDER — BUTALBITAL, ACETAMINOPHEN AND CAFFEINE 50; 325; 40 MG/1; MG/1; MG/1
TABLET ORAL
Qty: 10 TABLET | Refills: 0 | Status: SHIPPED | OUTPATIENT
Start: 2024-01-16 | End: 2024-02-16 | Stop reason: SDUPTHER

## 2024-01-19 ENCOUNTER — CLINICAL SUPPORT (OUTPATIENT)
Dept: CARDIOLOGY | Facility: HOSPITAL | Age: 74
End: 2024-01-19
Attending: INTERNAL MEDICINE
Payer: MEDICARE

## 2024-01-19 PROCEDURE — 93298 REM INTERROG DEV EVAL SCRMS: CPT | Mod: ,,, | Performed by: INTERNAL MEDICINE

## 2024-01-22 LAB
OHS CV AF BURDEN PERCENT: < 1
OHS CV DC REMOTE DEVICE TYPE: NORMAL

## 2024-02-01 DIAGNOSIS — Z95.1 S/P CABG (CORONARY ARTERY BYPASS GRAFT): ICD-10-CM

## 2024-02-02 RX ORDER — EVOLOCUMAB 140 MG/ML
140 INJECTION, SOLUTION SUBCUTANEOUS
Qty: 2 EACH | Refills: 5 | Status: ACTIVE | OUTPATIENT
Start: 2024-02-02

## 2024-02-05 NOTE — TELEPHONE ENCOUNTER
No care due was identified.  [facility] Embedded Care Due Messages. Reference number: [id number].   2/05/2024 3:48:21 PM CST

## 2024-02-06 RX ORDER — PROMETHAZINE HYDROCHLORIDE 25 MG/1
25 TABLET ORAL EVERY 6 HOURS PRN
Qty: 45 TABLET | Refills: 5 | Status: SHIPPED | OUTPATIENT
Start: 2024-02-06

## 2024-02-06 NOTE — TELEPHONE ENCOUNTER
Refill Routing Note   Medication(s) are not appropriate for processing by [facility] for the following reason(s):        Outside of protocol    [facility] action(s):  Route               Appointments  past 12m or future 3m with PCP    Date Provider   Last Visit   12/12/2023 Galindo Stiles MD   Next Visit   6/26/2024 Galindo Stiles MD   ED visits in past 90 days: 0        Note composed:10:17 PM 02/05/2024

## 2024-02-09 ENCOUNTER — TELEPHONE (OUTPATIENT)
Dept: NEPHROLOGY | Facility: CLINIC | Age: 74
End: 2024-02-09
Payer: MEDICARE

## 2024-02-11 DIAGNOSIS — B02.9 HERPES ZOSTER WITHOUT COMPLICATION: ICD-10-CM

## 2024-02-11 NOTE — TELEPHONE ENCOUNTER
No care due was identified.  [facility] Embedded Care Due Messages. Reference number: [id number].   2/11/2024 8:04:31 AM CST

## 2024-02-12 RX ORDER — VALACYCLOVIR HYDROCHLORIDE 1 G/1
TABLET, FILM COATED ORAL
Qty: 7 TABLET | Refills: 11 | Status: SHIPPED | OUTPATIENT
Start: 2024-02-12

## 2024-02-12 NOTE — TELEPHONE ENCOUNTER
Refill Decision Note   Osman Johansen  is requesting a refill authorization.  Brief Assessment and Rationale for Refill:  Approve     Medication Therapy Plan:         Pharmacist review requested: Yes   Extended chart review required: Yes   Comments:     Note composed:11:39 AM 02/12/2024

## 2024-02-12 NOTE — TELEPHONE ENCOUNTER
Refill Routing Note   Medication(s) are not appropriate for processing by [facility] for the following reason(s):        Drug-disease interaction: valACYclovir and Controlled type 2 diabetes mellitus with stage 3 chronic kidney disease, without long-term current use of insulin     [facility] action(s):  Defer      Medication Therapy Plan:       Pharmacist review requested: Yes     Appointments  past 12m or future 3m with PCP    Date Provider   Last Visit   12/12/2023 Galindo Stiles MD   Next Visit   6/26/2024 Galindo Stiles MD   ED visits in past 90 days: 0        Note composed:9:21 AM 02/12/2024

## 2024-02-14 ENCOUNTER — HOSPITAL ENCOUNTER (OUTPATIENT)
Dept: CARDIOLOGY | Facility: HOSPITAL | Age: 74
Discharge: HOME | End: 2024-02-14
Attending: INTERNAL MEDICINE
Payer: MEDICARE

## 2024-02-14 ENCOUNTER — CLINICAL SUPPORT (OUTPATIENT)
Dept: CARDIOLOGY | Facility: HOSPITAL | Age: 74
End: 2024-02-14
Payer: MEDICARE

## 2024-02-14 DIAGNOSIS — I49.8 OTHER SPECIFIED CARDIAC ARRHYTHMIAS: ICD-10-CM

## 2024-02-15 ENCOUNTER — CLINICAL SUPPORT (OUTPATIENT)
Dept: CARDIOLOGY | Facility: HOSPITAL | Age: 74
End: 2024-02-15
Payer: MEDICARE

## 2024-02-15 DIAGNOSIS — I49.8 OTHER SPECIFIED CARDIAC ARRHYTHMIAS: ICD-10-CM

## 2024-02-16 DIAGNOSIS — Z95.818 PRESENCE OF OTHER CARDIAC IMPLANTS AND GRAFTS: ICD-10-CM

## 2024-02-16 DIAGNOSIS — G43.719 INTRACTABLE CHRONIC MIGRAINE WITHOUT AURA AND WITHOUT STATUS MIGRAINOSUS: ICD-10-CM

## 2024-02-16 DIAGNOSIS — I63.9 ACUTE CVA (CEREBROVASCULAR ACCIDENT): ICD-10-CM

## 2024-02-16 DIAGNOSIS — Z95.1 S/P CABG (CORONARY ARTERY BYPASS GRAFT): ICD-10-CM

## 2024-02-16 RX ORDER — CLOPIDOGREL BISULFATE 75 MG/1
75 TABLET ORAL DAILY
Qty: 90 TABLET | Refills: 1 | Status: SHIPPED | OUTPATIENT
Start: 2024-02-16 | End: 2024-02-20

## 2024-02-16 RX ORDER — BUTALBITAL, ACETAMINOPHEN AND CAFFEINE 50; 325; 40 MG/1; MG/1; MG/1
TABLET ORAL
Qty: 10 TABLET | Refills: 0 | Status: SHIPPED | OUTPATIENT
Start: 2024-02-16 | End: 2024-03-18 | Stop reason: SDUPTHER

## 2024-02-19 ENCOUNTER — HOSPITAL ENCOUNTER (OUTPATIENT)
Dept: CARDIOLOGY | Facility: HOSPITAL | Age: 74
Discharge: HOME | End: 2024-02-19
Attending: INTERNAL MEDICINE
Payer: MEDICARE

## 2024-02-19 PROCEDURE — 93298 REM INTERROG DEV EVAL SCRMS: CPT | Mod: ,,, | Performed by: INTERNAL MEDICINE

## 2024-02-20 DIAGNOSIS — Z95.818 PRESENCE OF OTHER CARDIAC IMPLANTS AND GRAFTS: ICD-10-CM

## 2024-02-20 DIAGNOSIS — Z95.1 S/P CABG (CORONARY ARTERY BYPASS GRAFT): ICD-10-CM

## 2024-02-20 DIAGNOSIS — I63.9 ACUTE CVA (CEREBROVASCULAR ACCIDENT): ICD-10-CM

## 2024-02-20 RX ORDER — CLOPIDOGREL BISULFATE 75 MG/1
75 TABLET ORAL DAILY
Qty: 90 TABLET | Refills: 0 | Status: SHIPPED | OUTPATIENT
Start: 2024-02-20 | End: 2025-02-19

## 2024-02-28 ENCOUNTER — PATIENT MESSAGE (OUTPATIENT)
Dept: ADMINISTRATIVE | Facility: OTHER | Age: 74
End: 2024-02-28
Payer: MEDICARE

## 2024-03-04 ENCOUNTER — TELEPHONE (OUTPATIENT)
Dept: CARDIOLOGY | Facility: CLINIC | Age: 74
End: 2024-03-04
Payer: MEDICARE

## 2024-03-04 DIAGNOSIS — I10 UNCONTROLLED HYPERTENSION: Primary | ICD-10-CM

## 2024-03-04 NOTE — TELEPHONE ENCOUNTER
Patient said she had high blood pressure the last 2wks. She talk to pharmacy [facility] told her to raise amlodipine to 7.5mg and take a whole Imdur tablet she also was taken 1/2 nitro tablet and that helped. I spoke with Dr. Nugent and he said it was ok to continue meds as she was told  and if she need a whole nitro tablet that is fine. He also schedule a ultrasound with her .....

## 2024-03-04 NOTE — TELEPHONE ENCOUNTER
----- Message from Veda Raya sent at 3/4/2024  3:48 PM CST -----  Type: Needs Medical Advice  Who Called:  patient  Pharmacy name and phone #:    [facility] - [place] [place] - [address]  [address]  [place] [address]  Phone: [phone] Fax: [phone]    Best Call Back Number:  [phone]  Additional Information: patient requesting a call back- her BP medicine isn't working- BP is getting out of control and the only thing lowering it is the nitro tablets

## 2024-03-11 ENCOUNTER — HOSPITAL ENCOUNTER (OUTPATIENT)
Dept: CARDIOLOGY | Facility: HOSPITAL | Age: 74
Discharge: HOME | End: 2024-03-11
Attending: INTERNAL MEDICINE
Payer: MEDICARE

## 2024-03-11 DIAGNOSIS — I10 UNCONTROLLED HYPERTENSION: ICD-10-CM

## 2024-03-11 LAB
ABDOMINAL AORTA PROX EDV: 11 CM/S
ABDOMINAL AORTA PROX PSV: 77 CM/S
LEFT RENAL DIST DIAS: 13 CM/S
LEFT RENAL DIST SYS: 70 CM/S
LEFT RENAL MID DIAS: 15 CM/S
LEFT RENAL MID SYS: 80 CM/S
LEFT RENAL ORIGIN DIAS: 10 CM/S
LEFT RENAL ORIGIN SYS: 74 CM/S
LEFT RENAL PROX DIAS: 12 CM/S
LEFT RENAL PROX RAR: 0.87
LEFT RENAL PROX SYS: 67 CM/S
LEFT RENAL ULTRASOUND ACCELERATION TIME MEASUREMENT 1: 54 MS
LEFT RENAL ULTRASOUND ACCELERATION TIME MEASUREMENT 2: 34 MS
LEFT RENAL ULTRASOUND ACCELERATION TIME MEASUREMENT 3: 37 MS
LEFT RENAL ULTRASOUND ACCELERATION TIME MEASUREMENT AVERAGE: 54 MS
LEFT RENAL ULTRASOUND KIDNEY SIZE MEASUREMENT 1: 7.87 CM
LEFT RENAL ULTRASOUND KIDNEY SIZE MEASUREMENT 2: 7.98 CM
LEFT RENAL ULTRASOUND KIDNEY SIZE MEASUREMENT 3: 7.91 CM
LEFT RENAL ULTRASOUND KIDNEY SIZE MEASUREMENT AVERAGE: 7.98 CM
LEFT RENAL ULTRASOUND RESISTIVE INDEX MEASUREMENT 1: 0.7
LEFT RENAL ULTRASOUND RESISTIVE INDEX MEASUREMENT 2: 0.75
LEFT RENAL ULTRASOUND RESISTIVE INDEX MEASUREMENT 3: 0.73
LEFT RENAL ULTRASOUND RESISTIVE INDEX MEASUREMENT AVERAGE: 0.75
OHS CV LEFT RENAL RAR: 1.04
OHS CV RIGHT RENAL RAR: 1.25
OHS CV US LEFT RENAL HIGHEST EDV: 15
OHS CV US LEFT RENAL HIGHEST PSV: 80
OHS CV US RIGHT RENAL HIGHEST EDV: 16
OHS CV US RIGHT RENAL HIGHEST PSV: 96
RIGHT RENAL DIST DIAS: 10 CM/S
RIGHT RENAL DIST SYS: 51 CM/S
RIGHT RENAL MID DIAS: 12 CM/S
RIGHT RENAL MID SYS: 84 CM/S
RIGHT RENAL ORIGIN DIAS: 16 CM/S
RIGHT RENAL ORIGIN SYS: 93 CM/S
RIGHT RENAL PROX DIAS: 12 CM/S
RIGHT RENAL PROX RAR: 1.25
RIGHT RENAL PROX SYS: 96 CM/S
RIGHT RENAL ULTRASOUND ACCELERATION TIME MEASUREMENT 1: 54 MS
RIGHT RENAL ULTRASOUND ACCELERATION TIME MEASUREMENT 2: 43 MS
RIGHT RENAL ULTRASOUND ACCELERATION TIME MEASUREMENT 3: 51 MS
RIGHT RENAL ULTRASOUND ACCELERATION TIME MEASUREMENT AVERAGE: 54 MS
RIGHT RENAL ULTRASOUND KIDNEY SIZE MEASUREMENT 1: 9.73 CM
RIGHT RENAL ULTRASOUND KIDNEY SIZE MEASUREMENT 2: 9.87 CM
RIGHT RENAL ULTRASOUND KIDNEY SIZE MEASUREMENT 3: 9.81 CM
RIGHT RENAL ULTRASOUND KIDNEY SIZE MEASUREMENT AVERAGE: 9.87 CM
RIGHT RENAL ULTRASOUND RESISTIVE INDEX MEASUREMENT 1: 0.81
RIGHT RENAL ULTRASOUND RESISTIVE INDEX MEASUREMENT 2: 0.84
RIGHT RENAL ULTRASOUND RESISTIVE INDEX MEASUREMENT 3: 0.75
RIGHT RENAL ULTRASOUND RESISTIVE INDEX MEASUREMENT AVERAGE: 0.84

## 2024-03-11 PROCEDURE — 93975 VASCULAR STUDY: CPT | Mod: PO

## 2024-03-11 PROCEDURE — 93975 VASCULAR STUDY: CPT | Mod: 26,,, | Performed by: INTERNAL MEDICINE

## 2024-03-14 RX ORDER — NITROGLYCERIN 0.4 MG/1
0.4 TABLET SUBLINGUAL EVERY 5 MIN PRN
Qty: 25 TABLET | Refills: 3 | Status: SHIPPED | OUTPATIENT
Start: 2024-03-14 | End: 2025-03-14

## 2024-03-14 NOTE — TELEPHONE ENCOUNTER
----- Message from Chandana Dowell sent at 3/14/2024  2:14 PM CDT -----  Regarding: refill  Contact: pt at [phone]  Type:  RX Refill Request    Who Called:  Osman  Refill or New Rx:  refill    RX Name and Strength:  itroGLYCERIN 4mg tablet    How is the patient currently taking it? (ex. 1XDay):  as precribed  Is this a 30 day or 90 day RX:  90-day    Preferred Pharmacy with phone number:    [facility] - [address]  [address] [facility] [address]  [place] [address]  Phone: [phone] Fax: [phone]    Local or Mail Order:  local    Ordering Provider:  Dr Nugent    [facility] Call Back Number:  [phone]    Additional Information:  pt only has 1 tab left

## 2024-03-18 DIAGNOSIS — G43.719 INTRACTABLE CHRONIC MIGRAINE WITHOUT AURA AND WITHOUT STATUS MIGRAINOSUS: ICD-10-CM

## 2024-03-18 RX ORDER — BUTALBITAL, ACETAMINOPHEN AND CAFFEINE 50; 325; 40 MG/1; MG/1; MG/1
TABLET ORAL
Qty: 10 TABLET | Refills: 0 | Status: SHIPPED | OUTPATIENT
Start: 2024-03-18 | End: 2024-04-18 | Stop reason: SDUPTHER

## 2024-03-20 ENCOUNTER — OFFICE VISIT (OUTPATIENT)
Dept: NEUROLOGY | Facility: CLINIC | Age: 74
End: 2024-03-20
Payer: MEDICARE

## 2024-03-20 VITALS
DIASTOLIC BLOOD PRESSURE: 61 MMHG | WEIGHT: 137.81 LBS | SYSTOLIC BLOOD PRESSURE: 137 MMHG | HEART RATE: 64 BPM | HEIGHT: 61 IN | TEMPERATURE: 98 F | RESPIRATION RATE: 17 BRPM | BODY MASS INDEX: 26.02 KG/M2

## 2024-03-20 DIAGNOSIS — N18.30 CONTROLLED TYPE 2 DIABETES MELLITUS WITH STAGE 3 CHRONIC KIDNEY DISEASE, WITHOUT LONG-TERM CURRENT USE OF INSULIN: ICD-10-CM

## 2024-03-20 DIAGNOSIS — M79.18 CERVICAL MYOFASCIAL PAIN SYNDROME: ICD-10-CM

## 2024-03-20 DIAGNOSIS — E11.22 CONTROLLED TYPE 2 DIABETES MELLITUS WITH STAGE 3 CHRONIC KIDNEY DISEASE, WITHOUT LONG-TERM CURRENT USE OF INSULIN: ICD-10-CM

## 2024-03-20 DIAGNOSIS — G43.719 INTRACTABLE CHRONIC MIGRAINE WITHOUT AURA AND WITHOUT STATUS MIGRAINOSUS: ICD-10-CM

## 2024-03-20 DIAGNOSIS — G44.86 CERVICOGENIC HEADACHE: Primary | ICD-10-CM

## 2024-03-20 PROCEDURE — 99999 PR PBB SHADOW E&M-EST. PATIENT-LVL III: CPT | Mod: PBBFAC,,, | Performed by: NURSE PRACTITIONER

## 2024-03-20 PROCEDURE — 99213 OFFICE O/P EST LOW 20 MIN: CPT | Mod: PBBFAC,PO | Performed by: NURSE PRACTITIONER

## 2024-03-20 PROCEDURE — 99214 OFFICE O/P EST MOD 30 MIN: CPT | Mod: S$PBB,,, | Performed by: NURSE PRACTITIONER

## 2024-03-20 RX ORDER — TIZANIDINE 4 MG/1
TABLET ORAL
Qty: 30 TABLET | Refills: 11 | Status: SHIPPED | OUTPATIENT
Start: 2024-03-20

## 2024-03-20 NOTE — PATIENT INSTRUCTIONS
Please call our clinic at [phone] or send a message on the [facility] portal if there are any changes to the plan described below, for example,if you are not contacted for the requested tests, referral(s) within one week, if you are unable to receive the medications prescribed, or if you feel you need to change the treatment course for any reason.     TESTING:  -- none at this time    REFERRALS:  -- continue with pain management   -- consider PT or trusted chiropractor     PREVENTION (use daily regardless of headache):  -- continue current medications  -- can consider Botox in the future    AS-NEEDED TREATMENT (use total no more than 10 days per month unless otherwise stated):  -- continue Fioricet with next severe attack. You can repeat two hours later if needed.   -- TRIAL tizanidine at night. This is a muscle relaxer and it will also make you potentially sleepy. Start with half a tablet to see how you respond but can take up to a whole tablet if needed. Do not take with Robaxin
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
## 2024-03-20 NOTE — ASSESSMENT & PLAN NOTE
She is allergic to medications we would use to treat her headaches. Recommend follow up with PM for injections. We can add PT but she will see PM first.

## 2024-03-21 ENCOUNTER — HOSPITAL ENCOUNTER (OUTPATIENT)
Dept: CARDIOLOGY | Facility: HOSPITAL | Age: 74
Discharge: HOME | End: 2024-03-21
Attending: INTERNAL MEDICINE
Payer: MEDICARE

## 2024-03-21 ENCOUNTER — CLINICAL SUPPORT (OUTPATIENT)
Dept: CARDIOLOGY | Facility: HOSPITAL | Age: 74
End: 2024-03-21
Payer: MEDICARE

## 2024-03-21 DIAGNOSIS — I49.8 OTHER SPECIFIED CARDIAC ARRHYTHMIAS: ICD-10-CM

## 2024-03-21 PROCEDURE — 93298 REM INTERROG DEV EVAL SCRMS: CPT | Mod: 26,,, | Performed by: INTERNAL MEDICINE

## 2024-03-25 ENCOUNTER — PATIENT MESSAGE (OUTPATIENT)
Dept: ADMINISTRATIVE | Facility: OTHER | Age: 74
End: 2024-03-25
Payer: MEDICARE

## 2024-03-26 LAB
OHS CV AF BURDEN PERCENT: < 1
OHS CV DC REMOTE DEVICE TYPE: NORMAL

## 2024-04-15 ENCOUNTER — OFFICE VISIT (OUTPATIENT)
Dept: PAIN MEDICINE | Facility: CLINIC | Age: 74
End: 2024-04-15
Payer: MEDICARE

## 2024-04-15 VITALS
BODY MASS INDEX: 26.43 KG/M2 | SYSTOLIC BLOOD PRESSURE: 163 MMHG | TEMPERATURE: 98 F | HEART RATE: 56 BPM | WEIGHT: 139.75 LBS | RESPIRATION RATE: 18 BRPM | DIASTOLIC BLOOD PRESSURE: 73 MMHG | OXYGEN SATURATION: 98 %

## 2024-04-15 DIAGNOSIS — M54.2 CERVICALGIA: ICD-10-CM

## 2024-04-15 DIAGNOSIS — M54.9 DORSALGIA, UNSPECIFIED: ICD-10-CM

## 2024-04-15 DIAGNOSIS — M79.18 MYOFASCIAL PAIN: Primary | ICD-10-CM

## 2024-04-15 DIAGNOSIS — M54.17 LUMBOSACRAL RADICULOPATHY: ICD-10-CM

## 2024-04-15 DIAGNOSIS — M54.12 CERVICAL RADICULOPATHY: ICD-10-CM

## 2024-04-15 PROCEDURE — 99214 OFFICE O/P EST MOD 30 MIN: CPT | Mod: S$PBB,,, | Performed by: NURSE PRACTITIONER

## 2024-04-15 PROCEDURE — 99214 OFFICE O/P EST MOD 30 MIN: CPT | Mod: PBBFAC | Performed by: NURSE PRACTITIONER

## 2024-04-15 PROCEDURE — 99999 PR PBB SHADOW E&M-EST. PATIENT-LVL IV: CPT | Mod: PBBFAC,,, | Performed by: NURSE PRACTITIONER

## 2024-04-15 NOTE — PROGRESS NOTES
Chronic Pain-Established Note (Follow up visit)         SUBJECTIVE:    Interval History 4/8/2024:  The patient is here for increased neck and back pain. She reports an injury one week ago in which she had sudden onset of worsened neck and back pain. The neck pain radiates into the upper extremities. She has intermittent tingling. Her back pain is radiating into the buttocks and posterior thighs. She reports intermittent weakness as well. No bowel or bladder incontinence. Her pain today is 10/10.    Interval History 8/29/2023:  The patient is here for neck and head pain. She had trigger point injections at last OV with significant benefit for almost 2 months. She was recently seen by neurology and started on Fioricet which does help. They recommended repeat TPIs with me today. She is also having more middle and lower back pain recently. She has tried stretching without much benefit. No significant leg pain. Blood pressure is fairly well controlled today. Her pain today is 3/10.    Interval History 6/9/2023:  The patient presents for follow up of back and neck pain. She has been having increased pain over the past 2 weeks. She has radiation down the back/side of the left leg which is burning in nature. She has been having more muscle pain and tightness. She has had TPIs in the past with benefit. She would like to repeat today. She is also interested in scheduling a lumbar procedure. Her pain today is 5/10.    Interval History 11/8/2022:  The patient present today for increased muscle pain. At her last OV, she requested TPIs but she had a shingles outbreak. Today, she said she is clear from shingles and denies any contraindication to TPIs. However, she does have a cardiac history and blood pressure is slightly elevated today. No SOB or chest pain. No leg swelling. She report more diffuse pain recently, mainly to the neck, mid back, lower back, hips and knees. No new injury or trauma.     Interval History  10/18/2022:  The patient is here for increased back pain. The pain is tight in nature without radiation. She originally wanted TPIs today. However, she had a flu shot yesterday, Repatha 2 days ago and has a current shingles outbreak. Her pain today is 2/10.    Interval History 8/3/2021:  The patient presents to clinic today with return of neck pain. She states that since her TPI at last visit that she had complete resolution of sxs until about 10d ago, which saw the return of her neck pain and gluteal sxs same as prior to TPI. She would like TPI again today as it has brought her significant relief in the past. She endorses continuation of her chronic back pain. Her pain today is 7/10.    Interval History 3/12/2021:  The patient has a follow up today for increased neck pain. She is having aching pain across the neck with associated headaches. She is also having middle back tightness. She is not having radiation into the arms or numbness. She would like trigger point injections as these have been helpful in the past. She previously was seeing neurology but was lost to follow up. She denies nausea or vomiting currently. She had a cholecystectomy on 2/10/21 from which she has recovered well. She had her first Covid vaccine on 2/3/21 and is scheduled for the next on 2/24/21. She also has a history of chronic back pain. Her pain today is 7/10.    Interval History 7/22/2020:  The patient has an audio visit today to discuss back pain.  She had TPIs at last OV which gave her some short term benefit.  She was scheduled for facet injections which she cancelled.  She would like to update her imaging prior to another procedure.  Her pain today is 6/10.    Interval History 7/7/2020:  The patient is here for follow up of back pain.  She has been doing well until the past month or so.  She is having more middle and lower back pain.  She is not having any radiation into the legs at this time.  She describes the pain as aching and  stabbing.  She has been stretching and walking at home.  Her pain today is 4/10.    Interval History 1/20/2020:  The patient returns for follow up of middle and lower back pain.  She is now s/p bilateral L3-4 and L4-5 facet joint injections as well as T7-8 interspinous ligament injection on 12/16/19 with about 50% relief.  She is still having right sided lower back pain which starts at the spine and often radiates to her hip.  She denies associated numbness.  She says that the area is tender to touch and feels swollen sometimes.  She has tried ice and heat without benefit.  Her pain today is 2/10.    Interval History 12/13/2019:  The patient is here for follow up of back pain.  She is s/p Bilateral L2,3,4,5 MBB with steroids.  She is reporting limited benefit this time.  Previous provided her significant benefit.  She is having pain across the lower back, worse on the left side.  She denies radiation into the legs.  She is also having middle thoracic pain.  This does not radiate to the front of her body.  She is not having any numbness.  She would like to schedule another procedure.  Her pain today is 5/10.    Previous Encounter:  Osman Johansen presents to the clinic for a follow-up appointment for lower back pain. She reports increased low back pain over the last few weeks. She describes this pain as constant, sharp, and aching. She denies any radiating leg pain. She previously had 90% relief of her pain with bilateral L2,3,4,5 MBB and T7-8 interspinous ligament injection, last done in August 2018. She would like to repeat this procedure. She continues to participate in a home exercise routine. She denies any other health changes. Her pain today is 5/10.    Pain Disability Index Review:      4/15/2024     8:44 AM 8/29/2023     2:49 PM 11/8/2022     1:43 PM   Last 3 PDI Scores   Pain Disability Index (PDI) 61 [phone] 8       Pain Medications:  None    Opioid Contract: no     report:  Reviewed and consistent with  medication use as prescribed.    Pain Procedures:   7/18/13 Bilateral L5 TF JOCE  10/28/13 Bilateral SI joint injection  4/20/15 Bilateral SI joint injection  12/15/16 Bilateral L3-4 facet joint injections- 100% relief for 6 weeks  3/13/17 Bilateral L3-4 facet joint injections- 30% relief  4/17/17 Bilateral L2,3,4,5 MBB with steroids- significant benefit for 7 months  12/14/17 Bilateral L2,3,4,5 MBB with steroids and Interspinal ligament injection- 90% relief for 7 months  8/21/2018- Bilateral L2,3,4,5 MBB with steroids and T7-8 interspinous ligament injections   11/18/19 Bilateral L2,3,4,5 MBB with steroids  12/16/19 bilateral L3-4 and L4-5 facet joint injections as well as T7-8 interspinous ligament injection- 50% relief  3/12/21 bilateral cervical and paraspinal TPI, R gluteal TPI x1 (5 sites total)    Physical Therapy/Home Exercise: per self does stretching    Imaging:     Lumbar MRI 12/10/16    Narrative   MRI LUMBAR SPINE WITHOUT CONTRAST    COMPARISON: None    TECHNIQUE:  Sagittal T1, T2, and STIR;  axial T1 and T2 sequences through the lumbar spine were acquired without contrast.    FINDINGS: Vertebral body height and alignment are within normal limits.  The conus terminates at T12-L1.  Moderate loss of disc height is present at L4-5.  Marrow signal is mildly heterogeneous.  No focal osseous lesion.    L1-L2:  No disc herniation. No spinal canal or neuroforaminal narrowing.    L2-L3:  Mild diffuse disc bulging is present without spinal canal or neuroforaminal narrowing.    L3-L4:  Mild diffuse disc bulging and moderate bilateral facet arthropathy result in mild spinal canal narrowing.    L4-L5:  Mild diffuse disc bulging is present without spinal canal or neuroforaminal narrowing.    L5-S1:  No disc herniation. No spinal canal or neuroforaminal narrowing.    Several small gallstones noted.   Impression     Degenerative lumbar spondylosis with mild L3-4 spinal canal narrowing and no significant neuroforaminal  narrowing.  Cholelithiasis       Narrative     MRI of the thoracic spine without contrast    Technique: Multiplanar multisequence MR imaging of the thoracic spine was performed without contrast.    Findings: There is mild levoscoliosis of the lumbar spine.  This may be positional.  Vertebral bodies otherwise demonstrate normal height alignment.  The marrow signal of the vertebral bodies is within normal limits.  There is mild disc desiccation noted throughout the thoracic spine.  No significant disc space narrowing.  No significant disc protrusions are identified.  The central canal is widely patent.  No significant spondylosis.  The cord is normal in signal and caliber.      Impression         1.  Mild levoscoliosis of the thoracic spine otherwise essentially unremarkable MRI of the thoracic spine.     Narrative & Impression  EXAMINATION:  XR HIPS BILATERAL 2 VIEW INCL AP PELVIS     CLINICAL HISTORY:  Pain in right hip     TECHNIQUE:  AP view of the pelvis and frogleg lateral views of both hips were performed.     COMPARISON:  Hip and pelvic x-ray of April 19, 2011     FINDINGS:  A fracture of either hip is not seen.  No dislocation is noted.  The acetabula are well maintained.  There is sclerosis adjacent to the inferior left sacroiliac joint unchanged from the prior study and consistent with chronic sacroiliitis.  Degenerative disc disease is seen at the lower lumbar spine.     Impression:     Chronic left sacroiliitis.  Degenerative disc disease at L4-5.  Otherwise negative radiographs of both hips and pelvis.      CMP  Sodium   Date Value Ref Range Status   11/29/2023 140 136 - 145 mmol/L Final     Potassium   Date Value Ref Range Status   11/29/2023 5.2 (H) 3.5 - 5.1 mmol/L Final     Chloride   Date Value Ref Range Status   11/29/2023 105 95 - 110 mmol/L Final     CO2   Date Value Ref Range Status   11/29/2023 23 23 - 29 mmol/L Final     Glucose   Date Value Ref Range Status   11/29/2023 92 70 - 110 mg/dL  Final     BUN   Date Value Ref Range Status   11/29/2023 17 8 - 23 mg/dL Final     Creatinine   Date Value Ref Range Status   11/29/2023 1.0 0.5 - 1.4 mg/dL Final     Calcium   Date Value Ref Range Status   11/29/2023 9.5 8.7 - 10.5 mg/dL Final     Total Protein   Date Value Ref Range Status   11/29/2023 7.0 6.0 - 8.4 g/dL Final     Albumin   Date Value Ref Range Status   11/29/2023 3.7 3.5 - 5.2 g/dL Final     Total Bilirubin   Date Value Ref Range Status   11/29/2023 0.3 0.1 - 1.0 mg/dL Final     Comment:     For infants and newborns, interpretation of results should be based  on gestational age, weight and in agreement with clinical  observations.    Premature Infant recommended reference ranges:  Up to 24 hours.............<8.0 mg/dL  Up to 48 hours............<12.0 mg/dL  3-5 days..................<15.0 mg/dL  6-29 days.................<15.0 mg/dL       Alkaline Phosphatase   Date Value Ref Range Status   11/29/2023 74 55 - 135 U/L Final     AST   Date Value Ref Range Status   11/29/2023 19 10 - 40 U/L Final     ALT   Date Value Ref Range Status   11/29/2023 8 (L) 10 - 44 U/L Final     Anion Gap   Date Value Ref Range Status   11/29/2023 12 8 - 16 mmol/L Final     eGFR if    Date Value Ref Range Status   07/27/2022 29.7 (A) >60 mL/min/1.73 m^2 Final     eGFR if non    Date Value Ref Range Status   07/27/2022 25.8 (A) >60 mL/min/1.73 m^2 Final     Comment:     Calculation used to obtain the estimated glomerular filtration  rate (eGFR) is the CKD-EPI equation.        Lab Results   Component Value Date    WBC 7.20 12/12/2023    HGB 10.1 (L) 12/12/2023    HCT 31.7 (L) 12/12/2023    MCV 95 12/12/2023     12/12/2023         Allergies:   Review of patient's allergies indicates:   Allergen Reactions    Pcn [penicillins] Swelling    Toradol [ketorolac] Swelling    Vibramycin [doxycycline calcium] Swelling    Cymbalta [duloxetine]      dizzyness    Elavil [amitriptyline]     Lyrica  [pregabalin] Swelling    Seroquel [quetiapine]      restless leg symdrome       Current Medications:   Current Outpatient Medications   Medication Sig Dispense Refill    amLODIPine (NORVASC) 2.5 MG tablet Take 3 tablets (7.5 mg total) by mouth once daily. 90 tablet 1    bempedoic acid (NEXLETOL) 180 mg Tab Take 1 tablet (180 mg total) by mouth Daily. 90 tablet 1    benazepriL (LOTENSIN) 20 MG tablet Take 1 tablet (20 mg total) by mouth 2 (two) times daily. 180 tablet 1    butalbital-acetaminophen-caffeine -40 mg (FIORICET, ESGIC) -40 mg per tablet 1 tab PO PRN migraine. No more than 10 tab per month. 10 tablet 0    CALCIUM CARBONATE/VITAMIN D3 (CALCIUM 500 WITH D ORAL) Take 1 capsule by mouth once daily.      carboxymethylcellulose (REFRESH PLUS) 0.5 % Dpet Place 1 drop into both eyes daily as needed (DRY EYES).      clopidogreL (PLAVIX) 75 mg tablet Take 1 tablet (75 mg total) by mouth once daily. 90 tablet 0    co-enzyme Q-10 30 mg capsule Take 30 mg by mouth 3 (three) times daily.      dicyclomine (BENTYL) 10 MG capsule Take 1 capsule (10 mg total) by mouth 4 (four) times daily as needed (stomach discomfort). 120 capsule 0    esomeprazole (NEXIUM) 40 MG capsule Take 1 capsule (40 mg total) by mouth before breakfast. 90 capsule 3    evolocumab (REPATHA SURECLICK) 140 mg/mL PnIj Inject 1 mL (140 mg total) into the skin every 14 (fourteen) days. 2 each 5    fluticasone propionate (FLONASE) 50 mcg/actuation nasal spray 1 spray (50 mcg total) by Each Nostril route once daily. (Patient taking differently: 1 spray by Each Nostril route 2 (two) times a day.) 32 g 2    garlic (GARLIQUE ORAL) Take 1 capsule by mouth Daily.      isosorbide mononitrate (IMDUR) 30 MG 24 hr tablet Take 1 tablet (30 mg total) by mouth once daily. 90 tablet 1    methocarbamoL (ROBAXIN) 750 MG Tab Take 2 tablets (1,500 mg total) by mouth 4 (four) times daily. 60 tablet 3    MULTIVITAMIN (MULTIPLE VITAMIN ORAL) Take 1 capsule by mouth  once daily.      nitroGLYCERIN (NITROSTAT) 0.4 MG SL tablet Place 1 tablet (0.4 mg total) under the tongue every 5 (five) minutes as needed for Chest pain. 25 tablet 3    nitroGLYCERIN 0.2 mg/hr TD PT24 (NITRODUR) 0.2 mg/hr Place 1 patch onto the skin once daily. 30 patch 3    oxybutynin (DITROPAN XL) 15 MG TR24 Take 1 tablet (15 mg total) by mouth every morning. 90 tablet 3    promethazine (PHENERGAN) 25 MG tablet TAKE ONE TABLET BY MOUTH EVERY 6 HOURS AS NEEDED FOR NAUSEA 45 tablet 5    sennosides (LAXATIVE, SENNOSIDES,) 25 mg Tab Take by mouth.      SUBOXONE 8-2 mg Place 1 each under the tongue daily as needed (restless legs).      SYNTHROID 100 mcg tablet Take 1 tablet (100 mcg total) by mouth before breakfast. **BRAND MEDICALLY NECESSARY** 90 tablet 3    tiZANidine (ZANAFLEX) 4 MG tablet Half or full tablet by mouth at night as needed for muscle spasm 30 tablet 11    valACYclovir (VALTREX) 1000 MG tablet TAKE ONE TABLET BY MOUTH EVERY DAY FOR 7 DAYS 7 tablet 11    aspirin (ECOTRIN) 81 MG EC tablet Take 1 tablet (81 mg total) by mouth once daily. for 21 days       Current Facility-Administered Medications   Medication Dose Route Frequency Provider Last Rate Last Admin    sodium chloride 0.9% flush 10 mL  10 mL Intravenous PRN Sourav Nugent MD         Facility-Administered Medications Ordered in Other Visits   Medication Dose Route Frequency Provider Last Rate Last Admin    lactated ringers infusion   Intravenous Continuous Jim Mcdonough MD   Stopped at 06/27/22 [id number]    LIDOcaine (PF) 10 mg/ml (1%) injection 10 mg  1 mL Intradermal Once Jim Mcdonough MD           REVIEW OF SYSTEMS:    GENERAL:  No weight loss, malaise or fevers.  HEENT:  Positive for frequent headaches- followed by neurology.  NECK:  Negative for lumps, goiter, pain and significant neck swelling.  RESPIRATORY:  Negative for cough, wheezing or shortness of breath.  CARDIOVASCULAR:  Negative for chest pain, leg swelling or  palpitations.  GI:  Negative for abdominal discomfort, blood in stools or black stools or change in bowel habits. GERD.  MUSCULOSKELETAL:  See HPI.  SKIN:  Negative for lesions, rash, and itching.  PSYCH: H/O anxiety and opioid dependence.  HEMATOLOGY/LYMPHOLOGY:  On Plavix.  NEURO:   No history of syncope, paralysis, seizures or tremors.  All other reviewed and negative other than HPI.    Past Medical History:  Past Medical History:   Diagnosis Date    Allergy     Anemia     Anxiety     Arthritis     Blood transfusion 8 yrs    CAD (coronary artery disease)     Cataract     Chronic diastolic CHF (congestive heart failure)     CKD (chronic kidney disease), stage II     COPD (chronic obstructive pulmonary disease)     mild no inhalers    Degenerative disc disease     Depression     Dry eye syndrome     Fibromyalgia     Fluid overload     GERD (gastroesophageal reflux disease)     Headache     Hypercholesterolemia 12/09/2019    Hypertension     Hypothyroidism     IBS (irritable bowel syndrome)     Lower extremity edema     Macular drusen     Neuromuscular disorder     Ocular hypertension, bilateral     Osteoporosis     Pleural effusion     PUD (peptic ulcer disease)     Restless leg     Uses Suboxone to control    Sleep apnea     cpap    Stroke     2 strokes-after CABG    Thoracic or lumbosacral neuritis or radiculitis 10/19/2012    Thyroid disease     hashimoto's       Past Surgical History:  Past Surgical History:   Procedure Laterality Date    ADENOIDECTOMY  1955    ANGIOGRAM, CORONARY, WITH LEFT HEART CATHETERIZATION  10/26/2023    Procedure: Left Heart Cath;  Surgeon: Sourav Nugent MD;  [facility];  Service: Cardiology;;    APPENDECTOMY  1965    ARTERIOGRAPHY OF AORTIC ROOT  10/26/2023    Procedure: AO Root;  Surgeon: Sourav Nugent MD;  Location: [facility];  Service: Cardiology;;    CARDIAC CATHETERIZATION      CATARACT EXTRACTION W/  INTRAOCULAR LENS IMPLANT Right 05/24/2021    Procedure: EXTRACTION,  CATARACT, WITH IOL INSERTION;  Surgeon: Bebe Lynn MD;  Location: [facility] OR;  Service: Ophthalmology;  Laterality: Right;  Right/DM    CHOLECYSTECTOMY  2021    CORONARY ANGIOGRAPHY N/A 05/02/2022    Procedure: ANGIOGRAM, CORONARY ARTERY;  Surgeon: Sourav Nugent MD;  Location: [facility];  Service: Cardiology;  Laterality: N/A;    CORONARY ANGIOGRAPHY  11/9/2023    Procedure: Coronary angiogram study;  Surgeon: Sourav Nugent MD;  Location: [facility];  Service: Cardiology;;    CORONARY ARTERY BYPASS GRAFT      CORONARY ARTERY BYPASS GRAFT (CABG) N/A 06/27/2022    Procedure: CORONARY ARTERY BYPASS GRAFT (CABG) X 5;  Surgeon: Talib Torres MD;  Location: [facility] OR;  Service: Cardiovascular;  Laterality: N/A;    CORONARY BYPASS GRAFT ANGIOGRAPHY  10/26/2023    Procedure: Bypass graft study;  Surgeon: Sourav Nugent MD;  Location: [facility];  Service: Cardiology;;    ENDOSCOPIC HARVEST OF VEIN Left 06/27/2022    Procedure: SURGICAL PROCUREMENT, VEIN, ENDOSCOPIC;  Surgeon: Talib Torres MD;  Location: [facility] OR;  Service: Cardiovascular;  Laterality: Left;    EYE SURGERY  2021    Cataract surgery    HYSTERECTOMY  8 yrs     INJECTION OF ANESTHETIC AGENT AROUND NERVE Bilateral 08/21/2018    Procedure: BLOCK, NERVE;  Surgeon: Talia Belcher MD;  Location: [facility]IN [facility]GT;  Service: Pain Management;  Laterality: Bilateral;  Bilateral block @ L2,3,4,5      INJECTION OF ANESTHETIC AGENT AROUND NERVE Bilateral 11/18/2019    Procedure: BLOCK, NERVE, L2-L3-L4-L5 ME DIAL BRANCH;  Surgeon: Talia Belcher MD;  Location: [facility];  Service: Pain Management;  Laterality: Bilateral;    INJECTION OF FACET JOINT Bilateral 12/16/2019    Procedure: INJECTION, FACET JOINT, L3-L4 AND L4-L5 AND T7-T8 LIGAMENT;  Surgeon: Talia Belcher MD;  Location: [facility];  Service: Pain Management;  Laterality: Bilateral;    INSERTION OF IMPLANTABLE LOOP RECORDER Left 07/25/2022    Procedure: Insertion, Implantable Loop Recorder;  Surgeon:  Sourav Nugent MD;  Location: [facility];  Service: Cardiology;  Laterality: Left;    LAPAROSCOPIC CHOLECYSTECTOMY N/A 02/10/2021    Procedure: CHOLECYSTECTOMY, LAPAROSCOPIC;  Surgeon: John Morrow MD;  Location: [facility] OR;  Service: General;  Laterality: N/A;    LEFT HEART CATHETERIZATION Left 2022    Procedure: Left heart cath;  Surgeon: Sourav Nugent MD;  Location: [facility];  Service: Cardiology;  Laterality: Left;    LEFT HEART CATHETERIZATION Left 10/26/2023    Procedure: Left heart cath;  Surgeon: Sourav Nugent MD;  Location: [facility];  Service: Cardiology;  Laterality: Left;    PERCUTANEOUS CORONARY INTERVENTION, ARTERY  2023    Procedure: DANILO LCX;  Surgeon: Sourav Nugent MD;  Location: [facility];  Service: Cardiology;;    SINUS SURGERY      x2-one for CSF leak    TONSILLECTOMY         Family History:  Family History   Problem Relation Name Age of Onset    Ulcers Father      Arthritis Mother Azucena Hewitt     Cancer Mother Azucena Hewitt     Heart disease Mother Azucena Hewitt     Hypertension Mother Azucena Hewitt     Cataracts Mother Azucena Hewitt     Ovarian cancer Mother Azucena Hewitt     Thyroid disease Brother      Heart disease Brother      Stroke Paternal Aunt Jermaine kim             Amblyopia Neg Hx      Blindness Neg Hx      Diabetes Neg Hx      Glaucoma Neg Hx      Macular degeneration Neg Hx      Retinal detachment Neg Hx      Strabismus Neg Hx      Breast cancer Neg Hx         Social History:  Social History     Socioeconomic History    Marital status:    Tobacco Use    Smoking status: Former     Current packs/day: 0.00     Types: Cigarettes     Quit date: 2008     Years since quittin.2    Smokeless tobacco: Never   Substance and Sexual Activity    Alcohol use: Yes     Comment: rarely    Drug use: No    Sexual activity: Yes     Partners: Male     Social Determinants of Health     Financial Resource Strain: Low Risk  (2023)    Overall Financial Resource Strain  (CARDIA)     Difficulty of Paying Living Expenses: Not hard at all   Food Insecurity: No Food Insecurity (11/26/2023)    Hunger Vital Sign     Worried About Running Out of Food in the Last Year: Never true     Ran Out of Food in the Last Year: Never true   Transportation Needs: No Transportation Needs (11/26/2023)    PRAPARE - Transportation     Lack of Transportation (Medical): No     Lack of Transportation (Non-Medical): No   Physical Activity: Insufficiently Active (11/26/2023)    Exercise Vital Sign     Days of Exercise per Week: 3 days     Minutes of Exercise per Session: 30 min   Stress: Stress Concern Present (11/26/2023)    [employer] [place] [employer] - Occupational Stress Questionnaire     Feeling of Stress : To some extent   Social Connections: Unknown (11/26/2023)    Social Connection and Isolation Panel [NHANES]     Frequency of Communication with Friends and Family: More than three times a week     Frequency of Social Gatherings with Friends and Family: Three times a week     Active Member of Clubs or Organizations: Yes     Attends Club or Organization Meetings: More than 4 times per year     Marital Status:    Housing Stability: Unknown (11/26/2023)    Housing Stability Vital Sign     Unable to Pay for Housing in the Last Year: No     Unstable Housing in the Last Year: No       OBJECTIVE:    BP (!) 163/73   Pulse (!) 56   Temp 98.2 °F (36.8 °C)   Resp 18   Wt 63.4 kg (139 lb 12.4 oz)   SpO2 98%   BMI 26.43 kg/m²     PHYSICAL EXAMINATION     General appearance: Well appearing, in no acute distress, alert and oriented x3.  Psych:  Mood and affect appropriate.  Skin: Midline chest scar.   Neck: TTP over cervical and thoracic paraspinals as well as trapezius muscles. Full ROM with pain on extension and lateral rotation. Positive facet loading. Spurling is positive on the right.  Back: Straight leg raise in the sitting position is negative for radicular pain bilaterally. Positive  facet loading bilaterally. Limited ROM with pain on flexion and extension.  Extremities: Peripheral joint ROM is full and pain free without obvious instability or laxity in all four extremities. No deformities, edema, or skin discoloration. Good capillary refill.  Musculoskeletal: Bilateral upper and lower extremity strength is normal and symmetric.  No atrophy or tone abnormalities are noted.   Neuro: No loss of sensation noted.  Gait: Normal.    ASSESSMENT: 74 y.o. year old female with neck and back pain, consistent with the following diagnoses:     1. Myofascial pain        2. Lumbosacral radiculopathy        3. Dorsalgia, unspecified  MRI Lumbar Spine Without Contrast    MRI Lumbar Spine Without Contrast              PLAN:     - Previous imaging was reviewed and discussed with the patient today.    - Ordered updated cervical and lumbar MRI. External order provided today as she is claustrophobic.     - Encouraged the patient to resume a home exercise routine to help with pain and strengthening.     - Counseled patient regarding the importance of constant sleeping habits and physical therapy.    - F/U in 2-3 weeks for imaging review. Consider interventional procedures. She is aware to obtain the imaging on disc and bring to appointment.        The above plan and management options were discussed at length with patient. Patient is in agreement with the above and verbalized understanding.    Meche Clinton  04/15/2024

## 2024-04-18 ENCOUNTER — PATIENT MESSAGE (OUTPATIENT)
Dept: FAMILY MEDICINE | Facility: CLINIC | Age: 74
End: 2024-04-18
Payer: MEDICARE

## 2024-04-18 DIAGNOSIS — G43.719 INTRACTABLE CHRONIC MIGRAINE WITHOUT AURA AND WITHOUT STATUS MIGRAINOSUS: ICD-10-CM

## 2024-04-18 RX ORDER — BUTALBITAL, ACETAMINOPHEN AND CAFFEINE 50; 325; 40 MG/1; MG/1; MG/1
TABLET ORAL
Qty: 10 TABLET | Refills: 0 | Status: SHIPPED | OUTPATIENT
Start: 2024-04-18 | End: 2024-05-15 | Stop reason: SDUPTHER

## 2024-04-21 ENCOUNTER — HOSPITAL ENCOUNTER (OUTPATIENT)
Dept: CARDIOLOGY | Facility: HOSPITAL | Age: 74
Discharge: HOME | End: 2024-04-21
Attending: INTERNAL MEDICINE

## 2024-04-21 ENCOUNTER — CLINICAL SUPPORT (OUTPATIENT)
Dept: CARDIOLOGY | Facility: HOSPITAL | Age: 74
End: 2024-04-21
Payer: MEDICARE

## 2024-04-21 DIAGNOSIS — I49.8 OTHER SPECIFIED CARDIAC ARRHYTHMIAS: ICD-10-CM

## 2024-04-21 PROCEDURE — 93298 REM INTERROG DEV EVAL SCRMS: CPT | Mod: 26,,, | Performed by: INTERNAL MEDICINE

## 2024-04-25 ENCOUNTER — PATIENT MESSAGE (OUTPATIENT)
Dept: ADMINISTRATIVE | Facility: OTHER | Age: 74
End: 2024-04-25
Payer: MEDICARE

## 2024-04-30 DIAGNOSIS — M54.50 CHRONIC MIDLINE LOW BACK PAIN WITHOUT SCIATICA: ICD-10-CM

## 2024-04-30 DIAGNOSIS — G89.29 CHRONIC MIDLINE LOW BACK PAIN WITHOUT SCIATICA: ICD-10-CM

## 2024-04-30 LAB
OHS CV AF BURDEN PERCENT: < 1
OHS CV DC REMOTE DEVICE TYPE: NORMAL

## 2024-04-30 NOTE — TELEPHONE ENCOUNTER
Refill Routing Note   Medication(s) are not appropriate for processing by [facility] for the following reason(s):        Outside of protocol    [facility] action(s):  Route               Appointments  past 12m or future 3m with PCP    Date Provider   Last Visit   12/12/2023 Galindo Stiles MD   Next Visit   6/26/2024 Galindo Stiles MD   ED visits in past 90 days: 0        Note composed:8:56 AM 04/30/2024           Statement Selected

## 2024-05-01 RX ORDER — METHOCARBAMOL 750 MG/1
750 TABLET, FILM COATED ORAL 4 TIMES DAILY
Qty: 40 TABLET | Refills: 3 | Status: SHIPPED | OUTPATIENT
Start: 2024-05-01

## 2024-05-15 DIAGNOSIS — I10 ESSENTIAL (PRIMARY) HYPERTENSION: Primary | ICD-10-CM

## 2024-05-15 DIAGNOSIS — G43.719 INTRACTABLE CHRONIC MIGRAINE WITHOUT AURA AND WITHOUT STATUS MIGRAINOSUS: ICD-10-CM

## 2024-05-15 RX ORDER — BUTALBITAL, ACETAMINOPHEN AND CAFFEINE 50; 325; 40 MG/1; MG/1; MG/1
TABLET ORAL
Qty: 10 TABLET | Refills: 0 | Status: SHIPPED | OUTPATIENT
Start: 2024-05-15 | End: 2024-06-15 | Stop reason: SDUPTHER

## 2024-05-16 RX ORDER — ISOSORBIDE MONONITRATE 30 MG/1
30 TABLET, EXTENDED RELEASE ORAL
Qty: 90 TABLET | Refills: 3 | Status: SHIPPED | OUTPATIENT
Start: 2024-05-16

## 2024-05-17 ENCOUNTER — PATIENT MESSAGE (OUTPATIENT)
Dept: ADMINISTRATIVE | Facility: OTHER | Age: 74
End: 2024-05-17
Payer: MEDICARE

## 2024-05-19 ENCOUNTER — HOSPITAL ENCOUNTER (OUTPATIENT)
Dept: CARDIOLOGY | Facility: HOSPITAL | Age: 74
Discharge: HOME | End: 2024-05-19
Attending: INTERNAL MEDICINE

## 2024-05-21 ENCOUNTER — OFFICE VISIT (OUTPATIENT)
Dept: CARDIOLOGY | Facility: CLINIC | Age: 74
End: 2024-05-21
Payer: MEDICARE

## 2024-05-21 VITALS
HEIGHT: 61 IN | WEIGHT: 140.19 LBS | HEART RATE: 79 BPM | DIASTOLIC BLOOD PRESSURE: 76 MMHG | SYSTOLIC BLOOD PRESSURE: 138 MMHG | BODY MASS INDEX: 26.47 KG/M2

## 2024-05-21 DIAGNOSIS — I10 ESSENTIAL HYPERTENSION: Chronic | ICD-10-CM

## 2024-05-21 DIAGNOSIS — E78.00 HYPERCHOLESTEROLEMIA: Chronic | ICD-10-CM

## 2024-05-21 DIAGNOSIS — Z78.9 STATIN INTOLERANCE: Chronic | ICD-10-CM

## 2024-05-21 DIAGNOSIS — Z79.02 LONG TERM (CURRENT) USE OF ANTITHROMBOTICS/ANTIPLATELETS: Chronic | ICD-10-CM

## 2024-05-21 DIAGNOSIS — I77.9 MILD CAROTID ARTERY DISEASE: Chronic | ICD-10-CM

## 2024-05-21 DIAGNOSIS — I34.0 NON-RHEUMATIC MITRAL REGURGITATION: Chronic | ICD-10-CM

## 2024-05-21 DIAGNOSIS — I25.10 CORONARY ARTERY DISEASE INVOLVING NATIVE CORONARY ARTERY OF NATIVE HEART WITHOUT ANGINA PECTORIS: Primary | Chronic | ICD-10-CM

## 2024-05-21 PROBLEM — I20.89 ANGINA OF EFFORT: Status: RESOLVED | Noted: 2019-06-14 | Resolved: 2024-05-21

## 2024-05-21 PROCEDURE — 99999 PR PBB SHADOW E&M-EST. PATIENT-LVL IV: CPT | Mod: PBBFAC,,, | Performed by: INTERNAL MEDICINE

## 2024-05-21 PROCEDURE — 99214 OFFICE O/P EST MOD 30 MIN: CPT | Mod: S$PBB,,, | Performed by: INTERNAL MEDICINE

## 2024-05-21 PROCEDURE — 99214 OFFICE O/P EST MOD 30 MIN: CPT | Mod: PBBFAC,PO | Performed by: INTERNAL MEDICINE

## 2024-05-21 RX ORDER — BEMPEDOIC ACID 180 MG/1
180 TABLET, FILM COATED ORAL DAILY
Qty: 90 TABLET | Refills: 1 | Status: SHIPPED | OUTPATIENT
Start: 2024-05-21
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
## 2024-05-22 ENCOUNTER — TELEPHONE (OUTPATIENT)
Dept: CARDIOLOGY | Facility: HOSPITAL | Age: 74
End: 2024-05-22
Payer: MEDICARE

## 2024-05-22 ENCOUNTER — OFFICE VISIT (OUTPATIENT)
Dept: PAIN MEDICINE | Facility: CLINIC | Age: 74
End: 2024-05-22
Attending: ANESTHESIOLOGY
Payer: MEDICARE

## 2024-05-22 ENCOUNTER — CLINICAL SUPPORT (OUTPATIENT)
Dept: CARDIOLOGY | Facility: HOSPITAL | Age: 74
End: 2024-05-22
Payer: MEDICARE

## 2024-05-22 ENCOUNTER — HOSPITAL ENCOUNTER (OUTPATIENT)
Dept: CARDIOLOGY | Facility: HOSPITAL | Age: 74
Discharge: HOME | End: 2024-05-22
Attending: INTERNAL MEDICINE

## 2024-05-22 VITALS
WEIGHT: 140.19 LBS | BODY MASS INDEX: 26.47 KG/M2 | HEIGHT: 61 IN | DIASTOLIC BLOOD PRESSURE: 86 MMHG | HEART RATE: 74 BPM | SYSTOLIC BLOOD PRESSURE: 183 MMHG

## 2024-05-22 DIAGNOSIS — M53.3 SACROILIAC DYSFUNCTION: ICD-10-CM

## 2024-05-22 DIAGNOSIS — M79.18 MYOFASCIAL PAIN: Primary | ICD-10-CM

## 2024-05-22 DIAGNOSIS — I49.8 OTHER SPECIFIED CARDIAC ARRHYTHMIAS: ICD-10-CM

## 2024-05-22 DIAGNOSIS — M70.60 TROCHANTERIC BURSITIS, UNSPECIFIED LATERALITY: ICD-10-CM

## 2024-05-22 DIAGNOSIS — M47.816 LUMBAR SPONDYLOSIS: ICD-10-CM

## 2024-05-22 DIAGNOSIS — M47.812 CERVICAL SPONDYLOSIS: ICD-10-CM

## 2024-05-22 PROCEDURE — 93298 REM INTERROG DEV EVAL SCRMS: CPT | Mod: 26,,, | Performed by: INTERNAL MEDICINE

## 2024-05-22 PROCEDURE — 99215 OFFICE O/P EST HI 40 MIN: CPT | Mod: PBBFAC | Performed by: ANESTHESIOLOGY

## 2024-05-22 PROCEDURE — 99214 OFFICE O/P EST MOD 30 MIN: CPT | Mod: S$PBB,GC,, | Performed by: ANESTHESIOLOGY

## 2024-05-22 PROCEDURE — 99999 PR PBB SHADOW E&M-EST. PATIENT-LVL V: CPT | Mod: PBBFAC,,, | Performed by: ANESTHESIOLOGY

## 2024-05-22 NOTE — PROGRESS NOTES
Chronic Pain-Established Note (Follow up visit)         SUBJECTIVE:  Interval History 5/21/2024:  The patient is here for increased neck and back pain. She would like to do PT.    Interval History 4/8/2024:  The patient is here for increased neck and back pain. She reports an injury one week ago in which she had sudden onset of worsened neck and back pain. The neck pain radiates into the upper extremities. She has intermittent tingling. Her back pain is radiating into the buttocks and posterior thighs. She reports intermittent weakness as well. No bowel or bladder incontinence. Her pain today is 10/10.    Interval History 8/29/2023:  The patient is here for neck and head pain. She had trigger point injections at last OV with significant benefit for almost 2 months. She was recently seen by neurology and started on Fioricet which does help. They recommended repeat TPIs with me today. She is also having more middle and lower back pain recently. She has tried stretching without much benefit. No significant leg pain. Blood pressure is fairly well controlled today. Her pain today is 3/10.    Interval History 6/9/2023:  The patient presents for follow up of back and neck pain. She has been having increased pain over the past 2 weeks. She has radiation down the back/side of the left leg which is burning in nature. She has been having more muscle pain and tightness. She has had TPIs in the past with benefit. She would like to repeat today. She is also interested in scheduling a lumbar procedure. Her pain today is 5/10.    Interval History 11/8/2022:  The patient present today for increased muscle pain. At her last OV, she requested TPIs but she had a shingles outbreak. Today, she said she is clear from shingles and denies any contraindication to TPIs. However, she does have a cardiac history and blood pressure is slightly elevated today. No SOB or chest pain. No leg swelling. She report more diffuse pain recently, mainly to  the neck, mid back, lower back, hips and knees. No new injury or trauma.     Interval History 10/18/2022:  The patient is here for increased back pain. The pain is tight in nature without radiation. She originally wanted TPIs today. However, she had a flu shot yesterday, Repatha 2 days ago and has a current shingles outbreak. Her pain today is 2/10.    Interval History 8/3/2021:  The patient presents to clinic today with return of neck pain. She states that since her TPI at last visit that she had complete resolution of sxs until about 10d ago, which saw the return of her neck pain and gluteal sxs same as prior to TPI. She would like TPI again today as it has brought her significant relief in the past. She endorses continuation of her chronic back pain. Her pain today is 7/10.    Interval History 3/12/2021:  The patient has a follow up today for increased neck pain. She is having aching pain across the neck with associated headaches. She is also having middle back tightness. She is not having radiation into the arms or numbness. She would like trigger point injections as these have been helpful in the past. She previously was seeing neurology but was lost to follow up. She denies nausea or vomiting currently. She had a cholecystectomy on 2/10/21 from which she has recovered well. She had her first Covid vaccine on 2/3/21 and is scheduled for the next on 2/24/21. She also has a history of chronic back pain. Her pain today is 7/10.    Interval History 7/22/2020:  The patient has an audio visit today to discuss back pain.  She had TPIs at last OV which gave her some short term benefit.  She was scheduled for facet injections which she cancelled.  She would like to update her imaging prior to another procedure.  Her pain today is 6/10.    Interval History 7/7/2020:  The patient is here for follow up of back pain.  She has been doing well until the past month or so.  She is having more middle and lower back pain.  She is  not having any radiation into the legs at this time.  She describes the pain as aching and stabbing.  She has been stretching and walking at home.  Her pain today is 4/10.    Interval History 1/20/2020:  The patient returns for follow up of middle and lower back pain.  She is now s/p bilateral L3-4 and L4-5 facet joint injections as well as T7-8 interspinous ligament injection on 12/16/19 with about 50% relief.  She is still having right sided lower back pain which starts at the spine and often radiates to her hip.  She denies associated numbness.  She says that the area is tender to touch and feels swollen sometimes.  She has tried ice and heat without benefit.  Her pain today is 2/10.    Interval History 12/13/2019:  The patient is here for follow up of back pain.  She is s/p Bilateral L2,3,4,5 MBB with steroids.  She is reporting limited benefit this time.  Previous provided her significant benefit.  She is having pain across the lower back, worse on the left side.  She denies radiation into the legs.  She is also having middle thoracic pain.  This does not radiate to the front of her body.  She is not having any numbness.  She would like to schedule another procedure.  Her pain today is 5/10.    Previous Encounter:  Osman Johansen presents to the clinic for a follow-up appointment for lower back pain. She reports increased low back pain over the last few weeks. She describes this pain as constant, sharp, and aching. She denies any radiating leg pain. She previously had 90% relief of her pain with bilateral L2,3,4,5 MBB and T7-8 interspinous ligament injection, last done in August 2018. She would like to repeat this procedure. She continues to participate in a home exercise routine. She denies any other health changes. Her pain today is 5/10.    Pain Disability Index Review:      5/22/2024    11:34 AM 4/15/2024     8:44 AM 8/29/2023     2:49 PM   Last 3 PDI Scores   Pain Disability Index (PDI) 12 61 40        Pain Medications:  None    Opioid Contract: no     report:  Reviewed and consistent with medication use as prescribed.    Pain Procedures:   7/18/13 Bilateral L5 TF JOCE  10/28/13 Bilateral SI joint injection  4/20/15 Bilateral SI joint injection  12/15/16 Bilateral L3-4 facet joint injections- 100% relief for 6 weeks  3/13/17 Bilateral L3-4 facet joint injections- 30% relief  4/17/17 Bilateral L2,3,4,5 MBB with steroids- significant benefit for 7 months  12/14/17 Bilateral L2,3,4,5 MBB with steroids and Interspinal ligament injection- 90% relief for 7 months  8/21/2018- Bilateral L2,3,4,5 MBB with steroids and T7-8 interspinous ligament injections   11/18/19 Bilateral L2,3,4,5 MBB with steroids  12/16/19 bilateral L3-4 and L4-5 facet joint injections as well as T7-8 interspinous ligament injection- 50% relief  3/12/21 bilateral cervical and paraspinal TPI, R gluteal TPI x1 (5 sites total)    Physical Therapy/Home Exercise: per self does stretching    Imaging:     Lumbar MRI 12/10/16    Narrative   MRI LUMBAR SPINE WITHOUT CONTRAST    COMPARISON: None    TECHNIQUE:  Sagittal T1, T2, and STIR;  axial T1 and T2 sequences through the lumbar spine were acquired without contrast.    FINDINGS: Vertebral body height and alignment are within normal limits.  The conus terminates at T12-L1.  Moderate loss of disc height is present at L4-5.  Marrow signal is mildly heterogeneous.  No focal osseous lesion.    L1-L2:  No disc herniation. No spinal canal or neuroforaminal narrowing.    L2-L3:  Mild diffuse disc bulging is present without spinal canal or neuroforaminal narrowing.    L3-L4:  Mild diffuse disc bulging and moderate bilateral facet arthropathy result in mild spinal canal narrowing.    L4-L5:  Mild diffuse disc bulging is present without spinal canal or neuroforaminal narrowing.    L5-S1:  No disc herniation. No spinal canal or neuroforaminal narrowing.    Several small gallstones noted.   Impression      Degenerative lumbar spondylosis with mild L3-4 spinal canal narrowing and no significant neuroforaminal narrowing.  Cholelithiasis       Narrative     MRI of the thoracic spine without contrast    Technique: Multiplanar multisequence MR imaging of the thoracic spine was performed without contrast.    Findings: There is mild levoscoliosis of the lumbar spine.  This may be positional.  Vertebral bodies otherwise demonstrate normal height alignment.  The marrow signal of the vertebral bodies is within normal limits.  There is mild disc desiccation noted throughout the thoracic spine.  No significant disc space narrowing.  No significant disc protrusions are identified.  The central canal is widely patent.  No significant spondylosis.  The cord is normal in signal and caliber.      Impression         1.  Mild levoscoliosis of the thoracic spine otherwise essentially unremarkable MRI of the thoracic spine.     Narrative & Impression  EXAMINATION:  XR HIPS BILATERAL 2 VIEW INCL AP PELVIS     CLINICAL HISTORY:  Pain in right hip     TECHNIQUE:  AP view of the pelvis and frogleg lateral views of both hips were performed.     COMPARISON:  Hip and pelvic x-ray of April 19, 2011     FINDINGS:  A fracture of either hip is not seen.  No dislocation is noted.  The acetabula are well maintained.  There is sclerosis adjacent to the inferior left sacroiliac joint unchanged from the prior study and consistent with chronic sacroiliitis.  Degenerative disc disease is seen at the lower lumbar spine.     Impression:     Chronic left sacroiliitis.  Degenerative disc disease at L4-5.  Otherwise negative radiographs of both hips and pelvis.      CMP  Sodium   Date Value Ref Range Status   11/29/2023 140 136 - 145 mmol/L Final     Potassium   Date Value Ref Range Status   11/29/2023 5.2 (H) 3.5 - 5.1 mmol/L Final     Chloride   Date Value Ref Range Status   11/29/2023 105 95 - 110 mmol/L Final     CO2   Date Value Ref Range Status    11/29/2023 23 23 - 29 mmol/L Final     Glucose   Date Value Ref Range Status   11/29/2023 92 70 - 110 mg/dL Final     BUN   Date Value Ref Range Status   11/29/2023 17 8 - 23 mg/dL Final     Creatinine   Date Value Ref Range Status   11/29/2023 1.0 0.5 - 1.4 mg/dL Final     Calcium   Date Value Ref Range Status   11/29/2023 9.5 8.7 - 10.5 mg/dL Final     Total Protein   Date Value Ref Range Status   11/29/2023 7.0 6.0 - 8.4 g/dL Final     Albumin   Date Value Ref Range Status   11/29/2023 3.7 3.5 - 5.2 g/dL Final     Total Bilirubin   Date Value Ref Range Status   11/29/2023 0.3 0.1 - 1.0 mg/dL Final     Comment:     For infants and newborns, interpretation of results should be based  on gestational age, weight and in agreement with clinical  observations.    Premature Infant recommended reference ranges:  Up to 24 hours.............<8.0 mg/dL  Up to 48 hours............<12.0 mg/dL  3-5 days..................<15.0 mg/dL  6-29 days.................<15.0 mg/dL       Alkaline Phosphatase   Date Value Ref Range Status   11/29/2023 74 55 - 135 U/L Final     AST   Date Value Ref Range Status   11/29/2023 19 10 - 40 U/L Final     ALT   Date Value Ref Range Status   11/29/2023 8 (L) 10 - 44 U/L Final     Anion Gap   Date Value Ref Range Status   11/29/2023 12 8 - 16 mmol/L Final     eGFR if    Date Value Ref Range Status   07/27/2022 29.7 (A) >60 mL/min/1.73 m^2 Final     eGFR if non    Date Value Ref Range Status   07/27/2022 25.8 (A) >60 mL/min/1.73 m^2 Final     Comment:     Calculation used to obtain the estimated glomerular filtration  rate (eGFR) is the CKD-EPI equation.        Lab Results   Component Value Date    WBC 7.20 12/12/2023    HGB 10.1 (L) 12/12/2023    HCT 31.7 (L) 12/12/2023    MCV 95 12/12/2023     12/12/2023         Allergies:   Review of patient's allergies indicates:   Allergen Reactions    Pcn [penicillins] Swelling    Toradol [ketorolac] Swelling     Vibramycin [doxycycline calcium] Swelling    Cymbalta [duloxetine]      dizzyness    Elavil [amitriptyline]     Lyrica [pregabalin] Swelling    Seroquel [quetiapine]      restless leg symdrome       Current Medications:   Current Outpatient Medications   Medication Sig Dispense Refill    amLODIPine (NORVASC) 10 MG tablet Take 1 tablet (10 mg total) by mouth once daily. 90 tablet 1    bempedoic acid (NEXLETOL) 180 mg Tab Take 1 tablet (180 mg total) by mouth Daily. 90 tablet 1    benazepriL (LOTENSIN) 20 MG tablet Take 1 tablet (20 mg total) by mouth 2 (two) times daily. 180 tablet 1    butalbital-acetaminophen-caffeine -40 mg (FIORICET, ESGIC) -40 mg per tablet 1 tab PO PRN migraine. No more than 10 tab per month. 10 tablet 0    CALCIUM CARBONATE/VITAMIN D3 (CALCIUM 500 WITH D ORAL) Take 1 capsule by mouth once daily.      carboxymethylcellulose (REFRESH PLUS) 0.5 % Dpet Place 1 drop into both eyes daily as needed (DRY EYES).      clopidogreL (PLAVIX) 75 mg tablet Take 1 tablet (75 mg total) by mouth once daily. 90 tablet 0    co-enzyme Q-10 30 mg capsule Take 30 mg by mouth 3 (three) times daily.      dicyclomine (BENTYL) 10 MG capsule Take 1 capsule (10 mg total) by mouth 4 (four) times daily as needed (stomach discomfort). 120 capsule 0    esomeprazole (NEXIUM) 40 MG capsule Take 1 capsule (40 mg total) by mouth before breakfast. 90 capsule 3    evolocumab (REPATHA SURECLICK) 140 mg/mL PnIj Inject 1 mL (140 mg total) into the skin every 14 (fourteen) days. 2 each 5    fluticasone propionate (FLONASE) 50 mcg/actuation nasal spray 1 spray (50 mcg total) by Each Nostril route once daily. (Patient taking differently: 1 spray by Each Nostril route 2 (two) times a day.) 32 g 2    garlic (GARLIQUE ORAL) Take 1 capsule by mouth Daily.      isosorbide mononitrate (IMDUR) 30 MG 24 hr tablet TAKE 1 TABLET ONE TIME DAILY 90 tablet 3    methocarbamoL (ROBAXIN) 750 MG Tab TAKE ONE TABLET BY MOUTH FOUR TIMES DAILY  40 tablet 3    MULTIVITAMIN (MULTIPLE VITAMIN ORAL) Take 1 capsule by mouth once daily.      nitroGLYCERIN (NITROSTAT) 0.4 MG SL tablet Place 1 tablet (0.4 mg total) under the tongue every 5 (five) minutes as needed for Chest pain. 25 tablet 3    nitroGLYCERIN 0.2 mg/hr TD PT24 (NITRODUR) 0.2 mg/hr Place 1 patch onto the skin once daily. 30 patch 3    oxybutynin (DITROPAN XL) 15 MG TR24 Take 1 tablet (15 mg total) by mouth every morning. 90 tablet 3    promethazine (PHENERGAN) 25 MG tablet TAKE ONE TABLET BY MOUTH EVERY 6 HOURS AS NEEDED FOR NAUSEA 45 tablet 5    sennosides (LAXATIVE, SENNOSIDES,) 25 mg Tab Take by mouth.      SUBOXONE 8-2 mg Place 1 each under the tongue daily as needed (restless legs).      SYNTHROID 100 mcg tablet Take 1 tablet (100 mcg total) by mouth before breakfast. **BRAND MEDICALLY NECESSARY** 90 tablet 3    tiZANidine (ZANAFLEX) 4 MG tablet Half or full tablet by mouth at night as needed for muscle spasm 30 tablet 11    valACYclovir (VALTREX) 1000 MG tablet TAKE ONE TABLET BY MOUTH EVERY DAY FOR 7 DAYS 7 tablet 11    aspirin (ECOTRIN) 81 MG EC tablet Take 1 tablet (81 mg total) by mouth once daily. for 21 days       Current Facility-Administered Medications   Medication Dose Route Frequency Provider Last Rate Last Admin    sodium chloride 0.9% flush 10 mL  10 mL Intravenous PRN Sourav Nugent MD         Facility-Administered Medications Ordered in Other Visits   Medication Dose Route Frequency Provider Last Rate Last Admin    lactated ringers infusion   Intravenous Continuous Jim Mcdonough MD   Stopped at 06/27/22 5924    LIDOcaine (PF) 10 mg/ml (1%) injection 10 mg  1 mL Intradermal Once Jim Mcdonough MD           REVIEW OF SYSTEMS:    GENERAL:  No weight loss, malaise or fevers.  HEENT:  Positive for frequent headaches- followed by neurology.  NECK:  Negative for lumps, goiter, pain and significant neck swelling.  RESPIRATORY:  Negative for cough, wheezing or shortness of  breath.  CARDIOVASCULAR:  Negative for chest pain, leg swelling or palpitations.  GI:  Negative for abdominal discomfort, blood in stools or black stools or change in bowel habits. GERD.  MUSCULOSKELETAL:  See HPI.  SKIN:  Negative for lesions, rash, and itching.  PSYCH: H/O anxiety and opioid dependence.  HEMATOLOGY/LYMPHOLOGY:  On Plavix.  NEURO:   No history of syncope, paralysis, seizures or tremors.  All other reviewed and negative other than HPI.    Past Medical History:  Past Medical History:   Diagnosis Date    Allergy     Anemia     Anxiety     Arthritis     Blood transfusion 8 yrs    CAD (coronary artery disease)     Cataract     Chronic diastolic CHF (congestive heart failure)     CKD (chronic kidney disease), stage II     COPD (chronic obstructive pulmonary disease)     mild no inhalers    Degenerative disc disease     Depression     Dry eye syndrome     Fibromyalgia     Fluid overload     GERD (gastroesophageal reflux disease)     Headache     Hypercholesterolemia 12/09/2019    Hypertension     Hypothyroidism     IBS (irritable bowel syndrome)     Lower extremity edema     Macular drusen     Neuromuscular disorder     Ocular hypertension, bilateral     Osteoporosis     Pleural effusion     PUD (peptic ulcer disease)     Restless leg     Uses Suboxone to control    Sleep apnea     cpap    Stroke     2 strokes-after CABG    Thoracic or lumbosacral neuritis or radiculitis 10/19/2012    Thyroid disease     hashimoto's       Past Surgical History:  Past Surgical History:   Procedure Laterality Date    ADENOIDECTOMY  1955    ANGIOGRAM, CORONARY, WITH LEFT HEART CATHETERIZATION  10/26/2023    Procedure: Left Heart Cath;  Surgeon: Sourav Nugent MD;  [facility];  Service: Cardiology;;    APPENDECTOMY  1965    ARTERIOGRAPHY OF AORTIC ROOT  10/26/2023    Procedure: AO Root;  Surgeon: Sourav Nugent MD;  Location: [facility];  Service: Cardiology;;    CARDIAC CATHETERIZATION      CATARACT EXTRACTION W/   INTRAOCULAR LENS IMPLANT Right 05/24/2021    Procedure: EXTRACTION, CATARACT, WITH IOL INSERTION;  Surgeon: Bebe Lynn MD;  Location: [facility] OR;  Service: Ophthalmology;  Laterality: Right;  Right/DM    CHOLECYSTECTOMY  2021    CORONARY ANGIOGRAPHY N/A 05/02/2022    Procedure: ANGIOGRAM, CORONARY ARTERY;  Surgeon: Sourav Nugent MD;  Location: [facility];  Service: Cardiology;  Laterality: N/A;    CORONARY ANGIOGRAPHY  11/9/2023    Procedure: Coronary angiogram study;  Surgeon: Sourav Nugent MD;  Location: [facility];  Service: Cardiology;;    CORONARY ARTERY BYPASS GRAFT      CORONARY ARTERY BYPASS GRAFT (CABG) N/A 06/27/2022    Procedure: CORONARY ARTERY BYPASS GRAFT (CABG) X 5;  Surgeon: Talib Torres MD;  Location: [facility] OR;  Service: Cardiovascular;  Laterality: N/A;    CORONARY BYPASS GRAFT ANGIOGRAPHY  10/26/2023    Procedure: Bypass graft study;  Surgeon: Sourav Nugent MD;  [facility];  Service: Cardiology;;    ENDOSCOPIC HARVEST OF VEIN Left 06/27/2022    Procedure: SURGICAL PROCUREMENT, VEIN, ENDOSCOPIC;  Surgeon: Talib Torres MD;  Location: [facility] OR;  Service: Cardiovascular;  Laterality: Left;    EYE SURGERY  2021    Cataract surgery    HYSTERECTOMY  8 yrs     INJECTION OF ANESTHETIC AGENT AROUND NERVE Bilateral 08/21/2018    Procedure: BLOCK, NERVE;  Surgeon: Talia Belcher MD;  Location: [facility] PAIN MGT;  Service: Pain Management;  Laterality: Bilateral;  Bilateral block @ L2,3,4,5      INJECTION OF ANESTHETIC AGENT AROUND NERVE Bilateral 11/18/2019    Procedure: BLOCK, NERVE, L2-L3-L4-L5 ME DIAL BRANCH;  Surgeon: Talia Belcher MD;  Location: [facility] PAIN [facility]GT;  Service: Pain Management;  Laterality: Bilateral;    INJECTION OF FACET JOINT Bilateral 12/16/2019    Procedure: INJECTION, FACET JOINT, L3-L4 AND L4-L5 AND T7-T8 LIGAMENT;  Surgeon: Talia Belcher MD;  Location: [facility] PAIN [facility]GT;  Service: Pain Management;  Laterality: Bilateral;    INSERTION OF IMPLANTABLE LOOP RECORDER Left  2022    Procedure: Insertion, Implantable Loop Recorder;  Surgeon: Sourav Nugent MD;  Location: [facility];  Service: Cardiology;  Laterality: Left;    LAPAROSCOPIC CHOLECYSTECTOMY N/A 02/10/2021    Procedure: CHOLECYSTECTOMY, LAPAROSCOPIC;  Surgeon: John Morrow MD;  Location: [facility] OR;  Service: General;  Laterality: N/A;    LEFT HEART CATHETERIZATION Left 2022    Procedure: Left heart cath;  Surgeon: Sourav Nugent MD;  Location: [facility];  Service: Cardiology;  Laterality: Left;    LEFT HEART CATHETERIZATION Left 10/26/2023    Procedure: Left heart cath;  Surgeon: Sourav Nugent MD;  Location: [facility];  Service: Cardiology;  Laterality: Left;    PERCUTANEOUS CORONARY INTERVENTION, ARTERY  2023    Procedure: DANILO LCX;  Surgeon: Sourav Nugent MD;  Location: [facility];  Service: Cardiology;;    SINUS SURGERY      x2-one for CSF leak    TONSILLECTOMY         Family History:  Family History   Problem Relation Name Age of Onset    Ulcers Father      Arthritis Mother Azucena Hewitt     Cancer Mother Azucena Hewitt     Heart disease Mother Azucena Hewitt     Hypertension Mother Azucena Hewitt     Cataracts Mother Azucena Hewitt     Ovarian cancer Mother Azucena Hewitt     Thyroid disease Brother      Heart disease Brother      Stroke Paternal Aunt Jermaine kim             Amblyopia Neg Hx      Blindness Neg Hx      Diabetes Neg Hx      Glaucoma Neg Hx      Macular degeneration Neg Hx      Retinal detachment Neg Hx      Strabismus Neg Hx      Breast cancer Neg Hx         Social History:  Social History     Socioeconomic History    Marital status:    Tobacco Use    Smoking status: Former     Current packs/day: 0.00     Types: Cigarettes     Quit date: 2008     Years since quittin.4    Smokeless tobacco: Never   Substance and Sexual Activity    Alcohol use: Yes     Comment: rarely    Drug use: No    Sexual activity: Yes     Partners: Male     Social Determinants of Health     Financial Resource  "Strain: Low Risk  (11/26/2023)    Overall Financial Resource Strain (CARDIA)     Difficulty of Paying Living Expenses: Not hard at all   Food Insecurity: No Food Insecurity (11/26/2023)    Hunger Vital Sign     Worried About Running Out of Food in the Last Year: Never true     Ran Out of Food in the Last Year: Never true   Transportation Needs: No Transportation Needs (11/26/2023)    PRAPARE - Transportation     Lack of Transportation (Medical): No     Lack of Transportation (Non-Medical): No   Physical Activity: Insufficiently Active (11/26/2023)    Exercise Vital Sign     Days of Exercise per Week: 3 days     Minutes of Exercise per Session: 30 min   Stress: Stress Concern Present (11/26/2023)    Citizen of [place] of Occupational [employer] - Occupational Stress Questionnaire     Feeling of Stress : To some extent   Housing Stability: Unknown (11/26/2023)    Housing Stability Vital Sign     Unable to Pay for Housing in the Last Year: No     Unstable Housing in the Last Year: No       OBJECTIVE:    BP (!) 183/86 (BP Location: Left arm, Patient Position: Sitting, BP Method: Large (Automatic))   Pulse 74   Ht 5' 1" (1.549 m)   Wt 63.6 kg (140 lb 3.4 oz)   BMI 26.49 kg/m²     PHYSICAL EXAMINATION     General appearance: Well appearing, in no acute distress, alert and oriented x3.  Psych:  Mood and affect appropriate.  Skin: Midline chest scar.   Neck: TTP over cervical and thoracic paraspinals as well as trapezius muscles. Full ROM with pain on extension and lateral rotation. Positive facet loading.  Back: Straight leg raise in the sitting position is negative for radicular pain bilaterally. Positive facet loading bilaterally. Limited ROM with pain on flexion and extension.  Extremities: Peripheral joint ROM is full and pain free without obvious instability or laxity in all four extremities. No deformities, edema, or skin discoloration. Good capillary refill.  Musculoskeletal: Bilateral upper and lower extremity " strength is normal and symmetric.  No atrophy or tone abnormalities are noted.  Positive Junior finger test, Dilip's test, thigh thrust, and compression test bilaterally  Neuro: No loss of sensation noted.  Gait: Normal.    ASSESSMENT: 74 y.o. year old female with neck and back pain, consistent with the following diagnoses:     1. Myofascial pain        2. Trochanteric bursitis, unspecified laterality  Ambulatory referral/consult to Physical/Occupational Therapy      3. Sacroiliac dysfunction        4. Lumbar spondylosis  Ambulatory referral/consult to Physical/Occupational Therapy      5. Cervical spondylosis  Ambulatory referral/consult to Physical/Occupational Therapy                PLAN:   We discussed with the patient the assessment and recommendations. The following is the plan we agreed on:   Previous imaging was reviewed and discussed with the patient today.  PT  Consider B SIJ and B GTB injections. For C spine consider B C4,5,6 MBB followed by RFAs          The above plan and management options were discussed at length with patient. Patient is in agreement with the above and verbalized understanding.    Raymond Chin  05/22/2024     I have personally taken the history and examined this patient and agree with the resident's note as stated above.

## 2024-05-22 NOTE — TELEPHONE ENCOUNTER
Presenting ECG shows normal sinus rhythm  .  Implanted for Suspected AF.    Last remote transmission was 05/19/2024.    Battery status is OK.    0% AT/AF [place].    Symptom episode detected on 05/19/2024 at 9:54pm. EGM displays sinus danny/SR with PVC and PACs ~48bpm into sinus arrhythmia                                      Message sent to Dr. Nugent for review, pt. Seen in clinic 5/21

## 2024-05-23 ENCOUNTER — TELEPHONE (OUTPATIENT)
Dept: INFUSION THERAPY | Facility: HOSPITAL | Age: 74
End: 2024-05-23
Payer: MEDICARE

## 2024-05-24 ENCOUNTER — TELEPHONE (OUTPATIENT)
Dept: INFUSION THERAPY | Facility: HOSPITAL | Age: 74
End: 2024-05-24
Payer: MEDICARE

## 2024-05-24 NOTE — TELEPHONE ENCOUNTER
----- Message from Sydnee Singleton sent at 5/23/2024  3:38 PM CDT -----  Regarding: appointment  Contact: Patient  Type:  Sooner Appointment Request    Caller is requesting a sooner appointment.  Caller declined first available appointment listed below.  Caller will not accept being placed on the waitlist and is requesting a message be sent to doctor.    Name of Caller:  patient  When is the first available appointment?    Symptoms:  prolia injection  Would the patient rather a call back or a response via [facility]? call    Best Call Back Number:  [phone]    Additional Information:  Please call patient to advise.  Thanks!

## 2024-05-24 NOTE — TELEPHONE ENCOUNTER
Spoke with the patient and scheduled her Iron infusions for July as she requested. I placed the reminders in the mail

## 2024-05-24 NOTE — TELEPHONE ENCOUNTER
----- Message from Carmela Diaz sent at 5/24/2024 12:51 PM CDT -----  Type: Needs Medical Advice  Who Called:  Patient   Symptoms (please be specific):    How long has patient had these symptoms:    Pharmacy name and phone #:    Best Call Back Number: [phone]   Additional Information: Patient returning missed call

## 2024-06-09 LAB
OHS CV AF BURDEN PERCENT: < 1
OHS CV AF BURDEN PERCENT: < 1
OHS CV DC REMOTE DEVICE TYPE: NORMAL
OHS CV DC REMOTE DEVICE TYPE: NORMAL
OHS CV ICD SHOCK: NO

## 2024-06-15 DIAGNOSIS — G43.719 INTRACTABLE CHRONIC MIGRAINE WITHOUT AURA AND WITHOUT STATUS MIGRAINOSUS: ICD-10-CM

## 2024-06-17 RX ORDER — BUTALBITAL, ACETAMINOPHEN AND CAFFEINE 50; 325; 40 MG/1; MG/1; MG/1
TABLET ORAL
Qty: 10 TABLET | Refills: 0 | Status: SHIPPED | OUTPATIENT
Start: 2024-06-17

## 2024-06-20 ENCOUNTER — OFFICE VISIT (OUTPATIENT)
Dept: NEUROLOGY | Facility: CLINIC | Age: 74
End: 2024-06-20
Payer: MEDICARE

## 2024-06-20 VITALS
BODY MASS INDEX: 26.74 KG/M2 | WEIGHT: 141.56 LBS | DIASTOLIC BLOOD PRESSURE: 80 MMHG | SYSTOLIC BLOOD PRESSURE: 136 MMHG | RESPIRATION RATE: 22 BRPM | HEART RATE: 73 BPM

## 2024-06-20 DIAGNOSIS — M79.18 CERVICAL MYOFASCIAL PAIN SYNDROME: ICD-10-CM

## 2024-06-20 DIAGNOSIS — G43.719 INTRACTABLE CHRONIC MIGRAINE WITHOUT AURA AND WITHOUT STATUS MIGRAINOSUS: Primary | ICD-10-CM

## 2024-06-20 DIAGNOSIS — G44.86 CERVICOGENIC HEADACHE: ICD-10-CM

## 2024-06-20 PROCEDURE — 99213 OFFICE O/P EST LOW 20 MIN: CPT | Mod: PBBFAC,PO | Performed by: NURSE PRACTITIONER

## 2024-06-20 PROCEDURE — 99214 OFFICE O/P EST MOD 30 MIN: CPT | Mod: S$PBB,,, | Performed by: NURSE PRACTITIONER

## 2024-06-20 PROCEDURE — 99999 PR PBB SHADOW E&M-EST. PATIENT-LVL III: CPT | Mod: PBBFAC,,, | Performed by: NURSE PRACTITIONER

## 2024-06-20 RX ORDER — ATOGEPANT 60 MG/1
60 TABLET ORAL DAILY
Qty: 30 TABLET | Refills: 11 | Status: SHIPPED | OUTPATIENT
Start: 2024-06-20
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
## 2024-06-20 NOTE — PATIENT INSTRUCTIONS
Please call our clinic at [phone] or send a message on the [employer] portal if there are any changes to the plan described below, for example,if you are not contacted for the requested tests, referral(s) within one week, if you are unable to receive the medications prescribed, or if you feel you need to change the treatment course for any reason.     TESTING:  -- none at this time    REFERRALS:  -- continue with pain management   -- agree with PT     PREVENTION (use daily regardless of headache):  -- continue current medications  -- START Qulipta once daily  -- can consider Botox in the future    AS-NEEDED TREATMENT (use total no more than 10 days per month unless otherwise stated):  -- continue Fioricet with next severe attack. You can repeat two hours later if needed.

## 2024-06-21 ENCOUNTER — LAB VISIT (OUTPATIENT)
Dept: LAB | Facility: HOSPITAL | Age: 74
End: 2024-06-21
Attending: INTERNAL MEDICINE
Payer: MEDICARE

## 2024-06-21 ENCOUNTER — TELEPHONE (OUTPATIENT)
Dept: PHARMACY | Facility: CLINIC | Age: 74
End: 2024-06-21
Payer: MEDICARE

## 2024-06-21 DIAGNOSIS — D64.9 NORMOCYTIC ANEMIA: ICD-10-CM

## 2024-06-21 DIAGNOSIS — E78.00 HYPERCHOLESTEROLEMIA: Chronic | ICD-10-CM

## 2024-06-21 DIAGNOSIS — I25.10 CORONARY ARTERY DISEASE INVOLVING NATIVE CORONARY ARTERY OF NATIVE HEART WITHOUT ANGINA PECTORIS: Chronic | ICD-10-CM

## 2024-06-21 DIAGNOSIS — Z79.02 LONG TERM (CURRENT) USE OF ANTITHROMBOTICS/ANTIPLATELETS: Chronic | ICD-10-CM

## 2024-06-21 DIAGNOSIS — I10 ESSENTIAL HYPERTENSION: ICD-10-CM

## 2024-06-21 DIAGNOSIS — E11.9 CONTROLLED TYPE 2 DIABETES MELLITUS WITHOUT COMPLICATION, WITHOUT LONG-TERM CURRENT USE OF INSULIN: ICD-10-CM

## 2024-06-21 DIAGNOSIS — E03.4 HYPOTHYROIDISM DUE TO ACQUIRED ATROPHY OF THYROID: ICD-10-CM

## 2024-06-21 LAB
ALBUMIN SERPL BCP-MCNC: 3.9 G/DL (ref 3.5–5.2)
ALBUMIN SERPL BCP-MCNC: 3.9 G/DL (ref 3.5–5.2)
ALP SERPL-CCNC: 60 U/L (ref 55–135)
ALP SERPL-CCNC: 60 U/L (ref 55–135)
ALT SERPL W/O P-5'-P-CCNC: 13 U/L (ref 10–44)
ALT SERPL W/O P-5'-P-CCNC: 13 U/L (ref 10–44)
ANION GAP SERPL CALC-SCNC: 9 MMOL/L (ref 8–16)
ANION GAP SERPL CALC-SCNC: 9 MMOL/L (ref 8–16)
AST SERPL-CCNC: 22 U/L (ref 10–40)
AST SERPL-CCNC: 22 U/L (ref 10–40)
BASOPHILS # BLD AUTO: 0.08 K/UL (ref 0–0.2)
BASOPHILS NFR BLD: 1.3 % (ref 0–1.9)
BILIRUB SERPL-MCNC: 0.3 MG/DL (ref 0.1–1)
BILIRUB SERPL-MCNC: 0.3 MG/DL (ref 0.1–1)
BUN SERPL-MCNC: 20 MG/DL (ref 8–23)
BUN SERPL-MCNC: 20 MG/DL (ref 8–23)
CALCIUM SERPL-MCNC: 9.5 MG/DL (ref 8.7–10.5)
CALCIUM SERPL-MCNC: 9.5 MG/DL (ref 8.7–10.5)
CHLORIDE SERPL-SCNC: 105 MMOL/L (ref 95–110)
CHLORIDE SERPL-SCNC: 105 MMOL/L (ref 95–110)
CHOLEST SERPL-MCNC: 144 MG/DL (ref 120–199)
CHOLEST/HDLC SERPL: 1.6 {RATIO} (ref 2–5)
CO2 SERPL-SCNC: 24 MMOL/L (ref 23–29)
CO2 SERPL-SCNC: 24 MMOL/L (ref 23–29)
CREAT SERPL-MCNC: 1.2 MG/DL (ref 0.5–1.4)
CREAT SERPL-MCNC: 1.2 MG/DL (ref 0.5–1.4)
DIFFERENTIAL METHOD BLD: ABNORMAL
EOSINOPHIL # BLD AUTO: 0.2 K/UL (ref 0–0.5)
EOSINOPHIL NFR BLD: 2.9 % (ref 0–8)
ERYTHROCYTE [DISTWIDTH] IN BLOOD BY AUTOMATED COUNT: 13.2 % (ref 11.5–14.5)
EST. GFR  (NO RACE VARIABLE): 47.2 ML/MIN/1.73 M^2
EST. GFR  (NO RACE VARIABLE): 47.2 ML/MIN/1.73 M^2
ESTIMATED AVG GLUCOSE: 108 MG/DL (ref 68–131)
GLUCOSE SERPL-MCNC: 75 MG/DL (ref 70–110)
GLUCOSE SERPL-MCNC: 75 MG/DL (ref 70–110)
HBA1C MFR BLD: 5.4 % (ref 4–5.6)
HCT VFR BLD AUTO: 34.1 % (ref 37–48.5)
HDLC SERPL-MCNC: 90 MG/DL (ref 40–75)
HDLC SERPL: 62.5 % (ref 20–50)
HGB BLD-MCNC: 11 G/DL (ref 12–16)
HGB BLD-MCNC: 11 G/DL (ref 12–16)
IMM GRANULOCYTES # BLD AUTO: 0.02 K/UL (ref 0–0.04)
IMM GRANULOCYTES NFR BLD AUTO: 0.3 % (ref 0–0.5)
LDLC SERPL CALC-MCNC: 46.8 MG/DL (ref 63–159)
LYMPHOCYTES # BLD AUTO: 1.7 K/UL (ref 1–4.8)
LYMPHOCYTES NFR BLD: 27.1 % (ref 18–48)
MCH RBC QN AUTO: 31.7 PG (ref 27–31)
MCHC RBC AUTO-ENTMCNC: 32.3 G/DL (ref 32–36)
MCV RBC AUTO: 98 FL (ref 82–98)
MONOCYTES # BLD AUTO: 0.5 K/UL (ref 0.3–1)
MONOCYTES NFR BLD: 8.5 % (ref 4–15)
NEUTROPHILS # BLD AUTO: 3.7 K/UL (ref 1.8–7.7)
NEUTROPHILS NFR BLD: 59.9 % (ref 38–73)
NONHDLC SERPL-MCNC: 54 MG/DL
NRBC BLD-RTO: 0 /100 WBC
PLATELET # BLD AUTO: 268 K/UL (ref 150–450)
PMV BLD AUTO: 11.7 FL (ref 9.2–12.9)
POTASSIUM SERPL-SCNC: 5.2 MMOL/L (ref 3.5–5.1)
POTASSIUM SERPL-SCNC: 5.2 MMOL/L (ref 3.5–5.1)
PROT SERPL-MCNC: 7.2 G/DL (ref 6–8.4)
PROT SERPL-MCNC: 7.2 G/DL (ref 6–8.4)
RBC # BLD AUTO: 3.47 M/UL (ref 4–5.4)
SODIUM SERPL-SCNC: 138 MMOL/L (ref 136–145)
SODIUM SERPL-SCNC: 138 MMOL/L (ref 136–145)
TRIGL SERPL-MCNC: 36 MG/DL (ref 30–150)
TSH SERPL DL<=0.005 MIU/L-ACNC: 2.44 UIU/ML (ref 0.4–4)
WBC # BLD AUTO: 6.13 K/UL (ref 3.9–12.7)

## 2024-06-21 PROCEDURE — 80061 LIPID PANEL: CPT | Performed by: INTERNAL MEDICINE

## 2024-06-21 PROCEDURE — 83036 HEMOGLOBIN GLYCOSYLATED A1C: CPT | Performed by: INTERNAL MEDICINE

## 2024-06-21 PROCEDURE — 80053 COMPREHEN METABOLIC PANEL: CPT | Performed by: INTERNAL MEDICINE

## 2024-06-21 PROCEDURE — 36415 COLL VENOUS BLD VENIPUNCTURE: CPT | Mod: PO | Performed by: INTERNAL MEDICINE

## 2024-06-21 PROCEDURE — 85025 COMPLETE CBC W/AUTO DIFF WBC: CPT | Performed by: INTERNAL MEDICINE

## 2024-06-21 PROCEDURE — 84443 ASSAY THYROID STIM HORMONE: CPT | Performed by: INTERNAL MEDICINE

## 2024-06-21 NOTE — TELEPHONE ENCOUNTER
Osman Johansen has been informed of the [employer] application process for Qulipta and what's required to apply.  She will provide the following documents: Proof of household Income( such as social security statement, 1099 form, pension statement or 3 consecutive pay stubs, Copy of all Insurance cards( front and back), and Signed and dated HIPAA /Patient Information Forms         Follow-up will be made in 5 business days.

## 2024-06-22 ENCOUNTER — CLINICAL SUPPORT (OUTPATIENT)
Dept: CARDIOLOGY | Facility: HOSPITAL | Age: 74
End: 2024-06-22
Payer: MEDICARE

## 2024-06-22 ENCOUNTER — HOSPITAL ENCOUNTER (OUTPATIENT)
Dept: CARDIOLOGY | Facility: HOSPITAL | Age: 74
Discharge: HOME | End: 2024-06-22
Attending: INTERNAL MEDICINE

## 2024-06-22 ENCOUNTER — PATIENT MESSAGE (OUTPATIENT)
Dept: ADMINISTRATIVE | Facility: OTHER | Age: 74
End: 2024-06-22
Payer: MEDICARE

## 2024-06-22 DIAGNOSIS — I49.8 OTHER SPECIFIED CARDIAC ARRHYTHMIAS: ICD-10-CM

## 2024-06-22 PROCEDURE — 93298 REM INTERROG DEV EVAL SCRMS: CPT | Mod: 26,,, | Performed by: INTERNAL MEDICINE

## 2024-06-26 ENCOUNTER — CLINICAL SUPPORT (OUTPATIENT)
Dept: REHABILITATION | Facility: HOSPITAL | Age: 74
End: 2024-06-26
Attending: ANESTHESIOLOGY
Payer: MEDICARE

## 2024-06-26 ENCOUNTER — OFFICE VISIT (OUTPATIENT)
Dept: FAMILY MEDICINE | Facility: CLINIC | Age: 74
End: 2024-06-26
Payer: MEDICARE

## 2024-06-26 VITALS
SYSTOLIC BLOOD PRESSURE: 122 MMHG | HEART RATE: 66 BPM | DIASTOLIC BLOOD PRESSURE: 60 MMHG | TEMPERATURE: 99 F | HEIGHT: 61 IN | OXYGEN SATURATION: 97 % | WEIGHT: 140.63 LBS | BODY MASS INDEX: 26.55 KG/M2

## 2024-06-26 DIAGNOSIS — I10 ESSENTIAL HYPERTENSION: ICD-10-CM

## 2024-06-26 DIAGNOSIS — M47.812 CERVICAL SPONDYLOSIS: ICD-10-CM

## 2024-06-26 DIAGNOSIS — I25.10 CORONARY ARTERY DISEASE INVOLVING NATIVE CORONARY ARTERY OF NATIVE HEART WITHOUT ANGINA PECTORIS: ICD-10-CM

## 2024-06-26 DIAGNOSIS — D64.9 NORMOCYTIC ANEMIA: ICD-10-CM

## 2024-06-26 DIAGNOSIS — N93.9 VAGINA BLEEDING: ICD-10-CM

## 2024-06-26 DIAGNOSIS — F33.9 DEPRESSION, RECURRENT: ICD-10-CM

## 2024-06-26 DIAGNOSIS — M70.60 TROCHANTERIC BURSITIS, UNSPECIFIED LATERALITY: ICD-10-CM

## 2024-06-26 DIAGNOSIS — M47.816 LUMBAR SPONDYLOSIS: Primary | ICD-10-CM

## 2024-06-26 DIAGNOSIS — G89.29 OTHER CHRONIC PAIN: ICD-10-CM

## 2024-06-26 DIAGNOSIS — Z00.00 ROUTINE PHYSICAL EXAMINATION: Primary | ICD-10-CM

## 2024-06-26 DIAGNOSIS — E03.4 HYPOTHYROIDISM DUE TO ACQUIRED ATROPHY OF THYROID: ICD-10-CM

## 2024-06-26 DIAGNOSIS — E11.9 CONTROLLED TYPE 2 DIABETES MELLITUS WITHOUT COMPLICATION, WITHOUT LONG-TERM CURRENT USE OF INSULIN: ICD-10-CM

## 2024-06-26 DIAGNOSIS — N18.32 STAGE 3B CHRONIC KIDNEY DISEASE: ICD-10-CM

## 2024-06-26 DIAGNOSIS — E78.2 MIXED HYPERLIPIDEMIA: ICD-10-CM

## 2024-06-26 PROBLEM — M46.1 BILATERAL SACROILIITIS: Status: RESOLVED | Noted: 2023-01-26 | Resolved: 2024-06-26

## 2024-06-26 PROCEDURE — 99214 OFFICE O/P EST MOD 30 MIN: CPT | Mod: PBBFAC,PO | Performed by: INTERNAL MEDICINE

## 2024-06-26 PROCEDURE — 97110 THERAPEUTIC EXERCISES: CPT | Mod: PO

## 2024-06-26 PROCEDURE — 97161 PT EVAL LOW COMPLEX 20 MIN: CPT | Mod: PO

## 2024-06-26 PROCEDURE — 99999 PR PBB SHADOW E&M-EST. PATIENT-LVL IV: CPT | Mod: PBBFAC,,, | Performed by: INTERNAL MEDICINE

## 2024-06-26 PROCEDURE — G0439 PPPS, SUBSEQ VISIT: HCPCS | Mod: ,,, | Performed by: INTERNAL MEDICINE

## 2024-06-26 NOTE — PROGRESS NOTES
Subjective:       Patient ID: Osman Johansen is a 74 y.o. female.  Chief Complaint: Annual Exam     HPI        HRA: patient feels overall is healthy.  Psychosocial and behavioral risks discussed.  BMI - 26  Weight loss discussed.   Diet - well balanced.   ADL: self sufficient in all  Instrumental ADL: patient is able to manage things like their medications and finances.    Memory or cognitive function - Patient has no issues with either   Ambulates normal. No recent falls.  Exercise - none   Depression screening is negative.  Hearing--no deficits.  Vision - no deficits.   Incontinence - none    Preventative health needs discussed and patient was given a printed list of what they have received and what they will need with in the next 5-10 years.  Screening schedule reviewed with patient  Colonoscopy or FIT kit done - due   Pneumovax - complete   Prevnar -complete    Flu vaccine - complete   Mammogram - avoiding 2nd chest scar pain  PAP - gyn referral given   DEXA - complete     Advanced Care directive: Patient will address advanced care directives on their own at a later time.   I have reviewed and updated the patient's current list of providers.       The patient is not currently on opioids                In addition to the patient's preventative review and discussion today, the patient also has other issues to discuss today with a separate summary of plan below:     C/o vaginal dryness with mild bleeding with friction    CAD - 2 new stents 11/23 in blockage with in 1 yr of CABG.  Still has chest pain occasional - improved and on nitro patch.      CVA - embolic x2 post CABG.  Wearing loop recorder now.  Mild residual confusion/ forgetful. Had JODI with lasix; no swelling, so no need   Dr Nugent,     CKD III - stable      Osteopenia with high 10 yr fracture risk warranting treatment, but could not tolerate treatment with fosamax.   Prolia ordered today     HTN - controlled.  On digital HTN program.  Does not take  the HCTZ because became lightheaded with this.    Hypothyroid - controlled.112 mcg from local pharmacy and Brand name made her have palpitations, but 125 mcg does not from mail order.  Dye? Only use mail order -   DM - controlled; outside eye exam done 7/26/17  HLD - controlled on Repatha.  Statin intolerance. could not tolerate multiple statins - Crestor, Liptior.  Starting to have body aches on 20 mg of Pravastatin.   Depression - stable      Iron deficiency anemia - worse; improved in past s/p iv iron infusion.  Feels more fatigued.   Trouble tolerating iron orally even only 3x per week or every other day.  Advised getting cscp and EGD (hx bleeding ulcer in past).  Last cscp 2012 was ok.  + fatigue   RLS - treated      CT doc emphysema - AVILA as above.  No longer smokes.          Assessment:       1. Routine physical examination    2. Hypothyroidism due to acquired atrophy of thyroid    3. Essential hypertension    4. Controlled type 2 diabetes mellitus without complication, without long-term current use of insulin    5. Mixed hyperlipidemia    6. Coronary artery disease involving native coronary artery of native heart without angina pectoris    7. Stage 3b chronic kidney disease    8. Depression, recurrent    9. Normocytic anemia    10. Vagina bleeding        Plan:       Routine physical examination    Hypothyroidism due to acquired atrophy of thyroid  -     TSH; Future; Expected date: 12/23/2024    Essential hypertension  -     Comprehensive Metabolic Panel; Future; Expected date: 12/23/2024    Controlled type 2 diabetes mellitus without complication, without long-term current use of insulin  -     Hemoglobin A1C; Future; Expected date: 12/23/2024  -     Microalbumin/Creatinine Ratio, Urine; Future; Expected date: 12/23/2024    Mixed hyperlipidemia  -     Lipid Panel; Future; Expected date: 12/23/2024    Coronary artery disease involving native coronary artery of native heart without angina pectoris    Stage 3b  chronic kidney disease    Depression, recurrent    Normocytic anemia  -     Occult blood x 1, stool; Future  -     Occult blood x 1, stool; Future  -     Occult blood x 1, stool; Future  -     CBC Auto Differential; Future; Expected date: 12/23/2024  -     Iron and TIBC; Future; Expected date: 12/26/2024  -     Ferritin; Future; Expected date: 12/26/2024    Vagina bleeding            Wellness reviewed      Gave name of [facility] gyn for vaginal dryness.  Could not refer with our Epic  Continue current management and monitor.  Other diagnoses were reviewed and found stable and will continue to monitor.  Counseled on regular exercise, maintenance of a healthy weight, balanced diet rich in fruits/vegetables and lean protein, and avoidance of unhealthy habits like smoking and excessive alcohol intake.   Also, counseled on importance of being compliant with medication, health appointments, diet and exercise.     Follow up in about 6 months (around 12/26/2024).      Medication List with Changes/Refills   Current Medications    AMLODIPINE (NORVASC) 2.5 MG TABLET    Take 3 tablets (7.5 mg total) by mouth once daily.    ASPIRIN (ECOTRIN) 81 MG EC TABLET    Take 1 tablet (81 mg total) by mouth once daily. for 21 days    ATOGEPANT (QULIPTA) 60 MG TAB    Take 1 tablet (60 mg total) by mouth once daily.    BEMPEDOIC ACID (NEXLETOL) 180 MG TAB    Take 1 tablet (180 mg total) by mouth Daily.    BENAZEPRIL (LOTENSIN) 20 MG TABLET    Take 1 tablet (20 mg total) by mouth 2 (two) times daily.    BUTALBITAL-ACETAMINOPHEN-CAFFEINE -40 MG (FIORICET, ESGIC) -40 MG PER TABLET    1 tab PO PRN migraine. No more than 10 tab per month.    CALCIUM CARBONATE/VITAMIN D3 (CALCIUM 500 WITH D ORAL)    Take 1 capsule by mouth once daily.    CARBOXYMETHYLCELLULOSE (REFRESH PLUS) 0.5 % DPET    Place 1 drop into both eyes daily as needed (DRY EYES).    CLOPIDOGREL (PLAVIX) 75 MG TABLET    Take 1 tablet (75 mg total) by mouth once daily.     CO-ENZYME Q-10 30 MG CAPSULE    Take 30 mg by mouth 3 (three) times daily.    DICYCLOMINE (BENTYL) 10 MG CAPSULE    Take 1 capsule (10 mg total) by mouth 4 (four) times daily as needed (stomach discomfort).    ESOMEPRAZOLE (NEXIUM) 40 MG CAPSULE    Take 1 capsule (40 mg total) by mouth before breakfast.    EVOLOCUMAB (REPATHA SURECLICK) 140 MG/ML PNIJ    Inject 1 mL (140 mg total) into the skin every 14 (fourteen) days.    FLUTICASONE PROPIONATE (FLONASE) 50 MCG/ACTUATION NASAL SPRAY    1 spray (50 mcg total) by Each Nostril route once daily.    GARLIC (GARLIQUE ORAL)    Take 1 capsule by mouth Daily.    ISOSORBIDE MONONITRATE (IMDUR) 30 MG 24 HR TABLET    TAKE 1 TABLET ONE TIME DAILY    METHOCARBAMOL (ROBAXIN) 750 MG TAB    TAKE ONE TABLET BY MOUTH FOUR TIMES DAILY    MULTIVITAMIN (MULTIPLE VITAMIN ORAL)    Take 1 capsule by mouth once daily.    NITROGLYCERIN (NITROSTAT) 0.4 MG SL TABLET    Place 1 tablet (0.4 mg total) under the tongue every 5 (five) minutes as needed for Chest pain.    NITROGLYCERIN 0.2 MG/HR TD PT24 (NITRODUR) 0.2 MG/HR    Place 1 patch onto the skin once daily.    OXYBUTYNIN (DITROPAN XL) 15 MG TR24    Take 1 tablet (15 mg total) by mouth every morning.    PROMETHAZINE (PHENERGAN) 25 MG TABLET    TAKE ONE TABLET BY MOUTH EVERY 6 HOURS AS NEEDED FOR NAUSEA    SENNOSIDES (LAXATIVE, SENNOSIDES,) 25 MG TAB    Take by mouth.    SUBOXONE 8-2 MG    Place 1 each under the tongue daily as needed (restless legs).    SYNTHROID 100 MCG TABLET    Take 1 tablet (100 mcg total) by mouth before breakfast. **BRAND MEDICALLY NECESSARY**    TIZANIDINE (ZANAFLEX) 4 MG TABLET    Half or full tablet by mouth at night as needed for muscle spasm    VALACYCLOVIR (VALTREX) 1000 MG TABLET    TAKE ONE TABLET BY MOUTH EVERY DAY FOR 7 DAYS       BP Readings from Last 3 Encounters:   06/26/24 122/60   06/20/24 136/80   05/22/24 (!) 183/86     Hemoglobin A1C   Date Value Ref Range Status   06/21/2024 5.4 4.0 - 5.6 % Final      Comment:     ADA Screening Guidelines:  5.7-6.4%  Consistent with prediabetes  >or=6.5%  Consistent with diabetes    High levels of fetal hemoglobin interfere with the HbA1C  assay. Heterozygous hemoglobin variants (HbS, HgC, etc)do  not significantly interfere with this assay.   However, presence of multiple variants may affect accuracy.     11/29/2023 5.5 4.0 - 5.6 % Final     Comment:     ADA Screening Guidelines:  5.7-6.4%  Consistent with prediabetes  >or=6.5%  Consistent with diabetes    High levels of fetal hemoglobin interfere with the HbA1C  assay. Heterozygous hemoglobin variants (HbS, HgC, etc)do  not significantly interfere with this assay.   However, presence of multiple variants may affect accuracy.     05/30/2023 5.6 4.0 - 5.6 % Final     Comment:     ADA Screening Guidelines:  5.7-6.4%  Consistent with prediabetes  >or=6.5%  Consistent with diabetes    High levels of fetal hemoglobin interfere with the HbA1C  assay. Heterozygous hemoglobin variants (HbS, HgC, etc)do  not significantly interfere with this assay.   However, presence of multiple variants may affect accuracy.       Lab Results   Component Value Date    TSH 2.444 06/21/2024     Lab Results   Component Value Date    LDLCALC 46.8 (L) 06/21/2024    LDLCALC 84.2 11/29/2023    LDLCALC 74.0 05/30/2023     Lab Results   Component Value Date    TRIG 36 06/21/2024    TRIG 54 11/29/2023    TRIG 85 05/30/2023     Wt Readings from Last 3 Encounters:   06/26/24 63.8 kg (140 lb 10.5 oz)   06/20/24 64.2 kg (141 lb 8.6 oz)   05/22/24 63.6 kg (140 lb 3.4 oz)     Lab Results   Component Value Date    HGB 11.0 (L) 06/21/2024    HGB 11.0 (L) 06/21/2024    HCT 34.1 (L) 06/21/2024    WBC 6.13 06/21/2024    ALT 13 06/21/2024    ALT 13 06/21/2024    AST 22 06/21/2024    AST 22 06/21/2024     06/21/2024     06/21/2024    K 5.2 (H) 06/21/2024    K 5.2 (H) 06/21/2024    CREATININE 1.2 06/21/2024    CREATININE 1.2 06/21/2024           Review of Systems    Constitutional:  Negative for diaphoresis and fever.   HENT:  Negative for drooling and nosebleeds.    Eyes:  Negative for discharge and redness.   Respiratory:  Negative for apnea and choking.    Cardiovascular:  Negative for chest pain and palpitations.   Gastrointestinal:  Negative for abdominal pain and nausea.   Skin:  Negative for color change.   Neurological:  Negative for seizures and syncope.   Psychiatric/Behavioral:  Negative for behavioral problems.            Objective:      Vitals:    06/26/24 1353   BP: 122/60   Pulse: 66   Temp: 98.8 °F (37.1 °C)     Physical Exam  Vitals reviewed.   Eyes:      Conjunctiva/sclera: Conjunctivae normal.   Neck:      Thyroid: No thyromegaly.      Trachea: Trachea normal.   Cardiovascular:      Rate and Rhythm: Normal rate and regular rhythm.      Pulses:           Dorsalis pedis pulses are 2+ on the right side and 2+ on the left side.      Comments: Edema negative  Pulmonary:      Effort: Pulmonary effort is normal.      Breath sounds: Normal breath sounds.   Abdominal:      General: Bowel sounds are normal.      Palpations: Abdomen is soft. There is no hepatomegaly.   Musculoskeletal:      Cervical back: Normal range of motion.      Comments: ROM normal bilateral  Strength normal bilateral   Feet:      Right foot:      Protective Sensation: 4 sites tested.  4 sites sensed.      Left foot:      Protective Sensation: 4 sites tested.  4 sites sensed.   Skin:     General: Skin is warm and dry.   Neurological:      Deep Tendon Reflexes: Reflexes are normal and symmetric.   Psychiatric:      Comments: Alert and Oriented
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
## 2024-07-02 ENCOUNTER — PATIENT MESSAGE (OUTPATIENT)
Dept: PHARMACY | Facility: CLINIC | Age: 74
End: 2024-07-02
Payer: MEDICARE

## 2024-07-02 LAB
OHS CV AF BURDEN PERCENT: < 1
OHS CV DC REMOTE DEVICE TYPE: NORMAL

## 2024-07-03 ENCOUNTER — CLINICAL SUPPORT (OUTPATIENT)
Dept: REHABILITATION | Facility: HOSPITAL | Age: 74
End: 2024-07-03
Payer: MEDICARE

## 2024-07-03 DIAGNOSIS — M50.30 DDD (DEGENERATIVE DISC DISEASE), CERVICAL: ICD-10-CM

## 2024-07-03 DIAGNOSIS — G89.29 OTHER CHRONIC PAIN: Primary | ICD-10-CM

## 2024-07-03 DIAGNOSIS — M47.816 LUMBAR SPONDYLOSIS: ICD-10-CM

## 2024-07-03 DIAGNOSIS — M51.36 DDD (DEGENERATIVE DISC DISEASE), LUMBAR: ICD-10-CM

## 2024-07-03 PROCEDURE — 97110 THERAPEUTIC EXERCISES: CPT | Mod: PO

## 2024-07-03 PROCEDURE — 97140 MANUAL THERAPY 1/> REGIONS: CPT | Mod: PO

## 2024-07-03 NOTE — PROGRESS NOTES
"[facility]   Physical Therapy Treatment Note     Name: Osman Johansen  Clinic Number: [id number]    Therapy Diagnosis:   Encounter Diagnoses   Name Primary?    Other chronic pain Yes    DDD (degenerative disc disease), cervical     DDD (degenerative disc disease), lumbar     Lumbar spondylosis      Physician: Talia Belcher MD    Visit Date: 7/3/2024    Physician Orders: PT Eval and Treat   Medical Diagnosis from Referral:   Diagnosis   M70.60 (ICD-10-CM) - Trochanteric bursitis, unspecified laterality   M47.816 (ICD-10-CM) - Lumbar spondylosis   M47.812 (ICD-10-CM) - Cervical spondylosis      Evaluation Date: 6/26/2024  Authorization Period Expiration: 5/22/2025  Plan of Care Expiration: 8/21/2024  Progress Note Due: 7/24/2024  Date of Surgery: None outside of injections  Visit # / Visits authorized: 1/20 + eval   FOTO: 1/ 3     Precautions: Standard and blood thinners     PTA Visit #: 0/5     Time In: 4:04 pm  Time Out: 5:03 pm  Total Billable Time: 59 minutes    SUBJECTIVE     Pt reports: that she's still getting headaches and back aches if she's sitting for prolonged periods of time. States that she wants to try dry needling today.  She was compliant with home exercise program.  Response to previous treatment: no change  Functional change: Unable to be determined    Pain: 3/10  Location: bilateral neck      OBJECTIVE     Objective Measures updated at progress report unless specified.     Treatment     Sherie received the treatments listed below:      therapeutic exercises to develop strength, endurance, and ROM for 39 minutes including:  Cervical rotation SNAG 15x 3" holds   LS stretch 10 seconds, 10x each  Open books  LTR 20x each way  Bridge 3x10     NV:  Recumbent bike  Piriformis stretch  Manual cervical traction  Lumbar rollouts 3 directions 10x 3"    manual therapy techniques: Dry needling was applied to the: cervical spine for 20 minutes, including:  Palpation Assessment to " determine the necessity for Functional Dry Needling    This was applied to the bilateral Suboccipitals and Upper Trap. Dry needling was performed to decrease inflammation, increase circulation, decrease pain and restore homeostasis.    30 mm needles with 0.30 diameter were used. Electrical stimulation was not applied to the needles. Patient demonstrated appropriate response to Functional Dry Needling. All needles were removed and changes in signs and symptoms were assessed. No adverse reactions noted at the conclusion of the treatment.  See EMR under MEDIA for written consent provided.      neuromuscular re-education activities to improve: Coordination, Kinesthetic, Sense, and Proprioception for 00 minutes. The following activities were included:    therapeutic activities to improve functional performance for 00  minutes, including:        Patient Education and Home Exercises     Home Exercises Provided and Patient Education Provided     Education provided:   - patient was educated on what to expect from dry needling and how to reduce muscle soreness following intervention if she were to experience any    Written Home Exercises Provided: Patient instructed to cont prior HEP. Exercises were reviewed and Sherie was able to demonstrate them prior to the end of the session.  Sherie demonstrated good  understanding of the education provided. See EMR under Patient Instructions for exercises provided during therapy sessions    ASSESSMENT     Patient with positive response to initiation of her therex and and dry needling. No immediate adverse effects from dry needling with abolishment of cervical pain noted at the end of today's session. Significant cueing needed during today's exercises for correct exercise performance. Will monitor patient for response to today's interventions. Focus will be on addressing her low back if her cervical symptoms are able to be addressed with dry needling.    Sherie Is progressing well towards  her goals.   Pt prognosis is Good.     Pt will continue to benefit from skilled outpatient physical therapy to address the deficits listed in the problem list box on initial evaluation, provide pt/family education and to maximize pt's level of independence in the home and community environment.     Pt's spiritual, cultural and educational needs considered and pt agreeable to plan of care and goals.     Anticipated barriers to physical therapy: none    Goals:   Short Term Goals: 4 weeks   Patient will be independent with her HEP to impact her progress with physical therapy  Patient will reduce her frequency of headaches to </= 3x per week to impact her QoL  Patient will improve her lumbar flexion and extension by 10 degrees to impact her ability to don and doff shoes     Long Term Goals: 8 weeks   Patient will improve her FOTO score to >/= 49 to demonstrate significant improvements in her function and ADL performance  Patient will report abolishment of headaches and migraines to impact her QoL.  Patient will report worst pain in her neck and low back as </= 6/10 to impact her tolerance to performing physical activity.    PLAN     Continue with LYLA Mota, PT

## 2024-07-16 DIAGNOSIS — G43.719 INTRACTABLE CHRONIC MIGRAINE WITHOUT AURA AND WITHOUT STATUS MIGRAINOSUS: ICD-10-CM

## 2024-07-16 RX ORDER — BUTALBITAL, ACETAMINOPHEN AND CAFFEINE 50; 325; 40 MG/1; MG/1; MG/1
TABLET ORAL
Qty: 10 TABLET | Refills: 0 | Status: SHIPPED | OUTPATIENT
Start: 2024-07-16

## 2024-07-17 ENCOUNTER — CLINICAL SUPPORT (OUTPATIENT)
Dept: REHABILITATION | Facility: HOSPITAL | Age: 74
End: 2024-07-17
Payer: MEDICARE

## 2024-07-17 DIAGNOSIS — G89.29 CHRONIC LOW BACK PAIN WITHOUT SCIATICA, UNSPECIFIED BACK PAIN LATERALITY: ICD-10-CM

## 2024-07-17 DIAGNOSIS — M54.50 CHRONIC LOW BACK PAIN WITHOUT SCIATICA, UNSPECIFIED BACK PAIN LATERALITY: ICD-10-CM

## 2024-07-17 DIAGNOSIS — M51.36 DDD (DEGENERATIVE DISC DISEASE), LUMBAR: ICD-10-CM

## 2024-07-17 DIAGNOSIS — M79.7 FIBROMYALGIA: ICD-10-CM

## 2024-07-17 DIAGNOSIS — M50.30 DDD (DEGENERATIVE DISC DISEASE), CERVICAL: Primary | ICD-10-CM

## 2024-07-17 PROCEDURE — 97110 THERAPEUTIC EXERCISES: CPT | Mod: PO

## 2024-07-17 PROCEDURE — 97140 MANUAL THERAPY 1/> REGIONS: CPT | Mod: PO

## 2024-07-17 NOTE — PROGRESS NOTES
"[facility]   Physical Therapy Treatment Note     Name: Osman Johansen  Clinic Number: [id number]    Therapy Diagnosis:   Encounter Diagnoses   Name Primary?    DDD (degenerative disc disease), cervical Yes    DDD (degenerative disc disease), lumbar     Fibromyalgia     Chronic low back pain without sciatica, unspecified back pain laterality      Physician: Talia Belcher MD    Visit Date: 7/17/2024    Physician Orders: PT Eval and Treat   Medical Diagnosis from Referral:   Diagnosis   M70.60 (ICD-10-CM) - Trochanteric bursitis, unspecified laterality   M47.816 (ICD-10-CM) - Lumbar spondylosis   M47.812 (ICD-10-CM) - Cervical spondylosis      Evaluation Date: 6/26/2024  Authorization Period Expiration: 5/22/2025  Plan of Care Expiration: 8/21/2024  Progress Note Due: 7/24/2024  Date of Surgery: None outside of injections  Visit # / Visits authorized: 2/20 + eval   FOTO: 1/ 3     Precautions: Standard and blood thinners     PTA Visit #: 0/5     Time In: 4:00 pm  Time Out: 5:02 pm  Total Billable Time: 62 minutes    SUBJECTIVE     Pt reports: reduced HA frequency since her second visit. States that she's only had a few HA since her second visit. States that she had a HA earlier, though.  She was compliant with home exercise program.  Response to previous treatment: no change  Functional change: Unable to be determined    Pain: 0/10, 7/10 earlier today  Location: bilateral neck      OBJECTIVE     Objective Measures updated at progress report unless specified.     Treatment     Sherie received the treatments listed below:      therapeutic exercises to develop strength, endurance, and ROM for 43 minutes including:  Cervical rotation SNAG 15x 3" holds   LS stretch 10 seconds, 10x each  Open books 15x 3"  LTR 20x each way  Bridge 3x10  Piriformis stretch 30" x3 each  Standing hip abduction 2x15 each  Lumbar rollouts 3 directions 10x 3"    manual therapy techniques: Dry needling was applied to " the: cervical spine for 19 minutes, including:  Palpation Assessment to determine the necessity for Functional Dry Needling    This was applied to the bilateral Suboccipitals and Upper Trap. Dry needling was performed to decrease inflammation, increase circulation, decrease pain and restore homeostasis.    30 mm needles with 0.30 diameter were used. Electrical stimulation was not applied to the needles. Patient demonstrated appropriate response to Functional Dry Needling. All needles were removed and changes in signs and symptoms were assessed. No adverse reactions noted at the conclusion of the treatment.  See EMR under MEDIA for written consent provided.      neuromuscular re-education activities to improve: Coordination, Kinesthetic, Sense, and Proprioception for 00 minutes. The following activities were included:    therapeutic activities to improve functional performance for 00  minutes, including:        Patient Education and Home Exercises     Home Exercises Provided and Patient Education Provided     Education provided:   - patient was educated on what to expect from dry needling and how to reduce muscle soreness following intervention if she were to experience any    Written Home Exercises Provided: Patient instructed to cont prior HEP. Exercises were reviewed and Sherie was able to demonstrate them prior to the end of the session.  Sherie demonstrated good  understanding of the education provided. See EMR under Patient Instructions for exercises provided during therapy sessions    ASSESSMENT     Patient with quick improvement in symptom frequency following her previous visit. Still experiences HA but not to the extent that she was experiencing them prior to her initial visit. Plan is to alternate dry needling to her low back next visit and perform manual interventions on her cervical spine. May be able to shift to more exercises focusing on her low back pain if her cervical pain continues to  improve.    Sherie Is progressing well towards her goals.   Pt prognosis is Good.     Pt will continue to benefit from skilled outpatient physical therapy to address the deficits listed in the problem list box on initial evaluation, provide pt/family education and to maximize pt's level of independence in the home and community environment.     Pt's spiritual, cultural and educational needs considered and pt agreeable to plan of care and goals.     Anticipated barriers to physical therapy: none    Goals:   Short Term Goals: 4 weeks   Patient will be independent with her HEP to impact her progress with physical therapy  Patient will reduce her frequency of headaches to </= 3x per week to impact her QoL  Patient will improve her lumbar flexion and extension by 10 degrees to impact her ability to don and doff shoes     Long Term Goals: 8 weeks   Patient will improve her FOTO score to >/= 49 to demonstrate significant improvements in her function and ADL performance  Patient will report abolishment of headaches and migraines to impact her QoL.  Patient will report worst pain in her neck and low back as </= 6/10 to impact her tolerance to performing physical activity.    PLAN     Continue with LYLA Mota, PT

## 2024-07-18 DIAGNOSIS — Z95.1 S/P CABG (CORONARY ARTERY BYPASS GRAFT): ICD-10-CM

## 2024-07-18 RX ORDER — EVOLOCUMAB 140 MG/ML
140 INJECTION, SOLUTION SUBCUTANEOUS
Qty: 2 EACH | Refills: 5 | Status: ACTIVE | OUTPATIENT
Start: 2024-07-18

## 2024-07-22 ENCOUNTER — CLINICAL SUPPORT (OUTPATIENT)
Dept: REHABILITATION | Facility: HOSPITAL | Age: 74
End: 2024-07-22
Payer: MEDICARE

## 2024-07-22 DIAGNOSIS — M79.7 FIBROMYALGIA: ICD-10-CM

## 2024-07-22 DIAGNOSIS — M51.36 DDD (DEGENERATIVE DISC DISEASE), LUMBAR: ICD-10-CM

## 2024-07-22 DIAGNOSIS — M50.30 DDD (DEGENERATIVE DISC DISEASE), CERVICAL: Primary | ICD-10-CM

## 2024-07-22 DIAGNOSIS — M79.18 MYOFASCIAL PAIN SYNDROME, CERVICAL: ICD-10-CM

## 2024-07-22 PROCEDURE — 97110 THERAPEUTIC EXERCISES: CPT | Mod: PO

## 2024-07-22 PROCEDURE — 97140 MANUAL THERAPY 1/> REGIONS: CPT | Mod: PO

## 2024-07-22 NOTE — PROGRESS NOTES
"[facility]   Physical Therapy Treatment Note     Name: Osman Johansen  Clinic Number: [id number]    Therapy Diagnosis:   Encounter Diagnoses   Name Primary?    DDD (degenerative disc disease), cervical Yes    DDD (degenerative disc disease), lumbar     Fibromyalgia     Myofascial pain syndrome, cervical        Physician: Talia Belcher MD    Visit Date: 7/22/2024    Physician Orders: PT Eval and Treat   Medical Diagnosis from Referral:   Diagnosis   M70.60 (ICD-10-CM) - Trochanteric bursitis, unspecified laterality   M47.816 (ICD-10-CM) - Lumbar spondylosis   M47.812 (ICD-10-CM) - Cervical spondylosis      Evaluation Date: 6/26/2024  Authorization Period Expiration: 5/22/2025  Plan of Care Expiration: 8/21/2024  Progress Note Due: 7/24/2024  Date of Surgery: None outside of injections  Visit # / Visits authorized: 3/20 + eval   FOTO: 1/ 3     Precautions: Standard and blood thinners     PTA Visit #: 0/5     Time In: 2:04 pm  Time Out: 2:59 pm  Total Billable Time: 55 minutes    SUBJECTIVE     Pt reports: having increased pain in her back after her last visit.  She was compliant with home exercise program.  Response to previous treatment: reductions in HA frequency  Functional change: Unable to be determined    Pain: 0/10  Location: bilateral neck      OBJECTIVE     Objective Measures updated at progress report unless specified.     Treatment     Sherie received the treatments listed below:      therapeutic exercises to develop strength, endurance, and ROM for 35 minutes including:  Cervical rotation SNAG 15x 3" holds   LS stretch 10 seconds, 10x each  Open books 15x 3"  LTR 20x each way  Bridge 3x10  Piriformis stretch 30" x3 each  Standing hip abduction 2x15 each    manual therapy techniques: Dry needling was applied to the: cervical spine for 20 minutes, including:  SO massage  STM to cervical pvms  Palpation Assessment to determine the necessity for Functional Dry Needling    This was " applied to the bilateral Suboccipitals. Dry needling was performed to decrease inflammation, increase circulation, decrease pain and restore homeostasis.    30 mm needles with 0.30 diameter were used. Electrical stimulation was not applied to the needles. Patient demonstrated appropriate response to Functional Dry Needling. All needles were removed and changes in signs and symptoms were assessed. No adverse reactions noted at the conclusion of the treatment.  See EMR under MEDIA for written consent provided.      neuromuscular re-education activities to improve: Coordination, Kinesthetic, Sense, and Proprioception for 00 minutes. The following activities were included:    therapeutic activities to improve functional performance for 00  minutes, including:        Patient Education and Home Exercises     Home Exercises Provided and Patient Education Provided     Education provided:   - patient was educated on what to expect from dry needling and how to reduce muscle soreness following intervention if she were to experience any    Written Home Exercises Provided: Patient instructed to cont prior HEP. Exercises were reviewed and Sherie was able to demonstrate them prior to the end of the session.  Sherie demonstrated good  understanding of the education provided. See EMR under Patient Instructions for exercises provided during therapy sessions    ASSESSMENT     Increases in HA intensity following her previous visit but unsure of what may have triggered her episode. Performed dry needling again today in hopes of reducing cervical pain. Will discontinue intervention to her cervical spine if her migraine was to return immediately after her visit again. Plan to perform dry needling to her low back once HA and cervical pain reduces. Unable to perform seated lumbar flexion this visit 2/2 increases in SIJ pain following completion last visit.    Sherie Is progressing well towards her goals.   Pt prognosis is Good.     Pt will  continue to benefit from skilled outpatient physical therapy to address the deficits listed in the problem list box on initial evaluation, provide pt/family education and to maximize pt's level of independence in the home and community environment.     Pt's spiritual, cultural and educational needs considered and pt agreeable to plan of care and goals.     Anticipated barriers to physical therapy: none    Goals:   Short Term Goals: 4 weeks   Patient will be independent with her HEP to impact her progress with physical therapy  Patient will reduce her frequency of headaches to </= 3x per week to impact her QoL  Patient will improve her lumbar flexion and extension by 10 degrees to impact her ability to don and doff shoes     Long Term Goals: 8 weeks   Patient will improve her FOTO score to >/= 49 to demonstrate significant improvements in her function and ADL performance  Patient will report abolishment of headaches and migraines to impact her QoL.  Patient will report worst pain in her neck and low back as </= 6/10 to impact her tolerance to performing physical activity.    PLAN     Continue with LYLA Mota, PT

## 2024-07-23 ENCOUNTER — HOSPITAL ENCOUNTER (OUTPATIENT)
Dept: CARDIOLOGY | Facility: HOSPITAL | Age: 74
Discharge: HOME | End: 2024-07-23
Attending: INTERNAL MEDICINE

## 2024-07-23 ENCOUNTER — CLINICAL SUPPORT (OUTPATIENT)
Dept: CARDIOLOGY | Facility: HOSPITAL | Age: 74
End: 2024-07-23
Payer: MEDICARE

## 2024-07-23 DIAGNOSIS — I49.8 OTHER SPECIFIED CARDIAC ARRHYTHMIAS: ICD-10-CM

## 2024-07-23 PROCEDURE — 93298 REM INTERROG DEV EVAL SCRMS: CPT | Mod: 26,,, | Performed by: INTERNAL MEDICINE

## 2024-07-25 ENCOUNTER — CLINICAL SUPPORT (OUTPATIENT)
Dept: REHABILITATION | Facility: HOSPITAL | Age: 74
End: 2024-07-25
Payer: MEDICARE

## 2024-07-25 DIAGNOSIS — M51.36 DDD (DEGENERATIVE DISC DISEASE), LUMBAR: ICD-10-CM

## 2024-07-25 DIAGNOSIS — M43.06 SPONDYLOLYSIS OF LUMBAR REGION: ICD-10-CM

## 2024-07-25 DIAGNOSIS — M79.7 FIBROMYALGIA: ICD-10-CM

## 2024-07-25 DIAGNOSIS — G89.29 OTHER CHRONIC PAIN: Primary | ICD-10-CM

## 2024-07-25 DIAGNOSIS — M50.30 DDD (DEGENERATIVE DISC DISEASE), CERVICAL: ICD-10-CM

## 2024-07-25 LAB
OHS CV AF BURDEN PERCENT: < 1
OHS CV DC REMOTE DEVICE TYPE: NORMAL

## 2024-07-25 PROCEDURE — 97110 THERAPEUTIC EXERCISES: CPT | Mod: KX,PO

## 2024-07-25 PROCEDURE — 97530 THERAPEUTIC ACTIVITIES: CPT | Mod: KX,PO

## 2024-07-25 NOTE — PROGRESS NOTES
[facility]   Physical Therapy Treatment Note     Name: Osman Johansen  Clinic Number: [id number]    Therapy Diagnosis:   Encounter Diagnoses   Name Primary?    Other chronic pain Yes    DDD (degenerative disc disease), cervical     DDD (degenerative disc disease), lumbar     Spondylolysis of lumbar region     Fibromyalgia        Physician: Talia Belcher MD    Visit Date: 7/25/2024    Physician Orders: PT Eval and Treat   Medical Diagnosis from Referral:   Diagnosis   M70.60 (ICD-10-CM) - Trochanteric bursitis, unspecified laterality   M47.816 (ICD-10-CM) - Lumbar spondylosis   M47.812 (ICD-10-CM) - Cervical spondylosis      Evaluation Date: 6/26/2024  Authorization Period Expiration: 5/22/2025  Plan of Care Expiration: 8/21/2024  Progress Note Due: 8/21/2024  Date of Surgery: None outside of injections  Visit # / Visits authorized: 4/20 + eval   FOTO: 2/ 3     Precautions: Standard and blood thinners     PTA Visit #: 0/5     Time In: 4:00 pm  Time Out: 4:56 pm  Total Billable Time: 56 minutes    SUBJECTIVE     Pt reports: having a little tension in her neck. States that her neck feels much better since starting physical therapy. She states that she's getting about 2 HA per week now rather than daily HA. States that her back has been up and down. States that she's okay with doing some activities for a few hours at most.   She was compliant with home exercise program.  Response to previous treatment: reductions in HA frequency  Functional change: Unable to be determined    Pain: 0/10  Location: bilateral neck      OBJECTIVE     Posture: Slight forward head posture     Reflexes:                         L              R  C5(Biceps)*                     2+             2+  C6:(Brachioradialis)        2+              2+  C7: (Triceps)                   2+             2+                                                                                                                                "                                                                                                   Cervical ROM: Inclinometer/Goniometer                            Pain/dysfunction/movement  Flexion 45     Extension 35     Side-bending Right 25     Side-bending Left 25 Tightness   Right rotation 60     Left rotation 50 Pain      Lumbar range of motion:  Flexion 40     Extension 10    Side-bending Right 20    Side-bending Left 10 Pain   Right rotation 30     Left rotation 60           Upper Extremity Strength  (R) UE   (L) UE     C5 Deltoid(90 ABD): 4+/5 C5 Deltoid (90 ABD): 4+/5   C6 Biceps/ECRB/L 4+/5 C6 Biceps/ECRB/L 4+/5   C7 triceps/FCR 5/5 C7 triceps/FCR 5/5      Lower Extremity Strength  Knee extension and flexion marian 4+/5  Hip ER and IR marian 5/5  Hip flexors 4+/5 marian  Hip abductors 4+/5 marian  Hip extensors 4+/5 marian     Special Tests:  Distraction +   Compression -   Spurlings -      Joint Mobility: Hypomobile      Palpation: TTP to SO musculature, cervical pvms, scalenes, UT, lumbar pvms, glutes, and greater trochanters marian         Intake Outcome Measure for FOTO Cervical Survey     Therapist reviewed FOTO scores for Osman Johansen on 7/25/2024.   FOTO report - see Media section or FOTO account episode details.     Intake Score: 53%           Treatment     Sherie received the treatments listed below:      therapeutic exercises to develop strength, endurance, and ROM for 28 minutes including:  Cervical rotation and extension SNAG 15x 3" holds   LS stretch 10 seconds, 10x each  Open books 15x 3"  LTR 20x each way  Bridge 3x10  Piriformis stretch 30" x3 each - NP  Standing hip abduction 2x15 each - NP    manual therapy techniques: Dry needling was applied to the: cervical spine for 00 minutes, including:  SO massage  STM to cervical pvms  Palpation Assessment to determine the necessity for Functional Dry Needling    This was applied to the bilateral Suboccipitals. Dry needling was performed to decrease " inflammation, increase circulation, decrease pain and restore homeostasis.    30 mm needles with 0.30 diameter were used. Electrical stimulation was not applied to the needles. Patient demonstrated appropriate response to Functional Dry Needling. All needles were removed and changes in signs and symptoms were assessed. No adverse reactions noted at the conclusion of the treatment.  See EMR under MEDIA for written consent provided.      neuromuscular re-education activities to improve: Coordination, Kinesthetic, Sense, and Proprioception for 00 minutes. The following activities were included:      therapeutic activities to improve functional performance for 28 minutes, including:  Time was taken during today's visit to perform reassessment, as well as update goals and objective measurements.      Patient Education and Home Exercises     Home Exercises Provided and Patient Education Provided     Education provided:   - patient was educated exam findings from today's reassessment    Written Home Exercises Provided: Patient instructed to cont prior HEP. Exercises were reviewed and Sherie was able to demonstrate them prior to the end of the session.  Sherie demonstrated good  understanding of the education provided. See EMR under Patient Instructions for exercises provided during therapy sessions    ASSESSMENT     Pt was reassessed this visit d/t 30 days since her initial evaluation. Since her initial evaluation, she has reported improvements in her cervical pain, HA intensity, and HA frequency. She also presents with slight improvements in her lumbar range of motion. Still has slight pain with left sidebending. She also continues to report painful cervical range of motion at this time, especially with left rotation. Her primary goal was to reduce her HA frequency and intensity which she is progressing well with at this time. Plan is to continue treating her cervical spine with combination of exercise, manual therapy, and  dry needling, then transition focus to her lumbar spine once her cervical spine is improved.    Sherie Is progressing well towards her goals.   Pt prognosis is Good.     Pt will continue to benefit from skilled outpatient physical therapy to address the deficits listed in the problem list box on initial evaluation, provide pt/family education and to maximize pt's level of independence in the home and community environment.     Pt's spiritual, cultural and educational needs considered and pt agreeable to plan of care and goals.     Anticipated barriers to physical therapy: none    Goals:   Short Term Goals: 4 weeks   Patient will be independent with her HEP to impact her progress with physical therapy. MET on 7/25/2024  Patient will reduce her frequency of headaches to </= 3x per week to impact her QoL. MET on 7/25/2024, gets about 2x/wk  Patient will improve her lumbar flexion and extension by 10 degrees to impact her ability to don and doff shoes. Ongoing, continue goal     Long Term Goals: 8 weeks   Patient will improve her FOTO score to >/= 49 to demonstrate significant improvements in her function and ADL performance. MET on 7/25/2024  Patient will report abolishment of headaches and migraines to impact her QoL.  Patient will report worst pain in her neck and low back as </= 6/10 to impact her tolerance to performing physical activity.    PLAN     Continue with LYLA Mota, PT

## 2024-07-29 ENCOUNTER — CLINICAL SUPPORT (OUTPATIENT)
Dept: REHABILITATION | Facility: HOSPITAL | Age: 74
End: 2024-07-29
Payer: MEDICARE

## 2024-07-29 DIAGNOSIS — I10 ESSENTIAL (PRIMARY) HYPERTENSION: Chronic | ICD-10-CM

## 2024-07-29 DIAGNOSIS — M50.30 DDD (DEGENERATIVE DISC DISEASE), CERVICAL: ICD-10-CM

## 2024-07-29 DIAGNOSIS — G89.29 CHRONIC LOW BACK PAIN WITHOUT SCIATICA, UNSPECIFIED BACK PAIN LATERALITY: ICD-10-CM

## 2024-07-29 DIAGNOSIS — K29.70 GASTRITIS, PRESENCE OF BLEEDING UNSPECIFIED, UNSPECIFIED CHRONICITY, UNSPECIFIED GASTRITIS TYPE: ICD-10-CM

## 2024-07-29 DIAGNOSIS — M54.50 CHRONIC LOW BACK PAIN WITHOUT SCIATICA, UNSPECIFIED BACK PAIN LATERALITY: ICD-10-CM

## 2024-07-29 DIAGNOSIS — M79.7 FIBROMYALGIA: Primary | ICD-10-CM

## 2024-07-29 DIAGNOSIS — M79.18 MYOFASCIAL PAIN SYNDROME, CERVICAL: ICD-10-CM

## 2024-07-29 PROCEDURE — 97110 THERAPEUTIC EXERCISES: CPT | Mod: KX,PO

## 2024-07-29 PROCEDURE — 97140 MANUAL THERAPY 1/> REGIONS: CPT | Mod: KX,PO

## 2024-07-29 RX ORDER — BENAZEPRIL HYDROCHLORIDE 20 MG/1
20 TABLET ORAL 2 TIMES DAILY
Qty: 180 TABLET | Refills: 1 | Status: SHIPPED | OUTPATIENT
Start: 2024-07-29

## 2024-07-29 NOTE — TELEPHONE ENCOUNTER
No care due was identified.  [facility] Embedded Care Due Messages. Reference number: [id number].   7/29/2024 12:38:39 PM CDT

## 2024-07-29 NOTE — PROGRESS NOTES
"[facility]   Physical Therapy Treatment Note     Name: Osman Johansen  Clinic Number: [id number]    Therapy Diagnosis:   Encounter Diagnoses   Name Primary?    Fibromyalgia Yes    Myofascial pain syndrome, cervical     Chronic low back pain without sciatica, unspecified back pain laterality     DDD (degenerative disc disease), cervical          Physician: Talia Belcher MD    Visit Date: 7/29/2024    Physician Orders: PT Eval and Treat   Medical Diagnosis from Referral:   Diagnosis   M70.60 (ICD-10-CM) - Trochanteric bursitis, unspecified laterality   M47.816 (ICD-10-CM) - Lumbar spondylosis   M47.812 (ICD-10-CM) - Cervical spondylosis      Evaluation Date: 6/26/2024  Authorization Period Expiration: 5/22/2025  Plan of Care Expiration: 8/21/2024  Progress Note Due: 8/21/2024  Date of Surgery: None outside of injections  Visit # / Visits authorized: 5/20 + eval   FOTO: 2/ 3     Precautions: Standard and blood thinners     PTA Visit #: 0/5     Time In: 1:00 pm  Time Out: 1:57 pm  Total Billable Time: 57 minutes     SUBJECTIVE     Pt reports: that she's doing okay today. States that she's only had 2 HA since her last visit. Intensity was only 5-6/10 and they were mostly in the morning. States that they passed throughout the day.   She was compliant with home exercise program.  Response to previous treatment: reductions in HA frequency  Functional change: Unable to be determined    Pain: 0/10  Location: bilateral neck      OBJECTIVE     Objective measures have not changed since previous reassessment unless otherwise stated    Treatment     Sherie received the treatments listed below:      therapeutic exercises to develop strength, endurance, and ROM for 25 minutes including:  Cervical rotation and extension SNAG 15x 3" holds   LS stretch 10 seconds, 10x each  Open books 15x 3"  Bridge 3x10  Piriformis stretch 30" x3 each - NV  LTR NV  Standing hip abduction 2x15 each    manual therapy " techniques: Dry needling was applied to the: cervical spine for 32 minutes, including:  SO massage  STM to cervical pvms  Palpation Assessment to determine the necessity for Functional Dry Needling    This was applied to the bilateral Upper Trapezius. Dry needling was performed to decrease inflammation, increase circulation, decrease pain and restore homeostasis.    30 mm needles with 0.30 diameter were used. Electrical stimulation was not applied to the needles. Patient demonstrated appropriate response to Functional Dry Needling. All needles were removed and changes in signs and symptoms were assessed. No adverse reactions noted at the conclusion of the treatment.  See EMR under MEDIA for written consent provided.      neuromuscular re-education activities to improve: Coordination, Kinesthetic, Sense, and Proprioception for 00 minutes. The following activities were included:      therapeutic activities to improve functional performance for 00 minutes, including:  Time was taken during today's visit to perform reassessment, as well as update goals and objective measurements.      Patient Education and Home Exercises     Home Exercises Provided and Patient Education Provided     Education provided:   - patient was educated on plan for physical therapy following resolution of her neck pain.    Written Home Exercises Provided: Patient instructed to cont prior HEP. Exercises were reviewed and Sherie was able to demonstrate them prior to the end of the session.  Sherie demonstrated good  understanding of the education provided. See EMR under Patient Instructions for exercises provided during therapy sessions    ASSESSMENT     Patient continues to report and demonstrate improvements in her HA frequency and intensity. Still having some deficits in her cervical range of motion. Plan is to continue focusing on her cervical pain and range of motion deficits until her cervical issues are resolved. Then switching focus to her  low back pain. She is progressing well at this time.    Sherie Is progressing well towards her goals.   Pt prognosis is Good.     Pt will continue to benefit from skilled outpatient physical therapy to address the deficits listed in the problem list box on initial evaluation, provide pt/family education and to maximize pt's level of independence in the home and community environment.     Pt's spiritual, cultural and educational needs considered and pt agreeable to plan of care and goals.     Anticipated barriers to physical therapy: none    Goals:   Short Term Goals: 4 weeks   Patient will be independent with her HEP to impact her progress with physical therapy. MET on 7/25/2024  Patient will reduce her frequency of headaches to </= 3x per week to impact her QoL. MET on 7/25/2024, gets about 2x/wk  Patient will improve her lumbar flexion and extension by 10 degrees to impact her ability to don and doff shoes. Ongoing, continue goal     Long Term Goals: 8 weeks   Patient will improve her FOTO score to >/= 49 to demonstrate significant improvements in her function and ADL performance. MET on 7/25/2024  Patient will report abolishment of headaches and migraines to impact her QoL.  Patient will report worst pain in her neck and low back as </= 6/10 to impact her tolerance to performing physical activity.    PLAN     Continue with LYLA Mota, PT

## 2024-07-30 RX ORDER — ESOMEPRAZOLE MAGNESIUM 40 MG/1
40 CAPSULE, DELAYED RELEASE ORAL
Qty: 90 CAPSULE | Refills: 3 | Status: SHIPPED | OUTPATIENT
Start: 2024-07-30

## 2024-07-31 ENCOUNTER — CLINICAL SUPPORT (OUTPATIENT)
Dept: REHABILITATION | Facility: HOSPITAL | Age: 74
End: 2024-07-31
Payer: MEDICARE

## 2024-07-31 DIAGNOSIS — M79.18 MYOFASCIAL PAIN SYNDROME, CERVICAL: ICD-10-CM

## 2024-07-31 DIAGNOSIS — M50.30 DDD (DEGENERATIVE DISC DISEASE), CERVICAL: ICD-10-CM

## 2024-07-31 DIAGNOSIS — M79.7 FIBROMYALGIA: ICD-10-CM

## 2024-07-31 DIAGNOSIS — M51.36 DDD (DEGENERATIVE DISC DISEASE), LUMBAR: ICD-10-CM

## 2024-07-31 DIAGNOSIS — M43.06 SPONDYLOLYSIS OF LUMBAR REGION: Primary | ICD-10-CM

## 2024-07-31 PROCEDURE — 97110 THERAPEUTIC EXERCISES: CPT | Mod: KX,PO

## 2024-07-31 PROCEDURE — 97140 MANUAL THERAPY 1/> REGIONS: CPT | Mod: KX,PO

## 2024-07-31 NOTE — PROGRESS NOTES
"[facility]   Physical Therapy Treatment Note     Name: Osman Johansen  Clinic Number: [id number]    Therapy Diagnosis:   Encounter Diagnoses   Name Primary?    Spondylolysis of lumbar region Yes    DDD (degenerative disc disease), cervical     DDD (degenerative disc disease), lumbar     Fibromyalgia     Myofascial pain syndrome, cervical            Physician: Talia Belcher MD    Visit Date: 7/31/2024    Physician Orders: PT Eval and Treat   Medical Diagnosis from Referral:   Diagnosis   M70.60 (ICD-10-CM) - Trochanteric bursitis, unspecified laterality   M47.816 (ICD-10-CM) - Lumbar spondylosis   M47.812 (ICD-10-CM) - Cervical spondylosis      Evaluation Date: 6/26/2024  Authorization Period Expiration: 5/22/2025  Plan of Care Expiration: 8/21/2024  Progress Note Due: 8/21/2024  Date of Surgery: None outside of injections  Visit # / Visits authorized: 6/20 + eval   FOTO: 2/ 3     Precautions: Standard and blood thinners     PTA Visit #: 0/5     Time In: 3:57 pm  Time Out: 4:57 pm  Total Billable Time: 60 minutes    SUBJECTIVE     Pt reports: that she went back to the gym for the first time and walked 1.25 miles on the treadmill. Just has some soreness in her hips. States that her neck is feeling fine today.  She was compliant with home exercise program.  Response to previous treatment: reductions in HA frequency  Functional change: Unable to be determined    Pain: 0/10  Location: bilateral neck      OBJECTIVE     Objective measures have not changed since previous reassessment unless otherwise stated    Treatment     Sherie received the treatments listed below:      therapeutic exercises to develop strength, endurance, and ROM for 42 minutes including:  Cervical rotation and extension SNAG 15x 3" holds   LS stretch 10 seconds, 10x each  Open books 15x 3"  Bridge 3x10  Piriformis stretch 30" x3 each  LTR 20x  Standing hip abduction 2x15 each  Seated lumbar rollouts 10x forward only  Seated " row machine 3x10 30#    manual therapy techniques: Dry needling was applied to the: cervical spine for 18 minutes, including:  SO massage  STM to cervical pvms      Palpation Assessment to determine the necessity for Functional Dry Needling - NP    This was applied to the bilateral Upper Trapezius. Dry needling was performed to decrease inflammation, increase circulation, decrease pain and restore homeostasis.    30 mm needles with 0.30 diameter were used. Electrical stimulation was not applied to the needles. Patient demonstrated appropriate response to Functional Dry Needling. All needles were removed and changes in signs and symptoms were assessed. No adverse reactions noted at the conclusion of the treatment.  See EMR under MEDIA for written consent provided.      neuromuscular re-education activities to improve: Coordination, Kinesthetic, Sense, and Proprioception for 00 minutes. The following activities were included:      therapeutic activities to improve functional performance for 00 minutes, including:  Time was taken during today's visit to perform reassessment, as well as update goals and objective measurements.      Patient Education and Home Exercises     Home Exercises Provided and Patient Education Provided     Education provided:   - patient was educated on plan for physical therapy following resolution of her neck pain.    Written Home Exercises Provided: Patient instructed to cont prior HEP. Exercises were reviewed and Sherie was able to demonstrate them prior to the end of the session.  Sherie demonstrated good  understanding of the education provided. See EMR under Patient Instructions for exercises provided during therapy sessions    ASSESSMENT     Patient with improving cervical pain at this time. No dry needling performed to cervical spine today. Patient chief complaint of tenderness to palpation along her SP at about her T6-T7 level. Performed prone PA glides with equivocal response. Did have  pain referral into her skull along her left side with SO massage. Added in additional exercises to her program this visit to address her low back which she tolerated well. Should be able to perform dry needling to her lumbar spine next visit.    Sherie Is progressing well towards her goals.   Pt prognosis is Good.     Pt will continue to benefit from skilled outpatient physical therapy to address the deficits listed in the problem list box on initial evaluation, provide pt/family education and to maximize pt's level of independence in the home and community environment.     Pt's spiritual, cultural and educational needs considered and pt agreeable to plan of care and goals.     Anticipated barriers to physical therapy: none    Goals:   Short Term Goals: 4 weeks   Patient will be independent with her HEP to impact her progress with physical therapy. MET on 7/25/2024  Patient will reduce her frequency of headaches to </= 3x per week to impact her QoL. MET on 7/25/2024, gets about 2x/wk  Patient will improve her lumbar flexion and extension by 10 degrees to impact her ability to don and doff shoes. Ongoing, continue goal     Long Term Goals: 8 weeks   Patient will improve her FOTO score to >/= 49 to demonstrate significant improvements in her function and ADL performance. MET on 7/25/2024  Patient will report abolishment of headaches and migraines to impact her QoL.  Patient will report worst pain in her neck and low back as </= 6/10 to impact her tolerance to performing physical activity.    PLAN     Continue with LYLA Mota, PT

## 2024-08-06 DIAGNOSIS — Z95.818 PRESENCE OF OTHER CARDIAC IMPLANTS AND GRAFTS: ICD-10-CM

## 2024-08-06 DIAGNOSIS — Z95.1 S/P CABG (CORONARY ARTERY BYPASS GRAFT): ICD-10-CM

## 2024-08-06 DIAGNOSIS — I63.9 ACUTE CVA (CEREBROVASCULAR ACCIDENT): ICD-10-CM

## 2024-08-06 RX ORDER — CLOPIDOGREL BISULFATE 75 MG/1
75 TABLET ORAL DAILY
Qty: 90 TABLET | Refills: 1 | Status: SHIPPED | OUTPATIENT
Start: 2024-08-06 | End: 2025-08-06

## 2024-08-12 ENCOUNTER — CLINICAL SUPPORT (OUTPATIENT)
Dept: REHABILITATION | Facility: HOSPITAL | Age: 74
End: 2024-08-12
Payer: MEDICARE

## 2024-08-12 DIAGNOSIS — M79.18 MYOFASCIAL PAIN SYNDROME, CERVICAL: Primary | ICD-10-CM

## 2024-08-12 DIAGNOSIS — M54.50 CHRONIC LOW BACK PAIN WITHOUT SCIATICA, UNSPECIFIED BACK PAIN LATERALITY: ICD-10-CM

## 2024-08-12 DIAGNOSIS — M50.30 DDD (DEGENERATIVE DISC DISEASE), CERVICAL: ICD-10-CM

## 2024-08-12 DIAGNOSIS — G89.29 OTHER CHRONIC PAIN: ICD-10-CM

## 2024-08-12 DIAGNOSIS — G89.29 CHRONIC LOW BACK PAIN WITHOUT SCIATICA, UNSPECIFIED BACK PAIN LATERALITY: ICD-10-CM

## 2024-08-12 DIAGNOSIS — M79.7 FIBROMYALGIA: ICD-10-CM

## 2024-08-12 PROCEDURE — 97140 MANUAL THERAPY 1/> REGIONS: CPT | Mod: KX,PO

## 2024-08-12 NOTE — PROGRESS NOTES
"[facility]   Physical Therapy Treatment Note     Name: Osman Johansen  Clinic Number: [id number]    Therapy Diagnosis:   Encounter Diagnoses   Name Primary?    Myofascial pain syndrome, cervical Yes    Chronic low back pain without sciatica, unspecified back pain laterality     Fibromyalgia     DDD (degenerative disc disease), cervical     Other chronic pain            Physician: Talia Belcher MD    Visit Date: 8/12/2024    Physician Orders: PT Eval and Treat   Medical Diagnosis from Referral:   Diagnosis   M70.60 (ICD-10-CM) - Trochanteric bursitis, unspecified laterality   M47.816 (ICD-10-CM) - Lumbar spondylosis   M47.812 (ICD-10-CM) - Cervical spondylosis      Evaluation Date: 6/26/2024  Authorization Period Expiration: 5/22/2025  Plan of Care Expiration: 8/21/2024  Progress Note Due: 8/21/2024  Date of Surgery: None outside of injections  Visit # / Visits authorized: 7/20 + eval   FOTO: 2/ 3     Precautions: Standard and blood thinners     PTA Visit #: 0/5     Time In: 2:02 pm  Time Out: 2:44 pm  Total Billable Time: 42 minutes    SUBJECTIVE     Pt reports: that she was flared up after her previous visit. States that she doesn't want to continue with the progressions that were made last visit. States that she had a HA following her last visit. States that her HA has calmed down. States that her back isn't bothering her at the moment, but will bother her at the end of the day.   She was compliant with home exercise program.  Response to previous treatment: reductions in HA frequency  Functional change: Unable to be determined    Pain: 3/10  Location: bilateral neck      OBJECTIVE     Objective measures have not changed since previous reassessment unless otherwise stated    Treatment     Sherie received the treatments listed below:      therapeutic exercises to develop strength, endurance, and ROM for 00 minutes including:  Cervical rotation and extension SNAG 15x 3" holds   LS " "stretch 10 seconds, 10x each  Open books 15x 3"  Bridge 3x10  Piriformis stretch 30" x3 each  LTR 20x  Standing hip abduction 2x15 each  Seated lumbar rollouts 10x forward only    manual therapy techniques: Dry needling was applied to the: cervical spine for 42 minutes, including:  SO massage  STM to cervical pvms    Palpation Assessment to determine the necessity for Functional Dry Needling     This was applied to the L Upper Trapezius, bilateral glute med, and marian multifidi. Dry needling was performed to decrease inflammation, increase circulation, decrease pain and restore homeostasis.    30 mm needles with 0.30 diameter were used. Electrical stimulation was not applied to the needles. Patient demonstrated appropriate response to Functional Dry Needling. All needles were removed and changes in signs and symptoms were assessed. No adverse reactions noted at the conclusion of the treatment.  See EMR under MEDIA for written consent provided.      neuromuscular re-education activities to improve: Coordination, Kinesthetic, Sense, and Proprioception for 00 minutes. The following activities were included:      therapeutic activities to improve functional performance for 00 minutes, including:  Time was taken during today's visit to perform reassessment, as well as update goals and objective measurements.      Patient Education and Home Exercises     Home Exercises Provided and Patient Education Provided     Education provided:   - patient was educated on plan for physical therapy following resolution of her neck pain.    Written Home Exercises Provided: Patient instructed to cont prior HEP. Exercises were reviewed and Sherie was able to demonstrate them prior to the end of the session.  Sherie demonstrated good  understanding of the education provided. See EMR under Patient Instructions for exercises provided during therapy sessions    ASSESSMENT     Patient doing well with physical therapy at this time. Had adverse " response to previous session after her rows on machine. That exercise was discontinued. Only dry needling and manual interventions performed this visit. Dry needling performed to patient's low back and glutes this visit with equivocal immediate effect. Will continue with normal exercise program at her next visit.     Sherie Is progressing well towards her goals.   Pt prognosis is Good.     Pt will continue to benefit from skilled outpatient physical therapy to address the deficits listed in the problem list box on initial evaluation, provide pt/family education and to maximize pt's level of independence in the home and community environment.     Pt's spiritual, cultural and educational needs considered and pt agreeable to plan of care and goals.     Anticipated barriers to physical therapy: none    Goals:   Short Term Goals: 4 weeks   Patient will be independent with her HEP to impact her progress with physical therapy. MET on 7/25/2024  Patient will reduce her frequency of headaches to </= 3x per week to impact her QoL. MET on 7/25/2024, gets about 2x/wk  Patient will improve her lumbar flexion and extension by 10 degrees to impact her ability to don and doff shoes. Ongoing, continue goal     Long Term Goals: 8 weeks   Patient will improve her FOTO score to >/= 49 to demonstrate significant improvements in her function and ADL performance. MET on 7/25/2024  Patient will report abolishment of headaches and migraines to impact her QoL.  Patient will report worst pain in her neck and low back as </= 6/10 to impact her tolerance to performing physical activity.    PLAN     Continue with LYLA Mota, PT

## 2024-08-14 ENCOUNTER — CLINICAL SUPPORT (OUTPATIENT)
Dept: REHABILITATION | Facility: HOSPITAL | Age: 74
End: 2024-08-14
Payer: MEDICARE

## 2024-08-14 DIAGNOSIS — M51.36 DDD (DEGENERATIVE DISC DISEASE), LUMBAR: ICD-10-CM

## 2024-08-14 DIAGNOSIS — G89.29 OTHER CHRONIC PAIN: ICD-10-CM

## 2024-08-14 DIAGNOSIS — M43.06 SPONDYLOLYSIS OF LUMBAR REGION: ICD-10-CM

## 2024-08-14 DIAGNOSIS — M79.18 MYOFASCIAL PAIN SYNDROME, CERVICAL: ICD-10-CM

## 2024-08-14 DIAGNOSIS — M50.30 DDD (DEGENERATIVE DISC DISEASE), CERVICAL: Primary | ICD-10-CM

## 2024-08-14 DIAGNOSIS — M79.7 FIBROMYALGIA: ICD-10-CM

## 2024-08-14 PROCEDURE — 97140 MANUAL THERAPY 1/> REGIONS: CPT | Mod: KX,PO

## 2024-08-14 PROCEDURE — 97110 THERAPEUTIC EXERCISES: CPT | Mod: KX,PO

## 2024-08-14 NOTE — PROGRESS NOTES
"[facility]   Physical Therapy Treatment Note     Name: Osman Johansen  Clinic Number: [id number]    Therapy Diagnosis:   Encounter Diagnoses   Name Primary?    DDD (degenerative disc disease), cervical Yes    DDD (degenerative disc disease), lumbar     Other chronic pain     Myofascial pain syndrome, cervical     Spondylolysis of lumbar region     Fibromyalgia        Physician: Talia Belcher MD    Visit Date: 8/14/2024    Physician Orders: PT Eval and Treat   Medical Diagnosis from Referral:   Diagnosis   M70.60 (ICD-10-CM) - Trochanteric bursitis, unspecified laterality   M47.816 (ICD-10-CM) - Lumbar spondylosis   M47.812 (ICD-10-CM) - Cervical spondylosis      Evaluation Date: 6/26/2024  Authorization Period Expiration: 5/22/2025  Plan of Care Expiration: 8/21/2024  Progress Note Due: 8/21/2024  Date of Surgery: None outside of injections  Visit # / Visits authorized: 8/20 + eval   FOTO: 2/ 3     Precautions: Standard and blood thinners     PTA Visit #: 0/5     Time In: 3:00 pm  Time Out: 3:56 pm  Total Billable Time: 56 minutes    SUBJECTIVE     Pt reports: that her back better after her last visit. States that her back wasn't hurting as bad as it normally does after her typical day. States that she woke up this morning without a HA.  She was compliant with home exercise program.  Response to previous treatment: reductions in HA frequency  Functional change: Unable to be determined    Pain: 0/10  Location: bilateral neck      OBJECTIVE     Objective measures have not changed since previous reassessment unless otherwise stated    Treatment     Sherie received the treatments listed below:      therapeutic exercises to develop strength, endurance, and ROM for 38 minutes including:  Cervical rotation and extension SNAG 15x 3" holds   LS stretch 10 seconds, 10x each  Open books 15x 3"  Bridge 3x10  Piriformis stretch 30" x3 each  LTR 20x  Standing hip abduction 2x15 each  Seated lumbar " rollouts 10x forward only    manual therapy techniques: Dry needling was applied to the: cervical spine for 18 minutes, including:    Palpation Assessment to determine the necessity for Functional Dry Needling    This was applied to the bilateral Upper Trap, Lumbar Multifidi, and Gluteus Medius. Dry needling was performed to decrease inflammation, increase circulation, decrease pain and restore homeostasis.    40, 50, and 60 mm needles with 0.30 diameter were used. Electrical stimulation was not applied to the needles. Patient demonstrated appropriate response to Functional Dry Needling. good  rhythmical contractions observed with estim to treated muscle groups. All needles were removed and changes in signs and symptoms were assessed. No adverse reactions noted at the conclusion of the treatment.  See EMR under MEDIA for written consent provided.      neuromuscular re-education activities to improve: Coordination, Kinesthetic, Sense, and Proprioception for 00 minutes. The following activities were included:      therapeutic activities to improve functional performance for 00 minutes, including:  Time was taken during today's visit to perform reassessment, as well as update goals and objective measurements.      Patient Education and Home Exercises     Home Exercises Provided and Patient Education Provided     Education provided:   - patient was educated on plan for physical therapy following resolution of her neck pain.    Written Home Exercises Provided: Patient instructed to cont prior HEP. Exercises were reviewed and Sherie was able to demonstrate them prior to the end of the session.  Sherie demonstrated good  understanding of the education provided. See EMR under Patient Instructions for exercises provided during therapy sessions    ASSESSMENT     Patient continues to make gradual progress and experience relief into her cervical and lumbar spine. Reports of improved tolerance to daily activities without lumbar  pain following her previous visit where dry needling was performed to lumbar pvms/multifidi and glute med. Will continue to f/u on response to dry needling as it also seems to be improving her cervicogenic DOUGLAS.     Sherie Is progressing well towards her goals.   Pt prognosis is Good.     Pt will continue to benefit from skilled outpatient physical therapy to address the deficits listed in the problem list box on initial evaluation, provide pt/family education and to maximize pt's level of independence in the home and community environment.     Pt's spiritual, cultural and educational needs considered and pt agreeable to plan of care and goals.     Anticipated barriers to physical therapy: none    Goals:   Short Term Goals: 4 weeks   Patient will be independent with her HEP to impact her progress with physical therapy. MET on 7/25/2024  Patient will reduce her frequency of headaches to </= 3x per week to impact her QoL. MET on 7/25/2024, gets about 2x/wk  Patient will improve her lumbar flexion and extension by 10 degrees to impact her ability to don and doff shoes. Ongoing, continue goal     Long Term Goals: 8 weeks   Patient will improve her FOTO score to >/= 49 to demonstrate significant improvements in her function and ADL performance. MET on 7/25/2024  Patient will report abolishment of headaches and migraines to impact her QoL.  Patient will report worst pain in her neck and low back as </= 6/10 to impact her tolerance to performing physical activity.    PLAN     Continue with LYLA Mota, PT

## 2024-08-15 DIAGNOSIS — G43.719 INTRACTABLE CHRONIC MIGRAINE WITHOUT AURA AND WITHOUT STATUS MIGRAINOSUS: ICD-10-CM

## 2024-08-16 RX ORDER — BUTALBITAL, ACETAMINOPHEN AND CAFFEINE 50; 325; 40 MG/1; MG/1; MG/1
TABLET ORAL
Qty: 10 TABLET | Refills: 0 | Status: SHIPPED | OUTPATIENT
Start: 2024-08-16

## 2024-08-19 ENCOUNTER — CLINICAL SUPPORT (OUTPATIENT)
Dept: REHABILITATION | Facility: HOSPITAL | Age: 74
End: 2024-08-19
Payer: MEDICARE

## 2024-08-19 DIAGNOSIS — M79.18 MYOFASCIAL PAIN SYNDROME, CERVICAL: Primary | ICD-10-CM

## 2024-08-19 DIAGNOSIS — G89.29 OTHER CHRONIC PAIN: ICD-10-CM

## 2024-08-19 DIAGNOSIS — M47.816 LUMBAR SPONDYLOSIS: ICD-10-CM

## 2024-08-19 DIAGNOSIS — M50.30 DDD (DEGENERATIVE DISC DISEASE), CERVICAL: ICD-10-CM

## 2024-08-19 DIAGNOSIS — M79.7 FIBROMYALGIA: ICD-10-CM

## 2024-08-19 DIAGNOSIS — M53.3 SACROILIAC JOINT PAIN: ICD-10-CM

## 2024-08-19 PROCEDURE — 97140 MANUAL THERAPY 1/> REGIONS: CPT | Mod: KX,PO

## 2024-08-19 PROCEDURE — 97110 THERAPEUTIC EXERCISES: CPT | Mod: KX,PO

## 2024-08-19 NOTE — PROGRESS NOTES
"[facility]   Physical Therapy Treatment Note     Name: Osman Johansen  Clinic Number: [id number]    Therapy Diagnosis:   Encounter Diagnoses   Name Primary?    Myofascial pain syndrome, cervical Yes    DDD (degenerative disc disease), cervical     Fibromyalgia     Sacroiliac joint pain     Other chronic pain     Lumbar spondylosis        Physician: Talia Belcher MD    Visit Date: 8/19/2024    Physician Orders: PT Eval and Treat   Medical Diagnosis from Referral:   Diagnosis   M70.60 (ICD-10-CM) - Trochanteric bursitis, unspecified laterality   M47.816 (ICD-10-CM) - Lumbar spondylosis   M47.812 (ICD-10-CM) - Cervical spondylosis      Evaluation Date: 6/26/2024  Authorization Period Expiration: 5/22/2025  Plan of Care Expiration: 8/21/2024  Progress Note Due: 8/21/2024  Date of Surgery: None outside of injections  Visit # / Visits authorized: 9/20 + eval   FOTO: 2/ 3     Precautions: Standard and blood thinners     PTA Visit #: 0/5     Time In: 3:01 pm  Time Out: 4:02 pm  Total Billable Time: 61 minutes     SUBJECTIVE     Pt reports: having some right sided pec pain today. States that it was off and on at first, but has recently become more consistent. States that it feels like a throb. States that pressing up will affect it. States that her back and neck are doing well, just has a spot in her back from overdoing it the other day.  She was compliant with home exercise program.  Response to previous treatment: reductions in HA frequency  Functional change: Unable to be determined    Pain: 0/10  Location: bilateral neck      OBJECTIVE     Objective measures have not changed since previous reassessment unless otherwise stated    Treatment     Sherie received the treatments listed below:      therapeutic exercises to develop strength, endurance, and ROM for 40 minutes including:  High pec stretch on towel roll 3:00  Cervical rotation and extension SNAG 15x 3" holds   LS stretch 10 seconds, 10x " "each  Open books 15x 3"  Bridge 3x10  Piriformis stretch 30" x3 each  LTR 20x  Standing hip abduction 2x15 each  Seated lumbar rollouts 10x forward only    manual therapy techniques: Dry needling was applied to the: cervical spine for 21 minutes, including:    Palpation Assessment to determine the necessity for Functional Dry Needling    This was applied to the bilateral Upper Trap, Lumbar Multifidi, and Gluteus Medius. Dry needling was performed to decrease inflammation, increase circulation, decrease pain and restore homeostasis.    40, 50, and 60 mm needles with 0.30 diameter were used. Electrical stimulation was not applied to the needles. Patient demonstrated appropriate response to Functional Dry Needling. good  rhythmical contractions observed with estim to treated muscle groups. All needles were removed and changes in signs and symptoms were assessed. No adverse reactions noted at the conclusion of the treatment.  See EMR under MEDIA for written consent provided.      neuromuscular re-education activities to improve: Coordination, Kinesthetic, Sense, and Proprioception for 00 minutes. The following activities were included:      therapeutic activities to improve functional performance for 00 minutes, including:  Time was taken during today's visit to perform reassessment, as well as update goals and objective measurements.      Patient Education and Home Exercises     Home Exercises Provided and Patient Education Provided     Education provided:   - patient was educated on plan for physical therapy following resolution of her neck pain.    Written Home Exercises Provided: Patient instructed to cont prior HEP. Exercises were reviewed and Sherie was able to demonstrate them prior to the end of the session.  Sherie demonstrated good  understanding of the education provided. See EMR under Patient Instructions for exercises provided during therapy sessions    ASSESSMENT     Right sided pectoral pain seems to be " deep pain referral from history of glenoid labral tear in right shoulder. Tried to perform pec stretch today which seemed to increase right shoulder pain. Will add in a few exercises to patient's program to impact her right shoulder pain if it continues. Currently reports no cervical or lumbar pain. Continued with dry needling with positive response across multiple sessions.     Sherie Is progressing well towards her goals.   Pt prognosis is Good.     Pt will continue to benefit from skilled outpatient physical therapy to address the deficits listed in the problem list box on initial evaluation, provide pt/family education and to maximize pt's level of independence in the home and community environment.     Pt's spiritual, cultural and educational needs considered and pt agreeable to plan of care and goals.     Anticipated barriers to physical therapy: none    Goals:   Short Term Goals: 4 weeks   Patient will be independent with her HEP to impact her progress with physical therapy. MET on 7/25/2024  Patient will reduce her frequency of headaches to </= 3x per week to impact her QoL. MET on 7/25/2024, gets about 2x/wk  Patient will improve her lumbar flexion and extension by 10 degrees to impact her ability to don and doff shoes. Ongoing, continue goal     Long Term Goals: 8 weeks   Patient will improve her FOTO score to >/= 49 to demonstrate significant improvements in her function and ADL performance. MET on 7/25/2024  Patient will report abolishment of headaches and migraines to impact her QoL.  Patient will report worst pain in her neck and low back as </= 6/10 to impact her tolerance to performing physical activity.    PLAN     Continue with LYLA Mota, PT

## 2024-08-20 ENCOUNTER — TELEPHONE (OUTPATIENT)
Dept: FAMILY MEDICINE | Facility: CLINIC | Age: 74
End: 2024-08-20
Payer: MEDICARE

## 2024-08-22 ENCOUNTER — CLINICAL SUPPORT (OUTPATIENT)
Dept: REHABILITATION | Facility: HOSPITAL | Age: 74
End: 2024-08-22
Payer: MEDICARE

## 2024-08-22 DIAGNOSIS — M79.7 FIBROMYALGIA: ICD-10-CM

## 2024-08-22 DIAGNOSIS — M51.36 DDD (DEGENERATIVE DISC DISEASE), LUMBAR: ICD-10-CM

## 2024-08-22 DIAGNOSIS — G89.29 OTHER CHRONIC PAIN: ICD-10-CM

## 2024-08-22 DIAGNOSIS — M43.06 SPONDYLOLYSIS OF LUMBAR REGION: ICD-10-CM

## 2024-08-22 DIAGNOSIS — M50.30 DDD (DEGENERATIVE DISC DISEASE), CERVICAL: Primary | ICD-10-CM

## 2024-08-22 DIAGNOSIS — M79.18 MYOFASCIAL PAIN SYNDROME, CERVICAL: ICD-10-CM

## 2024-08-22 PROCEDURE — 97110 THERAPEUTIC EXERCISES: CPT | Mod: KX,PO

## 2024-08-22 PROCEDURE — 97140 MANUAL THERAPY 1/> REGIONS: CPT | Mod: KX,PO

## 2024-08-22 PROCEDURE — 97530 THERAPEUTIC ACTIVITIES: CPT | Mod: KX,PO

## 2024-08-22 NOTE — PROGRESS NOTES
[facility]   Physical Therapy Treatment Note     Name: Osman Johansen  Clinic Number: [id number]    Therapy Diagnosis:   Encounter Diagnoses   Name Primary?    Spondylolysis of lumbar region     Other chronic pain     DDD (degenerative disc disease), cervical Yes    DDD (degenerative disc disease), lumbar     Fibromyalgia     Myofascial pain syndrome, cervical        Physician: Talia Belcher MD    Visit Date: 8/22/2024    Physician Orders: PT Eval and Treat   Medical Diagnosis from Referral:   Diagnosis   M70.60 (ICD-10-CM) - Trochanteric bursitis, unspecified laterality   M47.816 (ICD-10-CM) - Lumbar spondylosis   M47.812 (ICD-10-CM) - Cervical spondylosis      Evaluation Date: 6/26/2024  Authorization Period Expiration: 5/22/2025  Plan of Care Expiration: 10/17/2024  Progress Note Due: 9/19/2024  Date of Surgery: None outside of injections  Visit # / Visits authorized: 10/20 + eval   FOTO: 3/ 3     Precautions: Standard and blood thinners     PTA Visit #: 0/5     Time In: 4:00 pm  Time Out: 5:02 pm  Total Billable Time: 62 minutes    SUBJECTIVE     Pt reports: that she woke up with a migraine on Tuesday which lasted all day. States that it went away on Wednesday. States that she's getting migraines about once per week now. States that she has a pulling sensation when she looks down. States that her low back is doing okay, has bene doing better. States that it's more in her thoracic spine. States that she's having better tolerance to doing things at the end of the day.     She was compliant with home exercise program.  Response to previous treatment: reductions in HA frequency  Functional change: Unable to be determined    Pain: 4/10, 8/10 worst pain in neck and back  Location: bilateral neck      OBJECTIVE     Posture: Slight forward head posture     Reflexes:                         L              R  C5(Biceps)*                     2+             2+  C6:(Brachioradialis)        2+     "          2+  C7: (Triceps)                   2+             2+                                                                                                                                                                                                                                  Cervical ROM: Inclinometer/Goniometer                            Pain/dysfunction/movement  Flexion 50     Extension 40     Side-bending Right 40     Side-bending Left 25 Tightness   Right rotation 60     Left rotation 50 Pain      Lumbar range of motion:  Flexion 50     Extension 15     Side-bending Right 20     Side-bending Left 10 Pain   Right rotation 30     Left rotation 60           Upper Extremity Strength  (R) UE   (L) UE     C5 Deltoid(90 ABD): 4+/5 C5 Deltoid (90 ABD): 4+/5   C6 Biceps/ECRB/L 4+/5 C6 Biceps/ECRB/L 4+/5   C7 triceps/FCR 5/5 C7 triceps/FCR 5/5      Lower Extremity Strength  Knee extension and flexion marian 4+/5  Hip ER and IR marian 5/5  Hip flexors 4+/5 marian  Hip abductors 4+/5 marian  Hip extensors 4+/5 marian     Special Tests:  Distraction +   Compression -   Spurlings -      Joint Mobility: Hypomobile      Palpation: TTP to SO musculature, cervical pvms, scalenes, UT, lumbar pvms, glutes, and greater trochanters marian         Intake Outcome Measure for FOTO Cervical Survey     Therapist reviewed FOTO scores for Osman Johansen on 7/25/2024.   FOTO report - see Media section or FOTO account episode details.     Intake Score: 56%           Treatment     Sherie received the treatments listed below:      therapeutic exercises to develop strength, endurance, and ROM for 27 minutes including:  High pec stretch on towel roll 3:00  Cervical rotation and extension SNAG 15x 3" holds   LS stretch 10 seconds, 10x each  Open books 15x 3"  Bridge 3x10  Piriformis stretch 30" x3 each  LTR 20x  Standing hip abduction 2x15 each  Seated lumbar rollouts 10x forward only    manual therapy techniques: Dry needling was applied to the: " cervical spine for 16 minutes, including:    Palpation Assessment to determine the necessity for Functional Dry Needling    This was applied to the bilateral Upper Trap and Cervical Multifidi. Dry needling was performed to decrease inflammation, increase circulation, decrease pain and restore homeostasis.    40 mm needles with 0.30 diameter were used. Electrical stimulation was not applied to the needles. Patient demonstrated appropriate response to Functional Dry Needling. good  rhythmical contractions observed with estim to treated muscle groups. All needles were removed and changes in signs and symptoms were assessed. No adverse reactions noted at the conclusion of the treatment.  See EMR under MEDIA for written consent provided.      neuromuscular re-education activities to improve: Coordination, Kinesthetic, Sense, and Proprioception for 00 minutes. The following activities were included:      therapeutic activities to improve functional performance for 19 minutes, including:  Time was taken during today's visit to perform reassessment, as well as update goals and objective measurements.      Patient Education and Home Exercises     Home Exercises Provided and Patient Education Provided     Education provided:   - patient was educated on exam findings from today's reassessment    Written Home Exercises Provided: Patient instructed to cont prior HEP. Exercises were reviewed and Sherie was able to demonstrate them prior to the end of the session.  Sherie demonstrated good  understanding of the education provided. See EMR under Patient Instructions for exercises provided during therapy sessions    ASSESSMENT     Patient reassessed this visit due to 30 days since her previous reassessment. Since her previous reassessment, she has reported improvements in migraine frequency, reporting only 1x per week. She has also reported improvements in her low back pain and tolerance to performing activities at the end of the day  when her pain is normally worse. She also demonstrate improvements in her cervical and lumbar range of motion in flexion and extension. She is still currently limited with cervical rotation to her left, as well as left sidebending. These deficits may be related to cervical facet joint stiffness or irritation, which may contribute to left sided migraines. She continues to make gradual improvements in physical therapy with regards to cervical and lumbar pain, as well as HA frequency.    Sherie Is progressing well towards her goals.   Pt prognosis is Good.     Pt will continue to benefit from skilled outpatient physical therapy to address the deficits listed in the problem list box on initial evaluation, provide pt/family education and to maximize pt's level of independence in the home and community environment.     Pt's spiritual, cultural and educational needs considered and pt agreeable to plan of care and goals.     Anticipated barriers to physical therapy: none    Goals:   Short Term Goals: 4 weeks   Patient will be independent with her HEP to impact her progress with physical therapy. MET on 7/25/2024  Patient will reduce her frequency of headaches to </= 3x per week to impact her QoL. MET on 8/22/2024, gets about 1x/wk  Patient will improve her lumbar flexion and extension by 10 degrees to impact her ability to don and doff shoes. MET on 8/22/2024     Long Term Goals: 8 weeks   Patient will improve her FOTO score to >/= 49 to demonstrate significant improvements in her function and ADL performance. MET on 7/25/2024  Patient will report abolishment of headaches and migraines to impact her QoL. Progressing, only having migraines about 1x/wk now  Patient will report worst pain in her neck and low back as </= 6/10 to impact her tolerance to performing physical activity. 50% met on 8/22/2024    PLAN     Continue with LYLA Mota, PT

## 2024-08-22 NOTE — PLAN OF CARE
[facility]  Physical Therapy Plan of Care Note     Name: Osman Johansen  Clinic Number: [id number]    Therapy Diagnosis:   Encounter Diagnoses   Name Primary?    Spondylolysis of lumbar region     Other chronic pain     DDD (degenerative disc disease), cervical Yes    DDD (degenerative disc disease), lumbar     Fibromyalgia     Myofascial pain syndrome, cervical      Physician: Talia Belcher MD    Visit Date: 8/22/2024    Evaluation Date: 6/26/2024  Authorization Period Expiration: 5/22/2025  Plan of Care Expiration: 10/17/2024  Progress Note Due: 9/19/2024  Date of Surgery: None outside of injections  Visit # / Visits authorized: 10/20 + eval   FOTO: 3/ 3     Precautions: Standard and blood thinners     Functional Level Prior to Evaluation:  Independent but with pain    SUBJECTIVE     Update:   Pt reports: that she woke up with a migraine on Tuesday which lasted all day. States that it went away on Wednesday. States that she's getting migraines about once per week now. States that she has a pulling sensation when she looks down. States that her low back is doing okay, has bene doing better. States that it's more in her thoracic spine. States that she's having better tolerance to doing things at the end of the day.     OBJECTIVE     Update:     Posture: Slight forward head posture     Reflexes:                         L              R  C5(Biceps)*                     2+             2+  C6:(Brachioradialis)        2+              2+  C7: (Triceps)                   2+             2+                                                                                                                                                                                                                                  Cervical ROM: Inclinometer/Goniometer                            Pain/dysfunction/movement  Flexion 50     Extension 40     Side-bending Right 40     Side-bending Left 25 Tightness    Right rotation 60     Left rotation 50 Pain      Lumbar range of motion:  Flexion 50     Extension 15     Side-bending Right 20     Side-bending Left 10 Pain   Right rotation 30     Left rotation 60           Upper Extremity Strength  (R) UE   (L) UE     C5 Deltoid(90 ABD): 4+/5 C5 Deltoid (90 ABD): 4+/5   C6 Biceps/ECRB/L 4+/5 C6 Biceps/ECRB/L 4+/5   C7 triceps/FCR 5/5 C7 triceps/FCR 5/5      Lower Extremity Strength  Knee extension and flexion marian 4+/5  Hip ER and IR marian 5/5  Hip flexors 4+/5 marian  Hip abductors 4+/5 marian  Hip extensors 4+/5 marian     Special Tests:  Distraction +   Compression -   Spurlings -      Joint Mobility: Hypomobile      Palpation: TTP to SO musculature, cervical pvms, scalenes, UT, lumbar pvms, glutes, and greater trochanters marian         Intake Outcome Measure for FOTO Cervical Survey     Therapist reviewed FOTO scores for Osman Johansen on 7/25/2024.   FOTO report - see Media section or FOTO account episode details.     Intake Score: 56%           ASSESSMENT     Update:   Patient reassessed this visit due to 30 days since her previous reassessment. Since her previous reassessment, she has reported improvements in migraine frequency, reporting only 1x per week. She has also reported improvements in her low back pain and tolerance to performing activities at the end of the day when her pain is normally worse. She also demonstrate improvements in her cervical and lumbar range of motion in flexion and extension. She is still currently limited with cervical rotation to her left, as well as left sidebending. These deficits may be related to cervical facet joint stiffness or irritation, which may contribute to left sided migraines. She continues to make gradual improvements in physical therapy with regards to cervical and lumbar pain, as well as HA frequency.     Previous Short Term Goals Status:   2/3 STGs met  New Short Term Goals Status:   3/3 STGs met  Long Term Goal Status: continue per  initial plan of care.  Reasons for Recertification of Therapy:   patient continues to have migraines at a reduced frequency, as well as lumbar and cervical pain that reduces QoL.    GOALS  Short Term Goals: 4 weeks   Patient will be independent with her HEP to impact her progress with physical therapy. MET on 7/25/2024  Patient will reduce her frequency of headaches to </= 3x per week to impact her QoL. MET on 8/22/2024, gets about 1x/wk  Patient will improve her lumbar flexion and extension by 10 degrees to impact her ability to don and doff shoes. MET on 8/22/2024     Long Term Goals: 8 weeks   Patient will improve her FOTO score to >/= 49 to demonstrate significant improvements in her function and ADL performance. MET on 7/25/2024  Patient will report abolishment of headaches and migraines to impact her QoL. Progressing, only having migraines about 1x/wk now  Patient will report worst pain in her neck and low back as </= 6/10 to impact her tolerance to performing physical activity. 50% met on 8/22/2024    PLAN     Updated Certification Period: 8/23/2024 to 10/17/2024   Recommended Treatment Plan: 2 times per week for 8 weeks:  Electrical Stimulation IFC and Pre-mod, Manual Therapy, Moist Heat/ Ice, Neuromuscular Re-ed, Patient Education, Therapeutic Activities, Therapeutic Exercise, and Dry Needling  Other Recommendations: N/A    Lee Mota, PT

## 2024-08-23 ENCOUNTER — HOSPITAL ENCOUNTER (OUTPATIENT)
Dept: CARDIOLOGY | Facility: HOSPITAL | Age: 74
Discharge: HOME | End: 2024-08-23
Attending: INTERNAL MEDICINE
Payer: MEDICARE

## 2024-08-23 ENCOUNTER — CLINICAL SUPPORT (OUTPATIENT)
Dept: CARDIOLOGY | Facility: HOSPITAL | Age: 74
End: 2024-08-23
Payer: MEDICARE

## 2024-08-23 DIAGNOSIS — I49.8 OTHER SPECIFIED CARDIAC ARRHYTHMIAS: ICD-10-CM

## 2024-08-23 PROCEDURE — 93298 REM INTERROG DEV EVAL SCRMS: CPT | Mod: 26,,, | Performed by: INTERNAL MEDICINE

## 2024-08-28 ENCOUNTER — OFFICE VISIT (OUTPATIENT)
Dept: OBSTETRICS AND GYNECOLOGY | Facility: CLINIC | Age: 74
End: 2024-08-28
Payer: MEDICARE

## 2024-08-28 ENCOUNTER — OFFICE VISIT (OUTPATIENT)
Dept: FAMILY MEDICINE | Facility: CLINIC | Age: 74
End: 2024-08-28
Payer: MEDICARE

## 2024-08-28 ENCOUNTER — LAB VISIT (OUTPATIENT)
Dept: LAB | Facility: HOSPITAL | Age: 74
End: 2024-08-28
Attending: NURSE PRACTITIONER
Payer: MEDICARE

## 2024-08-28 ENCOUNTER — PATIENT MESSAGE (OUTPATIENT)
Dept: FAMILY MEDICINE | Facility: CLINIC | Age: 74
End: 2024-08-28

## 2024-08-28 VITALS
BODY MASS INDEX: 26.65 KG/M2 | SYSTOLIC BLOOD PRESSURE: 168 MMHG | SYSTOLIC BLOOD PRESSURE: 130 MMHG | TEMPERATURE: 99 F | WEIGHT: 141.13 LBS | HEIGHT: 61 IN | OXYGEN SATURATION: 95 % | BODY MASS INDEX: 26.66 KG/M2 | DIASTOLIC BLOOD PRESSURE: 60 MMHG | WEIGHT: 141.13 LBS | DIASTOLIC BLOOD PRESSURE: 88 MMHG | HEART RATE: 82 BPM

## 2024-08-28 DIAGNOSIS — N18.31 STAGE 3A CHRONIC KIDNEY DISEASE: ICD-10-CM

## 2024-08-28 DIAGNOSIS — D50.9 IRON DEFICIENCY ANEMIA, UNSPECIFIED IRON DEFICIENCY ANEMIA TYPE: ICD-10-CM

## 2024-08-28 DIAGNOSIS — N93.9 VAGINAL BLEEDING: Primary | ICD-10-CM

## 2024-08-28 DIAGNOSIS — N95.2 VAGINAL ATROPHY: Primary | ICD-10-CM

## 2024-08-28 DIAGNOSIS — I10 ESSENTIAL HYPERTENSION: ICD-10-CM

## 2024-08-28 DIAGNOSIS — N93.9 VAGINAL BLEEDING: ICD-10-CM

## 2024-08-28 LAB
BASOPHILS # BLD AUTO: 0.07 K/UL (ref 0–0.2)
BASOPHILS NFR BLD: 0.8 % (ref 0–1.9)
BILIRUB UR QL STRIP: NEGATIVE
CLARITY UR: CLEAR
COLOR UR: YELLOW
DIFFERENTIAL METHOD BLD: ABNORMAL
EOSINOPHIL # BLD AUTO: 0.2 K/UL (ref 0–0.5)
EOSINOPHIL NFR BLD: 2 % (ref 0–8)
ERYTHROCYTE [DISTWIDTH] IN BLOOD BY AUTOMATED COUNT: 13.1 % (ref 11.5–14.5)
FERRITIN SERPL-MCNC: 107 NG/ML (ref 20–300)
GLUCOSE UR QL STRIP: NEGATIVE
HCT VFR BLD AUTO: 35.2 % (ref 37–48.5)
HGB BLD-MCNC: 11.3 G/DL (ref 12–16)
HGB UR QL STRIP: NEGATIVE
IMM GRANULOCYTES # BLD AUTO: 0.03 K/UL (ref 0–0.04)
IMM GRANULOCYTES NFR BLD AUTO: 0.4 % (ref 0–0.5)
IRON SERPL-MCNC: 117 UG/DL (ref 30–160)
KETONES UR QL STRIP: NEGATIVE
LEUKOCYTE ESTERASE UR QL STRIP: NEGATIVE
LYMPHOCYTES # BLD AUTO: 1.6 K/UL (ref 1–4.8)
LYMPHOCYTES NFR BLD: 19.2 % (ref 18–48)
MCH RBC QN AUTO: 31.2 PG (ref 27–31)
MCHC RBC AUTO-ENTMCNC: 32.1 G/DL (ref 32–36)
MCV RBC AUTO: 97 FL (ref 82–98)
MONOCYTES # BLD AUTO: 0.6 K/UL (ref 0.3–1)
MONOCYTES NFR BLD: 7.6 % (ref 4–15)
NEUTROPHILS # BLD AUTO: 5.8 K/UL (ref 1.8–7.7)
NEUTROPHILS NFR BLD: 70 % (ref 38–73)
NITRITE UR QL STRIP: NEGATIVE
NRBC BLD-RTO: 0 /100 WBC
PH UR STRIP: 6 [PH] (ref 5–8)
PLATELET # BLD AUTO: 294 K/UL (ref 150–450)
PMV BLD AUTO: 11.7 FL (ref 9.2–12.9)
PROT UR QL STRIP: NEGATIVE
RBC # BLD AUTO: 3.62 M/UL (ref 4–5.4)
SATURATED IRON: 38 % (ref 20–50)
SP GR UR STRIP: 1.01 (ref 1–1.03)
TOTAL IRON BINDING CAPACITY: 305 UG/DL (ref 250–450)
TRANSFERRIN SERPL-MCNC: 206 MG/DL (ref 200–375)
URN SPEC COLLECT METH UR: NORMAL
WBC # BLD AUTO: 8.3 K/UL (ref 3.9–12.7)

## 2024-08-28 PROCEDURE — 99213 OFFICE O/P EST LOW 20 MIN: CPT | Mod: PBBFAC,PO | Performed by: NURSE PRACTITIONER

## 2024-08-28 PROCEDURE — 81003 URINALYSIS AUTO W/O SCOPE: CPT | Mod: PO | Performed by: NURSE PRACTITIONER

## 2024-08-28 PROCEDURE — 99214 OFFICE O/P EST MOD 30 MIN: CPT | Mod: S$PBB,,, | Performed by: NURSE PRACTITIONER

## 2024-08-28 PROCEDURE — 85025 COMPLETE CBC W/AUTO DIFF WBC: CPT | Performed by: NURSE PRACTITIONER

## 2024-08-28 PROCEDURE — 99999 PR PBB SHADOW E&M-EST. PATIENT-LVL III: CPT | Mod: PBBFAC,,, | Performed by: NURSE PRACTITIONER

## 2024-08-28 PROCEDURE — 99214 OFFICE O/P EST MOD 30 MIN: CPT | Mod: S$PBB,,, | Performed by: SPECIALIST

## 2024-08-28 PROCEDURE — 83540 ASSAY OF IRON: CPT | Performed by: NURSE PRACTITIONER

## 2024-08-28 PROCEDURE — 99999 PR PBB SHADOW E&M-EST. PATIENT-LVL III: CPT | Mod: PBBFAC,,, | Performed by: SPECIALIST

## 2024-08-28 PROCEDURE — 99213 OFFICE O/P EST LOW 20 MIN: CPT | Mod: PBBFAC,27,PN | Performed by: SPECIALIST

## 2024-08-28 PROCEDURE — 82728 ASSAY OF FERRITIN: CPT | Performed by: NURSE PRACTITIONER

## 2024-08-28 PROCEDURE — 36415 COLL VENOUS BLD VENIPUNCTURE: CPT | Mod: PO | Performed by: NURSE PRACTITIONER

## 2024-08-28 RX ORDER — ESTRADIOL 0.1 MG/G
1 CREAM VAGINAL
Qty: 42.5 G | Refills: 1 | Status: SHIPPED | OUTPATIENT
Start: 2024-08-28 | End: 2025-08-28

## 2024-08-28 NOTE — PROGRESS NOTES
Subjective:       Patient ID: Osman Johansen is a 74 y.o. female.    Chief Complaint: Vaginal Bleeding    HPI  Vaginal bleeding chronic, intermittent.   Denies hematuria     DEMARIO: cannot tolerate oral iron. Did well previously with iron infusions   +fatigue, RLS     HTN controlled at home   Vitals:    08/28/24 1404   BP: 130/60   Pulse:    Temp:      Review of Systems   Constitutional:  Positive for activity change and fatigue.   HENT:  Negative for hearing loss and trouble swallowing.    Eyes:  Negative for discharge.   Respiratory:  Negative for chest tightness and wheezing.    Cardiovascular:  Negative for chest pain and palpitations.   Gastrointestinal:  Negative for constipation, diarrhea and vomiting.   Genitourinary:  Positive for vaginal bleeding. Negative for dysuria and hematuria.   Neurological:  Negative for headaches.   Psychiatric/Behavioral:  Negative for dysphoric mood.        Past Medical History:   Diagnosis Date    Allergy     Anemia     Anxiety     Arthritis     Blood transfusion 8 yrs    CAD (coronary artery disease)     Cataract     Chronic diastolic CHF (congestive heart failure)     CKD (chronic kidney disease), stage II     COPD (chronic obstructive pulmonary disease)     mild no inhalers    Degenerative disc disease     Depression     Dry eye syndrome     Fibromyalgia     Fluid overload     GERD (gastroesophageal reflux disease)     Headache     Hypercholesterolemia 12/09/2019    Hypertension     Hypothyroidism     IBS (irritable bowel syndrome)     Lower extremity edema     Macular drusen     Neuromuscular disorder     Ocular hypertension, bilateral     Osteoporosis     Pleural effusion     PUD (peptic ulcer disease)     Restless leg     Uses Suboxone to control    Sleep apnea     cpap    Stroke     2 strokes-after CABG    Thoracic or lumbosacral neuritis or radiculitis 10/19/2012    Thyroid disease     hashimoto's     Objective:      Physical Exam  Constitutional:       General: She is  not in acute distress.     Appearance: She is well-developed. She is not ill-appearing, toxic-appearing or diaphoretic.   HENT:      Right Ear: Hearing normal.      Left Ear: Hearing normal.   Cardiovascular:      Rate and Rhythm: Normal rate.   Pulmonary:      Effort: No tachypnea or respiratory distress.   Skin:     Coloration: Skin is not pale.   Neurological:      Mental Status: She is alert and oriented to person, place, and time.   Psychiatric:         Speech: Speech normal.         Behavior: Behavior normal.         Thought Content: Thought content normal.         Judgment: Judgment normal.         Assessment:       1. Vaginal bleeding    2. Iron deficiency anemia, unspecified iron deficiency anemia type    3. Stage 3a chronic kidney disease    4. Essential hypertension        Plan:       Vaginal bleeding  -     Urinalysis, Reflex to Urine Culture Urine, Clean Catch  -     Ambulatory referral/consult to Obstetrics / Gynecology; Future; Expected date: 09/04/2024    Iron deficiency anemia, unspecified iron deficiency anemia type  -     CBC Auto Differential; Future; Expected date: 08/28/2024  -     Ferritin; Future; Expected date: 08/28/2024  -     Iron and TIBC; Future; Expected date: 08/28/2024    Stage 3a chronic kidney disease    Essential hypertension          Plan for iron infusion   education provided on supportive care, symptom monitoring and return precautions       Medication List with Changes/Refills   Current Medications    AMLODIPINE (NORVASC) 2.5 MG TABLET    Take 3 tablets (7.5 mg total) by mouth once daily.    ASPIRIN (ECOTRIN) 81 MG EC TABLET    Take 1 tablet (81 mg total) by mouth once daily. for 21 days    ATOGEPANT (QULIPTA) 60 MG TAB    Take 1 tablet (60 mg total) by mouth once daily.    BEMPEDOIC ACID (NEXLETOL) 180 MG TAB    Take 1 tablet (180 mg total) by mouth Daily.    BENAZEPRIL (LOTENSIN) 20 MG TABLET    TAKE 1 TABLET TWICE DAILY    BUTALBITAL-ACETAMINOPHEN-CAFFEINE -40 MG  (FIORICET, ESGIC) -40 MG PER TABLET    1 tab PO PRN migraine. No more than 10 tab per month.    CALCIUM CARBONATE/VITAMIN D3 (CALCIUM 500 WITH D ORAL)    Take 1 capsule by mouth once daily.    CARBOXYMETHYLCELLULOSE (REFRESH PLUS) 0.5 % DPET    Place 1 drop into both eyes daily as needed (DRY EYES).    CLOPIDOGREL (PLAVIX) 75 MG TABLET    Take 1 tablet (75 mg total) by mouth once daily.    CO-ENZYME Q-10 30 MG CAPSULE    Take 30 mg by mouth 3 (three) times daily.    DICYCLOMINE (BENTYL) 10 MG CAPSULE    Take 1 capsule (10 mg total) by mouth 4 (four) times daily as needed (stomach discomfort).    ESOMEPRAZOLE (NEXIUM) 40 MG CAPSULE    Take 1 capsule (40 mg total) by mouth before breakfast.    EVOLOCUMAB (REPATHA SURECLICK) 140 MG/ML PNIJ    Inject 1 mL (140 mg total) into the skin every 14 (fourteen) days.    FLUTICASONE PROPIONATE (FLONASE) 50 MCG/ACTUATION NASAL SPRAY    1 spray (50 mcg total) by Each Nostril route once daily.    GARLIC (GARLIQUE ORAL)    Take 1 capsule by mouth Daily.    ISOSORBIDE MONONITRATE (IMDUR) 30 MG 24 HR TABLET    TAKE 1 TABLET ONE TIME DAILY    METHOCARBAMOL (ROBAXIN) 750 MG TAB    TAKE ONE TABLET BY MOUTH FOUR TIMES DAILY    MULTIVITAMIN (MULTIPLE VITAMIN ORAL)    Take 1 capsule by mouth once daily.    NITROGLYCERIN (NITROSTAT) 0.4 MG SL TABLET    Place 1 tablet (0.4 mg total) under the tongue every 5 (five) minutes as needed for Chest pain.    NITROGLYCERIN 0.2 MG/HR TD PT24 (NITRODUR) 0.2 MG/HR    Place 1 patch onto the skin once daily.    OXYBUTYNIN (DITROPAN XL) 15 MG TR24    Take 1 tablet (15 mg total) by mouth every morning.    PROMETHAZINE (PHENERGAN) 25 MG TABLET    TAKE ONE TABLET BY MOUTH EVERY 6 HOURS AS NEEDED FOR NAUSEA    SENNOSIDES (LAXATIVE, SENNOSIDES,) 25 MG TAB    Take by mouth.    SUBOXONE 8-2 MG    Place 1 each under the tongue daily as needed (restless legs).    SYNTHROID 100 MCG TABLET    Take 1 tablet (100 mcg total) by mouth before breakfast. **BRAND  MEDICALLY NECESSARY**    TIZANIDINE (ZANAFLEX) 4 MG TABLET    Half or full tablet by mouth at night as needed for muscle spasm    VALACYCLOVIR (VALTREX) 1000 MG TABLET    TAKE ONE TABLET BY MOUTH EVERY DAY FOR 7 DAYS

## 2024-08-28 NOTE — PROGRESS NOTES
73 yo WF, history hyst presents to establish care Pt complains vaginal dryness, dyspareuni and seeing vaginal blood occasionally This has been present for approx  2 years  Pt uses lubricant but minimally affective . Denies dysuria, hematuria, lesion.   Pt with history bypass surgery  Nonsmoker  Past Medical History:   Diagnosis Date    Allergy     Anemia     Anxiety     Arthritis     Blood transfusion 8 yrs    CAD (coronary artery disease)     Cataract     Chronic diastolic CHF (congestive heart failure)     CKD (chronic kidney disease), stage II     COPD (chronic obstructive pulmonary disease)     mild no inhalers    Degenerative disc disease     Depression     Dry eye syndrome     Fibromyalgia     Fluid overload     GERD (gastroesophageal reflux disease)     Headache     Hypercholesterolemia 12/09/2019    Hypertension     Hypothyroidism     IBS (irritable bowel syndrome)     Lower extremity edema     Macular drusen     Neuromuscular disorder     Ocular hypertension, bilateral     Osteoporosis     Pleural effusion     PUD (peptic ulcer disease)     Restless leg     Uses Suboxone to control    Sleep apnea     cpap    Stroke     2 strokes-after CABG    Thoracic or lumbosacral neuritis or radiculitis 10/19/2012    Thyroid disease     hashimoto's       Past Surgical History:   Procedure Laterality Date    ADENOIDECTOMY  1955    ANGIOGRAM, CORONARY, WITH LEFT HEART CATHETERIZATION  10/26/2023    Procedure: Left Heart Cath;  Surgeon: Sourav Nugent MD;  Location: [facility] CATH;  Service: Cardiology;;    APPENDECTOMY  1965    ARTERIOGRAPHY OF AORTIC ROOT  10/26/2023    Procedure: AO Root;  Surgeon: Sourav Nugent MD;  Location: [facility] CATH;  Service: Cardiology;;    CARDIAC CATHETERIZATION      CATARACT EXTRACTION W/  INTRAOCULAR LENS IMPLANT Right 05/24/2021    Procedure: EXTRACTION, CATARACT, WITH IOL INSERTION;  Surgeon: Bebe Lynn MD;  Location: [facility] OR;  Service: Ophthalmology;  Laterality: Right;  Right/DM     CHOLECYSTECTOMY  2021    CORONARY ANGIOGRAPHY N/A 05/02/2022    Procedure: ANGIOGRAM, CORONARY ARTERY;  Surgeon: Sourav Nugent MD;  Location: [facility] CATH;  Service: Cardiology;  Laterality: N/A;    CORONARY ANGIOGRAPHY  11/9/2023    Procedure: Coronary angiogram study;  Surgeon: Sourav Nugent MD;  Location: [facility] CATH;  Service: Cardiology;;    CORONARY ARTERY BYPASS GRAFT      CORONARY ARTERY BYPASS GRAFT (CABG) N/A 06/27/2022    Procedure: CORONARY ARTERY BYPASS GRAFT (CABG) X 5;  Surgeon: Talib Torres MD;  Location: [facility] OR;  Service: Cardiovascular;  Laterality: N/A;    CORONARY BYPASS GRAFT ANGIOGRAPHY  10/26/2023    Procedure: Bypass graft study;  Surgeon: Sourav Nugent MD;  Location: [facility] CATH;  Service: Cardiology;;    ENDOSCOPIC HARVEST OF VEIN Left 06/27/2022    Procedure: SURGICAL PROCUREMENT, VEIN, ENDOSCOPIC;  Surgeon: Talib Torres MD;  Location: [facility] OR;  Service: Cardiovascular;  Laterality: Left;    EYE SURGERY  2021    Cataract surgery    HYSTERECTOMY  8 yrs     INJECTION OF ANESTHETIC AGENT AROUND NERVE Bilateral 08/21/2018    Procedure: BLOCK, NERVE;  Surgeon: Talia Belcher MD;  Location: [facility] PAIN MGT;  Service: Pain Management;  Laterality: Bilateral;  Bilateral block @ L2,3,4,5      INJECTION OF ANESTHETIC AGENT AROUND NERVE Bilateral 11/18/2019    Procedure: BLOCK, NERVE, L2-L3-L4-L5 ME DIAL BRANCH;  Surgeon: Talia Belcher MD;  Location: [facility] PAIN MGT;  Service: Pain Management;  Laterality: Bilateral;    INJECTION OF FACET JOINT Bilateral 12/16/2019    Procedure: INJECTION, FACET JOINT, L3-L4 AND L4-L5 AND T7-T8 LIGAMENT;  Surgeon: Talia Belcher MD;  Location: [facility] PAIN MGT;  Service: Pain Management;  Laterality: Bilateral;    INSERTION OF IMPLANTABLE LOOP RECORDER Left 07/25/2022    Procedure: Insertion, Implantable Loop Recorder;  Surgeon: Sourav Nugent MD;  Location: [facility] CATH;  Service: Cardiology;  Laterality: Left;    LAPAROSCOPIC CHOLECYSTECTOMY N/A 02/10/2021     Procedure: CHOLECYSTECTOMY, LAPAROSCOPIC;  Surgeon: John Morrow MD;  Location: [facility] OR;  Service: General;  Laterality: N/A;    LEFT HEART CATHETERIZATION Left 2022    Procedure: Left heart cath;  Surgeon: Sourav Nugent MD;  Location: [facility] CATH;  Service: Cardiology;  Laterality: Left;    LEFT HEART CATHETERIZATION Left 10/26/2023    Procedure: Left heart cath;  Surgeon: Sourav Nugent MD;  Location: [facility] CATH;  Service: Cardiology;  Laterality: Left;    PERCUTANEOUS CORONARY INTERVENTION, ARTERY  2023    Procedure: DANILO LCX;  Surgeon: Sourav Nugent MD;  [facility];  Service: Cardiology;;    SINUS SURGERY      x2-one for CSF leak    TONSILLECTOMY         Family History   Problem Relation Name Age of Onset    Ulcers Father      Arthritis Mother Azucena Heiwtt     Cancer Mother Azucena Hewitt     Heart disease Mother Azucena Hewitt     Hypertension Mother Azucena Hewitt     Cataracts Mother Azucena Hewitt     Ovarian cancer Mother Azucena Hewitt     Thyroid disease Brother      Heart disease Brother      Stroke Paternal Aunt Jermaine kim             Amblyopia Neg Hx      Blindness Neg Hx      Diabetes Neg Hx      Glaucoma Neg Hx      Macular degeneration Neg Hx      Retinal detachment Neg Hx      Strabismus Neg Hx      Breast cancer Neg Hx         Social History     Socioeconomic History    Marital status:    Tobacco Use    Smoking status: Former     Current packs/day: 0.00     Types: Cigarettes     Quit date: 2008     Years since quittin.6    Smokeless tobacco: Never   Substance and Sexual Activity    Alcohol use: Yes     Comment: rarely    Drug use: No    Sexual activity: Yes     Partners: Male     Social Determinants of Health     Financial Resource Strain: Low Risk  (2023)    Overall Financial Resource Strain (CARDIA)     Difficulty of Paying Living Expenses: Not hard at all   Food Insecurity: No Food Insecurity (2023)    Hunger Vital Sign     Worried About Running Out  of Food in the Last Year: Never true     Ran Out of Food in the Last Year: Never true   Transportation Needs: No Transportation Needs (11/26/2023)    PRAPARE - Transportation     Lack of Transportation (Medical): No     Lack of Transportation (Non-Medical): No   Physical Activity: Insufficiently Active (11/26/2023)    Exercise Vital Sign     Days of Exercise per Week: 3 days     Minutes of Exercise per Session: 30 min   Stress: Stress Concern Present (11/26/2023)    [facility] - Occupational Stress Questionnaire     Feeling of Stress : To some extent   Housing Stability: Unknown (11/26/2023)    Housing Stability Vital Sign     Unable to Pay for Housing in the Last Year: No     Unstable Housing in the Last Year: No       Current Outpatient Medications   Medication Sig Dispense Refill    amLODIPine (NORVASC) 2.5 MG tablet Take 3 tablets (7.5 mg total) by mouth once daily.      aspirin (ECOTRIN) 81 MG EC tablet Take 1 tablet (81 mg total) by mouth once daily. for 21 days      benazepriL (LOTENSIN) 20 MG tablet TAKE 1 TABLET TWICE DAILY 180 tablet 1    butalbital-acetaminophen-caffeine -40 mg (FIORICET, ESGIC) -40 mg per tablet 1 tab PO PRN migraine. No more than 10 tab per month. 10 tablet 0    CALCIUM CARBONATE/VITAMIN D3 (CALCIUM 500 WITH D ORAL) Take 1 capsule by mouth once daily.      carboxymethylcellulose (REFRESH PLUS) 0.5 % Dpet Place 1 drop into both eyes daily as needed (DRY EYES).      clopidogreL (PLAVIX) 75 mg tablet Take 1 tablet (75 mg total) by mouth once daily. 90 tablet 1    co-enzyme Q-10 30 mg capsule Take 30 mg by mouth 3 (three) times daily.      dicyclomine (BENTYL) 10 MG capsule Take 1 capsule (10 mg total) by mouth 4 (four) times daily as needed (stomach discomfort). 120 capsule 0    esomeprazole (NEXIUM) 40 MG capsule Take 1 capsule (40 mg total) by mouth before breakfast. 90 capsule 3    evolocumab (REPATHA SURECLICK) 140 mg/mL PnIj Inject 1 mL (140  mg total) into the skin every 14 (fourteen) days. 2 each 5    fluticasone propionate (FLONASE) 50 mcg/actuation nasal spray 1 spray (50 mcg total) by Each Nostril route once daily. (Patient taking differently: 1 spray by Each Nostril route 2 (two) times a day.) 32 g 2    garlic (GARLIQUE ORAL) Take 1 capsule by mouth Daily.      MULTIVITAMIN (MULTIPLE VITAMIN ORAL) Take 1 capsule by mouth once daily.      nitroGLYCERIN (NITROSTAT) 0.4 MG SL tablet Place 1 tablet (0.4 mg total) under the tongue every 5 (five) minutes as needed for Chest pain. 25 tablet 3    nitroGLYCERIN 0.2 mg/hr TD PT24 (NITRODUR) 0.2 mg/hr Place 1 patch onto the skin once daily. 30 patch 3    oxybutynin (DITROPAN XL) 15 MG TR24 Take 1 tablet (15 mg total) by mouth every morning. 90 tablet 3    promethazine (PHENERGAN) 25 MG tablet TAKE ONE TABLET BY MOUTH EVERY 6 HOURS AS NEEDED FOR NAUSEA 45 tablet 5    sennosides (LAXATIVE, SENNOSIDES,) 25 mg Tab Take by mouth.      SUBOXONE 8-2 mg Place 1 each under the tongue daily as needed (restless legs).      SYNTHROID 100 mcg tablet Take 1 tablet (100 mcg total) by mouth before breakfast. **BRAND MEDICALLY NECESSARY** 90 tablet 3    tiZANidine (ZANAFLEX) 4 MG tablet Half or full tablet by mouth at night as needed for muscle spasm 30 tablet 11    valACYclovir (VALTREX) 1000 MG tablet TAKE ONE TABLET BY MOUTH EVERY DAY FOR 7 DAYS 7 tablet 11    atogepant (QULIPTA) 60 mg Tab Take 1 tablet (60 mg total) by mouth once daily. (Patient not taking: Reported on 8/28/2024) 30 tablet 11    bempedoic acid (NEXLETOL) 180 mg Tab Take 1 tablet (180 mg total) by mouth Daily. (Patient not taking: Reported on 8/28/2024) 90 tablet 1    estradioL (ESTRACE) 0.01 % (0.1 mg/gram) vaginal cream Place 1 g vaginally 3 (three) times a week. 42.5 g 1    isosorbide mononitrate (IMDUR) 30 MG 24 hr tablet TAKE 1 TABLET ONE TIME DAILY (Patient not taking: Reported on 8/28/2024) 90 tablet 3    methocarbamoL (ROBAXIN) 750 MG Tab TAKE ONE  "TABLET BY MOUTH FOUR TIMES DAILY (Patient not taking: Reported on 8/28/2024) 40 tablet 3     Current Facility-Administered Medications   Medication Dose Route Frequency Provider Last Rate Last Admin    sodium chloride 0.9% flush 10 mL  10 mL Intravenous PRN Sourav Nugent MD         Facility-Administered Medications Ordered in Other Visits   Medication Dose Route Frequency Provider Last Rate Last Admin    lactated ringers infusion   Intravenous Continuous Jim Mcdonough MD   Stopped at 06/27/22 1714    LIDOcaine (PF) 10 mg/ml (1%) injection 10 mg  1 mL Intradermal Once Jim Mcdonough MD           Review of patient's allergies indicates:   Allergen Reactions    Alendronate Other (See Comments)     "Felt like I was having a heart attack"    Lyrica [pregabalin] Swelling    Pcn [penicillins] Swelling    Toradol [ketorolac] Swelling    Vibramycin [doxycycline calcium] Shortness Of Breath and Swelling    Zetia [ezetimibe] Other (See Comments)    Crestor [rosuvastatin]      Body aches    Cymbalta [duloxetine]      dizzyness    Elavil [amitriptyline] Other (See Comments)     Restless leg    Pravastatin Other (See Comments)     Flu -like symptoms   Body aches     Seroquel [quetiapine]      restless leg symdrome       Review of System:   General: no chills, fever, night sweats, weight gain or weight loss  Psychological: no depression or suicidal ideation  Breasts: no new or changing breast lumps, nipple discharge or masses.  Respiratory: no cough, shortness of breath, or wheezing  Cardiovascular: no chest pain or dyspnea on exertion  Gastrointestinal: no abdominal pain, change in bowel habits, or black or bloody stools  Genito-Urinary: as above  Musculoskeletal: no gait disturbance or muscular weakness     PE:   APPEARANCE: Well nourished, well developed, in no acute distress.  NECK: Neck symmetric without masses or thyromegaly.  NODES: No inguinal lymph node enlargement.  ABDOMEN: Soft. No tenderness or masses. No " hepatosplenomegaly. No hernias.  BREASTS: deferred  PELVIC: Normal external female genitalia without lesions. Normal hair distribution. Adequate perineal body, normal urethral meatus. Vagina dry, denuded and poorly rugaed with flat sidewalls without lesions or discharge. No significant cystocele or rectocele. Uterus and cervix surgically absent. Bimanual exam revealed no masses, tenderness or abnormality.Positive introital contracture    I discussed atrophic changes and secondary pain and bleeding  I discussed and rec low dose vaginal ERT  This will deliver minimal systemic effect and thus cardiac history is not a contraindication  I discussed dosing and rec three night week of 1 gram vaginally  Answered all questions and pt is agreeable  Follow up 3 months  /prn    I spent a total of 30 minutes on the day of the visit. This includes face to face time and non-face to face time preparing to see the patient (eg, review of tests), obtaining and/or reviewing separately obtained history, documenting clinical information in the electronic or other health record, independently interpreting results and communicating results to the patient/family/caregiver, or care coordinator.       NOTE  NURSING PERSONAL PRESENT FOR ENTIRE PHYSICAL EXAM

## 2024-08-30 LAB
OHS CV AF BURDEN PERCENT: < 1
OHS CV DC REMOTE DEVICE TYPE: NORMAL

## 2024-09-15 DIAGNOSIS — G43.719 INTRACTABLE CHRONIC MIGRAINE WITHOUT AURA AND WITHOUT STATUS MIGRAINOSUS: ICD-10-CM

## 2024-09-16 RX ORDER — BUTALBITAL, ACETAMINOPHEN AND CAFFEINE 50; 325; 40 MG/1; MG/1; MG/1
TABLET ORAL
Qty: 10 TABLET | Refills: 0 | Status: SHIPPED | OUTPATIENT
Start: 2024-09-16

## 2024-09-18 ENCOUNTER — HOSPITAL ENCOUNTER (OUTPATIENT)
Dept: CARDIOLOGY | Facility: HOSPITAL | Age: 74
Discharge: HOME | End: 2024-09-18
Attending: INTERNAL MEDICINE
Payer: MEDICARE

## 2024-09-18 ENCOUNTER — CLINICAL SUPPORT (OUTPATIENT)
Dept: CARDIOLOGY | Facility: HOSPITAL | Age: 74
End: 2024-09-18
Payer: MEDICARE

## 2024-09-18 ENCOUNTER — TELEPHONE (OUTPATIENT)
Dept: CARDIOLOGY | Facility: HOSPITAL | Age: 74
End: 2024-09-18
Payer: MEDICARE

## 2024-09-18 DIAGNOSIS — I49.8 OTHER SPECIFIED CARDIAC ARRHYTHMIAS: ICD-10-CM

## 2024-09-18 NOTE — TELEPHONE ENCOUNTER
The patient's ABBOTT ILR has reached ELECTIVE REPLACEMENT.   Implanted 7/25/22 for suspected AF    Current Device  and Model:    Date of JENNY, if known:  9/17/24    Is this replacement indicator early or unexpected due to an advisory:   No    Anticoagulation Status: none, Plavix 75mg daily, Aspirin 81mg daily    Last EF: unknown    F/U scheduled in clinic 12/5/24  Please advise on POC

## 2024-09-19 NOTE — TELEPHONE ENCOUNTER
Patient returned call  Notified of battery low on ILR  Patient states she will like to have it removed, will call later to get it scheduled  States she has been feeling really good since new medication and rarely has any s/s  Notified that the battery will likely deplete within a month  Patient VU

## 2024-09-21 ENCOUNTER — CLINICAL SUPPORT (OUTPATIENT)
Dept: CARDIOLOGY | Facility: HOSPITAL | Age: 74
End: 2024-09-21
Payer: MEDICARE

## 2024-09-21 DIAGNOSIS — I49.8 OTHER SPECIFIED CARDIAC ARRHYTHMIAS: ICD-10-CM

## 2024-09-21 NOTE — TELEPHONE ENCOUNTER
No care due was identified.  [facility] Embedded Care Due Messages. Reference number: [id number].   9/21/2024 8:01:18 AM CDT

## 2024-09-22 LAB
OHS CV AF BURDEN PERCENT: < 1
OHS CV DC REMOTE DEVICE TYPE: NORMAL
OHS CV ICD SHOCK: NO

## 2024-09-22 RX ORDER — FLUTICASONE PROPIONATE 50 MCG
1 SPRAY, SUSPENSION (ML) NASAL DAILY
Qty: 32 G | Refills: 2 | Status: SHIPPED | OUTPATIENT
Start: 2024-09-22

## 2024-09-22 NOTE — TELEPHONE ENCOUNTER
Refill Decision Note   Osman Johansen  is requesting a refill authorization.  Brief Assessment and Rationale for Refill:  Approve     Medication Therapy Plan:         Comments:     Note composed:7:44 AM 09/22/2024

## 2024-09-23 ENCOUNTER — HOSPITAL ENCOUNTER (OUTPATIENT)
Dept: CARDIOLOGY | Facility: HOSPITAL | Age: 74
Discharge: HOME | End: 2024-09-23
Attending: INTERNAL MEDICINE
Payer: MEDICARE

## 2024-09-23 PROCEDURE — 93298 REM INTERROG DEV EVAL SCRMS: CPT | Mod: 26,,, | Performed by: INTERNAL MEDICINE

## 2024-09-24 ENCOUNTER — OFFICE VISIT (OUTPATIENT)
Dept: NEUROLOGY | Facility: CLINIC | Age: 74
End: 2024-09-24
Payer: MEDICARE

## 2024-09-24 VITALS
BODY MASS INDEX: 26.73 KG/M2 | SYSTOLIC BLOOD PRESSURE: 134 MMHG | HEIGHT: 61 IN | DIASTOLIC BLOOD PRESSURE: 78 MMHG | RESPIRATION RATE: 18 BRPM | WEIGHT: 141.56 LBS | HEART RATE: 66 BPM

## 2024-09-24 DIAGNOSIS — G43.719 INTRACTABLE CHRONIC MIGRAINE WITHOUT AURA AND WITHOUT STATUS MIGRAINOSUS: Primary | ICD-10-CM

## 2024-09-24 DIAGNOSIS — G44.86 CERVICOGENIC HEADACHE: ICD-10-CM

## 2024-09-24 PROCEDURE — 99999 PR PBB SHADOW E&M-EST. PATIENT-LVL V: CPT | Mod: PBBFAC,,, | Performed by: NURSE PRACTITIONER

## 2024-09-24 PROCEDURE — 99213 OFFICE O/P EST LOW 20 MIN: CPT | Mod: S$PBB,,, | Performed by: NURSE PRACTITIONER

## 2024-09-24 PROCEDURE — 99215 OFFICE O/P EST HI 40 MIN: CPT | Mod: PBBFAC,PO | Performed by: NURSE PRACTITIONER
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
## 2024-09-24 NOTE — PATIENT INSTRUCTIONS
Please call our clinic at [phone] or send a message on the [facility] portal if there are any changes to the plan described below, for example,if you are not contacted for the requested tests, referral(s) within one week, if you are unable to receive the medications prescribed, or if you feel you need to change the treatment course for any reason.     TESTING:  -- none at this time    REFERRALS:  -- none    PREVENTION (use daily regardless of headache):  -- continue current medications  -- can consider Botox in the future    AS-NEEDED TREATMENT (use total no more than 10 days per month unless otherwise stated):  -- continue Fioricet with next severe attack. You can repeat two hours later if needed.

## 2024-09-30 LAB
OHS CV AF BURDEN PERCENT: < 1
OHS CV DC REMOTE DEVICE TYPE: NORMAL

## 2024-10-14 ENCOUNTER — PATIENT MESSAGE (OUTPATIENT)
Dept: ADMINISTRATIVE | Facility: OTHER | Age: 74
End: 2024-10-14
Payer: MEDICARE

## 2024-10-15 DIAGNOSIS — G43.719 INTRACTABLE CHRONIC MIGRAINE WITHOUT AURA AND WITHOUT STATUS MIGRAINOSUS: ICD-10-CM

## 2024-10-15 RX ORDER — BUTALBITAL, ACETAMINOPHEN AND CAFFEINE 50; 325; 40 MG/1; MG/1; MG/1
TABLET ORAL
Qty: 10 TABLET | Refills: 0 | Status: SHIPPED | OUTPATIENT
Start: 2024-10-15

## 2024-11-11 ENCOUNTER — OFFICE VISIT (OUTPATIENT)
Dept: FAMILY MEDICINE | Facility: CLINIC | Age: 74
End: 2024-11-11
Payer: MEDICARE

## 2024-11-11 DIAGNOSIS — J32.9 SINUSITIS, UNSPECIFIED CHRONICITY, UNSPECIFIED LOCATION: Primary | ICD-10-CM

## 2024-11-11 PROCEDURE — 99213 OFFICE O/P EST LOW 20 MIN: CPT | Mod: 95,,, | Performed by: NURSE PRACTITIONER

## 2024-11-11 RX ORDER — AZITHROMYCIN 250 MG/1
TABLET, FILM COATED ORAL
Qty: 6 TABLET | Refills: 0 | Status: SHIPPED | OUTPATIENT
Start: 2024-11-11 | End: 2024-11-16

## 2024-11-11 NOTE — PROGRESS NOTES
Subjective:       Patient ID: Osman Johansen is a 74 y.o. female.    The patient location is: [place]  The chief complaint leading to consultation is: sinus infection    Visit type: audiovisual    Face to Face time with patient: 10min  15 minutes of total time spent on the encounter, which includes face to face time and non-face to face time preparing to see the patient (eg, review of tests), Obtaining and/or reviewing separately obtained history, Documenting clinical information in the electronic or other health record, Independently interpreting results (not separately reported) and communicating results to the patient/family/caregiver, or Care coordination (not separately reported).     Each patient to whom he or she provides medical services by telemedicine is:  (1) informed of the relationship between the physician and patient and the respective role of any other health care provider with respect to management of the patient; and (2) notified that he or she may decline to receive medical services by telemedicine and may withdraw from such care at any time.    Sinusitis  This is a new problem. Episode onset: 3-4 weeks. The problem has been gradually worsening since onset. Associated symptoms include congestion, coughing, headaches, neck pain and sinus pressure. (+postnasal drip)           Review of Systems   Constitutional:  Positive for activity change. Negative for unexpected weight change.   HENT:  Positive for nasal congestion, postnasal drip and sinus pressure/congestion. Negative for hearing loss, rhinorrhea and trouble swallowing.    Eyes:  Positive for visual disturbance. Negative for discharge.   Respiratory:  Positive for cough. Negative for chest tightness and wheezing.    Cardiovascular:  Negative for chest pain and palpitations.   Gastrointestinal:  Negative for blood in stool, constipation, diarrhea and vomiting.   Endocrine: Negative for polydipsia and polyuria.   Genitourinary:  Negative for  difficulty urinating, dysuria, hematuria and menstrual problem.   Musculoskeletal:  Positive for neck pain. Negative for arthralgias and joint swelling.   Neurological:  Positive for weakness and headaches.   Psychiatric/Behavioral:  Negative for confusion and dysphoric mood.          Objective:      Physical Exam  Constitutional:       General: She is not in acute distress.     Appearance: She is well-developed. She is not ill-appearing, toxic-appearing or diaphoretic.   HENT:      Right Ear: Hearing normal.      Left Ear: Hearing normal.   Pulmonary:      Effort: No tachypnea or respiratory distress.   Skin:     Coloration: Skin is not pale.   Neurological:      Mental Status: She is alert and oriented to person, place, and time.   Psychiatric:         Speech: Speech normal.         Behavior: Behavior normal.         Thought Content: Thought content normal.         Judgment: Judgment normal.         Assessment:       1. Sinusitis, unspecified chronicity, unspecified location        Plan:       Sinusitis, unspecified chronicity, unspecified location  -     azithromycin (Z-LUCIUS) 250 MG tablet; Take 2 tablets by mouth on day 1; Take 1 tablet by mouth on days 2-5  Dispense: 6 tablet; Refill: 0    education provided on supportive care, symptom monitoring and return precautions           Follow up for further evaluation if s/s worsen, fail to improve, or new symptoms arise.    Medication List with Changes/Refills   New Medications    AZITHROMYCIN (Z-LUCIUS) 250 MG TABLET    Take 2 tablets by mouth on day 1; Take 1 tablet by mouth on days 2-5   Current Medications    AMLODIPINE (NORVASC) 2.5 MG TABLET    Take 3 tablets (7.5 mg total) by mouth once daily.    ASPIRIN (ECOTRIN) 81 MG EC TABLET    Take 1 tablet (81 mg total) by mouth once daily. for 21 days    ATOGEPANT (QULIPTA) 60 MG TAB    Take 1 tablet (60 mg total) by mouth once daily.    BEMPEDOIC ACID (NEXLETOL) 180 MG TAB    Take 1 tablet (180 mg total) by mouth Daily.     BENAZEPRIL (LOTENSIN) 20 MG TABLET    TAKE 1 TABLET TWICE DAILY    BUTALBITAL-ACETAMINOPHEN-CAFFEINE -40 MG (FIORICET, ESGIC) -40 MG PER TABLET    1 tab PO PRN migraine. No more than 10 tab per month.    CALCIUM CARBONATE/VITAMIN D3 (CALCIUM 500 WITH D ORAL)    Take 1 capsule by mouth once daily.    CARBOXYMETHYLCELLULOSE (REFRESH PLUS) 0.5 % DPET    Place 1 drop into both eyes daily as needed (DRY EYES).    CLOPIDOGREL (PLAVIX) 75 MG TABLET    Take 1 tablet (75 mg total) by mouth once daily.    CO-ENZYME Q-10 30 MG CAPSULE    Take 30 mg by mouth 3 (three) times daily.    DICYCLOMINE (BENTYL) 10 MG CAPSULE    Take 1 capsule (10 mg total) by mouth 4 (four) times daily as needed (stomach discomfort).    ESOMEPRAZOLE (NEXIUM) 40 MG CAPSULE    Take 1 capsule (40 mg total) by mouth before breakfast.    ESTRADIOL (ESTRACE) 0.01 % (0.1 MG/GRAM) VAGINAL CREAM    Place 1 g vaginally 3 (three) times a week.    EVOLOCUMAB (REPATHA SURECLICK) 140 MG/ML PNIJ    Inject 1 mL (140 mg total) into the skin every 14 (fourteen) days.    FLUTICASONE PROPIONATE (FLONASE) 50 MCG/ACTUATION NASAL SPRAY    1 spray (50 mcg total) by Each Nostril route once daily.    GARLIC (GARLIQUE ORAL)    Take 1 capsule by mouth Daily.    ISOSORBIDE MONONITRATE (IMDUR) 30 MG 24 HR TABLET    TAKE 1 TABLET ONE TIME DAILY    METHOCARBAMOL (ROBAXIN) 750 MG TAB    TAKE ONE TABLET BY MOUTH FOUR TIMES DAILY    MULTIVITAMIN (MULTIPLE VITAMIN ORAL)    Take 1 capsule by mouth once daily.    NITROGLYCERIN (NITROSTAT) 0.4 MG SL TABLET    Place 1 tablet (0.4 mg total) under the tongue every 5 (five) minutes as needed for Chest pain.    NITROGLYCERIN 0.2 MG/HR TD PT24 (NITRODUR) 0.2 MG/HR    Place 1 patch onto the skin once daily.    OXYBUTYNIN (DITROPAN XL) 15 MG TR24    Take 1 tablet (15 mg total) by mouth every morning.    PROMETHAZINE (PHENERGAN) 25 MG TABLET    TAKE ONE TABLET BY MOUTH EVERY 6 HOURS AS NEEDED FOR NAUSEA    SENNOSIDES (LAXATIVE,  SENNOSIDES,) 25 MG TAB    Take by mouth.    SUBOXONE 8-2 MG    Place 1 each under the tongue daily as needed (restless legs).    SYNTHROID 100 MCG TABLET    Take 1 tablet (100 mcg total) by mouth before breakfast. **BRAND MEDICALLY NECESSARY**    TIZANIDINE (ZANAFLEX) 4 MG TABLET    Half or full tablet by mouth at night as needed for muscle spasm    VALACYCLOVIR (VALTREX) 1000 MG TABLET    TAKE ONE TABLET BY MOUTH EVERY DAY FOR 7 DAYS

## 2024-11-17 DIAGNOSIS — J32.9 SINUSITIS, UNSPECIFIED CHRONICITY, UNSPECIFIED LOCATION: ICD-10-CM

## 2024-11-18 ENCOUNTER — TELEPHONE (OUTPATIENT)
Dept: FAMILY MEDICINE | Facility: CLINIC | Age: 74
End: 2024-11-18
Payer: MEDICARE

## 2024-11-18 DIAGNOSIS — G43.719 INTRACTABLE CHRONIC MIGRAINE WITHOUT AURA AND WITHOUT STATUS MIGRAINOSUS: ICD-10-CM

## 2024-11-18 RX ORDER — AZITHROMYCIN 250 MG/1
TABLET, FILM COATED ORAL
Qty: 6 TABLET | Refills: 0 | OUTPATIENT
Start: 2024-11-18 | End: 2024-11-22

## 2024-11-18 RX ORDER — BUTALBITAL, ACETAMINOPHEN AND CAFFEINE 50; 325; 40 MG/1; MG/1; MG/1
TABLET ORAL
Qty: 10 TABLET | Refills: 0 | Status: SHIPPED | OUTPATIENT
Start: 2024-11-18

## 2024-11-18 NOTE — TELEPHONE ENCOUNTER
Pleae see previous refill request, pt asking for z-pack again as they are still sick.     Please advise.

## 2024-11-18 NOTE — TELEPHONE ENCOUNTER
Patient comment: The medication started working after the 2nd day & I started to feel better but after finishing the pk 2 days later the symptoms started to return can this prescription be expended? Enma benjamin Thank you

## 2024-11-18 NOTE — TELEPHONE ENCOUNTER
Trav, please route questions related to the provider who recently saw them for an issue.  Thanks.

## 2024-11-21 ENCOUNTER — OFFICE VISIT (OUTPATIENT)
Dept: FAMILY MEDICINE | Facility: CLINIC | Age: 74
End: 2024-11-21
Payer: MEDICARE

## 2024-11-21 ENCOUNTER — PATIENT MESSAGE (OUTPATIENT)
Dept: NEUROLOGY | Facility: CLINIC | Age: 74
End: 2024-11-21
Payer: MEDICARE

## 2024-11-21 VITALS
BODY MASS INDEX: 27.14 KG/M2 | HEART RATE: 77 BPM | TEMPERATURE: 98 F | HEIGHT: 61 IN | WEIGHT: 143.75 LBS | DIASTOLIC BLOOD PRESSURE: 80 MMHG | OXYGEN SATURATION: 97 % | SYSTOLIC BLOOD PRESSURE: 130 MMHG

## 2024-11-21 DIAGNOSIS — J32.9 SINUSITIS, UNSPECIFIED CHRONICITY, UNSPECIFIED LOCATION: Primary | ICD-10-CM

## 2024-11-21 DIAGNOSIS — J32.9 RECURRENT SINUSITIS: ICD-10-CM

## 2024-11-21 PROCEDURE — 99213 OFFICE O/P EST LOW 20 MIN: CPT | Mod: S$PBB,,, | Performed by: NURSE PRACTITIONER

## 2024-11-21 PROCEDURE — 99999 PR PBB SHADOW E&M-EST. PATIENT-LVL III: CPT | Mod: PBBFAC,,, | Performed by: NURSE PRACTITIONER

## 2024-11-21 PROCEDURE — 99213 OFFICE O/P EST LOW 20 MIN: CPT | Mod: PBBFAC,PO | Performed by: NURSE PRACTITIONER

## 2024-11-21 RX ORDER — CYCLOSPORINE 0.5 MG/ML
1 EMULSION OPHTHALMIC 2 TIMES DAILY
COMMUNITY
Start: 2024-10-24

## 2024-11-21 RX ORDER — LEVOFLOXACIN 500 MG/1
500 TABLET, FILM COATED ORAL DAILY
Qty: 7 TABLET | Refills: 0 | Status: SHIPPED | OUTPATIENT
Start: 2024-11-21 | End: 2024-11-28

## 2024-11-21 RX ORDER — LIFITEGRAST 50 MG/ML
1 SOLUTION/ DROPS OPHTHALMIC 2 TIMES DAILY
COMMUNITY
Start: 2024-10-22

## 2024-11-21 NOTE — PROGRESS NOTES
Subjective:       Patient ID: Osman Johansen is a 74 y.o. female.    Chief Complaint: Facial Pain    Sinusitis  This is a recurrent problem. The current episode started 1 to 4 weeks ago. The problem has been gradually worsening since onset. There has been no fever. Associated symptoms include congestion, headaches and sinus pressure. Pertinent negatives include no ear pain or neck pain. Past treatments include antibiotics. The treatment provided no relief.         Vitals:    11/21/24 1415   BP: 130/80   Pulse:    Temp:      Review of Systems   Constitutional:  Positive for activity change. Negative for unexpected weight change.   HENT:  Positive for congestion, postnasal drip, sinus pressure and sinus pain. Negative for ear pain, hearing loss, rhinorrhea and trouble swallowing.    Eyes:  Positive for visual disturbance. Negative for discharge.   Respiratory:  Negative for chest tightness and wheezing.    Cardiovascular:  Negative for chest pain and palpitations.   Gastrointestinal:  Negative for blood in stool, constipation, diarrhea and vomiting.   Endocrine: Negative for polydipsia and polyuria.   Genitourinary:  Negative for difficulty urinating, dysuria, hematuria and menstrual problem.   Musculoskeletal:  Negative for arthralgias, joint swelling and neck pain.   Neurological:  Positive for weakness and headaches.   Psychiatric/Behavioral:  Negative for confusion and dysphoric mood.        Past Medical History:   Diagnosis Date    Allergy     Anemia     Anxiety     Arthritis     Blood transfusion 8 yrs    CAD (coronary artery disease)     Cataract     Chronic diastolic CHF (congestive heart failure)     CKD (chronic kidney disease), stage II     COPD (chronic obstructive pulmonary disease)     mild no inhalers    Degenerative disc disease     Depression     Dry eye syndrome     Fibromyalgia     Fluid overload     GERD (gastroesophageal reflux disease)     Headache     Hypercholesterolemia 12/09/2019     Hypertension     Hypothyroidism     IBS (irritable bowel syndrome)     Lower extremity edema     Macular drusen     Neuromuscular disorder     Ocular hypertension, bilateral     Osteoporosis     Pleural effusion     PUD (peptic ulcer disease)     Restless leg     Uses Suboxone to control    Sleep apnea     cpap    Stroke     2 strokes-after CABG    Thoracic or lumbosacral neuritis or radiculitis 10/19/2012    Thyroid disease     hashimoto's     Objective:      Physical Exam  Vitals and nursing note reviewed.   Constitutional:       General: She is not in acute distress.     Appearance: She is not diaphoretic.   HENT:      Head: Normocephalic.      Right Ear: A middle ear effusion is present.      Left Ear: A middle ear effusion is present.      Nose:      Right Sinus: Maxillary sinus tenderness and frontal sinus tenderness present.      Left Sinus: Maxillary sinus tenderness and frontal sinus tenderness present.      Mouth/Throat:      Pharynx: Oropharynx is clear.   Eyes:      General:         Right eye: No discharge.         Left eye: No discharge.   Neck:      Trachea: No tracheal deviation.   Cardiovascular:      Rate and Rhythm: Normal rate.      Heart sounds: Normal heart sounds.   Pulmonary:      Effort: Pulmonary effort is normal.      Breath sounds: Normal breath sounds.   Skin:     Coloration: Skin is not pale.   Neurological:      Mental Status: She is alert and oriented to person, place, and time.   Psychiatric:         Speech: Speech normal.         Behavior: Behavior normal.         Thought Content: Thought content normal.         Judgment: Judgment normal.         Assessment:       1. Sinusitis, unspecified chronicity, unspecified location    2. Recurrent sinusitis        Plan:       Sinusitis, unspecified chronicity, unspecified location  -     levoFLOXacin (LEVAQUIN) 500 MG tablet; Take 1 tablet (500 mg total) by mouth once daily. for 7 days  Dispense: 7 tablet; Refill: 0    Recurrent sinusitis  -      Ambulatory referral/consult to ENT; Future; Expected date: 11/28/2024          education provided on supportive care, symptom monitoring and return precautions         Medication List with Changes/Refills   New Medications    LEVOFLOXACIN (LEVAQUIN) 500 MG TABLET    Take 1 tablet (500 mg total) by mouth once daily. for 7 days   Current Medications    AMLODIPINE (NORVASC) 2.5 MG TABLET    Take 3 tablets (7.5 mg total) by mouth once daily.    ASPIRIN (ECOTRIN) 81 MG EC TABLET    Take 1 tablet (81 mg total) by mouth once daily. for 21 days    ATOGEPANT (QULIPTA) 60 MG TAB    Take 1 tablet (60 mg total) by mouth once daily.    BEMPEDOIC ACID (NEXLETOL) 180 MG TAB    Take 1 tablet (180 mg total) by mouth Daily.    BENAZEPRIL (LOTENSIN) 20 MG TABLET    TAKE 1 TABLET TWICE DAILY    BUTALBITAL-ACETAMINOPHEN-CAFFEINE -40 MG (FIORICET, ESGIC) -40 MG PER TABLET    1 tab PO PRN migraine. No more than 10 tab per month.    CALCIUM CARBONATE/VITAMIN D3 (CALCIUM 500 WITH D ORAL)    Take 1 capsule by mouth once daily.    CARBOXYMETHYLCELLULOSE (REFRESH PLUS) 0.5 % DPET    Place 1 drop into both eyes daily as needed (DRY EYES).    CLOPIDOGREL (PLAVIX) 75 MG TABLET    Take 1 tablet (75 mg total) by mouth once daily.    CO-ENZYME Q-10 30 MG CAPSULE    Take 30 mg by mouth 3 (three) times daily.    DICYCLOMINE (BENTYL) 10 MG CAPSULE    Take 1 capsule (10 mg total) by mouth 4 (four) times daily as needed (stomach discomfort).    ESOMEPRAZOLE (NEXIUM) 40 MG CAPSULE    Take 1 capsule (40 mg total) by mouth before breakfast.    ESTRADIOL (ESTRACE) 0.01 % (0.1 MG/GRAM) VAGINAL CREAM    Place 1 g vaginally 3 (three) times a week.    EVOLOCUMAB (REPATHA SURECLICK) 140 MG/ML PNIJ    Inject 1 mL (140 mg total) into the skin every 14 (fourteen) days.    FLUTICASONE PROPIONATE (FLONASE) 50 MCG/ACTUATION NASAL SPRAY    1 spray (50 mcg total) by Each Nostril route once daily.    GARLIC (GARLIQUE ORAL)    Take 1 capsule by mouth Daily.     ISOSORBIDE MONONITRATE (IMDUR) 30 MG 24 HR TABLET    TAKE 1 TABLET ONE TIME DAILY    METHOCARBAMOL (ROBAXIN) 750 MG TAB    TAKE ONE TABLET BY MOUTH FOUR TIMES DAILY    MULTIVITAMIN (MULTIPLE VITAMIN ORAL)    Take 1 capsule by mouth once daily.    NITROGLYCERIN (NITROSTAT) 0.4 MG SL TABLET    Place 1 tablet (0.4 mg total) under the tongue every 5 (five) minutes as needed for Chest pain.    OXYBUTYNIN (DITROPAN XL) 15 MG TR24    Take 1 tablet (15 mg total) by mouth every morning.    PROMETHAZINE (PHENERGAN) 25 MG TABLET    TAKE ONE TABLET BY MOUTH EVERY 6 HOURS AS NEEDED FOR NAUSEA    RESTASIS 0.05 % OPHTHALMIC EMULSION    Place 1 drop into both eyes 2 (two) times daily.    SENNOSIDES (LAXATIVE, SENNOSIDES,) 25 MG TAB    Take by mouth.    SUBOXONE 8-2 MG    Place 1 each under the tongue daily as needed (restless legs).    SYNTHROID 100 MCG TABLET    Take 1 tablet (100 mcg total) by mouth before breakfast. **BRAND MEDICALLY NECESSARY**    TIZANIDINE (ZANAFLEX) 4 MG TABLET    Half or full tablet by mouth at night as needed for muscle spasm    VALACYCLOVIR (VALTREX) 1000 MG TABLET    TAKE ONE TABLET BY MOUTH EVERY DAY FOR 7 DAYS    XIIDRA 5 % DPET    Place 1 drop into both eyes 2 (two) times daily.

## 2024-11-22 ENCOUNTER — PATIENT MESSAGE (OUTPATIENT)
Dept: OTOLARYNGOLOGY | Facility: CLINIC | Age: 74
End: 2024-11-22
Payer: MEDICARE

## 2024-12-05 ENCOUNTER — OFFICE VISIT (OUTPATIENT)
Dept: CARDIOLOGY | Facility: CLINIC | Age: 74
End: 2024-12-05
Payer: MEDICARE

## 2024-12-05 VITALS
HEART RATE: 58 BPM | HEIGHT: 61 IN | BODY MASS INDEX: 26.73 KG/M2 | SYSTOLIC BLOOD PRESSURE: 115 MMHG | DIASTOLIC BLOOD PRESSURE: 70 MMHG | WEIGHT: 141.56 LBS

## 2024-12-05 DIAGNOSIS — I25.10 CORONARY ARTERY DISEASE DUE TO LIPID RICH PLAQUE: Chronic | ICD-10-CM

## 2024-12-05 DIAGNOSIS — Z95.818 PRESENCE OF OTHER CARDIAC IMPLANTS AND GRAFTS: ICD-10-CM

## 2024-12-05 DIAGNOSIS — R07.89 ATYPICAL CHEST PAIN: Primary | ICD-10-CM

## 2024-12-05 DIAGNOSIS — I63.9 ACUTE CVA (CEREBROVASCULAR ACCIDENT): ICD-10-CM

## 2024-12-05 DIAGNOSIS — I25.83 CORONARY ARTERY DISEASE DUE TO LIPID RICH PLAQUE: Chronic | ICD-10-CM

## 2024-12-05 DIAGNOSIS — Z95.1 S/P CABG (CORONARY ARTERY BYPASS GRAFT): Chronic | ICD-10-CM

## 2024-12-05 DIAGNOSIS — Z95.1 S/P CABG (CORONARY ARTERY BYPASS GRAFT): ICD-10-CM

## 2024-12-05 DIAGNOSIS — I10 ESSENTIAL (PRIMARY) HYPERTENSION: Chronic | ICD-10-CM

## 2024-12-05 DIAGNOSIS — Z45.09 ENCOUNTER FOR LOOP RECORDER AT END OF BATTERY LIFE: ICD-10-CM

## 2024-12-05 DIAGNOSIS — T50.905A DRUG SIDE EFFECTS, INITIAL ENCOUNTER: ICD-10-CM

## 2024-12-05 LAB
OHS QRS DURATION: 92 MS
OHS QTC CALCULATION: 386 MS

## 2024-12-05 PROCEDURE — 93005 ELECTROCARDIOGRAM TRACING: CPT | Mod: PO

## 2024-12-05 PROCEDURE — 99213 OFFICE O/P EST LOW 20 MIN: CPT | Mod: PBBFAC,25,PO | Performed by: INTERNAL MEDICINE

## 2024-12-05 PROCEDURE — 99999 PR PBB SHADOW E&M-EST. PATIENT-LVL III: CPT | Mod: PBBFAC,,, | Performed by: INTERNAL MEDICINE

## 2024-12-05 PROCEDURE — 93010 ELECTROCARDIOGRAM REPORT: CPT | Mod: ,,, | Performed by: INTERNAL MEDICINE

## 2024-12-05 PROCEDURE — 99214 OFFICE O/P EST MOD 30 MIN: CPT | Mod: 25,S$PBB,, | Performed by: INTERNAL MEDICINE

## 2024-12-05 RX ORDER — CLOPIDOGREL BISULFATE 75 MG/1
75 TABLET ORAL DAILY
Qty: 90 TABLET | Refills: 1 | Status: SHIPPED | OUTPATIENT
Start: 2024-12-05 | End: 2024-12-05 | Stop reason: SDUPTHER

## 2024-12-06 ENCOUNTER — PATIENT MESSAGE (OUTPATIENT)
Dept: ADMINISTRATIVE | Facility: OTHER | Age: 74
End: 2024-12-06
Payer: MEDICARE

## 2024-12-09 RX ORDER — CLOPIDOGREL BISULFATE 75 MG/1
75 TABLET ORAL DAILY
Qty: 90 TABLET | Refills: 1 | Status: SHIPPED | OUTPATIENT
Start: 2024-12-09 | End: 2025-12-09

## 2024-12-09 RX ORDER — NITROGLYCERIN 0.4 MG/1
0.4 TABLET SUBLINGUAL EVERY 5 MIN PRN
Qty: 25 TABLET | Refills: 3 | Status: SHIPPED | OUTPATIENT
Start: 2024-12-09 | End: 2025-12-09

## 2024-12-16 DIAGNOSIS — G43.719 INTRACTABLE CHRONIC MIGRAINE WITHOUT AURA AND WITHOUT STATUS MIGRAINOSUS: ICD-10-CM

## 2024-12-16 RX ORDER — BUTALBITAL, ACETAMINOPHEN AND CAFFEINE 50; 325; 40 MG/1; MG/1; MG/1
TABLET ORAL
Qty: 10 TABLET | Refills: 0 | Status: SHIPPED | OUTPATIENT
Start: 2024-12-16

## 2024-12-17 ENCOUNTER — HOSPITAL ENCOUNTER (OUTPATIENT)
Dept: CARDIOLOGY | Facility: HOSPITAL | Age: 74
Discharge: HOME | End: 2024-12-17
Attending: INTERNAL MEDICINE
Payer: MEDICARE

## 2024-12-17 VITALS — HEIGHT: 61 IN | BODY MASS INDEX: 26.62 KG/M2 | WEIGHT: 141 LBS

## 2024-12-17 PROCEDURE — 93306 TTE W/DOPPLER COMPLETE: CPT | Mod: PO

## 2024-12-17 PROCEDURE — 93306 TTE W/DOPPLER COMPLETE: CPT | Mod: 26,,, | Performed by: INTERNAL MEDICINE

## 2024-12-18 ENCOUNTER — PATIENT MESSAGE (OUTPATIENT)
Dept: CARDIOLOGY | Facility: HOSPITAL | Age: 74
End: 2024-12-18
Payer: MEDICARE

## 2024-12-20 ENCOUNTER — HOSPITAL ENCOUNTER (OUTPATIENT)
Dept: RADIOLOGY | Facility: HOSPITAL | Age: 74
Discharge: HOME | End: 2024-12-20
Attending: INTERNAL MEDICINE
Payer: MEDICARE

## 2024-12-20 ENCOUNTER — HOSPITAL ENCOUNTER (OUTPATIENT)
Dept: CARDIOLOGY | Facility: HOSPITAL | Age: 74
Discharge: HOME | End: 2024-12-20
Attending: INTERNAL MEDICINE
Payer: MEDICARE

## 2024-12-20 VITALS — BODY MASS INDEX: 26.62 KG/M2 | WEIGHT: 141 LBS | HEIGHT: 61 IN

## 2024-12-20 DIAGNOSIS — I25.10 CORONARY ARTERY DISEASE DUE TO LIPID RICH PLAQUE: Chronic | ICD-10-CM

## 2024-12-20 DIAGNOSIS — R07.89 ATYPICAL CHEST PAIN: ICD-10-CM

## 2024-12-20 DIAGNOSIS — I25.83 CORONARY ARTERY DISEASE DUE TO LIPID RICH PLAQUE: Chronic | ICD-10-CM

## 2024-12-20 LAB
CV STRESS BASE HR: 68 BPM
DIASTOLIC BLOOD PRESSURE: 102 MMHG
NUC REST EJECTION FRACTION: 66
OHS CV CPX 1 MINUTE RECOVERY HEART RATE: 120 BPM
OHS CV CPX 85 PERCENT MAX PREDICTED HEART RATE MALE: 123
OHS CV CPX ESTIMATED METS: 8
OHS CV CPX MAX PREDICTED HEART RATE: 145
OHS CV CPX PATIENT IS FEMALE: 1
OHS CV CPX PATIENT IS MALE: 0
OHS CV CPX PEAK DIASTOLIC BLOOD PRESSURE: 91 MMHG
OHS CV CPX PEAK HEAR RATE: 134 BPM
OHS CV CPX PEAK RATE PRESSURE PRODUCT: NORMAL
OHS CV CPX PEAK SYSTOLIC BLOOD PRESSURE: 210 MMHG
OHS CV CPX PERCENT MAX PREDICTED HEART RATE ACHIEVED: 96
OHS CV CPX RATE PRESSURE PRODUCT PRESENTING: NORMAL
OHS CV INITIAL DOSE: 10.6 MCG/KG/MIN
OHS CV PEAK DOSE: 31.3 MCG/KG/MIN
STRESS ECHO POST EXERCISE DUR MIN: 5 MINUTES
STRESS ECHO POST EXERCISE DUR SEC: 23 SECONDS
STRESS ST DEPRESSION: 1.1 MM
SYSTOLIC BLOOD PRESSURE: 208 MMHG

## 2024-12-20 PROCEDURE — 93016 CV STRESS TEST SUPVJ ONLY: CPT | Mod: S$PBB,,, | Performed by: INTERNAL MEDICINE

## 2024-12-20 PROCEDURE — 93017 CV STRESS TEST TRACING ONLY: CPT | Mod: PBBFAC,PO

## 2024-12-20 PROCEDURE — 93018 CV STRESS TEST I&R ONLY: CPT | Mod: S$PBB,,, | Performed by: INTERNAL MEDICINE

## 2024-12-20 PROCEDURE — 93017 CV STRESS TEST TRACING ONLY: CPT | Mod: PO

## 2024-12-20 PROCEDURE — 78452 HT MUSCLE IMAGE SPECT MULT: CPT | Mod: 26,,, | Performed by: INTERNAL MEDICINE

## 2024-12-20 PROCEDURE — A9502 TC99M TETROFOSMIN: HCPCS | Mod: PO | Performed by: INTERNAL MEDICINE

## 2024-12-20 PROCEDURE — 78452 HT MUSCLE IMAGE SPECT MULT: CPT | Mod: PO

## 2024-12-20 RX ADMIN — TETROFOSMIN 31.3 MILLICURIE: 1.38 INJECTION, POWDER, LYOPHILIZED, FOR SOLUTION INTRAVENOUS at 02:12

## 2024-12-20 RX ADMIN — TETROFOSMIN 10.6 MILLICURIE: 1.38 INJECTION, POWDER, LYOPHILIZED, FOR SOLUTION INTRAVENOUS at 02:12

## 2025-01-01 DIAGNOSIS — B02.9 HERPES ZOSTER WITHOUT COMPLICATION: ICD-10-CM

## 2025-01-01 NOTE — TELEPHONE ENCOUNTER
No care due was identified.  [facility] Embedded Care Due Messages. Reference number: [id number].   1/01/2025 8:01:27 AM CST

## 2025-01-02 RX ORDER — VALACYCLOVIR HYDROCHLORIDE 1 G/1
TABLET, FILM COATED ORAL
Qty: 7 TABLET | Refills: 5 | Status: SHIPPED | OUTPATIENT
Start: 2025-01-02

## 2025-01-02 NOTE — TELEPHONE ENCOUNTER
Refill Decision Note   Osman Johansen  is requesting a refill authorization.  Brief Assessment and Rationale for Refill:  Approve     Medication Therapy Plan:        Comments:     Note composed:2:09 PM 01/02/2025

## 2025-01-03 DIAGNOSIS — Z95.1 S/P CABG (CORONARY ARTERY BYPASS GRAFT): ICD-10-CM

## 2025-01-03 RX ORDER — EVOLOCUMAB 140 MG/ML
140 INJECTION, SOLUTION SUBCUTANEOUS
Qty: 2 EACH | Refills: 5 | Status: SHIPPED | OUTPATIENT
Start: 2025-01-03

## 2025-01-07 ENCOUNTER — OFFICE VISIT (OUTPATIENT)
Dept: FAMILY MEDICINE | Facility: CLINIC | Age: 75
End: 2025-01-07
Payer: MEDICARE

## 2025-01-07 ENCOUNTER — TELEPHONE (OUTPATIENT)
Dept: FAMILY MEDICINE | Facility: CLINIC | Age: 75
End: 2025-01-07

## 2025-01-07 ENCOUNTER — TELEPHONE (OUTPATIENT)
Dept: CARDIOLOGY | Facility: CLINIC | Age: 75
End: 2025-01-07
Payer: MEDICARE

## 2025-01-07 VITALS
OXYGEN SATURATION: 98 % | HEIGHT: 61 IN | BODY MASS INDEX: 26.47 KG/M2 | HEART RATE: 78 BPM | SYSTOLIC BLOOD PRESSURE: 132 MMHG | DIASTOLIC BLOOD PRESSURE: 82 MMHG | TEMPERATURE: 98 F | WEIGHT: 140.19 LBS

## 2025-01-07 DIAGNOSIS — N18.30 CONTROLLED TYPE 2 DIABETES MELLITUS WITH STAGE 3 CHRONIC KIDNEY DISEASE, WITHOUT LONG-TERM CURRENT USE OF INSULIN: ICD-10-CM

## 2025-01-07 DIAGNOSIS — N18.32 STAGE 3B CHRONIC KIDNEY DISEASE: ICD-10-CM

## 2025-01-07 DIAGNOSIS — E11.9 CONTROLLED TYPE 2 DIABETES MELLITUS WITHOUT COMPLICATION, WITHOUT LONG-TERM CURRENT USE OF INSULIN: ICD-10-CM

## 2025-01-07 DIAGNOSIS — I25.10 CORONARY ARTERY DISEASE, UNSPECIFIED VESSEL OR LESION TYPE, UNSPECIFIED WHETHER ANGINA PRESENT, UNSPECIFIED WHETHER NATIVE OR TRANSPLANTED HEART: ICD-10-CM

## 2025-01-07 DIAGNOSIS — I10 ESSENTIAL HYPERTENSION: ICD-10-CM

## 2025-01-07 DIAGNOSIS — E11.22 CONTROLLED TYPE 2 DIABETES MELLITUS WITH STAGE 3 CHRONIC KIDNEY DISEASE, WITHOUT LONG-TERM CURRENT USE OF INSULIN: ICD-10-CM

## 2025-01-07 DIAGNOSIS — I77.9 MILD CAROTID ARTERY DISEASE: ICD-10-CM

## 2025-01-07 DIAGNOSIS — J40 BRONCHITIS: ICD-10-CM

## 2025-01-07 DIAGNOSIS — F33.9 DEPRESSION, RECURRENT: ICD-10-CM

## 2025-01-07 DIAGNOSIS — E03.4 HYPOTHYROIDISM DUE TO ACQUIRED ATROPHY OF THYROID: ICD-10-CM

## 2025-01-07 DIAGNOSIS — N18.31 STAGE 3A CHRONIC KIDNEY DISEASE: ICD-10-CM

## 2025-01-07 DIAGNOSIS — R11.0 NAUSEA: ICD-10-CM

## 2025-01-07 DIAGNOSIS — R94.39 ABNORMAL STRESS TEST: ICD-10-CM

## 2025-01-07 DIAGNOSIS — E78.2 MIXED HYPERLIPIDEMIA: ICD-10-CM

## 2025-01-07 DIAGNOSIS — J01.00 ACUTE NON-RECURRENT MAXILLARY SINUSITIS: Primary | ICD-10-CM

## 2025-01-07 PROCEDURE — G2211 COMPLEX E/M VISIT ADD ON: HCPCS | Mod: S$PBB,,, | Performed by: INTERNAL MEDICINE

## 2025-01-07 PROCEDURE — 99999 PR PBB SHADOW E&M-EST. PATIENT-LVL III: CPT | Mod: PBBFAC,,, | Performed by: INTERNAL MEDICINE

## 2025-01-07 PROCEDURE — 99213 OFFICE O/P EST LOW 20 MIN: CPT | Mod: PBBFAC,PO | Performed by: INTERNAL MEDICINE

## 2025-01-07 PROCEDURE — 99215 OFFICE O/P EST HI 40 MIN: CPT | Mod: S$PBB,,, | Performed by: INTERNAL MEDICINE

## 2025-01-07 RX ORDER — ONDANSETRON 4 MG/1
4 TABLET, FILM COATED ORAL EVERY 4 HOURS PRN
Qty: 20 TABLET | Refills: 1 | Status: SHIPPED | OUTPATIENT
Start: 2025-01-07

## 2025-01-07 RX ORDER — AZITHROMYCIN 250 MG/1
500 TABLET, FILM COATED ORAL DAILY
Qty: 14 TABLET | Refills: 0 | Status: SHIPPED | OUTPATIENT
Start: 2025-01-07 | End: 2025-01-14

## 2025-01-07 RX ORDER — BENZONATATE 100 MG/1
CAPSULE ORAL
Qty: 45 CAPSULE | Refills: 0 | Status: SHIPPED | OUTPATIENT
Start: 2025-01-07

## 2025-01-07 NOTE — TELEPHONE ENCOUNTER
----- Message from Sourav Nugent MD sent at 1/7/2025 12:37 PM CST -----  Regarding: RE: abnormal stress test  No significant reversible ischemia  ----- Message -----  From: Galindo Stiles MD  Sent: 1/7/2025  11:18 AM CST  To: Sourav Nugent MD  Subject: abnormal stress test                             Hello,  I saw our patient today.  She read that her recent stress test was abnormal, but hasn't heard from anyone in cardiology.  I just wanted to make sure she doesn't fall through the cracks.   Thanks,  Galindo

## 2025-01-07 NOTE — PROGRESS NOTES
Subjective:       Patient ID: Osman Johansen is a 74 y.o. female.  Chief Complaint: Fatigue, Fever, Nausea, Sore Throat, Sinus Problem, and Headache     HPI      C/o bad cold for 7 days.  + facial pain with green sputum draining.   + nausea with steady drainage.  + cough (mild).  + fever 101, + chills.  No body aches or profound fatigue over baseline.      Hypothyroid - uncontrolled.  C/o brain fog.  112 mcg from local pharmacy and Brand name made her have palpitations, but 125 mcg does not from mail order.  Dye? Only use mail order      CAD - abnormal stress test 12/20.  Concerned as has not heard from cardiology.  2 new stents 11/23 in blockage with in 1 yr of CABG.  Still has chest pain occasional - improved and on nitro patch.      CVA - embolic x2 post CABG.  Wearing loop recorder now.  Mild residual confusion/ forgetful. Had JODI with lasix; no swelling, so no need   Dr Nugent,     CKD III - stable      Osteopenia with high 10 yr fracture risk warranting treatment, but could not tolerate treatment with fosamax.   Prolia ordered today     HTN - controlled.  On digital HTN program.  Does not take the HCTZ because became lightheaded with this.      DM - controlled; outside eye exam done 7/26/17  HLD - uncontrolled on Repatha.  Statin intolerance. could not tolerate multiple statins - Crestor, Liptior.  Starting to have body aches on 20 mg of Pravastatin.   Depression - stable      Iron deficiency anemia - worse; improved in past s/p iv iron infusion.  Feels more fatigued.   Trouble tolerating iron orally even only 3x per week or every other day.  Advised getting cscp and EGD (hx bleeding ulcer in past).  Last cscp 2012 was ok.  + fatigue   RLS - treated      CT doc emphysema - AVILA as above.  No longer smokes.    Assessment:       1. Acute non-recurrent maxillary sinusitis    2. Bronchitis    3. Nausea    4. Essential hypertension    5. Controlled type 2 diabetes mellitus without complication, without long-term  current use of insulin    6. Mixed hyperlipidemia    7. Coronary artery disease, unspecified vessel or lesion type, unspecified whether angina present, unspecified whether native or transplanted heart    8. Abnormal stress test    9. Hypothyroidism due to acquired atrophy of thyroid    10. Controlled type 2 diabetes mellitus with stage 3 chronic kidney disease, without long-term current use of insulin    11. Stage 3b chronic kidney disease    12. Depression, recurrent    13. Mild carotid artery disease    14. Stage 3a chronic kidney disease        Plan:       Acute non-recurrent maxillary sinusitis  -     azithromycin (ZITHROMAX Z-LUCIUS) 250 MG tablet; Take 2 tablets (500 mg total) by mouth once daily. for 7 days  Dispense: 14 tablet; Refill: 0    Bronchitis  -     benzonatate (TESSALON) 100 MG capsule; 1 - 2 po every 6 hours prn cough  Dispense: 45 capsule; Refill: 0  -     azithromycin (ZITHROMAX Z-LUCIUS) 250 MG tablet; Take 2 tablets (500 mg total) by mouth once daily. for 7 days  Dispense: 14 tablet; Refill: 0    Nausea  -     ondansetron (ZOFRAN) 4 MG tablet; Take 1 tablet (4 mg total) by mouth every 4 (four) hours as needed for Nausea.  Dispense: 20 tablet; Refill: 1    Essential hypertension    Controlled type 2 diabetes mellitus without complication, without long-term current use of insulin    Mixed hyperlipidemia    Coronary artery disease, unspecified vessel or lesion type, unspecified whether angina present, unspecified whether native or transplanted heart    Abnormal stress test    Hypothyroidism due to acquired atrophy of thyroid  -     TSH; Future; Expected date: 02/07/2025    Controlled type 2 diabetes mellitus with stage 3 chronic kidney disease, without long-term current use of insulin    Stage 3b chronic kidney disease    Depression, recurrent    Mild carotid artery disease    Stage 3a chronic kidney disease            TSH may be off related to cold.  Treat and repeat.  Does have brain fog, so if still  slightly off, inc dose.  Yadira in 6 weeks with TSH 1 week prior.    Message sent to Dr Nugent on abnormal stress test.  If symptoms worsen, please go to Emergency Room.    Visit today included increased complexity associated with the care of the episodic problem HTN addressed and managing the longitudinal care of the patient due to the serious and/or complex managed problem(s) HTN.  Continue current management and monitor.  Other diagnoses were reviewed and found stable and will continue to monitor.  Counseled on regular exercise, maintenance of a healthy weight, balanced diet rich in fruits/vegetables and lean protein, and avoidance of unhealthy habits like smoking and excessive alcohol intake.   Also, counseled on importance of being compliant with medication, health appointments, diet and exercise.     Follow up in about 6 weeks (around 2/18/2025).  Yadira 6 weeks and 6 mo sandy, then me      Medication List with Changes/Refills   New Medications    AZITHROMYCIN (ZITHROMAX Z-LUCIUS) 250 MG TABLET    Take 2 tablets (500 mg total) by mouth once daily. for 7 days    BENZONATATE (TESSALON) 100 MG CAPSULE    1 - 2 po every 6 hours prn cough    ONDANSETRON (ZOFRAN) 4 MG TABLET    Take 1 tablet (4 mg total) by mouth every 4 (four) hours as needed for Nausea.   Current Medications    AMLODIPINE (NORVASC) 2.5 MG TABLET    Take 3 tablets (7.5 mg total) by mouth once daily.    ASPIRIN (ECOTRIN) 81 MG EC TABLET    Take 1 tablet (81 mg total) by mouth once daily. for 21 days    ATOGEPANT (QULIPTA) 60 MG TAB    Take 1 tablet (60 mg total) by mouth once daily.    BEMPEDOIC ACID (NEXLETOL) 180 MG TAB    Take 1 tablet (180 mg total) by mouth Daily.    BENAZEPRIL (LOTENSIN) 20 MG TABLET    TAKE 1 TABLET TWICE DAILY    BUTALBITAL-ACETAMINOPHEN-CAFFEINE -40 MG (FIORICET, ESGIC) -40 MG PER TABLET    1 tab PO PRN migraine. No more than 10 tab per month.    CALCIUM CARBONATE/VITAMIN D3 (CALCIUM 500 WITH D ORAL)    Take 1  capsule by mouth once daily.    CARBOXYMETHYLCELLULOSE (REFRESH PLUS) 0.5 % DPET    Place 1 drop into both eyes daily as needed (DRY EYES).    CLOPIDOGREL (PLAVIX) 75 MG TABLET    Take 1 tablet (75 mg total) by mouth once daily.    CO-ENZYME Q-10 30 MG CAPSULE    Take 30 mg by mouth 3 (three) times daily.    DICYCLOMINE (BENTYL) 10 MG CAPSULE    Take 1 capsule (10 mg total) by mouth 4 (four) times daily as needed (stomach discomfort).    ESOMEPRAZOLE (NEXIUM) 40 MG CAPSULE    Take 1 capsule (40 mg total) by mouth before breakfast.    ESTRADIOL (ESTRACE) 0.01 % (0.1 MG/GRAM) VAGINAL CREAM    Place 1 g vaginally 3 (three) times a week.    EVOLOCUMAB (REPATHA SURECLICK) 140 MG/ML PNIJ    Inject 1 mL (140 mg total) into the skin every 14 (fourteen) days.    FLUTICASONE PROPIONATE (FLONASE) 50 MCG/ACTUATION NASAL SPRAY    1 spray (50 mcg total) by Each Nostril route once daily.    GARLIC (GARLIQUE ORAL)    Take 1 capsule by mouth Daily.    ISOSORBIDE MONONITRATE (IMDUR) 30 MG 24 HR TABLET    TAKE 1 TABLET ONE TIME DAILY    METHOCARBAMOL (ROBAXIN) 750 MG TAB    TAKE ONE TABLET BY MOUTH FOUR TIMES DAILY    MULTIVITAMIN (MULTIPLE VITAMIN ORAL)    Take 1 capsule by mouth once daily.    NITROGLYCERIN (NITROSTAT) 0.4 MG SL TABLET    Place 1 tablet (0.4 mg total) under the tongue every 5 (five) minutes as needed for Chest pain.    OXYBUTYNIN (DITROPAN XL) 15 MG TR24    Take 1 tablet (15 mg total) by mouth every morning.    PROMETHAZINE (PHENERGAN) 25 MG TABLET    TAKE ONE TABLET BY MOUTH EVERY 6 HOURS AS NEEDED FOR NAUSEA    RESTASIS 0.05 % OPHTHALMIC EMULSION    Place 1 drop into both eyes 2 (two) times daily.    SENNOSIDES (LAXATIVE, SENNOSIDES,) 25 MG TAB    Take by mouth.    SUBOXONE 8-2 MG    Place 1 each under the tongue daily as needed (restless legs).    SYNTHROID 100 MCG TABLET    Take 1 tablet (100 mcg total) by mouth before breakfast. **BRAND MEDICALLY NECESSARY**    TIZANIDINE (ZANAFLEX) 4 MG TABLET    Half or full  tablet by mouth at night as needed for muscle spasm    VALACYCLOVIR (VALTREX) 1000 MG TABLET    TAKE ONE TABLET BY MOUTH EVERY DAY FOR 7 DAYS    XIIDRA 5 % DPET    Place 1 drop into both eyes 2 (two) times daily.       BP Readings from Last 3 Encounters:   01/07/25 132/82   12/05/24 115/70   11/21/24 130/80     Hemoglobin A1C   Date Value Ref Range Status   12/20/2024 5.4 4.0 - 5.6 % Final     Comment:     ADA Screening Guidelines:  5.7-6.4%  Consistent with prediabetes  >or=6.5%  Consistent with diabetes    High levels of fetal hemoglobin interfere with the HbA1C  assay. Heterozygous hemoglobin variants (HbS, HgC, etc)do  not significantly interfere with this assay.   However, presence of multiple variants may affect accuracy.     06/21/2024 5.4 4.0 - 5.6 % Final     Comment:     ADA Screening Guidelines:  5.7-6.4%  Consistent with prediabetes  >or=6.5%  Consistent with diabetes    High levels of fetal hemoglobin interfere with the HbA1C  assay. Heterozygous hemoglobin variants (HbS, HgC, etc)do  not significantly interfere with this assay.   However, presence of multiple variants may affect accuracy.     11/29/2023 5.5 4.0 - 5.6 % Final     Comment:     ADA Screening Guidelines:  5.7-6.4%  Consistent with prediabetes  >or=6.5%  Consistent with diabetes    High levels of fetal hemoglobin interfere with the HbA1C  assay. Heterozygous hemoglobin variants (HbS, HgC, etc)do  not significantly interfere with this assay.   However, presence of multiple variants may affect accuracy.       Lab Results   Component Value Date    TSH 5.207 (H) 12/20/2024     Lab Results   Component Value Date    LDLCALC 72.0 12/20/2024    LDLCALC 46.8 (L) 06/21/2024    LDLCALC 84.2 11/29/2023     Lab Results   Component Value Date    TRIG 65 12/20/2024    TRIG 36 06/21/2024    TRIG 54 11/29/2023     Wt Readings from Last 3 Encounters:   01/07/25 63.6 kg (140 lb 3.4 oz)   12/20/24 64 kg (141 lb)   12/17/24 64 kg (141 lb)     Lab Results    Component Value Date    HGB 11.7 (L) 12/20/2024    HCT 35.1 (L) 12/20/2024    WBC 6.95 12/20/2024    ALT 18 12/20/2024    AST 24 12/20/2024     12/20/2024    K 4.8 12/20/2024    CREATININE 1.2 12/20/2024           Review of Systems        Objective:      Vitals:    01/07/25 1053   BP: 132/82   Pulse: 78   Temp: 98.1 °F (36.7 °C)     Physical Exam  Vitals reviewed.   Constitutional:       Appearance: Normal appearance.   Eyes:      Conjunctiva/sclera: Conjunctivae normal.   Cardiovascular:      Rate and Rhythm: Normal rate.   Pulmonary:      Effort: Pulmonary effort is normal.      Breath sounds: Normal breath sounds.   Musculoskeletal:      Cervical back: Normal range of motion.      Comments: Normal ROM bilateral    Skin:     General: Skin is warm and dry.   Neurological:      Mental Status: She is alert.      Cranial Nerves: Cranial nerve deficit: grossly intact.   Psychiatric:      Comments: Alert and orientated

## 2025-01-13 ENCOUNTER — PATIENT MESSAGE (OUTPATIENT)
Dept: FAMILY MEDICINE | Facility: CLINIC | Age: 75
End: 2025-01-13
Payer: MEDICARE

## 2025-01-13 NOTE — TELEPHONE ENCOUNTER
In a separate message:    sent a message to Yadira Landry for my follow up visit with her My  (Mic Ridley)  came down with the same symptoms & thing I have last week he saw Dr Villar today & tested positive for Covid so I didnt know if I needed to be tested or do anything different my symptoms have eased a bit but still have the mucus & stuffy sinus & headaches thank you

## 2025-01-15 ENCOUNTER — PATIENT MESSAGE (OUTPATIENT)
Dept: NEUROLOGY | Facility: CLINIC | Age: 75
End: 2025-01-15
Payer: MEDICARE

## 2025-01-15 DIAGNOSIS — G43.719 INTRACTABLE CHRONIC MIGRAINE WITHOUT AURA AND WITHOUT STATUS MIGRAINOSUS: ICD-10-CM

## 2025-01-15 RX ORDER — BUTALBITAL, ACETAMINOPHEN AND CAFFEINE 50; 325; 40 MG/1; MG/1; MG/1
TABLET ORAL
Qty: 10 TABLET | Refills: 0 | Status: SHIPPED | OUTPATIENT
Start: 2025-01-15

## 2025-01-19 NOTE — TELEPHONE ENCOUNTER
No care due was identified.  Health [facility] Embedded Care Due Messages. Reference number: [id number].   1/19/2025 8:05:00 AM CST

## 2025-01-20 NOTE — TELEPHONE ENCOUNTER
Refill Routing Note   Medication(s) are not appropriate for processing by [facility] for the following reason(s):        Outside of protocol    [facility] action(s):  Route               Appointments  past 12m or future 3m with PCP    Date Provider   Last Visit   1/7/2025 Galindo Stiles MD   Next Visit   Visit date not found Galindo Stiles MD   ED visits in past 90 days: 0        Note composed:8:38 AM 01/20/2025

## 2025-01-23 RX ORDER — PROMETHAZINE HYDROCHLORIDE 25 MG/1
25 TABLET ORAL EVERY 6 HOURS PRN
Qty: 45 TABLET | Refills: 5 | Status: SHIPPED | OUTPATIENT
Start: 2025-01-23

## 2025-01-31 DIAGNOSIS — I10 ESSENTIAL (PRIMARY) HYPERTENSION: Chronic | ICD-10-CM

## 2025-01-31 DIAGNOSIS — M54.50 CHRONIC MIDLINE LOW BACK PAIN WITHOUT SCIATICA: ICD-10-CM

## 2025-01-31 DIAGNOSIS — G89.29 CHRONIC MIDLINE LOW BACK PAIN WITHOUT SCIATICA: ICD-10-CM

## 2025-01-31 DIAGNOSIS — K29.70 GASTRITIS, PRESENCE OF BLEEDING UNSPECIFIED, UNSPECIFIED CHRONICITY, UNSPECIFIED GASTRITIS TYPE: ICD-10-CM

## 2025-01-31 DIAGNOSIS — E03.4 HYPOTHYROIDISM DUE TO ACQUIRED ATROPHY OF THYROID: ICD-10-CM

## 2025-01-31 DIAGNOSIS — N39.41 URGE INCONTINENCE OF URINE: ICD-10-CM

## 2025-01-31 DIAGNOSIS — B02.9 HERPES ZOSTER WITHOUT COMPLICATION: ICD-10-CM

## 2025-02-01 RX ORDER — ESOMEPRAZOLE MAGNESIUM 40 MG/1
40 CAPSULE, DELAYED RELEASE ORAL
Qty: 90 CAPSULE | Refills: 3 | OUTPATIENT
Start: 2025-02-01

## 2025-02-01 RX ORDER — VALACYCLOVIR HYDROCHLORIDE 1 G/1
TABLET, FILM COATED ORAL
Qty: 7 TABLET | Refills: 5 | OUTPATIENT
Start: 2025-02-01

## 2025-02-01 NOTE — TELEPHONE ENCOUNTER
No care due was identified.  [facility] Embedded Care Due Messages. Reference number: [id number].   1/31/2025 6:25:59 PM CST

## 2025-02-01 NOTE — TELEPHONE ENCOUNTER
No care due was identified.  [facility] Embedded Care Due Messages. Reference number: [id number].   1/31/2025 6:23:48 PM CST

## 2025-02-03 RX ORDER — FLUTICASONE PROPIONATE 50 MCG
1 SPRAY, SUSPENSION (ML) NASAL DAILY
Qty: 32 G | Refills: 2 | Status: SHIPPED | OUTPATIENT
Start: 2025-02-03

## 2025-02-03 RX ORDER — METHOCARBAMOL 750 MG/1
750 TABLET, FILM COATED ORAL 4 TIMES DAILY
Qty: 40 TABLET | Refills: 3 | Status: SHIPPED | OUTPATIENT
Start: 2025-02-03

## 2025-02-03 RX ORDER — OXYBUTYNIN CHLORIDE 15 MG/1
15 TABLET, EXTENDED RELEASE ORAL EVERY MORNING
Qty: 90 TABLET | Refills: 3 | Status: SHIPPED | OUTPATIENT
Start: 2025-02-03

## 2025-02-03 RX ORDER — BENAZEPRIL HYDROCHLORIDE 20 MG/1
20 TABLET ORAL 2 TIMES DAILY
Qty: 180 TABLET | Refills: 1 | Status: SHIPPED | OUTPATIENT
Start: 2025-02-03

## 2025-02-03 RX ORDER — LEVOTHYROXINE SODIUM 100 UG/1
100 TABLET ORAL
Qty: 90 TABLET | Refills: 3 | Status: SHIPPED | OUTPATIENT
Start: 2025-02-03

## 2025-02-07 DIAGNOSIS — R11.0 NAUSEA: ICD-10-CM

## 2025-02-07 NOTE — TELEPHONE ENCOUNTER
Refill Routing Note   Medication(s) are not appropriate for processing by [facility] for the following reason(s):        Outside of protocol    [facility] action(s):  Route             Appointments  past 12m or future 3m with PCP    Date Provider   Last Visit   1/7/2025 Galindo Stiles MD   Next Visit   Visit date not found Galindo Stiles MD   ED visits in past 90 days: 0        Note composed:3:57 PM 02/07/2025

## 2025-02-07 NOTE — TELEPHONE ENCOUNTER
Requested Prescriptions     Pending Prescriptions Disp Refills    ondansetron (ZOFRAN) 4 MG tablet [Pharmacy Med Name: ondansetron HCl 4 mg tablet] 20 tablet 1     Sig: Take 1 tablet (4 mg total) by mouth every 4 (four) hours as needed for Nausea.    LOV:1/7/2025

## 2025-02-07 NOTE — TELEPHONE ENCOUNTER
No care due was identified.  Health [facility] Embedded Care Due Messages. Reference number: [id number].   2/07/2025 2:09:43 PM CST

## 2025-02-10 RX ORDER — ONDANSETRON 4 MG/1
4 TABLET, FILM COATED ORAL EVERY 4 HOURS PRN
Qty: 20 TABLET | Refills: 1 | Status: SHIPPED | OUTPATIENT
Start: 2025-02-10

## 2025-02-11 DIAGNOSIS — Z95.1 S/P CABG (CORONARY ARTERY BYPASS GRAFT): ICD-10-CM

## 2025-02-11 DIAGNOSIS — I63.9 ACUTE CVA (CEREBROVASCULAR ACCIDENT): ICD-10-CM

## 2025-02-11 DIAGNOSIS — Z95.818 PRESENCE OF OTHER CARDIAC IMPLANTS AND GRAFTS: ICD-10-CM

## 2025-02-11 RX ORDER — CLOPIDOGREL BISULFATE 75 MG/1
75 TABLET ORAL DAILY
Qty: 90 TABLET | Refills: 1 | Status: SHIPPED | OUTPATIENT
Start: 2025-02-11 | End: 2026-02-11

## 2025-02-13 ENCOUNTER — LAB VISIT (OUTPATIENT)
Dept: LAB | Facility: HOSPITAL | Age: 75
End: 2025-02-13
Attending: INTERNAL MEDICINE
Payer: MEDICARE

## 2025-02-13 DIAGNOSIS — E03.4 HYPOTHYROIDISM DUE TO ACQUIRED ATROPHY OF THYROID: ICD-10-CM

## 2025-02-13 LAB — TSH SERPL DL<=0.005 MIU/L-ACNC: 3.56 UIU/ML (ref 0.4–4)

## 2025-02-13 PROCEDURE — 84443 ASSAY THYROID STIM HORMONE: CPT | Performed by: INTERNAL MEDICINE

## 2025-02-13 PROCEDURE — 36415 COLL VENOUS BLD VENIPUNCTURE: CPT | Mod: PO | Performed by: INTERNAL MEDICINE

## 2025-02-16 DIAGNOSIS — G43.719 INTRACTABLE CHRONIC MIGRAINE WITHOUT AURA AND WITHOUT STATUS MIGRAINOSUS: ICD-10-CM

## 2025-02-18 RX ORDER — BUTALBITAL, ACETAMINOPHEN AND CAFFEINE 50; 325; 40 MG/1; MG/1; MG/1
TABLET ORAL
Qty: 10 TABLET | Refills: 0 | Status: SHIPPED | OUTPATIENT
Start: 2025-02-18

## 2025-02-19 ENCOUNTER — OFFICE VISIT (OUTPATIENT)
Dept: FAMILY MEDICINE | Facility: CLINIC | Age: 75
End: 2025-02-19
Payer: MEDICARE

## 2025-02-19 VITALS
HEART RATE: 63 BPM | SYSTOLIC BLOOD PRESSURE: 136 MMHG | WEIGHT: 142.44 LBS | OXYGEN SATURATION: 98 % | TEMPERATURE: 98 F | HEIGHT: 61 IN | DIASTOLIC BLOOD PRESSURE: 84 MMHG | BODY MASS INDEX: 26.89 KG/M2

## 2025-02-19 DIAGNOSIS — N18.31 STAGE 3A CHRONIC KIDNEY DISEASE: ICD-10-CM

## 2025-02-19 DIAGNOSIS — E11.9 CONTROLLED TYPE 2 DIABETES MELLITUS WITHOUT COMPLICATION, WITHOUT LONG-TERM CURRENT USE OF INSULIN: ICD-10-CM

## 2025-02-19 DIAGNOSIS — R10.11 RUQ PAIN: ICD-10-CM

## 2025-02-19 DIAGNOSIS — E03.4 HYPOTHYROIDISM DUE TO ACQUIRED ATROPHY OF THYROID: Primary | ICD-10-CM

## 2025-02-19 DIAGNOSIS — I25.10 CORONARY ARTERY DISEASE, UNSPECIFIED VESSEL OR LESION TYPE, UNSPECIFIED WHETHER ANGINA PRESENT, UNSPECIFIED WHETHER NATIVE OR TRANSPLANTED HEART: ICD-10-CM

## 2025-02-19 DIAGNOSIS — Z95.1 S/P CABG (CORONARY ARTERY BYPASS GRAFT): ICD-10-CM

## 2025-02-19 DIAGNOSIS — I10 ESSENTIAL HYPERTENSION: ICD-10-CM

## 2025-02-19 DIAGNOSIS — I27.20 PULMONARY HYPERTENSION: ICD-10-CM

## 2025-02-19 PROCEDURE — 99213 OFFICE O/P EST LOW 20 MIN: CPT | Mod: PBBFAC,PO | Performed by: NURSE PRACTITIONER

## 2025-02-19 RX ORDER — VALACYCLOVIR HYDROCHLORIDE 500 MG/1
500 TABLET, FILM COATED ORAL DAILY
Qty: 30 TABLET | Refills: 11 | Status: SHIPPED | OUTPATIENT
Start: 2025-02-19

## 2025-02-19 NOTE — PROGRESS NOTES
Subjective:       Patient ID: Osman Johansen is a 75 y.o. female.    Chief Complaint: Abdominal Pain    HPI  Patient presents for FU chronic conditions     Hypothyroidism: TSH WNL 3.559 on brand Synthroid 100mcg daily     RUQ chronic, intermittent, s/p cholecystectomy 2021  Reports pain is positionally triggered     Vitals:    02/19/25 1357   BP: 136/84   Pulse: 63   Temp: 98 °F (36.7 °C)     Review of Systems   Constitutional:  Negative for fever.   Respiratory:  Negative for cough and shortness of breath.    Cardiovascular:  Negative for chest pain.   Gastrointestinal:  Positive for abdominal pain. Negative for nausea and vomiting.       Past Medical History:   Diagnosis Date    Allergy     Anemia     Anxiety     Arthritis     Blood transfusion 8 yrs    CAD (coronary artery disease)     Cataract     Chronic diastolic CHF (congestive heart failure)     CKD (chronic kidney disease), stage II     COPD (chronic obstructive pulmonary disease)     mild no inhalers    Degenerative disc disease     Depression     Dry eye syndrome     Fibromyalgia     Fluid overload     GERD (gastroesophageal reflux disease)     Headache     Hypercholesterolemia 12/09/2019    Hypertension     Hypothyroidism     IBS (irritable bowel syndrome)     Lower extremity edema     Macular drusen     Neuromuscular disorder     Ocular hypertension, bilateral     Osteoporosis     Pleural effusion     PUD (peptic ulcer disease)     Restless leg     Uses Suboxone to control    Sleep apnea     cpap    Stroke     2 strokes-after CABG    Thoracic or lumbosacral neuritis or radiculitis 10/19/2012    Thyroid disease     hashimoto's     Objective:      Physical Exam  Constitutional:       General: She is not in acute distress.     Appearance: She is well-developed. She is not ill-appearing, toxic-appearing or diaphoretic.   HENT:      Right Ear: Hearing normal.      Left Ear: Hearing normal.   Cardiovascular:      Rate and Rhythm: Normal rate and regular  rhythm.      Heart sounds: Normal heart sounds.   Pulmonary:      Effort: No tachypnea or respiratory distress.   Skin:     Coloration: Skin is not pale.   Neurological:      Mental Status: She is alert and oriented to person, place, and time.   Psychiatric:         Speech: Speech normal.         Behavior: Behavior normal.         Thought Content: Thought content normal.         Judgment: Judgment normal.         Assessment:       1. Hypothyroidism due to acquired atrophy of thyroid    2. RUQ pain    3. Essential hypertension    4. Controlled type 2 diabetes mellitus without complication, without long-term current use of insulin    5. Coronary artery disease, unspecified vessel or lesion type, unspecified whether angina present, unspecified whether native or transplanted heart    6. S/P CABG (coronary artery bypass graft)    7. Pulmonary hypertension    8. Stage 3a chronic kidney disease        Plan:       Hypothyroidism due to acquired atrophy of thyroid  -     TSH; Future; Expected date: 08/19/2025    RUQ pain  -     US Abdomen Limited; Future; Expected date: 02/19/2025    Essential hypertension  -     CBC Auto Differential; Future; Expected date: 08/19/2025    Controlled type 2 diabetes mellitus without complication, without long-term current use of insulin  -     Hemoglobin A1C; Future; Expected date: 08/19/2025    Coronary artery disease, unspecified vessel or lesion type, unspecified whether angina present, unspecified whether native or transplanted heart  -     Lipid Panel; Future; Expected date: 08/19/2025    S/P CABG (coronary artery bypass graft)  Pulmonary hypertension   PA pressure 46 on echo 12/2024. TREMAINE Nugent routinely --NOV 3/6/25    Stage 3a chronic kidney disease  -     Comprehensive Metabolic Panel; Future; Expected date: 08/19/2025    Other orders  -     valACYclovir (VALTREX) 500 MG tablet; Take 1 tablet (500 mg total) by mouth once daily.  Dispense: 30 tablet; Refill: 11            Follow up  in about 6 months (around 8/19/2025).    Medication List with Changes/Refills   New Medications    VALACYCLOVIR (VALTREX) 500 MG TABLET    Take 1 tablet (500 mg total) by mouth once daily.   Current Medications    AMLODIPINE (NORVASC) 2.5 MG TABLET    Take 3 tablets (7.5 mg total) by mouth once daily.    ASPIRIN (ECOTRIN) 81 MG EC TABLET    Take 1 tablet (81 mg total) by mouth once daily. for 21 days    BEMPEDOIC ACID (NEXLETOL) 180 MG TAB    Take 1 tablet (180 mg total) by mouth Daily.    BENAZEPRIL (LOTENSIN) 20 MG TABLET    Take 1 tablet (20 mg total) by mouth 2 (two) times daily.    BENZONATATE (TESSALON) 100 MG CAPSULE    1 - 2 po every 6 hours prn cough    BUTALBITAL-ACETAMINOPHEN-CAFFEINE -40 MG (FIORICET, ESGIC) -40 MG PER TABLET    1 tab PO PRN migraine. No more than 10 tab per month.    CALCIUM CARBONATE/VITAMIN D3 (CALCIUM 500 WITH D ORAL)    Take 1 capsule by mouth once daily.    CARBOXYMETHYLCELLULOSE (REFRESH PLUS) 0.5 % DPET    Place 1 drop into both eyes daily as needed (DRY EYES).    CLOPIDOGREL (PLAVIX) 75 MG TABLET    Take 1 tablet (75 mg total) by mouth once daily.    CO-ENZYME Q-10 30 MG CAPSULE    Take 30 mg by mouth 3 (three) times daily.    DICYCLOMINE (BENTYL) 10 MG CAPSULE    Take 1 capsule (10 mg total) by mouth 4 (four) times daily as needed (stomach discomfort).    ESOMEPRAZOLE (NEXIUM) 40 MG CAPSULE    Take 1 capsule (40 mg total) by mouth before breakfast.    ESTRADIOL (ESTRACE) 0.01 % (0.1 MG/GRAM) VAGINAL CREAM    Place 1 g vaginally 3 (three) times a week.    EVOLOCUMAB (REPATHA SURECLICK) 140 MG/ML PNIJ    Inject 1 mL (140 mg total) into the skin every 14 (fourteen) days.    FLUTICASONE PROPIONATE (FLONASE) 50 MCG/ACTUATION NASAL SPRAY    1 spray (50 mcg total) by Each Nostril route once daily.    GARLIC (GARLIQUE ORAL)    Take 1 capsule by mouth Daily.    METHOCARBAMOL (ROBAXIN) 750 MG TAB    Take 1 tablet (750 mg total) by mouth 4 (four) times daily.    MULTIVITAMIN  (MULTIPLE VITAMIN ORAL)    Take 1 capsule by mouth once daily.    NITROGLYCERIN (NITROSTAT) 0.4 MG SL TABLET    Place 1 tablet (0.4 mg total) under the tongue every 5 (five) minutes as needed for Chest pain.    ONDANSETRON (ZOFRAN) 4 MG TABLET    Take 1 tablet (4 mg total) by mouth every 4 (four) hours as needed for Nausea.    OXYBUTYNIN (DITROPAN XL) 15 MG TR24    Take 1 tablet (15 mg total) by mouth every morning.    PROMETHAZINE (PHENERGAN) 25 MG TABLET    Take 1 tablet (25 mg total) by mouth every 6 (six) hours as needed for Nausea.    SENNOSIDES (LAXATIVE, SENNOSIDES,) 25 MG TAB    Take by mouth.    SUBOXONE 8-2 MG    Place 1 each under the tongue daily as needed (restless legs).    SYNTHROID 100 MCG TABLET    Take 1 tablet (100 mcg total) by mouth before breakfast. **BRAND MEDICALLY NECESSARY**    TIZANIDINE (ZANAFLEX) 4 MG TABLET    Half or full tablet by mouth at night as needed for muscle spasm    VALACYCLOVIR (VALTREX) 1000 MG TABLET    TAKE ONE TABLET BY MOUTH EVERY DAY FOR 7 DAYS

## 2025-02-25 ENCOUNTER — HOSPITAL ENCOUNTER (OUTPATIENT)
Dept: RADIOLOGY | Facility: HOSPITAL | Age: 75
Discharge: HOME | End: 2025-02-25
Attending: NURSE PRACTITIONER
Payer: MEDICARE

## 2025-02-25 DIAGNOSIS — R10.11 RUQ PAIN: ICD-10-CM

## 2025-02-25 PROCEDURE — 76705 ECHO EXAM OF ABDOMEN: CPT | Mod: TC,PO

## 2025-02-25 PROCEDURE — 76705 ECHO EXAM OF ABDOMEN: CPT | Mod: 26,,, | Performed by: RADIOLOGY

## 2025-02-27 ENCOUNTER — RESULTS FOLLOW-UP (OUTPATIENT)
Dept: FAMILY MEDICINE | Facility: CLINIC | Age: 75
End: 2025-02-27
Payer: MEDICARE

## 2025-02-27 DIAGNOSIS — K75.89 OTHER SPECIFIED INFLAMMATORY LIVER DISEASES: ICD-10-CM

## 2025-02-27 DIAGNOSIS — K76.89 OTHER SPECIFIED DISEASES OF LIVER: ICD-10-CM

## 2025-02-27 DIAGNOSIS — R93.2 ABNORMAL LIVER ULTRASOUND: Primary | ICD-10-CM

## 2025-02-28 ENCOUNTER — TELEPHONE (OUTPATIENT)
Dept: HEPATOLOGY | Facility: CLINIC | Age: 75
End: 2025-02-28
Payer: MEDICARE

## 2025-03-03 RX ORDER — ESTRADIOL 0.1 MG/G
CREAM VAGINAL
Qty: 42.5 G | Refills: 1 | Status: SHIPPED | OUTPATIENT
Start: 2025-03-03

## 2025-03-05 NOTE — PROGRESS NOTES
Subjective:    Patient ID:  Osman Johansen is a 75 y.o. female who presents for Follow-up, Coronary Artery Disease, and Hypertension        HPI  DISCUSSED TEST SLIGHTLY ABNORMAL NUCLEAR STRESS TEST WITH SMALL FIXED DEFECT, EF 66%, NO SIGNIFICANT REVERSIBLE ISCHEMIA, ECHO NORMAL LV FUNCTION, MILDLY MODERATELY DILATED LEFT ATRIUM MILD-TO-MODERATE MR PA PRESSURE 46 MM OF MERCURY, MOST RECENT LABS CMP GFR 47, LDL 72, , TSH REPEAT OK, STRESS WITH BROTHER, BP WAS UP,RUQ PAIN, LIVER ISSUES,  SEE ROS    Left Ventricle: The left ventricle is normal in size. Normal wall thickness. There is normal systolic function with a visually estimated ejection fraction of 55 - 60%. There is diastolic dysfunction.    Right Ventricle: Normal right ventricular cavity size. Systolic function is normal.    Left Atrium: Left atrium is mildly to moderately dilated.    Aortic Valve: Mildly calcified cusps.  No stenosis    Mitral Valve: There is mild to moderate MR regurgitation with a posterolateral eccentriccally directed jet.    Tricuspid Valve: There is mild to moderate regurgitation.    Pulmonary Artery: The estimated pulmonary artery systolic pressure is 46 mmHg.    IVC/SVC: Normal venous pressure at 3 mmHg.     Past Medical History:   Diagnosis Date    Allergy     Anemia     Anxiety     Arthritis     Blood transfusion 8 yrs    CAD (coronary artery disease)     Cataract     Chronic diastolic CHF (congestive heart failure)     CKD (chronic kidney disease), stage II     COPD (chronic obstructive pulmonary disease)     mild no inhalers    Degenerative disc disease     Depression     Dry eye syndrome     Fibromyalgia     Fluid overload     GERD (gastroesophageal reflux disease)     Headache     Hypercholesterolemia 12/09/2019    Hypertension     Hypothyroidism     IBS (irritable bowel syndrome)     Lower extremity edema     Macular drusen     Neuromuscular disorder     Ocular hypertension, bilateral      Osteoporosis     Pleural effusion     PUD (peptic ulcer disease)     Restless leg     Uses Suboxone to control    Sleep apnea     cpap    Stroke     2 strokes-after CABG    Thoracic or lumbosacral neuritis or radiculitis 10/19/2012    Thyroid disease     hashimoto's     Past Surgical History:   Procedure Laterality Date    ADENOIDECTOMY  1955    ANGIOGRAM, CORONARY, WITH LEFT HEART CATHETERIZATION  10/26/2023    Procedure: Left Heart Cath;  Surgeon: Sourav Nugent MD;  Location: [facility] CATH;  Service: Cardiology;;    APPENDECTOMY  1965    ARTERIOGRAPHY OF AORTIC ROOT  10/26/2023    Procedure: AO Root;  Surgeon: Sourav Nugent MD;  Location: [facility];  Service: Cardiology;;    CARDIAC CATHETERIZATION      CATARACT EXTRACTION W/  INTRAOCULAR LENS IMPLANT Right 05/24/2021    Procedure: EXTRACTION, CATARACT, WITH IOL INSERTION;  Surgeon: Bebe Lynn MD;  Location: [facility] OR;  Service: Ophthalmology;  Laterality: Right;  Right/DM    CHOLECYSTECTOMY  2021    CORONARY ANGIOGRAPHY N/A 05/02/2022    Procedure: ANGIOGRAM, CORONARY ARTERY;  Surgeon: Sourav Nugent MD;  [facility];  Service: Cardiology;  Laterality: N/A;    CORONARY ANGIOGRAPHY  11/9/2023    Procedure: Coronary angiogram study;  Surgeon: Sourav Nugent MD;  [facility];  Service: Cardiology;;    CORONARY ARTERY BYPASS GRAFT      CORONARY ARTERY BYPASS GRAFT (CABG) N/A 06/27/2022    Procedure: CORONARY ARTERY BYPASS GRAFT (CABG) X 5;  Surgeon: Talib Torres MD;  Location: [facility] OR;  Service: Cardiovascular;  Laterality: N/A;    CORONARY BYPASS GRAFT ANGIOGRAPHY  10/26/2023    Procedure: Bypass graft study;  Surgeon: Sourav Nugent MD;  [facility];  Service: Cardiology;;    ENDOSCOPIC HARVEST OF VEIN Left 06/27/2022    Procedure: SURGICAL PROCUREMENT, VEIN, ENDOSCOPIC;  Surgeon: Talib Torres MD;  Location: [facility] OR;  Service: Cardiovascular;  Laterality: Left;    EYE SURGERY  2021    Cataract  surgery    HYSTERECTOMY  8 yrs     INJECTION OF ANESTHETIC AGENT AROUND NERVE Bilateral 08/21/2018    Procedure: BLOCK, NERVE;  Surgeon: Talia Belcher MD;  Location: [facility] PAIN MGT;  Service: Pain Management;  Laterality: Bilateral;  Bilateral block @ L2,3,4,5      INJECTION OF ANESTHETIC AGENT AROUND NERVE Bilateral 11/18/2019    Procedure: BLOCK, NERVE, L2-L3-L4-L5 ME DIAL BRANCH;  Surgeon: Talia Belcher MD;  Location: [facility]IN [facility]GT;  Service: Pain Management;  Laterality: Bilateral;    INJECTION OF FACET JOINT Bilateral 12/16/2019    Procedure: INJECTION, FACET JOINT, L3-L4 AND L4-L5 AND T7-T8 LIGAMENT;  Surgeon: Talia Belcher MD;  Location: [facility];  Service: Pain Management;  Laterality: Bilateral;    INSERTION OF IMPLANTABLE LOOP RECORDER Left 07/25/2022    Procedure: Insertion, Implantable Loop Recorder;  Surgeon: Sourav Nugent MD;  Location: [facility]ATH;  Service: Cardiology;  Laterality: Left;    LAPAROSCOPIC CHOLECYSTECTOMY N/A 02/10/2021    Procedure: CHOLECYSTECTOMY, LAPAROSCOPIC;  Surgeon: John Morrow MD;  Location: [facility] OR;  Service: General;  Laterality: N/A;    LEFT HEART CATHETERIZATION Left 05/02/2022    Procedure: Left heart cath;  Surgeon: Sourav Nugent MD;  Location: [facility];  Service: Cardiology;  Laterality: Left;    LEFT HEART CATHETERIZATION Left 10/26/2023    Procedure: Left heart cath;  Surgeon: Sourav Nugent MD;  Location: [facility]ATH;  Service: Cardiology;  Laterality: Left;    PERCUTANEOUS CORONARY INTERVENTION, ARTERY  11/9/2023    Procedure: DANILO LCX;  Surgeon: Sourav Nugent MD;  Location: [facility] CATH;  Service: Cardiology;;    SINUS SURGERY      x2-one for CSF leak    TONSILLECTOMY  1954     Family History   Problem Relation Name Age of Onset    Ulcers Father      Arthritis Mother Azucena Hewitt     Cancer Mother Azucena Hewitt     Heart disease Mother Azucena Hewitt     Hypertension Mother Azucenasatya Hewitt     Cataracts Mother Azucenasatya Hewitt     Ovarian cancer Mother Azucenasatya Hewitt   "   Thyroid disease Brother      Heart disease Brother      Stroke Paternal Aunt Jermaine kim             Amblyopia Neg Hx      Blindness Neg Hx      Diabetes Neg Hx      Glaucoma Neg Hx      Macular degeneration Neg Hx      Retinal detachment Neg Hx      Strabismus Neg Hx      Breast cancer Neg Hx       Social History     Socioeconomic History    Marital status:    Tobacco Use    Smoking status: Former     Current packs/day: 0.00     Types: Cigarettes     Quit date: 2008     Years since quittin.1    Smokeless tobacco: Never   Substance and Sexual Activity    Alcohol use: Yes     Comment: rarely    Drug use: No    Sexual activity: Yes     Partners: Male     Social Drivers of Health     Financial Resource Strain: Low Risk  (2025)    Overall Financial Resource Strain (CARDIA)     Difficulty of Paying Living Expenses: Not hard at all   Food Insecurity: No Food Insecurity (2025)    Hunger Vital Sign     Worried About Running Out of Food in the Last Year: Never true     Ran Out of Food in the Last Year: Never true   Transportation Needs: No Transportation Needs (2025)    PRAPARE - Transportation     Lack of Transportation (Medical): No     Lack of Transportation (Non-Medical): No   Physical Activity: Insufficiently Active (2025)    Exercise Vital Sign     Days of Exercise per Week: 3 days     Minutes of Exercise per Session: 40 min   Stress: Stress Concern Present (2025)    [employer] - Occupational Stress Questionnaire     Feeling of Stress : Rather much   Housing Stability: Low Risk  (2025)    Housing Stability Vital Sign     Unable to Pay for Housing in the Last Year: No     Number of Times Moved in the Last Year: 0     Homeless in the Last Year: No       Review of patient's allergies indicates:   Allergen Reactions    Alendronate Other (See Comments)     "Felt like I was having a heart attack"    Lyrica " "[pregabalin] Swelling    Pcn [penicillins] Swelling    Toradol [ketorolac] Swelling    Vibramycin [doxycycline calcium] Shortness Of Breath and Swelling    Zetia [ezetimibe] Other (See Comments)    Crestor [rosuvastatin]      Body aches    Cymbalta [duloxetine]      dizzyness    Elavil [amitriptyline] Other (See Comments)     Restless leg    Pravastatin Other (See Comments)     Flu -like symptoms   Body aches     Seroquel [quetiapine]      restless leg symdrome     Current Medications[1]    Review of Systems   Constitutional: Negative for chills, decreased appetite, diaphoresis, fever and malaise/fatigue.   HENT:  Negative for congestion and nosebleeds.    Eyes:  Negative for blurred vision and visual disturbance.   Cardiovascular:  Negative for chest pain, claudication, cyanosis, dyspnea on exertion (MILD), irregular heartbeat, leg swelling, near-syncope, orthopnea, palpitations, paroxysmal nocturnal dyspnea and syncope.   Respiratory:  Negative for cough, hemoptysis (WITH CP), shortness of breath and wheezing.    Endocrine: Negative for polyphagia and polyuria.   Hematologic/Lymphatic: Negative for adenopathy. Does not bruise/bleed easily.   Skin:  Negative for color change and rash.   Musculoskeletal:  Negative for back pain and falls.   Gastrointestinal:  Positive for abdominal pain. Negative for dysphagia, jaundice, melena and nausea.   Genitourinary:  Negative for dysuria and flank pain.   Neurological:  Negative for dizziness, focal weakness, headaches, light-headedness, numbness and weakness.   Psychiatric/Behavioral:  Negative for altered mental status and depression. The patient is nervous/anxious (STRESS WITH BROTHER).    Allergic/Immunologic: Negative for hives and persistent infections.        Objective:      Vitals:    03/06/25 1550 03/06/25 1556   BP: (!) 157/74 137/72   Pulse: 73    Weight: 64.5 kg (142 lb 3.2 oz)    Height: 5' 1" (1.549 m)    PainSc: 0-No pain      Body mass index is 26.87 " kg/m².    Physical Exam  HENT:      Head: Normocephalic and atraumatic.   Eyes:      Extraocular Movements: Extraocular movements intact.      Conjunctiva/sclera: Conjunctivae normal.   Cardiovascular:      Rate and Rhythm: Normal rate and regular rhythm. No extrasystoles are present.     Pulses: Normal pulses.           Carotid pulses are 2+ on the right side and 2+ on the left side.       Radial pulses are 2+ on the right side and 2+ on the left side.        Posterior tibial pulses are 2+ on the right side and 2+ on the left side.      Heart sounds: Murmur heard.      Systolic murmur is present with a grade of 2/6 at the lower left sternal border and apex.      No friction rub. No gallop.   Pulmonary:      Effort: Pulmonary effort is normal. No respiratory distress.      Breath sounds: Normal breath sounds. No rales.   Abdominal:      Palpations: Abdomen is soft.      Tenderness: There is no abdominal tenderness.   Musculoskeletal:      Cervical back: Neck supple.      Right lower leg: No edema.      Left lower leg: No edema.   Skin:     General: Skin is warm and dry.      Capillary Refill: Capillary refill takes less than 2 seconds.   Neurological:      General: No focal deficit present.      Mental Status: She is alert.   Psychiatric:         Mood and Affect: Mood normal.         Speech: Speech normal.         Behavior: Behavior normal.               ..    Chemistry        Component Value Date/Time     12/20/2024 1153    K 4.8 12/20/2024 1153     12/20/2024 1153    CO2 25 12/20/2024 1153    BUN 13 12/20/2024 1153    CREATININE 1.2 12/20/2024 1153    GLU 90 12/20/2024 1153        Component Value Date/Time    CALCIUM 9.7 12/20/2024 1153    ALKPHOS 58 12/20/2024 1153    AST 24 12/20/2024 1153    ALT 18 12/20/2024 1153    BILITOT 0.4 12/20/2024 1153    ESTGFRAFRICA 29.7 (A) 07/27/2022 1159    EGFRNONAA 25.8 (A) 07/27/2022 1159            ..  Lab Results   Component Value Date    CHOL 187 12/20/2024     CHOL 144 06/21/2024    CHOL 193 11/29/2023     Lab Results   Component Value Date     (H) 12/20/2024    HDL 90 (H) 06/21/2024    HDL 98 (H) 11/29/2023     Lab Results   Component Value Date    LDLCALC 72.0 12/20/2024    LDLCALC 46.8 (L) 06/21/2024    LDLCALC 84.2 11/29/2023     Lab Results   Component Value Date    TRIG 65 12/20/2024    TRIG 36 06/21/2024    TRIG 54 11/29/2023     Lab Results   Component Value Date    CHOLHDL 54.5 (H) 12/20/2024    CHOLHDL 62.5 (H) 06/21/2024    CHOLHDL 50.8 (H) 11/29/2023     ..  Lab Results   Component Value Date    WBC 6.95 12/20/2024    HGB 11.7 (L) 12/20/2024    HCT 35.1 (L) 12/20/2024    MCV 96 12/20/2024     12/20/2024       Test(s) Reviewed  I have reviewed the following in detail:  [x] Stress test   [] Angiography   [x] Echocardiogram   [x] Labs   [] Other:       Assessment:         ICD-10-CM ICD-9-CM   1. Coronary artery disease involving native coronary artery of native heart without angina pectoris  I25.10 414.01   2. Non-rheumatic mitral regurgitation  I34.0 424.0   3. Essential hypertension  I10 401.9   4. Mild carotid artery disease  I77.9 447.9   5. Hypercholesterolemia  E78.00 272.0   6. Encounter for loop recorder at end of battery life  Z45.09 V53.39     Problem List Items Addressed This Visit          Cardiac/Vascular    Coronary artery disease involving native coronary artery of native heart without angina pectoris - Primary    Relevant Orders    Vital signs    Cardiac Monitoring - Adult    Non-rheumatic mitral regurgitation    Relevant Orders    Vital signs    Cardiac Monitoring - Adult    Hypercholesterolemia    Relevant Orders    Vital signs    Cardiac Monitoring - Adult    Mild carotid artery disease    Relevant Orders    Vital signs    Cardiac Monitoring - Adult    Essential hypertension    Relevant Orders    Case Request-Cath Lab: REMOVAL, IMPLANTABLE LOOP RECORDER (Completed)    Vital signs    Cardiac Monitoring - Adult    Encounter for loop  recorder at end of battery life    Relevant Orders    Case Request-Cath Lab: REMOVAL, IMPLANTABLE LOOP RECORDER (Completed)    Vital signs    Cardiac Monitoring - Adult        Plan:     ADD TOPROL, PATIENT WANTS LOOP OUT BOTHERSOME    ALL CV CLINICALLY STABLE, CLASS 1 ANGINA, NO HF, NO TIA, NO CLINICAL ARRHYTHMIA,CONTINUE CURRENT MEDS, EDUCATION, DIET, EXERCISE , RETURN TO CLINIC IN 6 MO        Coronary artery disease involving native coronary artery of native heart without angina pectoris  -     Height and weight; Standing  -     Clip and Prep Other (please specifiy); Standing  -     Verify informed consent; Standing  -     Vital signs; Standing  -     Cardiac Monitoring - Adult; Standing  -     Pulse Oximetry Q4H; Standing  -     Diet NPO; Standing  -     EKG 12-lead; Standing    Non-rheumatic mitral regurgitation  -     Height and weight; Standing  -     Clip and Prep Other (please specifiy); Standing  -     Verify informed consent; Standing  -     Vital signs; Standing  -     Cardiac Monitoring - Adult; Standing  -     Pulse Oximetry Q4H; Standing  -     Diet NPO; Standing  -     EKG 12-lead; Standing    Essential hypertension  Comments:  BETTER CONTROLLED  Orders:  -     Case Request-Cath Lab: REMOVAL, IMPLANTABLE LOOP RECORDER; Standing  -     Height and weight; Standing  -     Clip and Prep Other (please specifiy); Standing  -     Verify informed consent; Standing  -     Vital signs; Standing  -     Cardiac Monitoring - Adult; Standing  -     Pulse Oximetry Q4H; Standing  -     Diet NPO; Standing  -     EKG 12-lead; Standing    Mild carotid artery disease  -     Height and weight; Standing  -     Clip and Prep Other (please specifiy); Standing  -     Verify informed consent; Standing  -     Vital signs; Standing  -     Cardiac Monitoring - Adult; Standing  -     Pulse Oximetry Q4H; Standing  -     Diet NPO; Standing  -     EKG 12-lead; Standing    Hypercholesterolemia  -     Height and weight; Standing  -      Clip and Prep Other (please specifiy); Standing  -     Verify informed consent; Standing  -     Vital signs; Standing  -     Cardiac Monitoring - Adult; Standing  -     Pulse Oximetry Q4H; Standing  -     Diet NPO; Standing  -     EKG 12-lead; Standing    Encounter for loop recorder at end of battery life  Comments:  PATIENT'S WANTS LOOP OUT  Orders:  -     Case Request-Cath Lab: REMOVAL, IMPLANTABLE LOOP RECORDER; Standing  -     Height and weight; Standing  -     Clip and Prep Other (please specifiy); Standing  -     Verify informed consent; Standing  -     Vital signs; Standing  -     Cardiac Monitoring - Adult; Standing  -     Pulse Oximetry Q4H; Standing  -     Diet NPO; Standing  -     EKG 12-lead; Standing    Other orders  -     metoprolol succinate (TOPROL-XL) 25 MG 24 hr tablet; Take 0.5 tablets (12.5 mg total) by mouth once daily.  Dispense: 45 tablet; Refill: 1  -     sodium chloride 0.9% flush 10 mL    RTC Low level/low impact aerobic exercise 5x's/wk. Heart healthy diet and risk factor modification.    See labs and med orders.    Aerobic exercise 5x's/wk. Heart healthy diet and risk factor modification.    See labs and med orders.               [1]    Current Outpatient Medications:     amLODIPine (NORVASC) 2.5 MG tablet, Take 3 tablets (7.5 mg total) by mouth once daily., Disp: 270 tablet, Rfl: 2    aspirin (ECOTRIN) 81 MG EC tablet, Take 1 tablet (81 mg total) by mouth once daily. for 21 days, Disp: , Rfl:     bempedoic acid (NEXLETOL) 180 mg Tab, Take 1 tablet (180 mg total) by mouth Daily., Disp: 90 tablet, Rfl: 1    benazepriL (LOTENSIN) 20 MG tablet, Take 1 tablet (20 mg total) by mouth 2 (two) times daily., Disp: 180 tablet, Rfl: 1    butalbital-acetaminophen-caffeine -40 mg (FIORICET, ESGIC) -40 mg per tablet, 1 tab PO PRN migraine. No more than 10 tab per month., Disp: 10 tablet, Rfl: 0    CALCIUM CARBONATE/VITAMIN D3 (CALCIUM 500 WITH D ORAL), Take 1 capsule by mouth once  daily., Disp: , Rfl:     carboxymethylcellulose (REFRESH PLUS) 0.5 % Dpet, Place 1 drop into both eyes daily as needed (DRY EYES)., Disp: , Rfl:     clopidogreL (PLAVIX) 75 mg tablet, Take 1 tablet (75 mg total) by mouth once daily., Disp: 90 tablet, Rfl: 1    co-enzyme Q-10 30 mg capsule, Take 30 mg by mouth 3 (three) times daily., Disp: , Rfl:     dicyclomine (BENTYL) 10 MG capsule, Take 1 capsule (10 mg total) by mouth 4 (four) times daily as needed (stomach discomfort)., Disp: 120 capsule, Rfl: 0    esomeprazole (NEXIUM) 40 MG capsule, Take 1 capsule (40 mg total) by mouth before breakfast., Disp: 90 capsule, Rfl: 3    estradioL (ESTRACE) 0.01 % (0.1 mg/gram) vaginal cream, USE ONE GRAM VAGINALLY THREE TIMES A WEEK (MUST LAST 100 DAYS), Disp: 42.5 g, Rfl: 1    evolocumab (REPATHA SURECLICK) 140 mg/mL PnIj, Inject 1 mL (140 mg total) into the skin every 14 (fourteen) days., Disp: 2 each, Rfl: 5    fluticasone propionate (FLONASE) 50 mcg/actuation nasal spray, 1 spray (50 mcg total) by Each Nostril route once daily., Disp: 32 g, Rfl: 2    garlic (GARLIQUE ORAL), Take 1 capsule by mouth Daily., Disp: , Rfl:     methocarbamoL (ROBAXIN) 750 MG Tab, Take 1 tablet (750 mg total) by mouth 4 (four) times daily., Disp: 40 tablet, Rfl: 3    MULTIVITAMIN (MULTIPLE VITAMIN ORAL), Take 1 capsule by mouth once daily., Disp: , Rfl:     nitroGLYCERIN (NITROSTAT) 0.4 MG SL tablet, Place 1 tablet (0.4 mg total) under the tongue every 5 (five) minutes as needed for Chest pain., Disp: 25 tablet, Rfl: 3    oxybutynin (DITROPAN XL) 15 MG TR24, Take 1 tablet (15 mg total) by mouth every morning., Disp: 90 tablet, Rfl: 3    promethazine (PHENERGAN) 25 MG tablet, Take 1 tablet (25 mg total) by mouth every 6 (six) hours as needed for Nausea., Disp: 45 tablet, Rfl: 5    sennosides (LAXATIVE, SENNOSIDES,) 25 mg Tab, Take by mouth., Disp: , Rfl:     SUBOXONE 8-2 mg, Place 1 each under the tongue daily as needed (restless  legs)., Disp: , Rfl:     SYNTHROID 100 mcg tablet, Take 1 tablet (100 mcg total) by mouth before breakfast. **BRAND MEDICALLY NECESSARY**, Disp: 90 tablet, Rfl: 3    tiZANidine (ZANAFLEX) 4 MG tablet, Half or full tablet by mouth at night as needed for muscle spasm, Disp: 30 tablet, Rfl: 11    valACYclovir (VALTREX) 1000 MG tablet, TAKE ONE TABLET BY MOUTH EVERY DAY FOR 7 DAYS, Disp: 7 tablet, Rfl: 5    valACYclovir (VALTREX) 500 MG tablet, Take 1 tablet (500 mg total) by mouth once daily., Disp: 30 tablet, Rfl: 11    benzonatate (TESSALON) 100 MG capsule, 1 - 2 po every 6 hours prn cough (Patient not taking: Reported on 3/6/2025), Disp: 45 capsule, Rfl: 0    metoprolol succinate (TOPROL-XL) 25 MG 24 hr tablet, Take 0.5 tablets (12.5 mg total) by mouth once daily., Disp: 45 tablet, Rfl: 1    ondansetron (ZOFRAN) 4 MG tablet, Take 1 tablet (4 mg total) by mouth every 4 (four) hours as needed for Nausea. (Patient not taking: Reported on 3/6/2025), Disp: 20 tablet, Rfl: 1    Current Facility-Administered Medications:     sodium chloride 0.9% flush 10 mL, 10 mL, Intravenous, PRN, Sourav Nugent MD    sodium chloride 0.9% flush 10 mL, 10 mL, Intravenous, PRN, Sourav Nugent MD    Facility-Administered Medications Ordered in Other Visits:     lactated ringers infusion, , Intravenous, Continuous, Jim Mcdonough MD, Stopped at 06/27/22 1714    LIDOcaine (PF) 10 mg/ml (1%) injection 10 mg, 1 mL, Intradermal, Once, Jim Mcdonough MD

## 2025-03-06 ENCOUNTER — LAB VISIT (OUTPATIENT)
Dept: LAB | Facility: HOSPITAL | Age: 75
End: 2025-03-06
Attending: INTERNAL MEDICINE
Payer: MEDICARE

## 2025-03-06 ENCOUNTER — OFFICE VISIT (OUTPATIENT)
Dept: CARDIOLOGY | Facility: CLINIC | Age: 75
End: 2025-03-06
Payer: MEDICARE

## 2025-03-06 VITALS
WEIGHT: 142.19 LBS | BODY MASS INDEX: 26.85 KG/M2 | SYSTOLIC BLOOD PRESSURE: 137 MMHG | HEIGHT: 61 IN | HEART RATE: 73 BPM | DIASTOLIC BLOOD PRESSURE: 72 MMHG

## 2025-03-06 DIAGNOSIS — K76.89 OTHER SPECIFIED DISEASES OF LIVER: ICD-10-CM

## 2025-03-06 DIAGNOSIS — K75.89 OTHER SPECIFIED INFLAMMATORY LIVER DISEASES: ICD-10-CM

## 2025-03-06 DIAGNOSIS — I77.9 MILD CAROTID ARTERY DISEASE: Chronic | ICD-10-CM

## 2025-03-06 DIAGNOSIS — E78.00 HYPERCHOLESTEROLEMIA: Chronic | ICD-10-CM

## 2025-03-06 DIAGNOSIS — I25.10 CORONARY ARTERY DISEASE INVOLVING NATIVE CORONARY ARTERY OF NATIVE HEART WITHOUT ANGINA PECTORIS: Primary | Chronic | ICD-10-CM

## 2025-03-06 DIAGNOSIS — I10 ESSENTIAL HYPERTENSION: Chronic | ICD-10-CM

## 2025-03-06 DIAGNOSIS — R93.2 ABNORMAL LIVER ULTRASOUND: ICD-10-CM

## 2025-03-06 DIAGNOSIS — Z45.09 ENCOUNTER FOR LOOP RECORDER AT END OF BATTERY LIFE: Chronic | ICD-10-CM

## 2025-03-06 DIAGNOSIS — N18.31 STAGE 3A CHRONIC KIDNEY DISEASE: ICD-10-CM

## 2025-03-06 DIAGNOSIS — I34.0 NON-RHEUMATIC MITRAL REGURGITATION: Chronic | ICD-10-CM

## 2025-03-06 PROCEDURE — 99213 OFFICE O/P EST LOW 20 MIN: CPT | Mod: PBBFAC,PO | Performed by: INTERNAL MEDICINE

## 2025-03-06 PROCEDURE — 82728 ASSAY OF FERRITIN: CPT | Performed by: NURSE PRACTITIONER

## 2025-03-06 PROCEDURE — 99999 PR PBB SHADOW E&M-EST. PATIENT-LVL III: CPT | Mod: PBBFAC,,, | Performed by: INTERNAL MEDICINE

## 2025-03-06 PROCEDURE — 83540 ASSAY OF IRON: CPT | Performed by: NURSE PRACTITIONER

## 2025-03-06 PROCEDURE — 36415 COLL VENOUS BLD VENIPUNCTURE: CPT | Mod: PO | Performed by: NURSE PRACTITIONER

## 2025-03-06 PROCEDURE — 80074 ACUTE HEPATITIS PANEL: CPT | Performed by: NURSE PRACTITIONER

## 2025-03-06 PROCEDURE — 99214 OFFICE O/P EST MOD 30 MIN: CPT | Mod: S$PBB,,, | Performed by: INTERNAL MEDICINE

## 2025-03-06 RX ORDER — METOPROLOL SUCCINATE 25 MG/1
12.5 TABLET, EXTENDED RELEASE ORAL DAILY
Qty: 45 TABLET | Refills: 1 | Status: SHIPPED | OUTPATIENT
Start: 2025-03-06 | End: 2026-03-06

## 2025-03-06 RX ORDER — SODIUM CHLORIDE 0.9 % (FLUSH) 0.9 %
10 SYRINGE (ML) INJECTION
Status: SHIPPED | OUTPATIENT
Start: 2025-03-06

## 2025-03-06 NOTE — PATIENT INSTRUCTIONS
Loop Recorder Removal    Arrive for your procedure at: [facility] on March 27th at 11am (approximate arrival for 10am)    NO PREP IS NECESSARY    The [employer] loop recorder monitors a persons heart rate 24 hours a day and has a battery life of up to 3 years.  The device is approximately one-third of the size of a AAA battery and is also safe for use in an MRI.  It is placed beneath the skin through a small incision in the left upper side of the chest wall.  The procedure is minimally invasive so anesthesia is not needed to implant the device.  The procedure can be done in about 10 minutes.      You may eat or drink the day of the procedure.    Please take all of your medications on the day of scheduled procedure.    This WILL NOT require anesthesia or an IV.    You DO NOT need someone to drive you home.

## 2025-03-07 ENCOUNTER — RESULTS FOLLOW-UP (OUTPATIENT)
Dept: FAMILY MEDICINE | Facility: CLINIC | Age: 75
End: 2025-03-07

## 2025-03-07 PROBLEM — N18.31 STAGE 3A CHRONIC KIDNEY DISEASE: Status: ACTIVE | Noted: 2025-03-07

## 2025-03-07 LAB
FERRITIN SERPL-MCNC: 41 NG/ML (ref 20–300)
HAV IGM SERPL QL IA: NORMAL
HBV CORE IGM SERPL QL IA: NORMAL
HBV SURFACE AG SERPL QL IA: NORMAL
HCV AB SERPL QL IA: NORMAL
IRON SERPL-MCNC: 73 UG/DL (ref 30–160)
SATURATED IRON: 25 % (ref 20–50)
TOTAL IRON BINDING CAPACITY: 289 UG/DL (ref 250–450)
TRANSFERRIN SERPL-MCNC: 195 MG/DL (ref 200–375)

## 2025-03-15 DIAGNOSIS — G43.719 INTRACTABLE CHRONIC MIGRAINE WITHOUT AURA AND WITHOUT STATUS MIGRAINOSUS: ICD-10-CM

## 2025-03-17 RX ORDER — BUTALBITAL, ACETAMINOPHEN AND CAFFEINE 50; 325; 40 MG/1; MG/1; MG/1
TABLET ORAL
Qty: 10 TABLET | Refills: 0 | Status: SHIPPED | OUTPATIENT
Start: 2025-03-17

## 2025-03-31 ENCOUNTER — RESULTS FOLLOW-UP (OUTPATIENT)
Dept: FAMILY MEDICINE | Facility: CLINIC | Age: 75
End: 2025-03-31

## 2025-03-31 ENCOUNTER — OFFICE VISIT (OUTPATIENT)
Dept: FAMILY MEDICINE | Facility: CLINIC | Age: 75
End: 2025-03-31
Payer: MEDICARE

## 2025-03-31 ENCOUNTER — LAB VISIT (OUTPATIENT)
Dept: LAB | Facility: HOSPITAL | Age: 75
End: 2025-03-31
Attending: NURSE PRACTITIONER
Payer: MEDICARE

## 2025-03-31 VITALS
WEIGHT: 136.44 LBS | OXYGEN SATURATION: 98 % | HEART RATE: 84 BPM | TEMPERATURE: 98 F | SYSTOLIC BLOOD PRESSURE: 144 MMHG | DIASTOLIC BLOOD PRESSURE: 76 MMHG | HEIGHT: 61 IN | BODY MASS INDEX: 25.76 KG/M2

## 2025-03-31 DIAGNOSIS — R10.11 RUQ PAIN: ICD-10-CM

## 2025-03-31 DIAGNOSIS — R11.0 NAUSEA: ICD-10-CM

## 2025-03-31 DIAGNOSIS — R10.13 EPIGASTRIC PAIN: ICD-10-CM

## 2025-03-31 DIAGNOSIS — R10.11 RUQ PAIN: Primary | ICD-10-CM

## 2025-03-31 LAB
ABSOLUTE EOSINOPHIL (OHS): 0.04 K/UL
ABSOLUTE MONOCYTE (OHS): 0.59 K/UL (ref 0.3–1)
ABSOLUTE NEUTROPHIL COUNT (OHS): 6.49 K/UL (ref 1.8–7.7)
ALBUMIN SERPL BCP-MCNC: 3.9 G/DL (ref 3.5–5.2)
ALP SERPL-CCNC: 64 UNIT/L (ref 40–150)
ALT SERPL W/O P-5'-P-CCNC: 8 UNIT/L (ref 10–44)
ANION GAP (OHS): 6 MMOL/L (ref 8–16)
AST SERPL-CCNC: 17 UNIT/L (ref 11–45)
BASOPHILS # BLD AUTO: 0.07 K/UL
BASOPHILS NFR BLD AUTO: 0.8 %
BILIRUB SERPL-MCNC: 0.3 MG/DL (ref 0.1–1)
BILIRUB UR QL STRIP.AUTO: NEGATIVE
BUN SERPL-MCNC: 19 MG/DL (ref 8–23)
CALCIUM SERPL-MCNC: 9.5 MG/DL (ref 8.7–10.5)
CHLORIDE SERPL-SCNC: 104 MMOL/L (ref 95–110)
CLARITY UR: CLEAR
CO2 SERPL-SCNC: 27 MMOL/L (ref 23–29)
COLOR UR AUTO: YELLOW
CREAT SERPL-MCNC: 1.1 MG/DL (ref 0.5–1.4)
ERYTHROCYTE [DISTWIDTH] IN BLOOD BY AUTOMATED COUNT: 13.6 % (ref 11.5–14.5)
GFR SERPLBLD CREATININE-BSD FMLA CKD-EPI: 52 ML/MIN/1.73/M2
GLUCOSE SERPL-MCNC: 90 MG/DL (ref 70–110)
GLUCOSE UR QL STRIP: NEGATIVE
HCT VFR BLD AUTO: 34.2 % (ref 37–48.5)
HGB BLD-MCNC: 10.7 GM/DL (ref 12–16)
HGB UR QL STRIP: NEGATIVE
IMM GRANULOCYTES # BLD AUTO: 0.03 K/UL (ref 0–0.04)
IMM GRANULOCYTES NFR BLD AUTO: 0.4 % (ref 0–0.5)
KETONES UR QL STRIP: NEGATIVE
LEUKOCYTE ESTERASE UR QL STRIP: NEGATIVE
LIPASE SERPL-CCNC: 12 U/L (ref 4–60)
LYMPHOCYTES # BLD AUTO: 1.34 K/UL (ref 1–4.8)
MCH RBC QN AUTO: 30.6 PG (ref 27–31)
MCHC RBC AUTO-ENTMCNC: 31.3 G/DL (ref 32–36)
MCV RBC AUTO: 98 FL (ref 82–98)
NITRITE UR QL STRIP: NEGATIVE
NUCLEATED RBC (/100WBC) (OHS): 0 /100 WBC
PH UR STRIP: 6 [PH]
PLATELET # BLD AUTO: 262 K/UL (ref 150–450)
PMV BLD AUTO: 11.9 FL (ref 9.2–12.9)
POTASSIUM SERPL-SCNC: 5.4 MMOL/L (ref 3.5–5.1)
PROT SERPL-MCNC: 7.5 GM/DL (ref 6–8.4)
PROT UR QL STRIP: NEGATIVE
RBC # BLD AUTO: 3.5 M/UL (ref 4–5.4)
RELATIVE EOSINOPHIL (OHS): 0.5 %
RELATIVE LYMPHOCYTE (OHS): 15.7 % (ref 18–48)
RELATIVE MONOCYTE (OHS): 6.9 % (ref 4–15)
RELATIVE NEUTROPHIL (OHS): 75.7 % (ref 38–73)
SODIUM SERPL-SCNC: 137 MMOL/L (ref 136–145)
SP GR UR STRIP: 1.01
WBC # BLD AUTO: 8.56 K/UL (ref 3.9–12.7)

## 2025-03-31 PROCEDURE — 99999 PR PBB SHADOW E&M-EST. PATIENT-LVL V: CPT | Mod: PBBFAC,,, | Performed by: NURSE PRACTITIONER

## 2025-03-31 PROCEDURE — 80053 COMPREHEN METABOLIC PANEL: CPT

## 2025-03-31 PROCEDURE — 36415 COLL VENOUS BLD VENIPUNCTURE: CPT | Mod: PO

## 2025-03-31 PROCEDURE — 83690 ASSAY OF LIPASE: CPT

## 2025-03-31 PROCEDURE — 99215 OFFICE O/P EST HI 40 MIN: CPT | Mod: PBBFAC,PO | Performed by: NURSE PRACTITIONER

## 2025-03-31 PROCEDURE — 81003 URINALYSIS AUTO W/O SCOPE: CPT | Mod: PO | Performed by: NURSE PRACTITIONER

## 2025-03-31 PROCEDURE — 85025 COMPLETE CBC W/AUTO DIFF WBC: CPT

## 2025-03-31 PROCEDURE — 99214 OFFICE O/P EST MOD 30 MIN: CPT | Mod: S$PBB,,, | Performed by: NURSE PRACTITIONER

## 2025-03-31 NOTE — PROGRESS NOTES
Subjective:       Patient ID: Osman Johansen is a 75 y.o. female.    Chief Complaint: Abdominal Pain    Abdominal Pain  This is a recurrent problem. The current episode started more than 1 year ago. The onset quality is gradual. The problem occurs every several days. The most recent episode lasted [id number] days. The problem has been gradually worsening. The pain is located in the RUQ, epigastric region and periumbilical region. The pain is at a severity of 8/10. The pain is moderate. The quality of the pain is aching, cramping, a sensation of fullness and sharp. Associated symptoms include flatus and nausea. Pertinent negatives include no anorexia, arthralgias, belching, constipation, diarrhea, dysuria, fever, frequency, headaches, hematochezia, hematuria, melena, myalgias, vomiting or weight loss. The pain is aggravated by certain positions, eating and movement. The pain is relieved by Certain positions, movement, recumbency and standing. She has tried acetaminophen for the symptoms. The treatment provided mild relief. Prior diagnostic workup includes ultrasound. Her past medical history is significant for gallstones, GERD and irritable bowel syndrome. There is no history of abdominal surgery, colon cancer, Crohn's disease, pancreatitis, PUD or ulcerative colitis. Patient's medical history does not include kidney stones and UTI.     Patient presents for persistent RUQ abdominal pain ongoing >6 weeks  Initially localized to RUQ   Now radiating to epigastric area  Occurs 1-3 hours after she eats  Associated nausea  History of GERD on omeprazole--she reports symptoms are controlled--denies heartburn, regurgitation, or dysphagia  She denies dark/bloody stools  History cholecystectomy  Denies flank pain or urinary symptoms     Vitals:    03/31/25 1401   BP: (!) 144/76   Pulse: 84   Temp: 98.2 °F (36.8 °C)     Review of Systems   Constitutional:  Negative for fever and weight loss.   Gastrointestinal:  Positive for  abdominal pain, flatus and nausea. Negative for abdominal distention, anal bleeding, anorexia, blood in stool, constipation, diarrhea, hematochezia, melena and vomiting.   Genitourinary:  Negative for dysuria, flank pain, frequency and hematuria.   Musculoskeletal:  Negative for arthralgias and myalgias.   Neurological:  Negative for headaches.       Past Medical History:   Diagnosis Date    Allergy     Anemia     Anxiety     Arthritis     Blood transfusion 8 yrs    CAD (coronary artery disease)     Cataract     Chronic diastolic CHF (congestive heart failure)     CKD (chronic kidney disease), stage II     COPD (chronic obstructive pulmonary disease)     mild no inhalers    Degenerative disc disease     Depression     Dry eye syndrome     Fibromyalgia     Fluid overload     GERD (gastroesophageal reflux disease)     Headache     Hypercholesterolemia 12/09/2019    Hypertension     Hypothyroidism     IBS (irritable bowel syndrome)     Lower extremity edema     Macular drusen     Neuromuscular disorder     Ocular hypertension, bilateral     Osteoporosis     Pleural effusion     PUD (peptic ulcer disease)     Restless leg     Uses Suboxone to control    Sleep apnea     don't use C-PAP    Stroke     2 strokes-after CABG    Thoracic or lumbosacral neuritis or radiculitis 10/19/2012    Thyroid disease     hashimoto's     Objective:      Physical Exam  Constitutional:       General: She is not in acute distress.     Appearance: She is well-developed. She is not ill-appearing, toxic-appearing or diaphoretic.   HENT:      Right Ear: Hearing normal.      Left Ear: Hearing normal.   Pulmonary:      Effort: No tachypnea or respiratory distress.   Abdominal:      General: There is no distension.      Palpations: Abdomen is soft.      Tenderness: There is no abdominal tenderness. There is no right CVA tenderness or left CVA tenderness.   Skin:     Coloration: Skin is not pale.   Neurological:      Mental Status: She is alert and  oriented to person, place, and time.   Psychiatric:         Speech: Speech normal.         Behavior: Behavior normal.         Thought Content: Thought content normal.         Judgment: Judgment normal.         Assessment:       1. RUQ pain    2. Epigastric pain    3. Nausea        Plan:       RUQ pain  -     CBC Auto Differential; Future; Expected date: 03/31/2025  -     Comprehensive Metabolic Panel; Future; Expected date: 03/31/2025  -     Lipase; Future; Expected date: 03/31/2025  -     Ambulatory referral/consult to Gastroenterology; Future; Expected date: 04/07/2025  -     CT Abdomen Pelvis With IV Contrast Routine Oral Contrast; Future; Expected date: 03/31/2025  -     Urinalysis, Reflex to Urine Culture; Future; Expected date: 03/31/2025    Epigastric pain  -     CT Abdomen Pelvis With IV Contrast Routine Oral Contrast; Future; Expected date: 03/31/2025    Nausea  -     Urinalysis, Reflex to Urine Culture; Future; Expected date: 03/31/2025          Labs and CT, GI eval  education provided on supportive care, symptom monitoring and return/ER precautions       Medication List with Changes/Refills   Current Medications    AMLODIPINE (NORVASC) 2.5 MG TABLET    Take 3 tablets (7.5 mg total) by mouth once daily.    ASPIRIN (ECOTRIN) 81 MG EC TABLET    Take 1 tablet (81 mg total) by mouth once daily. for 21 days    BEMPEDOIC ACID (NEXLETOL) 180 MG TAB    Take 1 tablet (180 mg total) by mouth Daily.    BENAZEPRIL (LOTENSIN) 20 MG TABLET    Take 1 tablet (20 mg total) by mouth 2 (two) times daily.    BENZONATATE (TESSALON) 100 MG CAPSULE    1 - 2 po every 6 hours prn cough    BUTALBITAL-ACETAMINOPHEN-CAFFEINE -40 MG (FIORICET, ESGIC) -40 MG PER TABLET    1 tab PO PRN migraine. No more than 10 tab per month.    CALCIUM CARBONATE/VITAMIN D3 (CALCIUM 500 WITH D ORAL)    Take 1 capsule by mouth once daily.    CARBOXYMETHYLCELLULOSE (REFRESH PLUS) 0.5 % DPET    Place 1 drop into both eyes daily as needed (DRY  EYES).    CLOPIDOGREL (PLAVIX) 75 MG TABLET    Take 1 tablet (75 mg total) by mouth once daily.    CO-ENZYME Q-10 30 MG CAPSULE    Take 30 mg by mouth 3 (three) times daily.    DICYCLOMINE (BENTYL) 10 MG CAPSULE    Take 1 capsule (10 mg total) by mouth 4 (four) times daily as needed (stomach discomfort).    ESOMEPRAZOLE (NEXIUM) 40 MG CAPSULE    Take 1 capsule (40 mg total) by mouth before breakfast.    ESTRADIOL (ESTRACE) 0.01 % (0.1 MG/GRAM) VAGINAL CREAM    USE ONE GRAM VAGINALLY THREE TIMES A WEEK (MUST LAST 100 DAYS)    EVOLOCUMAB (REPATHA SURECLICK) 140 MG/ML PNIJ    Inject 1 mL (140 mg total) into the skin every 14 (fourteen) days.    FLUTICASONE PROPIONATE (FLONASE) 50 MCG/ACTUATION NASAL SPRAY    1 spray (50 mcg total) by Each Nostril route once daily.    GARLIC (GARLIQUE ORAL)    Take 1 capsule by mouth Daily.    ISOSORBIDE MONONITRATE (IMDUR) 30 MG 24 HR TABLET    Take 15 mg by mouth once daily.    METHOCARBAMOL (ROBAXIN) 750 MG TAB    Take 1 tablet (750 mg total) by mouth 4 (four) times daily.    METOPROLOL SUCCINATE (TOPROL-XL) 25 MG 24 HR TABLET    Take 0.5 tablets (12.5 mg total) by mouth once daily.    MULTIVITAMIN (MULTIPLE VITAMIN ORAL)    Take 1 capsule by mouth once daily.    NITROGLYCERIN (NITROSTAT) 0.4 MG SL TABLET    Place 1 tablet (0.4 mg total) under the tongue every 5 (five) minutes as needed for Chest pain.    ONDANSETRON (ZOFRAN) 4 MG TABLET    Take 1 tablet (4 mg total) by mouth every 4 (four) hours as needed for Nausea.    OXYBUTYNIN (DITROPAN XL) 15 MG TR24    Take 1 tablet (15 mg total) by mouth every morning.    PROMETHAZINE (PHENERGAN) 25 MG TABLET    Take 1 tablet (25 mg total) by mouth every 6 (six) hours as needed for Nausea.    SENNOSIDES (LAXATIVE, SENNOSIDES,) 25 MG TAB    Take 1 capsule by mouth every Mon, Wed, Fri.    SUBOXONE 8-2 MG    Place 1 each under the tongue daily as needed (restless legs).    SYNTHROID 100 MCG TABLET    Take 1 tablet (100 mcg total) by mouth before  breakfast. **BRAND MEDICALLY NECESSARY**    TIZANIDINE (ZANAFLEX) 4 MG TABLET    Half or full tablet by mouth at night as needed for muscle spasm    VALACYCLOVIR (VALTREX) 1000 MG TABLET    TAKE ONE TABLET BY MOUTH EVERY DAY FOR 7 DAYS    VALACYCLOVIR (VALTREX) 500 MG TABLET    Take 1 tablet (500 mg total) by mouth once daily.

## 2025-04-01 ENCOUNTER — TELEPHONE (OUTPATIENT)
Dept: FAMILY MEDICINE | Facility: CLINIC | Age: 75
End: 2025-04-01
Payer: MEDICARE

## 2025-04-02 ENCOUNTER — PATIENT MESSAGE (OUTPATIENT)
Dept: ADMINISTRATIVE | Facility: OTHER | Age: 75
End: 2025-04-02
Payer: MEDICARE

## 2025-04-15 DIAGNOSIS — G43.719 INTRACTABLE CHRONIC MIGRAINE WITHOUT AURA AND WITHOUT STATUS MIGRAINOSUS: ICD-10-CM

## 2025-04-15 RX ORDER — BUTALBITAL, ACETAMINOPHEN AND CAFFEINE 50; 325; 40 MG/1; MG/1; MG/1
TABLET ORAL
Qty: 10 TABLET | Refills: 3 | Status: SHIPPED | OUTPATIENT
Start: 2025-04-15 | End: 2025-04-16

## 2025-04-16 ENCOUNTER — HOSPITAL ENCOUNTER (OUTPATIENT)
Dept: RADIOLOGY | Facility: HOSPITAL | Age: 75
Discharge: HOME | End: 2025-04-16
Attending: NURSE PRACTITIONER
Payer: MEDICARE

## 2025-04-16 DIAGNOSIS — R10.11 RUQ PAIN: ICD-10-CM

## 2025-04-16 DIAGNOSIS — R10.13 EPIGASTRIC PAIN: ICD-10-CM

## 2025-04-16 PROCEDURE — 74177 CT ABD & PELVIS W/CONTRAST: CPT | Mod: TC,PO

## 2025-04-16 PROCEDURE — 74177 CT ABD & PELVIS W/CONTRAST: CPT | Mod: 26,,, | Performed by: STUDENT IN AN ORGANIZED HEALTH CARE EDUCATION/TRAINING PROGRAM

## 2025-04-16 PROCEDURE — A9698 NON-RAD CONTRAST MATERIALNOC: HCPCS | Mod: PO | Performed by: NURSE PRACTITIONER

## 2025-04-16 PROCEDURE — 25500020 PHARM REV CODE 255: Mod: PO | Performed by: NURSE PRACTITIONER

## 2025-04-16 RX ORDER — BUTALBITAL, ACETAMINOPHEN AND CAFFEINE 50; 325; 40 MG/1; MG/1; MG/1
TABLET ORAL
Qty: 10 TABLET | Refills: 0 | Status: SHIPPED | OUTPATIENT
Start: 2025-04-16

## 2025-04-16 RX ADMIN — BARIUM SULFATE 900 ML: 20 SUSPENSION ORAL at 02:04

## 2025-04-16 RX ADMIN — IOHEXOL 75 ML: 350 INJECTION, SOLUTION INTRAVENOUS at 02:04

## 2025-04-17 NOTE — TELEPHONE ENCOUNTER
Spoke with patient and she will reach out to the [place] office to see if she can see Alexandru Espinal.    I can not schedule her with that provider.     She is scheduled with new patient next available apt she will keep incase she can not get in with other provider.

## 2025-04-20 DIAGNOSIS — I10 ESSENTIAL (PRIMARY) HYPERTENSION: ICD-10-CM

## 2025-04-21 NOTE — TELEPHONE ENCOUNTER
Refill Routing Note   Medication(s) are not appropriate for processing by [facility] for the following reason(s):        Required vitals abnormal    [facility] action(s):  Defer             Appointments  past 12m or future 3m with PCP    Date Provider   Last Visit   1/7/2025 Galindo Stiles MD   Next Visit   8/26/2025 Galindo Stiles MD   ED visits in past 90 days: 0        Note composed:5:29 PM 04/21/2025

## 2025-04-21 NOTE — TELEPHONE ENCOUNTER
No care due was identified.  [facility] Embedded Care Due Messages. Reference number: [id number].   4/21/2025 5:03:29 PM CDT

## 2025-04-22 RX ORDER — AMLODIPINE BESYLATE 2.5 MG/1
7.5 TABLET ORAL DAILY
Qty: 270 TABLET | Refills: 2
Start: 2025-04-22 | End: 2026-01-17

## 2025-05-15 ENCOUNTER — OFFICE VISIT (OUTPATIENT)
Facility: CLINIC | Age: 75
End: 2025-05-15
Payer: MEDICARE

## 2025-05-15 ENCOUNTER — PROCEDURE VISIT (OUTPATIENT)
Facility: CLINIC | Age: 75
End: 2025-05-15
Payer: MEDICARE

## 2025-05-15 DIAGNOSIS — R93.2 ABNORMAL LIVER ULTRASOUND: ICD-10-CM

## 2025-05-15 DIAGNOSIS — R93.2 ABNORMAL LIVER ULTRASOUND: Primary | ICD-10-CM

## 2025-05-15 PROCEDURE — 99212 OFFICE O/P EST SF 10 MIN: CPT | Mod: PBBFAC,PN | Performed by: NURSE PRACTITIONER

## 2025-05-15 PROCEDURE — 91200 LIVER ELASTOGRAPHY: CPT | Mod: PBBFAC,PN | Performed by: NURSE PRACTITIONER

## 2025-05-15 PROCEDURE — 99999 PR PBB SHADOW E&M-EST. PATIENT-LVL II: CPT | Mod: PBBFAC,,, | Performed by: NURSE PRACTITIONER

## 2025-05-15 PROCEDURE — 91200 LIVER ELASTOGRAPHY: CPT | Mod: 26,S$PBB,, | Performed by: NURSE PRACTITIONER

## 2025-05-15 NOTE — PROGRESS NOTES
[facility] - New Patient     REFERRING PROVIDER: Yadira Serna  PCP: Galindo Stiles MD    Chief Complaint: Abnormal liver imaging      HISTORY     This is a 75 y.o. female referred for evaluation of above.    Denies prior diagnosis of liver disease.     She had an US in Feb of this year (for RUQ pain) that showed a coarsened liver echotexture. Liver normal size, no liver lesions or biliary dilation. CT in April without abnormal liver findings.     Review of labs:  - Transaminases: WNL  - Alk phos: WNL  - Synthetic liver function: WNL  - Platelets: WNL    Workup thus far:  Serologies:   Lab Results   Component Value Date    MAYO Negative 12/07/2011    FERRITIN 41 03/06/2025    FESATURATED 25 03/06/2025    HEPBSAG Non-reactive 03/06/2025    HEPBIGM Non-reactive 03/06/2025    HEPCAB Non-reactive 03/06/2025    HEPAIGM Non-reactive 03/06/2025    CPK 52 (L) 07/21/2022      Risk factors for MASLD:   Weight -- BMI 25               Dyslipidemia -- yes   Hypertension -- yes                              Insulin resistance / diabetes -- no  Lab Results   Component Value Date    HGBA1C 5.4 12/20/2024         Other -- noted h/o CAD w/ CABG and CVA, hypothyroidism, NAOMI     Alcohol use: rare    Denies illicit drug use.     Family history:  Son has fatty liver    Meds/supplements:  None overly concerning from liver standpoint    Denies recent illness or infection.    No symptoms of hepatic decompensation including jaundice, ascites, cognitive problems to suggest hepatic encephalopathy, or GI bleeding.     She is scheduled to see GI in a few weeks for the abdominal pain. This was starting to occur more frequently and spreading across her abdomen; worse after meals. Lately pain has improved some.          Past medical history, surgical history, problem list, family history, social history, allergies: Reviewed and updated in the appropriate section of the electronic medical record.      Current  "Medications[1]  Medication list reviewed and updated.      Review of Systems - as per HPI  Constitutional: Negative for unexpected weight change.   Respiratory: Negative for shortness of breath.    Cardiovascular: Negative for leg swelling.  Gastrointestinal: Negative for abdominal distention. Negative for melena or hematemesis. +intermittent RUQ pain   Musculoskeletal: Negative for myalgias.    Skin: Negative for jaundice or itching.  Neurological: Negative for confusion or slowed mentation. Negative for tremors.   Hematological: Does not bruise/bleed easily.       Physical Exam   Constitutional: No distress. Alert and oriented.  Eyes: No scleral icterus.   Pulmonary/Chest: Respiratory effort normal. No respiratory distress.   Abdominal: No distension, no ascites appreciated.   Extremities: No edema.   Neurological: No tremor.   Skin: No jaundice.   Psychiatric: Normal mood and affect. Speech, behavior, and thought content normal.       LABS & DIAGNOSTIC STUDIES     I have personally reviewed pertinent laboratory findings:    Lab Results   Component Value Date    ALT 8 (L) 03/31/2025    AST 17 03/31/2025    ALKPHOS 64 03/31/2025    BILITOT 0.3 03/31/2025    ALBUMIN 3.9 03/31/2025    INR 0.9 07/21/2022       Lab Results   Component Value Date    WBC 8.56 03/31/2025    HGB 10.7 (L) 03/31/2025    HCT 34.2 (L) 03/31/2025    MCV 98 03/31/2025     03/31/2025       Lab Results   Component Value Date     03/31/2025    K 5.4 (H) 03/31/2025    BUN 19 03/31/2025    CREATININE 1.1 03/31/2025    ESTGFRAFRICA 29.7 (A) 07/27/2022    EGFRNONAA 25.8 (A) 07/27/2022       Lab Results   Component Value Date    FERRITIN 41 03/06/2025    FESATURATED 25 03/06/2025    CPK 52 (L) 07/21/2022    HEPBSAG Non-reactive 03/06/2025    HEPCAB Non-reactive 03/06/2025       No results found for: "AFP"      I have personally reviewed the following result reports:  Abdominal US - 4/16/25    ASSESSMENT & PLAN     75 y.o. female with:    1. " Abnormal liver ultrasound          FIB-4 Calculation: 1.74 at 3/31/2025  3:12 PM    FIB-4 below 1.30 is considered as low-risk for advanced fibrosis  FIB-4 over 2.67 is considered as high-risk for advanced fibrosis  FIB-4 values between 1.30 and 2.67 are considered as intermediate-risk of advanced fibrosis for ages 36-64.   For ages > 64 the cut-off for low-risk goes to < 2.      Liver appearance could be due to steatosis; she has multiple metabolic risk factors. Her son also has fatty liver. Liver enzymes/LFTs have always been normal.     Recommendations:   Obtain Fibroscan for fibrosis staging.  Discussed that if determined to have MASLD, even 3-5% weight loss can help; she is already down 5 lb. Maintain good control of blood pressure, cholesterol, and blood sugar.   Current symptoms unlikely due to liver; encouraged to keep her consult with GI       Orders Placed This Encounter   Procedures    FibroScan Transplant Hepatology(Vibration Controlled Transient Elastography)       ADDENDUM:  Fibroscan kPa 4.6, F0-F1  , <S1  *Results reviewed in clinic. Continue routine lab monitoring with PCP. Can always f/u if liver testing is ever abnormal.       Thank you for allowing me to participate in the care of [facility]        Duration of encounter: 45 min  This includes face to face time and non-face to face time preparing to see the patient (eg, review of tests), obtaining and/or reviewing separately obtained history, documenting clinical information in the electronic or other health record, independently interpreting results and communicating results to the patient/family/caregiver, or Care coordination.           [1]   Current Outpatient Medications   Medication Sig Dispense Refill    butalbital-acetaminophen-caffeine -40 mg (FIORICET, ESGIC) -40 mg per tablet TAKE ONE TABLET BY MOUTH AS NEEDED FOR migraine. No more THAN 10 TABLET PER MONTH.] 10 tablet 0     amLODIPine (NORVASC) 2.5 MG tablet Take 3 tablets (7.5 mg total) by mouth once daily. 270 tablet 2    aspirin (ECOTRIN) 81 MG EC tablet Take 1 tablet (81 mg total) by mouth once daily. for 21 days      bempedoic acid (NEXLETOL) 180 mg Tab Take 1 tablet (180 mg total) by mouth Daily. (Patient not taking: Reported on 3/31/2025) 90 tablet 1    benazepriL (LOTENSIN) 20 MG tablet Take 1 tablet (20 mg total) by mouth 2 (two) times daily. 180 tablet 1    CALCIUM CARBONATE/VITAMIN D3 (CALCIUM 500 WITH D ORAL) Take 1 capsule by mouth once daily.      carboxymethylcellulose (REFRESH PLUS) 0.5 % Dpet Place 1 drop into both eyes daily as needed (DRY EYES).      clopidogreL (PLAVIX) 75 mg tablet Take 1 tablet (75 mg total) by mouth once daily. 90 tablet 1    co-enzyme Q-10 30 mg capsule Take 30 mg by mouth 3 (three) times daily.      dicyclomine (BENTYL) 10 MG capsule Take 1 capsule (10 mg total) by mouth 4 (four) times daily as needed (stomach discomfort). 120 capsule 0    esomeprazole (NEXIUM) 40 MG capsule Take 1 capsule (40 mg total) by mouth before breakfast. 90 capsule 3    estradioL (ESTRACE) 0.01 % (0.1 mg/gram) vaginal cream USE ONE GRAM VAGINALLY THREE TIMES A WEEK (MUST LAST 100 DAYS) 42.5 g 1    evolocumab (REPATHA SURECLICK) 140 mg/mL PnIj Inject 1 mL (140 mg total) into the skin every 14 (fourteen) days. 2 each 5    fluticasone propionate (FLONASE) 50 mcg/actuation nasal spray 1 spray (50 mcg total) by Each Nostril route once daily. 32 g 2    garlic (GARLIQUE ORAL) Take 1 capsule by mouth Daily.      isosorbide mononitrate (IMDUR) 30 MG 24 hr tablet Take 15 mg by mouth once daily.      methocarbamoL (ROBAXIN) 750 MG Tab Take 1 tablet (750 mg total) by mouth 4 (four) times daily. 40 tablet 3    MULTIVITAMIN (MULTIPLE VITAMIN ORAL) Take 1 capsule by mouth once daily.      nitroGLYCERIN (NITROSTAT) 0.4 MG SL tablet Place 1 tablet (0.4 mg total) under the tongue every 5 (five) minutes as needed for Chest pain. 25  tablet 3    oxybutynin (DITROPAN XL) 15 MG TR24 Take 1 tablet (15 mg total) by mouth every morning. 90 tablet 3    promethazine (PHENERGAN) 25 MG tablet Take 1 tablet (25 mg total) by mouth every 6 (six) hours as needed for Nausea. 45 tablet 5    sennosides (LAXATIVE, SENNOSIDES,) 25 mg Tab Take 1 capsule by mouth every Mon, Wed, Fri.      SUBOXONE 8-2 mg Place 1 each under the tongue daily as needed (restless legs).      SYNTHROID 100 mcg tablet Take 1 tablet (100 mcg total) by mouth before breakfast. **BRAND MEDICALLY NECESSARY** 90 tablet 3    tiZANidine (ZANAFLEX) 4 MG tablet Half or full tablet by mouth at night as needed for muscle spasm 30 tablet 11    valACYclovir (VALTREX) 1000 MG tablet TAKE ONE TABLET BY MOUTH EVERY DAY FOR 7 DAYS 7 tablet 5    valACYclovir (VALTREX) 500 MG tablet Take 1 tablet (500 mg total) by mouth once daily. 30 tablet 11     Current Facility-Administered Medications   Medication Dose Route Frequency Provider Last Rate Last Admin    sodium chloride 0.9% flush 10 mL  10 mL Intravenous PRN Sourav Nugent MD        sodium chloride 0.9% flush 10 mL  10 mL Intravenous PRN Sourav Nugent MD         Facility-Administered Medications Ordered in Other Visits   Medication Dose Route Frequency Provider Last Rate Last Admin    lactated ringers infusion   Intravenous Continuous Jim Mcdonough MD   Stopped at 06/27/22 5091    LIDOcaine (PF) 10 mg/ml (1%) injection 10 mg  1 mL Intradermal Once Jim Mcdonough MD

## 2025-05-15 NOTE — PROCEDURES
FibroScan Transplant Hepatology(Vibration Controlled Transient Elastography)    Date/Time: 5/15/2025 1:30 PM    Performed by: Berenice Godfrey NP  Authorized by: Berenice Godfrey NP    Diagnosis:  Other    Probe:  XL    Universal Protocol: Patient's identity, procedure and site were verified, confirmatory pause was performed.  Discussed procedure including risks and potential complications.  Questions answered.  Patient verbalizes understanding and wishes to proceed with VCTE.     Procedure: After providing explanations of the procedure, patient was placed in the supine position with right arm in maximum abduction to allow optimal exposure of right lateral abdomen.  Patient was briefly assessed, Testing was performed in the mid-axillary location, 50Hz Shear Wave pulses were applied and the resulting Shear Wave and Propagation Speed detected with a 3.5 MHz ultrasonic signal, using the FibroScan probe, Skin to liver capsule distance and liver parenchyma were accessed during the entire examination with the FibroScan probe, Patient was instructed to breathe normally and to abstain from sudden movements during the procedure, allowing for random measurements of liver stiffness. At least 10 Shear Waves were produced, Individual measurements of each Shear Wave were calculated.  Patient tolerated the procedure well with no complications.  Meets discharge criteria as was dismissed.  Rates pain 0 out of 10.  Patient will follow up with ordering provider to review results.    Findings  Median liver stiffness score:  4.6  CAP Reading: dB/m:  225    IQR/med %:  7  Interpretation  Fibrosis interpretation is based on medial liver stiffness - Kilopascal (kPa).    Fibrosis Stage:  F 0-1  Steatosis interpretation is based on controlled attenuation parameter - (dB/m).    Steatosis Grade:  <S1

## 2025-05-16 ENCOUNTER — TELEPHONE (OUTPATIENT)
Dept: NEUROLOGY | Facility: CLINIC | Age: 75
End: 2025-05-16
Payer: MEDICARE

## 2025-05-16 DIAGNOSIS — G43.719 INTRACTABLE CHRONIC MIGRAINE WITHOUT AURA AND WITHOUT STATUS MIGRAINOSUS: ICD-10-CM

## 2025-05-16 DIAGNOSIS — I10 ESSENTIAL (PRIMARY) HYPERTENSION: ICD-10-CM

## 2025-05-16 RX ORDER — AMLODIPINE BESYLATE 2.5 MG/1
7.5 TABLET ORAL DAILY
Qty: 270 TABLET | Refills: 2 | Status: SHIPPED | OUTPATIENT
Start: 2025-05-16

## 2025-05-16 RX ORDER — BUTALBITAL, ACETAMINOPHEN AND CAFFEINE 50; 325; 40 MG/1; MG/1; MG/1
TABLET ORAL
Qty: 10 TABLET | Refills: 3 | Status: SHIPPED | OUTPATIENT
Start: 2025-05-16

## 2025-05-16 NOTE — TELEPHONE ENCOUNTER
No care due was identified.  [facility] Embedded Care Due Messages. Reference number: [id number].   5/16/2025 9:19:18 AM CDT

## 2025-05-16 NOTE — TELEPHONE ENCOUNTER
"Refill Routing Note   Medication(s) are not appropriate for processing by [facility] for the following reason(s):        Required vitals abnormal    [facility] action(s):  Defer        Medication Therapy Plan: Pt has been taking, but last order noted as "no print". Recent fill history.      Appointments  past 12m or future 3m with PCP    Date Provider   Last Visit   1/7/2025 Galindo Stiles MD   Next Visit   8/26/2025 Galindo Stiles MD   ED visits in past 90 days: 0        Note composed:10:46 AM 05/16/2025          "

## 2025-06-05 ENCOUNTER — OFFICE VISIT (OUTPATIENT)
Dept: GASTROENTEROLOGY | Facility: CLINIC | Age: 75
End: 2025-06-05
Payer: MEDICARE

## 2025-06-05 VITALS — WEIGHT: 140.19 LBS | HEIGHT: 61 IN | BODY MASS INDEX: 26.47 KG/M2

## 2025-06-05 DIAGNOSIS — K59.09 CHRONIC CONSTIPATION: Primary | ICD-10-CM

## 2025-06-05 DIAGNOSIS — R10.11 RUQ PAIN: ICD-10-CM

## 2025-06-05 DIAGNOSIS — K57.90 DIVERTICULOSIS: ICD-10-CM

## 2025-06-05 PROCEDURE — 99213 OFFICE O/P EST LOW 20 MIN: CPT | Mod: PBBFAC,PO | Performed by: INTERNAL MEDICINE

## 2025-06-05 PROCEDURE — 99203 OFFICE O/P NEW LOW 30 MIN: CPT | Mod: S$PBB,,, | Performed by: INTERNAL MEDICINE

## 2025-06-05 PROCEDURE — 99999 PR PBB SHADOW E&M-EST. PATIENT-LVL III: CPT | Mod: PBBFAC,,, | Performed by: INTERNAL MEDICINE

## 2025-06-06 DIAGNOSIS — Z79.01 CURRENT USE OF LONG TERM ANTICOAGULATION: Primary | ICD-10-CM

## 2025-06-09 ENCOUNTER — E-CONSULT (OUTPATIENT)
Dept: CARDIOLOGY | Facility: CLINIC | Age: 75
End: 2025-06-09
Payer: MEDICARE

## 2025-06-09 DIAGNOSIS — I25.10 CORONARY ARTERY DISEASE INVOLVING NATIVE CORONARY ARTERY OF NATIVE HEART WITHOUT ANGINA PECTORIS: Primary | ICD-10-CM

## 2025-06-09 PROCEDURE — 99451 NTRPROF PH1/NTRNET/EHR 5/>: CPT | Mod: S$PBB,,, | Performed by: INTERNAL MEDICINE

## 2025-06-09 NOTE — CONSULTS
[facility]  Response for E-Consult     Patient Name: Osman Johansen  MRN: [id number]  Primary Care Provider: Galindo Stiles MD   Requesting Provider: Walter Rey Jr., *  Consults    Recommendation:  Acceptable CV risk to hold Plavix for 5 days before colonoscopy    Additional future steps to consider:     Total time of Consultation: 5 minute    I did not speak to the requesting provider verbally about this.     *This eConsult is based on the clinical data available to me and is furnished without benefit of a physical examination. The eConsult will need to be interpreted in light of any clinical issues or changes in patient status not available to me at the time of filing this eConsults. Significant changes in patient condition or level of acuity should result in immediate formal consultation and reevaluation. Please alert me if you have further questions.    Thank you for this eConsult referral.     Sourav Nugent MD  [facility]

## 2025-06-21 DIAGNOSIS — K29.70 GASTRITIS, PRESENCE OF BLEEDING UNSPECIFIED, UNSPECIFIED CHRONICITY, UNSPECIFIED GASTRITIS TYPE: ICD-10-CM

## 2025-06-21 NOTE — TELEPHONE ENCOUNTER
No care due was identified.  [facility] Embedded Care Due Messages. Reference number: [id number].   6/21/2025 8:03:58 AM CDT

## 2025-06-22 NOTE — TELEPHONE ENCOUNTER
Refill Routing Note   Medication(s) are not appropriate for processing by [facility] for the following reason(s):        Drug-drug interaction: No prior override by participating responsible provider-  Plavix is not on active medication list     [facility] action(s):  Defer             Appointments  past 12m or future 3m with PCP    Date Provider   Last Visit   1/7/2025 Galindo Stiles MD   Next Visit   8/26/2025 Galindo Stiles MD   ED visits in past 90 days: 0        Note composed:1:12 PM 06/22/2025

## 2025-06-23 DIAGNOSIS — Z00.00 ENCOUNTER FOR MEDICARE ANNUAL WELLNESS EXAM: ICD-10-CM

## 2025-06-23 RX ORDER — ESOMEPRAZOLE MAGNESIUM 40 MG/1
40 CAPSULE, DELAYED RELEASE ORAL
Qty: 90 CAPSULE | Refills: 1 | Status: SHIPPED | OUTPATIENT
Start: 2025-06-23

## 2025-06-23 NOTE — PROGRESS NOTES
"Subjective     Patient ID: Osman Johansen is a 75 y.o. female.    Chief Complaint: RUQ PAIN    This is my 1st encounter with this 75-year-old female who presents with listed complaint of right upper quadrant pain.  However, she gives the history that a recent CT scan showed "slow bowels. ".   This translates to constipation.  The constipation has been worse for the last 6 months to a year.  But she goes on to say she has been bothered probably 3 or 4 years.  She uses stool softeners (docusate sodium) and laxative (such as Ex-Lax, or sennoside).  When asked about other specific ones, she reported that MiraLax did not work.  And that milk of magnesia had worked previously but has since stopped working.  There has been no blood with the bowel movements.  No changes in her appetite.  No nausea vomiting.    Her last colonoscopy was 11/29/2012 with Dr. Flores, see below.  It was done then when she had a complaint of significant diarrhea.      CT ABDOMEN PELVIS WITH IV CONTRAST, 4/16/2025   CLINICAL HISTORY:  RUQ/epigastric pain;     FINDINGS:  No concerning liver lesion.  Mild prominence of the bile ducts likely related to cholecystectomy status.   Spleen and right adrenal gland are unremarkable.  Diffuse atrophy of the pancreas.  Stable nodular thickening of the left adrenal gland likely representing adenoma given appearance on prior MRI.    No evidence of bowel obstruction or inflammation.  Colonic diverticulosis without acute diverticulitis.  Distal small bowel feces sign.  Prominent stool volume in the right colon with left colon relatively decompressed.  Severe aortoiliac atherosclerosis.  No aneurysm.    Impression:     1. No acute abdominopelvic abnormality.  2. Mild prominence of the bile ducts likely related to cholecystectomy status.  3. Distal small bowel feces sign and asymmetrically prominent stool volume in the right colon.  Findings can be seen with delayed GI transit.  Recommend clinical " I have personally verified, reviewed, released, signed, authenticated, authorized, confirmed,finalized, and approved the actions of the CMA. correlation.  4. Colonic diverticulosis.  5. Additional findings as above.      Electronically signed by:Tommie Arya  Date:                                            04/16/2025        See last Colonoscopy 11/29/2012  Indications:        Screening for colorectal malignant neoplasm,                        Clinically significant diarrhea of unexplained origin   Providers:           Alexandru Flores MD   Impression:          1. Diarrhea - etiology unknown                        2. Normal colon and terminal ileum                        3. Biopsies taken to rule out microscopic colitis   Recommendation:      1. Follow up biopsies.                        2. Repeat exam for screening in 10 years.                        3. Follow up in my office in 8 weeks.   Alexandru Flores MD   11/29/2012       And Dr. Flores's note 11/20/2012:  Last EGD was in 2002 and showed GERD.  Colonoscopy was done at the same time and was noted for spastic colon, no polyps.  No GI bleeding.  + change in bowel habits.             Review of Systems   Constitutional:  Negative for activity change, appetite change, fatigue, fever and unexpected weight change.   HENT: Negative.  Negative for dental problem, drooling, mouth sores, sore throat, trouble swallowing and voice change.    Eyes: Negative.    Respiratory:  Negative for cough, choking, shortness of breath, wheezing and stridor.    Cardiovascular:  Negative for chest pain.   Gastrointestinal:  Positive for abdominal pain (Abdominal discomfort when constipated.) and constipation. Negative for abdominal distention, anal bleeding, blood in stool, diarrhea, nausea, rectal pain and vomiting.   Genitourinary: Negative.    Musculoskeletal:  Negative for arthralgias, joint swelling, myalgias and neck stiffness.   Integumentary:  Negative for color change and rash.   Neurological:  Negative for weakness.   Hematological:  Negative for adenopathy. Does not bruise/bleed easily.   Psychiatric/Behavioral:   Negative for agitation, behavioral problems, confusion, decreased concentration and dysphoric mood.           Objective     Physical Exam  Constitutional:       General: She is not in acute distress.     Appearance: Normal appearance. She is well-developed.   HENT:      Head: Normocephalic.      Nose: Nose normal.      Mouth/Throat:      Mouth: Mucous membranes are moist.      Pharynx: No oropharyngeal exudate.   Eyes:      General: No scleral icterus.     Conjunctiva/sclera: Conjunctivae normal.   Neck:      Thyroid: No thyromegaly.      Trachea: No tracheal deviation.   Cardiovascular:      Rate and Rhythm: Normal rate and regular rhythm.      Heart sounds: Normal heart sounds. No murmur heard.     No gallop.   Pulmonary:      Effort: Pulmonary effort is normal.      Breath sounds: Normal breath sounds. No wheezing or rales.   Abdominal:      General: Bowel sounds are normal. There is no distension.      Palpations: Abdomen is soft. There is no mass.      Tenderness: There is abdominal tenderness (Minimally tender right side of abdomen and left side of abdomen.  No rebound tenderness.). There is no guarding.   Musculoskeletal:         General: Normal range of motion.      Cervical back: Neck supple.   Lymphadenopathy:      Cervical: No cervical adenopathy.   Skin:     General: Skin is warm and dry.      Findings: No erythema or rash.   Neurological:      General: No focal deficit present.      Mental Status: She is alert and oriented to person, place, and time.   Psychiatric:         Mood and Affect: Mood normal.         Behavior: Behavior normal.            Assessment and Plan     Chronic constipation    RUQ pain  -     Ambulatory referral/consult to Gastroenterology    Diverticulosis    BMI 26.0-26.9,adult      Continue her medications the same for now.  Schedule for colonoscopy.  Further recs to follow after the above

## 2025-06-25 DIAGNOSIS — Z95.1 S/P CABG (CORONARY ARTERY BYPASS GRAFT): ICD-10-CM

## 2025-06-25 RX ORDER — EVOLOCUMAB 140 MG/ML
140 INJECTION, SOLUTION SUBCUTANEOUS
Qty: 2 EACH | Refills: 5 | Status: ACTIVE | OUTPATIENT
Start: 2025-06-25

## 2025-07-02 DIAGNOSIS — M79.18 CERVICAL MYOFASCIAL PAIN SYNDROME: ICD-10-CM

## 2025-07-02 DIAGNOSIS — G44.86 CERVICOGENIC HEADACHE: ICD-10-CM

## 2025-07-02 RX ORDER — TIZANIDINE 4 MG/1
TABLET ORAL
Qty: 30 TABLET | Refills: 11 | Status: SHIPPED | OUTPATIENT
Start: 2025-07-02

## 2025-07-07 DIAGNOSIS — I25.10 CORONARY ARTERY DISEASE INVOLVING NATIVE CORONARY ARTERY OF NATIVE HEART WITHOUT ANGINA PECTORIS: Primary | ICD-10-CM

## 2025-07-07 RX ORDER — ISOSORBIDE MONONITRATE 30 MG/1
30 TABLET, EXTENDED RELEASE ORAL
Qty: 90 TABLET | Refills: 0 | Status: SHIPPED | OUTPATIENT
Start: 2025-07-07

## 2025-07-09 DIAGNOSIS — Z78.0 MENOPAUSE: ICD-10-CM

## 2025-07-17 ENCOUNTER — TELEPHONE (OUTPATIENT)
Dept: GASTROENTEROLOGY | Facility: CLINIC | Age: 75
End: 2025-07-17
Payer: MEDICARE

## 2025-07-17 ENCOUNTER — HOSPITAL ENCOUNTER (OUTPATIENT)
Dept: RADIOLOGY | Facility: HOSPITAL | Age: 75
Discharge: HOME | End: 2025-07-17
Attending: INTERNAL MEDICINE
Payer: MEDICARE

## 2025-07-17 DIAGNOSIS — Z78.0 MENOPAUSE: ICD-10-CM

## 2025-07-17 PROCEDURE — 77080 DXA BONE DENSITY AXIAL: CPT | Mod: 26,,, | Performed by: RADIOLOGY

## 2025-07-17 PROCEDURE — 77080 DXA BONE DENSITY AXIAL: CPT | Mod: TC,PO

## 2025-07-17 NOTE — TELEPHONE ENCOUNTER
Copied from CRM #[id number]. Topic: Appointments - Appointment Access  >> Jul 17, 2025 11:41 AM Tresa wrote:  Type:  Appointment Request    Caller is requesting a sooner appointment.  Caller declined first available appointment listed below.  Caller will not accept being placed on the waitlist and is requesting a message be sent to doctor.    Name of Caller:  Patient  When is the first available appointment?  N/A    Would the patient rather a call back or a response via [facility]?  Call back  Best Call Back Number:   [phone]    Additional Information:   States she would like to speak with someone to reschedule her 7/29 scheduled colonoscopy - please call - thank you

## 2025-07-24 DIAGNOSIS — M54.50 CHRONIC MIDLINE LOW BACK PAIN WITHOUT SCIATICA: ICD-10-CM

## 2025-07-24 DIAGNOSIS — G89.29 CHRONIC MIDLINE LOW BACK PAIN WITHOUT SCIATICA: ICD-10-CM

## 2025-07-24 RX ORDER — METHOCARBAMOL 750 MG/1
750 TABLET, FILM COATED ORAL 4 TIMES DAILY
Qty: 40 TABLET | Refills: 3 | Status: SHIPPED | OUTPATIENT
Start: 2025-07-24

## 2025-07-24 NOTE — TELEPHONE ENCOUNTER
No care due was identified.  [facility] Embedded Care Due Messages. Reference number: [id number].   7/24/2025 8:01:50 AM CDT

## 2025-07-25 DIAGNOSIS — Z95.818 PRESENCE OF OTHER CARDIAC IMPLANTS AND GRAFTS: ICD-10-CM

## 2025-07-25 DIAGNOSIS — I63.9 ACUTE CVA (CEREBROVASCULAR ACCIDENT): ICD-10-CM

## 2025-07-25 DIAGNOSIS — Z95.1 S/P CABG (CORONARY ARTERY BYPASS GRAFT): ICD-10-CM

## 2025-07-25 RX ORDER — CLOPIDOGREL BISULFATE 75 MG/1
75 TABLET ORAL DAILY
Qty: 90 TABLET | Refills: 1 | Status: SHIPPED | OUTPATIENT
Start: 2025-07-25 | End: 2026-07-25

## 2025-08-12 ENCOUNTER — OFFICE VISIT (OUTPATIENT)
Dept: CARDIOLOGY | Facility: CLINIC | Age: 75
End: 2025-08-12
Payer: MEDICARE

## 2025-08-12 VITALS
HEIGHT: 61 IN | SYSTOLIC BLOOD PRESSURE: 136 MMHG | BODY MASS INDEX: 26.98 KG/M2 | WEIGHT: 142.88 LBS | DIASTOLIC BLOOD PRESSURE: 74 MMHG

## 2025-08-12 DIAGNOSIS — I25.10 CORONARY ARTERY DISEASE DUE TO LIPID RICH PLAQUE: Primary | Chronic | ICD-10-CM

## 2025-08-12 DIAGNOSIS — E66.3 OVERWEIGHT (BMI 25.0-29.9): Chronic | ICD-10-CM

## 2025-08-12 DIAGNOSIS — Z95.1 S/P CABG (CORONARY ARTERY BYPASS GRAFT): Chronic | ICD-10-CM

## 2025-08-12 DIAGNOSIS — R06.09 DOE (DYSPNEA ON EXERTION): ICD-10-CM

## 2025-08-12 DIAGNOSIS — I34.0 NON-RHEUMATIC MITRAL REGURGITATION: Chronic | ICD-10-CM

## 2025-08-12 DIAGNOSIS — I25.83 CORONARY ARTERY DISEASE DUE TO LIPID RICH PLAQUE: Primary | Chronic | ICD-10-CM

## 2025-08-12 DIAGNOSIS — Z95.5 S/P DRUG ELUTING CORONARY STENT PLACEMENT: Chronic | ICD-10-CM

## 2025-08-12 DIAGNOSIS — I10 ESSENTIAL HYPERTENSION: Chronic | ICD-10-CM

## 2025-08-12 DIAGNOSIS — Z95.818 PRESENCE OF OTHER CARDIAC IMPLANTS AND GRAFTS: Chronic | ICD-10-CM

## 2025-08-12 DIAGNOSIS — I63.9 ACUTE CVA (CEREBROVASCULAR ACCIDENT): ICD-10-CM

## 2025-08-12 PROCEDURE — 99214 OFFICE O/P EST MOD 30 MIN: CPT | Mod: S$PBB,,, | Performed by: INTERNAL MEDICINE

## 2025-08-12 PROCEDURE — 99212 OFFICE O/P EST SF 10 MIN: CPT | Mod: PBBFAC,PO | Performed by: INTERNAL MEDICINE

## 2025-08-12 PROCEDURE — 99999 PR PBB SHADOW E&M-EST. PATIENT-LVL II: CPT | Mod: PBBFAC,,, | Performed by: INTERNAL MEDICINE

## 2025-08-12 RX ORDER — CLOPIDOGREL BISULFATE 75 MG/1
75 TABLET ORAL DAILY
Qty: 90 TABLET | Refills: 1 | Status: SHIPPED | OUTPATIENT
Start: 2025-08-12 | End: 2026-08-12

## 2025-08-13 PROBLEM — Z95.5 S/P DRUG ELUTING CORONARY STENT PLACEMENT: Chronic | Status: ACTIVE | Noted: 2025-08-13

## 2025-08-13 PROBLEM — Z95.818 PRESENCE OF OTHER CARDIAC IMPLANTS AND GRAFTS: Chronic | Status: ACTIVE | Noted: 2025-08-13

## 2025-08-19 ENCOUNTER — LAB VISIT (OUTPATIENT)
Dept: LAB | Facility: HOSPITAL | Age: 75
End: 2025-08-19
Payer: MEDICARE

## 2025-08-19 DIAGNOSIS — E11.9 CONTROLLED TYPE 2 DIABETES MELLITUS WITHOUT COMPLICATION, WITHOUT LONG-TERM CURRENT USE OF INSULIN: ICD-10-CM

## 2025-08-19 DIAGNOSIS — E03.4 HYPOTHYROIDISM DUE TO ACQUIRED ATROPHY OF THYROID: ICD-10-CM

## 2025-08-19 DIAGNOSIS — I10 ESSENTIAL HYPERTENSION: ICD-10-CM

## 2025-08-19 DIAGNOSIS — N18.31 STAGE 3A CHRONIC KIDNEY DISEASE: ICD-10-CM

## 2025-08-19 DIAGNOSIS — I25.10 CORONARY ARTERY DISEASE, UNSPECIFIED VESSEL OR LESION TYPE, UNSPECIFIED WHETHER ANGINA PRESENT, UNSPECIFIED WHETHER NATIVE OR TRANSPLANTED HEART: ICD-10-CM

## 2025-08-19 LAB
ABSOLUTE EOSINOPHIL (OHS): 0.27 K/UL
ABSOLUTE MONOCYTE (OHS): 0.46 K/UL (ref 0.3–1)
ABSOLUTE NEUTROPHIL COUNT (OHS): 6.45 K/UL (ref 1.8–7.7)
ALBUMIN SERPL BCP-MCNC: 3.9 G/DL (ref 3.5–5.2)
ALP SERPL-CCNC: 63 UNIT/L (ref 40–150)
ALT SERPL W/O P-5'-P-CCNC: 15 UNIT/L (ref 0–55)
ANION GAP (OHS): 9 MMOL/L (ref 8–16)
AST SERPL-CCNC: 24 UNIT/L (ref 0–50)
BASOPHILS # BLD AUTO: 0.08 K/UL
BASOPHILS NFR BLD AUTO: 0.9 %
BILIRUB SERPL-MCNC: 0.3 MG/DL (ref 0.1–1)
BUN SERPL-MCNC: 16 MG/DL (ref 8–23)
CALCIUM SERPL-MCNC: 9.3 MG/DL (ref 8.7–10.5)
CHLORIDE SERPL-SCNC: 104 MMOL/L (ref 95–110)
CHOLEST SERPL-MCNC: 192 MG/DL (ref 120–199)
CHOLEST/HDLC SERPL: 2.1 {RATIO} (ref 2–5)
CO2 SERPL-SCNC: 24 MMOL/L (ref 23–29)
CREAT SERPL-MCNC: 1.2 MG/DL (ref 0.5–1.4)
EAG (OHS): 108 MG/DL (ref 68–131)
ERYTHROCYTE [DISTWIDTH] IN BLOOD BY AUTOMATED COUNT: 13.6 % (ref 11.5–14.5)
GFR SERPLBLD CREATININE-BSD FMLA CKD-EPI: 47 ML/MIN/1.73/M2
GLUCOSE SERPL-MCNC: 85 MG/DL (ref 70–110)
HBA1C MFR BLD: 5.4 % (ref 4–5.6)
HCT VFR BLD AUTO: 33 % (ref 37–48.5)
HDLC SERPL-MCNC: 92 MG/DL (ref 40–75)
HDLC SERPL: 47.9 % (ref 20–50)
HGB BLD-MCNC: 10.4 GM/DL (ref 12–16)
IMM GRANULOCYTES # BLD AUTO: 0.03 K/UL (ref 0–0.04)
IMM GRANULOCYTES NFR BLD AUTO: 0.3 % (ref 0–0.5)
LDLC SERPL CALC-MCNC: 84.6 MG/DL (ref 63–159)
LYMPHOCYTES # BLD AUTO: 1.36 K/UL (ref 1–4.8)
MCH RBC QN AUTO: 29.8 PG (ref 27–31)
MCHC RBC AUTO-ENTMCNC: 31.5 G/DL (ref 32–36)
MCV RBC AUTO: 95 FL (ref 82–98)
NONHDLC SERPL-MCNC: 100 MG/DL
NUCLEATED RBC (/100WBC) (OHS): 0 /100 WBC
PLATELET # BLD AUTO: 240 K/UL (ref 150–450)
PMV BLD AUTO: 11.4 FL (ref 9.2–12.9)
POTASSIUM SERPL-SCNC: 5 MMOL/L (ref 3.5–5.1)
PROT SERPL-MCNC: 7 GM/DL (ref 6–8.4)
RBC # BLD AUTO: 3.49 M/UL (ref 4–5.4)
RELATIVE EOSINOPHIL (OHS): 3.1 %
RELATIVE LYMPHOCYTE (OHS): 15.7 % (ref 18–48)
RELATIVE MONOCYTE (OHS): 5.3 % (ref 4–15)
RELATIVE NEUTROPHIL (OHS): 74.7 % (ref 38–73)
SODIUM SERPL-SCNC: 137 MMOL/L (ref 136–145)
T4 FREE SERPL-MCNC: 1.01 NG/DL (ref 0.71–1.51)
TRIGL SERPL-MCNC: 77 MG/DL (ref 30–150)
TSH SERPL-ACNC: 6.65 UIU/ML (ref 0.4–4)
WBC # BLD AUTO: 8.65 K/UL (ref 3.9–12.7)

## 2025-08-19 PROCEDURE — 84443 ASSAY THYROID STIM HORMONE: CPT

## 2025-08-19 PROCEDURE — 80061 LIPID PANEL: CPT

## 2025-08-19 PROCEDURE — 80053 COMPREHEN METABOLIC PANEL: CPT

## 2025-08-19 PROCEDURE — 36415 COLL VENOUS BLD VENIPUNCTURE: CPT | Mod: PO

## 2025-08-19 PROCEDURE — 84439 ASSAY OF FREE THYROXINE: CPT

## 2025-08-19 PROCEDURE — 85025 COMPLETE CBC W/AUTO DIFF WBC: CPT

## 2025-08-19 PROCEDURE — 83036 HEMOGLOBIN GLYCOSYLATED A1C: CPT

## 2025-08-26 ENCOUNTER — OFFICE VISIT (OUTPATIENT)
Dept: FAMILY MEDICINE | Facility: CLINIC | Age: 75
End: 2025-08-26
Payer: MEDICARE

## 2025-08-26 ENCOUNTER — LAB VISIT (OUTPATIENT)
Dept: LAB | Facility: HOSPITAL | Age: 75
End: 2025-08-26
Attending: INTERNAL MEDICINE
Payer: MEDICARE

## 2025-08-26 VITALS
SYSTOLIC BLOOD PRESSURE: 180 MMHG | BODY MASS INDEX: 27.01 KG/M2 | HEIGHT: 61 IN | HEART RATE: 79 BPM | OXYGEN SATURATION: 97 % | WEIGHT: 143.06 LBS | DIASTOLIC BLOOD PRESSURE: 90 MMHG

## 2025-08-26 DIAGNOSIS — R06.09 DOE (DYSPNEA ON EXERTION): ICD-10-CM

## 2025-08-26 DIAGNOSIS — R53.83 FATIGUE, UNSPECIFIED TYPE: ICD-10-CM

## 2025-08-26 DIAGNOSIS — I10 ESSENTIAL HYPERTENSION: ICD-10-CM

## 2025-08-26 DIAGNOSIS — I25.10 CORONARY ARTERY DISEASE, UNSPECIFIED VESSEL OR LESION TYPE, UNSPECIFIED WHETHER ANGINA PRESENT, UNSPECIFIED WHETHER NATIVE OR TRANSPLANTED HEART: ICD-10-CM

## 2025-08-26 DIAGNOSIS — R07.9 CHEST PAIN, UNSPECIFIED TYPE: ICD-10-CM

## 2025-08-26 DIAGNOSIS — E03.4 HYPOTHYROIDISM DUE TO ACQUIRED ATROPHY OF THYROID: ICD-10-CM

## 2025-08-26 DIAGNOSIS — D50.8 OTHER IRON DEFICIENCY ANEMIA: Primary | ICD-10-CM

## 2025-08-26 PROCEDURE — 99213 OFFICE O/P EST LOW 20 MIN: CPT | Mod: PBBFAC,PO | Performed by: INTERNAL MEDICINE

## 2025-08-26 PROCEDURE — 99215 OFFICE O/P EST HI 40 MIN: CPT | Mod: S$PBB,,, | Performed by: INTERNAL MEDICINE

## 2025-08-26 PROCEDURE — G2211 COMPLEX E/M VISIT ADD ON: HCPCS | Mod: ,,, | Performed by: INTERNAL MEDICINE

## 2025-08-26 PROCEDURE — 99999 PR PBB SHADOW E&M-EST. PATIENT-LVL III: CPT | Mod: PBBFAC,,, | Performed by: INTERNAL MEDICINE

## 2025-08-27 ENCOUNTER — RESULTS FOLLOW-UP (OUTPATIENT)
Dept: FAMILY MEDICINE | Facility: CLINIC | Age: 75
End: 2025-08-27
Payer: MEDICARE

## 2025-08-27 ENCOUNTER — TELEPHONE (OUTPATIENT)
Dept: CARDIOLOGY | Facility: CLINIC | Age: 75
End: 2025-08-27
Payer: MEDICARE

## 2025-08-27 RX ORDER — LEVOTHYROXINE SODIUM 112 UG/1
112 TABLET ORAL
Qty: 30 TABLET | Refills: 11 | Status: SHIPPED | OUTPATIENT
Start: 2025-08-27 | End: 2026-08-27

## 2025-09-02 ENCOUNTER — TELEPHONE (OUTPATIENT)
Dept: GASTROENTEROLOGY | Facility: CLINIC | Age: 75
End: 2025-09-02
Payer: MEDICARE

## (undated) DEVICE — SOL IRR STRL WATER 500ML

## (undated) DEVICE — ELECTRODE REM PLYHSV RETURN 9

## (undated) DEVICE — BANDAGE ADHESIVE

## (undated) DEVICE — SOL BSS BALANCED SALT

## (undated) DEVICE — SCISSOR 5MMX35CM DIRECT DRIVE

## (undated) DEVICE — KIT ANTIFOG

## (undated) DEVICE — PACK EYE CUSTOM COVINGTON.

## (undated) DEVICE — COVER SURG LIGHT HANDLE

## (undated) DEVICE — DRESSING LEUKOPLAST FLEX 1X3IN

## (undated) DEVICE — SUT MONOCYRL 4-0 PS2 UND

## (undated) DEVICE — DRAPE OPHTHALMIC 48X62 FEN

## (undated) DEVICE — NEPTUNE 4 PORT MANIFOLD

## (undated) DEVICE — BLADE SURG CARBON STEEL SZ11

## (undated) DEVICE — TROCAR KII FIOS 5MM X 100MM

## (undated) DEVICE — TROCAR ENDOPATH XCEL 5MM 7.5CM

## (undated) DEVICE — GLOVE BIOGEL ECLIPSE SZ 6.5

## (undated) DEVICE — TROCAR ENDOPATH XCEL 5X75MM

## (undated) DEVICE — SOL BETADINE 5%

## (undated) DEVICE — NDL INSUF ULTRA VERESS 120MM

## (undated) DEVICE — PACK OPHTHALMIC

## (undated) DEVICE — SUT 0 VICRYL / UR6 (J603)

## (undated) DEVICE — SPONGE WEC CEL SPEARS

## (undated) DEVICE — SEE MEDLINE ITEM 157128

## (undated) DEVICE — TUBING HEATED INSUFFLATOR

## (undated) DEVICE — APPLICATOR CHLORAPREP ORN 26ML

## (undated) DEVICE — GARTER EYE ADULT

## (undated) DEVICE — BAG TISS RETRV MONARCH 10MM

## (undated) DEVICE — SEE MEDLINE ITEM 152622

## (undated) DEVICE — SEE L#120831

## (undated) DEVICE — APPLIER CLIP EPIX UNIV 5X34

## (undated) DEVICE — BANDAID STRIP PLASTIC 3/4X3

## (undated) DEVICE — Device

## (undated) DEVICE — SYR 10CC LUER LOCK

## (undated) DEVICE — TROCAR ENDOPATH XCEL 11MM 10CM

## (undated) DEVICE — GLOVE SURG BIOGEL LATEX SZ 7.5

## (undated) DEVICE — DRAPE ABDOMINAL TIBURON 14X11